# Patient Record
Sex: MALE | Race: BLACK OR AFRICAN AMERICAN | Employment: PART TIME | URBAN - METROPOLITAN AREA
[De-identification: names, ages, dates, MRNs, and addresses within clinical notes are randomized per-mention and may not be internally consistent; named-entity substitution may affect disease eponyms.]

---

## 2017-01-10 ENCOUNTER — HOSPITAL ENCOUNTER (INPATIENT)
Age: 37
LOS: 4 days | Discharge: HOME | DRG: 812 | End: 2017-01-14
Attending: EMERGENCY MEDICINE | Admitting: FAMILY MEDICINE
Payer: MEDICARE

## 2017-01-10 DIAGNOSIS — D57.00 SICKLE CELL PAIN CRISIS (HCC): ICD-10-CM

## 2017-01-10 DIAGNOSIS — D57.00 SICKLE CELL ANEMIA WITH CRISIS (HCC): Primary | ICD-10-CM

## 2017-01-10 LAB
ANION GAP BLD CALC-SCNC: 8 MMOL/L (ref 3–18)
BASOPHILS # BLD AUTO: 0.3 K/UL (ref 0–0.06)
BASOPHILS # BLD: 2 % (ref 0–3)
BUN SERPL-MCNC: 4 MG/DL (ref 7–18)
BUN/CREAT SERPL: 5 (ref 12–20)
CALCIUM SERPL-MCNC: 8.5 MG/DL (ref 8.5–10.1)
CHLORIDE SERPL-SCNC: 106 MMOL/L (ref 100–108)
CO2 SERPL-SCNC: 26 MMOL/L (ref 21–32)
CREAT SERPL-MCNC: 0.79 MG/DL (ref 0.6–1.3)
DIFFERENTIAL METHOD BLD: ABNORMAL
EOSINOPHIL # BLD: 0.9 K/UL (ref 0–0.4)
EOSINOPHIL NFR BLD: 7 % (ref 0–5)
ERYTHROCYTE [DISTWIDTH] IN BLOOD BY AUTOMATED COUNT: 23.6 % (ref 11.6–14.5)
GLUCOSE SERPL-MCNC: 94 MG/DL (ref 74–99)
HCT VFR BLD AUTO: 27.8 % (ref 36–48)
HGB BLD-MCNC: 9.8 G/DL (ref 13–16)
LYMPHOCYTES # BLD AUTO: 45 % (ref 20–51)
LYMPHOCYTES # BLD: 5.8 K/UL (ref 0.8–3.5)
MCH RBC QN AUTO: 26.5 PG (ref 24–34)
MCHC RBC AUTO-ENTMCNC: 35.3 G/DL (ref 31–37)
MCV RBC AUTO: 75.1 FL (ref 74–97)
MONOCYTES # BLD: 0.9 K/UL (ref 0–1)
MONOCYTES NFR BLD AUTO: 7 % (ref 2–9)
NEUTS SEG # BLD: 4.8 K/UL (ref 1.8–8)
NEUTS SEG NFR BLD AUTO: 37 % (ref 42–75)
NRBC BLD-RTO: 1 PER 100 WBC
PLATELET # BLD AUTO: 391 K/UL (ref 135–420)
PLATELET COMMENTS,PCOM: ABNORMAL
PMV BLD AUTO: 9.4 FL (ref 9.2–11.8)
POTASSIUM SERPL-SCNC: 3.6 MMOL/L (ref 3.5–5.5)
RBC # BLD AUTO: 3.7 M/UL (ref 4.7–5.5)
RBC MORPH BLD: ABNORMAL
RETICS/RBC NFR AUTO: 9.4 % (ref 0.5–2.3)
SODIUM SERPL-SCNC: 140 MMOL/L (ref 136–145)
WBC # BLD AUTO: 12.9 K/UL (ref 4.6–13.2)
WBC OTHER # BLD MANUAL: 2 10*3/UL

## 2017-01-10 PROCEDURE — 74011250636 HC RX REV CODE- 250/636: Performed by: FAMILY MEDICINE

## 2017-01-10 PROCEDURE — 65270000029 HC RM PRIVATE

## 2017-01-10 PROCEDURE — 99284 EMERGENCY DEPT VISIT MOD MDM: CPT

## 2017-01-10 PROCEDURE — 74011250636 HC RX REV CODE- 250/636: Performed by: EMERGENCY MEDICINE

## 2017-01-10 PROCEDURE — 74011250637 HC RX REV CODE- 250/637: Performed by: FAMILY MEDICINE

## 2017-01-10 PROCEDURE — 74011250636 HC RX REV CODE- 250/636: Performed by: INTERNAL MEDICINE

## 2017-01-10 PROCEDURE — 80048 BASIC METABOLIC PNL TOTAL CA: CPT | Performed by: EMERGENCY MEDICINE

## 2017-01-10 PROCEDURE — 85045 AUTOMATED RETICULOCYTE COUNT: CPT | Performed by: EMERGENCY MEDICINE

## 2017-01-10 PROCEDURE — 96374 THER/PROPH/DIAG INJ IV PUSH: CPT

## 2017-01-10 PROCEDURE — 85025 COMPLETE CBC W/AUTO DIFF WBC: CPT | Performed by: EMERGENCY MEDICINE

## 2017-01-10 PROCEDURE — 96375 TX/PRO/DX INJ NEW DRUG ADDON: CPT

## 2017-01-10 PROCEDURE — 96376 TX/PRO/DX INJ SAME DRUG ADON: CPT

## 2017-01-10 PROCEDURE — 96361 HYDRATE IV INFUSION ADD-ON: CPT

## 2017-01-10 RX ORDER — HYDROMORPHONE HYDROCHLORIDE 1 MG/ML
2 INJECTION, SOLUTION INTRAMUSCULAR; INTRAVENOUS; SUBCUTANEOUS ONCE
Status: COMPLETED | OUTPATIENT
Start: 2017-01-10 | End: 2017-01-10

## 2017-01-10 RX ORDER — HYDROMORPHONE HYDROCHLORIDE 1 MG/ML
2 INJECTION, SOLUTION INTRAMUSCULAR; INTRAVENOUS; SUBCUTANEOUS
Status: DISCONTINUED | OUTPATIENT
Start: 2017-01-10 | End: 2017-01-11

## 2017-01-10 RX ORDER — DIPHENHYDRAMINE HYDROCHLORIDE 50 MG/ML
50 INJECTION, SOLUTION INTRAMUSCULAR; INTRAVENOUS ONCE
Status: COMPLETED | OUTPATIENT
Start: 2017-01-10 | End: 2017-01-10

## 2017-01-10 RX ORDER — SODIUM CHLORIDE 9 MG/ML
150 INJECTION, SOLUTION INTRAVENOUS CONTINUOUS
Status: DISCONTINUED | OUTPATIENT
Start: 2017-01-10 | End: 2017-01-14 | Stop reason: HOSPADM

## 2017-01-10 RX ORDER — HYDROXYUREA 500 MG/1
500 CAPSULE ORAL 2 TIMES DAILY
Status: DISCONTINUED | OUTPATIENT
Start: 2017-01-10 | End: 2017-01-14 | Stop reason: HOSPADM

## 2017-01-10 RX ORDER — FENTANYL 25 UG/1
1 PATCH TRANSDERMAL
Status: DISCONTINUED | OUTPATIENT
Start: 2017-01-10 | End: 2017-01-14 | Stop reason: HOSPADM

## 2017-01-10 RX ORDER — SODIUM CHLORIDE 9 MG/ML
150 INJECTION, SOLUTION INTRAVENOUS CONTINUOUS
Status: DISCONTINUED | OUTPATIENT
Start: 2017-01-10 | End: 2017-01-10

## 2017-01-10 RX ORDER — DIPHENHYDRAMINE HYDROCHLORIDE 50 MG/ML
25 INJECTION, SOLUTION INTRAMUSCULAR; INTRAVENOUS
Status: COMPLETED | OUTPATIENT
Start: 2017-01-10 | End: 2017-01-10

## 2017-01-10 RX ORDER — HYDROMORPHONE HYDROCHLORIDE 2 MG/ML
2 INJECTION, SOLUTION INTRAMUSCULAR; INTRAVENOUS; SUBCUTANEOUS ONCE
Status: COMPLETED | OUTPATIENT
Start: 2017-01-10 | End: 2017-01-10

## 2017-01-10 RX ORDER — ONDANSETRON 4 MG/1
8 TABLET, FILM COATED ORAL
Status: DISCONTINUED | OUTPATIENT
Start: 2017-01-10 | End: 2017-01-14 | Stop reason: HOSPADM

## 2017-01-10 RX ORDER — DIPHENHYDRAMINE HYDROCHLORIDE 50 MG/ML
25 INJECTION, SOLUTION INTRAMUSCULAR; INTRAVENOUS
Status: DISCONTINUED | OUTPATIENT
Start: 2017-01-10 | End: 2017-01-14 | Stop reason: HOSPADM

## 2017-01-10 RX ORDER — ONDANSETRON 2 MG/ML
4 INJECTION INTRAMUSCULAR; INTRAVENOUS
Status: COMPLETED | OUTPATIENT
Start: 2017-01-10 | End: 2017-01-10

## 2017-01-10 RX ORDER — HYDROMORPHONE HYDROCHLORIDE 1 MG/ML
1 INJECTION, SOLUTION INTRAMUSCULAR; INTRAVENOUS; SUBCUTANEOUS ONCE
Status: COMPLETED | OUTPATIENT
Start: 2017-01-10 | End: 2017-01-10

## 2017-01-10 RX ORDER — SODIUM CHLORIDE 450 MG/100ML
150 INJECTION, SOLUTION INTRAVENOUS CONTINUOUS
Status: DISCONTINUED | OUTPATIENT
Start: 2017-01-10 | End: 2017-01-10

## 2017-01-10 RX ADMIN — HYDROMORPHONE HYDROCHLORIDE 2 MG: 1 INJECTION, SOLUTION INTRAMUSCULAR; INTRAVENOUS; SUBCUTANEOUS at 20:52

## 2017-01-10 RX ADMIN — SODIUM CHLORIDE 1000 ML: 900 INJECTION, SOLUTION INTRAVENOUS at 12:36

## 2017-01-10 RX ADMIN — HYDROMORPHONE HYDROCHLORIDE 2 MG: 2 INJECTION, SOLUTION INTRAMUSCULAR; INTRAVENOUS; SUBCUTANEOUS at 12:32

## 2017-01-10 RX ADMIN — HYDROXYUREA 500 MG: 500 CAPSULE ORAL at 21:50

## 2017-01-10 RX ADMIN — ONDANSETRON HYDROCHLORIDE 4 MG: 2 SOLUTION INTRAMUSCULAR; INTRAVENOUS at 02:48

## 2017-01-10 RX ADMIN — DIPHENHYDRAMINE HYDROCHLORIDE 25 MG: 50 INJECTION INTRAMUSCULAR; INTRAVENOUS at 12:31

## 2017-01-10 RX ADMIN — HYDROMORPHONE HYDROCHLORIDE 2 MG: 1 INJECTION, SOLUTION INTRAMUSCULAR; INTRAVENOUS; SUBCUTANEOUS at 02:48

## 2017-01-10 RX ADMIN — DIPHENHYDRAMINE HYDROCHLORIDE 25 MG: 50 INJECTION INTRAMUSCULAR; INTRAVENOUS at 20:54

## 2017-01-10 RX ADMIN — HYDROMORPHONE HYDROCHLORIDE 2 MG: 1 INJECTION, SOLUTION INTRAMUSCULAR; INTRAVENOUS; SUBCUTANEOUS at 19:46

## 2017-01-10 RX ADMIN — ONDANSETRON HYDROCHLORIDE 8 MG: 8 TABLET, FILM COATED ORAL at 17:18

## 2017-01-10 RX ADMIN — HYDROMORPHONE HYDROCHLORIDE 2 MG: 1 INJECTION, SOLUTION INTRAMUSCULAR; INTRAVENOUS; SUBCUTANEOUS at 17:18

## 2017-01-10 RX ADMIN — DIPHENHYDRAMINE HYDROCHLORIDE 50 MG: 50 INJECTION INTRAMUSCULAR; INTRAVENOUS at 02:48

## 2017-01-10 RX ADMIN — DIPHENHYDRAMINE HYDROCHLORIDE 50 MG: 50 INJECTION INTRAMUSCULAR; INTRAVENOUS at 07:02

## 2017-01-10 RX ADMIN — SODIUM CHLORIDE 125 ML/HR: 900 INJECTION, SOLUTION INTRAVENOUS at 17:19

## 2017-01-10 RX ADMIN — SODIUM CHLORIDE 125 ML/HR: 900 INJECTION, SOLUTION INTRAVENOUS at 21:06

## 2017-01-10 RX ADMIN — HYDROMORPHONE HYDROCHLORIDE 2 MG: 1 INJECTION, SOLUTION INTRAMUSCULAR; INTRAVENOUS; SUBCUTANEOUS at 05:46

## 2017-01-10 RX ADMIN — SODIUM CHLORIDE 1000 ML: 900 INJECTION, SOLUTION INTRAVENOUS at 02:49

## 2017-01-10 RX ADMIN — HYDROMORPHONE HYDROCHLORIDE 2 MG: 1 INJECTION, SOLUTION INTRAMUSCULAR; INTRAVENOUS; SUBCUTANEOUS at 03:32

## 2017-01-10 RX ADMIN — HYDROMORPHONE HYDROCHLORIDE 1 MG: 1 INJECTION, SOLUTION INTRAMUSCULAR; INTRAVENOUS; SUBCUTANEOUS at 10:41

## 2017-01-10 RX ADMIN — HYDROMORPHONE HYDROCHLORIDE 2 MG: 1 INJECTION, SOLUTION INTRAMUSCULAR; INTRAVENOUS; SUBCUTANEOUS at 06:53

## 2017-01-10 NOTE — IP AVS SNAPSHOT
[address] [address] [facility] Patient: Neil Brown MRN: [id number] PHT:5/90/1407 You are allergic to the following Allergen Reactions Eggshell Membrane Nausea and Vomiting Milk Nausea and Vomiting Recent Documentation Weight BMI Smoking Status 78 kg 23.33 kg/m2 Never Smoker Emergency Contacts Name Discharge Info Relation Home Work Mobile [address] CAREGIVER [3] Spouse [3] [phone] Art Moreno  Mother [14] [phone] About your hospitalization You were admitted on:  January 10, 2017 You last received care in the:  [facility] [address] You were discharged on:  January 14, 2017 Unit phone number:  [phone] Why you were hospitalized Your primary diagnosis was:  Not on File Providers Seen During Your Hospitalizations Provider Role Specialty Primary office phone Edd Oppenheim, MD Attending Provider Internal Medicine [phone] Woodrow Hernandez MD Attending Provider Emergency Medicine [phone] Spencer Valle MD Attending Provider Emergency Medicine [phone] Gary Talbert MD Attending Provider [facility] [phone] Nilo Le MD Attending Provider Hematology and Oncology [phone] Your Primary Care Physician (PCP) Primary Care Physician Office Phone Office Fax [address] [facility], [address] [place] [address] [phone] Follow-up Information Follow up With Details Comments Contact Info Nilo Le MD On 1/13/2017 Kalina Chavez will call  the patient. [address] [facility] [address] [place] [address] 
[phone] Current Discharge Medication List  
  
START taking these medications Dose & Instructions Dispensing Information Comments Morning Noon Evening Bedtime  
 folic acid 1 mg tablet Commonly known as:  Google Your next dose is: Today, Tomorrow Other:  _________ Dose:  1 mg Take 1 Tab by mouth daily. Quantity:  30 Tab Refills:  1 CONTINUE these medications which have NOT CHANGED Dose & Instructions Dispensing Information Comments Morning Noon Evening Bedtime  
 fentaNYL 25 mcg/hr PATCH Commonly known as:  Alexis Herrera Your next dose is: Today, Tomorrow Other:  _________ Dose:  1 Patch 1 Patch by TransDERmal route every seventy-two (72) hours. Max Daily Amount: 1 Patch. Quantity:  5 Patch Refills:  0 HYDROmorphone 2 mg tablet Commonly known as:  DILAUDID Your next dose is: Today, Tomorrow Other:  _________ Dose:  2 mg Take 1 Tab by mouth every four (4) hours as needed for Pain. Max Daily Amount: 12 mg. Quantity:  40 Tab Refills:  0  
     
   
   
   
  
 hydroxyurea 500 mg capsule Commonly known as:  HYDREA Your next dose is: Today, Tomorrow Other:  _________ Dose:  500 mg Take 500 mg by mouth two (2) times a day. Indications: SICKLE CELL ANEMIA WITH CRISIS Refills:  0  
     
   
   
   
  
 ondansetron hcl 4 mg tablet Commonly known as:  ZOFRAN (AS HYDROCHLORIDE) Your next dose is: Today, Tomorrow Other:  _________ Dose:  8 mg Take 2 Tabs by mouth every eight (8) hours as needed for Nausea. Quantity:  12 Tab Refills:  0 Where to Get Your Medications Information on where to get these meds will be given to you by the nurse or doctor. ! Ask your nurse or doctor about these medications  
  folic acid 1 mg tablet Discharge Instructions Sickle Cell Crisis: Care Instructions Your Care Instructions Sickle cell crisis is a painful episode that may begin suddenly in a person with sickle cell disease. Sickle cell disease turns normal, round red blood cells into cells that look like jeane or crescent moons. The sickle-shaped cells can get stuck in blood vessels, blocking blood flow and causing severe pain. The pain can occur in the bones of the spine, the arms and legs, the chest, and the abdomen. An episode may be called a \"painful event\" or \"painful crisis. \" Some people who have sickle cell disease have many painful events, while others have few or none. Treatment depends on the level of pain and how long it lasts. Sometimes taking nonprescription pain relievers can help. Or you may need stronger pain relief medicine that is prescribed or given by a doctor. You may need to be treated in the hospital. 
It isn't always possible to know what sets off a painful event. But triggers include being dehydrated, cold temperatures, infection, stress, and not getting enough oxygen. Follow-up care is a key part of your treatment and safety. Be sure to make and go to all appointments, and call your doctor if you are having problems. It's also a good idea to know your test results and keep a list of the medicines you take. How can you care for yourself at home? · Create a pain management plan with your doctor. This plan should include the types of medicines you can take and other actions you can take at home to relieve pain. · Drink plenty of fluids, enough so that your urine is light yellow or clear like water. If you have kidney, heart, or liver disease and have to limit fluids, talk with your doctor before you increase the amount of fluids you drink. · Take your medicines exactly as prescribed. Call your doctor if you think you are having a problem with your medicine. · Take pain medicines exactly as directed. ¨ If the doctor gave you a prescription medicine for pain, take it as prescribed. ¨ If you are not taking a prescription pain medicine, ask your doctor if you can take an over-the-counter medicine. · Avoid alcohol. It can make you dehydrated. · Dress warmly in cold weather. The cold and windy weather can lead to severe pain. · Do not smoke. Smoking can reduce the amount of oxygen in your blood. · Get plenty of sleep. When should you call for help? Call 911 anytime you think you may need emergency care. For example, call if: 
· You passed out (lost consciousness). Call your doctor now or seek immediate medical care if: 
· You are in severe pain. · You are dizzy or lightheaded, or you feel like you may faint. · You have a fever. · You are short of breath. · Your symptoms are getting worse. Watch closely for changes in your health, and be sure to contact your doctor if you are not getting better as expected. Where can you learn more? Go to http://manuel-amadeo.info/. Enter F104 in the search box to learn more about \"Sickle Cell Crisis: Care Instructions. \" Current as of: February 5, 2016 Content Version: 11.1 © 6749-5068 Silenseed. Care instructions adapted under license by [employer] (which disclaims liability or warranty for this information). If you have questions about a medical condition or this instruction, always ask your healthcare professional. [address] [place] [address] any warranty or liability for your use of this information. Sickle Cell Disease: Care Instructions Your Care Instructions Sickle cell disease turns normal, round red blood cells into misshaped cells that look like jeane or crescent moons. The sickle-shaped cells can get stuck in blood vessels, blocking blood flow and causing severe pain. The sickle-shaped cells also can harm organs, muscles, and bones. It is a lifelong condition. Sickle cell disease is passed down in families. You can talk to your doctor about whether to have genetic tests to find out the chance of having a child with the disease.  Your doctor also may recommend that your family members get tested for sickle cell disease. Your doctor may treat you with medicines. Some people get blood transfusions or a bone marrow transplant. Managing pain is an important part of your treatment. Follow-up care is a key part of your treatment and safety. Be sure to make and go to all appointments, and call your doctor if you are having problems. It's also a good idea to know your test results and keep a list of the medicines you take. How can you care for yourself at home? · Take your medicines exactly as prescribed. Call your doctor if you think you are having a problem with your medicine. · Take pain medicines exactly as directed. ¨ If the doctor gave you a prescription medicine for pain, take it as prescribed. ¨ If you are not taking a prescription pain medicine, ask your doctor if you can take an over-the-counter medicine. · Try to help ease pain by distracting yourself. Use guided imagery, deep breathing, and relaxation exercises. A pain specialist can teach you pain management skills. · Avoid alcohol. It can make you dehydrated. · Dress warmly in cold weather. The cold and windy weather can lead to severe pain. · Do not smoke. Smoking can reduce the amount of oxygen in your blood. If you need help quitting, talk to your doctor about stop-smoking programs and medicines. These can increase your chances of quitting for good. · Get plenty of sleep. · Get regular eye exams. Sickle cell disease can cause vision problems. · Wear medical alert jewelry that says that you have sickle cell disease. You can buy this at most drugstores. · Avoid colds and flu. Get a pneumococcal vaccine shot. If you have had one before, ask your doctor whether you need another dose. Get a flu shot every year. If you must be around people with colds or flu, wash your hands often. When should you call for help? Call 911 anytime you think you may need emergency care. For example, call if: · You passed out (lost consciousness). · You are in severe pain that is not helped by your pain medicines. Call your doctor now or seek immediate medical care if: 
· You have these signs of acute chest syndrome, a problem caused by sickle cell disease: ¨ Cough. ¨ Chest pain. ¨ Fever. ¨ Shortness of breath. · You have vision problems. · You have severe belly pain. · You have a severe headache. · You have less urine than normal or no urine. · You have vomiting or diarrhea that does not go away after 2 hours. · You are dizzy or lightheaded, or you feel like you may faint. · You have sudden numbness or tingling in your hands, feet, fingers, or toes (even if it goes away). · You suddenly have poor balance and coordination when you walk (even if it goes away). · You have an erection that lasts more than 2 to 3 hours or is extremely painful. Watch closely for changes in your health, and be sure to contact your doctor if you have any problems. Where can you learn more? Go to http://manuel-amadeo.info/. Enter 486 4676 in the search box to learn more about \"Sickle Cell Disease: Care Instructions. \" Current as of: February 5, 2016 Content Version: 11.1 © 1007-8326 Section 101. Care instructions adapted under license by [employer] (which disclaims liability or warranty for this information). If you have questions about a medical condition or this instruction, always ask your healthcare professional. [place] [address] any warranty or liability for your use of this information. Sickle Cell Disease: Care Instructions Your Care Instructions Sickle cell disease turns normal, round red blood cells into misshaped cells that look like jeane or crescent moons. The sickle-shaped cells can get stuck in blood vessels, blocking blood flow and causing severe pain. The sickle-shaped cells also can harm organs, muscles, and bones.  It is a lifelong condition. Sickle cell disease is passed down in families. You can talk to your doctor about whether to have genetic tests to find out the chance of having a child with the disease. Your doctor also may recommend that your family members get tested for sickle cell disease. Your doctor may treat you with medicines. Some people get blood transfusions or a bone marrow transplant. Managing pain is an important part of your treatment. Follow-up care is a key part of your treatment and safety. Be sure to make and go to all appointments, and call your doctor if you are having problems. It's also a good idea to know your test results and keep a list of the medicines you take. How can you care for yourself at home? · Take your medicines exactly as prescribed. Call your doctor if you think you are having a problem with your medicine. · Take pain medicines exactly as directed. ¨ If the doctor gave you a prescription medicine for pain, take it as prescribed. ¨ If you are not taking a prescription pain medicine, ask your doctor if you can take an over-the-counter medicine. · Try to help ease pain by distracting yourself. Use guided imagery, deep breathing, and relaxation exercises. A pain specialist can teach you pain management skills. · Avoid alcohol. It can make you dehydrated. · Dress warmly in cold weather. The cold and windy weather can lead to severe pain. · Do not smoke. Smoking can reduce the amount of oxygen in your blood. If you need help quitting, talk to your doctor about stop-smoking programs and medicines. These can increase your chances of quitting for good. · Get plenty of sleep. · Get regular eye exams. Sickle cell disease can cause vision problems. · Wear medical alert jewelry that says that you have sickle cell disease. You can buy this at most [employer]. · Avoid colds and flu. Get a pneumococcal vaccine shot.  If you have had one before, ask your doctor whether you need another dose. Get a flu shot every year. If you must be around people with colds or flu, wash your hands often. When should you call for help? Call 911 anytime you think you may need emergency care. For example, call if: 
· You passed out (lost consciousness). · You are in severe pain that is not helped by your pain medicines. Call your doctor now or seek immediate medical care if: 
· You have these signs of acute chest syndrome, a problem caused by sickle cell disease: ¨ Cough. ¨ Chest pain. ¨ Fever. ¨ Shortness of breath. · You have vision problems. · You have severe belly pain. · You have a severe headache. · You have less urine than normal or no urine. · You have vomiting or diarrhea that does not go away after 2 hours. · You are dizzy or lightheaded, or you feel like you may faint. · You have sudden numbness or tingling in your hands, feet, fingers, or toes (even if it goes away). · You suddenly have poor balance and coordination when you walk (even if it goes away). · You have an erection that lasts more than 2 to 3 hours or is extremely painful. Watch closely for changes in your health, and be sure to contact your doctor if you have any problems. Where can you learn more? Go to http://manuel-amadeo.info/. Enter 486 5792 in the search box to learn more about \"Sickle Cell Disease: Care Instructions. \" Current as of: February 5, 2016 Content Version: 11.1 © 6673-4436 [employer]ing. Care instructions adapted under license by [employer] (which disclaims liability or warranty for this information). If you have questions about a medical condition or this instruction, always ask your healthcare professional. [place] [address] any warranty or liability for your use of this information. Patient armband removed and shredded MyChart Activation Thank you for requesting access to Acumen. Please follow the instructions below to securely access and download your online medical record. Acumen allows you to send messages to your doctor, view your test results, renew your prescriptions, schedule appointments, and more. How Do I Sign Up? 1. In your internet browser, go to [email or web address] 
2. Click on the First Time User? Click Here link in the Sign In box. You will be redirect to the New Member Sign Up page. 3. Enter your Acumen Access Code exactly as it appears below. You will not need to use this code after youve completed the sign-up process. If you do not sign up before the expiration date, you must request a new code. Acumen Access Code: [email or web address] Expires: 1/15/2017  6:03 PM (This is the date your Acumen access code will ) 4. Enter the last four digits of your Social Security Number (xxxx) and Date of Birth (mm/dd/yyyy) as indicated and click Submit. You will be taken to the next sign-up page. 5. Create a Acumen ID. This will be your Acumen login ID and cannot be changed, so think of one that is secure and easy to remember. 6. Create a Acumen password. You can change your password at any time. 7. Enter your Password Reset Question and Answer. This can be used at a later time if you forget your password. 8. Enter your e-mail address. You will receive e-mail notification when new information is available in [address] 9. Click Sign Up. You can now view and download portions of your medical record. 10. Click the Download Summary menu link to download a portable copy of your medical information. Additional Information If you have questions, please visit the Frequently Asked Questions section of the Acumen website at [email or web address] Remember, Acumen is NOT to be used for urgent needs. For medical emergencies, dial 911. DISCHARGE SUMMARY from Nurse The following personal items are in your possession at time of discharge: 
 
Dental Appliances: None Visual Aid: None Home Medications: None Jewelry: Watch Clothing: Pants, Shirt, Undergarments, Footwear, Jacket/Coat, Socks (2 shirts total ) Other Valuables: Cell Phone () PATIENT INSTRUCTIONS: 
 
 
F-face looks uneven A-arms unable to move or move unevenly S-speech slurred or non-existent T-time-call 911 as soon as signs and symptoms begin-DO NOT go Back to bed or wait to see if you get better-TIME IS BRAIN. Warning Signs of HEART ATTACK Call 911 if you have these symptoms: 
? Chest discomfort. Most heart attacks involve discomfort in the center of the chest that lasts more than a few minutes, or that goes away and comes back. It can feel like uncomfortable pressure, squeezing, fullness, or pain. ? Discomfort in other areas of the upper body. Symptoms can include pain or discomfort in one or both arms, the back, neck, jaw, or stomach. ? Shortness of breath with or without chest discomfort. ? Other signs may include breaking out in a cold sweat, nausea, or lightheadedness. Don't wait more than five minutes to call [address]! Fast action can save your life. Calling 911 is almost always the fastest way to get lifesaving treatment. Emergency Medical Services staff can begin treatment when they arrive  up to an hour sooner than if someone gets to the hospital by car. The discharge information has been reviewed with the patient. The patient verbalized understanding.  
 
Discharge medications reviewed with the patient and appropriate educational materials and side effects teaching were provided. Discharge Orders None Inspirato Announcement We are excited to announce that we are making your provider's discharge notes available to you in Inspirato. You will see these notes when they are completed and signed by the physician that discharged you from your recent hospital stay. If you have any questions or concerns about any information you see in Inspirato, please call the Health Information Department where you were seen or reach out to your Primary Care Provider for more information about your plan of care. Introducing [facility]! Clifton Cristina introduces Inspirato patient portal. Now you can access parts of your medical record, email your doctor's office, and request medication refills online. 1. In your internet browser, go to [email or web address] 2. Click on the First Time User? Click Here link in the Sign In box. You will see the New Member Sign Up page. 3. Enter your Inspirato Access Code exactly as it appears below. You will not need to use this code after youve completed the sign-up process. If you do not sign up before the expiration date, you must request a new code. · Inspirato Access Code: [email or web address] Expires: 1/15/2017  6:03 PM 
 
4. Enter the last four digits of your Social Security Number (xxxx) and Date of Birth (mm/dd/yyyy) as indicated and click Submit. You will be taken to the next sign-up page. 5. Create a Inspirato ID. This will be your Inspirato login ID and cannot be changed, so think of one that is secure and easy to remember. 6. Create a Inspirato password. You can change your password at any time. 7. Enter your Password Reset Question and Answer. This can be used at a later time if you forget your password. 8. Enter your e-mail address. You will receive e-mail notification when new information is available in [address] 9. Click Sign Up. You can now view and download portions of your medical record. 10. Click the Download Summary menu link to download a portable copy of your medical information. If you have questions, please visit the Frequently Asked Questions section of the "[employer]" website. Remember, "[employer]" is NOT to be used for urgent needs. For medical emergencies, dial 911. Now available from your iPhone and Android! General Information Please provide this summary of care documentation to your next provider. Patient Signature:  ____________________________________________________________ Date:  ____________________________________________________________  
  
[facility] Provider Signature:  ____________________________________________________________ Date:  ____________________________________________________________

## 2017-01-10 NOTE — ED NOTES
Patient states that he has generalized pain in his body. Particular in his legs and back. Patient states that his pain has been a 9/10 for the [ast 2 days due to his sickle cell.

## 2017-01-10 NOTE — ED NOTES
Patient laying on stretcher with curtain partially closed and lights dimmed. No other request at this time.

## 2017-01-10 NOTE — H&P
[facility]  ROUTINE H AND PS    Name:  Meghan Groves  MR#:  [id number]  :  1980  Account #:  [de-identified]  Date of Adm:  01/10/2017      CHIEF COMPLAINT: Sickle cell pain crisis. HISTORY OF PRESENT ILLNESS: The patient is a 26-year-old  gentleman with a past medical history significant for sickle cell disease,  who came to the emergency department with pain complaints and  crisis. He uses a fentanyl patch at home, as well as p.o. Dilaudid. However, he has had onset of sickle cell pain crisis 2 days ago without  relief from his home medication regimen. His affects his lower back, both legs. It is constant in nature, at times  reaching 10/10. He does take hydroxyurea on an outpatient basis. In the emergency department, he has received multiple doses of  Dilaudid, has maintained his Fentanyl patch. He still has significant  pain complaints. Hematology/Oncology been consulted. He has no other complaints at the current time. REVIEW OF SYSTEMS: No headaches or visual changes, dizziness,  lightheadedness, any fevers or chills, nausea, vomiting, chest pain,  shortness of breath, palpitations, edema, cough, wheeze, abdominal  pain, dysuria, hematuria, polyuria, oliguria, constipation, diarrhea,  melena, hematochezia, rash, bruise, bleed, or hot or cold intolerance. Review otherwise negative 10 element review. ALLERGIES: NO KNOWN DRUG ALLERGIES. MEDICATIONS  1. Hydroxyurea 500 mg p.o. b.i.d.  2. Dilaudid 2 mg every 4 hours as needed for pain. 3. Benadryl 25 mg every 6 hours as needed for itching. 4. Fentanyl 25 mcg per hour patch to skin every 72 hours. 5. Zofran 4 mg p.o. every 8 hours p.r.n. nausea. PAST MEDICAL HISTORY: Sickle cell pain crisis with sickle cell  disease. PAST SURGICAL HISTORY: No new surgical history. FAMILY HISTORY: No new family history. SOCIAL HISTORY: No alcohol, tobacco or any other drug use.     PHYSICAL EXAMINATION  VITAL SIGNS: Last set of vitals include a temperature of 98.2, pulse  77, blood pressure 146/70, respiratory rate 100% on room air. GENERAL: The patient is a well-developed, well-nourished gentleman  who is awake, alert and oriented, and appears to be in mild discomfort. HEENT: Head is normocephalic and atraumatic. Pupils equal, round,  reactive to light. Extraocular motion intact. Nares are patent both sides. Posterior pharynx is clear. Mucous membranes are moist. Tongue is  midline. NECK: Supple, no lymphadenopathy. CARDIOVASCULAR: Regular rate and rhythm. No murmur, rubs, or  gallops. PULMONARY: Clear to auscultation bilaterally. ABDOMEN: Soft, nontender, nondistended. Normal bowel sounds. No  masses are felt. EXTREMITIES: 5/5 motor strength x4. He does have some pain with  movement. 2+ bilateral pedal pulses. No calf tenderness, no edema. NEUROLOGIC: Cranial nerves 2 through 12 grossly intact. LABORATORY DATA: Includes a metabolic panel with a sodium 140,  potassium 3.6, chloride 106, CO2 26, BUN 4, creatinine 0.7, glucose  94, calcium 8.5. CBC with a white count 12.9, hemoglobin and hematocrit of 9.8/27.8,  platelets 188. Differential 37 segs, 45 lymphs, 7 monocytes, 7  eosinophils, 2 basophils. IMPRESSION: A 64-year-old gentleman with a history significant for  the above, who is here with sickle cell pain crisis. PLAN  1. Sickle cell pain crisis: Will maintain him on IV Dilaudid 2 mg every 2  hours as needed for pain, continue to reassess. Holding parameters  for sedation or respiratory rate less than 12. Continue hydroxyurea,  maintain on supplemental oxygen, as well. We will go ahead and  continue his Fentanyl patch as well. Hematology and Oncology have  been consulted. 2. Deep venous thrombosis prophylaxis in the form of bilateral  sequential compression devices.         Shanthi Alfaro MD    PP / DC  D:  01/10/2017   15:31  T:  01/10/2017   16:01  Job #:  [id number]

## 2017-01-10 NOTE — DISCHARGE INSTRUCTIONS
Sickle Cell Crisis: Care Instructions  Your Care Instructions    Sickle cell crisis is a painful episode that may begin suddenly in a person with sickle cell disease. Sickle cell disease turns normal, round red blood cells into cells that look like jeane or crescent moons. The sickle-shaped cells can get stuck in blood vessels, blocking blood flow and causing severe pain. The pain can occur in the bones of the spine, the arms and legs, the chest, and the abdomen. An episode may be called a \"painful event\" or \"painful crisis. \" Some people who have sickle cell disease have many painful events, while others have few or none. Treatment depends on the level of pain and how long it lasts. Sometimes taking nonprescription pain relievers can help. Or you may need stronger pain relief medicine that is prescribed or given by a doctor. You may need to be treated in the hospital.  It isn't always possible to know what sets off a painful event. But triggers include being dehydrated, cold temperatures, infection, stress, and not getting enough oxygen. Follow-up care is a key part of your treatment and safety. Be sure to make and go to all appointments, and call your doctor if you are having problems. It's also a good idea to know your test results and keep a list of the medicines you take. How can you care for yourself at home? · Create a pain management plan with your doctor. This plan should include the types of medicines you can take and other actions you can take at home to relieve pain. · Drink plenty of fluids, enough so that your urine is light yellow or clear like water. If you have kidney, heart, or liver disease and have to limit fluids, talk with your doctor before you increase the amount of fluids you drink. · Take your medicines exactly as prescribed. Call your doctor if you think you are having a problem with your medicine. · Take pain medicines exactly as directed.   ¨ If the doctor gave you a prescription medicine for pain, take it as prescribed. ¨ If you are not taking a prescription pain medicine, ask your doctor if you can take an over-the-counter medicine. · Avoid alcohol. It can make you dehydrated. · Dress warmly in cold weather. The cold and windy weather can lead to severe pain. · Do not smoke. Smoking can reduce the amount of oxygen in your blood. · Get plenty of sleep. When should you call for help? Call 911 anytime you think you may need emergency care. For example, call if:  · You passed out (lost consciousness). Call your doctor now or seek immediate medical care if:  · You are in severe pain. · You are dizzy or lightheaded, or you feel like you may faint. · You have a fever. · You are short of breath. · Your symptoms are getting worse. Watch closely for changes in your health, and be sure to contact your doctor if you are not getting better as expected. Where can you learn more? Go to http://manuel-amadeo.info/. Enter F104 in the search box to learn more about \"Sickle Cell Crisis: Care Instructions. \"  Current as of: February 5, 2016  Content Version: 11.1  © 9058-9368 [employer]. Care instructions adapted under license by [employer] (which disclaims liability or warranty for this information). If you have questions about a medical condition or this instruction, always ask your healthcare professional. [place] [address] any warranty or liability for your use of this information. Sickle Cell Disease: Care Instructions  Your Care Instructions    Sickle cell disease turns normal, round red blood cells into misshaped cells that look like jeane or crescent moons. The sickle-shaped cells can get stuck in blood vessels, blocking blood flow and causing severe pain. The sickle-shaped cells also can harm organs, muscles, and bones. It is a lifelong condition. Sickle cell disease is passed down in families.  You can talk to your doctor about whether to have genetic tests to find out the chance of having a child with the disease. Your doctor also may recommend that your family members get tested for sickle cell disease. Your doctor may treat you with medicines. Some people get blood transfusions or a bone marrow transplant. Managing pain is an important part of your treatment. Follow-up care is a key part of your treatment and safety. Be sure to make and go to all appointments, and call your doctor if you are having problems. It's also a good idea to know your test results and keep a list of the medicines you take. How can you care for yourself at home? · Take your medicines exactly as prescribed. Call your doctor if you think you are having a problem with your medicine. · Take pain medicines exactly as directed. ¨ If the doctor gave you a prescription medicine for pain, take it as prescribed. ¨ If you are not taking a prescription pain medicine, ask your doctor if you can take an over-the-counter medicine. · Try to help ease pain by distracting yourself. Use guided imagery, deep breathing, and relaxation exercises. A pain specialist can teach you pain management skills. · Avoid alcohol. It can make you dehydrated. · Dress warmly in cold weather. The cold and windy weather can lead to severe pain. · Do not smoke. Smoking can reduce the amount of oxygen in your blood. If you need help quitting, talk to your doctor about stop-smoking programs and medicines. These can increase your chances of quitting for good. · Get plenty of sleep. · Get regular eye exams. Sickle cell disease can cause vision problems. · Wear medical alert jewelry that says that you have sickle cell disease. You can buy this at most drugstores. · Avoid colds and flu. Get a pneumococcal vaccine shot. If you have had one before, ask your doctor whether you need another dose. Get a flu shot every year.  If you must be around people with colds or flu, wash your hands often. When should you call for help? Call 911 anytime you think you may need emergency care. For example, call if:  · You passed out (lost consciousness). · You are in severe pain that is not helped by your pain medicines. Call your doctor now or seek immediate medical care if:  · You have these signs of acute chest syndrome, a problem caused by sickle cell disease:  ¨ Cough. ¨ Chest pain. ¨ Fever. ¨ Shortness of breath. · You have vision problems. · You have severe belly pain. · You have a severe headache. · You have less urine than normal or no urine. · You have vomiting or diarrhea that does not go away after 2 hours. · You are dizzy or lightheaded, or you feel like you may faint. · You have sudden numbness or tingling in your hands, feet, fingers, or toes (even if it goes away). · You suddenly have poor balance and coordination when you walk (even if it goes away). · You have an erection that lasts more than 2 to 3 hours or is extremely painful. Watch closely for changes in your health, and be sure to contact your doctor if you have any problems. Where can you learn more? Go to http://manuel-amadeo.info/. Enter 488 8392 in the search box to learn more about \"Sickle Cell Disease: Care Instructions. \"  Current as of: February 5, 2016  Content Version: 11.1  © 3044-8236 [employer]. Care instructions adapted under license by [employer] (which disclaims liability or warranty for this information). If you have questions about a medical condition or this instruction, always ask your healthcare professional. [place] [address] any warranty or liability for your use of this information. Sickle Cell Disease: Care Instructions  Your Care Instructions    Sickle cell disease turns normal, round red blood cells into misshaped cells that look like jeane or crescent moons.  The sickle-shaped cells can get stuck in blood vessels, blocking blood flow and causing severe pain. The sickle-shaped cells also can harm organs, muscles, and bones. It is a lifelong condition. Sickle cell disease is passed down in families. You can talk to your doctor about whether to have genetic tests to find out the chance of having a child with the disease. Your doctor also may recommend that your family members get tested for sickle cell disease. Your doctor may treat you with medicines. Some people get blood transfusions or a bone marrow transplant. Managing pain is an important part of your treatment. Follow-up care is a key part of your treatment and safety. Be sure to make and go to all appointments, and call your doctor if you are having problems. It's also a good idea to know your test results and keep a list of the medicines you take. How can you care for yourself at home? · Take your medicines exactly as prescribed. Call your doctor if you think you are having a problem with your medicine. · Take pain medicines exactly as directed. ¨ If the doctor gave you a prescription medicine for pain, take it as prescribed. ¨ If you are not taking a prescription pain medicine, ask your doctor if you can take an over-the-counter medicine. · Try to help ease pain by distracting yourself. Use guided imagery, deep breathing, and relaxation exercises. A pain specialist can teach you pain management skills. · Avoid alcohol. It can make you dehydrated. · Dress warmly in cold weather. The cold and windy weather can lead to severe pain. · Do not smoke. Smoking can reduce the amount of oxygen in your blood. If you need help quitting, talk to your doctor about stop-smoking programs and medicines. These can increase your chances of quitting for good. · Get plenty of sleep. · Get regular eye exams. Sickle cell disease can cause vision problems. · Wear medical alert jewelry that says that you have sickle cell disease. You can buy this at most drugstores.   · Avoid colds and flu. Get a pneumococcal vaccine shot. If you have had one before, ask your doctor whether you need another dose. Get a flu shot every year. If you must be around people with colds or flu, wash your hands often. When should you call for help? Call 911 anytime you think you may need emergency care. For example, call if:  · You passed out (lost consciousness). · You are in severe pain that is not helped by your pain medicines. Call your doctor now or seek immediate medical care if:  · You have these signs of acute chest syndrome, a problem caused by sickle cell disease:  ¨ Cough. ¨ Chest pain. ¨ Fever. ¨ Shortness of breath. · You have vision problems. · You have severe belly pain. · You have a severe headache. · You have less urine than normal or no urine. · You have vomiting or diarrhea that does not go away after 2 hours. · You are dizzy or lightheaded, or you feel like you may faint. · You have sudden numbness or tingling in your hands, feet, fingers, or toes (even if it goes away). · You suddenly have poor balance and coordination when you walk (even if it goes away). · You have an erection that lasts more than 2 to 3 hours or is extremely painful. Watch closely for changes in your health, and be sure to contact your doctor if you have any problems. Where can you learn more? Go to http://manuel-amadeo.info/. Enter 691 1275 in the search box to learn more about \"Sickle Cell Disease: Care Instructions. \"  Current as of: February 5, 2016  Content Version: 11.1  © 7615-0770 [employer]. Care instructions adapted under license by [employer] (which disclaims liability or warranty for this information). If you have questions about a medical condition or this instruction, always ask your healthcare professional. [place] [address] any warranty or liability for your use of this information.   Patient armband removed and shredded  MyChart Activation    Thank you for requesting access to [facility]. Please follow the instructions below to securely access and download your online medical record. [facility] allows you to send messages to your doctor, view your test results, renew your prescriptions, schedule appointments, and more. How Do I Sign Up? 1. In your internet browser, go to [email or web address]  2. Click on the First Time User? Click Here link in the Sign In box. You will be redirect to the New Member Sign Up page. 3. Enter your RVX Access Code exactly as it appears below. You will not need to use this code after youve completed the sign-up process. If you do not sign up before the expiration date, you must request a new code. RVX Access Code: [email or web address]  Expires: 1/15/2017  6:03 PM (This is the date your RVX access code will )    4. Enter the last four digits of your Social Security Number (xxxx) and Date of Birth (mm/dd/yyyy) as indicated and click Submit. You will be taken to the next sign-up page. 5. Create a RVX ID. This will be your RVX login ID and cannot be changed, so think of one that is secure and easy to remember. 6. Create a RVX password. You can change your password at any time. 7. Enter your Password Reset Question and Answer. This can be used at a later time if you forget your password. 8. Enter your e-mail address. You will receive e-mail notification when new information is available in [address]. 9. Click Sign Up. You can now view and download portions of your medical record. 10. Click the Download Summary menu link to download a portable copy of your medical information. Additional Information    If you have questions, please visit the Frequently Asked Questions section of the [facility]X website at [email or web address] Remember, [facility] is NOT to be used for urgent needs. For medical emergencies, dial 911.         DISCHARGE SUMMARY from Nurse    The following personal items are in your possession at time of discharge:    Dental Appliances: None  Visual Aid: None     Home Medications: None  Jewelry: Watch  Clothing: Pants, Shirt, Undergarments, Footwear, Jacket/Coat, Socks (2 shirts total )  Other Valuables: Cell Phone ()             PATIENT INSTRUCTIONS:    After general anesthesia or intravenous sedation, for 24 hours or while taking prescription Narcotics:  · Limit your activities  · Do not drive and operate hazardous machinery  · Do not make important personal or business decisions  · Do  not drink alcoholic beverages  · If you have not urinated within 8 hours after discharge, please contact your surgeon on call. Report the following to your surgeon:  · Excessive pain, swelling, redness or odor of or around the surgical area  · Temperature over 100.5  · Nausea and vomiting lasting longer than 4 hours or if unable to take medications  · Any signs of decreased circulation or nerve impairment to extremity: change in color, persistent  numbness, tingling, coldness or increase pain  · Any questions        What to do at Home:  Recommended activity: Activity as tolerated,     If you experience any of the following symptoms fever greater than 100, abdominal pain greater than 10, nausea vomiting or diarrhea, please follow up with PCP or ER. *  Please give a list of your current medications to your Primary Care Provider. *  Please update this list whenever your medications are discontinued, doses are      changed, or new medications (including over-the-counter products) are added. *  Please carry medication information at all times in case of emergency situations. These are general instructions for a healthy lifestyle:    No smoking/ No tobacco products/ Avoid exposure to second hand smoke    Surgeon General's Warning:  Quitting smoking now greatly reduces serious risk to your health.     Obesity, smoking, and sedentary lifestyle greatly increases your risk for illness    A healthy diet, regular physical exercise & weight monitoring are important for maintaining a healthy lifestyle    You may be retaining fluid if you have a history of heart failure or if you experience any of the following symptoms:  Weight gain of 3 pounds or more overnight or 5 pounds in a week, increased swelling in our hands or feet or shortness of breath while lying flat in bed. Please call your doctor as soon as you notice any of these symptoms; do not wait until your next office visit. Recognize signs and symptoms of STROKE:    F-face looks uneven    A-arms unable to move or move unevenly    S-speech slurred or non-existent    T-time-call 911 as soon as signs and symptoms begin-DO NOT go       Back to bed or wait to see if you get better-TIME IS BRAIN. Warning Signs of HEART ATTACK     Call 911 if you have these symptoms:   Chest discomfort. Most heart attacks involve discomfort in the center of the chest that lasts more than a few minutes, or that goes away and comes back. It can feel like uncomfortable pressure, squeezing, fullness, or pain.  Discomfort in other areas of the upper body. Symptoms can include pain or discomfort in one or both arms, the back, neck, jaw, or stomach.  Shortness of breath with or without chest discomfort.  Other signs may include breaking out in a cold sweat, nausea, or lightheadedness. Don't wait more than five minutes to call 911 - MINUTES MATTER! Fast action can save your life. Calling 911 is almost always the fastest way to get lifesaving treatment. Emergency Medical Services staff can begin treatment when they arrive -- up to an hour sooner than if someone gets to the hospital by car. The discharge information has been reviewed with the patient. The patient verbalized understanding.     Discharge medications reviewed with the patient and appropriate educational materials and side effects teaching were provided.

## 2017-01-10 NOTE — IP AVS SNAPSHOT
Current Discharge Medication List  
  
Take these medications at their scheduled times Dose & Instructions Dispensing Information Comments Morning Noon Evening Bedtime  
 fentaNYL 25 mcg/hr PATCH Commonly known as:  Alfredoe Gals Your next dose is: Today, Tomorrow Other:  ____________ Dose:  1 Patch 1 Patch by TransDERmal route every seventy-two (72) hours. Max Daily Amount: 1 Patch. Quantity:  5 Patch Refills:  0  
     
   
   
   
  
 folic acid 1 mg tablet Commonly known as:  Google Your next dose is: Today, Tomorrow Other:  ____________ Dose:  1 mg Take 1 Tab by mouth daily. Quantity:  30 Tab Refills:  1  
     
   
   
   
  
 hydroxyurea 500 mg capsule Commonly known as:  HYDREA Your next dose is: Today, Tomorrow Other:  ____________ Dose:  500 mg Take 500 mg by mouth two (2) times a day. Indications: SICKLE CELL ANEMIA WITH CRISIS Refills:  0 Take these medications as needed Dose & Instructions Dispensing Information Comments Morning Noon Evening Bedtime HYDROmorphone 2 mg tablet Commonly known as:  DILAUDID Your next dose is: Today, Tomorrow Other:  ____________ Dose:  2 mg Take 1 Tab by mouth every four (4) hours as needed for Pain. Max Daily Amount: 12 mg. Quantity:  40 Tab Refills:  0  
     
   
   
   
  
 ondansetron hcl 4 mg tablet Commonly known as:  ZOFRAN (AS HYDROCHLORIDE) Your next dose is: Today, Tomorrow Other:  ____________ Dose:  8 mg Take 2 Tabs by mouth every eight (8) hours as needed for Nausea. Quantity:  12 Tab Refills:  0 Where to Get Your Medications Information about where to get these medications is not yet available ! Ask your nurse or doctor about these medications  
  folic acid 1 mg tablet

## 2017-01-10 NOTE — ED PROVIDER NOTES
HPI Comments: 2:55 AM Jp Toro is a 43 y.o. male who presents to the ED c/o sickle cell crisis onset 2 days ago, but worsening today. C/o leg and back pain. Mentions that he has Dilaudid and Fentanyl patches at home, but they have provided him minimal relief. No further complaints at this time. The history is provided by the patient. Past Medical History:   Diagnosis Date    Sickle cell anemia with crisis ([facility]     Vitamin D deficiency        History reviewed. No pertinent past surgical history. Family History:   Problem Relation Age of Onset    Cancer Neg Hx     Diabetes Neg Hx     Heart Disease Neg Hx     Heart Attack Neg Hx     Hypertension Neg Hx     Stroke Neg Hx        Social History     Social History    Marital status:      Spouse name: N/A    Number of children: N/A    Years of education: N/A     Occupational History    Not on file. Social History Main Topics    Smoking status: Never Smoker    Smokeless tobacco: Never Used    Alcohol use No    Drug use: No    Sexual activity: Yes     Partners: Female     Birth control/ protection: None     Other Topics Concern    Not on file     Social History Narrative         ALLERGIES: Review of patient's allergies indicates no known allergies. Review of Systems   Constitutional: Negative for activity change, appetite change, diaphoresis and fever. HENT: Negative for congestion, dental problem, ear pain, hearing loss, nosebleeds, postnasal drip, sinus pressure, sneezing and tinnitus. Eyes: Negative for photophobia, discharge, redness and visual disturbance. Respiratory: Negative for cough, choking, shortness of breath, wheezing and stridor. Cardiovascular: Negative for chest pain, palpitations and leg swelling. Gastrointestinal: Negative for abdominal distention, abdominal pain, anal bleeding and blood in stool.    Genitourinary: Negative for decreased urine volume, difficulty urinating, discharge, dysuria, frequency, hematuria, penile swelling, scrotal swelling, testicular pain and urgency. Musculoskeletal: Positive for back pain. Negative for arthralgias, gait problem, joint swelling, myalgias and neck pain. (+) bilateral leg pain    Skin: Negative for color change and pallor. Neurological: Negative for dizziness, tremors, seizures, syncope and headaches. Hematological: Negative for adenopathy. Does not bruise/bleed easily. Psychiatric/Behavioral: Negative for agitation, behavioral problems, confusion and hallucinations. The patient is not nervous/anxious. Vitals:    01/10/17 0147 01/10/17 0223   BP: 126/77    Pulse: 77    Resp: 16    Temp: 98.2 °F (36.8 °C)    SpO2: 98% 98%            Physical Exam   Constitutional: He is oriented to person, place, and time. He appears well-developed and well-nourished. HENT:   Head: Normocephalic and atraumatic. Right Ear: External ear normal.   Left Ear: External ear normal.   Nose: Nose normal.   Mouth/Throat: Oropharynx is clear and moist.   Eyes: Conjunctivae and EOM are normal. Pupils are equal, round, and reactive to light. Right eye exhibits no discharge. No scleral icterus. Neck: Normal range of motion. Neck supple. No JVD present. No thyromegaly present. Cardiovascular: Normal rate, regular rhythm and intact distal pulses. Exam reveals no gallop and no friction rub. No murmur heard. Pulmonary/Chest: No respiratory distress. He has no wheezes. He has no rales. He exhibits no tenderness. Abdominal: He exhibits no distension and no mass. There is no tenderness. There is no rebound and no guarding. Musculoskeletal: Normal range of motion. He exhibits no edema. Lymphadenopathy:     He has no cervical adenopathy. Neurological: He is alert and oriented to person, place, and time. No cranial nerve deficit. Coordination normal.   Skin: Skin is warm and dry. No rash noted. No erythema.    Slightly jaundice    Psychiatric: He has a normal mood and affect. His behavior is normal. Judgment normal.   Nursing note and vitals reviewed. MDM  Number of Diagnoses or Management Options  Sickle cell anemia with crisis [facility]):   Sickle cell pain crisis [facility]):   Diagnosis management comments: 43year old male, 2 day history of SS crisis despite home Dilaudid. No history of any recent infection. The pains are in the legs. Will initiate treatment, follow       Amount and/or Complexity of Data Reviewed  Clinical lab tests: ordered      ED Course       Procedures    Vitals:  Patient Vitals for the past 12 hrs:   Temp Pulse Resp BP SpO2   01/10/17 0223 - - - - 98 %   01/10/17 0147 98.2 °F (36.8 °C) 77 16 126/77 98 %       Medications ordered:   Medications   sodium chloride 0.9 % bolus infusion 1,000 mL (1,000 mL IntraVENous New Bag 1/10/17 0249)   0.9% sodium chloride infusion (not administered)   HYDROmorphone (PF) (DILAUDID) injection 2 mg (2 mg IntraVENous Given 1/10/17 0248)   diphenhydrAMINE (BENADRYL) injection 50 mg (50 mg IntraVENous Given 1/10/17 0248)   ondansetron (ZOFRAN) injection 4 mg (4 mg IntraVENous Given 1/10/17 0248)         Lab findings:  Recent Results (from the past 12 hour(s))   CBC WITH AUTOMATED DIFF    Collection Time: 01/10/17  2:28 AM   Result Value Ref Range    WBC 12.9 4.6 - 13.2 K/uL    RBC 3.70 (L) 4.70 - 5.50 M/uL    HGB 9.8 (L) 13.0 - 16.0 g/dL    HCT 27.8 (L) 36.0 - 48.0 %    MCV 75.1 74.0 - 97.0 FL    MCH 26.5 24.0 - 34.0 PG    MCHC 35.3 31.0 - 37.0 g/dL    RDW 23.6 (H) 11.6 - 14.5 %    PLATELET 212 072 - 965 K/uL    MPV 9.4 9.2 - 11.8 FL    NEUTROPHILS PENDING %    LYMPHOCYTES PENDING %    MONOCYTES PENDING %    EOSINOPHILS PENDING %    BASOPHILS PENDING %    ABS. NEUTROPHILS PENDING K/UL    ABS. LYMPHOCYTES PENDING K/UL    ABS. MONOCYTES PENDING K/UL    ABS. EOSINOPHILS PENDING K/UL    ABS.  BASOPHILS PENDING K/UL    DF PENDING    METABOLIC PANEL, BASIC    Collection Time: 01/10/17  2:28 AM   Result Value Ref Range Sodium 140 136 - 145 mmol/L    Potassium 3.6 3.5 - 5.5 mmol/L    Chloride 106 100 - 108 mmol/L    CO2 26 21 - 32 mmol/L    Anion gap 8 3.0 - 18 mmol/L    Glucose 94 74 - 99 mg/dL    BUN 4 (L) 7.0 - 18 MG/DL    Creatinine 0.79 0.6 - 1.3 MG/DL    BUN/Creatinine ratio 5 (L) 12 - 20      GFR est AA >60 >60 ml/min/1.73m2    GFR est non-AA >60 >60 ml/min/1.73m2    Calcium 8.5 8.5 - 10.1 MG/DL   RETICULOCYTE COUNT    Collection Time: 01/10/17  2:28 AM   Result Value Ref Range    Reticulocyte count 9.4 (H) 0.5 - 2.3 %       EKG interpretation by ED Physician:    X-Ray, CT or other radiology findings or impressions:  No orders to display       Progress notes, Consult notes or additional Procedure notes:     Reevaluation of patient:   I have reassessed the patient. Patient is feeling . Patient was    Disposition:  Diagnosis: No diagnosis found. Disposition:    Follow-up Information     None           Patient's Medications   Start Taking    No medications on file   Continue Taking    DIPHENHYDRAMINE (BENADRYL) 25 MG CAPSULE    Take 25 mg by mouth every six (6) hours as needed for Itching or Skin Irritation. FENTANYL (DURAGESIC) 25 MCG/HR PATCH    1 Patch by TransDERmal route every seventy-two (72) hours. Max Daily Amount: 1 Patch. HYDROMORPHONE (DILAUDID) 2 MG TABLET    Take 1 Tab by mouth every four (4) hours as needed for Pain. Max Daily Amount: 12 mg. HYDROXYUREA (HYDREA) 500 MG CAPSULE    Take 500 mg by mouth two (2) times a day. Indications: SICKLE CELL ANEMIA WITH CRISIS    ONDANSETRON HCL (ZOFRAN, AS HYDROCHLORIDE,) 4 MG TABLET    Take 2 Tabs by mouth every eight (8) hours as needed for Nausea.    These Medications have changed    No medications on file   Stop Taking    No medications on file           SCRIBE ATTESTATION STATEMENT  Documented by: Jenna elaineing for, and in the presence of, Catalino Juares MD 2:58 AM     Signed by: Lynda Prasad, 1/10/17, 2:58 AM    PROVIDER ATTESTATION STATEMENT  I personally performed the services described in the documentation, reviewed the documentation, as recorded by the scribe in my presence, and it accurately and completely records my words and actions. Enma Kiser MD      Pt signed out to me by Dr. Holly Sharif pending pain control and reassessment. Pt states that he feels no better, still having low back pain despite IVF and multiple rounds of IV narcotics. He states that he does have pain medications at home and denies any recent trauma or fevers. Will plan to admit. Discussed with Dr. Rekha Brooks, will admit. Requests heme consult. Discussed with Dr. Farshad Faust, will consult on pt.   Guru Tony MD

## 2017-01-11 LAB
ALBUMIN SERPL BCP-MCNC: 3.5 G/DL (ref 3.4–5)
ALBUMIN/GLOB SERPL: 0.9 {RATIO} (ref 0.8–1.7)
ALP SERPL-CCNC: 101 U/L (ref 45–117)
ALT SERPL-CCNC: 39 U/L (ref 16–61)
ANION GAP BLD CALC-SCNC: 8 MMOL/L (ref 3–18)
AST SERPL W P-5'-P-CCNC: 57 U/L (ref 15–37)
BASOPHILS # BLD AUTO: 0 K/UL (ref 0–0.06)
BASOPHILS # BLD: 0 % (ref 0–3)
BILIRUB DIRECT SERPL-MCNC: 0.7 MG/DL (ref 0–0.2)
BILIRUB SERPL-MCNC: 2.7 MG/DL (ref 0.2–1)
BUN SERPL-MCNC: 6 MG/DL (ref 7–18)
BUN/CREAT SERPL: 7 (ref 12–20)
CALCIUM SERPL-MCNC: 7.9 MG/DL (ref 8.5–10.1)
CHLORIDE SERPL-SCNC: 108 MMOL/L (ref 100–108)
CO2 SERPL-SCNC: 24 MMOL/L (ref 21–32)
CREAT SERPL-MCNC: 0.87 MG/DL (ref 0.6–1.3)
DIFFERENTIAL METHOD BLD: ABNORMAL
EOSINOPHIL # BLD: 0.7 K/UL (ref 0–0.4)
EOSINOPHIL NFR BLD: 5 % (ref 0–5)
ERYTHROCYTE [DISTWIDTH] IN BLOOD BY AUTOMATED COUNT: 22.7 % (ref 11.6–14.5)
GLOBULIN SER CALC-MCNC: 3.8 G/DL (ref 2–4)
GLUCOSE SERPL-MCNC: 106 MG/DL (ref 74–99)
HCT VFR BLD AUTO: 23.3 % (ref 36–48)
HGB BLD-MCNC: 8.1 G/DL (ref 13–16)
LYMPHOCYTES # BLD AUTO: 37 % (ref 20–51)
LYMPHOCYTES # BLD: 5 K/UL (ref 0.8–3.5)
MCH RBC QN AUTO: 26.2 PG (ref 24–34)
MCHC RBC AUTO-ENTMCNC: 34.8 G/DL (ref 31–37)
MCV RBC AUTO: 75.4 FL (ref 74–97)
MONOCYTES # BLD: 0.5 K/UL (ref 0–1)
MONOCYTES NFR BLD AUTO: 4 % (ref 2–9)
NEUTS BAND NFR BLD MANUAL: 2 % (ref 0–5)
NEUTS SEG # BLD: 7.4 K/UL (ref 1.8–8)
NEUTS SEG NFR BLD AUTO: 52 % (ref 42–75)
PLATELET # BLD AUTO: 321 K/UL (ref 135–420)
PLATELET COMMENTS,PCOM: ABNORMAL
PMV BLD AUTO: 9.7 FL (ref 9.2–11.8)
POTASSIUM SERPL-SCNC: 3.6 MMOL/L (ref 3.5–5.5)
PROT SERPL-MCNC: 7.3 G/DL (ref 6.4–8.2)
RBC # BLD AUTO: 3.09 M/UL (ref 4.7–5.5)
RBC MORPH BLD: ABNORMAL
SODIUM SERPL-SCNC: 140 MMOL/L (ref 136–145)
WBC # BLD AUTO: 13.6 K/UL (ref 4.6–13.2)

## 2017-01-11 PROCEDURE — 74011250636 HC RX REV CODE- 250/636: Performed by: INTERNAL MEDICINE

## 2017-01-11 PROCEDURE — 74011250636 HC RX REV CODE- 250/636: Performed by: FAMILY MEDICINE

## 2017-01-11 PROCEDURE — 74011250637 HC RX REV CODE- 250/637: Performed by: FAMILY MEDICINE

## 2017-01-11 PROCEDURE — 80076 HEPATIC FUNCTION PANEL: CPT | Performed by: INTERNAL MEDICINE

## 2017-01-11 PROCEDURE — 85025 COMPLETE CBC W/AUTO DIFF WBC: CPT | Performed by: FAMILY MEDICINE

## 2017-01-11 PROCEDURE — 80048 BASIC METABOLIC PNL TOTAL CA: CPT | Performed by: FAMILY MEDICINE

## 2017-01-11 PROCEDURE — 65270000029 HC RM PRIVATE

## 2017-01-11 PROCEDURE — 36415 COLL VENOUS BLD VENIPUNCTURE: CPT | Performed by: FAMILY MEDICINE

## 2017-01-11 RX ORDER — HYDROMORPHONE HYDROCHLORIDE 2 MG/ML
2 INJECTION, SOLUTION INTRAMUSCULAR; INTRAVENOUS; SUBCUTANEOUS
Status: DISCONTINUED | OUTPATIENT
Start: 2017-01-11 | End: 2017-01-11 | Stop reason: CLARIF

## 2017-01-11 RX ORDER — HYDROMORPHONE HYDROCHLORIDE 4 MG/ML
1 INJECTION, SOLUTION INTRAMUSCULAR; INTRAVENOUS; SUBCUTANEOUS ONCE
Status: DISCONTINUED | OUTPATIENT
Start: 2017-01-11 | End: 2017-01-11 | Stop reason: SDUPTHER

## 2017-01-11 RX ORDER — HYDROMORPHONE HYDROCHLORIDE 1 MG/ML
1 INJECTION, SOLUTION INTRAMUSCULAR; INTRAVENOUS; SUBCUTANEOUS ONCE
Status: COMPLETED | OUTPATIENT
Start: 2017-01-11 | End: 2017-01-11

## 2017-01-11 RX ORDER — HYDROMORPHONE HYDROCHLORIDE 2 MG/ML
2 INJECTION, SOLUTION INTRAMUSCULAR; INTRAVENOUS; SUBCUTANEOUS
Status: DISCONTINUED | OUTPATIENT
Start: 2017-01-11 | End: 2017-01-14 | Stop reason: HOSPADM

## 2017-01-11 RX ADMIN — HYDROMORPHONE HYDROCHLORIDE 1 MG: 1 INJECTION, SOLUTION INTRAMUSCULAR; INTRAVENOUS; SUBCUTANEOUS at 08:51

## 2017-01-11 RX ADMIN — HYDROXYUREA 500 MG: 500 CAPSULE ORAL at 10:44

## 2017-01-11 RX ADMIN — SODIUM CHLORIDE 150 ML/HR: 900 INJECTION, SOLUTION INTRAVENOUS at 20:29

## 2017-01-11 RX ADMIN — HYDROMORPHONE HYDROCHLORIDE 2 MG: 2 INJECTION, SOLUTION INTRAMUSCULAR; INTRAVENOUS; SUBCUTANEOUS at 14:09

## 2017-01-11 RX ADMIN — HYDROMORPHONE HYDROCHLORIDE 2 MG: 2 INJECTION, SOLUTION INTRAMUSCULAR; INTRAVENOUS; SUBCUTANEOUS at 22:36

## 2017-01-11 RX ADMIN — DIPHENHYDRAMINE HYDROCHLORIDE 25 MG: 50 INJECTION INTRAMUSCULAR; INTRAVENOUS at 07:45

## 2017-01-11 RX ADMIN — HYDROMORPHONE HYDROCHLORIDE 2 MG: 2 INJECTION, SOLUTION INTRAMUSCULAR; INTRAVENOUS; SUBCUTANEOUS at 18:38

## 2017-01-11 RX ADMIN — HYDROMORPHONE HYDROCHLORIDE 2 MG: 1 INJECTION, SOLUTION INTRAMUSCULAR; INTRAVENOUS; SUBCUTANEOUS at 01:46

## 2017-01-11 RX ADMIN — HYDROMORPHONE HYDROCHLORIDE 2 MG: 2 INJECTION, SOLUTION INTRAMUSCULAR; INTRAVENOUS; SUBCUTANEOUS at 10:36

## 2017-01-11 RX ADMIN — DIPHENHYDRAMINE HYDROCHLORIDE 25 MG: 50 INJECTION INTRAMUSCULAR; INTRAVENOUS at 20:22

## 2017-01-11 RX ADMIN — HYDROXYUREA 500 MG: 500 CAPSULE ORAL at 21:20

## 2017-01-11 RX ADMIN — SODIUM CHLORIDE 150 ML/HR: 900 INJECTION, SOLUTION INTRAVENOUS at 14:25

## 2017-01-11 RX ADMIN — HYDROMORPHONE HYDROCHLORIDE 2 MG: 2 INJECTION, SOLUTION INTRAMUSCULAR; INTRAVENOUS; SUBCUTANEOUS at 20:22

## 2017-01-11 RX ADMIN — DIPHENHYDRAMINE HYDROCHLORIDE 25 MG: 50 INJECTION INTRAMUSCULAR; INTRAVENOUS at 14:13

## 2017-01-11 RX ADMIN — HYDROMORPHONE HYDROCHLORIDE 2 MG: 1 INJECTION, SOLUTION INTRAMUSCULAR; INTRAVENOUS; SUBCUTANEOUS at 07:45

## 2017-01-11 NOTE — PROGRESS NOTES
Care Management Interventions  PCP Verified by CM: Yes (Dr. Leah Zhou)  [facility] (Criteria: CHF and RRAT>21): No  Reason for No Palliative Care Consult: Other (see comment) (No Order)  Mode of Transport at Discharge: Other (see comment)  Transition of Care Consult (CM Consult): Discharge Planning  Current Support Network: Relative's Home (pt lives with his mother)  Confirm Follow Up Transport: Self  Plan discussed with Pt/Family/Caregiver: Yes (pt plans to return home where he lives with his mother.  when he is ready for discharge. )  Discharge Location  Discharge Placement: Home with family assistance

## 2017-01-11 NOTE — PROGRESS NOTES
NUTRITION    BPA/MST Referral    Nutrition Consult: Other     RECOMMENDATIONS / PLAN:     - Add food preferences/food allergies to diet order  - Continue RD inpatient monitoring and evaluation     NUTRITION INTERVENTIONS & DIAGNOSIS:     [x] Meals/Snacks: regular diet     Nutrition Diagnosis:  No nutrition diagnosis at this time. ASSESSMENT:     Subjective:  Patient reported good appetite, good meal intake PTA and since admission. Discussed food allergies/intolerances  Current Appetite:   [x] Good     [] Fair     [] Poor     [] Other:  Appetite/meal intake prior to admission:   [x] Good     [] Fair     [] Poor     [] Other:    Diet: DIET REGULAR     Average po intake adequate to meet patients estimated nutritional needs:   [x] Yes     [] No   [] Unable to determine at this time  Current Meal Intake: No data found. Food Allergies:  Milk, eggs (only if served as is; if in baked goods or used in meal preparation/cooking, then tolerates per patient report)  Feeding Limitations:  [] Swallowing difficulty    [] Chewing difficulty    [] Other:    BM:  1/10  Skin Integrity:  No pressure ulcer or wound  Edema: 1+ UEs  Pertinent Medications: Reviewed: NS at 150 mL/hr     Labs:  Recent Labs      01/11/17   0242  01/10/17   0228   NA  140  140   K  3.6  3.6   CL  108  106   CO2  24  26   GLU  106*  94   BUN  6*  4*   CREA  0.87  0.79   CA  7.9*  8.5   ALB  3.5   --    SGOT  57*   --    ALT  39   --        Intake/Output Summary (Last 24 hours) at 01/11/17 1514  Last data filed at 01/11/17 1425   Gross per 24 hour   Intake              960 ml   Output              600 ml   Net              360 ml       Anthropometrics:  Ht Readings from Last 1 Encounters:   12/10/16 6' (1.829 m)     Last 3 Recorded Weights in this Encounter    01/10/17 1746   Weight: 78 kg (172 lb)     Body mass index is 23.33 kg/(m^2).       Weight History:  Patient denied experiencing recent changes in weight PTA    Weight Metrics 1/10/2017 12/10/2016 11/11/2016 11/9/2016 10/17/2016 6/27/2016 6/15/2016   Weight 172 lb 174 lb 174 lb 174 lb 178 lb 174 lb 178 lb   BMI 23.33 kg/m2 23.6 kg/m2 23.6 kg/m2 23.6 kg/m2 24.14 kg/m2 23.59 kg/m2 24.14 kg/m2        Admitting Diagnosis: Sickle cell pain crisis ([facility]  Past Medical History   Diagnosis Date    Sickle cell anemia with crisis ([facility]     Vitamin D deficiency        Education Needs:        [x] None identified  [] Identified - Not appropriate at this time  []  Identified and addressed - refer to education log  Learning Limitations:   [x] None identified  [] Identified    Cultural, Yazdanism & ethnic food preferences:  [x] None identified    [] Identified and addressed     ESTIMATED NUTRITION NEEDS:     Calories:  kcal (MSJx1.2-1.3) based on  [] Actual BW:      [] IBW:   CHO:  gm (50% kcal)   Protein:  gm (0.8-1 gm/kg) based on  [] Actual BW:      [] IBW:   Fluid: 1 mL/kcal     MONITORING & EVALUATION:     Nutrition Goal(s):  1. Po intake of meals will meet >75% of patient estimated nutritional needs within the next 5-7 days.   Outcome:  [] Met/Ongoing    []  Not Met    [x] New/Initial Goal     Monitoring:   [x] Monitor meal intake    [] Monitor diet tolerance     [] Monitor supplement intake    Previous Recommendations (for follow-up assessments only):     []   Implemented       []   Not Implemented (RD to address)     [] No Recommendation Made     Discharge Planning:  Regular diet   [x] Participated in care planning, discharge planning, & interdisciplinary rounds as appropriate      Ellen Aleman, [address] [place] [address]   Pager: [phone]

## 2017-01-11 NOTE — INTERDISCIPLINARY ROUNDS
IDR/SLIDR Summary          Patient: Roverto Jackson MRN: [id number]    Age: 43 y.o. YOB: 1980 Room/Bed: [facility]   Admit Diagnosis: Sickle cell pain crisis ([facility]  Principal Diagnosis: <principal problem not specified>   Goals: Pain control iv hydration  Readmission: no  Quality Measure: Not applicable  VTE Prophylaxis: Not needed  Influenza Vaccine screening completed? YES  Pneumococcal Vaccine screening completed? NO  Mobility needs: No   Nutrition plan:No  Consults:   Financial concerns:No  Escalated to CM? NO  RRAT Score: 11   Interventions:H2H  Testing due for pt today?  NO  LOS: 1 days Expected length of stay 3 days  Discharge plan: hom3   PCP: Yogi Dobson MD  Transportation needs: No    Days before discharge:two or more days before discharge   Discharge disposition: Home    Signed:     Glen Ngo RN  1/11/2017  11:38 AM

## 2017-01-11 NOTE — INTERDISCIPLINARY ROUNDS
IDR/SLIDR Summary          Patient: Theron Quevedo MRN: [id number]    Age: 43 y.o. YOB: 1980 Room/Bed: [facility]   Admit Diagnosis: Sickle cell pain crisis ([facility]  Principal Diagnosis: <principal problem not specified>   Goals: pain control, hydration, DVT prevention  Readmission: NO  Quality Measure: Not applicable  VTE Prophylaxis: Mechanical  Influenza Vaccine screening completed? YES  Pneumococcal Vaccine screening completed? NO  Mobility needs: No   Nutrition plan:Yes  Consults:Case Management. hemotology    Financial concerns:No  Escalated to CM? NO  RRAT Score: no RRAT score populated   Interventions:H2H  Testing due for pt today?  NO  LOS: 0 days Expected length of stay 3 days  Discharge plan: was sickle cell crisis under control, home   PCP: Ozzie Levine MD  Transportation needs: No    Days before discharge:two or more days before discharge   Discharge disposition: Home    Signed:     Meenu Pinedo RN  1/10/2017  8:40 PM

## 2017-01-11 NOTE — PROGRESS NOTES
Phone: [phone]     Hematology / Oncology Progress Note  [facility]      Patient: Patrick Montague   MRN: [id number]         CSN: [id number]    YOB: 1980      Admit Date: 1/10/2017    Assessment:     Active Problems:    Sickle cell pain crisis ([facility] (8/15/2014)    sickle cell anemia, SS disease    Plan:     Titrate iv dilaudid. Fentanyl patch to ct. IVF. Encourage po fluids. Check LFTs, monitor hb. [facility] [phone]      Subjective:     Back and leg pains, 3 days.  No fever or cough    Objective:     Visit Vitals    /88 (BP 1 Location: Right arm, BP Patient Position: At rest)    Pulse 82    Temp 98.4 °F (36.9 °C)    Resp 18    Wt 78 kg (172 lb)    SpO2 99%    BMI 23.33 kg/m2             Temp (24hrs), Av.9 °F (36.6 °C), Min:97.2 °F (36.2 °C), Max:98.5 °F (36.9 °C)      No intake or output data in the 24 hours ending 17 0820  Review of Systems:   Constitutional: negative for fevers, chills, sweats and positive for  fatigue  Eyes: negative for visual disturbance, redness and icterus  Ears, Nose, Mouth, Throat, and Face: negative for tinnitus, epistaxis, sore mouth and hoarseness  Respiratory: negative for cough, sputum, hemoptysis, pleurisy/chest pain or wheezing  Cardiovascular: negative for chest pain, chest pressure/discomfort, palpitations, irregular heart beats, syncope, paroxysmal nocturnal dyspnea  Gastrointestinal: negative for reflux symptoms, nausea, vomiting, change in bowel habits, melena, diarrhea, constipation and abdominal pain  Genitourinary:negative for dysuria, nocturia, urinary incontinence, hesitancy and hematuria  Integument: negative for rash, skin lesion(s) and pruritus  Hematologic/Lymphatic: negative for easy bruising, bleeding and lymphadenopathy  Musculoskeletal:positive for back and leg pain, severe  Neurological: negative for headaches, dizziness, seizures, paresthesia and weakness    Physical Exam:  Constitutional: Alert, oriented, not in distress  Eyes: PERRLA, anicteric, pallor  Ears, nose, mouth, throat, and face: no palpable Lymph nodes, no mucositis, no thrush. Respiratory: symmetrical expansion, no rales, no rhonchi, no wheezing. Cardiovascular: S1S2, no pathologic murmur, no rub. Gastrointestinal: soft, benign, non tender, no HSM, normal bowel sounds, no mass. Integument: no rash, no petechiae, no ecchymosis. Musculoskeletal: no edema, no cyanosis, no clubbing. Neurological: intact, cranial nerves, no focal motor or sensory deficits. Labs:  Recent Results (from the past 24 hour(s))   METABOLIC PANEL, BASIC    Collection Time: 01/11/17  2:42 AM   Result Value Ref Range    Sodium 140 136 - 145 mmol/L    Potassium 3.6 3.5 - 5.5 mmol/L    Chloride 108 100 - 108 mmol/L    CO2 24 21 - 32 mmol/L    Anion gap 8 3.0 - 18 mmol/L    Glucose 106 (H) 74 - 99 mg/dL    BUN 6 (L) 7.0 - 18 MG/DL    Creatinine 0.87 0.6 - 1.3 MG/DL    BUN/Creatinine ratio 7 (L) 12 - 20      GFR est AA >60 >60 ml/min/1.73m2    GFR est non-AA >60 >60 ml/min/1.73m2    Calcium 7.9 (L) 8.5 - 10.1 MG/DL   CBC WITH AUTOMATED DIFF    Collection Time: 01/11/17  2:42 AM   Result Value Ref Range    WBC 13.6 (H) 4.6 - 13.2 K/uL    RBC 3.09 (L) 4.70 - 5.50 M/uL    HGB 8.1 (L) 13.0 - 16.0 g/dL    HCT 23.3 (L) 36.0 - 48.0 %    MCV 75.4 74.0 - 97.0 FL    MCH 26.2 24.0 - 34.0 PG    MCHC 34.8 31.0 - 37.0 g/dL    RDW 22.7 (H) 11.6 - 14.5 %    PLATELET 345 490 - 749 K/uL    MPV 9.7 9.2 - 11.8 FL    NEUTROPHILS 52 42 - 75 %    BANDS 2 0 - 5 %    LYMPHOCYTES 37 20 - 51 %    MONOCYTES 4 2 - 9 %    EOSINOPHILS 5 0 - 5 %    BASOPHILS 0 0 - 3 %    ABS. NEUTROPHILS 7.4 1.8 - 8.0 K/UL    ABS. LYMPHOCYTES 5.0 (H) 0.8 - 3.5 K/UL    ABS. MONOCYTES 0.5 0 - 1.0 K/UL    ABS. EOSINOPHILS 0.7 (H) 0.0 - 0.4 K/UL    ABS.  BASOPHILS 0.0 0.0 - 0.06 K/UL    DF AUTOMATED      PLATELET COMMENTS ADEQUATE PLATELETS      RBC COMMENTS ANISOCYTOSIS  2+        RBC COMMENTS MICROCYTOSIS  1+        RBC COMMENTS HYPOCHROMIA  1+        RBC COMMENTS OVALOCYTES  1+        RBC COMMENTS TARGET CELLS  1+        RBC COMMENTS SICKLE CELLS  2+

## 2017-01-11 NOTE — PROGRESS NOTES
Received patient alert and verbal with no distress noted. Complains of continuous back and bilateral leg pain due to sickle cell crisis. Receiving PRN Dilaudid for pain. In bed at this time resting quietly. Call bell in reach. Will continue to monitor.

## 2017-01-11 NOTE — ED NOTES
Patient was admitted into the hospital. Patient will be contacted in 1 week upon discharge from the hospital to see if he needs any help with hematologist or oncologist.

## 2017-01-11 NOTE — PROGRESS NOTES
[facility] Hospitalist Group  Progress Note    Patient: Diego Stein Age: 43 y.o. : 1980 MR#: [id number] SSN: [id number]  Date/Time: 2017 2:48 PM    Subjective:     Feels a little better, pain somewhat controlled. No F/C, N/V, CP, SOB. Assessment/Plan:   1. SS pain crisis - continue current regimen. With improvement of pain, taper IV Dilaudid. Maintain Fentanyl patch. IVFluids. O2. Hydroxyurea. 2. Encouraged ambulation. Additional Notes:      Case discussed with:  [x]Patient  []Family  []Nursing  []Case Management  DVT Prophylaxis:  []Lovenox  []Hep SQ  [x]SCDs  []Coumadin   []On Heparin gtt    Objective:   VS:   Visit Vitals    /69 (BP 1 Location: Right arm, BP Patient Position: At rest)    Pulse 78    Temp 98.4 °F (36.9 °C)    Resp 18    Wt 78 kg (172 lb)    SpO2 (!) 87%    BMI 23.33 kg/m2      Tmax/24hrs: Temp (24hrs), Av °F (36.7 °C), Min:97.2 °F (36.2 °C), Max:98.5 °F (36.9 °C)    Intake/Output Summary (Last 24 hours) at 17 1448  Last data filed at 17 1425   Gross per 24 hour   Intake              960 ml   Output              600 ml   Net              360 ml       General:  Awake, alert, NAD. Cardiovascular:  RRR. Pulmonary:  CTA B.  GI:  Soft, NT/ND, NABS. Extremities:  No CT or edema.    Additional:      Labs:    Recent Results (from the past 24 hour(s))   METABOLIC PANEL, BASIC    Collection Time: 17  2:42 AM   Result Value Ref Range    Sodium 140 136 - 145 mmol/L    Potassium 3.6 3.5 - 5.5 mmol/L    Chloride 108 100 - 108 mmol/L    CO2 24 21 - 32 mmol/L    Anion gap 8 3.0 - 18 mmol/L    Glucose 106 (H) 74 - 99 mg/dL    BUN 6 (L) 7.0 - 18 MG/DL    Creatinine 0.87 0.6 - 1.3 MG/DL    BUN/Creatinine ratio 7 (L) 12 - 20      GFR est AA >60 >60 ml/min/1.73m2    GFR est non-AA >60 >60 ml/min/1.73m2    Calcium 7.9 (L) 8.5 - 10.1 MG/DL   CBC WITH AUTOMATED DIFF    Collection Time: 17  2:42 AM   Result Value Ref Range WBC 13.6 (H) 4.6 - 13.2 K/uL    RBC 3.09 (L) 4.70 - 5.50 M/uL    HGB 8.1 (L) 13.0 - 16.0 g/dL    HCT 23.3 (L) 36.0 - 48.0 %    MCV 75.4 74.0 - 97.0 FL    MCH 26.2 24.0 - 34.0 PG    MCHC 34.8 31.0 - 37.0 g/dL    RDW 22.7 (H) 11.6 - 14.5 %    PLATELET 807 807 - 810 K/uL    MPV 9.7 9.2 - 11.8 FL    NEUTROPHILS 52 42 - 75 %    BANDS 2 0 - 5 %    LYMPHOCYTES 37 20 - 51 %    MONOCYTES 4 2 - 9 %    EOSINOPHILS 5 0 - 5 %    BASOPHILS 0 0 - 3 %    ABS. NEUTROPHILS 7.4 1.8 - 8.0 K/UL    ABS. LYMPHOCYTES 5.0 (H) 0.8 - 3.5 K/UL    ABS. MONOCYTES 0.5 0 - 1.0 K/UL    ABS. EOSINOPHILS 0.7 (H) 0.0 - 0.4 K/UL    ABS. BASOPHILS 0.0 0.0 - 0.06 K/UL    DF AUTOMATED      PLATELET COMMENTS ADEQUATE PLATELETS      RBC COMMENTS ANISOCYTOSIS  2+        RBC COMMENTS MICROCYTOSIS  1+        RBC COMMENTS HYPOCHROMIA  1+        RBC COMMENTS OVALOCYTES  1+        RBC COMMENTS TARGET CELLS  1+        RBC COMMENTS SICKLE CELLS  2+       HEPATIC FUNCTION PANEL    Collection Time: 01/11/17  2:42 AM   Result Value Ref Range    Protein, total 7.3 6.4 - 8.2 g/dL    Albumin 3.5 3.4 - 5.0 g/dL    Globulin 3.8 2.0 - 4.0 g/dL    A-G Ratio 0.9 0.8 - 1.7      Bilirubin, total 2.7 (H) 0.2 - 1.0 MG/DL    Bilirubin, direct 0.7 (H) 0.0 - 0.2 MG/DL    Alk.  phosphatase 101 45 - 117 U/L    AST 57 (H) 15 - 37 U/L    ALT 39 16 - 61 U/L       Signed By: Kay Martinez MD     January 11, 2017 2:48 PM

## 2017-01-12 PROCEDURE — 74011250637 HC RX REV CODE- 250/637: Performed by: FAMILY MEDICINE

## 2017-01-12 PROCEDURE — 65270000029 HC RM PRIVATE

## 2017-01-12 PROCEDURE — 74011250636 HC RX REV CODE- 250/636: Performed by: INTERNAL MEDICINE

## 2017-01-12 PROCEDURE — 77010033678 HC OXYGEN DAILY

## 2017-01-12 RX ADMIN — HYDROXYUREA 500 MG: 500 CAPSULE ORAL at 09:50

## 2017-01-12 RX ADMIN — HYDROMORPHONE HYDROCHLORIDE 2 MG: 2 INJECTION, SOLUTION INTRAMUSCULAR; INTRAVENOUS; SUBCUTANEOUS at 20:56

## 2017-01-12 RX ADMIN — HYDROMORPHONE HYDROCHLORIDE 2 MG: 2 INJECTION, SOLUTION INTRAMUSCULAR; INTRAVENOUS; SUBCUTANEOUS at 09:50

## 2017-01-12 RX ADMIN — HYDROXYUREA 500 MG: 500 CAPSULE ORAL at 18:55

## 2017-01-12 RX ADMIN — HYDROMORPHONE HYDROCHLORIDE 2 MG: 2 INJECTION, SOLUTION INTRAMUSCULAR; INTRAVENOUS; SUBCUTANEOUS at 07:51

## 2017-01-12 RX ADMIN — SODIUM CHLORIDE 150 ML/HR: 900 INJECTION, SOLUTION INTRAVENOUS at 09:43

## 2017-01-12 RX ADMIN — DIPHENHYDRAMINE HYDROCHLORIDE 25 MG: 50 INJECTION INTRAMUSCULAR; INTRAVENOUS at 20:55

## 2017-01-12 RX ADMIN — HYDROMORPHONE HYDROCHLORIDE 2 MG: 2 INJECTION, SOLUTION INTRAMUSCULAR; INTRAVENOUS; SUBCUTANEOUS at 04:11

## 2017-01-12 RX ADMIN — HYDROMORPHONE HYDROCHLORIDE 2 MG: 2 INJECTION, SOLUTION INTRAMUSCULAR; INTRAVENOUS; SUBCUTANEOUS at 00:10

## 2017-01-12 RX ADMIN — HYDROMORPHONE HYDROCHLORIDE 2 MG: 2 INJECTION, SOLUTION INTRAMUSCULAR; INTRAVENOUS; SUBCUTANEOUS at 12:17

## 2017-01-12 RX ADMIN — DIPHENHYDRAMINE HYDROCHLORIDE 25 MG: 50 INJECTION INTRAMUSCULAR; INTRAVENOUS at 12:17

## 2017-01-12 RX ADMIN — HYDROMORPHONE HYDROCHLORIDE 2 MG: 2 INJECTION, SOLUTION INTRAMUSCULAR; INTRAVENOUS; SUBCUTANEOUS at 05:40

## 2017-01-12 RX ADMIN — HYDROMORPHONE HYDROCHLORIDE 2 MG: 2 INJECTION, SOLUTION INTRAMUSCULAR; INTRAVENOUS; SUBCUTANEOUS at 15:09

## 2017-01-12 RX ADMIN — SODIUM CHLORIDE 150 ML/HR: 900 INJECTION, SOLUTION INTRAVENOUS at 03:16

## 2017-01-12 RX ADMIN — HYDROMORPHONE HYDROCHLORIDE 2 MG: 2 INJECTION, SOLUTION INTRAMUSCULAR; INTRAVENOUS; SUBCUTANEOUS at 22:35

## 2017-01-12 RX ADMIN — HYDROMORPHONE HYDROCHLORIDE 2 MG: 2 INJECTION, SOLUTION INTRAMUSCULAR; INTRAVENOUS; SUBCUTANEOUS at 02:00

## 2017-01-12 RX ADMIN — DIPHENHYDRAMINE HYDROCHLORIDE 25 MG: 50 INJECTION INTRAMUSCULAR; INTRAVENOUS at 05:24

## 2017-01-12 NOTE — PROGRESS NOTES
Pain better but not resolved. vss pallor  Lung clear cardiac reg hs abd soft nt  Alert oriented. impr sickle cell pain crisis, uncomplicated  Plan ct current pain meds. If improved, d/c planning soon. Encourage fluids.   Cbc tomorrow am.

## 2017-01-12 NOTE — PROGRESS NOTES
[facility]  Progress Note    Patient: Saleem Woodward Age: 43 y.o. : 1980 MR#: [id number] SSN: [id number]  Date/Time: 2017 2:48 PM    Subjective:     Minimal improvement in pain. No F/C, N/V, CP or SOB. Assessment/Plan:   1. SS pain crisis - continue current regimen. Slow improvement. No change in meds today. 2. Encouraged ambulation. Additional Notes:      Case discussed with:  [x]Patient  []Family  [x]Nursing  []Case Management  DVT Prophylaxis:  []Lovenox  []Hep SQ  [x]SCDs  []Coumadin   []On Heparin gtt    Objective:   VS:   Visit Vitals    /69 (BP 1 Location: Right arm, BP Patient Position: At rest)    Pulse 64    Temp 98.2 °F (36.8 °C)    Resp 20    Wt 78 kg (172 lb)    SpO2 95%    BMI 23.33 kg/m2      Tmax/24hrs: Temp (24hrs), Av.2 °F (36.8 °C), Min:98.1 °F (36.7 °C), Max:98.3 °F (36.8 °C)      Intake/Output Summary (Last 24 hours) at 17 1610  Last data filed at 17 1400   Gross per 24 hour   Intake             4054 ml   Output             2825 ml   Net             1229 ml       General:  Awake, alert, NAD. Cardiovascular:  RRR. Pulmonary:  CTA B.  GI:  Soft, NT/ND, NABS. Extremities:  No CT or edema. Additional:      Labs:    No results found for this or any previous visit (from the past 24 hour(s)).     Signed By: Kel Pelayo MD     2017 2:48 PM

## 2017-01-12 NOTE — ROUTINE PROCESS
Bedside and Verbal shift change report given to [address] RN (oncoming nurse) by Yun Narayan RN (offgoing nurse). Report included the following information SBAR, Kardex, MAR and Recent Results.     SITUATION:    Code Status: Full Code   Reason for Admission: Sickle cell pain crisis ([facility] [address]    [facility] day: 1   Problem List:       Hospital Problems  Date Reviewed: 12/11/2016          Codes Class Noted POA    Sickle cell pain crisis [facility]) ICD-10-CM: D57.00  ICD-9-CM: 282.62  8/15/2014 Unknown              BACKGROUND:    Past Medical History:   Past Medical History   Diagnosis Date    Sickle cell anemia with crisis ([facility] [address]     Vitamin D deficiency          Patient taking anticoagulants no     ASSESSMENT:    Changes in Assessment Throughout Shift: no     Patient has Central Line: no Reasons if yes: n/a   Patient has Sesay Cath: no Reasons if yes: n/a      Last Vitals:     Vitals:    01/11/17 0316 01/11/17 0727 01/11/17 1059 01/11/17 1607   BP: 117/67 135/88 128/69 122/72   Pulse: 83 82 78 81   Resp: 16 18 18 20   Temp: 98.3 °F (36.8 °C) 98.4 °F (36.9 °C) 98.4 °F (36.9 °C) 98.3 °F (36.8 °C)   SpO2: 96% 99% (!) 87% 96%   Weight:            IV and DRAINS (will only show if present)   Peripheral IV 01/11/17 Left Wrist-Site Assessment: Clean, dry, & intact  [REMOVED] Peripheral IV 01/10/17 Left Hand-Site Assessment: Clean, dry, & intact     WOUND (if present)   Wound Type:  none   Dressing present Dressing Present : No   Wound Concerns/Notes:  none     PAIN    Pain Assessment    Pain Intensity 1: 8 (01/11/17 1938)    Pain Location 1: Arm, Back, Leg    Pain Intervention(s) 1: Medication (see MAR)    Patient Stated Pain Goal: 3  o Interventions for Pain:  dilaudid  o Intervention effective: yes  o Time of last intervention: 1838   o Reassessment Completed: yes      Last 3 Weights:  Last 3 Recorded Weights in this Encounter    01/10/17 1746   Weight: 78 kg (172 lb)     Weight change:      INTAKE/OUPUT    Current Shift:      Last three shifts: 01/10 0701 - 01/11 1900  In: 960 [P.O.:960]  Out: 1600 [Urine:1600]     LAB RESULTS     Recent Labs      01/11/17   0242  01/10/17   0228   WBC  13.6*  12.9   HGB  8.1*  9.8*   HCT  23.3*  27.8*   PLT  321  391        Recent Labs      01/11/17   0242  01/10/17   0228   NA  140  140   K  3.6  3.6   GLU  106*  94   BUN  6*  4*   CREA  0.87  0.79   CA  7.9*  8.5       RECOMMENDATIONS AND DISCHARGE PLANNING     1. Pending tests/procedures/ Plan of Care or Other Needs: disharge     2. Discharge plan for patient and Needs/Barriers: home    3. Estimated Discharge Date: TBD Posted on Whiteboard in Patients Room: no      4. The patient's care plan was reviewed with the oncoming nurse. \"HEALS\" SAFETY CHECK      Fall Risk    Total Score: 1    Safety Measures: Safety Measures: Bed/Chair alarm on, Bed/Chair-Wheels locked, Bed in low position    A safety check occurred in the patient's room between off going nurse and oncoming nurse listed above. The safety check included the below items  Area Items   H  High Alert Medications - Verify all high alert medication drips (heparin, PCA, etc.)   E  Equipment - Suction is set up for ALL patients (with pat)  - Red plugs utilized for all equipment (IV pumps, etc.)  - WOWs wiped down at end of shift.  - Room stocked with oxygen, suction, and other unit-specific supplies   A  Alarms - Bed alarm is set for fall risk patients  - Ensure chair alarm is in place and activated if patient is up in a chair   L  Lines - Check IV for any infiltration  - Sesay bag is empty if patient has a Sesay   - Tubing and IV bags are labeled   S  Safety   - Room is clean, patient is clean, and equipment is clean. - Hallways are clear from equipment besides carts. - Fall bracelet on for fall risk patients  - Ensure room is clear and free of clutter  - Suction is set up for ALL patients (with pat)  - Hallways are clear from equipment besides carts.    - Isolation precautions followed, supplies available outside room, sign posted     Cesia Lugo RN

## 2017-01-12 NOTE — ROUTINE PROCESS
No overnight events, medicated throuout shift for pain, medication effective, Benadryl given PRN for itching. Encourage fluids/ ambulation through shift. Patient ambulated to BR x 2. Remains on 2L via NC, sats wnl, . Patient resting comfortably at this time.

## 2017-01-13 LAB
ERYTHROCYTE [DISTWIDTH] IN BLOOD BY AUTOMATED COUNT: 24.9 % (ref 11.6–14.5)
HCT VFR BLD AUTO: 25.4 % (ref 36–48)
HGB BLD-MCNC: 8.7 G/DL (ref 13–16)
MCH RBC QN AUTO: 26.4 PG (ref 24–34)
MCHC RBC AUTO-ENTMCNC: 34.3 G/DL (ref 31–37)
MCV RBC AUTO: 77.2 FL (ref 74–97)
PLATELET # BLD AUTO: 207 K/UL (ref 135–420)
PMV BLD AUTO: 10.7 FL (ref 9.2–11.8)
RBC # BLD AUTO: 3.29 M/UL (ref 4.7–5.5)
WBC # BLD AUTO: 15.8 K/UL (ref 4.6–13.2)

## 2017-01-13 PROCEDURE — 36415 COLL VENOUS BLD VENIPUNCTURE: CPT | Performed by: INTERNAL MEDICINE

## 2017-01-13 PROCEDURE — 74011250636 HC RX REV CODE- 250/636: Performed by: INTERNAL MEDICINE

## 2017-01-13 PROCEDURE — 85027 COMPLETE CBC AUTOMATED: CPT | Performed by: INTERNAL MEDICINE

## 2017-01-13 PROCEDURE — 74011250637 HC RX REV CODE- 250/637: Performed by: FAMILY MEDICINE

## 2017-01-13 PROCEDURE — 65270000029 HC RM PRIVATE

## 2017-01-13 RX ORDER — FOLIC ACID 1 MG/1
1 TABLET ORAL DAILY
Qty: 30 TAB | Refills: 1 | Status: SHIPPED | OUTPATIENT
Start: 2017-01-13 | End: 2017-07-01 | Stop reason: CLARIF

## 2017-01-13 RX ADMIN — DIPHENHYDRAMINE HYDROCHLORIDE 25 MG: 50 INJECTION INTRAMUSCULAR; INTRAVENOUS at 13:12

## 2017-01-13 RX ADMIN — HYDROMORPHONE HYDROCHLORIDE 2 MG: 2 INJECTION, SOLUTION INTRAMUSCULAR; INTRAVENOUS; SUBCUTANEOUS at 05:09

## 2017-01-13 RX ADMIN — HYDROMORPHONE HYDROCHLORIDE 2 MG: 2 INJECTION, SOLUTION INTRAMUSCULAR; INTRAVENOUS; SUBCUTANEOUS at 13:12

## 2017-01-13 RX ADMIN — HYDROXYUREA 500 MG: 500 CAPSULE ORAL at 18:06

## 2017-01-13 RX ADMIN — SODIUM CHLORIDE 150 ML/HR: 900 INJECTION, SOLUTION INTRAVENOUS at 20:03

## 2017-01-13 RX ADMIN — HYDROMORPHONE HYDROCHLORIDE 2 MG: 2 INJECTION, SOLUTION INTRAMUSCULAR; INTRAVENOUS; SUBCUTANEOUS at 17:25

## 2017-01-13 RX ADMIN — HYDROMORPHONE HYDROCHLORIDE 2 MG: 2 INJECTION, SOLUTION INTRAMUSCULAR; INTRAVENOUS; SUBCUTANEOUS at 10:23

## 2017-01-13 RX ADMIN — HYDROXYUREA 500 MG: 500 CAPSULE ORAL at 10:23

## 2017-01-13 RX ADMIN — HYDROMORPHONE HYDROCHLORIDE 2 MG: 2 INJECTION, SOLUTION INTRAMUSCULAR; INTRAVENOUS; SUBCUTANEOUS at 15:30

## 2017-01-13 RX ADMIN — DIPHENHYDRAMINE HYDROCHLORIDE 25 MG: 50 INJECTION INTRAMUSCULAR; INTRAVENOUS at 07:13

## 2017-01-13 RX ADMIN — HYDROMORPHONE HYDROCHLORIDE 2 MG: 2 INJECTION, SOLUTION INTRAMUSCULAR; INTRAVENOUS; SUBCUTANEOUS at 19:58

## 2017-01-13 RX ADMIN — HYDROMORPHONE HYDROCHLORIDE 2 MG: 2 INJECTION, SOLUTION INTRAMUSCULAR; INTRAVENOUS; SUBCUTANEOUS at 07:13

## 2017-01-13 RX ADMIN — HYDROMORPHONE HYDROCHLORIDE 2 MG: 2 INJECTION, SOLUTION INTRAMUSCULAR; INTRAVENOUS; SUBCUTANEOUS at 22:30

## 2017-01-13 RX ADMIN — DIPHENHYDRAMINE HYDROCHLORIDE 25 MG: 50 INJECTION INTRAMUSCULAR; INTRAVENOUS at 19:58

## 2017-01-13 RX ADMIN — HYDROMORPHONE HYDROCHLORIDE 2 MG: 2 INJECTION, SOLUTION INTRAMUSCULAR; INTRAVENOUS; SUBCUTANEOUS at 00:29

## 2017-01-13 RX ADMIN — HYDROMORPHONE HYDROCHLORIDE 2 MG: 2 INJECTION, SOLUTION INTRAMUSCULAR; INTRAVENOUS; SUBCUTANEOUS at 02:53

## 2017-01-13 RX ADMIN — SODIUM CHLORIDE 150 ML/HR: 900 INJECTION, SOLUTION INTRAVENOUS at 05:09

## 2017-01-13 RX ADMIN — DIPHENHYDRAMINE HYDROCHLORIDE 25 MG: 50 INJECTION INTRAMUSCULAR; INTRAVENOUS at 01:30

## 2017-01-13 NOTE — PROGRESS NOTES
Pt's mother at bedside, stating she will assist with medications purchases that is needed and provide pt transportation home. Pt lives with his mother.

## 2017-01-13 NOTE — PROGRESS NOTES
attended the interdisciplinary rounds for Daquan Mcallister, who is a 43 y. o.,male. Patients Primary Language is: [place]. According to the patients EMR Druze Affiliation is: [place]. The reason the Patient came to the hospital is:   Patient Active Problem List    Diagnosis Date Noted    Hemolytic anemia ([facility] [address] 12/11/2016    SC disease ([address] 12/11/2016    Pain, generalized 04/05/2016    Sickle cell anemia with crisis ([facility] [address] 05/07/2015    Sickle cell pain crisis ([facility] [address] 08/15/2014    Sickle cell crisis ([address] 01/26/2014    Sickle cell disease ([facility] [address] 09/27/2013          Plan:  Sabra Smallwood will continue to follow and will provide pastoral care as needed.       [facility]   Spiritual Care  [phone]

## 2017-01-13 NOTE — PROGRESS NOTES
Pain better, no fever  vss lung clear alert   Oriented abd soft nt  cardiac reg hs  impr sickle cell anemia, pain crisis, improved  Plan wants to go home later today. Pt will ct fentanyl 25/hr and dilaudid prn, he has scripts. Hydrea and folic acid to ct. F/u with me in office.

## 2017-01-13 NOTE — PROGRESS NOTES
conducted an initial consultation and Spiritual Assessment for Tod Faulkner, who is a 43 y. o.,male. Patients Primary Language is: [place]. According to the patients EMR Gnosticist Affiliation is: [place]. The reason the Patient came to the hospital is:   Patient Active Problem List    Diagnosis Date Noted    Hemolytic anemia ([facility] 12/11/2016    SC disease ([facility] 12/11/2016    Pain, generalized 04/05/2016    Sickle cell anemia with crisis ([facility] 05/07/2015    Sickle cell pain crisis ([facility] 08/15/2014    Sickle cell crisis ([facility] 01/26/2014    Sickle cell disease ([facility] 09/27/2013        The  provided the following Interventions:  Initiated a relationship of care and support. Explored issues of delia, belief, spirituality and Tenriism/ritual needs while hospitalized. Listened empathically. Provided chaplaincy education. Chart reviewed. The following outcomes where achieved:  Patient shared limited information about both their medical narrative and spiritual journey/beliefs.  confirmed Patient's Gnosticist Affiliation, [place]  Patient processed feeling about current hospitalization, he has not have any problems. Patient expressed gratitude for 's visit. Assessment:  There are no spiritual or Tenriism issues which require intervention at this time. Plan:  Chaplains will continue to follow and will provide pastoral care on an as needed/requested basis.       [facility]   Spiritual Care  [phone]

## 2017-01-13 NOTE — ROUTINE PROCESS
attempted to draw labs from patient this am, each temp unsuccessful, different  will try again shortly.

## 2017-01-13 NOTE — DISCHARGE SUMMARY
Discharge summary dictated. ID#: [id number]. Pt still c/o pain. Will cancel discharge, aim to d/c tomorrow. As d/w nursing.    Marshall MD Darrell  1/13/2017  6:16 PM

## 2017-01-13 NOTE — ROUTINE PROCESS
Bedside and Verbal shift change report given to [address], RN (oncoming nurse) by Ashly Mehta RN (offgoing nurse). Report included the following information SBAR, Kardex, MAR and Recent Results. SITUATION:    Code Status: Full Code   Reason for Admission: Sickle cell pain crisis [facility])    [facility] day: 2   Problem List:       Hospital Problems  Date Reviewed: 12/11/2016          Codes Class Noted POA    Sickle cell pain crisis [facility]) ICD-10-CM: D57.00  ICD-9-CM: 282.62  8/15/2014 Unknown              BACKGROUND:    Past Medical History:   Past Medical History   Diagnosis Date    Sickle cell anemia with crisis ([facility] [address]     Vitamin D deficiency          Patient taking anticoagulants no     ASSESSMENT:    Changes in Assessment Throughout Shift: no     Patient has Central Line: no Reasons if yes: n/a   Patient has Sesay Cath: no Reasons if yes: n/a      Last Vitals:     Vitals:    01/12/17 0414 01/12/17 0719 01/12/17 1129 01/12/17 1540   BP: 109/73 122/72 144/81 120/69   Pulse: 72 80 75 64   Resp: 18 20 18 20   Temp: 98.1 °F (36.7 °C) 98.2 °F (36.8 °C) 98.3 °F (36.8 °C) 98.2 °F (36.8 °C)   SpO2: 94% 91% 90% 95%   Weight:            IV and DRAINS (will only show if present)   [REMOVED] Peripheral IV 01/11/17 Left Wrist-Site Assessment: Clean, dry, & intact  [REMOVED] Peripheral IV 01/10/17 Left Hand-Site Assessment: Clean, dry, & intact     WOUND (if present)   Wound Type:  none   Dressing present Dressing Present : No   Wound Concerns/Notes:  none     PAIN    Pain Assessment    Pain Intensity 1: 8 (01/12/17 1854)    Pain Location 1: Arm, Back, Leg    Pain Intervention(s) 1:   (iv infiltrated, unable to give pain med)    Patient Stated Pain Goal: 3  o Interventions for Pain:  dilaudid  o Intervention effective: yes  o Time of last intervention: 1509   o Reassessment Completed: yes      Last 3 Weights:  Last 3 Recorded Weights in this Encounter    01/10/17 1746   Weight: 78 kg (172 lb)     Weight change:  INTAKE/OUPUT    Current Shift:      Last three shifts: 01/11 0701 - 01/12 1900  In: 7485 [P.O.:3494; I.V.:1760]  Out: 3700 [Urine:3700]     LAB RESULTS     Recent Labs      01/11/17   0242  01/10/17   0228   WBC  13.6*  12.9   HGB  8.1*  9.8*   HCT  23.3*  27.8*   PLT  321  391        Recent Labs      01/11/17   0242  01/10/17   0228   NA  140  140   K  3.6  3.6   GLU  106*  94   BUN  6*  4*   CREA  0.87  0.79   CA  7.9*  8.5       RECOMMENDATIONS AND DISCHARGE PLANNING     1. Pending tests/procedures/ Plan of Care or Other Needs: need new iv     2. Discharge plan for patient and Needs/Barriers: Home    3. Estimated Discharge Date: TBD Posted on Whiteboard in Patients Room: yes      4. The patient's care plan was reviewed with the oncoming nurse. \"HEALS\" SAFETY CHECK      Fall Risk    Total Score: 1    Safety Measures: Safety Measures: Bed/Chair-Wheels locked, Bed in low position, Call light within reach    A safety check occurred in the patient's room between off going nurse and oncoming nurse listed above. The safety check included the below items  Area Items   H  High Alert Medications - Verify all high alert medication drips (heparin, PCA, etc.)   E  Equipment - Suction is set up for ALL patients (with yanker)  - Red plugs utilized for all equipment (IV pumps, etc.)  - WOWs wiped down at end of shift.  - Room stocked with oxygen, suction, and other unit-specific supplies   A  Alarms - Bed alarm is set for fall risk patients  - Ensure chair alarm is in place and activated if patient is up in a chair   L  Lines - Check IV for any infiltration  - Sesay bag is empty if patient has a Sesay   - Tubing and IV bags are labeled   S  Safety   - Room is clean, patient is clean, and equipment is clean. - Hallways are clear from equipment besides carts.    - Fall bracelet on for fall risk patients  - Ensure room is clear and free of clutter  - Suction is set up for ALL patients (with yanker)  - Hallways are clear from equipment besides carts.    - Isolation precautions followed, supplies available outside room, sign posted     Presley Laguna RN

## 2017-01-14 VITALS
OXYGEN SATURATION: 92 % | TEMPERATURE: 98 F | RESPIRATION RATE: 18 BRPM | DIASTOLIC BLOOD PRESSURE: 91 MMHG | SYSTOLIC BLOOD PRESSURE: 150 MMHG | WEIGHT: 172 LBS | HEART RATE: 90 BPM | BODY MASS INDEX: 23.33 KG/M2

## 2017-01-14 LAB
BASOPHILS # BLD: 0 % (ref 0–3)
BLASTS NFR BLD: 0 %
DIFFERENTIAL METHOD BLD: ABNORMAL
EOSINOPHIL NFR BLD: 10 % (ref 0–5)
ERYTHROCYTE [DISTWIDTH] IN BLOOD BY AUTOMATED COUNT: 24.5 % (ref 11.6–14.5)
HCT VFR BLD AUTO: 23.8 % (ref 36–48)
HGB BLD-MCNC: 8.2 G/DL (ref 13–16)
LYMPHOCYTES # BLD AUTO: 44 % (ref 20–51)
LYMPHOCYTES # BLD: 6 K/UL (ref 0.8–3.5)
MANUAL DIFFERENTIAL PERFORMED BLD QL: ABNORMAL
MCH RBC QN AUTO: 26.2 PG (ref 24–34)
MCHC RBC AUTO-ENTMCNC: 34.5 G/DL (ref 31–37)
MCV RBC AUTO: 76 FL (ref 74–97)
METAMYELOCYTES NFR BLD MANUAL: 0 %
MONOCYTES # BLD: 0.9 K/UL (ref 0–1)
MONOCYTES NFR BLD AUTO: 7 % (ref 2–9)
MYELOCYTES NFR BLD MANUAL: 0 %
NEUTS BAND NFR BLD MANUAL: 1 % (ref 0–5)
NEUTS SEG # BLD: 5.2 K/UL (ref 1.8–8)
NEUTS SEG NFR BLD AUTO: 38 % (ref 42–75)
NRBC BLD-RTO: 5 PER 100 WBC
PLATELET # BLD AUTO: 360 K/UL (ref 135–420)
PLATELET COMMENTS,PCOM: ABNORMAL
PMV BLD AUTO: 10 FL (ref 9.2–11.8)
PROMYELOCYTES NFR BLD MANUAL: 0 %
RBC # BLD AUTO: 3.13 M/UL (ref 4.7–5.5)
RBC MORPH BLD: ABNORMAL
WBC # BLD AUTO: 13.4 K/UL (ref 4.6–13.2)
WBC OTHER # BLD MANUAL: 0 10*3/UL

## 2017-01-14 PROCEDURE — 85027 COMPLETE CBC AUTOMATED: CPT | Performed by: FAMILY MEDICINE

## 2017-01-14 PROCEDURE — 74011250636 HC RX REV CODE- 250/636: Performed by: INTERNAL MEDICINE

## 2017-01-14 PROCEDURE — 36415 COLL VENOUS BLD VENIPUNCTURE: CPT | Performed by: FAMILY MEDICINE

## 2017-01-14 PROCEDURE — 74011250637 HC RX REV CODE- 250/637: Performed by: FAMILY MEDICINE

## 2017-01-14 RX ADMIN — HYDROMORPHONE HYDROCHLORIDE 2 MG: 2 INJECTION, SOLUTION INTRAMUSCULAR; INTRAVENOUS; SUBCUTANEOUS at 05:33

## 2017-01-14 RX ADMIN — HYDROXYUREA 500 MG: 500 CAPSULE ORAL at 09:44

## 2017-01-14 RX ADMIN — HYDROMORPHONE HYDROCHLORIDE 2 MG: 2 INJECTION, SOLUTION INTRAMUSCULAR; INTRAVENOUS; SUBCUTANEOUS at 02:51

## 2017-01-14 RX ADMIN — DIPHENHYDRAMINE HYDROCHLORIDE 25 MG: 50 INJECTION INTRAMUSCULAR; INTRAVENOUS at 02:51

## 2017-01-14 RX ADMIN — HYDROMORPHONE HYDROCHLORIDE 2 MG: 2 INJECTION, SOLUTION INTRAMUSCULAR; INTRAVENOUS; SUBCUTANEOUS at 07:44

## 2017-01-14 RX ADMIN — SODIUM CHLORIDE 150 ML/HR: 900 INJECTION, SOLUTION INTRAVENOUS at 03:00

## 2017-01-14 RX ADMIN — DIPHENHYDRAMINE HYDROCHLORIDE 25 MG: 50 INJECTION INTRAMUSCULAR; INTRAVENOUS at 07:44

## 2017-01-14 RX ADMIN — HYDROMORPHONE HYDROCHLORIDE 2 MG: 2 INJECTION, SOLUTION INTRAMUSCULAR; INTRAVENOUS; SUBCUTANEOUS at 00:30

## 2017-01-14 RX ADMIN — HYDROMORPHONE HYDROCHLORIDE 2 MG: 2 INJECTION, SOLUTION INTRAMUSCULAR; INTRAVENOUS; SUBCUTANEOUS at 11:11

## 2017-01-14 NOTE — DISCHARGE SUMMARY
Discharge Summary    Patient: Jp Toro MRN: [id number]  CSN: [id number]    YOB: 1980  Age: 43 y.o. Sex: male    DOA: 1/10/2017 LOS:  LOS: 4 days   Discharge Date:      Admission Diagnoses: Sickle cell pain crisis [facility])    Discharge Diagnoses:  PLEASE SEE DICTATION. Discharge Condition: Stable    PHYSICAL EXAM  Visit Vitals    BP (!) 150/91 (BP 1 Location: Right arm, BP Patient Position: Sitting)    Pulse 90    Temp 98 °F (36.7 °C)    Resp 18    Wt 78 kg (172 lb)    SpO2 92%    BMI 23.33 kg/m2       General: Alert, cooperative, no acute distress    Lungs:  CTA Bilaterally. No Wheezing/Rhonchi/Rales. Heart:  Regular rate and Rhythm. Abdomen: Soft, Non distended, Non tender. + Bowel sounds. Extremities: No edema/ cyanosis/ clubbing  Psych:   Good insight. Not anxious or agitated. Neurologic:  AA oriented X 3. Moves all extremities. Hospital Course: Please see dictation by Dr. Yudith Beaulieu. Pt feeling much better today, want to go home. [id number] [address] for d/c fatou smyth        Discharge Medications:     Current Discharge Medication List      START taking these medications    Details   folic acid (FOLVITE) 1 mg tablet Take 1 Tab by mouth daily. Qty: 30 Tab, Refills: 1         CONTINUE these medications which have NOT CHANGED    Details   HYDROmorphone (DILAUDID) 2 mg tablet Take 1 Tab by mouth every four (4) hours as needed for Pain. Max Daily Amount: 12 mg.  Qty: 40 Tab, Refills: 0      hydroxyurea (HYDREA) 500 mg capsule Take 500 mg by mouth two (2) times a day. Indications: SICKLE CELL ANEMIA WITH CRISIS      fentaNYL (DURAGESIC) 25 mcg/hr PATCH 1 Patch by TransDERmal route every seventy-two (72) hours. Max Daily Amount: 1 Patch. Qty: 5 Patch, Refills: 0      ondansetron hcl (ZOFRAN, AS HYDROCHLORIDE,) 4 mg tablet Take 2 Tabs by mouth every eight (8) hours as needed for Nausea.   Qty: 12 Tab, Refills: 0           · It is important that you take the medication exactly as they are prescribed. · Keep your medication in the bottles provided by the pharmacist and keep a list of the medication names, dosages, and times to be taken in your wallet. · Do not take other medications without consulting your doctor.          Minutes spent on discharge: 40 minutes spent coordinating this discharge (review instructions/follow-up, prescriptions, preparing report for sign off)    Leesa Canseco MD  1/14/2017 1:15 PM

## 2017-01-14 NOTE — DISCHARGE SUMMARY
[address]  [address] [place] SUMMARY    Name:  Marjan Hoang  MR#:  [id number]  :  1980  Account #:  [de-identified]  Date of Adm:  01/10/2017  Date of Discharge:      DATE OF DISCHARGE: 2017    DISCHARGE DIAGNOSIS: Sickle cell pain crisis. SERVICE: The patient was admitted to the hospitalist service. CONSULTS: Dr. Joanna Aase was consulted for Hematology and  Oncology. PROCEDURES: None. HISTORY OF PRESENT ILLNESS: Please refer to my admission H  and P.    Briefly, this is a 41-year-old gentleman with a history significant for  sickle cell disease, who came to the emergency department with pain  crisis. He uses a fentanyl patch home as well as p.o. Dilaudid. However, his pain crisis, which started 2 days prior to  presentation, has been worsening and not controlled with his home  regimen. This pain affects his lower back, both legs, constant in nature,  and up to 10/10. He continues to take his other medications including hydroxyurea  regularly. Vital signs at presentation were stable and normal with a pressure  146/70. The patient is well-developed, well-nourished on examination,  appeared to be in mild discomfort. He had a regular heart, clear lungs,  benign abdomen. No calf tenderness or edema. Metabolic panel, CBC normal, hemoglobin and hemoglobin of 9.8/27. 8. HOSPITAL COURSE BY PROBLEM:  1. Sickle cell pain crisis: Maintained on IV Dilaudid, fentanyl, oxygen,  hydroxyurea. Symptomatically, he has improved over the course of his  hospital stay. He would like to try to go home today. Pain is much  better. He does not require any new prescriptions and states that he  has medications at home. He was seen by Hematology and Oncology,  who has cleared the patient for discharge. We will be adding folic acid  supplementation as well.   2. On examination today, vital signs are stable and normal. The patient  has no complaints, denying fevers, chills, nausea, vomiting, chest pain,  shortness of breath, palpitations, or edema. He feels significantly  better. He has a regular heart, clear lungs, benign abdomen. No calf  tenderness or edema. LABORATORY DATA: Include a CBC with an hemoglobin and  hematocrit of 8.7/25.4 and white count 15.8. Hemoglobin and  hematocrit are stable. DISPOSITION: Home. MEDICATIONS: New medicines as follows. Folic acid 1 mg p.o. daily. OTHERWISE, CONTINUE THE FOLLOWIN. Fentanyl 25 mcg per hour patch to skin every 72 hours. 2. Dilaudid 2 mg p.o. every 4 hours p.r.n. pain. 3. Hydroxyurea 500 mg p.o. b.i.d.  4. Zofran 8 mg every 8 hours as needed for nausea. FOLLOWUP: With Dr. Bam Kennedy in 7-10 days. Total time of discharge was greater than 30 minutes.         Martín Dubois MD    PP / CD  D:  2017   16:12  T:  2017   07:01  Job #:  [id number]

## 2017-01-14 NOTE — ROUTINE PROCESS
Bedside and Verbal shift change report given to Vitaly Mcmanus RN (oncoming nurse) by Katherine Mack RN (offgoing nurse). Report included the following information SBAR, Kardex, MAR and Recent Results.     SITUATION:    Code Status: Full Code   Reason for Admission: Sickle cell pain crisis ([facility] [address]    [facility] day: 3   Problem List:       Hospital Problems  Date Reviewed: 12/11/2016          Codes Class Noted POA    Sickle cell pain crisis [facility]) ICD-10-CM: D57.00  ICD-9-CM: 282.62  8/15/2014 Unknown              BACKGROUND:    Past Medical History:   Past Medical History   Diagnosis Date    Sickle cell anemia with crisis ([facility] [address]     Vitamin D deficiency          Patient taking anticoagulants no     ASSESSMENT:    Changes in Assessment Throughout Shift: no     Patient has Central Line: no Reasons if yes: n/a   Patient has Sesay Cath: no Reasons if yes: n/a      Last Vitals:     Vitals:    01/13/17 0400 01/13/17 0724 01/13/17 1126 01/13/17 1508   BP: 136/80 129/76 129/75 126/80   Pulse: 75 88 83 78   Resp: 20 18 18 20   Temp: 98.2 °F (36.8 °C) 97.9 °F (36.6 °C) 98.4 °F (36.9 °C) 97.8 °F (36.6 °C)   SpO2: 95% 91% 94% 91%   Weight:            IV and DRAINS (will only show if present)   [REMOVED] Peripheral IV 01/11/17 Left Wrist-Site Assessment: Clean, dry, & intact  Peripheral IV 01/12/17 Right Hand-Site Assessment: Clean, dry, & intact  [REMOVED] Peripheral IV 01/10/17 Left Hand-Site Assessment: Clean, dry, & intact     WOUND (if present)   Wound Type:  none   Dressing present Dressing Present : No   Wound Concerns/Notes:  none     PAIN    Pain Assessment    Pain Intensity 1: 7 (01/13/17 0713)    Pain Location 1: Back, Arm    Pain Intervention(s) 1: Medication (see MAR)    Patient Stated Pain Goal: 3  o Interventions for Pain:  dilaudid  o Intervention effective: yes  o Time of last intervention: 1509   o Reassessment Completed: yes      Last 3 Weights:  Last 3 Recorded Weights in this Encounter    01/10/17 6366 Weight: 78 kg (172 lb)     Weight change:      INTAKE/OUPUT    Current Shift:      Last three shifts: 01/12 0701 - 01/13 1900  In: 2482 [P.O.:3517; I.V.:1500]  Out: 4250 [Urine:4250]     LAB RESULTS     Recent Labs      01/13/17   0256  01/11/17   0242   WBC  15.8*  13.6*   HGB  8.7*  8.1*   HCT  25.4*  23.3*   PLT  207  321        Recent Labs      01/11/17   0242   NA  140   K  3.6   GLU  106*   BUN  6*   CREA  0.87   CA  7.9*       RECOMMENDATIONS AND DISCHARGE PLANNING     Pending tests/procedures/ Plan of Care or Other Needs: none    1. Discharge plan for patient and Needs/Barriers: Home    2. Estimated Discharge Date: TBD Posted on Whiteboard in Patients Room: yes      4. The patient's care plan was reviewed with the oncoming nurse. \"HEALS\" SAFETY CHECK      Fall Risk    Total Score: 1    Safety Measures: Safety Measures: Bed/Chair-Wheels locked, Bed in low position, Call light within reach    A safety check occurred in the patient's room between off going nurse and oncoming nurse listed above. The safety check included the below items  Area Items   H  High Alert Medications - Verify all high alert medication drips (heparin, PCA, etc.)   E  Equipment - Suction is set up for ALL patients (with pat)  - Red plugs utilized for all equipment (IV pumps, etc.)  - WOWs wiped down at end of shift.  - Room stocked with oxygen, suction, and other unit-specific supplies   A  Alarms - Bed alarm is set for fall risk patients  - Ensure chair alarm is in place and activated if patient is up in a chair   L  Lines - Check IV for any infiltration  - Sesay bag is empty if patient has a Sesay   - Tubing and IV bags are labeled   S  Safety   - Room is clean, patient is clean, and equipment is clean. - Hallways are clear from equipment besides carts.    - Fall bracelet on for fall risk patients  - Ensure room is clear and free of clutter  - Suction is set up for ALL patients (with pat)  - Hallways are clear from equipment besides carts.    - Isolation precautions followed, supplies available outside room, sign posted     Abdullahi Corona RN

## 2017-01-19 ENCOUNTER — APPOINTMENT (OUTPATIENT)
Dept: GENERAL RADIOLOGY | Age: 37
DRG: 811 | End: 2017-01-19
Attending: EMERGENCY MEDICINE
Payer: MEDICARE

## 2017-01-19 ENCOUNTER — HOSPITAL ENCOUNTER (INPATIENT)
Age: 37
LOS: 6 days | Discharge: HOME | DRG: 811 | End: 2017-01-25
Attending: EMERGENCY MEDICINE | Admitting: INTERNAL MEDICINE
Payer: MEDICARE

## 2017-01-19 DIAGNOSIS — D57.00 SICKLE CELL ANEMIA WITH CRISIS (HCC): Primary | ICD-10-CM

## 2017-01-19 PROBLEM — D57.1 SICKLE CELL ANEMIA (HCC): Status: ACTIVE | Noted: 2017-01-19

## 2017-01-19 LAB
ALBUMIN SERPL BCP-MCNC: 4.1 G/DL (ref 3.4–5)
ALBUMIN/GLOB SERPL: 0.9 {RATIO} (ref 0.8–1.7)
ALP SERPL-CCNC: 180 U/L (ref 45–117)
ALT SERPL-CCNC: 52 U/L (ref 16–61)
ANION GAP BLD CALC-SCNC: 10 MMOL/L (ref 3–18)
APTT PPP: 29.5 SEC (ref 23–36.4)
AST SERPL W P-5'-P-CCNC: 73 U/L (ref 15–37)
BASOPHILS # BLD AUTO: 0 K/UL (ref 0–0.06)
BASOPHILS # BLD: 0 % (ref 0–3)
BILIRUB SERPL-MCNC: 4.1 MG/DL (ref 0.2–1)
BUN SERPL-MCNC: 11 MG/DL (ref 7–18)
BUN/CREAT SERPL: 14 (ref 12–20)
CALCIUM SERPL-MCNC: 8.7 MG/DL (ref 8.5–10.1)
CHLORIDE SERPL-SCNC: 104 MMOL/L (ref 100–108)
CO2 SERPL-SCNC: 23 MMOL/L (ref 21–32)
CREAT SERPL-MCNC: 0.79 MG/DL (ref 0.6–1.3)
DIFFERENTIAL METHOD BLD: ABNORMAL
EOSINOPHIL # BLD: 0.3 K/UL (ref 0–0.4)
EOSINOPHIL NFR BLD: 2 % (ref 0–5)
ERYTHROCYTE [DISTWIDTH] IN BLOOD BY AUTOMATED COUNT: 24.4 % (ref 11.6–14.5)
GLOBULIN SER CALC-MCNC: 4.7 G/DL (ref 2–4)
GLUCOSE SERPL-MCNC: 100 MG/DL (ref 74–99)
HCT VFR BLD AUTO: 23.4 % (ref 36–48)
HGB BLD-MCNC: 8.2 G/DL (ref 13–16)
INR PPP: 1.2 (ref 0.8–1.2)
LYMPHOCYTES # BLD AUTO: 35 % (ref 20–51)
LYMPHOCYTES # BLD: 5.1 K/UL (ref 0.8–3.5)
MCH RBC QN AUTO: 27.4 PG (ref 24–34)
MCHC RBC AUTO-ENTMCNC: 35 G/DL (ref 31–37)
MCV RBC AUTO: 78.3 FL (ref 74–97)
MONOCYTES # BLD: 0.6 K/UL (ref 0–1)
MONOCYTES NFR BLD AUTO: 4 % (ref 2–9)
NEUTS SEG # BLD: 8.5 K/UL (ref 1.8–8)
NEUTS SEG NFR BLD AUTO: 59 % (ref 42–75)
NRBC BLD-RTO: 4 PER 100 WBC
PLATELET # BLD AUTO: 322 K/UL (ref 135–420)
PLATELET COMMENTS,PCOM: ABNORMAL
PMV BLD AUTO: 9.5 FL (ref 9.2–11.8)
POTASSIUM SERPL-SCNC: 3.8 MMOL/L (ref 3.5–5.5)
PROT SERPL-MCNC: 8.8 G/DL (ref 6.4–8.2)
PROTHROMBIN TIME: 14.4 SEC (ref 11.5–15.2)
RBC # BLD AUTO: 2.99 M/UL (ref 4.7–5.5)
RBC MORPH BLD: ABNORMAL
RETICS/RBC NFR AUTO: 9.4 % (ref 0.5–2.3)
SODIUM SERPL-SCNC: 137 MMOL/L (ref 136–145)
WBC # BLD AUTO: 14.5 K/UL (ref 4.6–13.2)

## 2017-01-19 PROCEDURE — 85730 THROMBOPLASTIN TIME PARTIAL: CPT | Performed by: EMERGENCY MEDICINE

## 2017-01-19 PROCEDURE — 85025 COMPLETE CBC W/AUTO DIFF WBC: CPT | Performed by: EMERGENCY MEDICINE

## 2017-01-19 PROCEDURE — 96361 HYDRATE IV INFUSION ADD-ON: CPT

## 2017-01-19 PROCEDURE — 74011250636 HC RX REV CODE- 250/636: Performed by: EMERGENCY MEDICINE

## 2017-01-19 PROCEDURE — 74011250637 HC RX REV CODE- 250/637: Performed by: INTERNAL MEDICINE

## 2017-01-19 PROCEDURE — 74011250636 HC RX REV CODE- 250/636: Performed by: INTERNAL MEDICINE

## 2017-01-19 PROCEDURE — 93005 ELECTROCARDIOGRAM TRACING: CPT

## 2017-01-19 PROCEDURE — 65270000029 HC RM PRIVATE

## 2017-01-19 PROCEDURE — 99285 EMERGENCY DEPT VISIT HI MDM: CPT

## 2017-01-19 PROCEDURE — 71020 XR CHEST PA LAT: CPT

## 2017-01-19 PROCEDURE — 74011250637 HC RX REV CODE- 250/637: Performed by: EMERGENCY MEDICINE

## 2017-01-19 PROCEDURE — 74011000258 HC RX REV CODE- 258: Performed by: EMERGENCY MEDICINE

## 2017-01-19 PROCEDURE — 85045 AUTOMATED RETICULOCYTE COUNT: CPT | Performed by: EMERGENCY MEDICINE

## 2017-01-19 PROCEDURE — 85610 PROTHROMBIN TIME: CPT | Performed by: EMERGENCY MEDICINE

## 2017-01-19 PROCEDURE — 74011250636 HC RX REV CODE- 250/636

## 2017-01-19 PROCEDURE — 96375 TX/PRO/DX INJ NEW DRUG ADDON: CPT

## 2017-01-19 PROCEDURE — 96376 TX/PRO/DX INJ SAME DRUG ADON: CPT

## 2017-01-19 PROCEDURE — 77030027138 HC INCENT SPIROMETER -A

## 2017-01-19 PROCEDURE — 74011000258 HC RX REV CODE- 258: Performed by: INTERNAL MEDICINE

## 2017-01-19 PROCEDURE — 80053 COMPREHEN METABOLIC PANEL: CPT | Performed by: EMERGENCY MEDICINE

## 2017-01-19 PROCEDURE — 96374 THER/PROPH/DIAG INJ IV PUSH: CPT

## 2017-01-19 PROCEDURE — 94761 N-INVAS EAR/PLS OXIMETRY MLT: CPT

## 2017-01-19 RX ORDER — HYDROMORPHONE HYDROCHLORIDE 1 MG/ML
2 INJECTION, SOLUTION INTRAMUSCULAR; INTRAVENOUS; SUBCUTANEOUS ONCE
Status: COMPLETED | OUTPATIENT
Start: 2017-01-19 | End: 2017-01-19

## 2017-01-19 RX ORDER — FOLIC ACID 1 MG/1
1 TABLET ORAL DAILY
Status: DISCONTINUED | OUTPATIENT
Start: 2017-01-19 | End: 2017-01-25 | Stop reason: HOSPADM

## 2017-01-19 RX ORDER — PROCHLORPERAZINE EDISYLATE 5 MG/ML
10 INJECTION INTRAMUSCULAR; INTRAVENOUS
Status: DISCONTINUED | OUTPATIENT
Start: 2017-01-19 | End: 2017-01-19

## 2017-01-19 RX ORDER — FENTANYL 25 UG/1
1 PATCH TRANSDERMAL
Status: DISCONTINUED | OUTPATIENT
Start: 2017-01-19 | End: 2017-01-19

## 2017-01-19 RX ORDER — ENOXAPARIN SODIUM 100 MG/ML
40 INJECTION SUBCUTANEOUS EVERY 24 HOURS
Status: DISCONTINUED | OUTPATIENT
Start: 2017-01-19 | End: 2017-01-25 | Stop reason: HOSPADM

## 2017-01-19 RX ORDER — AZITHROMYCIN 250 MG/1
500 TABLET, FILM COATED ORAL EVERY 24 HOURS
Status: DISCONTINUED | OUTPATIENT
Start: 2017-01-19 | End: 2017-01-25

## 2017-01-19 RX ORDER — HYDROMORPHONE HYDROCHLORIDE 1 MG/ML
2 INJECTION, SOLUTION INTRAMUSCULAR; INTRAVENOUS; SUBCUTANEOUS ONCE
Status: DISCONTINUED | OUTPATIENT
Start: 2017-01-19 | End: 2017-01-19 | Stop reason: SDUPTHER

## 2017-01-19 RX ORDER — HYDROXYUREA 500 MG/1
500 CAPSULE ORAL 2 TIMES DAILY
Status: DISCONTINUED | OUTPATIENT
Start: 2017-01-19 | End: 2017-01-25 | Stop reason: HOSPADM

## 2017-01-19 RX ORDER — DEXTROSE MONOHYDRATE AND SODIUM CHLORIDE 5; .45 G/100ML; G/100ML
125 INJECTION, SOLUTION INTRAVENOUS CONTINUOUS
Status: DISCONTINUED | OUTPATIENT
Start: 2017-01-19 | End: 2017-01-25

## 2017-01-19 RX ORDER — DIPHENHYDRAMINE HYDROCHLORIDE 50 MG/ML
50 INJECTION, SOLUTION INTRAMUSCULAR; INTRAVENOUS ONCE
Status: COMPLETED | OUTPATIENT
Start: 2017-01-19 | End: 2017-01-19

## 2017-01-19 RX ORDER — SODIUM CHLORIDE 9 MG/ML
250 INJECTION, SOLUTION INTRAVENOUS CONTINUOUS
Status: DISCONTINUED | OUTPATIENT
Start: 2017-01-19 | End: 2017-01-19

## 2017-01-19 RX ORDER — ONDANSETRON 2 MG/ML
4 INJECTION INTRAMUSCULAR; INTRAVENOUS
Status: DISCONTINUED | OUTPATIENT
Start: 2017-01-19 | End: 2017-01-19

## 2017-01-19 RX ORDER — HYDROMORPHONE HYDROCHLORIDE 2 MG/ML
2 INJECTION, SOLUTION INTRAMUSCULAR; INTRAVENOUS; SUBCUTANEOUS
Status: DISCONTINUED | OUTPATIENT
Start: 2017-01-19 | End: 2017-01-25

## 2017-01-19 RX ORDER — HYDROMORPHONE HYDROCHLORIDE 1 MG/ML
2 INJECTION, SOLUTION INTRAMUSCULAR; INTRAVENOUS; SUBCUTANEOUS ONCE
Status: DISCONTINUED | OUTPATIENT
Start: 2017-01-19 | End: 2017-01-19

## 2017-01-19 RX ORDER — DIPHENHYDRAMINE HYDROCHLORIDE 50 MG/ML
12.5 INJECTION, SOLUTION INTRAMUSCULAR; INTRAVENOUS
Status: DISCONTINUED | OUTPATIENT
Start: 2017-01-19 | End: 2017-01-25 | Stop reason: HOSPADM

## 2017-01-19 RX ORDER — DOCUSATE SODIUM 100 MG/1
100 CAPSULE, LIQUID FILLED ORAL 2 TIMES DAILY
Status: DISCONTINUED | OUTPATIENT
Start: 2017-01-19 | End: 2017-01-25 | Stop reason: HOSPADM

## 2017-01-19 RX ORDER — SODIUM CHLORIDE 9 MG/ML
125 INJECTION, SOLUTION INTRAVENOUS CONTINUOUS
Status: DISCONTINUED | OUTPATIENT
Start: 2017-01-19 | End: 2017-01-19

## 2017-01-19 RX ORDER — HYDROMORPHONE HYDROCHLORIDE 1 MG/ML
2 INJECTION, SOLUTION INTRAMUSCULAR; INTRAVENOUS; SUBCUTANEOUS
Status: DISCONTINUED | OUTPATIENT
Start: 2017-01-19 | End: 2017-01-19 | Stop reason: SDUPTHER

## 2017-01-19 RX ORDER — HYDROMORPHONE HYDROCHLORIDE 1 MG/ML
INJECTION, SOLUTION INTRAMUSCULAR; INTRAVENOUS; SUBCUTANEOUS
Status: COMPLETED
Start: 2017-01-19 | End: 2017-01-19

## 2017-01-19 RX ORDER — HYDROMORPHONE HYDROCHLORIDE 2 MG/ML
2 INJECTION, SOLUTION INTRAMUSCULAR; INTRAVENOUS; SUBCUTANEOUS
Status: DISCONTINUED | OUTPATIENT
Start: 2017-01-19 | End: 2017-01-19 | Stop reason: SDUPTHER

## 2017-01-19 RX ORDER — FENTANYL 25 UG/1
1 PATCH TRANSDERMAL
Status: DISCONTINUED | OUTPATIENT
Start: 2017-01-22 | End: 2017-01-25 | Stop reason: HOSPADM

## 2017-01-19 RX ADMIN — DOCUSATE SODIUM 100 MG: 100 CAPSULE, LIQUID FILLED ORAL at 21:14

## 2017-01-19 RX ADMIN — SODIUM CHLORIDE 250 ML/HR: 900 INJECTION, SOLUTION INTRAVENOUS at 06:50

## 2017-01-19 RX ADMIN — HYDROXYUREA 500 MG: 500 CAPSULE ORAL at 18:21

## 2017-01-19 RX ADMIN — AZITHROMYCIN 500 MG: 250 TABLET, FILM COATED ORAL at 21:14

## 2017-01-19 RX ADMIN — HYDROMORPHONE HYDROCHLORIDE 2 MG: 1 INJECTION, SOLUTION INTRAMUSCULAR; INTRAVENOUS; SUBCUTANEOUS at 06:49

## 2017-01-19 RX ADMIN — HYDROMORPHONE HYDROCHLORIDE 2 MG: 1 INJECTION, SOLUTION INTRAMUSCULAR; INTRAVENOUS; SUBCUTANEOUS at 05:38

## 2017-01-19 RX ADMIN — FOLIC ACID 1 MG: 1 TABLET ORAL at 08:59

## 2017-01-19 RX ADMIN — SODIUM CHLORIDE 1000 ML: 900 INJECTION, SOLUTION INTRAVENOUS at 02:03

## 2017-01-19 RX ADMIN — HYDROMORPHONE HYDROCHLORIDE 2 MG: 1 INJECTION, SOLUTION INTRAMUSCULAR; INTRAVENOUS; SUBCUTANEOUS at 02:03

## 2017-01-19 RX ADMIN — DIPHENHYDRAMINE HYDROCHLORIDE 50 MG: 50 INJECTION INTRAMUSCULAR; INTRAVENOUS at 02:02

## 2017-01-19 RX ADMIN — SODIUM CHLORIDE 250 ML/HR: 900 INJECTION, SOLUTION INTRAVENOUS at 03:35

## 2017-01-19 RX ADMIN — DIPHENHYDRAMINE HYDROCHLORIDE 12.5 MG: 50 INJECTION INTRAMUSCULAR; INTRAVENOUS at 21:16

## 2017-01-19 RX ADMIN — HYDROMORPHONE HYDROCHLORIDE 2 MG: 2 INJECTION, SOLUTION INTRAMUSCULAR; INTRAVENOUS; SUBCUTANEOUS at 23:46

## 2017-01-19 RX ADMIN — HYDROMORPHONE HYDROCHLORIDE 2 MG: 2 INJECTION, SOLUTION INTRAMUSCULAR; INTRAVENOUS; SUBCUTANEOUS at 21:14

## 2017-01-19 RX ADMIN — HYDROMORPHONE HYDROCHLORIDE 2 MG: 2 INJECTION, SOLUTION INTRAMUSCULAR; INTRAVENOUS; SUBCUTANEOUS at 18:30

## 2017-01-19 RX ADMIN — CEFTRIAXONE 1 G: 1 INJECTION, POWDER, FOR SOLUTION INTRAMUSCULAR; INTRAVENOUS at 21:14

## 2017-01-19 RX ADMIN — HYDROMORPHONE HYDROCHLORIDE 2 MG: 2 INJECTION, SOLUTION INTRAMUSCULAR; INTRAVENOUS; SUBCUTANEOUS at 12:21

## 2017-01-19 RX ADMIN — HYDROMORPHONE HYDROCHLORIDE 2 MG: 2 INJECTION, SOLUTION INTRAMUSCULAR; INTRAVENOUS; SUBCUTANEOUS at 16:30

## 2017-01-19 RX ADMIN — DEXTROSE MONOHYDRATE AND SODIUM CHLORIDE 125 ML/HR: 5; .45 INJECTION, SOLUTION INTRAVENOUS at 16:28

## 2017-01-19 RX ADMIN — HYDROMORPHONE HYDROCHLORIDE 2 MG: 2 INJECTION, SOLUTION INTRAMUSCULAR; INTRAVENOUS; SUBCUTANEOUS at 08:59

## 2017-01-19 RX ADMIN — ENOXAPARIN SODIUM 40 MG: 40 INJECTION SUBCUTANEOUS at 08:13

## 2017-01-19 RX ADMIN — HYDROMORPHONE HYDROCHLORIDE 2 MG: 1 INJECTION, SOLUTION INTRAMUSCULAR; INTRAVENOUS; SUBCUTANEOUS at 03:35

## 2017-01-19 RX ADMIN — HYDROXYUREA 500 MG: 500 CAPSULE ORAL at 09:46

## 2017-01-19 RX ADMIN — DEXTROSE MONOHYDRATE AND SODIUM CHLORIDE 125 ML/HR: 5; .45 INJECTION, SOLUTION INTRAVENOUS at 08:12

## 2017-01-19 NOTE — ED NOTES
7:14 AM Consult:  Discussed care with Dr. Dwana Rinne discussion; including history of patients chief complaint, available diagnostic results, and treatment course. Agrees to consult on pt. Lynda Attestation:     carlos manuel Phillip for and in the presence of Aki Dangelo MD January 19, 2017 at 7:14 AM     Signed by: Lynda Gray, January 19, 2017 at 7:14 AM     Physician Attestation:   I personally performed the services described in this documentation, reviewed and edited the documentation which was dictated to the martibe in my presence, and it accurately records my words and actions.  Aki Dangelo MD  January 19, 2017 at 7:14 AM

## 2017-01-19 NOTE — PROGRESS NOTES
Patient seen in f/u. Patient in NAD, appears non toxic. Pain control, iv fluids, O2, IS, dvt prophylaxis. CXR with infiltrate/atelactasis, leucocytosis- add ceftriaxone, zithromax.  D/w patient

## 2017-01-19 NOTE — IP AVS SNAPSHOT
[address] Patient: Daquan Mcallister MRN: [id number] MAS:[id number] You are allergic to the following Allergen Reactions Eggshell Membrane Nausea and Vomiting Milk Nausea and Vomiting Recent Documentation Height Weight BMI Smoking Status 1.829 m 79.7 kg 23.84 kg/m2 Never Smoker Emergency Contacts Name Discharge Info Relation Home Work Mobile [address] CAREGIVER [3] Spouse [3] [phone] Nika Echeverria  Mother [14] [phone] About your hospitalization You were admitted on:  January 19, 2017 You last received care in the:  [facility] [address] You were discharged on:  January 25, 2017 Unit phone number:  [phone] Why you were hospitalized Your primary diagnosis was:  Not on File Your diagnoses also included:  Sickle Cell Anemia (Hcc) Providers Seen During Your Hospitalizations Provider Role Specialty Primary office phone Enma Kiser MD Attending Provider Emergency Medicine [phone] Evelyn Geronimo MD Attending Provider Internal Medicine [phone] Your Primary Care Physician (PCP) Primary Care Physician Office Phone Office Fax [address] [facility], [address] [place] [address] [phone] Follow-up Information Follow up With Details Comments Contact Info Eh Jefferson MD  Appt planner said the office will call the patient with appt. [address] [facility] 105 200 [facility] 
[phone] Current Discharge Medication List  
  
CONTINUE these medications which have NOT CHANGED Dose & Instructions Dispensing Information Comments Morning Noon Evening Bedtime  
 fentaNYL 25 mcg/hr PATCH Commonly known as:  Ambreen Force Your next dose is: Today, Tomorrow Other:  _________ Dose:  1 Patch 1 Patch by TransDERmal route every seventy-two (72) hours. Max Daily Amount: 1 Patch. Quantity:  5 Patch Refills:  0  
     
   
   
   
  
 folic acid 1 mg tablet Commonly known as:  Google Your next dose is: Today, Tomorrow Other:  _________ Dose:  1 mg Take 1 Tab by mouth daily. Quantity:  30 Tab Refills:  1 HYDROmorphone 2 mg tablet Commonly known as:  DILAUDID Your next dose is: Today, Tomorrow Other:  _________ Dose:  2 mg Take 1 Tab by mouth every four (4) hours as needed for Pain. Max Daily Amount: 12 mg. Quantity:  40 Tab Refills:  0  
     
   
   
   
  
 hydroxyurea 500 mg capsule Commonly known as:  HYDREA Your next dose is: Today, Tomorrow Other:  _________ Dose:  500 mg Take 500 mg by mouth two (2) times a day. Indications: SICKLE CELL ANEMIA WITH CRISIS Refills:  0 STOP taking these medications   
 ondansetron hcl 4 mg tablet Commonly known as:  ZOFRAN (AS HYDROCHLORIDE) Discharge Instructions Sickle Cell Crisis: Care Instructions Your Care Instructions Sickle cell crisis is a painful episode that may begin suddenly in a person with sickle cell disease. Sickle cell disease turns normal, round red blood cells into cells that look like jeane or crescent moons. The sickle-shaped cells can get stuck in blood vessels, blocking blood flow and causing severe pain. The pain can occur in the bones of the spine, the arms and legs, the chest, and the abdomen. An episode may be called a \"painful event\" or \"painful crisis. \" Some people who have sickle cell disease have many painful events, while others have few or none. Treatment depends on the level of pain and how long it lasts. Sometimes taking nonprescription pain relievers can help.  Or you may need stronger pain relief medicine that is prescribed or given by a doctor. You may need to be treated in the hospital. 
It isn't always possible to know what sets off a painful event. But triggers include being dehydrated, cold temperatures, infection, stress, and not getting enough oxygen. Follow-up care is a key part of your treatment and safety. Be sure to make and go to all appointments, and call your doctor if you are having problems. It's also a good idea to know your test results and keep a list of the medicines you take. How can you care for yourself at home? · Create a pain management plan with your doctor. This plan should include the types of medicines you can take and other actions you can take at home to relieve pain. · Drink plenty of fluids, enough so that your urine is light yellow or clear like water. If you have kidney, heart, or liver disease and have to limit fluids, talk with your doctor before you increase the amount of fluids you drink. · Take your medicines exactly as prescribed. Call your doctor if you think you are having a problem with your medicine. · Take pain medicines exactly as directed. ¨ If the doctor gave you a prescription medicine for pain, take it as prescribed. ¨ If you are not taking a prescription pain medicine, ask your doctor if you can take an over-the-counter medicine. · Avoid alcohol. It can make you dehydrated. · Dress warmly in cold weather. The cold and windy weather can lead to severe pain. · Do not smoke. Smoking can reduce the amount of oxygen in your blood. · Get plenty of sleep. When should you call for help? Call 911 anytime you think you may need emergency care. For example, call if: 
· You passed out (lost consciousness). Call your doctor now or seek immediate medical care if: 
· You are in severe pain. · You are dizzy or lightheaded, or you feel like you may faint. · You have a fever. · You are short of breath. · Your symptoms are getting worse. Watch closely for changes in your health, and be sure to contact your doctor if you are not getting better as expected. Where can you learn more? Go to http://manuel-amadeo.info/. Enter F104 in the search box to learn more about \"Sickle Cell Crisis: Care Instructions. \" Current as of: 2016 Content Version: 11.1 © 7918-6506 MD [employer]. Care instructions adapted under license by [employer] (which disclaims liability or warranty for this information). If you have questions about a medical condition or this instruction, always ask your healthcare professional. [place] [address] any warranty or liability for your use of this information. Patient armband removed and shredded MyCharLINAGORA Activation Thank you for requesting access to ROXIMITY. Please follow the instructions below to securely access and download your online medical record. ROXIMITY allows you to send messages to your doctor, view your test results, renew your prescriptions, schedule appointments, and more. How Do I Sign Up? 1. In your internet browser, go to [email or web address] 
2. Click on the First Time User? Click Here link in the Sign In box. You will be redirect to the New Member Sign Up page. 3. Enter your ROXIMITY Access Code exactly as it appears below. You will not need to use this code after youve completed the sign-up process. If you do not sign up before the expiration date, you must request a new code. ROXIMITY Access Code: [email or web address] Expires: 2017  1:09 AM (This is the date your ROXIMITY access code will ) 4. Enter the last four digits of your Social Security Number (xxxx) and Date of Birth (mm/dd/yyyy) as indicated and click Submit. You will be taken to the next sign-up page. 5. Create a ROXIMITY ID. This will be your ROXIMITY login ID and cannot be changed, so think of one that is secure and easy to remember. 6. Create a Consumer Physics password. You can change your password at any time. 7. Enter your Password Reset Question and Answer. This can be used at a later time if you forget your password. 8. Enter your e-mail address. You will receive e-mail notification when new information is available in [address] 9. Click Sign Up. You can now view and download portions of your medical record. 10. Click the Download Summary menu link to download a portable copy of your medical information. Additional Information If you have questions, please visit the Frequently Asked Questions section of the [employer] website at [email or web address] Remember, Consumer [employer] is NOT to be used for urgent needs. For medical emergencies, dial 911. DISCHARGE SUMMARY from Nurse The following personal items are in your possession at time of discharge: 
 
Dental Appliances: None Visual Aid: None Home Medications: None Jewelry: Lorena Boyce Clothing: At bedside Other Valuables: Cell Phone, Clarita Reveles (cell phone  & headphones) PATIENT INSTRUCTIONS: 
 
After general anesthesia or intravenous sedation, for 24 hours or while taking prescription Narcotics: · Limit your activities · Do not drive and operate hazardous machinery · Do not make important personal or business decisions · Do  not drink alcoholic beverages · If you have not urinated within 8 hours after discharge, please contact your surgeon on call. Report the following to your surgeon: 
· Excessive pain, swelling, redness or odor of or around the surgical area · Temperature over 100.5 · Nausea and vomiting lasting longer than 4 hours or if unable to take medications · Any signs of decreased circulation or nerve impairment to extremity: change in color, persistent  numbness, tingling, coldness or increase pain · Any questions What to do at Home: 
Recommended activity: Activity as tolerated,  
 
 If you experience any of the following symptoms fever greater than 100, chest pain, nausea, vomiting or diarrhea, please follow up with PCP or ER. *  Please give a list of your current medications to your Primary Care Provider. *  Please update this list whenever your medications are discontinued, doses are 
    changed, or new medications (including over-the-counter products) are added. *  Please carry medication information at all times in case of emergency situations. These are general instructions for a healthy lifestyle: No smoking/ No tobacco products/ Avoid exposure to second hand smoke Surgeon General's Warning:  Quitting smoking now greatly reduces serious risk to your health. Obesity, smoking, and sedentary lifestyle greatly increases your risk for illness A healthy diet, regular physical exercise & weight monitoring are important for maintaining a healthy lifestyle You may be retaining fluid if you have a history of heart failure or if you experience any of the following symptoms:  Weight gain of 3 pounds or more overnight or 5 pounds in a week, increased swelling in our hands or feet or shortness of breath while lying flat in bed. Please call your doctor as soon as you notice any of these symptoms; do not wait until your next office visit. Recognize signs and symptoms of STROKE: 
 
F-face looks uneven A-arms unable to move or move unevenly S-speech slurred or non-existent T-time-call 911 as soon as signs and symptoms begin-DO NOT go Back to bed or wait to see if you get better-TIME IS BRAIN. Warning Signs of HEART ATTACK Call 911 if you have these symptoms: 
? Chest discomfort. Most heart attacks involve discomfort in the center of the chest that lasts more than a few minutes, or that goes away and comes back. It can feel like uncomfortable pressure, squeezing, fullness, or pain. ? Discomfort in other areas of the upper body. Symptoms can include pain or discomfort in one or both arms, the back, neck, jaw, or stomach. ? Shortness of breath with or without chest discomfort. ? Other signs may include breaking out in a cold sweat, nausea, or lightheadedness. Don't wait more than five minutes to call [address]! Fast action can save your life. Calling 911 is almost always the fastest way to get lifesaving treatment. Emergency Medical Services staff can begin treatment when they arrive  up to an hour sooner than if someone gets to the hospital by car. The discharge information has been reviewed with the patient. The patient verbalized understanding. Discharge medications reviewed with the patient and appropriate educational materials and side effects teaching were provided. Discharge Orders None Introducing [facility]! Anastasiya Cano introduces [employer] patient portal. Now you can access parts of your medical record, email your doctor's office, and request medication refills online. 1. In your internet browser, go to [email or web address] 2. Click on the First Time User? Click Here link in the Sign In box. You will see the New Member Sign Up page. 3. Enter your Syscor Access Code exactly as it appears below. You will not need to use this code after youve completed the sign-up process. If you do not sign up before the expiration date, you must request a new code. · [employer] Access Code: [email or web address] Expires: 4/19/2017  1:09 AM 
 
4. Enter the last four digits of your Social Security Number (xxxx) and Date of Birth (mm/dd/yyyy) as indicated and click Submit. You will be taken to the next sign-up page. 5. Create a Sambazont ID. This will be your [employer]cor login ID and cannot be changed, so think of one that is secure and easy to remember. 6. Create a Sambazont password. You can change your password at any time. 7. Enter your Password Reset Question and Answer. This can be used at a later time if you forget your password. 8. Enter your e-mail address. You will receive e-mail notification when new information is available in [address] 9. Click Sign Up. You can now view and download portions of your medical record. 10. Click the Download Summary menu link to download a portable copy of your medical information. If you have questions, please visit the Frequently Asked Questions section of the Dizzion website. Remember, Dizzion is NOT to be used for urgent needs. For medical emergencies, dial 911. Now available from your iPhone and Android! General Information Please provide this summary of care documentation to your next provider. Patient Signature:  ____________________________________________________________ Date:  ____________________________________________________________  
  
Earnstine Bruno Provider Signature:  ____________________________________________________________ Date:  ____________________________________________________________

## 2017-01-19 NOTE — ED NOTES
Spoke with Dr. Andry Dumont about possibly getting a duragesic patch for pt since hes on one at home, he agreed, 25mcg/hr patch ordered for pt.

## 2017-01-19 NOTE — ED PROVIDER NOTES
HPI Comments: 1:26 AM Qi Cardoso is a 43 y.o. male who presents to the ED c/o sickle cell crisis onset just PTA. Sx include generalized body aches that are worse in his back and legs. Patient states that he last took Dilaudid around 10 PM last night with minimal relief. No further complaints at this time. The history is provided by the patient. Past Medical History:   Diagnosis Date    Sickle cell anemia with crisis ([address]     Vitamin D deficiency        History reviewed. No pertinent past surgical history. Family History:   Problem Relation Age of Onset    Cancer Neg Hx     Diabetes Neg Hx     Heart Disease Neg Hx     Heart Attack Neg Hx     Hypertension Neg Hx     Stroke Neg Hx        Social History     Social History    Marital status:      Spouse name: N/A    Number of children: N/A    Years of education: N/A     Occupational History    Not on file. Social History Main Topics    Smoking status: Never Smoker    Smokeless tobacco: Never Used    Alcohol use No    Drug use: No    Sexual activity: Yes     Partners: Female     Birth control/ protection: None     Other Topics Concern    Not on file     Social History Narrative         ALLERGIES: Eggshell membrane and Milk    Review of Systems   Constitutional: Negative for activity change, appetite change, diaphoresis and fever. HENT: Negative for congestion, dental problem, ear pain, hearing loss, nosebleeds, postnasal drip, sinus pressure, sneezing and tinnitus. Eyes: Negative for photophobia, discharge, redness and visual disturbance. Respiratory: Negative for cough, choking, shortness of breath, wheezing and stridor. Cardiovascular: Negative for chest pain, palpitations and leg swelling. Gastrointestinal: Negative for abdominal distention, abdominal pain, anal bleeding and blood in stool.    Genitourinary: Negative for decreased urine volume, difficulty urinating, discharge, dysuria, frequency, hematuria, penile swelling, scrotal swelling, testicular pain and urgency. Musculoskeletal: Positive for myalgias. Negative for arthralgias, back pain, gait problem, joint swelling and neck pain. Skin: Negative for color change and pallor. Neurological: Negative for dizziness, tremors, seizures, syncope and headaches. Hematological: Negative for adenopathy. Does not bruise/bleed easily. Psychiatric/Behavioral: Negative for agitation, behavioral problems, confusion and hallucinations. The patient is not nervous/anxious. Vitals:    01/19/17 0100   BP: 125/79   Pulse: 91   Resp: 18   Temp: 98.3 °F (36.8 °C)   SpO2: 96%   Weight: 78.9 kg (174 lb)   Height: 6' (1.829 m)            Physical Exam   Constitutional: He is oriented to person, place, and time. He appears well-developed and well-nourished. HENT:   Head: Normocephalic and atraumatic. Right Ear: External ear normal.   Left Ear: External ear normal.   Nose: Nose normal.   Mouth/Throat: Oropharynx is clear and moist.   Eyes: Conjunctivae and EOM are normal. Pupils are equal, round, and reactive to light. Right eye exhibits no discharge. No scleral icterus. Neck: Normal range of motion. Neck supple. No JVD present. No thyromegaly present. Cardiovascular: Normal rate, regular rhythm and intact distal pulses. Exam reveals no gallop and no friction rub. No murmur heard. Pulmonary/Chest: No respiratory distress. He has no wheezes. He has no rales. He exhibits no tenderness. Abdominal: He exhibits no distension and no mass. There is no tenderness. There is no rebound and no guarding. Musculoskeletal: Normal range of motion. He exhibits no edema. Lymphadenopathy:     He has no cervical adenopathy. Neurological: He is alert and oriented to person, place, and time. No cranial nerve deficit. Coordination normal.   Skin: Skin is warm and dry. No rash noted. No erythema. Psychiatric: He has a normal mood and affect.  His behavior is normal. Judgment normal.   Nursing note and vitals reviewed. [facility]  ED Course       Procedures      Vitals:  Patient Vitals for the past 12 hrs:   Temp Pulse Resp BP SpO2   01/19/17 0100 98.3 °F (36.8 °C) 91 18 125/79 96 %       Medications ordered:   Medications   HYDROmorphone (PF) (DILAUDID) injection 2 mg (not administered)   prochlorperazine (COMPAZINE) injection 10 mg (not administered)   diphenhydrAMINE (BENADRYL) injection 50 mg (not administered)   sodium chloride 0.9 % bolus infusion 1,000 mL (not administered)         Lab findings:  No results found for this or any previous visit (from the past 12 hour(s)). EKG interpretation by ED Physician:    X-Ray, CT or other radiology findings or impressions:  No orders to display       Progress notes, Consult notes or additional Procedure notes:     Reevaluation of patient:   I have reassessed the patient. Patient is feeling . Patient was    Disposition:  Diagnosis: No diagnosis found. Disposition:    Follow-up Information     None           Patient's Medications   Start Taking    No medications on file   Continue Taking    FENTANYL (DURAGESIC) 25 MCG/HR PATCH    1 Patch by TransDERmal route every seventy-two (72) hours. Max Daily Amount: 1 Patch. FOLIC ACID (FOLVITE) 1 MG TABLET    Take 1 Tab by mouth daily. HYDROMORPHONE (DILAUDID) 2 MG TABLET    Take 1 Tab by mouth every four (4) hours as needed for Pain. Max Daily Amount: 12 mg. HYDROXYUREA (HYDREA) 500 MG CAPSULE    Take 500 mg by mouth two (2) times a day. Indications: SICKLE CELL ANEMIA WITH CRISIS    ONDANSETRON HCL (ZOFRAN, AS HYDROCHLORIDE,) 4 MG TABLET    Take 2 Tabs by mouth every eight (8) hours as needed for Nausea.    These Medications have changed    No medications on file   Stop Taking    No medications on file           SCRIBE ATTESTATION STATEMENT  Documented by: Yrn Herbert scribing for, and in the presence of, Enma Kiser MD 1:28 AM     Signed by: Gloria Lara Lynda Eldridge, 1/19/17, 1:28 AM    PROVIDER ATTESTATION STATEMENT  I personally performed the services described in the documentation, reviewed the documentation, as recorded by the scribe in my presence, and it accurately and completely records my words and actions.   Som Carter MD      7:09 AM Discussed with Dr. Charly Altamirano who will inform Dr. Anette Pérez

## 2017-01-19 NOTE — ED NOTES
Hourly rounding-pt sleeping on stretcher, call bell in reach, side rails up, no complaints at thie time, lights dimmed, warm blanket provided.

## 2017-01-19 NOTE — H&P
History and Physical        Patient: Mariza Monaco               Sex: male          DOA: 1/19/2017         YOB: 1980      Age:  43 y.o.        LOS:  LOS: 0 days        HPI:     Mariza Monaco is a 43 y.o. male who presents to the ED c/o sickle cell crisis onset just this morning when he woke up in pain. Sx include generalized body aches that are worse in his back and legs. Patient states that he last took Dilaudid around 10 PM last night with minimal relief. He states he is compliant with medications. Does not know what triggered this crisis. No further complaints at this time.        Past Medical History   Diagnosis Date    Sickle cell anemia with crisis ([facility] [address]     Vitamin D deficiency        History reviewed. No pertinent past surgical history. Family History   Problem Relation Age of Onset    Cancer Neg Hx     Diabetes Neg Hx     Heart Disease Neg Hx     Heart Attack Neg Hx     Hypertension Neg Hx     Stroke Neg Hx        Social History     Social History    Marital status:      Spouse name: N/A    Number of children: N/A    Years of education: N/A     Social History Main Topics    Smoking status: Never Smoker    Smokeless tobacco: Never Used    Alcohol use No    Drug use: No    Sexual activity: Yes     Partners: Female     Birth control/ protection: None     Other Topics Concern    None     Social History Narrative       Prior to Admission medications    Medication Sig Start Date End Date Taking? Authorizing Provider   HYDROmorphone (DILAUDID) 2 mg tablet Take 1 Tab by mouth every four (4) hours as needed for Pain. Max Daily Amount: 12 mg. 4/8/16  Yes Jolene Reyes MD   hydroxyurea (HYDREA) 500 mg capsule Take 500 mg by mouth two (2) times a day. Indications: SICKLE CELL ANEMIA WITH CRISIS   Yes Raphael Guerrero MD   fentaNYL (DURAGESIC) 25 mcg/hr PATCH 1 Patch by TransDERmal route every seventy-two (72) hours.  Max Daily Amount: 1 Patch. 10/21/15  Yes Danya Willis MD   folic acid (FOLVITE) 1 mg tablet Take 1 Tab by mouth daily. 1/13/17   Severiano Dill, MD   ondansetron hcl (ZOFRAN, AS HYDROCHLORIDE,) 4 mg tablet Take 2 Tabs by mouth every eight (8) hours as needed for Nausea.  11/9/16   Dago Yanez MD       Allergies   Allergen Reactions    Eggshell Membrane Nausea and Vomiting    Milk Nausea and Vomiting       Review of Systems  CONSTITUTIONAL: No Fever, No chills, No weight loss, No Night sweats  HEENT: No nasal discharge, No PN drainage, No epistaxis, No diff in swallowing  CVS: No palpitations, No syncope, No peripheral edema, No PND, No orthopnea  RS: No shortness of breath, No cough, No hemoptysis, No pleuritic chest pain  GI: No vomitting, No diarrhea, No hematemesis, No rectal bleeding, No acid reflux or heartburn  NEURO: No focal weakness, No headaches, No seizures  PSYCH: No anxiety, No depression  : No hematuria or dysuria  SKIN: No rash        Physical Exam:      Visit Vitals    /74    Pulse 77    Temp 98.3 °F (36.8 °C)    Resp 21    Ht 6' (1.829 m)    Wt 78.9 kg (174 lb)    SpO2 100%    BMI 23.6 kg/m2       Intake/Output Summary (Last 24 hours) at 01/19/17 0733  Last data filed at 01/19/17 0658   Gross per 24 hour   Intake             2500 ml   Output              800 ml   Net             1700 ml       Physical Exam:  General: appears to be in pain, cooperative  Neck: No adenopathy or thyroid swelling  CVS: No significant JVD (Jugular Venous Distention) observed, PMI (Point of Maximum Impulse) not displaced, regular rate and rhythm, S1S2 normal, no murmurs  RS: Symmetrical chest rise, clear to auscultation bilaterally  Abd: No distention observed, bowel sounds normal, palpation is soft, non tender, no hepatosplenomegaly, no distension or guarding or rigidity  Neuro: non focal, awake, alert  Extrm: no leg edema   Skin: no rash    Labs Reviewed:    Chemistry Recent Labs      01/19/17   0152   GLU  100*   NA  137   K  3.8   CL 104   CO2  23   BUN  11   CREA  0.79   CA  8.7   AGAP  10   BUCR  14   AP  180*   TP  8.8*   ALB  4.1   GLOB  4.7*   AGRAT  0.9        Lactic Acid Lactic acid   Date Value Ref Range Status   10/17/2015 1.4 0.4 - 2.0 MMOL/L Final     No results for input(s): LAC in the last 72 hours. Liver Enzymes Protein, total   Date Value Ref Range Status   01/19/2017 8.8 (H) 6.4 - 8.2 g/dL Final     Albumin   Date Value Ref Range Status   01/19/2017 4.1 3.4 - 5.0 g/dL Final     Globulin   Date Value Ref Range Status   01/19/2017 4.7 (H) 2.0 - 4.0 g/dL Final     A-G Ratio   Date Value Ref Range Status   01/19/2017 0.9 0.8 - 1.7   Final     AST   Date Value Ref Range Status   01/19/2017 73 (H) 15 - 37 U/L Final     Alk. phosphatase   Date Value Ref Range Status   01/19/2017 180 (H) 45 - 117 U/L Final     Recent Labs      01/19/17   0152   TP  8.8*   ALB  4.1   GLOB  4.7*   AGRAT  0.9   SGOT  73*   AP  180*        CBC w/Diff Recent Labs      01/19/17 0152   WBC  14.5*   RBC  2.99*   HGB  8.2*   HCT  23.4*   PLT  322   GRANS  59   LYMPH  35   EOS  2        Cardiac Enzymes No results found for: CPK, CKMMB, CKMB, RCK3, CKMBT, CKNDX, CKND1, KY, TROPT, TROIQ, ELSA, TROPT, TNIPOC, BNP, BNPP     BNP No results found for: BNP, BNPP, XBNPT     Coagulation Recent Labs      01/19/17   0152   PTP  14.4   INR  1.2   APTT  29.5         Thyroid  Lab Results   Component Value Date/Time    TSH 0.93 03/15/2010 01:58 PM            ABG No results for input(s): PHI, PHI, PCO2I, PO2, PO2I, HCO3, HCO3I, FIO2, FIO2I in the last 72 hours.     No lab exists for component: POC2     Urinalysis Lab Results   Component Value Date/Time    Color DARK YELLOW 12/11/2016 02:16 AM    Appearance CLEAR 12/11/2016 02:16 AM    Specific gravity 1.012 12/11/2016 02:16 AM    pH (UA) 7.0 12/11/2016 02:16 AM    Protein NEGATIVE  12/11/2016 02:16 AM    Glucose NEGATIVE  12/11/2016 02:16 AM    Ketone NEGATIVE  12/11/2016 02:16 AM    Bilirubin NEGATIVE  12/11/2016 02:16 AM Urobilinogen 4.0 12/11/2016 02:16 AM    Nitrites NEGATIVE  12/11/2016 02:16 AM    Leukocyte Esterase TRACE 12/11/2016 02:16 AM    Epithelial cells FEW 12/11/2016 02:16 AM    Bacteria FEW 12/11/2016 02:16 AM    WBC 1 to 4 12/11/2016 02:16 AM    RBC 0 to 2 12/11/2016 02:16 AM        Micro  No results for input(s): SDES, CULT in the last 72 hours. No results for input(s): CULT in the last 72 hours. XR (Most Recent). CXR reviewed by me and compared with previous CXR   Results from Hospital Encounter encounter on 12/10/16   XR CHEST PORT   Narrative Chest single view     History: sickle cell crisis    Comparison: Multiple prior studies with the most recent from 08/09/16    Findings: The lungs are clear. No evidence to suggest pneumothorax of pleural effusion. The cardiomediastinal silhouette is unremarkable. Impression Impression:    No radiographic evidence of acute cardiopulmonary process. CT (Most Recent)   Results from Hospital Encounter encounter on 08/09/16   CT CHEST WO CONT   Narrative CT CHEST WITHOUT IV CONTRAST    COMPARISON: CTA chest 4/30/2015. INDICATIONS: Sickle cell pain, chest pain. TECHNIQUE: Volumetric data acquisition was performed through the chest with a  multislice scanner. Reconstructions were created in the axial, coronal, and  sagittal planes. All CT scans at this facility are performed using dose optimization technique as  appropriate to the performed exam, to include automated exposure control,  adjustment of the mA and/or kV according to patient's size (Including  appropriate matching for site-specific examinations), or use of iterative  reconstruction technique. FINDINGS:     Thyroid/Base Of Neck:  Unremarkable  . Lungs:   No acute infiltrates are evident. .   No mass lesions are seen. .  There is 4 mm lateral right lower lobe pulmonary nodule (image 31), stable since  comparison. Trachea and central bronchi are unremarkable.   Minimal left basilar atelectasis or scars. Pleural Spaces: There is no pneumothorax or pleural fluid evident. No pleural plaques are seen    Lymph Nodes:   Axillae: Similar prominent bilateral axillary lymph nodes but subcentimeter in  short axis. Mediastinum / Daysi: Limited evaluation without contrast. No definite  enlargement. Mediastinum, Great Vessels And Heart:  Stable prominent anterior mediastinal soft tissue/thymic tissue. .  The heart size is prominent or mildly enlarged. No pericardial effusion. Abdomen Structures Included:  Cholelithiasis. .  Probably right kidney upper pole 1.2 cm cyst.    Osseous Structures: Partially imaged bilateral humeral head AVN. Eloina Tony Impression IMPRESSION:     1. No acute abnormality in the chest.  2. Stable 4 mm pulmonary nodule right lower lobe, follow-up CT in 12 months and  if stable no further follow-up required. 3. Cholelithiasis. 4. Partially imaged bilateral humeral head AVN. EKG No results found for this or any previous visit. ECHO No results found for this or any previous visit. PFT No flowsheet data found. Assessment/Plan     1) Sickle cell crisis: Hematology consulted by ED, no recommendations at this time other than continue care initiated in ED. Will see patient later this morning. Continue IVF (change to D5 1/2 normal saline as patient appears euvolemic), Oxygen by nasal canula, home medications. Patient denies having a sickle cell pain crisis care plan. Will initiated the same pain management as patient has had the past several admissions. 2) Hemolytic anemia: patient appears to be having red cell hemolysis as bilirubin is elevated. Continue folic acid. Monitor H/H and CMP. 3) Leukocytosis: appears stable from previous labs. CXR pending. U/A ordered. Patient denies fever, cough, dysuria. Will monitor    4) Code status:  Full code per patient    5) DVT prophylaxis:  Lovenox 40 daily initiated.   Patient has normal GFR and platelet count.

## 2017-01-19 NOTE — ED NOTES
TRANSFER - OUT REPORT:    Verbal report given to Stephany Mcclelland RN on Tod Faulkner  being transferred to [facility]  for routine progression of care       Report consisted of patients Situation, Background, Assessment and   Recommendations(SBAR). Information from the following report(s) SBAR, ED Summary, MAR, Recent Results and Cardiac Rhythm NSR was reviewed with the receiving nurse. Lines:   Peripheral IV 01/19/17 Right Forearm (Active)   Site Assessment Clean, dry, & intact 1/19/2017  1:52 AM   Phlebitis Assessment 0 1/19/2017  1:52 AM   Infiltration Assessment 0 1/19/2017  1:52 AM   Dressing Status Clean, dry, & intact 1/19/2017  1:52 AM   Dressing Type Transparent;Tape 1/19/2017  1:52 AM   Hub Color/Line Status Patent; Flushed;Pink 1/19/2017  1:52 AM   Action Taken Blood drawn 1/19/2017  1:52 AM        Opportunity for questions and clarification was provided.       Patient transported with:   Registered Nurse

## 2017-01-19 NOTE — ED NOTES
Hourly rounding-pt sleeping on stretcher, call bell in reach, still c/o pain all over, states the meds arent helping, vitals stable, will continue to monitor.

## 2017-01-19 NOTE — ED NOTES
Pt c/0 generalized all over pain from sickle cell, denies SOB at this time, states he is having pain in his arms, legs and chest. States he just got released from the hospital and was doing well, tonight he was sleeping and was woken from sleep with severe sudden onset pain everywhere, states he normally wears a fentanyl patch which was removed yesterday, he needed to  new script from pharmacy so he did not replace it.

## 2017-01-19 NOTE — IP AVS SNAPSHOT
Current Discharge Medication List  
  
Take these medications at their scheduled times Dose & Instructions Dispensing Information Comments Morning Noon Evening Bedtime  
 fentaNYL 25 mcg/hr PATCH Commonly known as:  Ángel Lax Your next dose is: Today, Tomorrow Other:  ____________ Dose:  1 Patch 1 Patch by TransDERmal route every seventy-two (72) hours. Max Daily Amount: 1 Patch. Quantity:  5 Patch Refills:  0  
     
   
   
   
  
 folic acid 1 mg tablet Commonly known as:  Google Your next dose is: Today, Tomorrow Other:  ____________ Dose:  1 mg Take 1 Tab by mouth daily. Quantity:  30 Tab Refills:  1  
     
   
   
   
  
 hydroxyurea 500 mg capsule Commonly known as:  HYDREA Your next dose is: Today, Tomorrow Other:  ____________ Dose:  500 mg Take 500 mg by mouth two (2) times a day. Indications: SICKLE CELL ANEMIA WITH CRISIS Refills:  0 Take these medications as needed Dose & Instructions Dispensing Information Comments Morning Noon Evening Bedtime HYDROmorphone 2 mg tablet Commonly known as:  DILAUDID Your next dose is: Today, Tomorrow Other:  ____________ Dose:  2 mg Take 1 Tab by mouth every four (4) hours as needed for Pain. Max Daily Amount: 12 mg. Quantity:  40 Tab Refills:  0

## 2017-01-19 NOTE — ED NOTES
Hourly rounding-pt sleeping on stretcher, when aroused pt begins to writhe in pain and states his pain has not changed, call bell in reach, pt using urinal ad milton

## 2017-01-20 LAB
ALBUMIN SERPL BCP-MCNC: 3.5 G/DL (ref 3.4–5)
ALBUMIN/GLOB SERPL: 0.8 {RATIO} (ref 0.8–1.7)
ALP SERPL-CCNC: 143 U/L (ref 45–117)
ALT SERPL-CCNC: 45 U/L (ref 16–61)
ANION GAP BLD CALC-SCNC: 9 MMOL/L (ref 3–18)
AST SERPL W P-5'-P-CCNC: 60 U/L (ref 15–37)
ATRIAL RATE: 83 BPM
BASOPHILS # BLD AUTO: 0 K/UL (ref 0–0.06)
BASOPHILS # BLD: 0 % (ref 0–3)
BILIRUB SERPL-MCNC: 2.4 MG/DL (ref 0.2–1)
BUN SERPL-MCNC: 7 MG/DL (ref 7–18)
BUN/CREAT SERPL: 9 (ref 12–20)
CALCIUM SERPL-MCNC: 8 MG/DL (ref 8.5–10.1)
CALCULATED P AXIS, ECG09: 68 DEGREES
CALCULATED R AXIS, ECG10: -4 DEGREES
CALCULATED T AXIS, ECG11: 68 DEGREES
CHLORIDE SERPL-SCNC: 105 MMOL/L (ref 100–108)
CO2 SERPL-SCNC: 24 MMOL/L (ref 21–32)
CREAT SERPL-MCNC: 0.75 MG/DL (ref 0.6–1.3)
DIAGNOSIS, 93000: NORMAL
DIFFERENTIAL METHOD BLD: ABNORMAL
EOSINOPHIL # BLD: 0.7 K/UL (ref 0–0.4)
EOSINOPHIL NFR BLD: 5 % (ref 0–5)
ERYTHROCYTE [DISTWIDTH] IN BLOOD BY AUTOMATED COUNT: 24.4 % (ref 11.6–14.5)
GLOBULIN SER CALC-MCNC: 4.4 G/DL (ref 2–4)
GLUCOSE SERPL-MCNC: 90 MG/DL (ref 74–99)
HCT VFR BLD AUTO: 23 % (ref 36–48)
HGB BLD-MCNC: 7.8 G/DL (ref 13–16)
LYMPHOCYTES # BLD AUTO: 56 % (ref 20–51)
LYMPHOCYTES # BLD: 7.7 K/UL (ref 0.8–3.5)
MAGNESIUM SERPL-MCNC: 2.1 MG/DL (ref 1.8–2.4)
MCH RBC QN AUTO: 26.9 PG (ref 24–34)
MCHC RBC AUTO-ENTMCNC: 33.9 G/DL (ref 31–37)
MCV RBC AUTO: 79.3 FL (ref 74–97)
MONOCYTES # BLD: 1.7 K/UL (ref 0–1)
MONOCYTES NFR BLD AUTO: 12 % (ref 2–9)
NEUTS SEG # BLD: 3.7 K/UL (ref 1.8–8)
NEUTS SEG NFR BLD AUTO: 27 % (ref 42–75)
NRBC BLD-RTO: 6 PER 100 WBC
P-R INTERVAL, ECG05: 160 MS
PLATELET # BLD AUTO: 368 K/UL (ref 135–420)
PLATELET COMMENTS,PCOM: ABNORMAL
PMV BLD AUTO: 9.9 FL (ref 9.2–11.8)
POTASSIUM SERPL-SCNC: 4 MMOL/L (ref 3.5–5.5)
PROT SERPL-MCNC: 7.9 G/DL (ref 6.4–8.2)
Q-T INTERVAL, ECG07: 414 MS
QRS DURATION, ECG06: 94 MS
QTC CALCULATION (BEZET), ECG08: 486 MS
RBC # BLD AUTO: 2.9 M/UL (ref 4.7–5.5)
RBC MORPH BLD: ABNORMAL
RETICS/RBC NFR AUTO: 10.1 % (ref 0.5–2.3)
SODIUM SERPL-SCNC: 138 MMOL/L (ref 136–145)
VENTRICULAR RATE, ECG03: 83 BPM
WBC # BLD AUTO: 13.8 K/UL (ref 4.6–13.2)

## 2017-01-20 PROCEDURE — 83735 ASSAY OF MAGNESIUM: CPT | Performed by: INTERNAL MEDICINE

## 2017-01-20 PROCEDURE — 74011000258 HC RX REV CODE- 258: Performed by: INTERNAL MEDICINE

## 2017-01-20 PROCEDURE — 74011250636 HC RX REV CODE- 250/636: Performed by: EMERGENCY MEDICINE

## 2017-01-20 PROCEDURE — 85660 RBC SICKLE CELL TEST: CPT | Performed by: INTERNAL MEDICINE

## 2017-01-20 PROCEDURE — 36430 TRANSFUSION BLD/BLD COMPNT: CPT

## 2017-01-20 PROCEDURE — 65270000029 HC RM PRIVATE

## 2017-01-20 PROCEDURE — 74011250636 HC RX REV CODE- 250/636: Performed by: INTERNAL MEDICINE

## 2017-01-20 PROCEDURE — 80053 COMPREHEN METABOLIC PANEL: CPT | Performed by: INTERNAL MEDICINE

## 2017-01-20 PROCEDURE — 74011250637 HC RX REV CODE- 250/637: Performed by: INTERNAL MEDICINE

## 2017-01-20 PROCEDURE — 74011000258 HC RX REV CODE- 258: Performed by: EMERGENCY MEDICINE

## 2017-01-20 PROCEDURE — P9016 RBC LEUKOCYTES REDUCED: HCPCS | Performed by: INTERNAL MEDICINE

## 2017-01-20 PROCEDURE — 74011250637 HC RX REV CODE- 250/637: Performed by: EMERGENCY MEDICINE

## 2017-01-20 PROCEDURE — 74011250636 HC RX REV CODE- 250/636

## 2017-01-20 PROCEDURE — 36415 COLL VENOUS BLD VENIPUNCTURE: CPT | Performed by: INTERNAL MEDICINE

## 2017-01-20 PROCEDURE — 85025 COMPLETE CBC W/AUTO DIFF WBC: CPT | Performed by: INTERNAL MEDICINE

## 2017-01-20 PROCEDURE — 85045 AUTOMATED RETICULOCYTE COUNT: CPT | Performed by: INTERNAL MEDICINE

## 2017-01-20 PROCEDURE — 86900 BLOOD TYPING SEROLOGIC ABO: CPT | Performed by: INTERNAL MEDICINE

## 2017-01-20 PROCEDURE — 86920 COMPATIBILITY TEST SPIN: CPT | Performed by: INTERNAL MEDICINE

## 2017-01-20 PROCEDURE — 86902 BLOOD TYPE ANTIGEN DONOR EA: CPT | Performed by: INTERNAL MEDICINE

## 2017-01-20 RX ORDER — SODIUM CHLORIDE 9 MG/ML
250 INJECTION, SOLUTION INTRAVENOUS AS NEEDED
Status: DISCONTINUED | OUTPATIENT
Start: 2017-01-20 | End: 2017-01-25 | Stop reason: HOSPADM

## 2017-01-20 RX ORDER — HYDROMORPHONE HYDROCHLORIDE 1 MG/ML
INJECTION, SOLUTION INTRAMUSCULAR; INTRAVENOUS; SUBCUTANEOUS
Status: COMPLETED
Start: 2017-01-20 | End: 2017-01-20

## 2017-01-20 RX ORDER — ACETAMINOPHEN 325 MG/1
650 TABLET ORAL
Status: COMPLETED | OUTPATIENT
Start: 2017-01-20 | End: 2017-01-20

## 2017-01-20 RX ADMIN — DOCUSATE SODIUM 100 MG: 100 CAPSULE, LIQUID FILLED ORAL at 17:23

## 2017-01-20 RX ADMIN — DEXTROSE MONOHYDRATE AND SODIUM CHLORIDE 125 ML/HR: 5; .45 INJECTION, SOLUTION INTRAVENOUS at 22:31

## 2017-01-20 RX ADMIN — HYDROMORPHONE HYDROCHLORIDE 2 MG: 1 INJECTION, SOLUTION INTRAMUSCULAR; INTRAVENOUS; SUBCUTANEOUS at 06:57

## 2017-01-20 RX ADMIN — HYDROMORPHONE HYDROCHLORIDE 2 MG: 2 INJECTION, SOLUTION INTRAMUSCULAR; INTRAVENOUS; SUBCUTANEOUS at 11:16

## 2017-01-20 RX ADMIN — HYDROMORPHONE HYDROCHLORIDE 2 MG: 2 INJECTION, SOLUTION INTRAMUSCULAR; INTRAVENOUS; SUBCUTANEOUS at 09:20

## 2017-01-20 RX ADMIN — DEXTROSE MONOHYDRATE AND SODIUM CHLORIDE 125 ML/HR: 5; .45 INJECTION, SOLUTION INTRAVENOUS at 10:36

## 2017-01-20 RX ADMIN — DIPHENHYDRAMINE HYDROCHLORIDE 12.5 MG: 50 INJECTION INTRAMUSCULAR; INTRAVENOUS at 09:24

## 2017-01-20 RX ADMIN — ENOXAPARIN SODIUM 40 MG: 40 INJECTION SUBCUTANEOUS at 07:55

## 2017-01-20 RX ADMIN — HYDROMORPHONE HYDROCHLORIDE 2 MG: 2 INJECTION, SOLUTION INTRAMUSCULAR; INTRAVENOUS; SUBCUTANEOUS at 15:14

## 2017-01-20 RX ADMIN — HYDROXYUREA 500 MG: 500 CAPSULE ORAL at 17:23

## 2017-01-20 RX ADMIN — FOLIC ACID 1 MG: 1 TABLET ORAL at 07:56

## 2017-01-20 RX ADMIN — HYDROMORPHONE HYDROCHLORIDE 2 MG: 2 INJECTION, SOLUTION INTRAMUSCULAR; INTRAVENOUS; SUBCUTANEOUS at 19:52

## 2017-01-20 RX ADMIN — HYDROMORPHONE HYDROCHLORIDE 2 MG: 2 INJECTION, SOLUTION INTRAMUSCULAR; INTRAVENOUS; SUBCUTANEOUS at 01:48

## 2017-01-20 RX ADMIN — DIPHENHYDRAMINE HYDROCHLORIDE 12.5 MG: 50 INJECTION INTRAMUSCULAR; INTRAVENOUS at 22:25

## 2017-01-20 RX ADMIN — HYDROMORPHONE HYDROCHLORIDE 2 MG: 2 INJECTION, SOLUTION INTRAMUSCULAR; INTRAVENOUS; SUBCUTANEOUS at 22:26

## 2017-01-20 RX ADMIN — AZITHROMYCIN 500 MG: 250 TABLET, FILM COATED ORAL at 22:26

## 2017-01-20 RX ADMIN — HYDROXYUREA 500 MG: 500 CAPSULE ORAL at 09:20

## 2017-01-20 RX ADMIN — HYDROMORPHONE HYDROCHLORIDE 2 MG: 2 INJECTION, SOLUTION INTRAMUSCULAR; INTRAVENOUS; SUBCUTANEOUS at 17:24

## 2017-01-20 RX ADMIN — DIPHENHYDRAMINE HYDROCHLORIDE 12.5 MG: 50 INJECTION INTRAMUSCULAR; INTRAVENOUS at 03:48

## 2017-01-20 RX ADMIN — CEFTRIAXONE 1 G: 1 INJECTION, POWDER, FOR SOLUTION INTRAMUSCULAR; INTRAVENOUS at 22:26

## 2017-01-20 RX ADMIN — DEXTROSE MONOHYDRATE AND SODIUM CHLORIDE 125 ML/HR: 5; .45 INJECTION, SOLUTION INTRAVENOUS at 01:44

## 2017-01-20 RX ADMIN — HYDROMORPHONE HYDROCHLORIDE 2 MG: 2 INJECTION, SOLUTION INTRAMUSCULAR; INTRAVENOUS; SUBCUTANEOUS at 03:48

## 2017-01-20 RX ADMIN — DIPHENHYDRAMINE HYDROCHLORIDE 12.5 MG: 50 INJECTION INTRAMUSCULAR; INTRAVENOUS at 15:15

## 2017-01-20 RX ADMIN — ACETAMINOPHEN 650 MG: 325 TABLET ORAL at 15:15

## 2017-01-20 RX ADMIN — DOCUSATE SODIUM 100 MG: 100 CAPSULE, LIQUID FILLED ORAL at 07:56

## 2017-01-20 RX ADMIN — HYDROMORPHONE HYDROCHLORIDE 2 MG: 2 INJECTION, SOLUTION INTRAMUSCULAR; INTRAVENOUS; SUBCUTANEOUS at 13:25

## 2017-01-20 NOTE — PROGRESS NOTES
Phone: [phone]     Hematology / Oncology Progress Note  [facility]      Patient: Verónica Bronson   MRN: [id number]         CSN: [id number]    YOB: 1980      Admit Date: 2017    Assessment:     Active Problems:    Sickle cell anemia ([facility] [address] (2017)    pain crisis    Plan:     IVF. Dilaudid iv q 2hr. ct fentanyl patch. 1 unit prbc today  Monitor pain and titrate pain meds, T curve. chest xray with bibasilar infiltrates vs atelectasis, empiric antibiotics to ct.     Sacha Shaw MD  [facility] [phone]      Subjective:     Severe leg pain bilateral and back pain  No fever, cough    Objective:     Visit Vitals    /65 (BP 1 Location: Left arm, BP Patient Position: At rest)    Pulse 79    Temp 97.1 °F (36.2 °C)    Resp 20    Ht 6' (1.829 m)    Wt 78.4 kg (172 lb 13.5 oz)    SpO2 99%    BMI 23.44 kg/m2             Temp (24hrs), Av.1 °F (36.2 °C), Min:96.7 °F (35.9 °C), Max:97.4 °F (36.3 °C)        Intake/Output Summary (Last 24 hours) at 17 6184  Last data filed at 17 0630   Gross per 24 hour   Intake           3437.5 ml   Output              960 ml   Net           2477.5 ml     Review of Systems:   Constitutional: negative for fevers, chills, sweats and fatigue  Eyes: negative for visual disturbance, redness and icterus  Ears, Nose, Mouth, Throat, and Face: negative for tinnitus, epistaxis, sore mouth and hoarseness  Respiratory: negative for cough, sputum, hemoptysis, pleurisy/chest pain or wheezing  Cardiovascular: negative for chest pain, chest pressure/discomfort, palpitations, irregular heart beats, syncope, paroxysmal nocturnal dyspnea  Gastrointestinal: negative for reflux symptoms, nausea, vomiting, change in bowel habits, melena, diarrhea, constipation and abdominal pain  Genitourinary:negative for dysuria, nocturia, urinary incontinence, hesitancy and hematuria  Integument: negative for rash, skin lesion(s) and pruritus  Hematologic/Lymphatic: negative for easy bruising, bleeding and lymphadenopathy  Musculoskeletal:igor leg and back pain  Neurological: negative for headaches, dizziness, seizures, paresthesia and weakness    Physical Exam: distress from pain  Constitutional: Alert, oriented, Eyes: PERRLA, icteric sclera, pallor  Ears, nose, mouth, throat, and face: no palpable Lymph nodes, no mucositis, no thrush. Respiratory: symmetrical expansion, no rales, no rhonchi, no wheezing. Cardiovascular: S1S2, no pathologic murmur, no rub. Gastrointestinal: soft, benign, non tender, no HSM, normal bowel sounds, no mass. Integument: no rash, no petechiae, no ecchymosis. Musculoskeletal: no edema or bone tenderness   Neurological: intact, cranial nerves, no focal motor or sensory deficits. Labs:  Recent Results (from the past 24 hour(s))   METABOLIC PANEL, COMPREHENSIVE    Collection Time: 01/20/17  3:48 AM   Result Value Ref Range    Sodium 138 136 - 145 mmol/L    Potassium 4.0 3.5 - 5.5 mmol/L    Chloride 105 100 - 108 mmol/L    CO2 24 21 - 32 mmol/L    Anion gap 9 3.0 - 18 mmol/L    Glucose 90 74 - 99 mg/dL    BUN 7 7.0 - 18 MG/DL    Creatinine 0.75 0.6 - 1.3 MG/DL    BUN/Creatinine ratio 9 (L) 12 - 20      GFR est AA >60 >60 ml/min/1.73m2    GFR est non-AA >60 >60 ml/min/1.73m2    Calcium 8.0 (L) 8.5 - 10.1 MG/DL    Bilirubin, total 2.4 (H) 0.2 - 1.0 MG/DL    ALT 45 16 - 61 U/L    AST 60 (H) 15 - 37 U/L    Alk.  phosphatase 143 (H) 45 - 117 U/L    Protein, total 7.9 6.4 - 8.2 g/dL    Albumin 3.5 3.4 - 5.0 g/dL    Globulin 4.4 (H) 2.0 - 4.0 g/dL    A-G Ratio 0.8 0.8 - 1.7     CBC WITH AUTOMATED DIFF    Collection Time: 01/20/17  3:48 AM   Result Value Ref Range    WBC 13.8 (H) 4.6 - 13.2 K/uL    RBC 2.90 (L) 4.70 - 5.50 M/uL    HGB 7.8 (L) 13.0 - 16.0 g/dL    HCT 23.0 (L) 36.0 - 48.0 %    MCV 79.3 74.0 - 97.0 FL    MCH 26.9 24.0 - 34.0 PG    MCHC 33.9 31.0 - 37.0 g/dL    RDW 24.4 (H) 11.6 - 14.5 % PLATELET 286 023 - 469 K/uL    MPV 9.9 9.2 - 11.8 FL    NEUTROPHILS 27 (L) 42 - 75 %    LYMPHOCYTES 56 (H) 20 - 51 %    MONOCYTES 12 (H) 2 - 9 %    EOSINOPHILS 5 0 - 5 %    BASOPHILS 0 0 - 3 %    NRBC 6.0 (H) 0  WBC    ABS. NEUTROPHILS 3.7 1.8 - 8.0 K/UL    ABS. LYMPHOCYTES 7.7 (H) 0.8 - 3.5 K/UL    ABS. MONOCYTES 1.7 (H) 0 - 1.0 K/UL    ABS. EOSINOPHILS 0.7 (H) 0.0 - 0.4 K/UL    ABS.  BASOPHILS 0.0 0.0 - 0.06 K/UL    DF MANUAL      PLATELET COMMENTS ADEQUATE PLATELETS      RBC COMMENTS ANISOCYTOSIS  3+        RBC COMMENTS POLYCHROMASIA  1+        RBC COMMENTS TARGET CELLS  2+        RBC COMMENTS SICKLE CELLS  2+        RBC COMMENTS POIKILOCYTOSIS  2+       MAGNESIUM    Collection Time: 01/20/17  3:48 AM   Result Value Ref Range    Magnesium 2.1 1.8 - 2.4 mg/dL   RETICULOCYTE COUNT    Collection Time: 01/20/17  3:48 AM   Result Value Ref Range    Reticulocyte count 10.1 (H) 0.5 - 2.3 %

## 2017-01-20 NOTE — ROUTINE PROCESS
Bedside and Verbal shift change report given to Anu Sood RN (oncoming nurse) by Felix Knox RN (offgoing nurse). Report included the following information SBAR, Kardex, MAR and Recent Results.     SITUATION:    Code Status: Full Code   Reason for Admission: Sickle cell anemia ([facility] [address]    [facility] day: 0   Problem List:       Hospital Problems  Date Reviewed: 12/11/2016          Codes Class Noted POA    Sickle cell anemia ([facility] [address] ICD-10-CM: D57.1  ICD-9-CM: 282.60  1/19/2017 Unknown              BACKGROUND:    Past Medical History:   Past Medical History   Diagnosis Date    Sickle cell anemia with crisis ([facility] [address]     Vitamin D deficiency          Patient taking anticoagulants yes     ASSESSMENT:    Changes in Assessment Throughout Shift: see documentation     Patient has Central Line: no Reasons if yes: n/a   Patient has Sesay Cath: no Reasons if yes: n/a      Last Vitals:     Vitals:    01/19/17 0700 01/19/17 0715 01/19/17 1028 01/19/17 1606   BP: 118/74 118/70 117/67 117/77   Pulse: 77 84 70 77   Resp: 21 28 20 20   Temp:  98 °F (36.7 °C) 97.4 °F (36.3 °C) 97.2 °F (36.2 °C)   SpO2: 100% 98% 95% 95%   Weight:   77.9 kg (171 lb 12.8 oz)    Height:   6' (1.829 m)         IV and DRAINS (will only show if present)   Peripheral IV 01/19/17 Right Forearm-Site Assessment: Clean, dry, & intact     WOUND (if present)   Wound Type:  none   Dressing present     Wound Concerns/Notes:  none     PAIN    Pain Assessment    Pain Intensity 1: 4 (01/19/17 1504)    Pain Location 1: Generalized    Pain Intervention(s) 1: Medication (see MAR)    Patient Stated Pain Goal: 4  o Interventions for Pain:  SEE MAR  o Intervention effective: yes  o Time of last intervention: SEE MAR   o Reassessment Completed: yes      Last 3 Weights:  Last 3 Recorded Weights in this Encounter    01/19/17 0100 01/19/17 1028   Weight: 78.9 kg (174 lb) 77.9 kg (171 lb 12.8 oz)     Weight change:      INTAKE/OUPUT    Current Shift: Last three shifts: 01/18 0701 - 01/19 1900  In: 4476 [P.O.:620; I.V.:2000]  Out: 800 [Urine:800]     LAB RESULTS     Recent Labs      01/19/17 0152   WBC  14.5*   HGB  8.2*   HCT  23.4*   PLT  322        Recent Labs      01/19/17 0152   NA  137   K  3.8   GLU  100*   BUN  11   CREA  0.79   CA  8.7   INR  1.2       RECOMMENDATIONS AND DISCHARGE PLANNING     1. Pending tests/procedures/ Plan of Care or Other Needs: N/A     2. Discharge plan for patient and Needs/Barriers: N/A    3. Estimated Discharge Date: N/A Posted on Whiteboard in Patients Room: no      4. The patient's care plan was reviewed with the oncoming nurse. \"HEALS\" SAFETY CHECK      Fall Risk    Total Score: 1    Safety Measures: Safety Measures: Bed/Chair-Wheels locked    A safety check occurred in the patient's room between off going nurse and oncoming nurse listed above. The safety check included the below items  Area Items   H  High Alert Medications - Verify all high alert medication drips (heparin, PCA, etc.)   E  Equipment - Suction is set up for ALL patients (with pat)  - Red plugs utilized for all equipment (IV pumps, etc.)  - WOWs wiped down at end of shift.  - Room stocked with oxygen, suction, and other unit-specific supplies   A  Alarms - Bed alarm is set for fall risk patients  - Ensure chair alarm is in place and activated if patient is up in a chair   L  Lines - Check IV for any infiltration  - Sesay bag is empty if patient has a Sesay   - Tubing and IV bags are labeled   S  Safety   - Room is clean, patient is clean, and equipment is clean. - Hallways are clear from equipment besides carts. - Fall bracelet on for fall risk patients  - Ensure room is clear and free of clutter  - Suction is set up for ALL patients (with pat)  - Hallways are clear from equipment besides carts.    - Isolation precautions followed, supplies available outside room, sign posted     Fransisco Doyle RN

## 2017-01-20 NOTE — PROGRESS NOTES
[facility]  [facility]    Patient: Patrick Montague Age: 43 y.o. : 1980 MR#: [id number] SSN: [id number]  Date/Time: 2017     Subjective:     Patient lying in bed in NAD, awake, alert, follows commands. No CP or sob    Assessment/Plan:     1- Sickle Cell Crisis  2- ANemia  3- ? PNA     PLAN  Iv fluids, pain control, O2  Transfuse as per hematology  On empiric abx  DVT prophylaxis  D/w patient    Case discussed with:  [x]Patient  []Family  []Nursing  []Case Management  DVT Prophylaxis:  []Lovenox  []Hep SQ  []SCDs  []Coumadin   []On Heparin gtt    Objective:   VS:   Visit Vitals    /67 (BP 1 Location: Left arm, BP Patient Position: At rest)    Pulse 75    Temp 96.8 °F (36 °C)    Resp 16    Ht 6' (1.829 m)    Wt 78.4 kg (172 lb 13.5 oz)    SpO2 100%    BMI 23.44 kg/m2      Tmax/24hrs: Temp (24hrs), Av °F (36.1 °C), Min:96.7 °F (35.9 °C), Max:97.2 °F (36.2 °C)    Intake/Output Summary (Last 24 hours) at 17 1400  Last data filed at 17 1259   Gross per 24 hour   Intake           4037.5 ml   Output             1360 ml   Net           2677.5 ml       General:  Awake, alert  Cardiovascular:  S1S2+, RRR  Pulmonary:  CTA b/l  GI:  Soft, BS+, NT, ND  Extremities:  No edema      Labs:    Recent Results (from the past 24 hour(s))   METABOLIC PANEL, COMPREHENSIVE    Collection Time: 17  3:48 AM   Result Value Ref Range    Sodium 138 136 - 145 mmol/L    Potassium 4.0 3.5 - 5.5 mmol/L    Chloride 105 100 - 108 mmol/L    CO2 24 21 - 32 mmol/L    Anion gap 9 3.0 - 18 mmol/L    Glucose 90 74 - 99 mg/dL    BUN 7 7.0 - 18 MG/DL    Creatinine 0.75 0.6 - 1.3 MG/DL    BUN/Creatinine ratio 9 (L) 12 - 20      GFR est AA >60 >60 ml/min/1.73m2    GFR est non-AA >60 >60 ml/min/1.73m2    Calcium 8.0 (L) 8.5 - 10.1 MG/DL    Bilirubin, total 2.4 (H) 0.2 - 1.0 MG/DL    ALT 45 16 - 61 U/L    AST 60 (H) 15 - 37 U/L    Alk.  phosphatase 143 (H) 45 - 117 U/L    Protein, total 7.9 6.4 - 8.2 g/dL    Albumin 3.5 3.4 - 5.0 g/dL    Globulin 4.4 (H) 2.0 - 4.0 g/dL    A-G Ratio 0.8 0.8 - 1.7     CBC WITH AUTOMATED DIFF    Collection Time: 01/20/17  3:48 AM   Result Value Ref Range    WBC 13.8 (H) 4.6 - 13.2 K/uL    RBC 2.90 (L) 4.70 - 5.50 M/uL    HGB 7.8 (L) 13.0 - 16.0 g/dL    HCT 23.0 (L) 36.0 - 48.0 %    MCV 79.3 74.0 - 97.0 FL    MCH 26.9 24.0 - 34.0 PG    MCHC 33.9 31.0 - 37.0 g/dL    RDW 24.4 (H) 11.6 - 14.5 %    PLATELET 908 490 - 102 K/uL    MPV 9.9 9.2 - 11.8 FL    NEUTROPHILS 27 (L) 42 - 75 %    LYMPHOCYTES 56 (H) 20 - 51 %    MONOCYTES 12 (H) 2 - 9 %    EOSINOPHILS 5 0 - 5 %    BASOPHILS 0 0 - 3 %    NRBC 6.0 (H) 0  WBC    ABS. NEUTROPHILS 3.7 1.8 - 8.0 K/UL    ABS. LYMPHOCYTES 7.7 (H) 0.8 - 3.5 K/UL    ABS. MONOCYTES 1.7 (H) 0 - 1.0 K/UL    ABS. EOSINOPHILS 0.7 (H) 0.0 - 0.4 K/UL    ABS.  BASOPHILS 0.0 0.0 - 0.06 K/UL    DF MANUAL      PLATELET COMMENTS ADEQUATE PLATELETS      RBC COMMENTS ANISOCYTOSIS  3+        RBC COMMENTS POLYCHROMASIA  1+        RBC COMMENTS TARGET CELLS  2+        RBC COMMENTS SICKLE CELLS  2+        RBC COMMENTS POIKILOCYTOSIS  2+       MAGNESIUM    Collection Time: 01/20/17  3:48 AM   Result Value Ref Range    Magnesium 2.1 1.8 - 2.4 mg/dL   RETICULOCYTE COUNT    Collection Time: 01/20/17  3:48 AM   Result Value Ref Range    Reticulocyte count 10.1 (H) 0.5 - 2.3 %   TYPE & CROSSMATCH    Collection Time: 01/20/17  9:45 AM   Result Value Ref Range    Crossmatch Expiration 01/23/2017     ABO/Rh(D) Kat Hatchet POSITIVE     Antibody screen NEG        Signed By: Olga Lidia Sandoval MD     January 20, 2017

## 2017-01-20 NOTE — INTERDISCIPLINARY ROUNDS
IDR/SLIDR Summary          Patient: Aidan Sneed MRN: [id number]    Age: 43 y.o. YOB: 1980 Room/Bed: [facility]   Admit Diagnosis: Sickle cell anemia (HCC)  Principal Diagnosis: <principal problem not specified>   Goals: pain control  Readmission: YES  Quality Measure: PNA  VTE Prophylaxis: SCDs  Influenza Vaccine screening completed? YES  Pneumococcal Vaccine screening completed? NO  Mobility needs: No   Nutrition plan:Yes  Consults:Case Management    Financial concerns: yes  Escalated to CM? YES  RRAT Score: 16   Interventions  Testing due for pt today?  NO  LOS: 1 days Expected length of stay 2 days  Discharge plan: home   PCP: Delfin Anglin MD  Transportation needs: No    Days before discharge:two or more days before discharge   Discharge disposition: Home    Signed:     Sage Taveras RN  1/20/2017  1:33 AM

## 2017-01-20 NOTE — ROUTINE PROCESS
Bedside and Verbal shift change report given to Yadi Corral RN (oncoming nurse) by Catalina Alicea RN (offgoing nurse). Report included the following information SBAR, Kardex, Intake/Output, MAR and Recent Results.

## 2017-01-20 NOTE — PROGRESS NOTES
Pt resides with his mother- Amanda Alejandre. Pt states he is insured by Medicare and Medicaid. Discussed need for insurance cards for verifications.

## 2017-01-20 NOTE — PROGRESS NOTES
NUTRITION    Nutrition Consult: General Nutrition Management & Supplements     RECOMMENDATIONS / PLAN:     - Continue current nutrition interventions   - Continue RD inpatient monitoring and evaluation     NUTRITION INTERVENTIONS & DIAGNOSIS:     [x] Meals/Snacks: modified diet     Nutrition Diagnosis: No nutrition diagnosis at this time. ASSESSMENT:     Subjective:  Consuming 100% of meals and tolerating diet. Current Appetite:   [x] Good     [] Fair     [] Poor     [] Other:  Appetite/meal intake prior to admission:   [x] Good     [] Fair     [] Poor     [] Other:    Diet: DIET REGULAR     Average po intake adequate to meet patients estimated nutritional needs:   [x] Yes     [] No   [] Unable to determine at this time  Current Meal Intake: Patient Vitals for the past 100 hrs:   % Diet Eaten   01/20/17 1259 95 %   01/20/17 1017 25 %   01/19/17 1031 90 %      Food Allergies: eggs, milk   Feeding Limitations:  [] Swallowing difficulty    [] Chewing difficulty    [] Other:    BM: 1/18   Skin Integrity: WDL   Edema: none     Pertinent Medications: Reviewed; folic acid      Labs:  Recent Labs      01/20/17   0348  01/19/17   0152   NA  138  137   K  4.0  3.8   CL  105  104   CO2  24  23   GLU  90  100*   BUN  7  11   CREA  0.75  0.79   CA  8.0*  8.7   MG  2.1   --    ALB  3.5  4.1   SGOT  60*  73*   ALT  45  52       Intake/Output Summary (Last 24 hours) at 01/20/17 1516  Last data filed at 01/20/17 1259   Gross per 24 hour   Intake           4037.5 ml   Output             1360 ml   Net           2677.5 ml       Anthropometrics:  Ht Readings from Last 1 Encounters:   01/19/17 6' (1.829 m)     Last 3 Recorded Weights in this Encounter    01/19/17 0100 01/19/17 1028 01/20/17 0600   Weight: 78.9 kg (174 lb) 77.9 kg (171 lb 12.8 oz) 78.4 kg (172 lb 13.5 oz)     Body mass index is 23.44 kg/(m^2).       Weight History: patient denies change in weight PTA     Weight Metrics 1/20/2017 1/10/2017 12/10/2016 11/11/2016 11/9/2016 10/17/2016 6/27/2016   Weight 172 lb 13.5 oz 172 lb 174 lb 174 lb 174 lb 178 lb 174 lb   BMI 23.44 kg/m2 23.33 kg/m2 23.6 kg/m2 23.6 kg/m2 23.6 kg/m2 24.14 kg/m2 23.59 kg/m2        Admitting Diagnosis: Sickle cell anemia (HCC)  Past Medical History   Diagnosis Date    Sick[facility] with crisis [facility] [address]     Vitamin D deficiency        Education Needs:        [x] None identified  [] Identified - Not appropriate at this time  []  Identified and addressed - refer to education log  Learning Limitations:   [x] None identified  [] Identified    Cultural, Yazidi & ethnic food preferences:  [x] None identified    [] Identified and addressed     ESTIMATED NUTRITION NEEDS:     Calories: 9087-2150 kcal (MSJx1.2-1.3) based on  [x] Actual BW: 78 kg     [] IBW:   Protein: 62-78 gm (0.8-1 gm/kg) based on  [x] Actual BW: 78 kg     [] IBW:   Fluid: 1 mL/kcal     MONITORING & EVALUATION:     Nutrition Goal(s):  1. Po intake of meals will meet >75% of patient estimated nutritional needs within the next 7 days.   Outcome:  [] Met/Ongoing    []  Not Met    [x] New/Initial Goal     Monitoring:   [x] Monitor meal intake    [] Monitor diet tolerance     [] Monitor supplement intake    Previous Recommendations (for follow-up assessments only):     []   Implemented       []   Not Implemented (RD to address)     [] No Recommendation Made     Discharge Planning: regular diet   [x] Participated in care planning, discharge planning, & interdisciplinary rounds as appropriate      Simon Maria, [address]    Pager: [phone]

## 2017-01-21 PROCEDURE — 65270000029 HC RM PRIVATE

## 2017-01-21 PROCEDURE — 74011000258 HC RX REV CODE- 258: Performed by: EMERGENCY MEDICINE

## 2017-01-21 PROCEDURE — 74011250637 HC RX REV CODE- 250/637: Performed by: EMERGENCY MEDICINE

## 2017-01-21 PROCEDURE — 74011000258 HC RX REV CODE- 258: Performed by: INTERNAL MEDICINE

## 2017-01-21 PROCEDURE — 74011250636 HC RX REV CODE- 250/636: Performed by: INTERNAL MEDICINE

## 2017-01-21 PROCEDURE — 74011250637 HC RX REV CODE- 250/637: Performed by: INTERNAL MEDICINE

## 2017-01-21 PROCEDURE — 74011250636 HC RX REV CODE- 250/636: Performed by: EMERGENCY MEDICINE

## 2017-01-21 RX ADMIN — CEFTRIAXONE 1 G: 1 INJECTION, POWDER, FOR SOLUTION INTRAMUSCULAR; INTRAVENOUS at 21:39

## 2017-01-21 RX ADMIN — HYDROXYUREA 500 MG: 500 CAPSULE ORAL at 12:40

## 2017-01-21 RX ADMIN — DIPHENHYDRAMINE HYDROCHLORIDE 12.5 MG: 50 INJECTION INTRAMUSCULAR; INTRAVENOUS at 07:48

## 2017-01-21 RX ADMIN — FOLIC ACID 1 MG: 1 TABLET ORAL at 07:46

## 2017-01-21 RX ADMIN — HYDROMORPHONE HYDROCHLORIDE 2 MG: 2 INJECTION, SOLUTION INTRAMUSCULAR; INTRAVENOUS; SUBCUTANEOUS at 00:46

## 2017-01-21 RX ADMIN — DEXTROSE MONOHYDRATE AND SODIUM CHLORIDE 125 ML/HR: 5; .45 INJECTION, SOLUTION INTRAVENOUS at 14:30

## 2017-01-21 RX ADMIN — HYDROMORPHONE HYDROCHLORIDE 2 MG: 2 INJECTION, SOLUTION INTRAMUSCULAR; INTRAVENOUS; SUBCUTANEOUS at 14:35

## 2017-01-21 RX ADMIN — HYDROMORPHONE HYDROCHLORIDE 2 MG: 2 INJECTION, SOLUTION INTRAMUSCULAR; INTRAVENOUS; SUBCUTANEOUS at 05:49

## 2017-01-21 RX ADMIN — HYDROMORPHONE HYDROCHLORIDE 2 MG: 2 INJECTION, SOLUTION INTRAMUSCULAR; INTRAVENOUS; SUBCUTANEOUS at 21:42

## 2017-01-21 RX ADMIN — HYDROXYUREA 500 MG: 500 CAPSULE ORAL at 18:00

## 2017-01-21 RX ADMIN — DOCUSATE SODIUM 100 MG: 100 CAPSULE, LIQUID FILLED ORAL at 16:15

## 2017-01-21 RX ADMIN — DEXTROSE MONOHYDRATE AND SODIUM CHLORIDE 125 ML/HR: 5; .45 INJECTION, SOLUTION INTRAVENOUS at 21:47

## 2017-01-21 RX ADMIN — HYDROMORPHONE HYDROCHLORIDE 2 MG: 2 INJECTION, SOLUTION INTRAMUSCULAR; INTRAVENOUS; SUBCUTANEOUS at 03:09

## 2017-01-21 RX ADMIN — DIPHENHYDRAMINE HYDROCHLORIDE 12.5 MG: 50 INJECTION INTRAMUSCULAR; INTRAVENOUS at 23:28

## 2017-01-21 RX ADMIN — HYDROMORPHONE HYDROCHLORIDE 2 MG: 2 INJECTION, SOLUTION INTRAMUSCULAR; INTRAVENOUS; SUBCUTANEOUS at 07:49

## 2017-01-21 RX ADMIN — DOCUSATE SODIUM 100 MG: 100 CAPSULE, LIQUID FILLED ORAL at 07:45

## 2017-01-21 RX ADMIN — HYDROMORPHONE HYDROCHLORIDE 2 MG: 2 INJECTION, SOLUTION INTRAMUSCULAR; INTRAVENOUS; SUBCUTANEOUS at 18:00

## 2017-01-21 RX ADMIN — DEXTROSE MONOHYDRATE AND SODIUM CHLORIDE 125 ML/HR: 5; .45 INJECTION, SOLUTION INTRAVENOUS at 05:51

## 2017-01-21 RX ADMIN — HYDROMORPHONE HYDROCHLORIDE 2 MG: 2 INJECTION, SOLUTION INTRAMUSCULAR; INTRAVENOUS; SUBCUTANEOUS at 12:36

## 2017-01-21 RX ADMIN — HYDROMORPHONE HYDROCHLORIDE 2 MG: 2 INJECTION, SOLUTION INTRAMUSCULAR; INTRAVENOUS; SUBCUTANEOUS at 23:28

## 2017-01-21 RX ADMIN — AZITHROMYCIN 500 MG: 250 TABLET, FILM COATED ORAL at 21:39

## 2017-01-21 RX ADMIN — ENOXAPARIN SODIUM 40 MG: 40 INJECTION SUBCUTANEOUS at 07:46

## 2017-01-21 NOTE — PROGRESS NOTES
Phone: [phone]     Hematology / Oncology Progress Note  [facility]      Patient: Ned Estrada   MRN: [id number]         CSN: [id number]    YOB: 1980      Admit Date: 2017    Assessment:     Active Problems:    Sickle cell anemia ([address] (2017)    Sickle Cell Pain Crisis, improving    Plan:     Continue with suportive transfusions. Pain meds PRN    Chey Patino MD  [facility] [phone]      Subjective: Followed for SCD. Pain is is better but still significant. Objective:     Visit Vitals    /76 (BP 1 Location: Left arm, BP Patient Position: At rest)    Pulse 81    Temp 97.7 °F (36.5 °C)    Resp 18    Ht 6' (1.829 m)    Wt 79.7 kg (175 lb 12.8 oz)    SpO2 96%    BMI 23.84 kg/m2             Temp (24hrs), Av.7 °F (36.5 °C), Min:96.8 °F (36 °C), Max:99.1 °F (37.3 °C)        Intake/Output Summary (Last 24 hours) at 17 0903  Last data filed at 17 0551   Gross per 24 hour   Intake             4090 ml   Output             2600 ml   Net             1490 ml        Physical Exam:    Constitutional: well developed, well nourished, in mild discomfort, no cute distress and alert and oriented x 3   HENT: Oral mucosa  moist, no cyanosis observed and no pallor observed. Eyes: conjunctiva normal, upper and lower eyelid normal bilaterally. Neck: No jugular venous distension present. Cardiovascular: heart sounds normal, normal rate and regular rhythm, no murmurs or rubs,  Carotid arteries normal.     Pulmonary/Chest Wall: breath sounds are clear bilaterally and no use of accessory muscles in breathing. Abdominal: Non tender, no palpable masses, no hepatosplenomegaly    Genitourinary/Anorectal: No abnormality   Musculoskeletal: No digital clubbing or cyanosis. Normal muscle strength and tone.    Skin: Normal and no rashes or lesions   Lymph nodes: Not enlarged   Peripheral Vascular: No edema present in extremities. Labs:  Recent Results (from the past 24 hour(s))   TYPE & CROSSMATCH    Collection Time: 01/20/17  9:45 AM   Result Value Ref Range    Crossmatch Expiration 01/23/2017     ABO/Rh(D) O POSITIVE     Antibody screen NEG     Physician instructions SICKLEDEX NEGATIVE     CALLED TO: 2S RAJENDRA ANDREZ ON 01/20/2017 AT 1424 BY [facility]/[id number]. Unit number [id number]     Blood component type RC LR AS1     Unit division 00     Status of unit TRANSFUSED     ANTIGEN/ANTIBODY INFO E NEGATIVE,  DAVINA NEGATIVE,  FY(A) NEGATIVE,       Crossmatch result Compatible            IMAGING :  No results found.

## 2017-01-21 NOTE — ROUTINE PROCESS
Bedside and Verbal shift change report given to Patricia Yung (oncoming nurse) by nat (offgoing nurse). Report included the following information Kardex, Intake/Output and MAR.

## 2017-01-21 NOTE — ROUTINE PROCESS
Bedside and Verbal shift change report given to Nacho Graham RN (oncoming nurse) by Carmen Momin RN (offgoing nurse). Report included the following information SBAR, Kardex, MAR and Recent Results.     SITUATION:    Code Status: Full Code   Reason for Admission: Sickle cell anemia ([facility] [address]    [facility] day: 1   Problem List:       Hospital Problems  Date Reviewed: 12/11/2016          Codes Class Noted POA    Sickle cell anemia ([facility] [address] ICD-10-CM: D57.1  ICD-9-CM: 282.60  1/19/2017 Unknown              BACKGROUND:    Past Medical History:   Past Medical History   Diagnosis Date    Sickle cell anemia with crisis ([facility] [address]     Vitamin D deficiency          Patient taking anticoagulants yes     ASSESSMENT:    Changes in Assessment Throughout Shift: see documentation     Patient has Central Line: no Reasons if yes: n/a   Patient has Sesay Cath: no Reasons if yes: n/a      Last Vitals:     Vitals:    01/20/17 1127 01/20/17 1517 01/20/17 1546 01/20/17 1827   BP: 116/67 114/70 112/62 124/77   Pulse: 75 78 85 63   Resp: 16 18 18 18   Temp: 96.8 °F (36 °C) 97.1 °F (36.2 °C) 97.9 °F (36.6 °C) 97.6 °F (36.4 °C)   SpO2: 100% 96% 98% 97%   Weight:       Height:            IV and DRAINS (will only show if present)   Peripheral IV 01/19/17 Right Forearm-Site Assessment: Clean, dry, & intact     WOUND (if present)   Wound Type:  none   Dressing present Dressing Present : No   Wound Concerns/Notes:  none     PAIN    Pain Assessment    Pain Intensity 1: 5 (01/20/17 1544)    Pain Location 1: Generalized    Pain Intervention(s) 1: Medication (see MAR)    Patient Stated Pain Goal: 4  o Interventions for Pain:  See mar  o Intervention effective: yes  o Time of last intervention: see mar   o Reassessment Completed: yes      Last 3 Weights:  Last 3 Recorded Weights in this Encounter    01/19/17 0100 01/19/17 1028 01/20/17 0600   Weight: 78.9 kg (174 lb) 77.9 kg (171 lb 12.8 oz) 78.4 kg (172 lb 13.5 oz)     Weight change: -0.998 kg (-2 lb 3.2 oz)     INTAKE/OUPUT    Current Shift:      Last three shifts: 01/19 0701 - 01/20 1900  In: 4637.5 [P.O.:1800; I.V.:2837.5]  Out: 2160 [Urine:2160]     LAB RESULTS     Recent Labs      01/20/17 0348  01/19/17   0152   WBC  13.8*  14.5*   HGB  7.8*  8.2*   HCT  23.0*  23.4*   PLT  368  322        Recent Labs      01/20/17 0348 01/19/17   0152   NA  138  137   K  4.0  3.8   GLU  90  100*   BUN  7  11   CREA  0.75  0.79   CA  8.0*  8.7   MG  2.1   --    INR   --   1.2       RECOMMENDATIONS AND DISCHARGE PLANNING     1. Pending tests/procedures/ Plan of Care or Other Needs: n/a     2. Discharge plan for patient and Needs/Barriers: n/a    3. Estimated Discharge Date: n/a Posted on Whiteboard in Patients Room: no      4. The patient's care plan was reviewed with the oncoming nurse. \"HEALS\" SAFETY CHECK      Fall Risk    Total Score: 1    Safety Measures: Safety Measures: Bed/Chair alarm on, Bed/Chair-Wheels locked, Bed in low position, Call light within reach, Side rails X 3    A safety check occurred in the patient's room between off going nurse and oncoming nurse listed above. The safety check included the below items  Area Items   H  High Alert Medications - Verify all high alert medication drips (heparin, PCA, etc.)   E  Equipment - Suction is set up for ALL patients (with yanker)  - Red plugs utilized for all equipment (IV pumps, etc.)  - WOWs wiped down at end of shift.  - Room stocked with oxygen, suction, and other unit-specific supplies   A  Alarms - Bed alarm is set for fall risk patients  - Ensure chair alarm is in place and activated if patient is up in a chair   L  Lines - Check IV for any infiltration  - Sesay bag is empty if patient has a Sesay   - Tubing and IV bags are labeled   S  Safety   - Room is clean, patient is clean, and equipment is clean. - Hallways are clear from equipment besides carts.    - Fall bracelet on for fall risk patients  - Ensure room is clear and free of clutter  - Suction is set up for ALL patients (with pat)  - Hallways are clear from equipment besides carts.    - Isolation precautions followed, supplies available outside room, sign posted     Gavin Zhang RN

## 2017-01-21 NOTE — ROUTINE PROCESS
Bedside and Verbal shift change report given to Akhil Arredondo RN (oncoming nurse) by Deandre Beltran RN (offgoing nurse). Report included the following information SBAR, Kardex, Intake/Output, MAR and Recent Results.

## 2017-01-22 LAB
ABO + RH BLD: NORMAL
ANION GAP BLD CALC-SCNC: 8 MMOL/L (ref 3–18)
ANTIGENS PRESENT RBC DONR: NORMAL
BASOPHILS # BLD AUTO: 0.3 K/UL (ref 0–0.06)
BASOPHILS # BLD: 3 % (ref 0–3)
BLD PROD TYP BPU: NORMAL
BLOOD GROUP ANTIBODIES SERPL: NORMAL
BPU ID: NORMAL
BUN SERPL-MCNC: 6 MG/DL (ref 7–18)
BUN/CREAT SERPL: 8 (ref 12–20)
CALCIUM SERPL-MCNC: 8.5 MG/DL (ref 8.5–10.1)
CALLED TO:,BCALL1: NORMAL
CHLORIDE SERPL-SCNC: 106 MMOL/L (ref 100–108)
CO2 SERPL-SCNC: 24 MMOL/L (ref 21–32)
CREAT SERPL-MCNC: 0.75 MG/DL (ref 0.6–1.3)
CROSSMATCH RESULT,%XM: NORMAL
DIFFERENTIAL METHOD BLD: ABNORMAL
EOSINOPHIL # BLD: 0.8 K/UL (ref 0–0.4)
EOSINOPHIL NFR BLD: 7 % (ref 0–5)
ERYTHROCYTE [DISTWIDTH] IN BLOOD BY AUTOMATED COUNT: 23 % (ref 11.6–14.5)
GLUCOSE SERPL-MCNC: 95 MG/DL (ref 74–99)
HCT VFR BLD AUTO: 26.5 % (ref 36–48)
HGB BLD-MCNC: 9.1 G/DL (ref 13–16)
LYMPHOCYTES # BLD AUTO: 48 % (ref 20–51)
LYMPHOCYTES # BLD: 5.6 K/UL (ref 0.8–3.5)
MAGNESIUM SERPL-MCNC: 2.2 MG/DL (ref 1.8–2.4)
MCH RBC QN AUTO: 27.7 PG (ref 24–34)
MCHC RBC AUTO-ENTMCNC: 34.3 G/DL (ref 31–37)
MCV RBC AUTO: 80.8 FL (ref 74–97)
MONOCYTES # BLD: 1.3 K/UL (ref 0–1)
MONOCYTES NFR BLD AUTO: 11 % (ref 2–9)
NEUTS SEG # BLD: 3.6 K/UL (ref 1.8–8)
NEUTS SEG NFR BLD AUTO: 31 % (ref 42–75)
NRBC BLD-RTO: 3 PER 100 WBC
PHYSICIAN INSTRUCTIO,%PI: NORMAL
PLATELET # BLD AUTO: 368 K/UL (ref 135–420)
PMV BLD AUTO: 9.5 FL (ref 9.2–11.8)
POTASSIUM SERPL-SCNC: 3.7 MMOL/L (ref 3.5–5.5)
RBC # BLD AUTO: 3.28 M/UL (ref 4.7–5.5)
RBC MORPH BLD: ABNORMAL
RBC MORPH BLD: ABNORMAL
RETICS/RBC NFR AUTO: 11.1 % (ref 0.5–2.3)
SODIUM SERPL-SCNC: 138 MMOL/L (ref 136–145)
SPECIMEN EXP DATE BLD: NORMAL
STATUS OF UNIT,%ST: NORMAL
UNIT DIVISION, %UDIV: 0
WBC # BLD AUTO: 11.6 K/UL (ref 4.6–13.2)

## 2017-01-22 PROCEDURE — 74011250637 HC RX REV CODE- 250/637: Performed by: INTERNAL MEDICINE

## 2017-01-22 PROCEDURE — 74011000258 HC RX REV CODE- 258: Performed by: INTERNAL MEDICINE

## 2017-01-22 PROCEDURE — 83735 ASSAY OF MAGNESIUM: CPT | Performed by: EMERGENCY MEDICINE

## 2017-01-22 PROCEDURE — 74011250636 HC RX REV CODE- 250/636: Performed by: EMERGENCY MEDICINE

## 2017-01-22 PROCEDURE — 80048 BASIC METABOLIC PNL TOTAL CA: CPT | Performed by: EMERGENCY MEDICINE

## 2017-01-22 PROCEDURE — 74011250637 HC RX REV CODE- 250/637: Performed by: EMERGENCY MEDICINE

## 2017-01-22 PROCEDURE — 65270000029 HC RM PRIVATE

## 2017-01-22 PROCEDURE — 36415 COLL VENOUS BLD VENIPUNCTURE: CPT | Performed by: EMERGENCY MEDICINE

## 2017-01-22 PROCEDURE — 74011250636 HC RX REV CODE- 250/636: Performed by: INTERNAL MEDICINE

## 2017-01-22 PROCEDURE — 85045 AUTOMATED RETICULOCYTE COUNT: CPT | Performed by: EMERGENCY MEDICINE

## 2017-01-22 PROCEDURE — 85025 COMPLETE CBC W/AUTO DIFF WBC: CPT | Performed by: EMERGENCY MEDICINE

## 2017-01-22 PROCEDURE — 74011000258 HC RX REV CODE- 258: Performed by: EMERGENCY MEDICINE

## 2017-01-22 RX ADMIN — HYDROMORPHONE HYDROCHLORIDE 2 MG: 2 INJECTION, SOLUTION INTRAMUSCULAR; INTRAVENOUS; SUBCUTANEOUS at 07:41

## 2017-01-22 RX ADMIN — HYDROMORPHONE HYDROCHLORIDE 2 MG: 2 INJECTION, SOLUTION INTRAMUSCULAR; INTRAVENOUS; SUBCUTANEOUS at 01:37

## 2017-01-22 RX ADMIN — DIPHENHYDRAMINE HYDROCHLORIDE 12.5 MG: 50 INJECTION INTRAMUSCULAR; INTRAVENOUS at 11:38

## 2017-01-22 RX ADMIN — CEFTRIAXONE 1 G: 1 INJECTION, POWDER, FOR SOLUTION INTRAMUSCULAR; INTRAVENOUS at 20:49

## 2017-01-22 RX ADMIN — HYDROMORPHONE HYDROCHLORIDE 2 MG: 2 INJECTION, SOLUTION INTRAMUSCULAR; INTRAVENOUS; SUBCUTANEOUS at 11:38

## 2017-01-22 RX ADMIN — DIPHENHYDRAMINE HYDROCHLORIDE 12.5 MG: 50 INJECTION INTRAMUSCULAR; INTRAVENOUS at 18:38

## 2017-01-22 RX ADMIN — HYDROMORPHONE HYDROCHLORIDE 2 MG: 2 INJECTION, SOLUTION INTRAMUSCULAR; INTRAVENOUS; SUBCUTANEOUS at 03:31

## 2017-01-22 RX ADMIN — HYDROMORPHONE HYDROCHLORIDE 2 MG: 2 INJECTION, SOLUTION INTRAMUSCULAR; INTRAVENOUS; SUBCUTANEOUS at 20:49

## 2017-01-22 RX ADMIN — HYDROXYUREA 500 MG: 500 CAPSULE ORAL at 18:39

## 2017-01-22 RX ADMIN — FOLIC ACID 1 MG: 1 TABLET ORAL at 09:00

## 2017-01-22 RX ADMIN — DEXTROSE MONOHYDRATE AND SODIUM CHLORIDE 125 ML/HR: 5; .45 INJECTION, SOLUTION INTRAVENOUS at 05:38

## 2017-01-22 RX ADMIN — DIPHENHYDRAMINE HYDROCHLORIDE 12.5 MG: 50 INJECTION INTRAMUSCULAR; INTRAVENOUS at 05:33

## 2017-01-22 RX ADMIN — HYDROMORPHONE HYDROCHLORIDE 2 MG: 2 INJECTION, SOLUTION INTRAMUSCULAR; INTRAVENOUS; SUBCUTANEOUS at 05:35

## 2017-01-22 RX ADMIN — AZITHROMYCIN 500 MG: 250 TABLET, FILM COATED ORAL at 20:49

## 2017-01-22 RX ADMIN — HYDROMORPHONE HYDROCHLORIDE 2 MG: 2 INJECTION, SOLUTION INTRAMUSCULAR; INTRAVENOUS; SUBCUTANEOUS at 09:30

## 2017-01-22 RX ADMIN — HYDROMORPHONE HYDROCHLORIDE 2 MG: 2 INJECTION, SOLUTION INTRAMUSCULAR; INTRAVENOUS; SUBCUTANEOUS at 23:09

## 2017-01-22 RX ADMIN — HYDROMORPHONE HYDROCHLORIDE 2 MG: 2 INJECTION, SOLUTION INTRAMUSCULAR; INTRAVENOUS; SUBCUTANEOUS at 14:06

## 2017-01-22 RX ADMIN — HYDROMORPHONE HYDROCHLORIDE 2 MG: 2 INJECTION, SOLUTION INTRAMUSCULAR; INTRAVENOUS; SUBCUTANEOUS at 18:39

## 2017-01-22 RX ADMIN — ENOXAPARIN SODIUM 40 MG: 40 INJECTION SUBCUTANEOUS at 07:39

## 2017-01-22 RX ADMIN — HYDROXYUREA 500 MG: 500 CAPSULE ORAL at 09:47

## 2017-01-22 RX ADMIN — HYDROMORPHONE HYDROCHLORIDE 2 MG: 2 INJECTION, SOLUTION INTRAMUSCULAR; INTRAVENOUS; SUBCUTANEOUS at 16:06

## 2017-01-22 RX ADMIN — DEXTROSE MONOHYDRATE AND SODIUM CHLORIDE 125 ML/HR: 5; .45 INJECTION, SOLUTION INTRAVENOUS at 11:43

## 2017-01-22 NOTE — PROGRESS NOTES
Phone: [phone]     Hematology / Oncology Progress Note  [facility]      Patient: Thomas Wolf   MRN: [id number]         CSN: [id number]    YOB: 1980      Admit Date: 2017    Assessment:     Active Problems:    Sickle cell anemia ([address] (2017)    Pain Crisis, slowly improving      Plan:     Continue with pain and supportive meds. Zelda Tinajero MD  [facility] [phone]      Subjective:     Overall less pain but still with w/ episodes of significant pain. NO SOB. Rested well last night. Objective:     Visit Vitals    /85 (BP 1 Location: Right arm, BP Patient Position: At rest)    Pulse 67    Temp 97.2 °F (36.2 °C)    Resp 18    Ht 6' (1.829 m)    Wt 79.7 kg (175 lb 12.8 oz)    SpO2 100%    BMI 23.84 kg/m2             Temp (24hrs), Av °F (36.7 °C), Min:97.2 °F (36.2 °C), Max:99.2 °F (37.3 °C)        Intake/Output Summary (Last 24 hours) at 17 1002  Last data filed at 17 0009   Gross per 24 hour   Intake             1800 ml   Output              500 ml   Net             1300 ml       Physical Exam:    Constitutional: well developed, well nourished, in moderate pain, alert and oriented x 3   HENT: Oral mucosa  moist, no cyanosis observed, moderate pallor observed. Eyes: conjunctiva normal, upper and lower eyelid normal bilaterally. Neck: No jugular venous distension present. Cardiovascular: heart sounds normal, normal rate and regular rhythm, no murmurs or rubs,  Carotid arteries normal.     Pulmonary/Chest Wall: breath sounds are clear bilaterally and no use of accessory muscles in breathing. Abdominal: Non tender, no palpable masses, no hepatosplenomegaly    Genitourinary/Anorectal: N/A    Musculoskeletal: No digital clubbing or cyanosis. Normal muscle strength and tone. Skin: Normal and no rashes or lesions   Lymph nodes: N/A   Peripheral Vascular: No edema present in extremities. Labs:  Recent Results (from the past 24 hour(s))   CBC WITH AUTOMATED DIFF    Collection Time: 01/22/17  2:43 AM   Result Value Ref Range    WBC 11.6 4.6 - 13.2 K/uL    RBC 3.28 (L) 4.70 - 5.50 M/uL    HGB 9.1 (L) 13.0 - 16.0 g/dL    HCT 26.5 (L) 36.0 - 48.0 %    MCV 80.8 74.0 - 97.0 FL    MCH 27.7 24.0 - 34.0 PG    MCHC 34.3 31.0 - 37.0 g/dL    RDW 23.0 (H) 11.6 - 14.5 %    PLATELET 364 263 - 153 K/uL    MPV 9.5 9.2 - 11.8 FL    NEUTROPHILS 31 (L) 42 - 75 %    LYMPHOCYTES 48 20 - 51 %    MONOCYTES 11 (H) 2 - 9 %    EOSINOPHILS 7 (H) 0 - 5 %    BASOPHILS 3 0 - 3 %    NRBC 3.0 (H) 0  WBC    ABS. NEUTROPHILS 3.6 1.8 - 8.0 K/UL    ABS. LYMPHOCYTES 5.6 (H) 0.8 - 3.5 K/UL    ABS. MONOCYTES 1.3 (H) 0 - 1.0 K/UL    ABS. EOSINOPHILS 0.8 (H) 0.0 - 0.4 K/UL    ABS. BASOPHILS 0.3 (H) 0.0 - 0.06 K/UL    DF MANUAL      RBC COMMENTS ANISOCYTOSIS  2+        RBC COMMENTS OVALOCYTES  2+       METABOLIC PANEL, BASIC    Collection Time: 01/22/17  2:43 AM   Result Value Ref Range    Sodium 138 136 - 145 mmol/L    Potassium 3.7 3.5 - 5.5 mmol/L    Chloride 106 100 - 108 mmol/L    CO2 24 21 - 32 mmol/L    Anion gap 8 3.0 - 18 mmol/L    Glucose 95 74 - 99 mg/dL    BUN 6 (L) 7.0 - 18 MG/DL    Creatinine 0.75 0.6 - 1.3 MG/DL    BUN/Creatinine ratio 8 (L) 12 - 20      GFR est AA >60 >60 ml/min/1.73m2    GFR est non-AA >60 >60 ml/min/1.73m2    Calcium 8.5 8.5 - 10.1 MG/DL   MAGNESIUM    Collection Time: 01/22/17  2:43 AM   Result Value Ref Range    Magnesium 2.2 1.8 - 2.4 mg/dL   RETICULOCYTE COUNT    Collection Time: 01/22/17  2:43 AM   Result Value Ref Range    Reticulocyte count 11.1 (H) 0.5 - 2.3 %           IMAGING :  No results found.

## 2017-01-22 NOTE — PROGRESS NOTES
[facility]    Patient: Abdullahi Lara Age: 43 y.o. : 1980 MR#: [id number] SSN: [id number]  Date/Time: 2017     Subjective:     Patient in NAD, states had some pain earlier today    Assessment/Plan:     1- Sickle Cell Crisis  2- ANemia  3- ? PNA     PLAN  O2, iV fluids, Pain control  Heme following  On ceftriaxone, zithromax  DVT prophylaxis  D/wpatient    Case discussed with:  [x]Patient  []Family  []Nursing  []Case Management  DVT Prophylaxis:  []Lovenox  []Hep SQ  []SCDs  []Coumadin   []On Heparin gtt    Objective:   VS:   Visit Vitals    /74 (BP 1 Location: Right arm, BP Patient Position: At rest)    Pulse 80    Temp 98.1 °F (36.7 °C)    Resp 20    Ht 6' (1.829 m)    Wt 79.7 kg (175 lb 12.8 oz)    SpO2 100%    BMI 23.84 kg/m2      Tmax/24hrs: Temp (24hrs), Av.1 °F (36.7 °C), Min:97.2 °F (36.2 °C), Max:99.2 °F (37.3 °C)      Intake/Output Summary (Last 24 hours) at 17 1457  Last data filed at 17 0009   Gross per 24 hour   Intake             1560 ml   Output              500 ml   Net             1060 ml       General:  Awake, alert  Cardiovascular:  S1S2+, RRR  Pulmonary:  CTA b/l  GI:  Soft, BS+, NT, ND  Extremities:  No edema        Labs:    Recent Results (from the past 24 hour(s))   CBC WITH AUTOMATED DIFF    Collection Time: 17  2:43 AM   Result Value Ref Range    WBC 11.6 4.6 - 13.2 K/uL    RBC 3.28 (L) 4.70 - 5.50 M/uL    HGB 9.1 (L) 13.0 - 16.0 g/dL    HCT 26.5 (L) 36.0 - 48.0 %    MCV 80.8 74.0 - 97.0 FL    MCH 27.7 24.0 - 34.0 PG    MCHC 34.3 31.0 - 37.0 g/dL    RDW 23.0 (H) 11.6 - 14.5 %    PLATELET 859 917 - 257 K/uL    MPV 9.5 9.2 - 11.8 FL    NEUTROPHILS 31 (L) 42 - 75 %    LYMPHOCYTES 48 20 - 51 %    MONOCYTES 11 (H) 2 - 9 %    EOSINOPHILS 7 (H) 0 - 5 %    BASOPHILS 3 0 - 3 %    NRBC 3.0 (H) 0  WBC    ABS. NEUTROPHILS 3.6 1.8 - 8.0 K/UL    ABS.  LYMPHOCYTES 5.6 (H) 0.8 - 3.5 K/UL ABS. MONOCYTES 1.3 (H) 0 - 1.0 K/UL    ABS. EOSINOPHILS 0.8 (H) 0.0 - 0.4 K/UL    ABS.  BASOPHILS 0.3 (H) 0.0 - 0.06 K/UL    DF MANUAL      RBC COMMENTS ANISOCYTOSIS  2+        RBC COMMENTS OVALOCYTES  2+       METABOLIC PANEL, BASIC    Collection Time: 01/22/17  2:43 AM   Result Value Ref Range    Sodium 138 136 - 145 mmol/L    Potassium 3.7 3.5 - 5.5 mmol/L    Chloride 106 100 - 108 mmol/L    CO2 24 21 - 32 mmol/L    Anion gap 8 3.0 - 18 mmol/L    Glucose 95 74 - 99 mg/dL    BUN 6 (L) 7.0 - 18 MG/DL    Creatinine 0.75 0.6 - 1.3 MG/DL    BUN/Creatinine ratio 8 (L) 12 - 20      GFR est AA >60 >60 ml/min/1.73m2    GFR est non-AA >60 >60 ml/min/1.73m2    Calcium 8.5 8.5 - 10.1 MG/DL   MAGNESIUM    Collection Time: 01/22/17  2:43 AM   Result Value Ref Range    Magnesium 2.2 1.8 - 2.4 mg/dL   RETICULOCYTE COUNT    Collection Time: 01/22/17  2:43 AM   Result Value Ref Range    Reticulocyte count 11.1 (H) 0.5 - 2.3 %       Signed By: Pieter Beckford MD     January 22, 2017

## 2017-01-22 NOTE — PROGRESS NOTES
[facility]  Progress Note    Patient: Qi Cardoso Age: 43 y.o. : 1980 MR#: [id number] SSN: [id number]  Date/Time: 2017     Subjective:     Patient in NAD, awake, follows commands, feels a little better    Assessment/Plan:     1- Sickle Cell Crisis  2- ANemia  3- ? PNA     PLAN  Pain control, O2, iv fluids  On empiric abx  DVT prophylaxis  Check am labs  D/w patient    Case discussed with:  [x]Patient  []Family  []Nursing  []Case Management  DVT Prophylaxis:  []Lovenox  []Hep SQ  []SCDs  []Coumadin   []On Heparin gtt    Objective:   VS:   Visit Vitals    /79    Pulse 71    Temp 99.2 °F (37.3 °C)    Resp 16    Ht 6' (1.829 m)    Wt 79.7 kg (175 lb 12.8 oz)    SpO2 95%    BMI 23.84 kg/m2      Tmax/24hrs: Temp (24hrs), Av.1 °F (36.7 °C), Min:97.3 °F (36.3 °C), Max:99.2 °F (37.3 °C)      Intake/Output Summary (Last 24 hours) at 17 1940  Last data filed at 17 1801   Gross per 24 hour   Intake             4330 ml   Output             1400 ml   Net             2930 ml       General:  Awake, alert, follows commands  Cardiovascular:  S1S2+, RRR  Pulmonary:  CTA b/l  GI:  Soft, BS+, NT, ND  Extremities:  No edema      Labs:    No results found for this or any previous visit (from the past 24 hour(s)).     Signed By: Aidee Montgomery MD     2017

## 2017-01-22 NOTE — ROUTINE PROCESS
TRANSFER - OUT REPORT:    Verbal report given to ANSELMO PAULSON RN(name) on [facility]  being transferred to (unit) for routine progression of care       Report consisted of patients Situation, Background, Assessment and   Recommendations(SBAR). Information from the following report(s) SBAR, Kardex, [facility] and Recent Results was reviewed with the receiving nurse. Lines:   Peripheral IV 01/19/17 Right Forearm (Active)   Site Assessment Clean, dry, & intact 1/20/2017  7:03 AM   Phlebitis Assessment 0 1/20/2017  7:03 AM   Infiltration Assessment 0 1/20/2017  7:03 AM   Dressing Status Clean, dry, & intact 1/20/2017  7:03 AM   Dressing Type Tape;Transparent 1/20/2017  7:03 AM   Hub Color/Line Status Infusing;Patent 1/20/2017  7:03 AM   Action Taken Blood drawn 1/19/2017  1:52 AM        Opportunity for questions and clarification was provided. Patient transported with:   Tech   Patient transported via bed, belongings packed per patient.

## 2017-01-23 PROCEDURE — 74011250636 HC RX REV CODE- 250/636: Performed by: EMERGENCY MEDICINE

## 2017-01-23 PROCEDURE — 74011250637 HC RX REV CODE- 250/637: Performed by: INTERNAL MEDICINE

## 2017-01-23 PROCEDURE — 65270000029 HC RM PRIVATE

## 2017-01-23 PROCEDURE — 74011250637 HC RX REV CODE- 250/637: Performed by: EMERGENCY MEDICINE

## 2017-01-23 PROCEDURE — 74011250636 HC RX REV CODE- 250/636: Performed by: INTERNAL MEDICINE

## 2017-01-23 PROCEDURE — 74011000258 HC RX REV CODE- 258: Performed by: EMERGENCY MEDICINE

## 2017-01-23 RX ADMIN — HYDROMORPHONE HYDROCHLORIDE 2 MG: 2 INJECTION, SOLUTION INTRAMUSCULAR; INTRAVENOUS; SUBCUTANEOUS at 03:06

## 2017-01-23 RX ADMIN — HYDROMORPHONE HYDROCHLORIDE 2 MG: 2 INJECTION, SOLUTION INTRAMUSCULAR; INTRAVENOUS; SUBCUTANEOUS at 11:03

## 2017-01-23 RX ADMIN — HYDROMORPHONE HYDROCHLORIDE 2 MG: 2 INJECTION, SOLUTION INTRAMUSCULAR; INTRAVENOUS; SUBCUTANEOUS at 21:57

## 2017-01-23 RX ADMIN — HYDROXYUREA 500 MG: 500 CAPSULE ORAL at 09:24

## 2017-01-23 RX ADMIN — AZITHROMYCIN 500 MG: 250 TABLET, FILM COATED ORAL at 21:57

## 2017-01-23 RX ADMIN — HYDROMORPHONE HYDROCHLORIDE 2 MG: 2 INJECTION, SOLUTION INTRAMUSCULAR; INTRAVENOUS; SUBCUTANEOUS at 09:23

## 2017-01-23 RX ADMIN — HYDROMORPHONE HYDROCHLORIDE 2 MG: 2 INJECTION, SOLUTION INTRAMUSCULAR; INTRAVENOUS; SUBCUTANEOUS at 06:52

## 2017-01-23 RX ADMIN — HYDROMORPHONE HYDROCHLORIDE 2 MG: 2 INJECTION, SOLUTION INTRAMUSCULAR; INTRAVENOUS; SUBCUTANEOUS at 17:39

## 2017-01-23 RX ADMIN — ENOXAPARIN SODIUM 40 MG: 40 INJECTION SUBCUTANEOUS at 08:13

## 2017-01-23 RX ADMIN — HYDROMORPHONE HYDROCHLORIDE 2 MG: 2 INJECTION, SOLUTION INTRAMUSCULAR; INTRAVENOUS; SUBCUTANEOUS at 19:10

## 2017-01-23 RX ADMIN — DIPHENHYDRAMINE HYDROCHLORIDE 12.5 MG: 50 INJECTION INTRAMUSCULAR; INTRAVENOUS at 05:59

## 2017-01-23 RX ADMIN — HYDROMORPHONE HYDROCHLORIDE 2 MG: 2 INJECTION, SOLUTION INTRAMUSCULAR; INTRAVENOUS; SUBCUTANEOUS at 05:02

## 2017-01-23 RX ADMIN — DIPHENHYDRAMINE HYDROCHLORIDE 12.5 MG: 50 INJECTION INTRAMUSCULAR; INTRAVENOUS at 11:03

## 2017-01-23 RX ADMIN — DIPHENHYDRAMINE HYDROCHLORIDE 12.5 MG: 50 INJECTION INTRAMUSCULAR; INTRAVENOUS at 17:39

## 2017-01-23 RX ADMIN — HYDROXYUREA 500 MG: 500 CAPSULE ORAL at 17:39

## 2017-01-23 RX ADMIN — HYDROMORPHONE HYDROCHLORIDE 2 MG: 2 INJECTION, SOLUTION INTRAMUSCULAR; INTRAVENOUS; SUBCUTANEOUS at 01:09

## 2017-01-23 RX ADMIN — CEFTRIAXONE 1 G: 1 INJECTION, POWDER, FOR SOLUTION INTRAMUSCULAR; INTRAVENOUS at 21:57

## 2017-01-23 RX ADMIN — FOLIC ACID 1 MG: 1 TABLET ORAL at 08:14

## 2017-01-23 RX ADMIN — DIPHENHYDRAMINE HYDROCHLORIDE 12.5 MG: 50 INJECTION INTRAMUSCULAR; INTRAVENOUS at 01:09

## 2017-01-23 RX ADMIN — HYDROMORPHONE HYDROCHLORIDE 2 MG: 2 INJECTION, SOLUTION INTRAMUSCULAR; INTRAVENOUS; SUBCUTANEOUS at 13:07

## 2017-01-23 NOTE — ROUTINE PROCESS
Bedside and Verbal shift change report given to Vitaly Mcmanus RN (oncoming nurse) by Jass Simms RN (offgoing nurse). Report included the following information SBAR, Kardex, MAR and Recent Results. SITUATION:  Code Status: Full Code  Reason for Admission: Sickle cell anemia [facility])  Hospital day: 3  Problem List:       Hospital Problems  Date Reviewed: 12/11/2016          Codes Class Noted POA    Sickle cell anemia ([facility] [address] ICD-10-CM: D57.1  ICD-9-CM: 282.60  1/19/2017 Unknown              BACKGROUND:   Past Medical History:   Past Medical History   Diagnosis Date    Sickle cell anemia with crisis ([facility] [address]     Vitamin D deficiency       Patient taking anticoagulants no    Patient has a defibrillator: no    History of shots YES for example, flu, pneumonia, tetanus   Isolation History NO for example, MRSA, CDiff    ASSESSMENT:  Changes in Assessment Throughout Shift: None  Significant Changes in 24 hours (for example, RR/code, fall)  Patient has Central Line: no Reasons if yes:   Patient has Sesay Cath: no Reasons if yes:     Mobility Issues  PT  IV Patency  OR Checklist  Pending Tests    Last Vitals:  Vitals w/ MEWS Score (last day)     Date/Time MEWS Score Pulse Resp Temp BP Level of Consciousness SpO2    01/22/17 1928 1 88 18 98.5 °F (36.9 °C) 122/77 Alert 96 %    01/22/17 1612 1 88 20 98 °F (36.7 °C) 126/81 Alert 93 %    01/22/17 1225 1 80 20 98.1 °F (36.7 °C) 121/74 Alert 100 %    01/22/17 0741 1 67 18 97.2 °F (36.2 °C) 130/85 Alert 100 %    01/21/17 2300 1 68 18 98.1 °F (36.7 °C) 123/72 Alert 93 %    01/21/17 2250 1 68 18 98.1 °F (36.7 °C) 120/70 Alert 93 %    01/21/17 1900 1 73 16 98.1 °F (36.7 °C) 130/82 Alert 95 %    01/21/17 1600 1 71 16 99.2 °F (37.3 °C) 126/79 Alert 95 %    01/21/17 1114 1 69 20 97.3 °F (36.3 °C) 116/62 Alert 95 %    01/21/17 0745 1 81 18 97.7 °F (36.5 °C) 118/76 Alert 96 %    01/21/17 0340 1 91 16 97.4 °F (36.3 °C) 120/77 Alert 95 %    01/21/17 0000 1 66 18 97.7 °F (36.5 °C) 116/73 Alert 94 % PAIN    Pain Assessment    Pain Intensity 1: 4 (01/22/17 1915)    Pain Location 1: Back, Leg    Pain Intervention(s) 1: Medication (see MAR)    Patient Stated Pain Goal: 5  Intervention effective: yes  Time of last intervention: 1900 Reassessment Completed: yes   Other actions taken for pain:     Last 3 Weights:  Last 3 Recorded Weights in this Encounter    01/19/17 1028 01/20/17 0600 01/21/17 0530   Weight: 77.9 kg (171 lb 12.8 oz) 78.4 kg (172 lb 13.5 oz) 79.7 kg (175 lb 12.8 oz)   Weight change:     INTAKE/OUPUT    Current Shift: 01/22 1901 - 01/23 0700  In: -   Out: 420 [Urine:420]    Last three shifts: 01/21 0701 - 01/22 1900  In: 2160 [P.O.:960; I.V.:1200]  Out: 500 [Urine:500]    RECOMMENDATIONS AND DISCHARGE PLANNING  Patient needs and requests:     Pending tests/procedures:      Discharge plan for patient:     Discharge planning Needs or Barriers:     Estimated Discharge Date:  Posted on Whiteboard in Patients Room: yes       \"HEALS\" SAFETY CHECK  A safety check occurred in the patient's room between off going nurse and oncoming nurse listed above. The safety check included the below items:    H  High Alert Medications Verify all high alert medication drips (heparin, PCA, etc.)  E  Equipment Suction is set up for ALL patients (with yanker)  Red plugs utilized for all equipment (IV pumps, etc.)  WOWs wiped down at end of shift. Room stocked with oxygen, suction, and other unit-specific supplies  A  Alarms Bed alarm is set for fall risk patients  Ensure chair alarm is in place and activated if patient is up in a chair  L  Lines Check IV for any infiltration  Sesay bag is empty if patient has a Sesay   Tubing and IV bags are labeled  S  Safety  Room is clean, patient is clean, and equipment is clean. Hallways are clear from equipment besides carts.    Fall bracelet on for fall risk patients  Ensure room is clear and free of clutter  Suction is set up for ALL patients (with yanker)  Hallways are clear from equipment besides carts.    Isolation precautions followed, supplies available outside room, sign posted    Josseline Kaur LPN

## 2017-01-23 NOTE — PROGRESS NOTES
[facility] Hospitalist Group  Progress Note    Patient: Neil Brown Age: 43 y.o. : 1980 MR#: [id number] SSN: [id number]  Date/Time: 2017     Subjective:     Patient sitting in bed, still has pain    Assessment/Plan:     1- Sickle Cell Crisis  2- ANemia  3- Possible PNA     PLAN  Pain control, IV Fluids, O2  Heme following  Zithromax, ceftriaxone  DVT prophylaxis  D/w pateint    Case discussed with:  [x]Patient  []Family  []Nursing  []Case Management  DVT Prophylaxis:  []Lovenox  []Hep SQ  []SCDs  []Coumadin   []On Heparin gtt    Objective:   VS:   Visit Vitals    /74 (BP 1 Location: Right arm, BP Patient Position: At rest)    Pulse 88    Temp 98.4 °F (36.9 °C)    Resp 18    Ht 6' (1.829 m)    Wt 79.7 kg (175 lb 12.8 oz)    SpO2 93%    BMI 23.84 kg/m2      Tmax/24hrs: Temp (24hrs), Av.2 °F (36.8 °C), Min:97.7 °F (36.5 °C), Max:98.6 °F (37 °C)      Intake/Output Summary (Last 24 hours) at 17 1526  Last data filed at 17 0240   Gross per 24 hour   Intake             1320 ml   Output             1160 ml   Net              160 ml       General:  Awake, alert, follows commands  Cardiovascular:  S1S2+, RRR  Pulmonary:  CTA b/l  GI:  Soft, BS+, NT, ND  Extremities:  No edema      Labs:    No results found for this or any previous visit (from the past 24 hour(s)).     Signed By: Charity Krause MD     2017

## 2017-01-23 NOTE — INTERDISCIPLINARY ROUNDS
IDR/SLIDR Summary          Patient: Patrick Montague MRN: [id number]    Age: 43 y.o. YOB: 1980 Room/Bed: [facility]   Admit Diagnosis: Sickle cell anemia (HCC)  Principal Diagnosis: <principal problem not specified>   Goals: pain control  Readmission: YES  Quality Measure: SEPSIS  VTE Prophylaxis: Chemical  Influenza Vaccine screening completed? YES  Pneumococcal Vaccine screening completed? YES  Mobility needs: No   Nutrition plan:No  Consults:Case Management    Financial concerns:No  Escalated to CM? NO  RRAT Score: medium   Interventions:H2H  Testing due for pt today?  NO  LOS: 4 days Expected length of stay 6 days  Discharge plan: plans to return home   PCP: Karli Linda MD  Transportation needs: No    Days before discharge:discharge pending  Discharge disposition: Home    Signed:     David Camacho RN  1/23/2017  2:18 AM

## 2017-01-23 NOTE — PROGRESS NOTES
Pain better this am, although not slept well last night due to pain  vss lung clear alert oriented  abd soft nt  impr sickle cell pain crisis, improving  Plan ct current pain meds. Monitor symtoms. Hopefully d/c soon.

## 2017-01-24 LAB
ANION GAP BLD CALC-SCNC: 9 MMOL/L (ref 3–18)
BASOPHILS # BLD AUTO: 0.1 K/UL (ref 0–0.06)
BASOPHILS # BLD: 1 % (ref 0–3)
BUN SERPL-MCNC: 8 MG/DL (ref 7–18)
BUN/CREAT SERPL: 10 (ref 12–20)
CALCIUM SERPL-MCNC: 8.5 MG/DL (ref 8.5–10.1)
CHLORIDE SERPL-SCNC: 102 MMOL/L (ref 100–108)
CO2 SERPL-SCNC: 25 MMOL/L (ref 21–32)
CREAT SERPL-MCNC: 0.82 MG/DL (ref 0.6–1.3)
DIFFERENTIAL METHOD BLD: ABNORMAL
EOSINOPHIL # BLD: 1.5 K/UL (ref 0–0.4)
EOSINOPHIL NFR BLD: 14 % (ref 0–5)
ERYTHROCYTE [DISTWIDTH] IN BLOOD BY AUTOMATED COUNT: 22.6 % (ref 11.6–14.5)
GLUCOSE SERPL-MCNC: 108 MG/DL (ref 74–99)
HCT VFR BLD AUTO: 26.5 % (ref 36–48)
HGB BLD-MCNC: 9 G/DL (ref 13–16)
LYMPHOCYTES # BLD AUTO: 46 % (ref 20–51)
LYMPHOCYTES # BLD: 4.9 K/UL (ref 0.8–3.5)
MAGNESIUM SERPL-MCNC: 2 MG/DL (ref 1.8–2.4)
MCH RBC QN AUTO: 27.5 PG (ref 24–34)
MCHC RBC AUTO-ENTMCNC: 34 G/DL (ref 31–37)
MCV RBC AUTO: 81 FL (ref 74–97)
MONOCYTES # BLD: 0.8 K/UL (ref 0–1)
MONOCYTES NFR BLD AUTO: 7 % (ref 2–9)
NEUTS SEG # BLD: 3.5 K/UL (ref 1.8–8)
NEUTS SEG NFR BLD AUTO: 32 % (ref 42–75)
NRBC BLD-RTO: 3 PER 100 WBC
PLATELET # BLD AUTO: 409 K/UL (ref 135–420)
PLATELET COMMENTS,PCOM: ABNORMAL
PMV BLD AUTO: 10.1 FL (ref 9.2–11.8)
POTASSIUM SERPL-SCNC: 3.7 MMOL/L (ref 3.5–5.5)
RBC # BLD AUTO: 3.27 M/UL (ref 4.7–5.5)
RBC MORPH BLD: ABNORMAL
SODIUM SERPL-SCNC: 136 MMOL/L (ref 136–145)
WBC # BLD AUTO: 10.8 K/UL (ref 4.6–13.2)

## 2017-01-24 PROCEDURE — 74011000258 HC RX REV CODE- 258: Performed by: INTERNAL MEDICINE

## 2017-01-24 PROCEDURE — 74011250637 HC RX REV CODE- 250/637: Performed by: EMERGENCY MEDICINE

## 2017-01-24 PROCEDURE — 83735 ASSAY OF MAGNESIUM: CPT | Performed by: EMERGENCY MEDICINE

## 2017-01-24 PROCEDURE — 74011250636 HC RX REV CODE- 250/636: Performed by: EMERGENCY MEDICINE

## 2017-01-24 PROCEDURE — 36415 COLL VENOUS BLD VENIPUNCTURE: CPT | Performed by: EMERGENCY MEDICINE

## 2017-01-24 PROCEDURE — 80048 BASIC METABOLIC PNL TOTAL CA: CPT | Performed by: EMERGENCY MEDICINE

## 2017-01-24 PROCEDURE — 74011000258 HC RX REV CODE- 258: Performed by: EMERGENCY MEDICINE

## 2017-01-24 PROCEDURE — 74011250637 HC RX REV CODE- 250/637: Performed by: INTERNAL MEDICINE

## 2017-01-24 PROCEDURE — 65270000029 HC RM PRIVATE

## 2017-01-24 PROCEDURE — 85025 COMPLETE CBC W/AUTO DIFF WBC: CPT | Performed by: EMERGENCY MEDICINE

## 2017-01-24 PROCEDURE — 74011250636 HC RX REV CODE- 250/636: Performed by: INTERNAL MEDICINE

## 2017-01-24 RX ADMIN — DIPHENHYDRAMINE HYDROCHLORIDE 12.5 MG: 50 INJECTION INTRAMUSCULAR; INTRAVENOUS at 09:50

## 2017-01-24 RX ADMIN — DEXTROSE MONOHYDRATE AND SODIUM CHLORIDE 125 ML/HR: 5; .45 INJECTION, SOLUTION INTRAVENOUS at 20:56

## 2017-01-24 RX ADMIN — HYDROMORPHONE HYDROCHLORIDE 2 MG: 2 INJECTION, SOLUTION INTRAMUSCULAR; INTRAVENOUS; SUBCUTANEOUS at 00:51

## 2017-01-24 RX ADMIN — HYDROXYUREA 500 MG: 500 CAPSULE ORAL at 18:16

## 2017-01-24 RX ADMIN — DIPHENHYDRAMINE HYDROCHLORIDE 12.5 MG: 50 INJECTION INTRAMUSCULAR; INTRAVENOUS at 00:56

## 2017-01-24 RX ADMIN — HYDROMORPHONE HYDROCHLORIDE 2 MG: 2 INJECTION, SOLUTION INTRAMUSCULAR; INTRAVENOUS; SUBCUTANEOUS at 14:31

## 2017-01-24 RX ADMIN — HYDROXYUREA 500 MG: 500 CAPSULE ORAL at 10:08

## 2017-01-24 RX ADMIN — FOLIC ACID 1 MG: 1 TABLET ORAL at 09:50

## 2017-01-24 RX ADMIN — HYDROMORPHONE HYDROCHLORIDE 2 MG: 2 INJECTION, SOLUTION INTRAMUSCULAR; INTRAVENOUS; SUBCUTANEOUS at 20:32

## 2017-01-24 RX ADMIN — CEFTRIAXONE 1 G: 1 INJECTION, POWDER, FOR SOLUTION INTRAMUSCULAR; INTRAVENOUS at 20:32

## 2017-01-24 RX ADMIN — ENOXAPARIN SODIUM 40 MG: 40 INJECTION SUBCUTANEOUS at 09:49

## 2017-01-24 RX ADMIN — AZITHROMYCIN 500 MG: 250 TABLET, FILM COATED ORAL at 20:32

## 2017-01-24 RX ADMIN — DEXTROSE MONOHYDRATE AND SODIUM CHLORIDE 125 ML/HR: 5; .45 INJECTION, SOLUTION INTRAVENOUS at 03:47

## 2017-01-24 RX ADMIN — HYDROMORPHONE HYDROCHLORIDE 2 MG: 2 INJECTION, SOLUTION INTRAMUSCULAR; INTRAVENOUS; SUBCUTANEOUS at 06:38

## 2017-01-24 RX ADMIN — HYDROMORPHONE HYDROCHLORIDE 2 MG: 2 INJECTION, SOLUTION INTRAMUSCULAR; INTRAVENOUS; SUBCUTANEOUS at 03:40

## 2017-01-24 RX ADMIN — HYDROMORPHONE HYDROCHLORIDE 2 MG: 2 INJECTION, SOLUTION INTRAMUSCULAR; INTRAVENOUS; SUBCUTANEOUS at 09:50

## 2017-01-24 NOTE — ROUTINE PROCESS
Bedside and Verbal shift change report given to ANA IRIZARRY (oncoming nurse) by Logan Ignacio RN (offgoing nurse). Report included the following information SBAR, Kardex, MAR and Recent Results. SITUATION:  Code Status: Full Code  Reason for Admission: Sickle cell anemia [facility])  Hospital day: 4  Problem List:       Hospital Problems  Date Reviewed: 12/11/2016          Codes Class Noted POA    Sickle cell anemia ([facility] [address] ICD-10-CM: D57.1  ICD-9-CM: 282.60  1/19/2017 Unknown              BACKGROUND:   Past Medical History:   Past Medical History   Diagnosis Date    Sickle cell anemia with crisis ([facility] [address]     Vitamin D deficiency       Patient taking anticoagulants no    Patient has a defibrillator: no    History of shots YES for example, flu, pneumonia, tetanus   Isolation History NO for example, MRSA, CDiff    ASSESSMENT:  Changes in Assessment Throughout Shift: None  Significant Changes in 24 hours (for example, RR/code, fall)  Patient has Central Line: no Reasons if yes:   Patient has Sesay Cath: no Reasons if yes:     Mobility Issues  PT  IV Patency  OR Checklist  Pending Tests    Last Vitals:  Vitals w/ MEWS Score (last day)     Date/Time MEWS Score Pulse Resp Temp BP Level of Consciousness SpO2    01/23/17 1504 1 88 18 98.4 °F (36.9 °C) 129/74 Alert 93 %    01/23/17 0750 1 74 18 97.7 °F (36.5 °C) 122/76 Alert 98 %    01/22/17 2312 1 89 18 98.6 °F (37 °C) 129/72 Alert 96 %    01/22/17 1928 1 88 18 98.5 °F (36.9 °C) 122/77 Alert 96 %    01/22/17 1612 1 88 20 98 °F (36.7 °C) 126/81 Alert 93 %    01/22/17 1225 1 80 20 98.1 °F (36.7 °C) 121/74 Alert 100 %    01/22/17 0741 1 67 18 97.2 °F (36.2 °C) 130/85 Alert 100 %            PAIN    Pain Assessment    Pain Intensity 1: 3 (01/23/17 0403)    Pain Location 1: Arm    Pain Intervention(s) 1: Medication (see MAR)    Patient Stated Pain Goal: 5  Intervention effective: yes  Time of last intervention: 1900 Reassessment Completed: yes   Other actions taken for pain:     Last 3 Weights:  Last 3 Recorded Weights in this Encounter    01/19/17 1028 01/20/17 0600 01/21/17 0530   Weight: 77.9 kg (171 lb 12.8 oz) 78.4 kg (172 lb 13.5 oz) 79.7 kg (175 lb 12.8 oz)   Weight change:     INTAKE/OUPUT    Current Shift:      Last three shifts: 01/22 0701 - 01/23 1900  In: 1320 [P.O.:1320]  Out: 1160 [Urine:1160]    RECOMMENDATIONS AND DISCHARGE PLANNING  Patient needs and requests:     Pending tests/procedures:      Discharge plan for patient:     Discharge planning Needs or Barriers:     Estimated Discharge Date:  Posted on Whiteboard in Patients Room: yes       \"HEALS\" SAFETY CHECK  A safety check occurred in the patient's room between off going nurse and oncoming nurse listed above. The safety check included the below items:    H  High Alert Medications Verify all high alert medication drips (heparin, PCA, etc.)  E  Equipment Suction is set up for ALL patients (with pat)  Red plugs utilized for all equipment (IV pumps, etc.)  WOWs wiped down at end of shift. Room stocked with oxygen, suction, and other unit-specific supplies  A  Alarms Bed alarm is set for fall risk patients  Ensure chair alarm is in place and activated if patient is up in a chair  L  Lines Check IV for any infiltration  Sesay bag is empty if patient has a Sesay   Tubing and IV bags are labeled  S  Safety  Room is clean, patient is clean, and equipment is clean. Hallways are clear from equipment besides carts. Fall bracelet on for fall risk patients  Ensure room is clear and free of clutter  Suction is set up for ALL patients (with pat)  Hallways are clear from equipment besides carts.    Isolation precautions followed, supplies available outside room, sign posted    Cielo Contreras, RN

## 2017-01-24 NOTE — ROUTINE PROCESS
Received patient lying in bed awake alert and orient x 4. Patient denies pain and discomfort at this time. Call light in reach. Instructed to call lfor assistance.

## 2017-01-24 NOTE — PROGRESS NOTES
Still with back pain. No cough or SOB  vss afebrile , pallor  Lung clear to auscultation  Cardiac reg hs abd soft nt  Recent Results (from the past 12 hour(s))   CBC WITH AUTOMATED DIFF    Collection Time: 01/24/17  2:43 AM   Result Value Ref Range    WBC 10.8 4.6 - 13.2 K/uL    RBC 3.27 (L) 4.70 - 5.50 M/uL    HGB 9.0 (L) 13.0 - 16.0 g/dL    HCT 26.5 (L) 36.0 - 48.0 %    MCV 81.0 74.0 - 97.0 FL    MCH 27.5 24.0 - 34.0 PG    MCHC 34.0 31.0 - 37.0 g/dL    RDW 22.6 (H) 11.6 - 14.5 %    PLATELET 546 473 - 711 K/uL    MPV 10.1 9.2 - 11.8 FL    NEUTROPHILS 32 (L) 42 - 75 %    LYMPHOCYTES 46 20 - 51 %    MONOCYTES 7 2 - 9 %    EOSINOPHILS 14 (H) 0 - 5 %    BASOPHILS 1 0 - 3 %    NRBC 3.0 (H) 0  WBC    ABS. NEUTROPHILS 3.5 1.8 - 8.0 K/UL    ABS. LYMPHOCYTES 4.9 (H) 0.8 - 3.5 K/UL    ABS. MONOCYTES 0.8 0 - 1.0 K/UL    ABS. EOSINOPHILS 1.5 (H) 0.0 - 0.4 K/UL    ABS. BASOPHILS 0.1 (H) 0.0 - 0.06 K/UL    DF MANUAL      PLATELET COMMENTS INCREASED PLATELETS      RBC COMMENTS SICKLE CELLS  2+        RBC COMMENTS ANISOCYTOSIS  2+        RBC COMMENTS HYPOCHROMIA  1+        RBC COMMENTS POIKILOCYTOSIS  2+        RBC COMMENTS TARGET CELLS  FEW       METABOLIC PANEL, BASIC    Collection Time: 01/24/17  2:43 AM   Result Value Ref Range    Sodium 136 136 - 145 mmol/L    Potassium 3.7 3.5 - 5.5 mmol/L    Chloride 102 100 - 108 mmol/L    CO2 25 21 - 32 mmol/L    Anion gap 9 3.0 - 18 mmol/L    Glucose 108 (H) 74 - 99 mg/dL    BUN 8 7.0 - 18 MG/DL    Creatinine 0.82 0.6 - 1.3 MG/DL    BUN/Creatinine ratio 10 (L) 12 - 20      GFR est AA >60 >60 ml/min/1.73m2    GFR est non-AA >60 >60 ml/min/1.73m2    Calcium 8.5 8.5 - 10.1 MG/DL   MAGNESIUM    Collection Time: 01/24/17  2:43 AM   Result Value Ref Range    Magnesium 2.0 1.8 - 2.4 mg/dL   impr sickle cell pain crisis, unresolved  Plan ct current pain meds and supportive care. Monitor hb.

## 2017-01-24 NOTE — CDMP QUERY
In order to reflect severity of illness, please document if dx of PNA is ruled in or ruled out. Telma Betancourt Thank you      QUESTIONS?    Netta Oliver -[phone]

## 2017-01-24 NOTE — PROGRESS NOTES
conducted an initial consultation and Spiritual Assessment for Tod Faulkner, who is a 43 y. o.,male. Patients Primary Language is: [place]. According to the patients EMR Scientology Affiliation is: [email or web address]. The reason the Patient came to the hospital is:   Patient Active Problem List    Diagnosis Date Noted    Sickle cell anemia ([facility] 01/19/2017    Hemolytic anemia (HCC) 12/11/2016    SC disease ([facility] 12/11/2016    Pain, generalized 04/05/2016    Sickle cell anemia with crisis ([facility] 05/07/2015    Sickle cell pain crisis ([facility] 08/15/2014    Sickle cell crisis ([facility] 01/26/2014    Sickle cell disease ([facility] 09/27/2013        The  provided the following Interventions:  Initiated a relationship of care and support. Explored issues of delia, belief, spirituality and Yazdanism/ritual needs while hospitalized. Listened empathically. Provided chaplaincy education. Provided information about Spiritual Care Services. Offered prayer and assurance of continued prayers on patient's behalf. Chart reviewed. The following outcomes where achieved:  Patient shared limited information about both their medical narrative and spiritual journey/beliefs.  confirmed Patient's Scientology Affiliation. Patient processed feeling about current hospitalization. Patient expressed gratitude for 's visit. Assessment:  Patient does not have any Yazdanism/cultural needs that will affect patients preferences in health care. There are no spiritual or Yazdanism issues which require intervention at this time. Plan:  Chaplains will continue to follow and will provide pastoral care on an as needed/requested basis.  recommends bedside caregivers page  on duty if patient shows signs of acute spiritual or emotional distress. Chaplain Sherly CELAYA  [address]  [phone]

## 2017-01-24 NOTE — INTERDISCIPLINARY ROUNDS
IDR/SLIDR Summary          Patient: Karol Jefferson MRN: [id number]    Age: 43 y.o. YOB: 1980 Room/Bed: [facility]   Admit Diagnosis: Sickle cell anemia (HCC)  Principal Diagnosis: <principal problem not specified>   Goals: Pain Control  Readmission: YES  Quality Measure: SEPSIS  VTE Prophylaxis: Chemical  Influenza Vaccine screening completed? YES  Pneumococcal Vaccine screening completed? YES  Mobility needs: No   Nutrition plan:No  Consults:Case Management    Financial concerns:No  Escalated to CM? NO  RRAT Score: medium   Interventions:H2H  Testing due for pt today?  NO  LOS: 4 days Expected length of stay 6 days  Discharge plan: plans to return home   PCP: Jerry Sharp MD  Transportation needs: No    Days before discharge:discharge pending  Discharge disposition: Home    Signed:     Kate Nava  1/23/2017  11:18 AM

## 2017-01-24 NOTE — PROGRESS NOTES
[facility] Hospitalist Group  Progress Note    Patient: Roberth Aaron Age: 43 y.o. : 1980 MR#: [id number] SSN: [id number]  Date/Time: 2017     Subjective:     Patient lying in bed, still has pain    Assessment/Plan:     1- Sickle Cell Crisis  2- ANemia  3- Possible PNA     PLAN  Pain control, iv fluids, O2  Zithromax, ceftriaxone  DVT prophylaxis  Hopefully can start deescalating pain regimen soon  D/w pateint  D/w DR JUNO Chase    Case discussed with:  [x]Patient  []Family  []Nursing  [x]Case Management  DVT Prophylaxis:  []Lovenox  []Hep SQ  []SCDs  []Coumadin   []On Heparin gtt    Objective:   VS:   Visit Vitals    /69 (BP 1 Location: Right arm, BP Patient Position: At rest)    Pulse 68    Temp 97.6 °F (36.4 °C)    Resp 18    Ht 6' (1.829 m)    Wt 79.7 kg (175 lb 12.8 oz)    SpO2 97%    BMI 23.84 kg/m2      Tmax/24hrs: Temp (24hrs), Av.4 °F (36.3 °C), Min:97.2 °F (36.2 °C), Max:97.6 °F (36.4 °C)      Intake/Output Summary (Last 24 hours) at 17 1540  Last data filed at 17 2157   Gross per 24 hour   Intake              640 ml   Output                0 ml   Net              640 ml       General:  Awake, alert  Cardiovascular:  S1S2+, RRR  Pulmonary:  Coarse BS b/l  GI:  Soft, BS+, NT, ND  Extremities:  No edema        Labs:    Recent Results (from the past 24 hour(s))   CBC WITH AUTOMATED DIFF    Collection Time: 17  2:43 AM   Result Value Ref Range    WBC 10.8 4.6 - 13.2 K/uL    RBC 3.27 (L) 4.70 - 5.50 M/uL    HGB 9.0 (L) 13.0 - 16.0 g/dL    HCT 26.5 (L) 36.0 - 48.0 %    MCV 81.0 74.0 - 97.0 FL    MCH 27.5 24.0 - 34.0 PG    MCHC 34.0 31.0 - 37.0 g/dL    RDW 22.6 (H) 11.6 - 14.5 %    PLATELET 826 653 - 275 K/uL    MPV 10.1 9.2 - 11.8 FL    NEUTROPHILS 32 (L) 42 - 75 %    LYMPHOCYTES 46 20 - 51 %    MONOCYTES 7 2 - 9 %    EOSINOPHILS 14 (H) 0 - 5 %    BASOPHILS 1 0 - 3 %    NRBC 3.0 (H) 0  WBC    ABS.  NEUTROPHILS 3.5 1.8 - 8.0 K/UL ABS. LYMPHOCYTES 4.9 (H) 0.8 - 3.5 K/UL    ABS. MONOCYTES 0.8 0 - 1.0 K/UL    ABS. EOSINOPHILS 1.5 (H) 0.0 - 0.4 K/UL    ABS.  BASOPHILS 0.1 (H) 0.0 - 0.06 K/UL    DF MANUAL      PLATELET COMMENTS INCREASED PLATELETS      RBC COMMENTS SICKLE CELLS  2+        RBC COMMENTS ANISOCYTOSIS  2+        RBC COMMENTS HYPOCHROMIA  1+        RBC COMMENTS POIKILOCYTOSIS  2+        RBC COMMENTS TARGET CELLS  FEW       METABOLIC PANEL, BASIC    Collection Time: 01/24/17  2:43 AM   Result Value Ref Range    Sodium 136 136 - 145 mmol/L    Potassium 3.7 3.5 - 5.5 mmol/L    Chloride 102 100 - 108 mmol/L    CO2 25 21 - 32 mmol/L    Anion gap 9 3.0 - 18 mmol/L    Glucose 108 (H) 74 - 99 mg/dL    BUN 8 7.0 - 18 MG/DL    Creatinine 0.82 0.6 - 1.3 MG/DL    BUN/Creatinine ratio 10 (L) 12 - 20      GFR est AA >60 >60 ml/min/1.73m2    GFR est non-AA >60 >60 ml/min/1.73m2    Calcium 8.5 8.5 - 10.1 MG/DL   MAGNESIUM    Collection Time: 01/24/17  2:43 AM   Result Value Ref Range    Magnesium 2.0 1.8 - 2.4 mg/dL       Signed By: Rochelle Galvan MD     January 24, 2017

## 2017-01-25 VITALS
BODY MASS INDEX: 23.81 KG/M2 | WEIGHT: 175.8 LBS | SYSTOLIC BLOOD PRESSURE: 128 MMHG | RESPIRATION RATE: 18 BRPM | HEIGHT: 72 IN | DIASTOLIC BLOOD PRESSURE: 78 MMHG | TEMPERATURE: 98 F | HEART RATE: 69 BPM | OXYGEN SATURATION: 94 %

## 2017-01-25 PROCEDURE — 74011250636 HC RX REV CODE- 250/636: Performed by: INTERNAL MEDICINE

## 2017-01-25 PROCEDURE — 74011250637 HC RX REV CODE- 250/637: Performed by: INTERNAL MEDICINE

## 2017-01-25 RX ORDER — HYDROMORPHONE HYDROCHLORIDE 4 MG/1
4 TABLET ORAL
Status: DISCONTINUED | OUTPATIENT
Start: 2017-01-25 | End: 2017-01-25 | Stop reason: HOSPADM

## 2017-01-25 RX ADMIN — HYDROMORPHONE HYDROCHLORIDE 4 MG: 4 TABLET ORAL at 09:05

## 2017-01-25 RX ADMIN — HYDROXYUREA 500 MG: 500 CAPSULE ORAL at 09:05

## 2017-01-25 RX ADMIN — FOLIC ACID 1 MG: 1 TABLET ORAL at 09:05

## 2017-01-25 RX ADMIN — HYDROMORPHONE HYDROCHLORIDE 4 MG: 4 TABLET ORAL at 14:44

## 2017-01-25 RX ADMIN — HYDROMORPHONE HYDROCHLORIDE 2 MG: 2 INJECTION, SOLUTION INTRAMUSCULAR; INTRAVENOUS; SUBCUTANEOUS at 04:45

## 2017-01-25 NOTE — PROGRESS NOTES
Sickle Cell Crisis: Care Instructions  Your Care Instructions    Sickle cell crisis is a painful episode that may begin suddenly in a person with sickle cell disease. Sickle cell disease turns normal, round red blood cells into cells that look like jeane or crescent moons. The sickle-shaped cells can get stuck in blood vessels, blocking blood flow and causing severe pain. The pain can occur in the bones of the spine, the arms and legs, the chest, and the abdomen. An episode may be called a \"painful event\" or \"painful crisis. \" Some people who have sickle cell disease have many painful events, while others have few or none. Treatment depends on the level of pain and how long it lasts. Sometimes taking nonprescription pain relievers can help. Or you may need stronger pain relief medicine that is prescribed or given by a doctor. You may need to be treated in the hospital.  It isn't always possible to know what sets off a painful event. But triggers include being dehydrated, cold temperatures, infection, stress, and not getting enough oxygen. Follow-up care is a key part of your treatment and safety. Be sure to make and go to all appointments, and call your doctor if you are having problems. It's also a good idea to know your test results and keep a list of the medicines you take. How can you care for yourself at home? · Create a pain management plan with your doctor. This plan should include the types of medicines you can take and other actions you can take at home to relieve pain. · Drink plenty of fluids, enough so that your urine is light yellow or clear like water. If you have kidney, heart, or liver disease and have to limit fluids, talk with your doctor before you increase the amount of fluids you drink. · Take your medicines exactly as prescribed. Call your doctor if you think you are having a problem with your medicine. · Take pain medicines exactly as directed.   ¨ If the doctor gave you a prescription medicine for pain, take it as prescribed. ¨ If you are not taking a prescription pain medicine, ask your doctor if you can take an over-the-counter medicine. · Avoid alcohol. It can make you dehydrated. · Dress warmly in cold weather. The cold and windy weather can lead to severe pain. · Do not smoke. Smoking can reduce the amount of oxygen in your blood. · Get plenty of sleep. When should you call for help? Call 911 anytime you think you may need emergency care. For example, call if:  · You passed out (lost consciousness). Call your doctor now or seek immediate medical care if:  · You are in severe pain. · You are dizzy or lightheaded, or you feel like you may faint. · You have a fever. · You are short of breath. · Your symptoms are getting worse. Watch closely for changes in your health, and be sure to contact your doctor if you are not getting better as expected. Where can you learn more? Go to http://manuel-amadeo.info/. Enter F104 in the search box to learn more about \"Sickle Cell Crisis: Care Instructions. \"  Current as of: February 5, 2016  Content Version: 11.1  © 2814-4206 [employer]. Care instructions adapted under license by [employer] (which disclaims liability or warranty for this information). If you have questions about a medical condition or this instruction, always ask your healthcare professional. [place] [address] any warranty or liability for your use of this information. Patient armband removed and shredded  [employer] Activation    Thank you for requesting access to HitMeUp. Please follow the instructions below to securely access and download your online medical record. HitMeUp allows you to send messages to your doctor, view your test results, renew your prescriptions, schedule appointments, and more. How Do I Sign Up? 1. In your internet browser, go to [email or web address]  2.  Click on the First Time User? Click Here link in the Sign In box. You will be redirect to the New Member Sign Up page. 3. Enter your Adometry By Google Access Code exactly as it appears below. You will not need to use this code after youve completed the sign-up process. If you do not sign up before the expiration date, you must request a new code. Adometry By Google Access Code: [email or web address]  Expires: 2017  1:09 AM (This is the date your Adometry By Google access code will )    4. Enter the last four digits of your Social Security Number (xxxx) and Date of Birth (mm/dd/yyyy) as indicated and click Submit. You will be taken to the next sign-up page. 5. Create a Adometry By Google ID. This will be your Adometry By Google login ID and cannot be changed, so think of one that is secure and easy to remember. 6. Create a Adometry By Google password. You can change your password at any time. 7. Enter your Password Reset Question and Answer. This can be used at a later time if you forget your password. 8. Enter your e-mail address. You will receive e-mail notification when new information is available in [address] 9. Click Sign Up. You can now view and download portions of your medical record. 10. Click the Download Summary menu link to download a portable copy of your medical information. Additional Information    If you have questions, please visit the Frequently Asked Questions section of the Adometry By Google website at [email or web address] Remember, Adometry By Google is NOT to be used for urgent needs. For medical emergencies, dial 911. DISCHARGE SUMMARY from Nurse    The following personal items are in your possession at time of discharge:    Dental Appliances: None  Visual Aid: None     Home Medications: None  Jewelry: Earrings  Clothing:  At bedside  Other Valuables: Cell Phone, Danielbo  (cell phone  & headphones)             PATIENT INSTRUCTIONS:    After general anesthesia or intravenous sedation, for 24 hours or while taking prescription Narcotics:  · Limit your activities  · Do not drive and operate hazardous machinery  · Do not make important personal or business decisions  · Do  not drink alcoholic beverages  · If you have not urinated within 8 hours after discharge, please contact your surgeon on call. Report the following to your surgeon:  · Excessive pain, swelling, redness or odor of or around the surgical area  · Temperature over 100.5  · Nausea and vomiting lasting longer than 4 hours or if unable to take medications  · Any signs of decreased circulation or nerve impairment to extremity: change in color, persistent  numbness, tingling, coldness or increase pain  · Any questions        What to do at Home:  Recommended activity: Activity as tolerated,     If you experience any of the following symptoms fever greater than 100, chest pain, nausea, vomiting or diarrhea, please follow up with PCP or ER. *  Please give a list of your current medications to your Primary Care Provider. *  Please update this list whenever your medications are discontinued, doses are      changed, or new medications (including over-the-counter products) are added. *  Please carry medication information at all times in case of emergency situations. These are general instructions for a healthy lifestyle:    No smoking/ No tobacco products/ Avoid exposure to second hand smoke    Surgeon General's Warning:  Quitting smoking now greatly reduces serious risk to your health. Obesity, smoking, and sedentary lifestyle greatly increases your risk for illness    A healthy diet, regular physical exercise & weight monitoring are important for maintaining a healthy lifestyle    You may be retaining fluid if you have a history of heart failure or if you experience any of the following symptoms:  Weight gain of 3 pounds or more overnight or 5 pounds in a week, increased swelling in our hands or feet or shortness of breath while lying flat in bed.   Please call your doctor as soon as you notice any of these symptoms; do not wait until your next office visit. Recognize signs and symptoms of STROKE:    F-face looks uneven    A-arms unable to move or move unevenly    S-speech slurred or non-existent    T-time-call 911 as soon as signs and symptoms begin-DO NOT go       Back to bed or wait to see if you get better-TIME IS BRAIN. Warning Signs of HEART ATTACK     Call 911 if you have these symptoms:   Chest discomfort. Most heart attacks involve discomfort in the center of the chest that lasts more than a few minutes, or that goes away and comes back. It can feel like uncomfortable pressure, squeezing, fullness, or pain.  Discomfort in other areas of the upper body. Symptoms can include pain or discomfort in one or both arms, the back, neck, jaw, or stomach.  Shortness of breath with or without chest discomfort.  Other signs may include breaking out in a cold sweat, nausea, or lightheadedness. Don't wait more than five minutes to call [address]! Fast action can save your life. Calling 911 is almost always the fastest way to get lifesaving treatment. Emergency Medical Services staff can begin treatment when they arrive  up to an hour sooner than if someone gets to the hospital by car. The discharge information has been reviewed with the patient. The patient verbalized understanding. Discharge medications reviewed with the patient and appropriate educational materials and side effects teaching were provided.

## 2017-01-25 NOTE — PROGRESS NOTES
Pt has lost IV access, multiple attempts unsuccessful. This am rates his pain as 6/10 scale, but better than before. Has not needed as frequent pain meds. No fever/nausea  vss lung clear alert oriented  abd soft nt  Labs noted  impr sickle cell pain crisis, improving  Plan dilaudid 4mg po now. If by mid day pt feels well, pt would like to go home, may be d/annika. He has script has for fentanyl and dilaudid at home. He has f/u appt with me as well.

## 2017-01-25 NOTE — DISCHARGE INSTRUCTIONS
Sickle Cell Crisis: Care Instructions  Your Care Instructions    Sickle cell crisis is a painful episode that may begin suddenly in a person with sickle cell disease. Sickle cell disease turns normal, round red blood cells into cells that look like jeane or crescent moons. The sickle-shaped cells can get stuck in blood vessels, blocking blood flow and causing severe pain. The pain can occur in the bones of the spine, the arms and legs, the chest, and the abdomen. An episode may be called a \"painful event\" or \"painful crisis. \" Some people who have sickle cell disease have many painful events, while others have few or none. Treatment depends on the level of pain and how long it lasts. Sometimes taking nonprescription pain relievers can help. Or you may need stronger pain relief medicine that is prescribed or given by a doctor. You may need to be treated in the hospital.  It isn't always possible to know what sets off a painful event. But triggers include being dehydrated, cold temperatures, infection, stress, and not getting enough oxygen. Follow-up care is a key part of your treatment and safety. Be sure to make and go to all appointments, and call your doctor if you are having problems. It's also a good idea to know your test results and keep a list of the medicines you take. How can you care for yourself at home? · Create a pain management plan with your doctor. This plan should include the types of medicines you can take and other actions you can take at home to relieve pain. · Drink plenty of fluids, enough so that your urine is light yellow or clear like water. If you have kidney, heart, or liver disease and have to limit fluids, talk with your doctor before you increase the amount of fluids you drink. · Take your medicines exactly as prescribed. Call your doctor if you think you are having a problem with your medicine. · Take pain medicines exactly as directed.   ¨ If the doctor gave you a prescription medicine for pain, take it as prescribed. ¨ If you are not taking a prescription pain medicine, ask your doctor if you can take an over-the-counter medicine. · Avoid alcohol. It can make you dehydrated. · Dress warmly in cold weather. The cold and windy weather can lead to severe pain. · Do not smoke. Smoking can reduce the amount of oxygen in your blood. · Get plenty of sleep. When should you call for help? Call 911 anytime you think you may need emergency care. For example, call if:  · You passed out (lost consciousness). Call your doctor now or seek immediate medical care if:  · You are in severe pain. · You are dizzy or lightheaded, or you feel like you may faint. · You have a fever. · You are short of breath. · Your symptoms are getting worse. Watch closely for changes in your health, and be sure to contact your doctor if you are not getting better as expected. Where can you learn more? Go to http://manuel-amadeo.info/. Enter F104 in the search box to learn more about \"Sickle Cell Crisis: Care Instructions. \"  Current as of: February 5, 2016  Content Version: 11.1  © 9495-3315 [employer]. Care instructions adapted under license by [employer] (which disclaims liability or warranty for this information). If you have questions about a medical condition or this instruction, always ask your healthcare professional. [place] [address] any warranty or liability for your use of this information. Patient armband removed and shredded  [employer]ing[employer] Activation    Thank you for requesting access to [employer]. Please follow the instructions below to securely access and download your online medical record. B[employer] In A Box [employer]V allows you to send messages to your doctor, view your test results, renew your prescriptions, schedule appointments, and more. How Do I Sign Up? 1. In your internet browser, go to [email or web address]  2.  Click on the First Time User? Click Here link in the Sign In box. You will be redirect to the New Member Sign Up page. 3. Enter your Magic Leap Access Code exactly as it appears below. You will not need to use this code after youve completed the sign-up process. If you do not sign up before the expiration date, you must request a new code. Magic Leap Access Code: [email or web address]  Expires: 2017  1:09 AM (This is the date your Magic Leap access code will )    4. Enter the last four digits of your Social Security Number (xxxx) and Date of Birth (mm/dd/yyyy) as indicated and click Submit. You will be taken to the next sign-up page. 5. Create a Magic Leap ID. This will be your Magic Leap login ID and cannot be changed, so think of one that is secure and easy to remember. 6. Create a Magic Leap password. You can change your password at any time. 7. Enter your Password Reset Question and Answer. This can be used at a later time if you forget your password. 8. Enter your e-mail address. You will receive e-mail notification when new information is available in [address] 9. Click Sign Up. You can now view and download portions of your medical record. 10. Click the Download Summary menu link to download a portable copy of your medical information. Additional Information    If you have questions, please visit the Frequently Asked Questions section of the Magic Leap website at [email or web address] Remember, Magic Leap is NOT to be used for urgent needs. For medical emergencies, dial 911. DISCHARGE SUMMARY from Nurse    The following personal items are in your possession at time of discharge:    Dental Appliances: None  Visual Aid: None     Home Medications: None  Jewelry: Earrings  Clothing:  At bedside  Other Valuables: Cell Phone, Anibal Francisco (cell phone  & headphones)             PATIENT INSTRUCTIONS:    After general anesthesia or intravenous sedation, for 24 hours or while taking prescription Narcotics:  · Limit your activities  · Do not drive and operate hazardous machinery  · Do not make important personal or business decisions  · Do  not drink alcoholic beverages  · If you have not urinated within 8 hours after discharge, please contact your surgeon on call. Report the following to your surgeon:  · Excessive pain, swelling, redness or odor of or around the surgical area  · Temperature over 100.5  · Nausea and vomiting lasting longer than 4 hours or if unable to take medications  · Any signs of decreased circulation or nerve impairment to extremity: change in color, persistent  numbness, tingling, coldness or increase pain  · Any questions        What to do at Home:  Recommended activity: Activity as tolerated,     If you experience any of the following symptoms fever greater than 100, chest pain, nausea, vomiting or diarrhea, please follow up with PCP or ER. *  Please give a list of your current medications to your Primary Care Provider. *  Please update this list whenever your medications are discontinued, doses are      changed, or new medications (including over-the-counter products) are added. *  Please carry medication information at all times in case of emergency situations. These are general instructions for a healthy lifestyle:    No smoking/ No tobacco products/ Avoid exposure to second hand smoke    Surgeon General's Warning:  Quitting smoking now greatly reduces serious risk to your health. Obesity, smoking, and sedentary lifestyle greatly increases your risk for illness    A healthy diet, regular physical exercise & weight monitoring are important for maintaining a healthy lifestyle    You may be retaining fluid if you have a history of heart failure or if you experience any of the following symptoms:  Weight gain of 3 pounds or more overnight or 5 pounds in a week, increased swelling in our hands or feet or shortness of breath while lying flat in bed.   Please call your doctor as soon as you notice any of these symptoms; do not wait until your next office visit. Recognize signs and symptoms of STROKE:    F-face looks uneven    A-arms unable to move or move unevenly    S-speech slurred or non-existent    T-time-call 911 as soon as signs and symptoms begin-DO NOT go       Back to bed or wait to see if you get better-TIME IS BRAIN. Warning Signs of HEART ATTACK     Call 911 if you have these symptoms:   Chest discomfort. Most heart attacks involve discomfort in the center of the chest that lasts more than a few minutes, or that goes away and comes back. It can feel like uncomfortable pressure, squeezing, fullness, or pain.  Discomfort in other areas of the upper body. Symptoms can include pain or discomfort in one or both arms, the back, neck, jaw, or stomach.  Shortness of breath with or without chest discomfort.  Other signs may include breaking out in a cold sweat, nausea, or lightheadedness. Don't wait more than five minutes to call 911 - MINUTES MATTER! Fast action can save your life. Calling 911 is almost always the fastest way to get lifesaving treatment. Emergency Medical Services staff can begin treatment when they arrive -- up to an hour sooner than if someone gets to the hospital by car. The discharge information has been reviewed with the patient. The patient verbalized understanding. Discharge medications reviewed with the patient and appropriate educational materials and side effects teaching were provided.

## 2017-01-25 NOTE — PROGRESS NOTES
[facility] Hospitalist Group  Progress Note    Patient: Candis Franklin Age: 43 y.o. : 1980 MR#: [id number] SSN: [id number]  Date/Time: 2017     Subjective:     Pains controlled    Assessment/Plan:     1- Sickle Cell Crisis  2- ANemia  3- Possible PNA     PLAN  Pain control, iv fluids, O2  Zithromax, ceftriaxone  DVT prophylaxis  Hopefully can start deescalating pain regimen soon  D/w pateint  D/w DR JUNO Chase    Case discussed with:  [x]Patient  []Family  []Nursing  [x]Case Management  DVT Prophylaxis:  []Lovenox  []Hep SQ  []SCDs  []Coumadin   []On Heparin gtt    Objective:   VS:   Visit Vitals    /78 (BP 1 Location: Right arm, BP Patient Position: Head of bed elevated (Comment degrees))    Pulse 69    Temp 98 °F (36.7 °C)    Resp 18    Ht 6' (1.829 m)    Wt 79.7 kg (175 lb 12.8 oz)    SpO2 94%    BMI 23.84 kg/m2      Tmax/24hrs: Temp (24hrs), Av.6 °F (36.4 °C), Min:97 °F (36.1 °C), Max:98.2 °F (36.8 °C)    No intake or output data in the 24 hours ending 17 1351    General:  Awake, alert  Cardiovascular:  S1S2+, RRR  Pulmonary:  Coarse BS b/l  GI:  Soft, BS+, NT, ND  Extremities:  No edema        Labs:    No results found for this or any previous visit (from the past 24 hour(s)).     Signed By: Allyn Peña MD     2017

## 2017-02-01 NOTE — DISCHARGE SUMMARY
[address]  [address] [place] SUMMARY    Name:  Marjan Hoang  MR#:  [id number]  :  1980  Account #:  [de-identified]  Date of Adm:  2017  Date of Discharge:  2017      DISCHARGE DIAGNOSES  1. Sickle cell pain crisis. 2. Possible pneumonia. 3. Anemia secondary to #1. CONSULTANTS: Dr. Joanna Aase from Hematology/Oncology. PROCEDURES: Chest x-ray, 2017, showing new bilateral lower  lobe pulmonary opacities which may represent pneumonia, atelectasis,  or acute chest syndrome, hypoinflation, stable mild cardiomegaly. HISTORY OF PRESENT ILLNESS AND HOSPITAL COURSE: The  patient is a 24-year-old male. He has a past medical history for sickle  cell anemia. He presents with a sickle cell pain crisis. He is admitted to  a medical bed. He was seen by Hematology/Oncology. He was  continued on his pain medication. He gets a transfusion during his  hospitalization and continued on antibiotics for possible pneumonia. He  was cleared by Hematology/Oncology. For medications that he does  not have prescriptions were provided and he is discharged home. DISCHARGE MEDICATIONS: Include:  1. Dilaudid as needed. 2. Hydroxyurea 500 twice daily. 3. Duragesic patch. 4. Folic acid 1 mg daily. DISPOSITION: Discharged home. DISCHARGE INSTRUCTIONS: He should be on a cardiac diet. Follow  up with his PCP and Dr. Luda Miranda.         Dulce Carmona MD [facility] / Joanna Reveles  D:  2017   15:11  T:  2017   08:03  Job #:  [id number]

## 2017-02-15 ENCOUNTER — HOSPITAL ENCOUNTER (EMERGENCY)
Age: 37
Discharge: HOME | End: 2017-02-15
Attending: EMERGENCY MEDICINE
Payer: SELF-PAY

## 2017-02-15 VITALS
RESPIRATION RATE: 14 BRPM | WEIGHT: 175 LBS | TEMPERATURE: 98.6 F | HEART RATE: 70 BPM | OXYGEN SATURATION: 95 % | HEIGHT: 72 IN | DIASTOLIC BLOOD PRESSURE: 68 MMHG | SYSTOLIC BLOOD PRESSURE: 119 MMHG | BODY MASS INDEX: 23.7 KG/M2

## 2017-02-15 DIAGNOSIS — D57.00 SICKLE CELL ANEMIA WITH PAIN (HCC): Primary | ICD-10-CM

## 2017-02-15 LAB
ALBUMIN SERPL BCP-MCNC: 3.8 G/DL (ref 3.4–5)
ALBUMIN/GLOB SERPL: 0.8 {RATIO} (ref 0.8–1.7)
ALP SERPL-CCNC: 97 U/L (ref 45–117)
ALT SERPL-CCNC: 34 U/L (ref 16–61)
ANION GAP BLD CALC-SCNC: 8 MMOL/L (ref 3–18)
AST SERPL W P-5'-P-CCNC: 72 U/L (ref 15–37)
BASOPHILS # BLD AUTO: 0.3 K/UL (ref 0–0.06)
BASOPHILS # BLD: 2 % (ref 0–3)
BILIRUB SERPL-MCNC: 3.1 MG/DL (ref 0.2–1)
BUN SERPL-MCNC: 4 MG/DL (ref 7–18)
BUN/CREAT SERPL: 5 (ref 12–20)
CALCIUM SERPL-MCNC: 8.5 MG/DL (ref 8.5–10.1)
CHLORIDE SERPL-SCNC: 107 MMOL/L (ref 100–108)
CO2 SERPL-SCNC: 24 MMOL/L (ref 21–32)
CREAT SERPL-MCNC: 0.75 MG/DL (ref 0.6–1.3)
DIFFERENTIAL METHOD BLD: ABNORMAL
EOSINOPHIL # BLD: 1.8 K/UL (ref 0–0.4)
EOSINOPHIL NFR BLD: 14 % (ref 0–5)
ERYTHROCYTE [DISTWIDTH] IN BLOOD BY AUTOMATED COUNT: 22.2 % (ref 11.6–14.5)
GLOBULIN SER CALC-MCNC: 4.5 G/DL (ref 2–4)
GLUCOSE SERPL-MCNC: 89 MG/DL (ref 74–99)
HCT VFR BLD AUTO: 26.8 % (ref 36–48)
HGB BLD-MCNC: 9.6 G/DL (ref 13–16)
LYMPHOCYTES # BLD AUTO: 39 % (ref 20–51)
LYMPHOCYTES # BLD: 4.7 K/UL (ref 0.8–3.5)
MCH RBC QN AUTO: 28.2 PG (ref 24–34)
MCHC RBC AUTO-ENTMCNC: 35.8 G/DL (ref 31–37)
MCV RBC AUTO: 78.6 FL (ref 74–97)
MONOCYTES # BLD: 0.9 K/UL (ref 0–1)
MONOCYTES NFR BLD AUTO: 7 % (ref 2–9)
NEUTS SEG # BLD: 4.8 K/UL (ref 1.8–8)
NEUTS SEG NFR BLD AUTO: 38 % (ref 42–75)
NRBC BLD-RTO: 5 PER 100 WBC
PLATELET # BLD AUTO: 376 K/UL (ref 135–420)
PLATELET COMMENTS,PCOM: ABNORMAL
PMV BLD AUTO: 9.6 FL (ref 9.2–11.8)
POTASSIUM SERPL-SCNC: 3.8 MMOL/L (ref 3.5–5.5)
PROT SERPL-MCNC: 8.3 G/DL (ref 6.4–8.2)
RBC # BLD AUTO: 3.41 M/UL (ref 4.7–5.5)
RBC MORPH BLD: ABNORMAL
RETICS/RBC NFR AUTO: 12.6 % (ref 0.5–2.3)
SODIUM SERPL-SCNC: 139 MMOL/L (ref 136–145)
WBC # BLD AUTO: 12.5 K/UL (ref 4.6–13.2)

## 2017-02-15 PROCEDURE — 80053 COMPREHEN METABOLIC PANEL: CPT | Performed by: EMERGENCY MEDICINE

## 2017-02-15 PROCEDURE — 96361 HYDRATE IV INFUSION ADD-ON: CPT

## 2017-02-15 PROCEDURE — 96376 TX/PRO/DX INJ SAME DRUG ADON: CPT

## 2017-02-15 PROCEDURE — 85025 COMPLETE CBC W/AUTO DIFF WBC: CPT | Performed by: EMERGENCY MEDICINE

## 2017-02-15 PROCEDURE — 85045 AUTOMATED RETICULOCYTE COUNT: CPT | Performed by: EMERGENCY MEDICINE

## 2017-02-15 PROCEDURE — 96374 THER/PROPH/DIAG INJ IV PUSH: CPT

## 2017-02-15 PROCEDURE — 74011250636 HC RX REV CODE- 250/636: Performed by: PHYSICIAN ASSISTANT

## 2017-02-15 PROCEDURE — 96375 TX/PRO/DX INJ NEW DRUG ADDON: CPT

## 2017-02-15 PROCEDURE — 99282 EMERGENCY DEPT VISIT SF MDM: CPT

## 2017-02-15 RX ORDER — DIPHENHYDRAMINE HYDROCHLORIDE 50 MG/ML
12.5 INJECTION, SOLUTION INTRAMUSCULAR; INTRAVENOUS
Status: COMPLETED | OUTPATIENT
Start: 2017-02-15 | End: 2017-02-15

## 2017-02-15 RX ORDER — KETOROLAC TROMETHAMINE 30 MG/ML
30 INJECTION, SOLUTION INTRAMUSCULAR; INTRAVENOUS
Status: COMPLETED | OUTPATIENT
Start: 2017-02-15 | End: 2017-02-15

## 2017-02-15 RX ORDER — SODIUM CHLORIDE 9 MG/ML
1000 INJECTION, SOLUTION INTRAVENOUS ONCE
Status: COMPLETED | OUTPATIENT
Start: 2017-02-15 | End: 2017-02-15

## 2017-02-15 RX ORDER — HYDROMORPHONE HYDROCHLORIDE 1 MG/ML
1 INJECTION, SOLUTION INTRAMUSCULAR; INTRAVENOUS; SUBCUTANEOUS
Status: COMPLETED | OUTPATIENT
Start: 2017-02-15 | End: 2017-02-15

## 2017-02-15 RX ORDER — IBUPROFEN 800 MG/1
800 TABLET ORAL
COMMUNITY
End: 2017-07-01 | Stop reason: CLARIF

## 2017-02-15 RX ORDER — DIPHENHYDRAMINE HYDROCHLORIDE 50 MG/ML
25 INJECTION, SOLUTION INTRAMUSCULAR; INTRAVENOUS
Status: COMPLETED | OUTPATIENT
Start: 2017-02-15 | End: 2017-02-15

## 2017-02-15 RX ORDER — HYDROMORPHONE HYDROCHLORIDE 2 MG/1
2 TABLET ORAL
Qty: 15 TAB | Refills: 0 | Status: SHIPPED | OUTPATIENT
Start: 2017-02-15 | End: 2017-03-19

## 2017-02-15 RX ORDER — ONDANSETRON 2 MG/ML
4 INJECTION INTRAMUSCULAR; INTRAVENOUS
Status: COMPLETED | OUTPATIENT
Start: 2017-02-15 | End: 2017-02-15

## 2017-02-15 RX ORDER — HYDROMORPHONE HYDROCHLORIDE 1 MG/ML
1 INJECTION, SOLUTION INTRAMUSCULAR; INTRAVENOUS; SUBCUTANEOUS ONCE
Status: COMPLETED | OUTPATIENT
Start: 2017-02-15 | End: 2017-02-15

## 2017-02-15 RX ADMIN — ONDANSETRON 4 MG: 2 INJECTION INTRAMUSCULAR; INTRAVENOUS at 18:44

## 2017-02-15 RX ADMIN — DIPHENHYDRAMINE HYDROCHLORIDE 25 MG: 50 INJECTION INTRAMUSCULAR; INTRAVENOUS at 18:44

## 2017-02-15 RX ADMIN — SODIUM CHLORIDE 1000 ML: 900 INJECTION, SOLUTION INTRAVENOUS at 18:44

## 2017-02-15 RX ADMIN — HYDROMORPHONE HYDROCHLORIDE 1 MG: 1 INJECTION, SOLUTION INTRAMUSCULAR; INTRAVENOUS; SUBCUTANEOUS at 18:44

## 2017-02-15 RX ADMIN — DIPHENHYDRAMINE HYDROCHLORIDE 12.5 MG: 50 INJECTION INTRAMUSCULAR; INTRAVENOUS at 20:28

## 2017-02-15 RX ADMIN — HYDROMORPHONE HYDROCHLORIDE 1 MG: 1 INJECTION, SOLUTION INTRAMUSCULAR; INTRAVENOUS; SUBCUTANEOUS at 20:00

## 2017-02-15 RX ADMIN — KETOROLAC TROMETHAMINE 30 MG: 30 INJECTION, SOLUTION INTRAMUSCULAR at 21:44

## 2017-02-15 RX ADMIN — HYDROMORPHONE HYDROCHLORIDE 1 MG: 1 INJECTION, SOLUTION INTRAMUSCULAR; INTRAVENOUS; SUBCUTANEOUS at 20:23

## 2017-02-15 RX ADMIN — SODIUM CHLORIDE 1000 ML: 900 INJECTION, SOLUTION INTRAVENOUS at 21:44

## 2017-02-15 NOTE — ED PROVIDER NOTES
HPI Comments: 39yoM with hx of sickle cell anemia to ED c/o low back and bilat leg pain x this morning. He is on Duragesic patch and Ibuprofen 800mg and is compliant. Pt denies N/V/D, abd pain, chest pain, cough, fever, chills, or any other complaints. Reports he is out of Dilaudid. Patient is a 43 y.o. male presenting with sickle cell disease. The history is provided by the patient. Sickle Cell Crisis          Past Medical History:   Diagnosis Date    Sickle cell anemia with crisis ([facility] [address]     Vitamin D deficiency        History reviewed. No pertinent past surgical history. Family History:   Problem Relation Age of Onset    Cancer Neg Hx     Diabetes Neg Hx     Heart Disease Neg Hx     Heart Attack Neg Hx     Hypertension Neg Hx     Stroke Neg Hx        Social History     Social History    Marital status:      Spouse name: N/A    Number of children: N/A    Years of education: N/A     Occupational History    Not on file. Social History Main Topics    Smoking status: Never Smoker    Smokeless tobacco: Never Used    Alcohol use No    Drug use: No    Sexual activity: Yes     Partners: Female     Birth control/ protection: None     Other Topics Concern    Not on file     Social History Narrative         ALLERGIES: Eggshell membrane and Milk    Review of Systems    Vitals:    02/15/17 1539   BP: 119/68   Pulse: 70   Resp: 14   Temp: 98.6 °F (37 °C)   SpO2: 95%   Weight: 79.4 kg (175 lb)   Height: 6' (1.829 m)            Physical Exam   Constitutional: He is oriented to person, place, and time. He appears well-developed and well-nourished. No distress. HENT:   Head: Normocephalic and atraumatic. Right Ear: External ear normal.   Left Ear: External ear normal.   Nose: Nose normal.   Mouth/Throat: Oropharynx is clear and moist.   Eyes: Conjunctivae and EOM are normal. Pupils are equal, round, and reactive to light. Neck: Normal range of motion. Neck supple.    Cardiovascular: Normal rate and regular rhythm. Pulmonary/Chest: Effort normal and breath sounds normal. He has no wheezes. Abdominal: Soft. There is no tenderness. Musculoskeletal: Normal range of motion. He exhibits no edema. Lymphadenopathy:     He has no cervical adenopathy. Neurological: He is alert and oriented to person, place, and time. Skin: Skin is warm and dry. He is not diaphoretic. Psychiatric: He has a normal mood and affect. MDM  Number of Diagnoses or Management Options  Sickle cell anemia with pain [facility]):   Diagnosis management comments: Pt feeling better. Has been out of dilaudid for break through pain x 1-2 weeks. States has appt with HemeOnc next week. Feels like he can be discharged with dilaudid for breakthrough pain.  PHANI Dickinson 11:24 PM         Amount and/or Complexity of Data Reviewed  Clinical lab tests: ordered and reviewed    Risk of Complications, Morbidity, and/or Mortality  Presenting problems: moderate  Diagnostic procedures: moderate  Management options: low    Patient Progress  Patient progress: improved    ED Course       Procedures

## 2017-02-18 ENCOUNTER — HOSPITAL ENCOUNTER (EMERGENCY)
Age: 37
Discharge: HOME | End: 2017-02-18
Attending: EMERGENCY MEDICINE
Payer: SELF-PAY

## 2017-02-18 VITALS
OXYGEN SATURATION: 97 % | RESPIRATION RATE: 16 BRPM | SYSTOLIC BLOOD PRESSURE: 105 MMHG | TEMPERATURE: 97.1 F | BODY MASS INDEX: 23.6 KG/M2 | DIASTOLIC BLOOD PRESSURE: 67 MMHG | HEART RATE: 58 BPM | WEIGHT: 174 LBS

## 2017-02-18 DIAGNOSIS — D57.00 SICKLE CELL PAIN CRISIS (HCC): Primary | ICD-10-CM

## 2017-02-18 LAB
ANION GAP BLD CALC-SCNC: 6 MMOL/L (ref 3–18)
BASOPHILS # BLD AUTO: 0 K/UL (ref 0–0.06)
BASOPHILS # BLD: 0 % (ref 0–3)
BUN SERPL-MCNC: 3 MG/DL (ref 7–18)
BUN/CREAT SERPL: 4 (ref 12–20)
CALCIUM SERPL-MCNC: 8.6 MG/DL (ref 8.5–10.1)
CHLORIDE SERPL-SCNC: 105 MMOL/L (ref 100–108)
CO2 SERPL-SCNC: 27 MMOL/L (ref 21–32)
CREAT SERPL-MCNC: 0.77 MG/DL (ref 0.6–1.3)
DIFFERENTIAL METHOD BLD: ABNORMAL
EOSINOPHIL # BLD: 1.3 K/UL (ref 0–0.4)
EOSINOPHIL NFR BLD: 14 % (ref 0–5)
ERYTHROCYTE [DISTWIDTH] IN BLOOD BY AUTOMATED COUNT: 22.5 % (ref 11.6–14.5)
GLUCOSE SERPL-MCNC: 85 MG/DL (ref 74–99)
HCT VFR BLD AUTO: 28.1 % (ref 36–48)
HGB BLD-MCNC: 10 G/DL (ref 13–16)
LYMPHOCYTES # BLD AUTO: 42 % (ref 20–51)
LYMPHOCYTES # BLD: 3.9 K/UL (ref 0.8–3.5)
MCH RBC QN AUTO: 27.9 PG (ref 24–34)
MCHC RBC AUTO-ENTMCNC: 35.6 G/DL (ref 31–37)
MCV RBC AUTO: 78.3 FL (ref 74–97)
MONOCYTES # BLD: 1 K/UL (ref 0–1)
MONOCYTES NFR BLD AUTO: 11 % (ref 2–9)
NEUTS SEG # BLD: 3 K/UL (ref 1.8–8)
NEUTS SEG NFR BLD AUTO: 33 % (ref 42–75)
NRBC BLD-RTO: 4 PER 100 WBC
PLATELET # BLD AUTO: 374 K/UL (ref 135–420)
PLATELET COMMENTS,PCOM: ABNORMAL
PMV BLD AUTO: 9.6 FL (ref 9.2–11.8)
POTASSIUM SERPL-SCNC: 3.6 MMOL/L (ref 3.5–5.5)
RBC # BLD AUTO: 3.59 M/UL (ref 4.7–5.5)
RBC MORPH BLD: ABNORMAL
RETICS/RBC NFR AUTO: 10.1 % (ref 0.5–2.3)
SODIUM SERPL-SCNC: 138 MMOL/L (ref 136–145)
WBC # BLD AUTO: 9.2 K/UL (ref 4.6–13.2)

## 2017-02-18 PROCEDURE — 85045 AUTOMATED RETICULOCYTE COUNT: CPT | Performed by: EMERGENCY MEDICINE

## 2017-02-18 PROCEDURE — 74011250636 HC RX REV CODE- 250/636: Performed by: EMERGENCY MEDICINE

## 2017-02-18 PROCEDURE — 80048 BASIC METABOLIC PNL TOTAL CA: CPT | Performed by: EMERGENCY MEDICINE

## 2017-02-18 PROCEDURE — 85025 COMPLETE CBC W/AUTO DIFF WBC: CPT | Performed by: EMERGENCY MEDICINE

## 2017-02-18 PROCEDURE — 96376 TX/PRO/DX INJ SAME DRUG ADON: CPT

## 2017-02-18 PROCEDURE — 96361 HYDRATE IV INFUSION ADD-ON: CPT

## 2017-02-18 PROCEDURE — 96375 TX/PRO/DX INJ NEW DRUG ADDON: CPT

## 2017-02-18 PROCEDURE — 96374 THER/PROPH/DIAG INJ IV PUSH: CPT

## 2017-02-18 PROCEDURE — 99283 EMERGENCY DEPT VISIT LOW MDM: CPT

## 2017-02-18 RX ORDER — HYDROMORPHONE HYDROCHLORIDE 2 MG/ML
1 INJECTION, SOLUTION INTRAMUSCULAR; INTRAVENOUS; SUBCUTANEOUS ONCE
Status: COMPLETED | OUTPATIENT
Start: 2017-02-18 | End: 2017-02-18

## 2017-02-18 RX ORDER — KETOROLAC TROMETHAMINE 30 MG/ML
30 INJECTION, SOLUTION INTRAMUSCULAR; INTRAVENOUS
Status: COMPLETED | OUTPATIENT
Start: 2017-02-18 | End: 2017-02-18

## 2017-02-18 RX ADMIN — SODIUM CHLORIDE 1000 ML: 900 INJECTION, SOLUTION INTRAVENOUS at 09:09

## 2017-02-18 RX ADMIN — HYDROMORPHONE HYDROCHLORIDE 1 MG: 2 INJECTION, SOLUTION INTRAMUSCULAR; INTRAVENOUS; SUBCUTANEOUS at 12:43

## 2017-02-18 RX ADMIN — HYDROMORPHONE HYDROCHLORIDE 1 MG: 2 INJECTION, SOLUTION INTRAMUSCULAR; INTRAVENOUS; SUBCUTANEOUS at 10:06

## 2017-02-18 RX ADMIN — HYDROMORPHONE HYDROCHLORIDE 1 MG: 2 INJECTION, SOLUTION INTRAMUSCULAR; INTRAVENOUS; SUBCUTANEOUS at 11:31

## 2017-02-18 RX ADMIN — HYDROMORPHONE HYDROCHLORIDE 1 MG: 2 INJECTION, SOLUTION INTRAMUSCULAR; INTRAVENOUS; SUBCUTANEOUS at 08:29

## 2017-02-18 RX ADMIN — KETOROLAC TROMETHAMINE 30 MG: 30 INJECTION, SOLUTION INTRAMUSCULAR at 09:08

## 2017-02-18 RX ADMIN — SODIUM CHLORIDE 1000 ML: 900 INJECTION, SOLUTION INTRAVENOUS at 11:31

## 2017-02-18 NOTE — ED NOTES
Patient armband removed and shredded  I have reviewed discharge instructions with the patient. The patient verbalized understanding. Patient discharged on no prescriptions.

## 2017-02-18 NOTE — ED PROVIDER NOTES
HPI Comments: 8:20 AM Tiara Reid is a 43 y.o. male with a history of sickle cell disease who presents to ED be evaluated for lower back and bilateral leg pain. States it is his typical pain crisis. Attempted to control his pain with his po meds but became so uncomfortable he came in. Pt has an appointment with his PCP, Dr. Jayde Puentes, at the end of the month to reevaluate his sickle cell disease. Pt called Dr. Jayde Puentes yesterday who prescribed him pain medication and told him to come to the ED if the pain worsened. Pt notes the pain worsened last night when he laid down to go to bed and was unable to sleep. Denies CP, cough, fever, fall, or any other pertinent symptoms. The history is provided by the patient. Past Medical History:   Diagnosis Date    Sickle [facility] anemia with crisis ([facility] [address]     Vitamin D deficiency        No past surgical history on file. Family History:   Problem Relation Age of Onset    Cancer Neg Hx     Diabetes Neg Hx     Heart Disease Neg Hx     Heart Attack Neg Hx     Hypertension Neg Hx     Stroke Neg Hx        Social History     Social History    Marital status:      Spouse name: N/A    Number of children: N/A    Years of education: N/A     Occupational History    Not on file. Social History Main Topics    Smoking status: Never Smoker    Smokeless tobacco: Never Used    Alcohol use No    Drug use: No    Sexual activity: Yes     Partners: Female     Birth control/ protection: None     Other Topics Concern    Not on file     Social History Narrative         ALLERGIES: Eggshell membrane and Milk    Review of Systems   Constitutional: Negative for fever. HENT: Negative for congestion. Respiratory: Negative for cough and shortness of breath. Cardiovascular: Negative for chest pain and leg swelling. Gastrointestinal: Negative for abdominal pain, nausea and vomiting. Genitourinary: Negative for dysuria.    Musculoskeletal: Positive for back pain (lower). Bilateral leg pain   Neurological: Negative for light-headedness and headaches. All other systems reviewed and are negative. Vitals:    02/18/17 0700 02/18/17 1013   BP: 122/78 123/72   Pulse: 65 65   Resp: 18 14   Temp: 98.3 °F (36.8 °C) 97.1 °F (36.2 °C)   SpO2: 98%    Weight: 78.9 kg (174 lb)             Physical Exam   Constitutional: He is oriented to person, place, and time. Appears uncomfortable. HENT:   Head: Atraumatic. Eyes: Conjunctivae are normal.   Neck: Normal range of motion. Neck supple. Cardiovascular: Normal rate, regular rhythm and normal heart sounds. Pulmonary/Chest: Effort normal and breath sounds normal. No respiratory distress. He exhibits no tenderness. Abdominal: Soft. Bowel sounds are normal. He exhibits no distension. There is no tenderness. There is no rebound and no guarding. Musculoskeletal: Normal range of motion. He exhibits no edema or tenderness. Neurological: He is alert and oriented to person, place, and time. Skin: Skin is warm and dry. Psychiatric: He has a normal mood and affect. Nursing note and vitals reviewed. MDM  Number of Diagnoses or Management Options  Sickle cell pain crisis [facility]):   Diagnosis management comments: Abel Ozuna is a 43 y.o. male presenting with symptoms c/w pain crisis. Denies any concerning symptoms. Will check labs, treat his pain and reassess. ED Course       Procedures      Vitals:  Patient Vitals for the past 12 hrs:   Temp Pulse Resp BP SpO2   02/18/17 1013 97.1 °F (36.2 °C) 65 14 123/72 -   02/18/17 0700 98.3 °F (36.8 °C) 65 18 122/78 98 %         Progress notes, Consult notes or additional Procedure notes:   I have discussed results of work up with patient. Patient with improvement in pain after 2L NS and iv medication. Pt stable for outpt follow up. Return precautions discussed. Patient stated verbal understanding and agrees with course and plan. Disposition:  Diagnosis:   1. Sickle cell pain crisis [facility])        Disposition: Discharged home in stable condition         Scribe Attestation:   Waqar Contreras, scribing for and in the presence of Yani Leung MD February 18, 2017 at 8:27 AM     Physician Attestation:   I personally performed the services described in this documentation, reviewed and edited the documentation which was dictated to the scribe in my presence, and it accurately records my words and actions.  Yani Leung MD  February 18, 2017 at 8:27 AM

## 2017-02-18 NOTE — PROGRESS NOTES
Pt c/o having constant aching pain in lower back rates 7/10. Pt states this has been constant and he is not getting any relief. He is kneeled down up again the floor trying to get in a position of comfort.

## 2017-02-18 NOTE — DISCHARGE INSTRUCTIONS
Sickle Cell Crisis: Care Instructions  Your Care Instructions    Sickle cell crisis is a painful episode that may begin suddenly in a person with sickle cell disease. Sickle cell disease turns normal, round red blood cells into cells that look like jeane or crescent moons. The sickle-shaped cells can get stuck in blood vessels, blocking blood flow and causing severe pain. The pain can occur in the bones of the spine, the arms and legs, the chest, and the abdomen. An episode may be called a \"painful event\" or \"painful crisis. \" Some people who have sickle cell disease have many painful events, while others have few or none. Treatment depends on the level of pain and how long it lasts. Sometimes taking nonprescription pain relievers can help. Or you may need stronger pain relief medicine that is prescribed or given by a doctor. You may need to be treated in the hospital.  It isn't always possible to know what sets off a painful event. But triggers include being dehydrated, cold temperatures, infection, stress, and not getting enough oxygen. Follow-up care is a key part of your treatment and safety. Be sure to make and go to all appointments, and call your doctor if you are having problems. It's also a good idea to know your test results and keep a list of the medicines you take. How can you care for yourself at home? · Create a pain management plan with your doctor. This plan should include the types of medicines you can take and other actions you can take at home to relieve pain. · Drink plenty of fluids, enough so that your urine is light yellow or clear like water. If you have kidney, heart, or liver disease and have to limit fluids, talk with your doctor before you increase the amount of fluids you drink. · Take your medicines exactly as prescribed. Call your doctor if you think you are having a problem with your medicine. · Take pain medicines exactly as directed.   ¨ If the doctor gave you a prescription medicine for pain, take it as prescribed. ¨ If you are not taking a prescription pain medicine, ask your doctor if you can take an over-the-counter medicine. · Avoid alcohol. It can make you dehydrated. · Dress warmly in cold weather. The cold and windy weather can lead to severe pain. · Do not smoke. Smoking can reduce the amount of oxygen in your blood. · Get plenty of sleep. When should you call for help? Call 911 anytime you think you may need emergency care. For example, call if:  · You passed out (lost consciousness). Call your doctor now or seek immediate medical care if:  · You are in severe pain. · You are dizzy or lightheaded, or you feel like you may faint. · You have a fever. · You are short of breath. · Your symptoms are getting worse. Watch closely for changes in your health, and be sure to contact your doctor if you are not getting better as expected. Where can you learn more? Go to http://manuel-amadeo.info/. Enter F104 in the search box to learn more about \"Sickle Cell Crisis: Care Instructions. \"  Current as of: February 5, 2016  Content Version: 11.1  © 9511-0311 [employer]. Care instructions adapted under license by "[employer]" (which disclaims liability or warranty for this information). If you have questions about a medical condition or this instruction, always ask your healthcare professional. [address] any warranty or liability for your use of this information.

## 2017-03-19 ENCOUNTER — APPOINTMENT (OUTPATIENT)
Dept: GENERAL RADIOLOGY | Age: 37
End: 2017-03-19
Attending: NURSE PRACTITIONER
Payer: MEDICARE

## 2017-03-19 ENCOUNTER — HOSPITAL ENCOUNTER (EMERGENCY)
Age: 37
Discharge: HOME | End: 2017-03-19
Attending: EMERGENCY MEDICINE | Admitting: EMERGENCY MEDICINE
Payer: MEDICARE

## 2017-03-19 VITALS
SYSTOLIC BLOOD PRESSURE: 120 MMHG | TEMPERATURE: 98.2 F | RESPIRATION RATE: 16 BRPM | HEART RATE: 80 BPM | BODY MASS INDEX: 23.57 KG/M2 | OXYGEN SATURATION: 94 % | DIASTOLIC BLOOD PRESSURE: 59 MMHG | WEIGHT: 174 LBS | HEIGHT: 72 IN

## 2017-03-19 DIAGNOSIS — D57.00 SICKLE CELL ANEMIA WITH CRISIS (HCC): Primary | ICD-10-CM

## 2017-03-19 DIAGNOSIS — R17 ELEVATED BILIRUBIN: ICD-10-CM

## 2017-03-19 DIAGNOSIS — M87.029: ICD-10-CM

## 2017-03-19 LAB
ALBUMIN SERPL BCP-MCNC: 4.1 G/DL (ref 3.4–5)
ALBUMIN/GLOB SERPL: 0.8 {RATIO} (ref 0.8–1.7)
ALP SERPL-CCNC: 128 U/L (ref 45–117)
ALT SERPL-CCNC: 62 U/L (ref 16–61)
AMPHET UR QL SCN: NEGATIVE
ANION GAP BLD CALC-SCNC: 5 MMOL/L (ref 3–18)
APPEARANCE UR: CLEAR
AST SERPL W P-5'-P-CCNC: 87 U/L (ref 15–37)
BACTERIA URNS QL MICRO: ABNORMAL /HPF
BARBITURATES UR QL SCN: NEGATIVE
BASOPHILS # BLD AUTO: 0.2 K/UL (ref 0–0.06)
BASOPHILS # BLD: 2 % (ref 0–3)
BENZODIAZ UR QL: NEGATIVE
BILIRUB SERPL-MCNC: 4.4 MG/DL (ref 0.2–1)
BILIRUB UR QL: NEGATIVE
BUN SERPL-MCNC: 10 MG/DL (ref 7–18)
BUN/CREAT SERPL: 11 (ref 12–20)
CALCIUM SERPL-MCNC: 8.4 MG/DL (ref 8.5–10.1)
CANNABINOIDS UR QL SCN: NEGATIVE
CHLORIDE SERPL-SCNC: 105 MMOL/L (ref 100–108)
CO2 SERPL-SCNC: 27 MMOL/L (ref 21–32)
COCAINE UR QL SCN: NEGATIVE
COLOR UR: YELLOW
CREAT SERPL-MCNC: 0.88 MG/DL (ref 0.6–1.3)
DIFFERENTIAL METHOD BLD: ABNORMAL
EOSINOPHIL # BLD: 0.6 K/UL (ref 0–0.4)
EOSINOPHIL NFR BLD: 5 % (ref 0–5)
EPITH CASTS URNS QL MICRO: ABNORMAL /LPF (ref 0–5)
ERYTHROCYTE [DISTWIDTH] IN BLOOD BY AUTOMATED COUNT: 22.7 % (ref 11.6–14.5)
GLOBULIN SER CALC-MCNC: 4.9 G/DL (ref 2–4)
GLUCOSE SERPL-MCNC: 85 MG/DL (ref 74–99)
GLUCOSE UR STRIP.AUTO-MCNC: NEGATIVE MG/DL
HCT VFR BLD AUTO: 26.6 % (ref 36–48)
HDSCOM,HDSCOM: NORMAL
HGB BLD-MCNC: 9.3 G/DL (ref 13–16)
HGB UR QL STRIP: NEGATIVE
KETONES UR QL STRIP.AUTO: NEGATIVE MG/DL
LEUKOCYTE ESTERASE UR QL STRIP.AUTO: ABNORMAL
LYMPHOCYTES # BLD AUTO: 46 % (ref 20–51)
LYMPHOCYTES # BLD: 5.7 K/UL (ref 0.8–3.5)
MCH RBC QN AUTO: 26.7 PG (ref 24–34)
MCHC RBC AUTO-ENTMCNC: 35 G/DL (ref 31–37)
MCV RBC AUTO: 76.4 FL (ref 74–97)
METHADONE UR QL: NEGATIVE
MONOCYTES # BLD: 1.1 K/UL (ref 0–1)
MONOCYTES NFR BLD AUTO: 9 % (ref 2–9)
NEUTS SEG # BLD: 4.6 K/UL (ref 1.8–8)
NEUTS SEG NFR BLD AUTO: 38 % (ref 42–75)
NITRITE UR QL STRIP.AUTO: NEGATIVE
NRBC BLD-RTO: 2 PER 100 WBC
OPIATES UR QL: NEGATIVE
PCP UR QL: NEGATIVE
PH UR STRIP: 7 [PH] (ref 5–8)
PLATELET # BLD AUTO: 379 K/UL (ref 135–420)
PLATELET COMMENTS,PCOM: ABNORMAL
PMV BLD AUTO: 10.1 FL (ref 9.2–11.8)
POTASSIUM SERPL-SCNC: 4 MMOL/L (ref 3.5–5.5)
PROT SERPL-MCNC: 9 G/DL (ref 6.4–8.2)
PROT UR STRIP-MCNC: NEGATIVE MG/DL
RBC # BLD AUTO: 3.48 M/UL (ref 4.7–5.5)
RBC #/AREA URNS HPF: ABNORMAL /HPF (ref 0–5)
RBC MORPH BLD: ABNORMAL
RETICS/RBC NFR AUTO: 9 % (ref 0.5–2.3)
SODIUM SERPL-SCNC: 137 MMOL/L (ref 136–145)
SP GR UR REFRACTOMETRY: 1.01 (ref 1–1.03)
UROBILINOGEN UR QL STRIP.AUTO: 4 EU/DL (ref 0.2–1)
WBC # BLD AUTO: 12.2 K/UL (ref 4.6–13.2)
WBC URNS QL MICRO: ABNORMAL /HPF (ref 0–4)

## 2017-03-19 PROCEDURE — 85045 AUTOMATED RETICULOCYTE COUNT: CPT | Performed by: NURSE PRACTITIONER

## 2017-03-19 PROCEDURE — 96374 THER/PROPH/DIAG INJ IV PUSH: CPT

## 2017-03-19 PROCEDURE — 85025 COMPLETE CBC W/AUTO DIFF WBC: CPT | Performed by: NURSE PRACTITIONER

## 2017-03-19 PROCEDURE — 81001 URINALYSIS AUTO W/SCOPE: CPT | Performed by: NURSE PRACTITIONER

## 2017-03-19 PROCEDURE — 71020 XR CHEST PA LAT: CPT

## 2017-03-19 PROCEDURE — 80053 COMPREHEN METABOLIC PANEL: CPT | Performed by: NURSE PRACTITIONER

## 2017-03-19 PROCEDURE — 74011250636 HC RX REV CODE- 250/636: Performed by: NURSE PRACTITIONER

## 2017-03-19 PROCEDURE — 96376 TX/PRO/DX INJ SAME DRUG ADON: CPT

## 2017-03-19 PROCEDURE — 96375 TX/PRO/DX INJ NEW DRUG ADDON: CPT

## 2017-03-19 PROCEDURE — 99284 EMERGENCY DEPT VISIT MOD MDM: CPT

## 2017-03-19 PROCEDURE — 80307 DRUG TEST PRSMV CHEM ANLYZR: CPT | Performed by: NURSE PRACTITIONER

## 2017-03-19 PROCEDURE — 96361 HYDRATE IV INFUSION ADD-ON: CPT

## 2017-03-19 RX ORDER — HYDROMORPHONE HYDROCHLORIDE 1 MG/ML
1 INJECTION, SOLUTION INTRAMUSCULAR; INTRAVENOUS; SUBCUTANEOUS
Status: COMPLETED | OUTPATIENT
Start: 2017-03-19 | End: 2017-03-19

## 2017-03-19 RX ORDER — HYDROMORPHONE HYDROCHLORIDE 2 MG/1
2 TABLET ORAL
Qty: 2 TAB | Refills: 0 | Status: SHIPPED | OUTPATIENT
Start: 2017-03-19 | End: 2017-04-16

## 2017-03-19 RX ORDER — KETOROLAC TROMETHAMINE 30 MG/ML
30 INJECTION, SOLUTION INTRAMUSCULAR; INTRAVENOUS
Status: COMPLETED | OUTPATIENT
Start: 2017-03-19 | End: 2017-03-19

## 2017-03-19 RX ORDER — DIPHENHYDRAMINE HYDROCHLORIDE 50 MG/ML
25 INJECTION, SOLUTION INTRAMUSCULAR; INTRAVENOUS
Status: COMPLETED | OUTPATIENT
Start: 2017-03-19 | End: 2017-03-19

## 2017-03-19 RX ORDER — DIPHENHYDRAMINE HYDROCHLORIDE 50 MG/ML
12.5 INJECTION, SOLUTION INTRAMUSCULAR; INTRAVENOUS
Status: COMPLETED | OUTPATIENT
Start: 2017-03-19 | End: 2017-03-19

## 2017-03-19 RX ADMIN — SODIUM CHLORIDE 1000 ML: 900 INJECTION, SOLUTION INTRAVENOUS at 09:41

## 2017-03-19 RX ADMIN — KETOROLAC TROMETHAMINE 30 MG: 30 INJECTION, SOLUTION INTRAMUSCULAR at 06:52

## 2017-03-19 RX ADMIN — DIPHENHYDRAMINE HYDROCHLORIDE 25 MG: 50 INJECTION, SOLUTION INTRAMUSCULAR; INTRAVENOUS at 06:51

## 2017-03-19 RX ADMIN — SODIUM CHLORIDE 1000 ML: 900 INJECTION, SOLUTION INTRAVENOUS at 06:15

## 2017-03-19 RX ADMIN — HYDROMORPHONE HYDROCHLORIDE 1 MG: 1 INJECTION, SOLUTION INTRAMUSCULAR; INTRAVENOUS; SUBCUTANEOUS at 08:09

## 2017-03-19 RX ADMIN — SODIUM CHLORIDE 1000 ML: 900 INJECTION, SOLUTION INTRAVENOUS at 07:04

## 2017-03-19 RX ADMIN — HYDROMORPHONE HYDROCHLORIDE 1 MG: 1 INJECTION, SOLUTION INTRAMUSCULAR; INTRAVENOUS; SUBCUTANEOUS at 06:52

## 2017-03-19 RX ADMIN — DIPHENHYDRAMINE HYDROCHLORIDE 12.5 MG: 50 INJECTION, SOLUTION INTRAMUSCULAR; INTRAVENOUS at 08:09

## 2017-03-19 RX ADMIN — DIPHENHYDRAMINE HYDROCHLORIDE 12.5 MG: 50 INJECTION, SOLUTION INTRAMUSCULAR; INTRAVENOUS at 09:42

## 2017-03-19 RX ADMIN — HYDROMORPHONE HYDROCHLORIDE 1 MG: 1 INJECTION, SOLUTION INTRAMUSCULAR; INTRAVENOUS; SUBCUTANEOUS at 09:43

## 2017-03-19 NOTE — ED PROVIDER NOTES
HPI Comments: Patient is a 43 y.o.  male with the following medical history:   Past Medical History:  No date: Sickle [facility] with [facility]  No date: Vitamin D deficiency  Presents to the ED with Sickle Cell Crisis in his mid to lower back and down igor posterior legs that started at 8 pm last night and is not responsive to oral hydration and his usual narcotic pain medications (fentanyl patch changed yesterday) from his hematologist, Dr. Jair Carson. He is going for his next visit in 2 weeks but was hospitalized last month during the time he was suppose to be seen out patient and his admission symptoms feel similar to today and he was admitted due to igor \"stuff\" in his lungs. He had been feeling well but his symptoms started again yesterday. Denies chest pain, SOB, loss or change of sensation in his extremities, difficulty with bowel or bladder function, no headache or change of vision and no fever or n/v. Patient is a 43 y.o. male presenting with sickle cell disease. The history is provided by the patient. Sickle Cell Crisis   This is a recurrent problem. The current episode started 6 to 12 hours ago. The problem occurs constantly. The problem has been gradually worsening. Pertinent negatives include no chest pain, no abdominal pain, no headaches and no shortness of breath. Associated symptoms comments: Mid to lower back and back of igor leg pain. The symptoms are aggravated by exertion. Nothing relieves the symptoms. He has tried rest and water (Usual narcotic pain medication) for the symptoms. The treatment provided no relief. Past Medical History:   Diagnosis Date    Sickle cell anemia with crisis ([facility] [address]     Vitamin D deficiency        History reviewed. No pertinent surgical history.       Family History:   Problem Relation Age of Onset    Cancer Neg Hx     Diabetes Neg Hx     Heart Disease Neg Hx     Heart Attack Neg Hx     Hypertension Neg Hx     Stroke Neg Hx Social History     Social History    Marital status:      Spouse name: N/A    Number of children: N/A    Years of education: N/A     Occupational History    Not on file. Social History Main Topics    Smoking status: Never Smoker    Smokeless tobacco: Never Used    Alcohol use No    Drug use: No    Sexual activity: Yes     Partners: Female     Birth control/ protection: None     Other Topics Concern    Not on file     Social History Narrative         ALLERGIES: Eggshell membrane and Milk    Review of Systems   Constitutional: Negative for activity change, appetite change, chills and fever. HENT: Negative for trouble swallowing and voice change. Eyes: Negative for discharge, redness and visual disturbance. Respiratory: Negative for cough, chest tightness and shortness of breath. Cardiovascular: Negative for chest pain and leg swelling. Gastrointestinal: Negative for abdominal pain, constipation, diarrhea, nausea and vomiting. Genitourinary: Negative for difficulty urinating, dysuria, flank pain, frequency and urgency. Musculoskeletal: Positive for myalgias (mid to lower back and igor posterior upper legs). Negative for back pain, joint swelling, neck pain and neck stiffness. Skin: Negative for color change, rash and wound. Neurological: Negative for dizziness, speech difficulty and headaches. Psychiatric/Behavioral: Positive for suicidal ideas. Negative for agitation and behavioral problems. The patient is not nervous/anxious. Vitals:    03/19/17 0517   BP: 111/71   Pulse: 75   Resp: 18   Temp: 98.2 °F (36.8 °C)   SpO2: 98%   Weight: 78.9 kg (174 lb)   Height: 6' (1.829 m)            Physical Exam   Constitutional: He is oriented to person, place, and time. He appears well-developed and well-nourished. He appears distressed (with movement to lower back with slowness and facial grimace).    HENT:   Mouth/Throat: Oropharynx is clear and moist. No oropharyngeal exudate. Eyes: EOM are normal. Scleral icterus (light yellow) is present. Cardiovascular: Normal rate, regular rhythm and normal heart sounds. No murmur heard. Pulmonary/Chest: Effort normal and breath sounds normal. No stridor. No respiratory distress. Abdominal: Soft. Bowel sounds are normal. He exhibits no distension. There is no tenderness. Musculoskeletal: He exhibits tenderness (mid to lower back, upper posterior legs). He exhibits no edema. Lymphadenopathy:     He has no cervical adenopathy. Neurological: He is alert and oriented to person, place, and time. Skin: Skin is warm and dry. Psychiatric: He has a normal mood and affect. His behavior is normal. Judgment and thought content normal.        MDM  Number of Diagnoses or Management Options  Avascular necrosis of humeral head, unspecified laterality ([facility] [address]):   Elevated bilirubin:   Sickle cell anemia with crisis [facility]):   Diagnosis management comments: Mr. Lena Estes is a familiar patient to the ED and usually is able to turn around with hydration, oxygen and IV pain medication titrated over 2 hours. Will recheck labs to ensure no new issues, occult infection, electrolyte abnormalities and recheck chest radiograph due to igor lower lobes consolidations one month ago. Discussed the plan with patient and he is agreeable to planned care. 7:28 AM  Chest PA/Lat much improved with no acute changes and minimal detectable igor lateral consolidations. Pain has slightly improved and 1 L NS infused. 8:11 AM  Radiology report agrees in improvement of lungs but shoulder (humeral heads igor)  have further deterioration, etiology of pain/increased inflammation? Consulted with Dr. Deb Durbin, ED MD attending concerning patient Krystle Rm, standard discussion of reason for visit, HPI, ROS, PE, and current results available.   Recommendation for continue plan of pain management/fluids/oxygen, re-assess at 0900 after 3rd round of medications and contact Dr. Mich Young for further dispo of worsening humeral  head disease, ? CT, medications, sooner follow up.  8:56 AM  Mr. Mitch Lay is asleep at this time and 2L infused of NS, VS stable. Will pend #3 L fluids if pain not less than 6 on next check. 9:20 AM  Pain is 6/10 so will give #3 bag NS and page Dr. Mich Young for dispo on igor hip infarct. Consulted with Dr. Davis Solis, Hematology on call for Dr. Mich Young, concerning patient Abel Ozuna, standard discussion of reason for visit, HPI, ROS, PE, and current results available. Recommendation for he will consult with Dr. Mich Young tomorrow and patient may need MRI, ensured no fever today, no bandemia and ok to follow up outpatient. The primary encounter diagnosis was Sickle cell anemia with crisis ([facility] [address] Diagnoses of Avascular necrosis of humeral head, unspecified laterality (HCC) and Elevated bilirubin were also pertinent to this visit.         ED Course       Procedures

## 2017-03-19 NOTE — Clinical Note
Both of your shoulders have worsening of the joints which was present on your February and today chest xray. Sometimes this can mean that the joint space circulation has been injured and the space can die and become an infection problem so your doctor johana tineo like you to call the office tomorrow for further evaluation and you may need further studies like an MRI to look closer at the joint.

## 2017-03-19 NOTE — ED NOTES
Received bedside report from Mike Fletcher RN. Pt currently resting in bed on cardiac monitor. Pt states that his pain has slightly improved. Pt updated on plan of care. Call bell is in reach. Will continue to monitor.

## 2017-03-19 NOTE — ED TRIAGE NOTES
Patient in sickle cell crisis that started up yesterday around 2000. S/S are progressively getting worse.

## 2017-03-19 NOTE — DISCHARGE INSTRUCTIONS
Sickle Cell Crisis: Care Instructions  Your Care Instructions    Sickle cell crisis is a painful episode that may begin suddenly in a person with sickle cell disease. Sickle cell disease turns normal, round red blood cells into cells that look like jeane or crescent moons. The sickle-shaped cells can get stuck in blood vessels, blocking blood flow and causing severe pain. The pain can occur in the bones of the spine, the arms and legs, the chest, and the abdomen. An episode may be called a \"painful event\" or \"painful crisis. \" Some people who have sickle cell disease have many painful events, while others have few or none. Treatment depends on the level of pain and how long it lasts. Sometimes taking nonprescription pain relievers can help. Or you may need stronger pain relief medicine that is prescribed or given by a doctor. You may need to be treated in the hospital.  It isn't always possible to know what sets off a painful event. But triggers include being dehydrated, cold temperatures, infection, stress, and not getting enough oxygen. Follow-up care is a key part of your treatment and safety. Be sure to make and go to all appointments, and call your doctor if you are having problems. It's also a good idea to know your test results and keep a list of the medicines you take. How can you care for yourself at home? · Create a pain management plan with your doctor. This plan should include the types of medicines you can take and other actions you can take at home to relieve pain. · Drink plenty of fluids, enough so that your urine is light yellow or clear like water. If you have kidney, heart, or liver disease and have to limit fluids, talk with your doctor before you increase the amount of fluids you drink. · Take your medicines exactly as prescribed. Call your doctor if you think you are having a problem with your medicine. · Take pain medicines exactly as directed.   ¨ If the doctor gave you a prescription medicine for pain, take it as prescribed. ¨ If you are not taking a prescription pain medicine, ask your doctor if you can take an over-the-counter medicine. · Avoid alcohol. It can make you dehydrated. · Dress warmly in cold weather. The cold and windy weather can lead to severe pain. · Do not smoke. Smoking can reduce the amount of oxygen in your blood. · Get plenty of sleep. When should you call for help? Call 911 anytime you think you may need emergency care. For example, call if:  · You passed out (lost consciousness). Call your doctor now or seek immediate medical care if:  · You are in severe pain. · You are dizzy or lightheaded, or you feel like you may faint. · You have a fever. · You are short of breath. · Your symptoms are getting worse. Watch closely for changes in your health, and be sure to contact your doctor if you are not getting better as expected. Where can you learn more? Go to http://manuel-amadeo.info/. Enter F104 in the search box to learn more about \"Sickle Cell Crisis: Care Instructions. \"  Current as of: February 5, 2016  Content Version: 11.1  © 9683-6438 [employer]. Care instructions adapted under license by [employer] (which disclaims liability or warranty for this information). If you have questions about a medical condition or this instruction, always ask your healthcare professional. [address] any warranty or liability for your use of this information.

## 2017-04-15 ENCOUNTER — HOSPITAL ENCOUNTER (EMERGENCY)
Age: 37
Discharge: HOME | End: 2017-04-16
Attending: EMERGENCY MEDICINE
Payer: MEDICARE

## 2017-04-15 DIAGNOSIS — D57.00 SICKLE CELL ANEMIA WITH PAIN (HCC): Primary | ICD-10-CM

## 2017-04-15 LAB
ALBUMIN SERPL BCP-MCNC: 4.1 G/DL (ref 3.4–5)
ALBUMIN/GLOB SERPL: 0.8 {RATIO} (ref 0.8–1.7)
ALP SERPL-CCNC: 109 U/L (ref 45–117)
ALT SERPL-CCNC: 47 U/L (ref 16–61)
ANION GAP BLD CALC-SCNC: 9 MMOL/L (ref 3–18)
AST SERPL W P-5'-P-CCNC: 74 U/L (ref 15–37)
BILIRUB SERPL-MCNC: 3.9 MG/DL (ref 0.2–1)
BUN SERPL-MCNC: 8 MG/DL (ref 7–18)
BUN/CREAT SERPL: 9 (ref 12–20)
CALCIUM SERPL-MCNC: 8.5 MG/DL (ref 8.5–10.1)
CHLORIDE SERPL-SCNC: 103 MMOL/L (ref 100–108)
CO2 SERPL-SCNC: 25 MMOL/L (ref 21–32)
CREAT SERPL-MCNC: 0.89 MG/DL (ref 0.6–1.3)
GLOBULIN SER CALC-MCNC: 5.1 G/DL (ref 2–4)
GLUCOSE SERPL-MCNC: 78 MG/DL (ref 74–99)
POTASSIUM SERPL-SCNC: 4 MMOL/L (ref 3.5–5.5)
PROT SERPL-MCNC: 9.2 G/DL (ref 6.4–8.2)
RETICS/RBC NFR AUTO: 8.5 % (ref 0.5–2.3)
SODIUM SERPL-SCNC: 137 MMOL/L (ref 136–145)

## 2017-04-15 PROCEDURE — 99284 EMERGENCY DEPT VISIT MOD MDM: CPT

## 2017-04-15 PROCEDURE — 96375 TX/PRO/DX INJ NEW DRUG ADDON: CPT

## 2017-04-15 PROCEDURE — 96361 HYDRATE IV INFUSION ADD-ON: CPT

## 2017-04-15 PROCEDURE — 85025 COMPLETE CBC W/AUTO DIFF WBC: CPT | Performed by: EMERGENCY MEDICINE

## 2017-04-15 PROCEDURE — 85045 AUTOMATED RETICULOCYTE COUNT: CPT | Performed by: EMERGENCY MEDICINE

## 2017-04-15 PROCEDURE — 80053 COMPREHEN METABOLIC PANEL: CPT | Performed by: EMERGENCY MEDICINE

## 2017-04-15 PROCEDURE — 96374 THER/PROPH/DIAG INJ IV PUSH: CPT

## 2017-04-16 VITALS
TEMPERATURE: 98.5 F | RESPIRATION RATE: 14 BRPM | HEART RATE: 57 BPM | BODY MASS INDEX: 23.57 KG/M2 | DIASTOLIC BLOOD PRESSURE: 66 MMHG | OXYGEN SATURATION: 98 % | WEIGHT: 174 LBS | SYSTOLIC BLOOD PRESSURE: 116 MMHG | HEIGHT: 72 IN

## 2017-04-16 LAB
BASOPHILS # BLD AUTO: 0 K/UL (ref 0–0.06)
BASOPHILS # BLD: 0 % (ref 0–3)
DIFFERENTIAL METHOD BLD: ABNORMAL
EOSINOPHIL # BLD: 0.8 K/UL (ref 0–0.4)
EOSINOPHIL NFR BLD: 6 % (ref 0–5)
ERYTHROCYTE [DISTWIDTH] IN BLOOD BY AUTOMATED COUNT: 21.6 % (ref 11.6–14.5)
HCT VFR BLD AUTO: 29.3 % (ref 36–48)
HGB BLD-MCNC: 10.2 G/DL (ref 13–16)
LYMPHOCYTES # BLD AUTO: 51 % (ref 20–51)
LYMPHOCYTES # BLD: 7 K/UL (ref 0.8–3.5)
MCH RBC QN AUTO: 26 PG (ref 24–34)
MCHC RBC AUTO-ENTMCNC: 34.8 G/DL (ref 31–37)
MCV RBC AUTO: 74.6 FL (ref 74–97)
MONOCYTES # BLD: 0.8 K/UL (ref 0–1)
MONOCYTES NFR BLD AUTO: 6 % (ref 2–9)
NEUTS SEG # BLD: 5.1 K/UL (ref 1.8–8)
NEUTS SEG NFR BLD AUTO: 37 % (ref 42–75)
PLATELET # BLD AUTO: 414 K/UL (ref 135–420)
PLATELET COMMENTS,PCOM: ABNORMAL
PMV BLD AUTO: 9.5 FL (ref 9.2–11.8)
RBC # BLD AUTO: 3.93 M/UL (ref 4.7–5.5)
RBC MORPH BLD: ABNORMAL
WBC # BLD AUTO: 13.7 K/UL (ref 4.6–13.2)

## 2017-04-16 PROCEDURE — 74011250636 HC RX REV CODE- 250/636: Performed by: EMERGENCY MEDICINE

## 2017-04-16 PROCEDURE — 74011250636 HC RX REV CODE- 250/636

## 2017-04-16 RX ORDER — HYDROMORPHONE HYDROCHLORIDE 2 MG/1
2 TABLET ORAL
Qty: 12 TAB | Refills: 0 | Status: SHIPPED | OUTPATIENT
Start: 2017-04-16 | End: 2019-08-03

## 2017-04-16 RX ORDER — HYDROMORPHONE HYDROCHLORIDE 2 MG/ML
2 INJECTION, SOLUTION INTRAMUSCULAR; INTRAVENOUS; SUBCUTANEOUS ONCE
Status: COMPLETED | OUTPATIENT
Start: 2017-04-16 | End: 2017-04-16

## 2017-04-16 RX ORDER — HYDROMORPHONE HYDROCHLORIDE 2 MG/ML
INJECTION, SOLUTION INTRAMUSCULAR; INTRAVENOUS; SUBCUTANEOUS
Status: COMPLETED
Start: 2017-04-16 | End: 2017-04-16

## 2017-04-16 RX ORDER — HYDROMORPHONE HYDROCHLORIDE 2 MG/ML
1 INJECTION, SOLUTION INTRAMUSCULAR; INTRAVENOUS; SUBCUTANEOUS ONCE
Status: COMPLETED | OUTPATIENT
Start: 2017-04-16 | End: 2017-04-16

## 2017-04-16 RX ADMIN — HYDROMORPHONE HYDROCHLORIDE 2 MG: 2 INJECTION, SOLUTION INTRAMUSCULAR; INTRAVENOUS; SUBCUTANEOUS at 03:26

## 2017-04-16 RX ADMIN — SODIUM CHLORIDE 1000 ML: 900 INJECTION, SOLUTION INTRAVENOUS at 03:27

## 2017-04-16 RX ADMIN — HYDROMORPHONE HYDROCHLORIDE 1 MG: 2 INJECTION, SOLUTION INTRAMUSCULAR; INTRAVENOUS; SUBCUTANEOUS at 00:09

## 2017-04-16 RX ADMIN — HYDROMORPHONE HYDROCHLORIDE 2 MG: 2 INJECTION, SOLUTION INTRAMUSCULAR; INTRAVENOUS; SUBCUTANEOUS at 01:32

## 2017-04-16 RX ADMIN — SODIUM CHLORIDE 1000 ML: 900 INJECTION, SOLUTION INTRAVENOUS at 01:32

## 2017-04-16 NOTE — ED PROVIDER NOTES
HPI Comments:   11:59 PM Elda Banegas is a 43 y.o. Male with a h/o sickle cell, who presents to the ED for the evaluation of sickle cell crisis. C/o back pain, but no other complaints. States that he is out of his pain meds. No fever, chills, N/V, or SOB. denies chest pain or cough. No relieving or exacerbating factors. No other complaints at this time. PCP: Myah Ro MD     The history is provided by the patient. Past Medical History:   Diagnosis Date    Sickle cell anemia with crisis ([facility] [address]     Vitamin D deficiency        History reviewed. No pertinent surgical history. Family History:   Problem Relation Age of Onset    Cancer Neg Hx     Diabetes Neg Hx     Heart Disease Neg Hx     Heart Attack Neg Hx     Hypertension Neg Hx     Stroke Neg Hx        Social History     Social History    Marital status:      Spouse name: N/A    Number of children: N/A    Years of education: N/A     Occupational History    Not on file. Social History Main Topics    Smoking status: Never Smoker    Smokeless tobacco: Never Used    Alcohol use No    Drug use: No    Sexual activity: Yes     Partners: Female     Birth control/ protection: None     Other Topics Concern    Not on file     Social History Narrative         ALLERGIES: Eggshell membrane and Milk    Review of Systems   Constitutional: Negative for fever. HENT: Negative for congestion. Respiratory: Negative for cough and shortness of breath. Cardiovascular: Negative for chest pain and leg swelling. Gastrointestinal: Negative for abdominal pain, nausea and vomiting. Genitourinary: Negative for dysuria. Musculoskeletal: Positive for back pain. Neurological: Negative for light-headedness and headaches. All other systems reviewed and are negative.       Vitals:    04/16/17 0430 04/16/17 0445 04/16/17 0500 04/16/17 0515   BP: 116/56 117/65 119/66 116/66   Pulse: 68 66 (!) 56 (!) 57   Resp: 14 19 16 14   Temp: SpO2: 96% 95% 99% 98%   Weight:       Height:            98% within normal limits     Physical Exam   Constitutional: He is oriented to person, place, and time. No distress. HENT:   Head: Atraumatic. Eyes: Conjunctivae are normal.   Neck: Normal range of motion. Neck supple. Cardiovascular: Normal rate, regular rhythm and normal heart sounds. Pulmonary/Chest: Effort normal and breath sounds normal. No respiratory distress. He exhibits no tenderness. Abdominal: Soft. Bowel sounds are normal. He exhibits no distension. There is no tenderness. There is no rebound and no guarding. Musculoskeletal: Normal range of motion. He exhibits no edema or tenderness. Neurological: He is alert and oriented to person, place, and time. He has normal strength. No sensory deficit. Gait normal.   Skin: Skin is warm and dry. Psychiatric: He has a normal mood and affect. Nursing note and vitals reviewed. MDM  Number of Diagnoses or Management Options  Sickle cell anemia with pain [facility]):   Diagnosis management comments: Sandra Salinas is a 43 y.o. male presenting with symptoms c/w sickle cell crisis. No red flags in history or exam. Labs reviewed. Pain controlled and pt stable for outpt follow up. Return precautions discussed. Patient stated verbal understanding and agrees with course and plan.        ED Course       Procedures        Vitals:  Patient Vitals for the past 12 hrs:   Temp Pulse Resp BP SpO2   04/16/17 0515 - (!) 57 14 116/66 98 %   04/16/17 0500 - (!) 56 16 119/66 99 %   04/16/17 0445 - 66 19 117/65 95 %   04/16/17 0430 - 68 14 116/56 96 %   04/16/17 0415 - (!) 55 13 111/67 97 %   04/16/17 0400 - (!) 57 17 115/79 92 %   04/16/17 0345 - 64 16 124/77 96 %   04/16/17 0330 - 61 16 121/66 94 %   04/16/17 0315 - (!) 59 14 124/69 96 %   04/16/17 0300 - (!) 59 15 104/70 98 %   04/16/17 0245 - 65 16 126/64 94 %   04/16/17 0230 - (!) 54 14 126/69 98 %   04/16/17 0215 - (!) 56 14 125/66 95 % 04/16/17 0200 - (!) 57 14 125/70 95 %   04/16/17 0145 - 76 25 120/64 98 %   04/16/17 0130 - 64 17 127/70 97 %   04/16/17 0115 - 62 18 127/71 99 %   04/16/17 0100 - 60 15 113/48 98 %   04/16/17 0045 - 60 17 118/72 98 %   04/16/17 0015 - 89 28 123/40 97 %   04/16/17 0000 - 67 20 117/73 99 %   04/15/17 2345 - (!) 52 15 110/69 99 %   04/15/17 2330 - (!) 57 14 120/76 98 %   04/15/17 2315 - 67 19 123/85 99 %   04/15/17 2212 98.5 °F (36.9 °C) 73 19 116/68 99 %   98% within normal limits        Disposition:  Diagnosis:   1. Sickle cell anemia with pain (HCC)        Disposition: Discharged home in stable condition           SCRIBE ATTESTATION STATEMENT  Documented by: Rachel Pérez. Katelin Sanabria for, and in the presence of, Du Parada MD 12:00 AM     Signed by: Rachel Pérez. Lynda Avila, 4/16/2017 12:00 AM     PROVIDER ATTESTATION STATEMENT  I personally performed the services described in the documentation, reviewed the documentation, as recorded by the scribe in my presence, and it accurately and completely records my words and actions.   Du Parada MD

## 2017-04-16 NOTE — ED TRIAGE NOTES
Pt complains of lower back and bilateral leg pain, patient has sickle cell disease. Patient has not taken any pain medications today.

## 2017-04-16 NOTE — ED NOTES
Reviewed DC instructions with patient. Pt sent home with 1 prescription. Pt instructed to follow up with PCP this week. PT signed paper DC instructions; RN placed in scan bin. Pt left ED with no distress noted. Pt left ED with all belongings.

## 2017-04-16 NOTE — ED NOTES
Hourly rounding complete. Safety  Pt resting   [x ]  On stretcher with side rails up and bed in locked position, call bell within reach  [  ]  Sitting in chair with casters locked, call bell within reach    Toileting  [ x ] pt denies need to use bathroom  [  ] pt assisted to bathroom  [  ] pt assisted with bedpan  [  ] pt independent to bathroom as needed    Ongoing Plan of Care  Plan of care and expected time for test and results reviewed with pt.     Pain Management / Comfort  [  ] dimmed lights  [  ] warm blanket provided  [  ] pain assessed  [x  ] monitor alarms reviewed

## 2017-04-16 NOTE — DISCHARGE INSTRUCTIONS
Sickle Cell Crisis: Care Instructions  Your Care Instructions    Sickle cell crisis is a painful episode that may begin suddenly in a person with sickle cell disease. Sickle cell disease turns normal, round red blood cells into cells that look like jeane or crescent moons. The sickle-shaped cells can get stuck in blood vessels, blocking blood flow and causing severe pain. The pain can occur in the bones of the spine, the arms and legs, the chest, and the abdomen. An episode may be called a \"painful event\" or \"painful crisis. \" Some people who have sickle cell disease have many painful events, while others have few or none. Treatment depends on the level of pain and how long it lasts. Sometimes taking nonprescription pain relievers can help. Or you may need stronger pain relief medicine that is prescribed or given by a doctor. You may need to be treated in the hospital.  It isn't always possible to know what sets off a painful event. But triggers include being dehydrated, cold temperatures, infection, stress, and not getting enough oxygen. Follow-up care is a key part of your treatment and safety. Be sure to make and go to all appointments, and call your doctor if you are having problems. It's also a good idea to know your test results and keep a list of the medicines you take. How can you care for yourself at home? · Create a pain management plan with your doctor. This plan should include the types of medicines you can take and other actions you can take at home to relieve pain. · Drink plenty of fluids, enough so that your urine is light yellow or clear like water. If you have kidney, heart, or liver disease and have to limit fluids, talk with your doctor before you increase the amount of fluids you drink. · Take your medicines exactly as prescribed. Call your doctor if you think you are having a problem with your medicine. · Take pain medicines exactly as directed.   ¨ If the doctor gave you a prescription medicine for pain, take it as prescribed. ¨ If you are not taking a prescription pain medicine, ask your doctor if you can take an over-the-counter medicine. · Avoid alcohol. It can make you dehydrated. · Dress warmly in cold weather. The cold and windy weather can lead to severe pain. · Do not smoke. Smoking can reduce the amount of oxygen in your blood. · Get plenty of sleep. When should you call for help? Call 911 anytime you think you may need emergency care. For example, call if:  · You passed out (lost consciousness). Call your doctor now or seek immediate medical care if:  · You are in severe pain. · You are dizzy or lightheaded, or you feel like you may faint. · You have a fever. · You are short of breath. · Your symptoms are getting worse. Watch closely for changes in your health, and be sure to contact your doctor if you are not getting better as expected. Where can you learn more? Go to http://manuel-amadeo.info/. Enter F104 in the search box to learn more about \"Sickle Cell Crisis: Care Instructions. \"  Current as of: October 13, 2016  Content Version: 11.2  © 9682-6907 [employer], [employer]. Care instructions adapted under license by [employer] (which disclaims liability or warranty for this information). If you have questions about a medical condition or this instruction, always ask your healthcare professional. [place] [address] any warranty or liability for your use of this information.

## 2017-04-16 NOTE — ED NOTES
Hourly rounding complete. Safety  Pt resting   [x  ]  On stretcher with side rails up and bed in locked position, call bell within reach  [  ]  Sitting in chair with casters locked, call bell within reach    Toileting  [ x ] pt denies need to use bathroom  [  ] pt assisted to bathroom  [  ] pt assisted with bedpan  [  ] pt independent to bathroom as needed    Ongoing Plan of Care  Plan of care and expected time for test and results reviewed with pt.     Pain Management / Comfort  [  ] dimmed lights  [  ] warm blanket provided  [  ] pain assessed  [ x] monitor alarms reviewed

## 2017-04-16 NOTE — ED NOTES
Hourly rounding complete. Safety  Pt resting   [x]  On stretcher with side rails up and bed in locked position, call bell within reach  [  ]  Sitting in chair with casters locked, call bell within reach    Toileting  [ x ] pt denies need to use bathroom  [  ] pt assisted to bathroom  [  ] pt assisted with bedpan  [  ] pt independent to bathroom as needed    Ongoing Plan of Care  Plan of care and expected time for test and results reviewed with pt.     Pain Management / Comfort  [  ] dimmed lights  [  ] warm blanket provided  [  ] pain assessed  [x  ] monitor alarms reviewed

## 2017-04-24 ENCOUNTER — APPOINTMENT (OUTPATIENT)
Dept: GENERAL RADIOLOGY | Age: 37
DRG: 812 | End: 2017-04-24
Attending: EMERGENCY MEDICINE
Payer: MEDICARE

## 2017-04-24 ENCOUNTER — HOSPITAL ENCOUNTER (INPATIENT)
Age: 37
LOS: 3 days | Discharge: HOME | DRG: 812 | End: 2017-04-27
Attending: EMERGENCY MEDICINE | Admitting: INTERNAL MEDICINE
Payer: MEDICARE

## 2017-04-24 DIAGNOSIS — M79.605 BILATERAL LEG PAIN: ICD-10-CM

## 2017-04-24 DIAGNOSIS — M79.604 BILATERAL LEG PAIN: ICD-10-CM

## 2017-04-24 DIAGNOSIS — D57.00 HB-SS DISEASE WITH CRISIS (HCC): Primary | ICD-10-CM

## 2017-04-24 LAB
ANION GAP BLD CALC-SCNC: 8 MMOL/L (ref 3–18)
BASOPHILS # BLD AUTO: 0 K/UL (ref 0–0.06)
BASOPHILS # BLD: 0 % (ref 0–3)
BUN SERPL-MCNC: 7 MG/DL (ref 7–18)
BUN/CREAT SERPL: 8 (ref 12–20)
CALCIUM SERPL-MCNC: 8.7 MG/DL (ref 8.5–10.1)
CHLORIDE SERPL-SCNC: 105 MMOL/L (ref 100–108)
CO2 SERPL-SCNC: 26 MMOL/L (ref 21–32)
CREAT SERPL-MCNC: 0.88 MG/DL (ref 0.6–1.3)
DIFFERENTIAL METHOD BLD: ABNORMAL
EOSINOPHIL # BLD: 0.2 K/UL (ref 0–0.4)
EOSINOPHIL NFR BLD: 1 % (ref 0–5)
ERYTHROCYTE [DISTWIDTH] IN BLOOD BY AUTOMATED COUNT: 23.1 % (ref 11.6–14.5)
GLUCOSE SERPL-MCNC: 95 MG/DL (ref 74–99)
HCT VFR BLD AUTO: 27.2 % (ref 36–48)
HGB BLD-MCNC: 9.8 G/DL (ref 13–16)
LYMPHOCYTES # BLD AUTO: 23 % (ref 20–51)
LYMPHOCYTES # BLD: 3.8 K/UL (ref 0.8–3.5)
MCH RBC QN AUTO: 25.8 PG (ref 24–34)
MCHC RBC AUTO-ENTMCNC: 36 G/DL (ref 31–37)
MCV RBC AUTO: 71.6 FL (ref 74–97)
MONOCYTES # BLD: 1.2 K/UL (ref 0–1)
MONOCYTES NFR BLD AUTO: 7 % (ref 2–9)
NEUTS SEG # BLD: 11.3 K/UL (ref 1.8–8)
NEUTS SEG NFR BLD AUTO: 69 % (ref 42–75)
NRBC BLD-RTO: 2 PER 100 WBC
PLATELET # BLD AUTO: 444 K/UL (ref 135–420)
PLATELET COMMENTS,PCOM: ABNORMAL
PMV BLD AUTO: 9.4 FL (ref 9.2–11.8)
POTASSIUM SERPL-SCNC: 3.5 MMOL/L (ref 3.5–5.5)
RBC # BLD AUTO: 3.8 M/UL (ref 4.7–5.5)
RBC MORPH BLD: ABNORMAL
RETICS/RBC NFR AUTO: 9 % (ref 0.5–2.3)
SODIUM SERPL-SCNC: 139 MMOL/L (ref 136–145)
WBC # BLD AUTO: 16.5 K/UL (ref 4.6–13.2)

## 2017-04-24 PROCEDURE — 74011250636 HC RX REV CODE- 250/636: Performed by: EMERGENCY MEDICINE

## 2017-04-24 PROCEDURE — 80048 BASIC METABOLIC PNL TOTAL CA: CPT | Performed by: EMERGENCY MEDICINE

## 2017-04-24 PROCEDURE — 85025 COMPLETE CBC W/AUTO DIFF WBC: CPT | Performed by: EMERGENCY MEDICINE

## 2017-04-24 PROCEDURE — 65270000029 HC RM PRIVATE

## 2017-04-24 PROCEDURE — 85045 AUTOMATED RETICULOCYTE COUNT: CPT | Performed by: EMERGENCY MEDICINE

## 2017-04-24 PROCEDURE — 96374 THER/PROPH/DIAG INJ IV PUSH: CPT

## 2017-04-24 PROCEDURE — 96375 TX/PRO/DX INJ NEW DRUG ADDON: CPT

## 2017-04-24 PROCEDURE — 99284 EMERGENCY DEPT VISIT MOD MDM: CPT

## 2017-04-24 PROCEDURE — 96376 TX/PRO/DX INJ SAME DRUG ADON: CPT

## 2017-04-24 PROCEDURE — 73590 X-RAY EXAM OF LOWER LEG: CPT

## 2017-04-24 PROCEDURE — 96361 HYDRATE IV INFUSION ADD-ON: CPT

## 2017-04-24 RX ORDER — HYDROMORPHONE HYDROCHLORIDE 1 MG/ML
1 INJECTION, SOLUTION INTRAMUSCULAR; INTRAVENOUS; SUBCUTANEOUS ONCE
Status: COMPLETED | OUTPATIENT
Start: 2017-04-24 | End: 2017-04-24

## 2017-04-24 RX ORDER — DIPHENHYDRAMINE HYDROCHLORIDE 50 MG/ML
25 INJECTION, SOLUTION INTRAMUSCULAR; INTRAVENOUS ONCE
Status: COMPLETED | OUTPATIENT
Start: 2017-04-24 | End: 2017-04-24

## 2017-04-24 RX ORDER — ENOXAPARIN SODIUM 100 MG/ML
40 INJECTION SUBCUTANEOUS EVERY 24 HOURS
Status: DISCONTINUED | OUTPATIENT
Start: 2017-04-25 | End: 2017-04-28 | Stop reason: HOSPADM

## 2017-04-24 RX ORDER — ONDANSETRON 2 MG/ML
4 INJECTION INTRAMUSCULAR; INTRAVENOUS
Status: DISCONTINUED | OUTPATIENT
Start: 2017-04-24 | End: 2017-04-28 | Stop reason: HOSPADM

## 2017-04-24 RX ORDER — SODIUM CHLORIDE 9 MG/ML
150 INJECTION, SOLUTION INTRAVENOUS CONTINUOUS
Status: DISCONTINUED | OUTPATIENT
Start: 2017-04-25 | End: 2017-04-28 | Stop reason: HOSPADM

## 2017-04-24 RX ORDER — HYDROXYUREA 500 MG/1
500 CAPSULE ORAL 2 TIMES DAILY
Status: DISCONTINUED | OUTPATIENT
Start: 2017-04-25 | End: 2017-04-28 | Stop reason: HOSPADM

## 2017-04-24 RX ORDER — HYDROMORPHONE HYDROCHLORIDE 1 MG/ML
1 INJECTION, SOLUTION INTRAMUSCULAR; INTRAVENOUS; SUBCUTANEOUS
Status: DISCONTINUED | OUTPATIENT
Start: 2017-04-24 | End: 2017-04-26

## 2017-04-24 RX ORDER — FOLIC ACID 1 MG/1
1 TABLET ORAL DAILY
Status: DISCONTINUED | OUTPATIENT
Start: 2017-04-25 | End: 2017-04-28 | Stop reason: HOSPADM

## 2017-04-24 RX ORDER — HYDROMORPHONE HYDROCHLORIDE 2 MG/ML
2 INJECTION, SOLUTION INTRAMUSCULAR; INTRAVENOUS; SUBCUTANEOUS ONCE
Status: COMPLETED | OUTPATIENT
Start: 2017-04-24 | End: 2017-04-24

## 2017-04-24 RX ORDER — HYDROMORPHONE HYDROCHLORIDE 1 MG/ML
1 INJECTION, SOLUTION INTRAMUSCULAR; INTRAVENOUS; SUBCUTANEOUS
Status: COMPLETED | OUTPATIENT
Start: 2017-04-24 | End: 2017-04-24

## 2017-04-24 RX ORDER — HYDROMORPHONE HYDROCHLORIDE 1 MG/ML
2 INJECTION, SOLUTION INTRAMUSCULAR; INTRAVENOUS; SUBCUTANEOUS ONCE
Status: COMPLETED | OUTPATIENT
Start: 2017-04-24 | End: 2017-04-24

## 2017-04-24 RX ADMIN — DIPHENHYDRAMINE HYDROCHLORIDE 25 MG: 50 INJECTION, SOLUTION INTRAMUSCULAR; INTRAVENOUS at 12:56

## 2017-04-24 RX ADMIN — HYDROMORPHONE HYDROCHLORIDE 1 MG: 1 INJECTION, SOLUTION INTRAMUSCULAR; INTRAVENOUS; SUBCUTANEOUS at 12:56

## 2017-04-24 RX ADMIN — HYDROMORPHONE HYDROCHLORIDE 2 MG: 1 INJECTION, SOLUTION INTRAMUSCULAR; INTRAVENOUS; SUBCUTANEOUS at 19:28

## 2017-04-24 RX ADMIN — HYDROMORPHONE HYDROCHLORIDE 1 MG: 1 INJECTION, SOLUTION INTRAMUSCULAR; INTRAVENOUS; SUBCUTANEOUS at 17:26

## 2017-04-24 RX ADMIN — HYDROMORPHONE HYDROCHLORIDE 1 MG: 1 INJECTION, SOLUTION INTRAMUSCULAR; INTRAVENOUS; SUBCUTANEOUS at 14:30

## 2017-04-24 RX ADMIN — HYDROMORPHONE HYDROCHLORIDE 2 MG: 2 INJECTION, SOLUTION INTRAMUSCULAR; INTRAVENOUS; SUBCUTANEOUS at 15:50

## 2017-04-24 RX ADMIN — HYDROMORPHONE HYDROCHLORIDE 1 MG: 1 INJECTION, SOLUTION INTRAMUSCULAR; INTRAVENOUS; SUBCUTANEOUS at 22:35

## 2017-04-24 RX ADMIN — SODIUM CHLORIDE 1000 ML: 900 INJECTION, SOLUTION INTRAVENOUS at 14:00

## 2017-04-24 RX ADMIN — SODIUM CHLORIDE 1000 ML: 900 INJECTION, SOLUTION INTRAVENOUS at 12:56

## 2017-04-24 RX ADMIN — SODIUM CHLORIDE 1000 ML: 900 INJECTION, SOLUTION INTRAVENOUS at 19:28

## 2017-04-24 NOTE — IP AVS SNAPSHOT
Summary of Care Report The Summary of Care report has been created to help improve care coordination. Users with access to [employer] or [facility] (Web-based application) may access additional patient information including the Discharge Summary. If you are not currently a [address] user and need more information, please call the number listed below in the [place] [address] section and ask to be connected with Medical Records. Facility Information Name Address Phone [place] [address] [facility] [address] [phone] Patient Information Patient Name Sex MARY Tyler ([id number]) Male 1980 Discharge Information Admitting Provider Service Area Unit Mica Hamilton MD / [phone] [address] [address] / [phone] Discharge Provider Discharge Date/Time Discharge Disposition Destination (none) 2017 Afternoon (Pending) AHR (none) Patient Language Language ENGLISH [13] Hospital Problems as of 2017  Reviewed: 2016  6:20 AM by Lashawn Hunt MD  
  
  
  
 Class Noted - Resolved Last Modified POA Active Problems Bilateral leg pain  2017 - Present 2017 by Kali Garcia MD Unknown Entered by Kali Garcia MD  
  Vaso-occlusive sickle cell crisis [facility])  2017 - Present 2017 by Kali Garcia MD Unknown Entered by Kali Garcia MD  
  
Non-Hospital Problems as of 2017  Reviewed: 2016  6:20 AM by Lashawn Hunt MD  
  
  
  
 Class Noted - Resolved Last Modified Active Problems Sickle cell disease ([facility]  2013 - Present 2016 by Lashawn Hunt MD  
  Entered by Sumanth Mcneil Sickle cell crisis ([facility]  2014 - Present 2016 by Lashawn Hunt MD  
  Entered by Sumanth Mcneil Sickle cell pain crisis ([facility]  8/15/2014 - Present 1/10/2017 by Skylar Winter MD  
  Entered by Deanna Sickle cell anemia with crisis ([facility]  5/7/2015 - Present 7/15/2015 by Mark Willams MD  
  Entered by Senia Wynn MD  
  Pain, generalized  4/5/2016 - Present 4/5/2016 by Susana Inman NP Entered by Susana Inman NP Hemolytic anemia ([facility]  12/11/2016 - Present 12/11/2016 by Lauryn Rosen MD  
  Entered by Lauryn Rosen MD  
  SC disease [facility])  12/11/2016 - Present 12/12/2016 by Yenifer Baldwin MD  
  Entered by Lauryn Rosen MD  
  Sickle cell anemia ([facility]  1/19/2017 - Present 1/19/2017 by Mark Willams MD  
  Entered by Mark Willams MD  
  
You are allergic to the following Allergen Reactions Eggshell Membrane Nausea and Vomiting Milk Nausea and Vomiting Current Discharge Medication List  
  
CONTINUE these medications which have NOT CHANGED Dose & Instructions Dispensing Information Comments  
 fentaNYL 25 mcg/hr PATCH Commonly known as:  [place]ta Pool Dose:  1 Patch 1 Patch by TransDERmal route every seventy-two (72) hours. Max Daily Amount: 1 Patch. Quantity:  5 Patch Refills:  0  
   
 folic acid 1 mg tablet Commonly known as:  Google Dose:  1 mg Take 1 Tab by mouth daily. Quantity:  30 Tab Refills:  1 HYDROmorphone 2 mg tablet Commonly known as:  DILAUDID Dose:  2 mg Take 1 Tab by mouth every six (6) hours as needed for Pain. Max Daily Amount: 8 mg. Indications: Pain Quantity:  12 Tab Refills:  0  
   
 hydroxyurea 500 mg capsule Commonly known as:  HYDREA Dose:  500 mg Take 500 mg by mouth two (2) times a day. Indications: SICKLE CELL ANEMIA WITH CRISIS Refills:  0  
   
 ibuprofen 800 mg tablet Commonly known as:  MOTRIN Dose:  800 mg Take 800 mg by mouth every eight (8) hours as needed for Pain. Refills:  0 Current Immunizations Name Date Influenza Vaccine (Quad) PF 12/14/2016, 10/17/2015 Pneumococcal Polysaccharide (PPSV-23) 10/17/2015 Follow-up Information Follow up With Details Comments Contact Jerry Burciaga MD On 5/15/2017 @ 12:45 [address] [place] [address] 
[phone] Discharge Instructions Sickle Cell Crisis: Care Instructions Your Care Instructions Sickle cell crisis is a painful episode that may begin suddenly in a person with sickle cell disease. Sickle cell disease turns normal, round red blood cells into cells that look like jeane or crescent moons. The sickle-shaped cells can get stuck in blood vessels, blocking blood flow and causing severe pain. The pain can occur in the bones of the spine, the arms and legs, the chest, and the abdomen. An episode may be called a \"painful event\" or \"painful crisis. \" Some people who have sickle cell disease have many painful events, while others have few or none. Treatment depends on the level of pain and how long it lasts. Sometimes taking nonprescription pain relievers can help. Or you may need stronger pain relief medicine that is prescribed or given by a doctor. You may need to be treated in the hospital. 
It isn't always possible to know what sets off a painful event. But triggers include being dehydrated, cold temperatures, infection, stress, and not getting enough oxygen. Follow-up care is a key part of your treatment and safety. Be sure to make and go to all appointments, and call your doctor if you are having problems. It's also a good idea to know your test results and keep a list of the medicines you take. How can you care for yourself at home? · Create a pain management plan with your doctor. This plan should include the types of medicines you can take and other actions you can take at home to relieve pain.  
· Drink plenty of fluids, enough so that your urine is light yellow or clear like water. If you have kidney, heart, or liver disease and have to limit fluids, talk with your doctor before you increase the amount of fluids you drink. · Take your medicines exactly as prescribed. Call your doctor if you think you are having a problem with your medicine. · Take pain medicines exactly as directed. ¨ If the doctor gave you a prescription medicine for pain, take it as prescribed. ¨ If you are not taking a prescription pain medicine, ask your doctor if you can take an over-the-counter medicine. · Avoid alcohol. It can make you dehydrated. · Dress warmly in cold weather. The cold and windy weather can lead to severe pain. · Do not smoke. Smoking can reduce the amount of oxygen in your blood. · Get plenty of sleep. When should you call for help? Call 911 anytime you think you may need emergency care. For example, call if: 
· You passed out (lost consciousness). Call your doctor now or seek immediate medical care if: 
· You are in severe pain. · You are dizzy or lightheaded, or you feel like you may faint. · You have a fever. · You are short of breath. · Your symptoms are getting worse. Watch closely for changes in your health, and be sure to contact your doctor if you are not getting better as expected. Where can you learn more? Go to http://manuel-amadeo.info/. Enter F104 in the search box to learn more about \"Sickle Cell Crisis: Care Instructions. \" Current as of: October 13, 2016 Content Version: 11.2 © 1001-2006 [employer]. Care instructions adapted under license by [employer] (which disclaims liability or warranty for this information). If you have questions about a medical condition or this instruction, always ask your healthcare professional. [place] [address] any warranty or liability for your use of this information. Sickle Cell Crisis: Care Instructions Your Care Instructions Sickle cell crisis is a painful episode that may begin suddenly in a person with sickle cell disease. Sickle cell disease turns normal, round red blood cells into cells that look like jeane or crescent moons. The sickle-shaped cells can get stuck in blood vessels, blocking blood flow and causing severe pain. The pain can occur in the bones of the spine, the arms and legs, the chest, and the abdomen. An episode may be called a \"painful event\" or \"painful crisis. \" Some people who have sickle cell disease have many painful events, while others have few or none. Treatment depends on the level of pain and how long it lasts. Sometimes taking nonprescription pain relievers can help. Or you may need stronger pain relief medicine that is prescribed or given by a doctor. You may need to be treated in the hospital. 
It isn't always possible to know what sets off a painful event. But triggers include being dehydrated, cold temperatures, infection, stress, and not getting enough oxygen. Follow-up care is a key part of your treatment and safety. Be sure to make and go to all appointments, and call your doctor if you are having problems. It's also a good idea to know your test results and keep a list of the medicines you take. How can you care for yourself at home? · Create a pain management plan with your doctor. This plan should include the types of medicines you can take and other actions you can take at home to relieve pain. · Drink plenty of fluids, enough so that your urine is light yellow or clear like water. If you have kidney, heart, or liver disease and have to limit fluids, talk with your doctor before you increase the amount of fluids you drink. · Take your medicines exactly as prescribed. Call your doctor if you think you are having a problem with your medicine. · Take pain medicines exactly as directed. ¨ If the doctor gave you a prescription medicine for pain, take it as prescribed. ¨ If you are not taking a prescription pain medicine, ask your doctor if you can take an over-the-counter medicine. · Avoid alcohol. It can make you dehydrated. · Dress warmly in cold weather. The cold and windy weather can lead to severe pain. · Do not smoke. Smoking can reduce the amount of oxygen in your blood. · Get plenty of sleep. When should you call for help? Call 911 anytime you think you may need emergency care. For example, call if: 
· You passed out (lost consciousness). Call your doctor now or seek immediate medical care if: 
· You are in severe pain. · You are dizzy or lightheaded, or you feel like you may faint. · You have a fever. · You are short of breath. · Your symptoms are getting worse. Watch closely for changes in your health, and be sure to contact your doctor if you are not getting better as expected. Where can you learn more? Go to http://manuel-amadeo.info/. Enter F104 in the search box to learn more about \"Sickle Cell Crisis: Care Instructions. \" Current as of: October 13, 2016 Content Version: 11.2 © 6900-2610 [employer]. Care instructions adapted under license by [employer] (which disclaims liability or warranty for this information). If you have questions about a medical condition or this instruction, always ask your healthcare professional. [place] [address] any warranty or liability for your use of this information. Chart Review Routing History Recipient Method Report Sent By Kel Workman MD [[id number]] 2/1/2012  3:46 AM 02/01/2012 Dona Sanford MD  
Fax: [phone] Phone: [phone] Fax IP Auto Routed MD Moraima [[id number]] 11/1/2013  3:09 PM 11/01/2013 Yomaira Greene MD  
Phone: [phone] In [employer] IP Auto Routesam Valera MD [[id number]] 11/1/2013  3:09 PM 11/01/2013  Leyda Candelaria MD  
 Phone: [phone] In [employer] IP Auto Routed MD Moraima [[id number]] 11/1/2013  3:09 PM 11/01/2013 Leah Gregg MD  
Fax: [phone] Phone: [phone] Fax IP Auto Routed MD Moraima [[id number]] 2/17/2015  2:48 PM 02/17/2015 Apolinar Mata MD  
Phone: [phone] In [employer] IP Auto Routed MD Moraima [[id number]] 2/17/2015  2:48 PM 02/17/2015 Nidhi Corona MD  
Phone: [phone] In [employer] IP Auto Routed MD Moraima [[id number]] 2/17/2015  2:48 PM 02/17/2015 Leah Gregg MD  
Fax: [phone] Phone: [phone] Fax IP Auto Routed Gisell Can MD [[id number]] 2/23/2015  5:46 PM 02/23/2015 Vira Yu MD  
Phone: [phone] In [employer] IP Auto Routed Gisell Can MD [[id number]] 2/23/2015  5:46 PM 02/23/2015 Nidhi Corona MD  
Phone: [phone] In [employer] IP Auto Routed Gisell Can MD [[id number]] 2/23/2015  5:46 PM 02/23/2015 Leah Gregg MD  
Fax: [phone] Phone: [phone] Fax IP Auto Routed Trans Vira Yu MD [[id number]] 5/8/2015  4:09 PM 05/08/2015 Vira Yu MD  
Phone: [phone] In Basket IP Auto Routed Trans Vira Yu MD [[id number]] 5/8/2015  4:09 PM 05/08/2015 Leah Gregg MD  
Fax: [phone] Phone: [phone] Fax IP Auto Routed Lisa Lenz MD [[id number]] 5/11/2015  8:18 AM 05/11/2015 Vira Yu MD  
Phone: [phone] In [employer] IP Auto Routed Lisa Lenz MD [[id number]] 5/11/2015  8:18 AM 05/11/2015 Leah Gregg MD  
Fax: [phone] Phone: [phone] Fax IP Auto Routed Trans Vira Yu MD [[id number]] 5/12/2015  4:10 PM 05/12/2015 Vira Yu MD  
Phone: [phone] In Basket IP Auto Routed Trans MD Mauricio Chambers 5/12/2015  4:10 PM 05/12/2015  
 [facility] Fax: [phone]  Fax [facility] IP AMB RESULT REPORT Jolene Vargas [[id number]] 7/15/2015 6:34 PM 07/15/2015 Shady Munoz MD  
Fax: [phone] Phone: [phone] Fax Merry Hudson Routed Mary Fitch MD [[id number]] 7/17/2015  9:15 AM 07/17/2015 Kennedy Alejandre MD  
Fax: [phone] Phone: [phone] Fax Merry Hudson Routed Mary Fitch MD [[id number]] 7/17/2015  9:15 AM 07/17/2015 Shady Munoz MD  
Fax: [phone] Phone: [phone] Fax Merry Hudson Routed Mary Fitch MD [[id number]] 7/22/2015  9:04 AM 07/22/2015 Kennedy Alejandre MD  
Fax: [phone] Phone: [phone] Fax Merry Hudson Routed Mary Fitch MD [[id number]] 7/22/2015  9:04 AM 07/22/2015 Kennedy Alejandre MD  
Fax: [phone] Phone: [phone] Fax IP Auto Routed Bernice Sánchez III, MD [[id number]] 10/17/2015  5:45 PM 10/17/2015 Shady Munoz MD  
Fax: [phone] Phone: [phone] Fax IP Auto Routed Bernice Sánchez III, MD [[id number]] 10/17/2015  5:45 PM 10/17/2015 Ruben Valera MD  
Phone: [phone] In Basket IP Auto Routed Bernice Sánchez III, MD [[id number]] 10/17/2015  5:45 PM 10/17/2015 Shady Munoz MD  
Fax: [phone] Phone: [phone] Fax IP Auto Routed MD Moraima [[id number]] 12/23/2016  5:54 PM 12/23/2016 Shady Munoz MD  
Fax: [phone] Phone: [phone] Fax IP Auto Routed Trans Suad Quiroz MD [[id number]] 1/10/2017  8:25 PM 01/10/2017 Shady Munoz MD  
Fax: [phone] Phone: [phone] Fax IP Auto Routed Magi Baron MD [[id number]] 1/16/2017  3:18 PM 01/16/2017

## 2017-04-24 NOTE — ED PROVIDER NOTES
HPI Comments:   12:58 PM Marcos Neal is a 43 y.o. male with a h/o sickle cell, who presents to the ED for the evaluation of sickle cell crisis. Pt currently c/o bilateral leg pain, onset 0600 this morning. No fever, N/V, CP, cough, congestion, rhinorrhea, or abd pain. Pt states that he usually has sickle cell pain in his back. States that his last infusion was last year. No relieving or exacerbating factors. No other complaints at this time. PCP: Elpidio Burciaga MD     The history is provided by the patient. Past Medical History:   Diagnosis Date    Sickle cell anemia with crisis ([facility]     Vitamin D deficiency        History reviewed. No pertinent surgical history. Family History:   Problem Relation Age of Onset    Cancer Neg Hx     Diabetes Neg Hx     Heart Disease Neg Hx     Heart Attack Neg Hx     Hypertension Neg Hx     Stroke Neg Hx        Social History     Social History    Marital status:      Spouse name: N/A    Number of children: N/A    Years of education: N/A     Occupational History    Not on file. Social History Main Topics    Smoking status: Never Smoker    Smokeless tobacco: Never Used    Alcohol use No    Drug use: No    Sexual activity: Yes     Partners: Female     Birth control/ protection: None     Other Topics Concern    Not on file     Social History Narrative         ALLERGIES: Eggshell membrane and Milk    Review of Systems   Constitutional: Negative for chills, fatigue, fever and unexpected weight change. HENT: Negative for congestion and rhinorrhea. Respiratory: Negative for chest tightness and shortness of breath. Cardiovascular: Negative for chest pain, palpitations and leg swelling. Gastrointestinal: Negative for abdominal pain, nausea and vomiting. Genitourinary: Negative for dysuria. Musculoskeletal: Positive for myalgias. Negative for back pain. Skin: Negative for rash.    Neurological: Negative for dizziness and weakness. Psychiatric/Behavioral: The patient is not nervous/anxious. Vitals:    04/24/17 1230   BP: 116/71   Pulse: 93   Resp: 16   Temp: 98.3 °F (36.8 °C)   SpO2: 99%   Weight: 79.4 kg (175 lb)   Height: 6' (1.829 m)        99% within normal limits     Physical Exam   Constitutional: He is oriented to person, place, and time. He appears well-developed and well-nourished. No distress. HENT:   Head: Normocephalic and atraumatic. Right Ear: External ear normal.   Left Ear: External ear normal.   Nose: Nose normal.   Mouth/Throat: Oropharynx is clear and moist. Mucous membranes are dry. Eyes: Conjunctivae and EOM are normal. Pupils are equal, round, and reactive to light. No scleral icterus. Neck: Normal range of motion. Neck supple. No JVD present. No tracheal deviation present. No thyromegaly present. Cardiovascular: Normal rate, regular rhythm, normal heart sounds and intact distal pulses. Exam reveals no gallop and no friction rub. No murmur heard. Pulmonary/Chest: Effort normal and breath sounds normal. He exhibits no tenderness. Abdominal: Soft. Bowel sounds are normal. He exhibits no distension. There is no tenderness. There is no rebound and no guarding. Musculoskeletal: Normal range of motion. He exhibits no edema or tenderness. Lymphadenopathy:     He has no cervical adenopathy. Neurological: He is alert and oriented to person, place, and time. No cranial nerve deficit. Coordination normal.   No sensory loss, Gait normal, Motor 5/5   Skin: Skin is warm and dry. Psychiatric: He has a normal mood and affect. His behavior is normal. Judgment and thought content normal.   Nursing note and vitals reviewed. MDM  Number of Diagnoses or Management Options  Bilateral leg pain:   Hb-SS disease with crisis [facility]):   Diagnosis management comments:   1:03 PM Bhanu Camejo is a 43 y.o. male with acute sickle crisis, symptomatic treatment.  Reevaluation  Check patient's hemoglobin  Hydrate      ED Course       Procedures    Medications ordered:   Medications   sodium chloride 0.9 % bolus infusion 1,000 mL (0 mL IntraVENous IV Completed 4/24/17 1551)   HYDROmorphone (PF) (DILAUDID) injection 1 mg (1 mg IntraVENous Given 4/24/17 1256)   diphenhydrAMINE (BENADRYL) injection 25 mg (25 mg IntraVENous Given 4/24/17 1256)   sodium chloride 0.9 % bolus infusion 1,000 mL (0 mL IntraVENous IV Completed 4/24/17 1726)   HYDROmorphone (PF) (DILAUDID) injection 1 mg (1 mg IntraVENous Given 4/24/17 1430)   HYDROmorphone (PF) (DILAUDID) injection 2 mg (2 mg IntraVENous Given 4/24/17 1550)   HYDROmorphone (PF) (DILAUDID) injection 1 mg (1 mg IntraVENous Given 4/24/17 1726)         Lab findings:  Labs Reviewed   CBC WITH AUTOMATED DIFF - Abnormal; Notable for the following:        Result Value    WBC 16.5 (*)     RBC 3.80 (*)     HGB 9.8 (*)     HCT 27.2 (*)     MCV 71.6 (*)     RDW 23.1 (*)     PLATELET 284 (*)     NRBC 2.0 (*)     ABS. NEUTROPHILS 11.3 (*)     ABS. LYMPHOCYTES 3.8 (*)     ABS. MONOCYTES 1.2 (*)     All other components within normal limits   RETICULOCYTE COUNT - Abnormal; Notable for the following:     Reticulocyte count 9.0 (*)     All other components within normal limits   METABOLIC PANEL, BASIC - Abnormal; Notable for the following:     BUN/Creatinine ratio 8 (*)     All other components within normal limits         X-Ray, CT or other radiology findings or impressions:  No results found. Progress notes, Consult notes or additional Procedure notes:   3:11 PM: Rechecked patient. Updated patient on all ED findings. All questions answered. Feels better, but would like another dose of pain meds. 3:42 PM: I have reassessed the patient and discussed their results and diagnosis. Pt will be discharged in stable condition. Patient understands and verbalizes agreement with plan. Reevaluation of patient:   I have reevaluated patient.  Patient is feeling better, but still has some pain. Will follow up in the clinic    Dispo:  Patient was discharged home in stable condition. Patient is to return to emergency department with any new or worsening condition. 1. Hb-SS disease with crisis ([address]    2. Bilateral leg pain        Follow-up Information     Follow up With Details Comments Contact Info    Leah Gregg MD Call in 2 days  [address] [address]      [facility] EMERGENCY DEPT  As needed, If symptoms worsen [address] [facility] [address]  [phone]          Patient's Medications   Start Taking    No medications on file   Continue Taking    FENTANYL (DURAGESIC) 25 MCG/HR PATCH    1 Patch by TransDERmal route every seventy-two (72) hours. Max Daily Amount: 1 Patch. FOLIC ACID (FOLVITE) 1 MG TABLET    Take 1 Tab by mouth daily. HYDROMORPHONE (DILAUDID) 2 MG TABLET    Take 1 Tab by mouth every six (6) hours as needed for Pain. Max Daily Amount: 8 mg. Indications: Pain    HYDROXYUREA (HYDREA) 500 MG CAPSULE    Take 500 mg by mouth two (2) times a day. Indications: SICKLE CELL ANEMIA WITH CRISIS    IBUPROFEN (MOTRIN) 800 MG TABLET    Take 800 mg by mouth every eight (8) hours as needed for Pain. These Medications have changed    No medications on file   Stop Taking    No medications on file       SCRIBE ATTESTATION STATEMENT  Documented by: Marjan Buenrostro. Harolyn Areas for, and in the presence of, Ana Cintron MD 12:58 PM     Signed by: Marjan Buenrostro. [address], 4/24/2017 12:58 PM     PROVIDER ATTESTATION STATEMENT  I personally performed the services described in the documentation, reviewed the documentation, as recorded by the scribe in my presence, and it accurately and completely records my words and actions. Ana Cintron MD    Patient's care is discussed and questions answered. Oncoming physician introduced to the patient.   Patient signed off to the oncoming ED physician,  7:15 PM  Patient is awaiting reevaluation after medication , potential to go home if improves  Please reevaluate prior to disposition.

## 2017-04-24 NOTE — IP AVS SNAPSHOT
Current Discharge Medication List  
  
CONTINUE these medications which have NOT CHANGED Dose & Instructions Dispensing Information Comments Morning Noon Evening Bedtime  
 fentaNYL 25 mcg/hr PATCH Commonly known as:  Diana Moh Your last dose was: Your next dose is:    
   
   
 Dose:  1 Patch 1 Patch by TransDERmal route every seventy-two (72) hours. Max Daily Amount: 1 Patch. Quantity:  5 Patch Refills:  0  
     
   
   
   
  
 folic acid 1 mg tablet Commonly known as:  Google Your last dose was: Your next dose is:    
   
   
 Dose:  1 mg Take 1 Tab by mouth daily. Quantity:  30 Tab Refills:  1 HYDROmorphone 2 mg tablet Commonly known as:  DILAUDID Your last dose was: Your next dose is:    
   
   
 Dose:  2 mg Take 1 Tab by mouth every six (6) hours as needed for Pain. Max Daily Amount: 8 mg. Indications: Pain Quantity:  12 Tab Refills:  0  
     
   
   
   
  
 hydroxyurea 500 mg capsule Commonly known as:  HYDREA Your last dose was: Your next dose is:    
   
   
 Dose:  500 mg Take 500 mg by mouth two (2) times a day. Indications: SICKLE CELL ANEMIA WITH CRISIS Refills:  0  
     
   
   
   
  
 ibuprofen 800 mg tablet Commonly known as:  MOTRIN Your last dose was: Your next dose is:    
   
   
 Dose:  800 mg Take 800 mg by mouth every eight (8) hours as needed for Pain. Refills:  0

## 2017-04-24 NOTE — DISCHARGE INSTRUCTIONS
Sickle Cell Crisis: Care Instructions  Your Care Instructions    Sickle cell crisis is a painful episode that may begin suddenly in a person with sickle cell disease. Sickle cell disease turns normal, round red blood cells into cells that look like jeane or crescent moons. The sickle-shaped cells can get stuck in blood vessels, blocking blood flow and causing severe pain. The pain can occur in the bones of the spine, the arms and legs, the chest, and the abdomen. An episode may be called a \"painful event\" or \"painful crisis. \" Some people who have sickle cell disease have many painful events, while others have few or none. Treatment depends on the level of pain and how long it lasts. Sometimes taking nonprescription pain relievers can help. Or you may need stronger pain relief medicine that is prescribed or given by a doctor. You may need to be treated in the hospital.  It isn't always possible to know what sets off a painful event. But triggers include being dehydrated, cold temperatures, infection, stress, and not getting enough oxygen. Follow-up care is a key part of your treatment and safety. Be sure to make and go to all appointments, and call your doctor if you are having problems. It's also a good idea to know your test results and keep a list of the medicines you take. How can you care for yourself at home? · Create a pain management plan with your doctor. This plan should include the types of medicines you can take and other actions you can take at home to relieve pain. · Drink plenty of fluids, enough so that your urine is light yellow or clear like water. If you have kidney, heart, or liver disease and have to limit fluids, talk with your doctor before you increase the amount of fluids you drink. · Take your medicines exactly as prescribed. Call your doctor if you think you are having a problem with your medicine. · Take pain medicines exactly as directed.   ¨ If the doctor gave you a prescription medicine for pain, take it as prescribed. ¨ If you are not taking a prescription pain medicine, ask your doctor if you can take an over-the-counter medicine. · Avoid alcohol. It can make you dehydrated. · Dress warmly in cold weather. The cold and windy weather can lead to severe pain. · Do not smoke. Smoking can reduce the amount of oxygen in your blood. · Get plenty of sleep. When should you call for help? Call 911 anytime you think you may need emergency care. For example, call if:  · You passed out (lost consciousness). Call your doctor now or seek immediate medical care if:  · You are in severe pain. · You are dizzy or lightheaded, or you feel like you may faint. · You have a fever. · You are short of breath. · Your symptoms are getting worse. Watch closely for changes in your health, and be sure to contact your doctor if you are not getting better as expected. Where can you learn more? Go to http://manuel-amadeo.info/. Enter F104 in the search box to learn more about \"Sickle Cell Crisis: Care Instructions. \"  Current as of: October 13, 2016  Content Version: 11.2  © 2590-3988 [employer]. Care instructions adapted under license by [employer] (which disclaims liability or warranty for this information). If you have questions about a medical condition or this instruction, always ask your healthcare professional. [place] [address] any warranty or liability for your use of this information. Sickle Cell Crisis: Care Instructions  Your Care Instructions    Sickle cell crisis is a painful episode that may begin suddenly in a person with sickle cell disease. Sickle cell disease turns normal, round red blood cells into cells that look like jeane or crescent moons. The sickle-shaped cells can get stuck in blood vessels, blocking blood flow and causing severe pain.  The pain can occur in the bones of the spine, the arms and legs, the chest, and the abdomen. An episode may be called a \"painful event\" or \"painful crisis. \" Some people who have sickle cell disease have many painful events, while others have few or none. Treatment depends on the level of pain and how long it lasts. Sometimes taking nonprescription pain relievers can help. Or you may need stronger pain relief medicine that is prescribed or given by a doctor. You may need to be treated in the hospital.  It isn't always possible to know what sets off a painful event. But triggers include being dehydrated, cold temperatures, infection, stress, and not getting enough oxygen. Follow-up care is a key part of your treatment and safety. Be sure to make and go to all appointments, and call your doctor if you are having problems. It's also a good idea to know your test results and keep a list of the medicines you take. How can you care for yourself at home? · Create a pain management plan with your doctor. This plan should include the types of medicines you can take and other actions you can take at home to relieve pain. · Drink plenty of fluids, enough so that your urine is light yellow or clear like water. If you have kidney, heart, or liver disease and have to limit fluids, talk with your doctor before you increase the amount of fluids you drink. · Take your medicines exactly as prescribed. Call your doctor if you think you are having a problem with your medicine. · Take pain medicines exactly as directed. ¨ If the doctor gave you a prescription medicine for pain, take it as prescribed. ¨ If you are not taking a prescription pain medicine, ask your doctor if you can take an over-the-counter medicine. · Avoid alcohol. It can make you dehydrated. · Dress warmly in cold weather. The cold and windy weather can lead to severe pain. · Do not smoke. Smoking can reduce the amount of oxygen in your blood. · Get plenty of sleep. When should you call for help?   Call 911 anytime you think you may need emergency care. For example, call if:  · You passed out (lost consciousness). Call your doctor now or seek immediate medical care if:  · You are in severe pain. · You are dizzy or lightheaded, or you feel like you may faint. · You have a fever. · You are short of breath. · Your symptoms are getting worse. Watch closely for changes in your health, and be sure to contact your doctor if you are not getting better as expected. Where can you learn more? Go to http://manuel-amadeo.info/. Enter F104 in the search box to learn more about \"Sickle Cell Crisis: Care Instructions. \"  Current as of: October 13, 2016  Content Version: 11.2  © 3593-7411 [employer]. Care instructions adapted under license by [employer] (which disclaims liability or warranty for this information). If you have questions about a medical condition or this instruction, always ask your healthcare professional. [address] any warranty or liability for your use of this information.

## 2017-04-24 NOTE — IP AVS SNAPSHOT
[address] [address] [address] Patient: Chris Mendoza MRN: [id number] GEV:[id number]/42/[id number] You are allergic to the following Allergen Reactions Eggshell Membrane Nausea and Vomiting Milk Nausea and Vomiting Recent Documentation Height Weight BMI Smoking Status 1.829 m 79.4 kg 23.73 kg/m2 Never Smoker Emergency Contacts Name Discharge Info Relation Home Work Mobile [address] CAREGIVER [3] Spouse [3] [phone] Chari Hutchison  Mother [14] [phone] About your hospitalization You were admitted on:  April 24, 2017 You last received care in the:  [facility] [address] You were discharged on:  April 27, 2017 Unit phone number:  [phone] Why you were hospitalized Your primary diagnosis was:  Not on File Your diagnoses also included:  Bilateral Leg Pain, Vaso-Occlusive Sickle Cell Crisis (Hcc) Providers Seen During Your Hospitalizations Provider Role Specialty Primary office phone Ivone Rankin MD Attending Provider Emergency Medicine [phone] Radha Cedeño MD Attending Provider Emergency Medicine [phone] Sacha Almeida MD Attending Provider Internal Medicine [phone] Boone Lal MD Attending Provider Internal Medicine [phone] Ravi Mcdonald MD Attending Provider Internal Medicine [phone] Your Primary Care Physician (PCP) Primary Care Physician Office Phone Office Fax [address] [facility], [address] [place] [address] [phone] Follow-up Information Follow up With Details Comments Contact Info Kelly Merlin, MD On 5/15/2017 @ 12:45 [address] [facility] 
[phone] Current Discharge Medication List  
  
CONTINUE these medications which have NOT CHANGED Dose & Instructions Dispensing Information Comments Morning Noon Evening Bedtime  
 fentaNYL 25 mcg/hr PATCH Commonly known as:  Meliza Lynch Your last dose was: Your next dose is:    
   
   
 Dose:  1 Patch 1 Patch by TransDERmal route every seventy-two (72) hours. Max Daily Amount: 1 Patch. Quantity:  5 Patch Refills:  0  
     
   
   
   
  
 folic acid 1 mg tablet Commonly known as:  Google Your last dose was: Your next dose is:    
   
   
 Dose:  1 mg Take 1 Tab by mouth daily. Quantity:  30 Tab Refills:  1 HYDROmorphone 2 mg tablet Commonly known as:  DILAUDID Your last dose was: Your next dose is:    
   
   
 Dose:  2 mg Take 1 Tab by mouth every six (6) hours as needed for Pain. Max Daily Amount: 8 mg. Indications: Pain Quantity:  12 Tab Refills:  0  
     
   
   
   
  
 hydroxyurea 500 mg capsule Commonly known as:  HYDREA Your last dose was: Your next dose is:    
   
   
 Dose:  500 mg Take 500 mg by mouth two (2) times a day. Indications: SICKLE CELL ANEMIA WITH CRISIS Refills:  0  
     
   
   
   
  
 ibuprofen 800 mg tablet Commonly known as:  MOTRIN Your last dose was: Your next dose is:    
   
   
 Dose:  800 mg Take 800 mg by mouth every eight (8) hours as needed for Pain. Refills:  0 Discharge Instructions Sickle Cell Crisis: Care Instructions Your Care Instructions Sickle cell crisis is a painful episode that may begin suddenly in a person with sickle cell disease. Sickle cell disease turns normal, round red blood cells into cells that look like jeane or crescent moons. The sickle-shaped cells can get stuck in blood vessels, blocking blood flow and causing severe pain. The pain can occur in the bones of the spine, the arms and legs, the chest, and the abdomen. An episode may be called a \"painful event\" or \"painful crisis. \" Some people who have sickle cell disease have many painful events, while others have few or none. Treatment depends on the level of pain and how long it lasts. Sometimes taking nonprescription pain relievers can help. Or you may need stronger pain relief medicine that is prescribed or given by a doctor. You may need to be treated in the hospital. 
It isn't always possible to know what sets off a painful event. But triggers include being dehydrated, cold temperatures, infection, stress, and not getting enough oxygen. Follow-up care is a key part of your treatment and safety. Be sure to make and go to all appointments, and call your doctor if you are having problems. It's also a good idea to know your test results and keep a list of the medicines you take. How can you care for yourself at home? · Create a pain management plan with your doctor. This plan should include the types of medicines you can take and other actions you can take at home to relieve pain. · Drink plenty of fluids, enough so that your urine is light yellow or clear like water. If you have kidney, heart, or liver disease and have to limit fluids, talk with your doctor before you increase the amount of fluids you drink. · Take your medicines exactly as prescribed. Call your doctor if you think you are having a problem with your medicine. · Take pain medicines exactly as directed. ¨ If the doctor gave you a prescription medicine for pain, take it as prescribed. ¨ If you are not taking a prescription pain medicine, ask your doctor if you can take an over-the-counter medicine. · Avoid alcohol. It can make you dehydrated. · Dress warmly in cold weather. The cold and windy weather can lead to severe pain. · Do not smoke. Smoking can reduce the amount of oxygen in your blood. · Get plenty of sleep. When should you call for help? Call 911 anytime you think you may need emergency care. For example, call if: 
· You passed out (lost consciousness). Call your doctor now or seek immediate medical care if: 
· You are in severe pain. · You are dizzy or lightheaded, or you feel like you may faint. · You have a fever. · You are short of breath. · Your symptoms are getting worse. Watch closely for changes in your health, and be sure to contact your doctor if you are not getting better as expected. Where can you learn more? Go to http://manuel-amadeo.info/. Enter F104 in the search box to learn more about \"Sickle Cell Crisis: Care Instructions. \" Current as of: October 13, 2016 Content Version: 11.2 © 9845-9729 [employer]. Care instructions adapted under license by [employer] (which disclaims liability or warranty for this information). If you have questions about a medical condition or this instruction, always ask your healthcare professional. [place] [address] any warranty or liability for your use of this information. Sickle Cell Crisis: Care Instructions Your Care Instructions Sickle cell crisis is a painful episode that may begin suddenly in a person with sickle cell disease. Sickle cell disease turns normal, round red blood cells into cells that look like jeane or crescent moons. The sickle-shaped cells can get stuck in blood vessels, blocking blood flow and causing severe pain. The pain can occur in the bones of the spine, the arms and legs, the chest, and the abdomen. An episode may be called a \"painful event\" or \"painful crisis. \" Some people who have sickle cell disease have many painful events, while others have few or none. Treatment depends on the level of pain and how long it lasts. Sometimes taking nonprescription pain relievers can help. Or you may need stronger pain relief medicine that is prescribed or given by a doctor.  You may need to be treated in the hospital. 
It isn't always possible to know what sets off a painful event. But triggers include being dehydrated, cold temperatures, infection, stress, and not getting enough oxygen. Follow-up care is a key part of your treatment and safety. Be sure to make and go to all appointments, and call your doctor if you are having problems. It's also a good idea to know your test results and keep a list of the medicines you take. How can you care for yourself at home? · Create a pain management plan with your doctor. This plan should include the types of medicines you can take and other actions you can take at home to relieve pain. · Drink plenty of fluids, enough so that your urine is light yellow or clear like water. If you have kidney, heart, or liver disease and have to limit fluids, talk with your doctor before you increase the amount of fluids you drink. · Take your medicines exactly as prescribed. Call your doctor if you think you are having a problem with your medicine. · Take pain medicines exactly as directed. ¨ If the doctor gave you a prescription medicine for pain, take it as prescribed. ¨ If you are not taking a prescription pain medicine, ask your doctor if you can take an over-the-counter medicine. · Avoid alcohol. It can make you dehydrated. · Dress warmly in cold weather. The cold and windy weather can lead to severe pain. · Do not smoke. Smoking can reduce the amount of oxygen in your blood. · Get plenty of sleep. When should you call for help? Call 911 anytime you think you may need emergency care. For example, call if: 
· You passed out (lost consciousness). Call your doctor now or seek immediate medical care if: 
· You are in severe pain. · You are dizzy or lightheaded, or you feel like you may faint. · You have a fever. · You are short of breath. · Your symptoms are getting worse.  
Watch closely for changes in your health, and be sure to contact your doctor if you are not getting better as expected. Where can you learn more? Go to http://manuel-amadeo.info/. Enter F104 in the search box to learn more about \"Sickle Cell Crisis: Care Instructions. \" Current as of: October 13, 2016 Content Version: 11.2 © 3569-6223 [employer]. Care instructions adapted under license by [employer] (which disclaims liability or warranty for this information). If you have questions about a medical condition or this instruction, always ask your healthcare professional. [address] any warranty or liability for your use of this information. Discharge Orders None KLab Announcement We are excited to announce that we are making your provider's discharge notes available to you in KLab. You will see these notes when they are completed and signed by the physician that discharged you from your recent hospital stay. If you have any questions or concerns about any information you see in KLab, please call the Health Information Department where you were seen or reach out to your Primary Care Provider for more information about your plan of care. Introducing [facility]! Dani Chauhan introduces KLab patient portal. Now you can access parts of your medical record, email your doctor's office, and request medication refills online. 1. In your internet browser, go to [email or web address][email or web address]/"[email or web address] 2. Click on the First Time User? Click Here link in the Sign In box. You will see the New Member Sign Up page. 3. Enter your KLab Access Code exactly as it appears below. You will not need to use this code after youve completed the sign-up process. If you do not sign up before the expiration date, you must request a new code. · KLab Access Code: [email or web address] Expires: 7/22/2017  3:57 PM 
 
4.  Enter the last four digits of your Social Security Number (xxxx) and Date of Birth (mm/dd/yyyy) as indicated and click Submit. You will be taken to the next sign-up page. 5. Create a Kloneworld ID. This will be your Kloneworld login ID and cannot be changed, so think of one that is secure and easy to remember. 6. Create a Kloneworld password. You can change your password at any time. 7. Enter your Password Reset Question and Answer. This can be used at a later time if you forget your password. 8. Enter your e-mail address. You will receive e-mail notification when new information is available in [address] 9. Click Sign Up. You can now view and download portions of your medical record. 10. Click the Download Summary menu link to download a portable copy of your medical information. If you have questions, please visit the Frequently Asked Questions section of the Kloneworld website. Remember, Kloneworld is NOT to be used for urgent needs. For medical emergencies, dial 911. Now available from your iPhone and Android! General Information Please provide this summary of care documentation to your next provider. Patient Signature:  ____________________________________________________________ Date:  ____________________________________________________________  
  
Ni Aguila Provider Signature:  ____________________________________________________________ Date:  ____________________________________________________________

## 2017-04-25 LAB
BASOPHILS # BLD AUTO: 0 K/UL (ref 0–0.06)
BASOPHILS # BLD: 0 % (ref 0–3)
DIFFERENTIAL METHOD BLD: ABNORMAL
EOSINOPHIL # BLD: 0 K/UL (ref 0–0.4)
EOSINOPHIL NFR BLD: 0 % (ref 0–5)
ERYTHROCYTE [DISTWIDTH] IN BLOOD BY AUTOMATED COUNT: 23.5 % (ref 11.6–14.5)
HCT VFR BLD AUTO: 26.6 % (ref 36–48)
HGB BLD-MCNC: 9.3 G/DL (ref 13–16)
LYMPHOCYTES # BLD AUTO: 27 % (ref 20–51)
LYMPHOCYTES # BLD: 4.9 K/UL (ref 0.8–3.5)
MCH RBC QN AUTO: 25.3 PG (ref 24–34)
MCHC RBC AUTO-ENTMCNC: 35 G/DL (ref 31–37)
MCV RBC AUTO: 72.5 FL (ref 74–97)
MONOCYTES # BLD: 2 K/UL (ref 0–1)
MONOCYTES NFR BLD AUTO: 11 % (ref 2–9)
NEUTS BAND NFR BLD MANUAL: 1 % (ref 0–5)
NEUTS SEG # BLD: 11.3 K/UL (ref 1.8–8)
NEUTS SEG NFR BLD AUTO: 61 % (ref 42–75)
NRBC BLD-RTO: 1 PER 100 WBC
PLATELET # BLD AUTO: 396 K/UL (ref 135–420)
PLATELET COMMENTS,PCOM: ABNORMAL
PMV BLD AUTO: 9.7 FL (ref 9.2–11.8)
RBC # BLD AUTO: 3.67 M/UL (ref 4.7–5.5)
RBC MORPH BLD: ABNORMAL
WBC # BLD AUTO: 18.2 K/UL (ref 4.6–13.2)

## 2017-04-25 PROCEDURE — 74011250636 HC RX REV CODE- 250/636: Performed by: INTERNAL MEDICINE

## 2017-04-25 PROCEDURE — 85025 COMPLETE CBC W/AUTO DIFF WBC: CPT | Performed by: INTERNAL MEDICINE

## 2017-04-25 PROCEDURE — 36415 COLL VENOUS BLD VENIPUNCTURE: CPT | Performed by: INTERNAL MEDICINE

## 2017-04-25 PROCEDURE — 74011250637 HC RX REV CODE- 250/637: Performed by: INTERNAL MEDICINE

## 2017-04-25 PROCEDURE — 65270000029 HC RM PRIVATE

## 2017-04-25 RX ADMIN — HYDROMORPHONE HYDROCHLORIDE 1 MG: 1 INJECTION, SOLUTION INTRAMUSCULAR; INTRAVENOUS; SUBCUTANEOUS at 14:58

## 2017-04-25 RX ADMIN — FOLIC ACID 1 MG: 1 TABLET ORAL at 08:04

## 2017-04-25 RX ADMIN — ENOXAPARIN SODIUM 40 MG: 40 INJECTION SUBCUTANEOUS at 05:30

## 2017-04-25 RX ADMIN — SODIUM CHLORIDE 150 ML/HR: 900 INJECTION, SOLUTION INTRAVENOUS at 14:58

## 2017-04-25 RX ADMIN — HYDROMORPHONE HYDROCHLORIDE 1 MG: 1 INJECTION, SOLUTION INTRAMUSCULAR; INTRAVENOUS; SUBCUTANEOUS at 08:04

## 2017-04-25 RX ADMIN — HYDROXYUREA 500 MG: 500 CAPSULE ORAL at 17:10

## 2017-04-25 RX ADMIN — SODIUM CHLORIDE 150 ML/HR: 900 INJECTION, SOLUTION INTRAVENOUS at 00:32

## 2017-04-25 RX ADMIN — HYDROMORPHONE HYDROCHLORIDE 1 MG: 1 INJECTION, SOLUTION INTRAMUSCULAR; INTRAVENOUS; SUBCUTANEOUS at 11:41

## 2017-04-25 RX ADMIN — HYDROMORPHONE HYDROCHLORIDE 1 MG: 1 INJECTION, SOLUTION INTRAMUSCULAR; INTRAVENOUS; SUBCUTANEOUS at 21:24

## 2017-04-25 RX ADMIN — HYDROMORPHONE HYDROCHLORIDE 1 MG: 1 INJECTION, SOLUTION INTRAMUSCULAR; INTRAVENOUS; SUBCUTANEOUS at 18:10

## 2017-04-25 RX ADMIN — SODIUM CHLORIDE 150 ML/HR: 900 INJECTION, SOLUTION INTRAVENOUS at 07:47

## 2017-04-25 RX ADMIN — HYDROMORPHONE HYDROCHLORIDE 1 MG: 1 INJECTION, SOLUTION INTRAMUSCULAR; INTRAVENOUS; SUBCUTANEOUS at 00:20

## 2017-04-25 RX ADMIN — HYDROXYUREA 500 MG: 500 CAPSULE ORAL at 09:10

## 2017-04-25 RX ADMIN — HYDROMORPHONE HYDROCHLORIDE 1 MG: 1 INJECTION, SOLUTION INTRAMUSCULAR; INTRAVENOUS; SUBCUTANEOUS at 04:18

## 2017-04-25 RX ADMIN — SODIUM CHLORIDE 150 ML/HR: 900 INJECTION, SOLUTION INTRAVENOUS at 21:27

## 2017-04-25 NOTE — PROGRESS NOTES
Care Management Interventions  PCP Verified by CM: Yes (DR. Laurie Webster)  Palliative Care Consult (Criteria: CHF and RRAT>21): No  Reason for No Palliative Care Consult: Other (see comment) (NO ORDER)  Mode of Transport at Discharge:  Other (see comment) (FAMILY WILL TRANSPORT HOME WHEN READY)  Transition of Care Consult (CM Consult): Discharge Planning  Current Support Network: Family Lives Nearby, Relative's Home (PT RESIDES [facility]  )  Confirm Follow Up Transport: Self  Plan discussed with Pt/Family/Caregiver: Yes (THIS PT PLANS TO RETURN TO HOME WHERE HE LIVES WITH HIS MOTHER WHO IS VERY SUPPORTIVE )  Discharge Location  Discharge Placement: Home with family assistance

## 2017-04-25 NOTE — ROUTINE PROCESS
TRANSFER - OUT REPORT:    Verbal report given to ANA Salmeron (name) on [facility]  being transferred to  (unit) for routine progression of care       Report consisted of patients Situation, Background, Assessment and   Recommendations(SBAR). Information from the following report(s) SBAR, Kardex and ED Summary was reviewed with the receiving nurse. Lines:   Peripheral IV 04/24/17 Left Forearm (Active)   Site Assessment Clean, dry, & intact 4/24/2017  7:30 PM   Phlebitis Assessment 0 4/24/2017  7:30 PM   Infiltration Assessment 0 4/24/2017  7:30 PM   Dressing Status Clean, dry, & intact 4/24/2017  7:30 PM        Opportunity for questions and clarification was provided.       Patient transported with:   [employer]

## 2017-04-25 NOTE — ROUTINE PROCESS
Bedside and Verbal shift change report given to Ngozi Burger RN (oncoming nurse) by Nyla Ohara RN (offgoing nurse). Report included the following information SBAR, Kardex, MAR and Recent Results.     SITUATION:    Code Status: Full Code   Reason for Admission: Vaso-occlusive sickle cell crisis ([facility]   Bilateral leg pain    [facility] day: 1   Problem List:       Hospital Problems  Date Reviewed: 12/11/2016          Codes Class Noted POA    Bilateral leg pain ICD-10-CM: M79.604, M79.605  ICD-9-CM: 729.5  4/24/2017 Unknown        Vaso-occlusive sickle cell crisis [facility]) ICD-10-CM: D57.00  ICD-9-CM: 282.62  4/24/2017 Unknown              BACKGROUND:    Past Medical History:   Past Medical History:   Diagnosis Date    Sickle cell anemia with crisis ([facility]     Vitamin D deficiency          Patient taking anticoagulants yes     ASSESSMENT:    Changes in Assessment Throughout Shift: none     Patient has Central Line: no Reasons if yes: n/a   Patient has Sesay Cath: no Reasons if yes: n/a      Last Vitals:     Vitals:    04/24/17 2200 04/25/17 0010 04/25/17 0416 04/25/17 0810   BP: 119/68 116/76 134/79 116/63   Pulse: 90 70 82 68   Resp: 18 20 18 19   Temp:  98.7 °F (37.1 °C) 98.7 °F (37.1 °C) 98.9 °F (37.2 °C)   SpO2: 98% 91% 94% 92%   Weight:       Height:            IV and DRAINS (will only show if present)   Peripheral IV 04/24/17 Left Forearm-Site Assessment: Clean, dry, & intact     WOUND (if present)   Wound Type:  none   Dressing present Dressing Present : No   Wound Concerns/Notes:  none     PAIN    Pain Assessment    Pain Intensity 1: 8 (04/25/17 0804)    Pain Location 1: Leg    Pain Intervention(s) 1: Medication (see MAR)    Patient Stated Pain Goal: 0  o Interventions for Pain:  Dilaudid iv  o Intervention effective: yes  o Time of last intervention: 2690  o Reassessment Completed: yes      Last 3 Weights:  Last 3 Recorded Weights in this Encounter    04/24/17 1230   Weight: 79.4 kg (175 lb) Weight change:      INTAKE/OUPUT    Current Shift: 04/25 0701 - 04/25 1900  In: 0   Out: 400 [Urine:400]    Last three shifts: 04/23 1901 - 04/25 0700  In: -   Out: 1000 [Urine:1000]     LAB RESULTS     Recent Labs      04/25/17   0411  04/24/17   1305   WBC  18.2*  16.5*   HGB  9.3*  9.8*   HCT  26.6*  27.2*   PLT  396  444*        Recent Labs      04/24/17   1305   NA  139   K  3.5   GLU  95   BUN  7   CREA  0.88   CA  8.7       RECOMMENDATIONS AND DISCHARGE PLANNING     1. Pending tests/procedures/ Plan of Care or Other Needs: pain control     2. Discharge plan for patient and Needs/Barriers: home    3. Estimated Discharge Date: tbd Posted on Whiteboard in Hospitals in [place]: yes      4. The patient's care plan was reviewed with the oncoming nurse. \"HEALS\" SAFETY CHECK      Fall Risk    Total Score: 1    Safety Measures: Safety Measures: Bed/Chair-Wheels locked, Bed in low position, Call light within reach    A safety check occurred in the patient's room between off going nurse and oncoming nurse listed above. The safety check included the below items  Area Items   H  High Alert Medications - Verify all high alert medication drips (heparin, PCA, etc.)   E  Equipment - Suction is set up for ALL patients (with pat)  - Red plugs utilized for all equipment (IV pumps, etc.)  - WOWs wiped down at end of shift.  - Room stocked with oxygen, suction, and other unit-specific supplies   A  Alarms - Bed alarm is set for fall risk patients  - Ensure chair alarm is in place and activated if patient is up in a chair   L  Lines - Check IV for any infiltration  - Sesay bag is empty if patient has a Sesay   - Tubing and IV bags are labeled   S  Safety   - Room is clean, patient is clean, and equipment is clean. - Hallways are clear from equipment besides carts.    - Fall bracelet on for fall risk patients  - Ensure room is clear and free of clutter  - Suction is set up for ALL patients (with pat)  - Hallways are clear from equipment besides carts.    - Isolation precautions followed, supplies available outside room, sign posted     Pritesh Renteria RN

## 2017-04-25 NOTE — H&P
History and Physical      NAME:  Addy Woodard   :   1980   MRN:   [id number]     Date/Time:  2017     CHIEF COMPLAINT: leg pain     HISTORY OF PRESENT ILLNESS:     Mr. Yohannes Spears is a 43 y.o.   male with a PMH of Sickle cell disease who presents with c/c of  Bilateral LE pain. Patient has multiple hospital admissions in the past with sickle cell pain crisis. He currently c/o bilateral leg pain. He denies fever or chills. No chest pain or SOB. Here in ED he was given IV dilaudid with no significant improvement. He takes dilaudid PO and fentanyl for pain at home. He is still in severe pain at this time. He will be admitted for better pain management. Past Medical History:   Diagnosis Date    Sickle cell anemia with crisis ([address]     Vitamin D deficiency         History reviewed. No pertinent surgical history. Social History   Substance Use Topics    Smoking status: Never Smoker    Smokeless tobacco: Never Used    Alcohol use No        Family History   Problem Relation Age of Onset    Cancer Neg Hx     Diabetes Neg Hx     Heart Disease Neg Hx     Heart Attack Neg Hx     Hypertension Neg Hx     Stroke Neg Hx         Allergies   Allergen Reactions    Eggshell Membrane Nausea and Vomiting    Milk Nausea and Vomiting        Prior to Admission medications    Medication Sig Start Date End Date Taking? Authorizing Provider   HYDROmorphone (DILAUDID) 2 mg tablet Take 1 Tab by mouth every six (6) hours as needed for Pain. Max Daily Amount: 8 mg. Indications: Pain 17   Nilo Saavedra MD   ibuprofen (MOTRIN) 800 mg tablet Take 800 mg by mouth every eight (8) hours as needed for Pain. Raphael Guerrero MD   folic acid (FOLVITE) 1 mg tablet Take 1 Tab by mouth daily. 17   Roselia Llanes MD   hydroxyurea (HYDREA) 500 mg capsule Take 500 mg by mouth two (2) times a day.  Indications: SICKLE CELL ANEMIA WITH CRISIS    Raphael Guerrero MD fentaNYL (DURAGESIC) 25 mcg/hr PATCH 1 Patch by TransDERmal route every seventy-two (72) hours.  Max Daily Amount: 1 Patch. 10/21/15   Nelly Lowry MD       REVIEW OF SYSTEMS:     CONSTITUTIONAL: No Fever, No chills, No weight loss, No Night sweats  HEENT:  No epistaxis, No diff in swallowing  CVS: No chest pain, No palpitations, No syncope, No peripheral edema, No PND, No orthopnea  RS: No shortness of breath, No cough, No hemoptysis, No pleuritic chest pain  GI: No abd pain, No vomitting, No diarrhea, No hematemesis, No rectal bleeding, No acid reflux or heartburn  NEURO: No focal weakness, No headaches, No seizures  PSYCH: No anxiety, No depression  MUSCULOSKLETAL: bilateral leg pain  : No hematuria or dysuria  SKIN: No rash      Physical Exam:    VITALS:    Vital signs reviewed; most recent are:    Visit Vitals    /68    Pulse 90    Temp 98.1 °F (36.7 °C)    Resp 18    Ht 6' (1.829 m)    Wt 79.4 kg (175 lb)    SpO2 98%    BMI 23.73 kg/m2     SpO2 Readings from Last 6 Encounters:   04/24/17 98%   04/16/17 98%   03/19/17 94%   02/18/17 97%   02/15/17 95%   01/25/17 94%          Intake/Output Summary (Last 24 hours) at 04/24/17 2314  Last data filed at 04/24/17 1900   Gross per 24 hour   Intake                0 ml   Output             1000 ml   Net            -1000 ml          GENERAL: Not in acute distress  HEENT: pink conjunctiva, un icteric sclera,   NECK: No lymphadenopthy or thyroid swelling, JVD not seen  LYMPH: No supraclavicular or cervical or axillary nodes on both sides  CVS: S1S2, No murmurs, No gallop or rub  RS: CTA, No wheezing or crackles  Abd: Soft, non tender, not distended, No guarding, No rigidity  NEURO:  No focal neurologic deficits   Extrm: no leg edema or swelling   Skin: No rash      Labs:  Recent Results (from the past 24 hour(s))   CBC WITH AUTOMATED DIFF    Collection Time: 04/24/17  1:05 PM   Result Value Ref Range    WBC 16.5 (H) 4.6 - 13.2 K/uL    RBC 3.80 (L) 4.70 - 5.50 M/uL    HGB 9.8 (L) 13.0 - 16.0 g/dL    HCT 27.2 (L) 36.0 - 48.0 %    MCV 71.6 (L) 74.0 - 97.0 FL    MCH 25.8 24.0 - 34.0 PG    MCHC 36.0 31.0 - 37.0 g/dL    RDW 23.1 (H) 11.6 - 14.5 %    PLATELET 476 (H) 001 - 420 K/uL    MPV 9.4 9.2 - 11.8 FL    NEUTROPHILS 69 42 - 75 %    LYMPHOCYTES 23 20 - 51 %    MONOCYTES 7 2 - 9 %    EOSINOPHILS 1 0 - 5 %    BASOPHILS 0 0 - 3 %    NRBC 2.0 (H) 0  WBC    ABS. NEUTROPHILS 11.3 (H) 1.8 - 8.0 K/UL    ABS. LYMPHOCYTES 3.8 (H) 0.8 - 3.5 K/UL    ABS. MONOCYTES 1.2 (H) 0 - 1.0 K/UL    ABS. EOSINOPHILS 0.2 0.0 - 0.4 K/UL    ABS. BASOPHILS 0.0 0.0 - 0.06 K/UL    DF MANUAL      PLATELET COMMENTS INCREASED PLATELETS      RBC COMMENTS ANISOCYTOSIS  3+        RBC COMMENTS MICROCYTOSIS  1+        RBC COMMENTS SICKLE CELLS  1+        RBC COMMENTS OVALOCYTES  2+        RBC COMMENTS POIKILOCYTOSIS  2+        RBC COMMENTS POLYCHROMASIA  1+        RBC COMMENTS TARGET CELLS  1+       RETICULOCYTE COUNT    Collection Time: 04/24/17  1:05 PM   Result Value Ref Range    Reticulocyte count 9.0 (H) 0.5 - 2.3 %   METABOLIC PANEL, BASIC    Collection Time: 04/24/17  1:05 PM   Result Value Ref Range    Sodium 139 136 - 145 mmol/L    Potassium 3.5 3.5 - 5.5 mmol/L    Chloride 105 100 - 108 mmol/L    CO2 26 21 - 32 mmol/L    Anion gap 8 3.0 - 18 mmol/L    Glucose 95 74 - 99 mg/dL    BUN 7 7.0 - 18 MG/DL    Creatinine 0.88 0.6 - 1.3 MG/DL    BUN/Creatinine ratio 8 (L) 12 - 20      GFR est AA >60 >60 ml/min/1.73m2    GFR est non-AA >60 >60 ml/min/1.73m2    Calcium 8.7 8.5 - 10.1 MG/DL         Active Problems:    Bilateral leg pain (4/24/2017)      Vaso-occlusive sickle cell crisis ([facility] (4/24/2017)        Assessment:       1. Sickle cell pain crisis   2. Bilateral Leg pain  3.  Chronic anemia due to sickle cell disease    Plan:       · Admit to medical floor  · Continue IVF hydration, NS @ 150  · IV analgesics with dilaudid, 1 mg IV Q3 PRN  · Supplemental  O2  · Continue hydroxyurea, folic acid per home dose  · Monitor cbc  · DVT prophylaxsis        Total time:  58 minutes             _______________________________________________________________________        Attending Physician:  Nathaniel Madera MD

## 2017-04-25 NOTE — ROUTINE PROCESS
TRANSFER - IN REPORT:    Verbal report received from [address] [place] [address], RN (name) on Asya Gordillo  being received from ED(unit) for routine progression of care      Report consisted of patients Situation, Background, Assessment and   Recommendations(SBAR). Information from the following report(s) SBAR, Kardex and ED Summary was reviewed with the receiving nurse. Opportunity for questions and clarification was provided. Assessment completed upon patients arrival to unit and care assumed.

## 2017-04-25 NOTE — PROGRESS NOTES
[facility] Hospitalist Group  Progress Note    Patient: Cora Schwab Age: 43 y.o. : 1980 MR#: [id number] SSN: [id number]  Date/Time: 2017     Subjective:     Review of systems  General: No fevers or chills. Cardiovascular: No chest pain or pressure. No palpitations. Pulmonary: No shortness of breath, cough or wheeze. Gastrointestinal: No abdominal pain, nausea, vomiting or diarrhea. Genitourinary: No urinary frequency, urgency, hesitancy or dysuria. Musculoskeletal:pain over all major joints   Neurologic: No headache, numbness, tingling or weakness. Assessment/Plan:   1. 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   Case discussed with:  [x]Patient  []Family  []Nursing  []Case Management  DVT Prophylaxis:  [x]Lovenox  []Hep SQ  []SCDs  []Coumadin   []On Heparin gtt    Patient Active Problem List   Diagnosis Code    Sickle cell disease ([address] D57.1    Sickle cell crisis (HCC) D57.00    Sickle cell pain crisis ([address] D57.00    Sickle cell anemia with crisis ([address] D57.00    Pain, generalized R52    Hemolytic anemia ([address] D58.9    SC disease ([address] D57.20    Sickle cell anemia (HCC) D57.1    Bilateral leg pain M79.604, M79.605    Vaso-occlusive sickle cell crisis ([address] D57.00       Objective:   VS:   Visit Vitals    /63 (BP 1 Location: Right arm, BP Patient Position: At rest)    Pulse 68    Temp 98.9 °F (37.2 °C)    Resp 19    Ht 6' (1.829 m)    Wt 79.4 kg (175 lb)    SpO2 92%    BMI 23.73 kg/m2      Tmax/24hrs: Temp (24hrs), Av.5 °F (36.9 °C), Min:98.1 °F (36.7 °C), Max:98.9 °F (37.2 °C)  IOBRIEF  Intake/Output Summary (Last 24 hours) at 17 1045  Last data filed at 17 0931   Gross per 24 hour   Intake              320 ml   Output             1900 ml   Net            -1580 ml       General:  Alert, cooperative, no acute distress    HEENT:  NC, Atraumatic. PERRLA, anicteric sclerae. Pulmonary:  CTA Bilaterally. No Wheezing/Rhonchi/Rales. Cardiovascular: Regular rate and Rhythm. GI:  Soft, Non distended, Non tender. + Bowel sounds. Extremities:  No edema, cyanosis, clubbing. No calf tenderness. Pain over all joints but more right LL. Psych:  Not anxious or agitated. Neurologic: Grossly - Motor and Sensory functions are intact . No Anxiety , calm , no Agitation         Medications:   Current Facility-Administered Medications   Medication Dose Route Frequency    folic acid (FOLVITE) tablet 1 mg  1 mg Oral DAILY    hydroxyurea (HYDREA) chemo cap 500 mg  500 mg Oral BID    0.9% sodium chloride infusion  150 mL/hr IntraVENous CONTINUOUS    HYDROmorphone (PF) (DILAUDID) injection 1 mg  1 mg IntraVENous Q3H PRN    ondansetron (ZOFRAN) injection 4 mg  4 mg IntraVENous Q4H PRN    enoxaparin (LOVENOX) injection 40 mg  40 mg SubCUTAneous Q24H       Labs:    Recent Results (from the past 24 hour(s))   CBC WITH AUTOMATED DIFF    Collection Time: 17  1:05 PM   Result Value Ref Range    WBC 16.5 (H) 4.6 - 13.2 K/uL    RBC 3.80 (L) 4.70 - 5.50 M/uL    HGB 9.8 (L) 13.0 - 16.0 g/dL    HCT 27.2 (L) 36.0 - 48.0 %    MCV 71.6 (L) 74.0 - 97.0 FL    MCH 25.8 24.0 - 34.0 PG    MCHC 36.0 31.0 - 37.0 g/dL    RDW 23.1 (H) 11.6 - 14.5 %    PLATELET 017 (H) 837 - 420 K/uL    MPV 9.4 9.2 - 11.8 FL    NEUTROPHILS 69 42 - 75 %    LYMPHOCYTES 23 20 - 51 %    MONOCYTES 7 2 - 9 %    EOSINOPHILS 1 0 - 5 %    BASOPHILS 0 0 - 3 %    NRBC 2.0 (H) 0  WBC    ABS. NEUTROPHILS 11.3 (H) 1.8 - 8.0 K/UL    ABS.  LYMPHOCYTES 3.8 (H) 0.8 - 3.5 K/UL    ABS. MONOCYTES 1.2 (H) 0 - 1.0 K/UL    ABS. EOSINOPHILS 0.2 0.0 - 0.4 K/UL    ABS. BASOPHILS 0.0 0.0 - 0.06 K/UL    DF MANUAL      PLATELET COMMENTS INCREASED PLATELETS      RBC COMMENTS ANISOCYTOSIS  3+        RBC COMMENTS MICROCYTOSIS  1+        RBC COMMENTS SICKLE CELLS  1+        RBC COMMENTS OVALOCYTES  2+        RBC COMMENTS POIKILOCYTOSIS  2+        RBC COMMENTS POLYCHROMASIA  1+        RBC COMMENTS TARGET CELLS  1+       RETICULOCYTE COUNT    Collection Time: 04/24/17  1:05 PM   Result Value Ref Range    Reticulocyte count 9.0 (H) 0.5 - 2.3 %   METABOLIC PANEL, BASIC    Collection Time: 04/24/17  1:05 PM   Result Value Ref Range    Sodium 139 136 - 145 mmol/L    Potassium 3.5 3.5 - 5.5 mmol/L    Chloride 105 100 - 108 mmol/L    CO2 26 21 - 32 mmol/L    Anion gap 8 3.0 - 18 mmol/L    Glucose 95 74 - 99 mg/dL    BUN 7 7.0 - 18 MG/DL    Creatinine 0.88 0.6 - 1.3 MG/DL    BUN/Creatinine ratio 8 (L) 12 - 20      GFR est AA >60 >60 ml/min/1.73m2    GFR est non-AA >60 >60 ml/min/1.73m2    Calcium 8.7 8.5 - 10.1 MG/DL   CBC WITH AUTOMATED DIFF    Collection Time: 04/25/17  4:11 AM   Result Value Ref Range    WBC 18.2 (H) 4.6 - 13.2 K/uL    RBC 3.67 (L) 4.70 - 5.50 M/uL    HGB 9.3 (L) 13.0 - 16.0 g/dL    HCT 26.6 (L) 36.0 - 48.0 %    MCV 72.5 (L) 74.0 - 97.0 FL    MCH 25.3 24.0 - 34.0 PG    MCHC 35.0 31.0 - 37.0 g/dL    RDW 23.5 (H) 11.6 - 14.5 %    PLATELET 897 159 - 650 K/uL    MPV 9.7 9.2 - 11.8 FL    NEUTROPHILS 61 42 - 75 %    BAND NEUTROPHILS 1 0 - 5 %    LYMPHOCYTES 27 20 - 51 %    MONOCYTES 11 (H) 2 - 9 %    EOSINOPHILS 0 0 - 5 %    BASOPHILS 0 0 - 3 %    NRBC 1.0 (H) 0  WBC    ABS. NEUTROPHILS 11.3 (H) 1.8 - 8.0 K/UL    ABS. LYMPHOCYTES 4.9 (H) 0.8 - 3.5 K/UL    ABS. MONOCYTES 2.0 (H) 0 - 1.0 K/UL    ABS. EOSINOPHILS 0.0 0.0 - 0.4 K/UL    ABS.  BASOPHILS 0.0 0.0 - 0.06 K/UL    DF MANUAL      PLATELET COMMENTS ADEQUATE PLATELETS      RBC COMMENTS ANISOCYTOSIS  2+        RBC COMMENTS POIKILOCYTOSIS  2+        RBC COMMENTS MICROCYTOSIS  1+        RBC COMMENTS SPHEROCYTES  1+        RBC COMMENTS SICKLE CELLS  3+        RBC COMMENTS TARGET CELLS  1+        RBC COMMENTS POLYCHROMASIA  1+           Signed By: Avanell Cabot, MD     April 25, 2017

## 2017-04-26 LAB
BILIRUB DIRECT SERPL-MCNC: 1.2 MG/DL (ref 0–0.2)
RETICS/RBC NFR AUTO: 9.3 % (ref 0.5–2.3)

## 2017-04-26 PROCEDURE — 74011250637 HC RX REV CODE- 250/637: Performed by: INTERNAL MEDICINE

## 2017-04-26 PROCEDURE — 65270000029 HC RM PRIVATE

## 2017-04-26 PROCEDURE — 82248 BILIRUBIN DIRECT: CPT | Performed by: INTERNAL MEDICINE

## 2017-04-26 PROCEDURE — 36415 COLL VENOUS BLD VENIPUNCTURE: CPT | Performed by: INTERNAL MEDICINE

## 2017-04-26 PROCEDURE — 74011250636 HC RX REV CODE- 250/636: Performed by: INTERNAL MEDICINE

## 2017-04-26 PROCEDURE — 85045 AUTOMATED RETICULOCYTE COUNT: CPT | Performed by: INTERNAL MEDICINE

## 2017-04-26 RX ORDER — FENTANYL 25 UG/1
1 PATCH TRANSDERMAL
Status: DISCONTINUED | OUTPATIENT
Start: 2017-04-26 | End: 2017-04-28 | Stop reason: HOSPADM

## 2017-04-26 RX ORDER — HYDROMORPHONE HYDROCHLORIDE 1 MG/ML
1 INJECTION, SOLUTION INTRAMUSCULAR; INTRAVENOUS; SUBCUTANEOUS
Status: DISCONTINUED | OUTPATIENT
Start: 2017-04-26 | End: 2017-04-28 | Stop reason: HOSPADM

## 2017-04-26 RX ADMIN — HYDROMORPHONE HYDROCHLORIDE 1 MG: 1 INJECTION, SOLUTION INTRAMUSCULAR; INTRAVENOUS; SUBCUTANEOUS at 03:36

## 2017-04-26 RX ADMIN — HYDROMORPHONE HYDROCHLORIDE 1 MG: 1 INJECTION, SOLUTION INTRAMUSCULAR; INTRAVENOUS; SUBCUTANEOUS at 17:42

## 2017-04-26 RX ADMIN — HYDROMORPHONE HYDROCHLORIDE 1 MG: 1 INJECTION, SOLUTION INTRAMUSCULAR; INTRAVENOUS; SUBCUTANEOUS at 00:13

## 2017-04-26 RX ADMIN — HYDROMORPHONE HYDROCHLORIDE 1 MG: 1 INJECTION, SOLUTION INTRAMUSCULAR; INTRAVENOUS; SUBCUTANEOUS at 20:24

## 2017-04-26 RX ADMIN — HYDROMORPHONE HYDROCHLORIDE 1 MG: 1 INJECTION, SOLUTION INTRAMUSCULAR; INTRAVENOUS; SUBCUTANEOUS at 09:43

## 2017-04-26 RX ADMIN — HYDROMORPHONE HYDROCHLORIDE 1 MG: 1 INJECTION, SOLUTION INTRAMUSCULAR; INTRAVENOUS; SUBCUTANEOUS at 23:15

## 2017-04-26 RX ADMIN — HYDROMORPHONE HYDROCHLORIDE 1 MG: 1 INJECTION, SOLUTION INTRAMUSCULAR; INTRAVENOUS; SUBCUTANEOUS at 15:04

## 2017-04-26 RX ADMIN — SODIUM CHLORIDE 150 ML/HR: 900 INJECTION, SOLUTION INTRAVENOUS at 06:42

## 2017-04-26 RX ADMIN — SODIUM CHLORIDE 150 ML/HR: 900 INJECTION, SOLUTION INTRAVENOUS at 23:18

## 2017-04-26 RX ADMIN — HYDROXYUREA 500 MG: 500 CAPSULE ORAL at 11:45

## 2017-04-26 RX ADMIN — FOLIC ACID 1 MG: 1 TABLET ORAL at 09:48

## 2017-04-26 RX ADMIN — HYDROXYUREA 500 MG: 500 CAPSULE ORAL at 17:46

## 2017-04-26 RX ADMIN — ENOXAPARIN SODIUM 40 MG: 40 INJECTION SUBCUTANEOUS at 06:39

## 2017-04-26 NOTE — PROGRESS NOTES
Phone: [phone]     Hematology / Oncology Progress Note  [facility]      Patient: Rochelle Nickerson   MRN: [id number]         CSN: [id number]    YOB: 1980      Admit Date: 2017    Assessment:     Active Problems:    Bilateral leg pain (2017)      Vaso-occlusive sickle cell crisis ([address] (2017)    sickle cell pain crisis  Abnormal LFT sec to intra hepatic sequestration      Plan:     Ct fentanyl patch 25 ugm/hr, increase IV  dilaudid q 2hr prn  Encourage IVF  Monitor HB      Gabriel See MD  [facility] [phone]      Subjective:     Pain in both legs.  No fevers    Objective:     Visit Vitals    /65    Pulse 75    Temp 99.1 °F (37.3 °C)    Resp 16    Ht 6' (1.829 m)    Wt 79.4 kg (175 lb)    SpO2 97%    BMI 23.73 kg/m2             Temp (24hrs), Av.7 °F (37.1 °C), Min:97.6 °F (36.4 °C), Max:99.4 °F (37.4 °C)        Intake/Output Summary (Last 24 hours) at 17 0817  Last data filed at 17 2768   Gross per 24 hour   Intake              940 ml   Output             3925 ml   Net            -2985 ml     Review of Systems:   Constitutional: negative for fevers, chills, sweats and positive for  fatigue  Eyes: negative for visual disturbance, redness and icterus  Ears, Nose, Mouth, Throat, and Face: negative for tinnitus, epistaxis, sore mouth and hoarseness  Respiratory: negative for cough, sputum, hemoptysis, pleurisy/chest pain or wheezing  Cardiovascular: negative for chest pain, chest pressure/discomfort, palpitations, irregular heart beats, syncope, paroxysmal nocturnal dyspnea  Gastrointestinal: negative for reflux symptoms, nausea, vomiting, change in bowel habits, melena, diarrhea, constipation and abdominal pain  Genitourinary:negative for dysuria, nocturia, urinary incontinence, hesitancy and hematuria  Integument: negative for rash, skin lesion(s) and pruritus  Hematologic/Lymphatic: negative for easy bruising, bleeding and lymphadenopathy  Musculoskeletal:igor leg pain  Neurological: negative for headaches, dizziness, seizures, paresthesia and weakness    Physical Exam:  Constitutional: Alert, oriented,  in distress from pain  Eyes: PERRLA, icteric,   Ears, nose, mouth, throat, and face: no palpable Lymph nodes, no mucositis, no thrush. Respiratory: symmetrical expansion, no rales, no rhonchi, no wheezing. Cardiovascular: S1S2, no pathologic murmur, no rub. Gastrointestinal: soft, benign, non tender, no HSM, normal bowel sounds, no mass. Integument: no rash, no petechiae, no ecchymosis. Musculoskeletal: no edema, no cyanosis, no clubbing. Neurological: intact, cranial nerves, no focal motor or sensory deficits. Labs:  No results found for this or any previous visit (from the past 24 hour(s)).

## 2017-04-26 NOTE — INTERDISCIPLINARY ROUNDS
IDR/SLIDR Summary          Patient: Rosa Elena Romero MRN: [id number]    Age: 43 y.o. YOB: 1980 Room/Bed: [facility]   Admit Diagnosis: Vaso-occlusive sickle cell crisis (HCC)  Bilateral leg pain  Principal Diagnosis: <principal problem not specified>   Goals: Pain Control  Readmission: NO  Quality Measure: Not applicable  VTE Prophylaxis: Not needed  Influenza Vaccine screening completed? YES  Pneumococcal Vaccine screening completed? YES  Mobility needs: No   Nutrition plan:No  Consults:Case Management    Financial concerns:No  Escalated to CM? YES  RRAT Score: 21   Interventions: Pain Management  Testing due for pt today? NO  LOS: 2 days Expected length of stay 3 days  Discharge plan: Home   PCP: Dick Deleon MD  Transportation needs: No    Days before discharge:two or more days before discharge   Discharge disposition: Home    Signed:      Gael Barclay RN  4/26/2017  4:13 AM

## 2017-04-26 NOTE — PROGRESS NOTES
[facility]  [facility]    Patient: Ginny Michaels Age: 43 y.o. : 1980 MR#: [id number] SSN: [id number]  Date/Time: 2017     Subjective:     Review of systems  General: No fevers or chills. Cardiovascular: No chest pain or pressure. No palpitations. Pulmonary: No shortness of breath, cough or wheeze. Gastrointestinal: No abdominal pain, nausea, vomiting or diarrhea. Genitourinary: No urinary frequency, urgency, hesitancy or dysuria. Musculoskeletal:pain bilateral leg pains , more on right shin area   Neurologic: No headache, numbness, tingling or weakness. Assessment/Plan:   1. 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   Case discussed with:  [x]Patient  []Family  []Nursing  []Case Management  DVT Prophylaxis:  [x]Lovenox  []Hep SQ  []SCDs  []Coumadin   []On Heparin gtt    Patient Active Problem List   Diagnosis Code    Sickle cell disease ([facility] [address] D57.1    Sickle cell crisis (HCC) D57.00    Sickle cell pain crisis ([facility] [address] D57.00    Sickle cell anemia with crisis ([address] D57.00    Pain, generalized R52    Hemolytic anemia ([address] D58.9    SC disease ([address] D57.20    Sickle cell anemia (HCC) D57.1    Bilateral leg pain M79.604, M79.605    Vaso-occlusive sickle cell crisis ([address] D57.00       Objective:   VS:   Visit Vitals    /65 (BP 1 Location: Right arm, BP Patient Position: Head of bed elevated (Comment degrees))    Pulse 78    Temp 98.8 °F (37.1 °C)    Resp 18    Ht 6' (1.829 m)    Wt 79.4 kg (175 lb)    SpO2 99%    BMI 23.73 kg/m2      Tmax/24hrs: Temp (24hrs), Av.9 °F (37.2 °C), Min:98.5 °F (36.9 °C), Max:99.4 °F (37.4 °C)  IOBRIEF    Intake/Output Summary (Last 24 hours) at 04/26/17 1239  Last data filed at 04/26/17 1131   Gross per 24 hour   Intake              620 ml   Output             4425 ml   Net            -3805 ml       General:  Alert, cooperative, no acute distress    HEENT:  NC, Atraumatic. PERRLA, anicteric sclerae. Pulmonary:  CTA Bilaterally. No Wheezing/Rhonchi/Rales. Cardiovascular: Regular rate and Rhythm. GI:  Soft, Non distended, Non tender. + Bowel sounds. Extremities:  No edema, cyanosis, clubbing. No calf tenderness. Pain over all joints but more right LL. Psych:  Not anxious or agitated. Neurologic: Grossly - Motor and Sensory functions are intact .  No Anxiety , calm , no Agitation         Medications:   Current Facility-Administered Medications   Medication Dose Route Frequency    HYDROmorphone (PF) (DILAUDID) injection 1 mg  1 mg IntraVENous Q2H PRN    fentaNYL (DURAGESIC) 25 mcg/hr patch 1 Patch  1 Patch TransDERmal M67Q    folic acid (FOLVITE) tablet 1 mg  1 mg Oral DAILY    hydroxyurea (HYDREA) chemo cap 500 mg  500 mg Oral BID    0.9% sodium chloride infusion  150 mL/hr IntraVENous CONTINUOUS    ondansetron (ZOFRAN) injection 4 mg  4 mg IntraVENous Q4H PRN    enoxaparin (LOVENOX) injection 40 mg  40 mg SubCUTAneous Q24H       Labs:    Recent Results (from the past 24 hour(s))   BILIRUBIN, DIRECT    Collection Time: 04/26/17 10:09 AM   Result Value Ref Range    Bilirubin, direct 1.2 (H) 0.0 - 0.2 MG/DL   RETICULOCYTE COUNT    Collection Time: 04/26/17 10:09 AM   Result Value Ref Range    Reticulocyte count 9.3 (H) 0.5 - 2.3 %       Signed By: Marnie Arriaga MD     April 26, 2017

## 2017-04-26 NOTE — ROUTINE PROCESS
Bedside and Verbal shift change report given to Macario Duran RN (oncoming nurse) by Clemente Leos RN (offgoing nurse). Report included the following information SBAR, Kardex, MAR and Recent Results.     SITUATION:    Code Status: Full Code  Reason for Admission: Vaso-occlusive sickle cell crisis ([facility] [address]   Bilateral leg pain    [facility] day: 2   Problem List:       Hospital Problems  Date Reviewed: 12/11/2016          Codes Class Noted POA    Bilateral leg pain ICD-10-CM: M79.604, M79.605  ICD-9-CM: 729.5  4/24/2017 Unknown        Vaso-occlusive sickle cell crisis [facility]) ICD-10-CM: D57.00  ICD-9-CM: 282.62  4/24/2017 Unknown              BACKGROUND:    Past Medical History:   Past Medical History:   Diagnosis Date    Sickle cell anemia with crisis ([facility] [address]     Vitamin D deficiency          Patient taking anticoagulants yes     ASSESSMENT:    Changes in Assessment Throughout Shift: none     Patient has Central Line: no Reasons if yes: n/a   Patient has Sesay Cath: no Reasons if yes: n/a      Last Vitals:     Vitals:    04/25/17 1107 04/25/17 1510 04/25/17 2000 04/26/17 0000   BP: 108/65 121/69 118/75 118/73   Pulse: 78 75 74 79   Resp: 18 16 18 18   Temp: 97.6 °F (36.4 °C) 98.5 °F (36.9 °C) 98.8 °F (37.1 °C) 99.4 °F (37.4 °C)   SpO2: 94% 97% 98% 97%   Weight:       Height:            IV and DRAINS (will only show if present)   Peripheral IV 04/24/17 Left Forearm-Site Assessment: Clean, dry, & intact     WOUND (if present)   Wound Type:  none   Dressing present Dressing Present : No   Wound Concerns/Notes:  none     PAIN    Pain Assessment    Pain Intensity 1: 8 (04/26/17 0405)    Pain Location 1: Leg    Pain Intervention(s) 1: Medication (see MAR)    Patient Stated Pain Goal: 0  o Interventions for Pain:  Dilaudid iv  o Intervention effective: yes  o Time of last intervention: 8198  o Reassessment Completed: yes      Last 3 Weights:  Last 3 Recorded Weights in this Encounter    04/24/17 1230   Weight: 79.4 kg (175 lb)     Weight change:      INTAKE/OUPUT    Current Shift: 04/25 1901 - 04/26 0700  In: -   Out: 8944 [Urine:1575]    Last three shifts: 04/24 0701 - 04/25 1900  In: 940 [P.O.:940]  Out: 3000 [Urine:3000]     LAB RESULTS     Recent Labs      04/25/17   0411  04/24/17   1305   WBC  18.2*  16.5*   HGB  9.3*  9.8*   HCT  26.6*  27.2*   PLT  396  444*        Recent Labs      04/24/17   1305   NA  139   K  3.5   GLU  95   BUN  7   CREA  0.88   CA  8.7       RECOMMENDATIONS AND DISCHARGE PLANNING     1. Pending tests/procedures/ Plan of Care or Other Needs: pain control     2. Discharge plan for patient and Needs/Barriers: home    3. Estimated Discharge Date: tbd Posted on Whiteboard in [facility]: yes      4. The patient's care plan was reviewed with the oncoming nurse. \"HEALS\" SAFETY CHECK      Fall Risk    Total Score: 1    Safety Measures: Safety Measures: Bed/Chair alarm on, Bed/Chair-Wheels locked, Bed in low position, Call light within reach, Side rails X 3    A safety check occurred in the patient's room between off going nurse and oncoming nurse listed above. The safety check included the below items  Area Items   H  High Alert Medications - Verify all high alert medication drips (heparin, PCA, etc.)   E  Equipment - Suction is set up for ALL patients (with yanker)  - Red plugs utilized for all equipment (IV pumps, etc.)  - WOWs wiped down at end of shift.  - Room stocked with oxygen, suction, and other unit-specific supplies   A  Alarms - Bed alarm is set for fall risk patients  - Ensure chair alarm is in place and activated if patient is up in a chair   L  Lines - Check IV for any infiltration  - Sesay bag is empty if patient has a Sesay   - Tubing and IV bags are labeled   S  Safety   - Room is clean, patient is clean, and equipment is clean. - Hallways are clear from equipment besides carts.    - Fall bracelet on for fall risk patients  - Ensure room is clear and free of clutter  - Suction is set up for ALL patients (with pat)  - Hallways are clear from equipment besides carts.    - Isolation precautions followed, supplies available outside room, sign posted     Yuly Valderrama RN

## 2017-04-27 VITALS
BODY MASS INDEX: 23.7 KG/M2 | TEMPERATURE: 98.9 F | RESPIRATION RATE: 17 BRPM | WEIGHT: 175 LBS | SYSTOLIC BLOOD PRESSURE: 129 MMHG | OXYGEN SATURATION: 94 % | HEIGHT: 72 IN | DIASTOLIC BLOOD PRESSURE: 75 MMHG | HEART RATE: 87 BPM

## 2017-04-27 PROCEDURE — 74011250636 HC RX REV CODE- 250/636: Performed by: INTERNAL MEDICINE

## 2017-04-27 PROCEDURE — 74011250637 HC RX REV CODE- 250/637: Performed by: INTERNAL MEDICINE

## 2017-04-27 RX ADMIN — FOLIC ACID 1 MG: 1 TABLET ORAL at 09:00

## 2017-04-27 RX ADMIN — HYDROMORPHONE HYDROCHLORIDE 1 MG: 1 INJECTION, SOLUTION INTRAMUSCULAR; INTRAVENOUS; SUBCUTANEOUS at 17:43

## 2017-04-27 RX ADMIN — HYDROMORPHONE HYDROCHLORIDE 1 MG: 1 INJECTION, SOLUTION INTRAMUSCULAR; INTRAVENOUS; SUBCUTANEOUS at 11:30

## 2017-04-27 RX ADMIN — HYDROMORPHONE HYDROCHLORIDE 1 MG: 1 INJECTION, SOLUTION INTRAMUSCULAR; INTRAVENOUS; SUBCUTANEOUS at 02:04

## 2017-04-27 RX ADMIN — HYDROMORPHONE HYDROCHLORIDE 1 MG: 1 INJECTION, SOLUTION INTRAMUSCULAR; INTRAVENOUS; SUBCUTANEOUS at 04:45

## 2017-04-27 RX ADMIN — HYDROMORPHONE HYDROCHLORIDE 1 MG: 1 INJECTION, SOLUTION INTRAMUSCULAR; INTRAVENOUS; SUBCUTANEOUS at 19:53

## 2017-04-27 RX ADMIN — ENOXAPARIN SODIUM 40 MG: 40 INJECTION SUBCUTANEOUS at 07:01

## 2017-04-27 RX ADMIN — HYDROMORPHONE HYDROCHLORIDE 1 MG: 1 INJECTION, SOLUTION INTRAMUSCULAR; INTRAVENOUS; SUBCUTANEOUS at 07:12

## 2017-04-27 RX ADMIN — HYDROXYUREA 500 MG: 500 CAPSULE ORAL at 11:44

## 2017-04-27 RX ADMIN — SODIUM CHLORIDE 150 ML/HR: 900 INJECTION, SOLUTION INTRAVENOUS at 07:02

## 2017-04-27 NOTE — PROGRESS NOTES
Feels better than yesterday. No fevers. vss pallor  Lung clear alert oriented  abd soft nt  Alert oriented  impr sickle cell pain crisis, improving  Plan  Ct current care, if better by afternoon would like to go home. Pt has pain meds and hydrea at home.

## 2017-04-27 NOTE — DISCHARGE SUMMARY
Discharge Summary     Patient ID:  Rosa Elena Romero  [id number]  43 y.o.  1980    PCP on record: Dick Deleon MD    Admit date: 4/24/2017  Discharge date and time: 4/27/2017    Discharge Diagnoses:                                           Acute SC crisis   Musculoskeletal pain       Consults: Hematology/Oncology          Hospital Course by problems:  - patient admitted with severe leg pains , arthralgia , high retic count. - SC with vaso occulsive state   - treated with IVF and pain management   - hematology consult done - Patient sees Dr Joe Villa on regular bases   - patient has hydroxy uria and pain meds at home - new prescriptions are not written         Patient seen and examined by me on discharge day. Pertinent Findings:  Alert and awake   Pain free   Vitals stable   RS - clear   CVS- reg   abd - benign     Patient is stable for discharge       Significant Diagnostic Studies:      Pertinent Lab Data:  Recent Labs      04/25/17   0411  04/24/17   1305   WBC  18.2*  16.5*   HGB  9.3*  9.8*   HCT  26.6*  27.2*   PLT  396  444*     Recent Labs      04/24/17   1305   NA  139   K  3.5   CL  105   CO2  26   GLU  95   BUN  7   CREA  0.88   CA  8.7       DISCHARGE MEDICATIONS:   @  Current Discharge Medication List      CONTINUE these medications which have NOT CHANGED    Details   HYDROmorphone (DILAUDID) 2 mg tablet Take 1 Tab by mouth every six (6) hours as needed for Pain. Max Daily Amount: 8 mg. Indications: Pain  Qty: 12 Tab, Refills: 0      ibuprofen (MOTRIN) 800 mg tablet Take 800 mg by mouth every eight (8) hours as needed for Pain. folic acid (FOLVITE) 1 mg tablet Take 1 Tab by mouth daily. Qty: 30 Tab, Refills: 1      hydroxyurea (HYDREA) 500 mg capsule Take 500 mg by mouth two (2) times a day. Indications: SICKLE CELL ANEMIA WITH CRISIS      fentaNYL (DURAGESIC) 25 mcg/hr PATCH 1 Patch by TransDERmal route every seventy-two (72) hours. Max Daily Amount: 1 Patch.   Qty: 5 Patch, Refills: 0               My Recommended Diet, Activity, Wound Care, and follow-up labs are listed in the patient's Discharge Insturctions which I have personally completed and reviewed. Disposition:     [x] Home with family     [] [place] PT/RN   [] SNF/NH   [] Inpatient Rehab/[facility]  Condition at Discharge:  Stable    Follow up with:   PCP : Dick Deleon MD      Please follow-up tests/labs that are still pendin.  None  2.    >30 minutes spent coordinating this discharge (review instructions/follow-up, prescriptions, preparing report for sign off)    Signed:  Dora Romero MD  2017  12:33 PM

## 2017-04-27 NOTE — ROUTINE PROCESS
Bedside and Verbal shift change report given to Bharti Casanova (oncoming nurse) by Kana Sebastian RN (offgoing nurse). Report included the following information SBAR, Kardex, MAR and Recent Results.     SITUATION:    Code Status: Full Code   Reason for Admission: Vaso-occlusive sickle cell crisis ([facility] [address]   Bilateral leg pain    [facility] day: 2   Problem List:       Hospital Problems  Date Reviewed: 12/11/2016          Codes Class Noted POA    Bilateral leg pain ICD-10-CM: M79.604, M79.605  ICD-9-CM: 729.5  4/24/2017 Unknown        Vaso-occlusive sickle cell crisis [facility]) ICD-10-CM: D57.00  ICD-9-CM: 282.62  4/24/2017 Unknown              BACKGROUND:    Past Medical History:   Past Medical History:   Diagnosis Date    Sickle cell anemia with crisis ([facility] [address]     Vitamin D deficiency          Patient taking anticoagulants no     ASSESSMENT:    Changes in Assessment Throughout Shift: NO     Patient has Central Line: no Reasons if yes:    Patient has Sesay Cath: no Reasons if yes:       Last Vitals:     Vitals:    04/26/17 0405 04/26/17 0858 04/26/17 1131 04/26/17 1558   BP: 117/65 119/71 108/65 104/59   Pulse: 75 66 78 67   Resp: 16 18 18 18   Temp: 99.1 °F (37.3 °C) 98.6 °F (37 °C) 98.8 °F (37.1 °C) 98.2 °F (36.8 °C)   SpO2: 97% 97% 99% 93%   Weight:       Height:            IV and DRAINS (will only show if present)   Peripheral IV 04/24/17 Left Forearm-Site Assessment: Clean, dry, & intact     WOUND (if present)   Wound Type:  none   Dressing present Dressing Present : No   Wound Concerns/Notes:  none     PAIN    Pain Assessment    Pain Intensity 1: 7 (04/26/17 1744)    Pain Location 1: Leg    Pain Intervention(s) 1: Medication (see MAR)    Patient Stated Pain Goal: 0  o Interventions for Pain:  YES  o Intervention effective: yes  o Time of last intervention:    o Reassessment Completed: yes      Last 3 Weights:  Last 3 Recorded Weights in this Encounter    04/24/17 1230   Weight: 79.4 kg (175 lb)     Weight change:      INTAKE/OUPUT    Current Shift:      Last three shifts: 04/25 0701 - 04/26 1900  In: 940 [P.O.:940]  Out: 6025 [Urine:6025]     LAB RESULTS     Recent Labs      04/25/17   0411  04/24/17   1305   WBC  18.2*  16.5*   HGB  9.3*  9.8*   HCT  26.6*  27.2*   PLT  396  444*        Recent Labs      04/24/17   1305   NA  139   K  3.5   GLU  95   BUN  7   CREA  0.88   CA  8.7       RECOMMENDATIONS AND DISCHARGE PLANNING     1. Pending tests/procedures/ Plan of Care or Other Needs: NONE     2. Discharge plan for patient and Needs/Barriers: HOME    3. Estimated Discharge Date: 4/28/17 Posted on Whiteboard in [facility] Room: yes      4. The patient's care plan was reviewed with the oncoming nurse. \"HEALS\" SAFETY CHECK      Fall Risk    Total Score: 1    Safety Measures: Safety Measures: Bed/Chair-Wheels locked, Bed in low position, Call light within reach    A safety check occurred in the patient's room between off going nurse and oncoming nurse listed above. The safety check included the below items  Area Items   H  High Alert Medications - Verify all high alert medication drips (heparin, PCA, etc.)   E  Equipment - Suction is set up for ALL patients (with pat)  - Red plugs utilized for all equipment (IV pumps, etc.)  - WOWs wiped down at end of shift.  - Room stocked with oxygen, suction, and other unit-specific supplies   A  Alarms - Bed alarm is set for fall risk patients  - Ensure chair alarm is in place and activated if patient is up in a chair   L  Lines - Check IV for any infiltration  - Sesay bag is empty if patient has a Sesay   - Tubing and IV bags are labeled   S  Safety   - Room is clean, patient is clean, and equipment is clean. - Hallways are clear from equipment besides carts. - Fall bracelet on for fall risk patients  - Ensure room is clear and free of clutter  - Suction is set up for ALL patients (with pat)  - Hallways are clear from equipment besides carts.    - Isolation precautions followed, supplies available outside room, sign posted     Elkhart Bolus, RN

## 2017-04-27 NOTE — PROGRESS NOTES
Bedside and Verbal shift change report given to Leyda rn (oncoming nurse) by Diego Luz RN   (offgoing nurse). Report included the following information SBAR and Kardex.

## 2017-04-27 NOTE — INTERDISCIPLINARY ROUNDS
Interdisciplinary Round Note   Patient Information: Patient Information: Sandra Salinas                                      460/01   Reason for Admission: Vaso-occlusive sickle cell crisis ([facility] [address]  Bilateral leg pain   Attending Provider:   Kaylee Hays MD  Primary Care Physician:       Ana Valdez MD       [phone]   Past Medical History:   Past Medical History:   Diagnosis Date    Sickle cell anemia with crisis ([facility] [address]     Vitamin D deficiency       Hospital day: 2  Estimated discharge date: 4/28/17  RRAT Score: Medium Risk            16       Total Score        3 Relationship with PCP    9 More than 1 Admission in calendar year    4 Patient Insurance is Medicare, Medicaid or Self Pay        Criteria that do not apply:    Patient Living Status    Patient Length of Stay > 5    Charlson Comorbidity Score       Goals for Today: PAIN CONTROL      Overnight Events: NO     VITAL SIGNS  Vitals:    04/26/17 0405 04/26/17 0858 04/26/17 1131 04/26/17 1558   BP: 117/65 119/71 108/65 104/59   Pulse: 75 66 78 67   Resp: 16 18 18 18   Temp: 99.1 °F (37.3 °C) 98.6 °F (37 °C) 98.8 °F (37.1 °C) 98.2 °F (36.8 °C)   SpO2: 97% 97% 99% 93%   Weight:       Height:              Lines, Drains, & Airways    Peripheral IV 04/24/17 Left Forearm-Site Assessment: Clean, dry, & intact       VTE Prophylaxis                                   Intake and Output:   04/25 0701 - 04/26 1900  In: 940 [P.O.:940]  Out: 6025 [Urine:6025]    Current Diet: DIET REGULAR          GI Prophylaxis: no        Type:         Recent Glucose Results: No results found for: GLU, GLUPOC, GLUCPOC       IV Antibiotics? When started: Activity Level:   Activity Level: Bath Room Privileges  Needs assistance with ADLs: yes  PT Consult Status:  Current Immunizations:  Immunization History   Administered Date(s) Administered    Influenza Vaccine (Quad) PF 10/17/2015, 12/14/2016    Pneumococcal Polysaccharide (PPSV-23) 10/17/2015 Recommendations:   Discharge Disposition: Home Independent    Needs for Discharge: NONE Recommendations from IDR team: PAIN MEDICATION    Other Notes:

## 2017-05-14 PROCEDURE — 99283 EMERGENCY DEPT VISIT LOW MDM: CPT

## 2017-05-14 PROCEDURE — 96375 TX/PRO/DX INJ NEW DRUG ADDON: CPT

## 2017-05-14 PROCEDURE — 96361 HYDRATE IV INFUSION ADD-ON: CPT

## 2017-05-14 PROCEDURE — 96374 THER/PROPH/DIAG INJ IV PUSH: CPT

## 2017-05-14 PROCEDURE — 96376 TX/PRO/DX INJ SAME DRUG ADON: CPT

## 2017-05-15 ENCOUNTER — HOSPITAL ENCOUNTER (EMERGENCY)
Age: 37
Discharge: HOME | End: 2017-05-15
Attending: EMERGENCY MEDICINE | Admitting: EMERGENCY MEDICINE
Payer: MEDICARE

## 2017-05-15 VITALS
RESPIRATION RATE: 17 BRPM | DIASTOLIC BLOOD PRESSURE: 47 MMHG | SYSTOLIC BLOOD PRESSURE: 120 MMHG | WEIGHT: 174 LBS | OXYGEN SATURATION: 100 % | HEART RATE: 76 BPM | TEMPERATURE: 98.2 F | BODY MASS INDEX: 23.6 KG/M2

## 2017-05-15 DIAGNOSIS — D57.00 SICKLE CELL PAIN CRISIS (HCC): Primary | ICD-10-CM

## 2017-05-15 PROCEDURE — 74011250636 HC RX REV CODE- 250/636: Performed by: EMERGENCY MEDICINE

## 2017-05-15 RX ORDER — DIPHENHYDRAMINE HYDROCHLORIDE 50 MG/ML
50 INJECTION, SOLUTION INTRAMUSCULAR; INTRAVENOUS ONCE
Status: COMPLETED | OUTPATIENT
Start: 2017-05-15 | End: 2017-05-15

## 2017-05-15 RX ORDER — KETOROLAC TROMETHAMINE 30 MG/ML
15 INJECTION, SOLUTION INTRAMUSCULAR; INTRAVENOUS
Status: COMPLETED | OUTPATIENT
Start: 2017-05-15 | End: 2017-05-15

## 2017-05-15 RX ORDER — SODIUM CHLORIDE 9 MG/ML
150 INJECTION, SOLUTION INTRAVENOUS CONTINUOUS
Status: DISCONTINUED | OUTPATIENT
Start: 2017-05-15 | End: 2017-05-15 | Stop reason: HOSPADM

## 2017-05-15 RX ORDER — HYDROMORPHONE HYDROCHLORIDE 1 MG/ML
2 INJECTION, SOLUTION INTRAMUSCULAR; INTRAVENOUS; SUBCUTANEOUS ONCE
Status: COMPLETED | OUTPATIENT
Start: 2017-05-15 | End: 2017-05-15

## 2017-05-15 RX ADMIN — DIPHENHYDRAMINE HYDROCHLORIDE 50 MG: 50 INJECTION, SOLUTION INTRAMUSCULAR; INTRAVENOUS at 03:53

## 2017-05-15 RX ADMIN — HYDROMORPHONE HYDROCHLORIDE 2 MG: 1 INJECTION, SOLUTION INTRAMUSCULAR; INTRAVENOUS; SUBCUTANEOUS at 05:09

## 2017-05-15 RX ADMIN — KETOROLAC TROMETHAMINE 15 MG: 30 INJECTION, SOLUTION INTRAMUSCULAR at 04:08

## 2017-05-15 RX ADMIN — HYDROMORPHONE HYDROCHLORIDE 2 MG: 1 INJECTION, SOLUTION INTRAMUSCULAR; INTRAVENOUS; SUBCUTANEOUS at 03:57

## 2017-05-15 RX ADMIN — SODIUM CHLORIDE 1000 ML: 900 INJECTION, SOLUTION INTRAVENOUS at 03:53

## 2017-05-15 NOTE — ED NOTES
7:11 AM :Pt care assumed from Dr. Farzad Valles , ED provider. Pt complaint(s), current treatment plan, progression and available diagnostic results have been discussed thoroughly. Rounding occurred: yes  Intended Disposition: Discharge Home  Pending diagnostic reports and/or labs (please list): Dispo         7:37 AM Pt has been reassessed. Discussed all results with pt and pt agrees with plan for discharge. All questions answered at this time. ED warnings given for any new or worsening symptoms. Pt voices understanding. Pt discharged in stable condition. Scribe Attestation:     I, [address] [place] [address] for and in the presence of [place] DO May 15, 2017 at 7:36 AM     Signed by: Lynda Xiong May 15, 2017, 7:36 AM      Physician Attestation:   I personally performed the services described in this documentation, reviewed and edited the documentation which was dictated to the scribe in my presence, and it accurately records my words and actions.  [place] DO  May 15, 2017 at 7:36 AM

## 2017-05-15 NOTE — ED NOTES
Bedside [facility] PT resting in bed with NS infusing, safety intact, will continue to monitor

## 2017-05-15 NOTE — ED PROVIDER NOTES
HPI Comments:   2:42 AM Opal Riley is a 43 y.o. male with a h/o sickle cell, who presents to the ED for the evaluation of sickle cell crisis, onset yesterday morning. Pt states that his crisis nset around 0200 yesterday morning, but has progressively worsened. States that he took his pain meds, but they did not help. C/o BLE and back pain. No relieving or exacerbating factors. No other complaints at this time. PCP: Nasima Solorzano MD     The history is provided by the patient. Past Medical History:   Diagnosis Date    Sickle cell anemia with crisis ([facility] [address]     Vitamin D deficiency        History reviewed. No pertinent surgical history. Family History:   Problem Relation Age of Onset    Cancer Neg Hx     Diabetes Neg Hx     Heart Disease Neg Hx     Heart Attack Neg Hx     Hypertension Neg Hx     Stroke Neg Hx        Social History     Social History    Marital status:      Spouse name: N/A    Number of children: N/A    Years of education: N/A     Occupational History    Not on file. Social History Main Topics    Smoking status: Never Smoker    Smokeless tobacco: Never Used    Alcohol use No    Drug use: No    Sexual activity: Yes     Partners: Female     Birth control/ protection: None     Other Topics Concern    Not on file     Social History Narrative         ALLERGIES: Eggshell membrane and Milk    Review of Systems   Constitutional: Negative for activity change, appetite change, diaphoresis and fever. HENT: Negative for congestion, dental problem, ear pain, hearing loss, nosebleeds, postnasal drip, sinus pressure, sneezing and tinnitus. Eyes: Negative for photophobia, discharge, redness and visual disturbance. Respiratory: Negative for cough, choking, shortness of breath, wheezing and stridor. Cardiovascular: Negative for chest pain, palpitations and leg swelling.    Gastrointestinal: Negative for abdominal distention, abdominal pain, anal bleeding and blood in stool. Genitourinary: Negative for decreased urine volume, difficulty urinating, discharge, dysuria, frequency, hematuria, penile swelling, scrotal swelling, testicular pain and urgency. Musculoskeletal: Positive for back pain and myalgias. Negative for arthralgias, gait problem, joint swelling and neck pain. Skin: Negative for color change and pallor. Neurological: Negative for dizziness, tremors, seizures, syncope and headaches. Hematological: Negative for adenopathy. Does not bruise/bleed easily. Psychiatric/Behavioral: Negative for agitation, behavioral problems, confusion and hallucinations. The patient is not nervous/anxious. Vitals:    05/14/17 2318 05/15/17 0139   BP: 132/86 131/73   Pulse: 76 76   Resp: 18 17   Temp: 98.3 °F (36.8 °C) 98.2 °F (36.8 °C)   SpO2: 98% 98%   Weight: 78.9 kg (174 lb)             Physical Exam   Constitutional: He is oriented to person, place, and time. He appears well-developed and well-nourished. HENT:   Head: Normocephalic and atraumatic. Right Ear: External ear normal.   Left Ear: External ear normal.   Nose: Nose normal.   Mouth/Throat: Oropharynx is clear and moist.   Eyes: Conjunctivae and EOM are normal. Pupils are equal, round, and reactive to light. Right eye exhibits no discharge. Scleral icterus is present. Neck: Normal range of motion. Neck supple. No JVD present. No thyromegaly present. Cardiovascular: Normal rate, regular rhythm and intact distal pulses. Exam reveals no gallop and no friction rub. No murmur heard. Pulmonary/Chest: No respiratory distress. He has no wheezes. He has no rales. He exhibits no tenderness. Abdominal: He exhibits no distension and no mass. There is no tenderness. There is no rebound and no guarding. Musculoskeletal: Normal range of motion. He exhibits no edema. Lymphadenopathy:     He has no cervical adenopathy. Neurological: He is alert and oriented to person, place, and time.  No cranial nerve deficit. Coordination normal.   Skin: Skin is warm and dry. No rash noted. No erythema. Psychiatric: He has a normal mood and affect. His behavior is normal. Judgment normal.   Nursing note and vitals reviewed. MDM  Number of Diagnoses or Management Options  Sickle cell pain crisis [facility]):   Diagnosis management comments: 43year old male presenting with pain in the legs despite home therapy. Known SS. Workup and treatment initiated. Amount and/or Complexity of Data Reviewed  Clinical lab tests: ordered and reviewed  Tests in the radiology section of CPT®: ordered and reviewed    Risk of Complications, Morbidity, and/or Mortality  Presenting problems: moderate    Patient Progress  Patient progress: stable    ED Course       Procedures    Medications ordered:   Medications   0.9% sodium chloride infusion (not administered)   HYDROmorphone (PF) (DILAUDID) injection 2 mg (not administered)   sodium chloride 0.9 % bolus infusion 1,000 mL (1,000 mL IntraVENous New Bag 5/15/17 0353)   HYDROmorphone (PF) (DILAUDID) injection 2 mg (2 mg IntraVENous Given 5/15/17 0357)   diphenhydrAMINE (BENADRYL) injection 50 mg (50 mg IntraVENous Given 5/15/17 0353)   ketorolac (TORADOL) injection 15 mg (15 mg IntraVENous Given 5/15/17 0408)         Lab findings:  No results found for this or any previous visit (from the past 12 hour(s)). Progress notes, Consult notes or additional Procedure notes:   5:08 AM: Rechecked patient. Updated patient on all ED findings. All questions answered. States that his painb is down to a 7. Will give more meds. 7:18 AM 7:18 AM : Pt care transferred to Dr. Osiris Ahn  ,ED provider. History of patient complaint(s), available diagnostic reports and current treatment plan has been discussed thoroughly. Bedside rounding on patient occured : yes .   Intended disposition of patient : Home  Pending diagnostics reports and/or labs (please list): repeat labs    Reevaluation of patient:   I have reevaluated patient. Patient is feeling better    Dispo:  Patient was discharged home in stable condition. Patient is to return to emergency department with any new or worsening condition. No diagnosis found. Follow-up Information     Follow up With Details Comments Contact Info    Brooks Yu MD Call in 2 days  [address] [facility] EM[facility]  As needed, If symptoms worsen [address] [place] [id number]  [phone]          Patient's Medications   Start Taking    No medications on file   Continue Taking    FENTANYL (DURAGESIC) 25 MCG/HR PATCH    1 Patch by TransDERmal route every seventy-two (72) hours. Max Daily Amount: 1 Patch. FOLIC ACID (FOLVITE) 1 MG TABLET    Take 1 Tab by mouth daily. HYDROMORPHONE (DILAUDID) 2 MG TABLET    Take 1 Tab by mouth every six (6) hours as needed for Pain. Max Daily Amount: 8 mg. Indications: Pain    HYDROXYUREA (HYDREA) 500 MG CAPSULE    Take 500 mg by mouth two (2) times a day. Indications: SICKLE CELL ANEMIA WITH CRISIS    IBUPROFEN (MOTRIN) 800 MG TABLET    Take 800 mg by mouth every eight (8) hours as needed for Pain. These Medications have changed    No medications on file   Stop Taking    No medications on file       SCRIBE ATTESTATION STATEMENT  Documented by: Gabriel Browning Beulah for, and in the presence of, Rashawn Fernandez MD 2:42 AM     Signed by: Lynda Hameed, 5/15/2017 2:42 AM     PROVIDER ATTESTATION STATEMENT  I personally performed the services described in the documentation, reviewed the documentation, as recorded by the scribe in my presence, and it accurately and completely records my words and actions.   Rashawn Fernandez MD

## 2017-05-15 NOTE — DISCHARGE INSTRUCTIONS
Sickle Cell Crisis: Care Instructions  Your Care Instructions    Sickle cell crisis is a painful episode that may begin suddenly in a person with sickle cell disease. Sickle cell disease turns normal, round red blood cells into cells that look like jeane or crescent moons. The sickle-shaped cells can get stuck in blood vessels, blocking blood flow and causing severe pain. The pain can occur in the bones of the spine, the arms and legs, the chest, and the abdomen. An episode may be called a \"painful event\" or \"painful crisis. \" Some people who have sickle cell disease have many painful events, while others have few or none. Treatment depends on the level of pain and how long it lasts. Sometimes taking nonprescription pain relievers can help. Or you may need stronger pain relief medicine that is prescribed or given by a doctor. You may need to be treated in the hospital.  It isn't always possible to know what sets off a painful event. But triggers include being dehydrated, cold temperatures, infection, stress, and not getting enough oxygen. Follow-up care is a key part of your treatment and safety. Be sure to make and go to all appointments, and call your doctor if you are having problems. It's also a good idea to know your test results and keep a list of the medicines you take. How can you care for yourself at home? · Create a pain management plan with your doctor. This plan should include the types of medicines you can take and other actions you can take at home to relieve pain. · Drink plenty of fluids, enough so that your urine is light yellow or clear like water. If you have kidney, heart, or liver disease and have to limit fluids, talk with your doctor before you increase the amount of fluids you drink. · Take your medicines exactly as prescribed. Call your doctor if you think you are having a problem with your medicine. · Take pain medicines exactly as directed.   ¨ If the doctor gave you a prescription medicine for pain, take it as prescribed. ¨ If you are not taking a prescription pain medicine, ask your doctor if you can take an over-the-counter medicine. · Avoid alcohol. It can make you dehydrated. · Dress warmly in cold weather. The cold and windy weather can lead to severe pain. · Do not smoke. Smoking can reduce the amount of oxygen in your blood. · Get plenty of sleep. When should you call for help? Call 911 anytime you think you may need emergency care. For example, call if:  · You passed out (lost consciousness). Call your doctor now or seek immediate medical care if:  · You are in severe pain. · You are dizzy or lightheaded, or you feel like you may faint. · You have a fever. · You are short of breath. · Your symptoms are getting worse. Watch closely for changes in your health, and be sure to contact your doctor if you are not getting better as expected. Where can you learn more? Go to http://manuel-amadeo.info/. Enter F104 in the search box to learn more about \"Sickle Cell Crisis: Care Instructions. \"  Current as of: October 13, 2016  Content Version: 11.2  © 3488-2219 [employer]. Care instructions adapted under license by [employer] (which disclaims liability or warranty for this information). If you have questions about a medical condition or this instruction, always ask your healthcare professional. [place] [address] any warranty or liability for your use of this information.

## 2017-06-26 ENCOUNTER — HOSPITAL ENCOUNTER (EMERGENCY)
Age: 37
Discharge: HOME | End: 2017-06-26
Attending: EMERGENCY MEDICINE
Payer: MEDICARE

## 2017-06-26 VITALS
SYSTOLIC BLOOD PRESSURE: 109 MMHG | OXYGEN SATURATION: 94 % | DIASTOLIC BLOOD PRESSURE: 68 MMHG | BODY MASS INDEX: 23.6 KG/M2 | HEART RATE: 88 BPM | WEIGHT: 174 LBS | RESPIRATION RATE: 17 BRPM | TEMPERATURE: 98.4 F

## 2017-06-26 DIAGNOSIS — D57.00 SICKLE CELL ANEMIA WITH PAIN (HCC): Primary | ICD-10-CM

## 2017-06-26 LAB
ANION GAP BLD CALC-SCNC: 8 MMOL/L (ref 3–18)
BASOPHILS # BLD AUTO: 0.1 K/UL (ref 0–0.06)
BASOPHILS # BLD: 1 % (ref 0–3)
BUN SERPL-MCNC: 11 MG/DL (ref 7–18)
BUN/CREAT SERPL: 12 (ref 12–20)
CALCIUM SERPL-MCNC: 8.4 MG/DL (ref 8.5–10.1)
CHLORIDE SERPL-SCNC: 104 MMOL/L (ref 100–108)
CO2 SERPL-SCNC: 25 MMOL/L (ref 21–32)
CREAT SERPL-MCNC: 0.89 MG/DL (ref 0.6–1.3)
DIFFERENTIAL METHOD BLD: ABNORMAL
EOSINOPHIL # BLD: 0.4 K/UL (ref 0–0.4)
EOSINOPHIL NFR BLD: 3 % (ref 0–5)
ERYTHROCYTE [DISTWIDTH] IN BLOOD BY AUTOMATED COUNT: 23.8 % (ref 11.6–14.5)
GLUCOSE SERPL-MCNC: 111 MG/DL (ref 74–99)
HCT VFR BLD AUTO: 21.7 % (ref 36–48)
HGB BLD-MCNC: 7.7 G/DL (ref 13–16)
LYMPHOCYTES # BLD AUTO: 42 % (ref 20–51)
LYMPHOCYTES # BLD: 5.5 K/UL (ref 0.8–3.5)
MCH RBC QN AUTO: 25.6 PG (ref 24–34)
MCHC RBC AUTO-ENTMCNC: 35.5 G/DL (ref 31–37)
MCV RBC AUTO: 72.1 FL (ref 74–97)
MONOCYTES # BLD: 1 K/UL (ref 0–1)
MONOCYTES NFR BLD AUTO: 8 % (ref 2–9)
NEUTS SEG # BLD: 6.1 K/UL (ref 1.8–8)
NEUTS SEG NFR BLD AUTO: 46 % (ref 42–75)
NRBC BLD-RTO: 8 PER 100 WBC
PLATELET # BLD AUTO: 309 K/UL (ref 135–420)
PLATELET COMMENTS,PCOM: ABNORMAL
PMV BLD AUTO: 9.2 FL (ref 9.2–11.8)
POTASSIUM SERPL-SCNC: 3.8 MMOL/L (ref 3.5–5.5)
RBC # BLD AUTO: 3.01 M/UL (ref 4.7–5.5)
RBC MORPH BLD: ABNORMAL
RETICS/RBC NFR AUTO: 10.6 % (ref 0.5–2.3)
SODIUM SERPL-SCNC: 137 MMOL/L (ref 136–145)
WBC # BLD AUTO: 13.1 K/UL (ref 4.6–13.2)

## 2017-06-26 PROCEDURE — 96375 TX/PRO/DX INJ NEW DRUG ADDON: CPT

## 2017-06-26 PROCEDURE — 96361 HYDRATE IV INFUSION ADD-ON: CPT

## 2017-06-26 PROCEDURE — 96374 THER/PROPH/DIAG INJ IV PUSH: CPT

## 2017-06-26 PROCEDURE — 99282 EMERGENCY DEPT VISIT SF MDM: CPT

## 2017-06-26 PROCEDURE — 96376 TX/PRO/DX INJ SAME DRUG ADON: CPT

## 2017-06-26 PROCEDURE — 85025 COMPLETE CBC W/AUTO DIFF WBC: CPT | Performed by: EMERGENCY MEDICINE

## 2017-06-26 PROCEDURE — 80048 BASIC METABOLIC PNL TOTAL CA: CPT | Performed by: EMERGENCY MEDICINE

## 2017-06-26 PROCEDURE — 85045 AUTOMATED RETICULOCYTE COUNT: CPT | Performed by: EMERGENCY MEDICINE

## 2017-06-26 PROCEDURE — 74011250636 HC RX REV CODE- 250/636: Performed by: EMERGENCY MEDICINE

## 2017-06-26 RX ORDER — HYDROMORPHONE HYDROCHLORIDE 1 MG/ML
1 INJECTION, SOLUTION INTRAMUSCULAR; INTRAVENOUS; SUBCUTANEOUS ONCE
Status: COMPLETED | OUTPATIENT
Start: 2017-06-26 | End: 2017-06-26

## 2017-06-26 RX ORDER — KETOROLAC TROMETHAMINE 30 MG/ML
30 INJECTION, SOLUTION INTRAMUSCULAR; INTRAVENOUS
Status: COMPLETED | OUTPATIENT
Start: 2017-06-26 | End: 2017-06-26

## 2017-06-26 RX ADMIN — KETOROLAC TROMETHAMINE 30 MG: 30 INJECTION, SOLUTION INTRAMUSCULAR at 04:51

## 2017-06-26 RX ADMIN — HYDROMORPHONE HYDROCHLORIDE 1 MG: 1 INJECTION, SOLUTION INTRAMUSCULAR; INTRAVENOUS; SUBCUTANEOUS at 04:20

## 2017-06-26 RX ADMIN — HYDROMORPHONE HYDROCHLORIDE 1 MG: 1 INJECTION, SOLUTION INTRAMUSCULAR; INTRAVENOUS; SUBCUTANEOUS at 03:52

## 2017-06-26 RX ADMIN — SODIUM CHLORIDE 1000 ML: 900 INJECTION, SOLUTION INTRAVENOUS at 03:20

## 2017-06-26 NOTE — ED PROVIDER NOTES
Patient is a 43 y.o. male presenting with sickle cell disease. Sickle Cell Crisis   Pertinent negatives include no chest pain, no headaches and no shortness of breath. Pt is c/o sickle cell crisis pain to BLE. Pain onset @6hrs ago. Takes Dilaudid and wears Fentanyl patch as outpatient to control his pain. Denies CP, SOB, abd pain, n/v/d. Denies ETOH, tobacco, illicits. Past Medical History:   Diagnosis Date    Sickle cell anemia with crisis ([facility] [address]     Vitamin D deficiency        History reviewed. No pertinent surgical history. Family History:   Problem Relation Age of Onset    Cancer Neg Hx     Diabetes Neg Hx     Heart Disease Neg Hx     Heart Attack Neg Hx     Hypertension Neg Hx     Stroke Neg Hx        Social History     Social History    Marital status:      Spouse name: N/A    Number of children: N/A    Years of education: N/A     Occupational History    Not on file. Social History Main Topics    Smoking status: Never Smoker    Smokeless tobacco: Never Used    Alcohol use No    Drug use: No    Sexual activity: Yes     Partners: Female     Birth control/ protection: None     Other Topics Concern    Not on file     Social History Narrative         ALLERGIES: Eggshell membrane and Milk    Review of Systems   Constitutional: Negative. Negative for fatigue and fever. HENT: Negative. Eyes: Negative. Respiratory: Negative. Negative for shortness of breath. Cardiovascular: Negative for chest pain. Gastrointestinal: Negative. Endocrine: Negative. Genitourinary: Negative. Negative for flank pain. Musculoskeletal: Positive for myalgias. Skin: Negative. Allergic/Immunologic: Negative. Neurological: Negative. Negative for weakness and headaches. Hematological: Negative. Psychiatric/Behavioral: Negative.         Vitals:    06/26/17 0245   BP: 109/68   Pulse: 88   Resp: 17   Temp: 98.4 °F (36.9 °C)   SpO2: 94%   Weight: 78.9 kg (174 lb) Physical Exam   Constitutional: Vital signs are normal. He appears well-developed and well-nourished. He is active. Non-toxic appearance. He does not appear ill. No distress. HENT:   Head: Normocephalic and atraumatic. Neck: Normal range of motion. Neck supple. Carotid bruit is not present. No tracheal deviation present. No thyromegaly present. Cardiovascular: Normal rate, regular rhythm and normal heart sounds. Exam reveals no gallop and no friction rub. No murmur heard. Pulmonary/Chest: Effort normal and breath sounds normal. No stridor. No respiratory distress. He has no wheezes. He has no rales. He exhibits no tenderness. Abdominal: Soft. He exhibits no distension and no mass. There is no tenderness. There is no rebound, no guarding and no CVA tenderness. Musculoskeletal: Normal range of motion. Neurological: He is alert. Skin: Skin is warm, dry and intact. He is not diaphoretic. No pallor. Psychiatric: He has a normal mood and affect. His speech is normal and behavior is normal. Judgment and thought content normal.   Nursing note and vitals reviewed. MDM  Number of Diagnoses or Management Options  Sickle cell anemia with pain [facility]):   Diagnosis management comments: Differential; sickle cell crisi; anemia; hypoxia; rhabdomyolysis    Feeling better. D/c home. Labs comparable to prior values. Amount and/or Complexity of Data Reviewed  Clinical lab tests: ordered and reviewed      ED Course       Left external jugular IV placement  Date/Time: 6/26/2017 3:09 AM  Performed by: Ever Martinez  Authorized by: Ever Martinez     Consent:     Consent obtained:  Verbal    Consent given by:  Patient    Risks discussed:  Bleeding, infection and nerve damage    Alternatives discussed:  Alternative treatment  Indications:     Indications:  Needs peripheralIV access  Pre-procedure details:     Skin preparation:  ChloraPrep  Anesthesia (see MAR for exact dosages):      Anesthesia method:  None  Post-procedure details:     Patient tolerance of procedure: Tolerated well, no immediate complications        Recent Results (from the past 12 hour(s))   CBC WITH AUTOMATED DIFF    Collection Time: 06/26/17  3:10 AM   Result Value Ref Range    WBC 13.1 4.6 - 13.2 K/uL    RBC 3.01 (L) 4.70 - 5.50 M/uL    HGB 7.7 (L) 13.0 - 16.0 g/dL    HCT 21.7 (L) 36.0 - 48.0 %    MCV 72.1 (L) 74.0 - 97.0 FL    MCH 25.6 24.0 - 34.0 PG    MCHC 35.5 31.0 - 37.0 g/dL    RDW 23.8 (H) 11.6 - 14.5 %    PLATELET 781 712 - 350 K/uL    MPV 9.2 9.2 - 11.8 FL    NEUTROPHILS 46 42 - 75 %    LYMPHOCYTES 42 20 - 51 %    MONOCYTES 8 2 - 9 %    EOSINOPHILS 3 0 - 5 %    BASOPHILS 1 0 - 3 %    NRBC 8.0 (H) 0  WBC    ABS. NEUTROPHILS 6.1 1.8 - 8.0 K/UL    ABS. LYMPHOCYTES 5.5 (H) 0.8 - 3.5 K/UL    ABS. MONOCYTES 1.0 0 - 1.0 K/UL    ABS. EOSINOPHILS 0.4 0.0 - 0.4 K/UL    ABS. BASOPHILS 0.1 (H) 0.0 - 0.06 K/UL    DF AUTOMATED      PLATELET COMMENTS ADEQUATE PLATELETS      RBC COMMENTS ANISOCYTOSIS  2+        RBC COMMENTS HYPOCHROMIA  1+        RBC COMMENTS MICROCYTOSIS  1+        RBC COMMENTS POIKILOCYTOSIS  2+        RBC COMMENTS OVALOCYTES  1+        RBC COMMENTS SICKLE CELLS  2+        RBC COMMENTS TARGET CELLS  1+       RETICULOCYTE COUNT    Collection Time: 06/26/17  3:10 AM   Result Value Ref Range    Reticulocyte count 10.6 (H) 0.5 - 2.3 %   METABOLIC PANEL, BASIC    Collection Time: 06/26/17  3:10 AM   Result Value Ref Range    Sodium 137 136 - 145 mmol/L    Potassium 3.8 3.5 - 5.5 mmol/L    Chloride 104 100 - 108 mmol/L    CO2 25 21 - 32 mmol/L    Anion gap 8 3.0 - 18 mmol/L    Glucose 111 (H) 74 - 99 mg/dL    BUN 11 7.0 - 18 MG/DL    Creatinine 0.89 0.6 - 1.3 MG/DL    BUN/Creatinine ratio 12 12 - 20      GFR est AA >60 >60 ml/min/1.73m2    GFR est non-AA >60 >60 ml/min/1.73m2    Calcium 8.4 (L) 8.5 - 10.1 MG/DL     4:12 AM    Diagnosis:   1.  Sickle cell anemia with pain (H[facility])          Disposition: home    Follow-up Information     Follow up With Details Comments Contact Info    Phuc Roper MD Schedule an appointment as soon as possible for a visit today  [address] [facility] EM[facility]ENCY DEPT  If symptoms worsen return immediately [address]  [phone]          Patient's Medications   Start Taking    No medications on file   Continue Taking    FENTANYL (DURAGESIC) 25 MCG/HR PATCH    1 Patch by TransDERmal route every seventy-two (72) hours. Max Daily Amount: 1 Patch. FOLIC ACID (FOLVITE) 1 MG TABLET    Take 1 Tab by mouth daily. HYDROMORPHONE (DILAUDID) 2 MG TABLET    Take 1 Tab by mouth every six (6) hours as needed for Pain. Max Daily Amount: 8 mg. Indications: Pain    HYDROXYUREA (HYDREA) 500 MG CAPSULE    Take 500 mg by mouth two (2) times a day. Indications: SICKLE CELL ANEMIA WITH CRISIS    IBUPROFEN (MOTRIN) 800 MG TABLET    Take 800 mg by mouth every eight (8) hours as needed for Pain.    These Medications have changed    No medications on file   Stop Taking    No medications on file

## 2017-07-01 ENCOUNTER — APPOINTMENT (OUTPATIENT)
Dept: NUCLEAR MEDICINE | Age: 37
DRG: 417 | End: 2017-07-01
Attending: PHYSICIAN ASSISTANT
Payer: MEDICARE

## 2017-07-01 ENCOUNTER — APPOINTMENT (OUTPATIENT)
Dept: ULTRASOUND IMAGING | Age: 37
DRG: 417 | End: 2017-07-01
Attending: PHYSICIAN ASSISTANT
Payer: MEDICARE

## 2017-07-01 ENCOUNTER — ANESTHESIA (OUTPATIENT)
Dept: SURGERY | Age: 37
DRG: 417 | End: 2017-07-01
Payer: MEDICARE

## 2017-07-01 ENCOUNTER — HOSPITAL ENCOUNTER (INPATIENT)
Age: 37
LOS: 1 days | Discharge: HOME | DRG: 417 | End: 2017-07-02
Attending: EMERGENCY MEDICINE | Admitting: INTERNAL MEDICINE
Payer: MEDICARE

## 2017-07-01 ENCOUNTER — APPOINTMENT (OUTPATIENT)
Dept: GENERAL RADIOLOGY | Age: 37
DRG: 417 | End: 2017-07-01
Attending: PHYSICIAN ASSISTANT
Payer: MEDICARE

## 2017-07-01 ENCOUNTER — APPOINTMENT (OUTPATIENT)
Dept: GENERAL RADIOLOGY | Age: 37
DRG: 417 | End: 2017-07-01
Attending: SURGERY
Payer: MEDICARE

## 2017-07-01 ENCOUNTER — ANESTHESIA EVENT (OUTPATIENT)
Dept: SURGERY | Age: 37
DRG: 417 | End: 2017-07-01
Payer: MEDICARE

## 2017-07-01 ENCOUNTER — APPOINTMENT (OUTPATIENT)
Dept: MRI IMAGING | Age: 37
DRG: 417 | End: 2017-07-01
Attending: PHYSICIAN ASSISTANT
Payer: MEDICARE

## 2017-07-01 DIAGNOSIS — M54.50 ACUTE MIDLINE LOW BACK PAIN WITHOUT SCIATICA: ICD-10-CM

## 2017-07-01 DIAGNOSIS — M79.605 BILATERAL LEG PAIN: ICD-10-CM

## 2017-07-01 DIAGNOSIS — M79.604 BILATERAL LEG PAIN: ICD-10-CM

## 2017-07-01 DIAGNOSIS — E87.20 LACTIC ACIDOSIS: ICD-10-CM

## 2017-07-01 DIAGNOSIS — R70.1 RETICULOCYTOSIS: ICD-10-CM

## 2017-07-01 DIAGNOSIS — R79.89 POSITIVE D DIMER: ICD-10-CM

## 2017-07-01 DIAGNOSIS — R10.13 ABDOMINAL PAIN, EPIGASTRIC: ICD-10-CM

## 2017-07-01 DIAGNOSIS — R79.89 ABNORMAL LFTS: ICD-10-CM

## 2017-07-01 DIAGNOSIS — R06.02 SOB (SHORTNESS OF BREATH): ICD-10-CM

## 2017-07-01 DIAGNOSIS — R94.31 ABNORMAL EKG: ICD-10-CM

## 2017-07-01 DIAGNOSIS — D50.9 MICROCYTIC ANEMIA: ICD-10-CM

## 2017-07-01 DIAGNOSIS — D72.829 LEUKOCYTOSIS, UNSPECIFIED TYPE: ICD-10-CM

## 2017-07-01 DIAGNOSIS — R17 TOTAL BILIRUBIN, ELEVATED: ICD-10-CM

## 2017-07-01 DIAGNOSIS — R07.9 CHEST PAIN, UNSPECIFIED TYPE: Primary | ICD-10-CM

## 2017-07-01 PROBLEM — K81.9 CHOLECYSTITIS: Status: ACTIVE | Noted: 2017-07-01

## 2017-07-01 LAB
ALBUMIN SERPL BCP-MCNC: 4.3 G/DL (ref 3.4–5)
ALBUMIN/GLOB SERPL: 0.9 {RATIO} (ref 0.8–1.7)
ALP SERPL-CCNC: 152 U/L (ref 45–117)
ALT SERPL-CCNC: 39 U/L (ref 16–61)
ANION GAP BLD CALC-SCNC: 11 MMOL/L (ref 3–18)
APAP SERPL-MCNC: <2 UG/ML (ref 10–30)
APPEARANCE UR: CLEAR
AST SERPL W P-5'-P-CCNC: 99 U/L (ref 15–37)
ATRIAL RATE: 49 BPM
ATRIAL RATE: 66 BPM
BACTERIA URNS QL MICRO: ABNORMAL /HPF
BASOPHILS # BLD AUTO: 0.3 K/UL (ref 0–0.06)
BASOPHILS # BLD: 2 % (ref 0–3)
BILIRUB DIRECT SERPL-MCNC: 1.2 MG/DL (ref 0–0.2)
BILIRUB SERPL-MCNC: 6 MG/DL (ref 0.2–1)
BILIRUB UR QL: ABNORMAL
BUN SERPL-MCNC: 12 MG/DL (ref 7–18)
BUN/CREAT SERPL: 11 (ref 12–20)
CALCIUM SERPL-MCNC: 9 MG/DL (ref 8.5–10.1)
CALCULATED P AXIS, ECG09: 57 DEGREES
CALCULATED P AXIS, ECG09: 66 DEGREES
CALCULATED R AXIS, ECG10: 1 DEGREES
CALCULATED T AXIS, ECG11: 58 DEGREES
CALCULATED T AXIS, ECG11: 75 DEGREES
CHLORIDE SERPL-SCNC: 101 MMOL/L (ref 100–108)
CK MB CFR SERPL CALC: NORMAL % (ref 0–4)
CK MB CFR SERPL CALC: NORMAL % (ref 0–4)
CK MB SERPL-MCNC: <1 NG/ML (ref 5–25)
CK MB SERPL-MCNC: <1 NG/ML (ref 5–25)
CK SERPL-CCNC: 102 U/L (ref 39–308)
CK SERPL-CCNC: 166 U/L (ref 39–308)
CO2 SERPL-SCNC: 25 MMOL/L (ref 21–32)
COLOR UR: ABNORMAL
CREAT SERPL-MCNC: 1.09 MG/DL (ref 0.6–1.3)
D DIMER PPP FEU-MCNC: 3.01 UG/ML(FEU)
DIAGNOSIS, 93000: NORMAL
DIAGNOSIS, 93000: NORMAL
DIFFERENTIAL METHOD BLD: ABNORMAL
EOSINOPHIL # BLD: 0.3 K/UL (ref 0–0.4)
EOSINOPHIL NFR BLD: 2 % (ref 0–5)
EPITH CASTS URNS QL MICRO: ABNORMAL /LPF (ref 0–5)
ERYTHROCYTE [DISTWIDTH] IN BLOOD BY AUTOMATED COUNT: 24.3 % (ref 11.6–14.5)
GLOBULIN SER CALC-MCNC: 5 G/DL (ref 2–4)
GLUCOSE SERPL-MCNC: 159 MG/DL (ref 74–99)
GLUCOSE UR STRIP.AUTO-MCNC: NEGATIVE MG/DL
HBA1C MFR BLD: <3.5 % (ref 4.2–5.6)
HCT VFR BLD AUTO: 27.6 % (ref 36–48)
HGB BLD-MCNC: 9.6 G/DL (ref 13–16)
HGB UR QL STRIP: NEGATIVE
KETONES UR QL STRIP.AUTO: ABNORMAL MG/DL
LACTATE BLD-SCNC: 0.8 MMOL/L (ref 0.4–2)
LACTATE BLD-SCNC: 2.1 MMOL/L (ref 0.4–2)
LEUKOCYTE ESTERASE UR QL STRIP.AUTO: ABNORMAL
LYMPHOCYTES # BLD AUTO: 28 % (ref 20–51)
LYMPHOCYTES # BLD: 4.7 K/UL (ref 0.8–3.5)
MAGNESIUM SERPL-MCNC: 2.4 MG/DL (ref 1.6–2.6)
MCH RBC QN AUTO: 25.4 PG (ref 24–34)
MCHC RBC AUTO-ENTMCNC: 34.8 G/DL (ref 31–37)
MCV RBC AUTO: 73 FL (ref 74–97)
MONOCYTES # BLD: 0.7 K/UL (ref 0–1)
MONOCYTES NFR BLD AUTO: 4 % (ref 2–9)
MUCOUS THREADS URNS QL MICRO: ABNORMAL /LPF
NEUTS SEG # BLD: 10.8 K/UL (ref 1.8–8)
NEUTS SEG NFR BLD AUTO: 64 % (ref 42–75)
NITRITE UR QL STRIP.AUTO: NEGATIVE
NRBC BLD-RTO: 6 PER 100 WBC
P-R INTERVAL, ECG05: 166 MS
P-R INTERVAL, ECG05: 168 MS
PH UR STRIP: 7 [PH] (ref 5–8)
PLATELET # BLD AUTO: 404 K/UL (ref 135–420)
PLATELET COMMENTS,PCOM: ABNORMAL
PMV BLD AUTO: 9.6 FL (ref 9.2–11.8)
POTASSIUM SERPL-SCNC: 4.4 MMOL/L (ref 3.5–5.5)
PROT SERPL-MCNC: 9.3 G/DL (ref 6.4–8.2)
PROT UR STRIP-MCNC: 30 MG/DL
Q-T INTERVAL, ECG07: 454 MS
Q-T INTERVAL, ECG07: 490 MS
QRS DURATION, ECG06: 102 MS
QRS DURATION, ECG06: 98 MS
QTC CALCULATION (BEZET), ECG08: 442 MS
QTC CALCULATION (BEZET), ECG08: 475 MS
RBC # BLD AUTO: 3.78 M/UL (ref 4.7–5.5)
RBC #/AREA URNS HPF: ABNORMAL /HPF (ref 0–5)
RBC MORPH BLD: ABNORMAL
RETICS/RBC NFR AUTO: 10.3 % (ref 0.5–2.3)
SALICYLATES SERPL-MCNC: <2.8 MG/DL (ref 2.8–20)
SODIUM SERPL-SCNC: 137 MMOL/L (ref 136–145)
SP GR UR REFRACTOMETRY: 1.01 (ref 1–1.03)
TROPONIN I SERPL-MCNC: <0.02 NG/ML (ref 0–0.04)
TROPONIN I SERPL-MCNC: <0.02 NG/ML (ref 0–0.04)
UROBILINOGEN UR QL STRIP.AUTO: 1 EU/DL (ref 0.2–1)
VENTRICULAR RATE, ECG03: 49 BPM
VENTRICULAR RATE, ECG03: 66 BPM
WBC # BLD AUTO: 16.8 K/UL (ref 4.6–13.2)
WBC URNS QL MICRO: ABNORMAL /HPF (ref 0–4)

## 2017-07-01 PROCEDURE — 80307 DRUG TEST PRSMV CHEM ANLYZR: CPT | Performed by: PHYSICIAN ASSISTANT

## 2017-07-01 PROCEDURE — 96361 HYDRATE IV INFUSION ADD-ON: CPT

## 2017-07-01 PROCEDURE — 77030009852 HC PCH RTVR ENDOSC COVD -B: Performed by: SURGERY

## 2017-07-01 PROCEDURE — 74011250636 HC RX REV CODE- 250/636: Performed by: SURGERY

## 2017-07-01 PROCEDURE — 83735 ASSAY OF MAGNESIUM: CPT | Performed by: EMERGENCY MEDICINE

## 2017-07-01 PROCEDURE — 77030008756 HC TU IRR SUC STRY -B: Performed by: SURGERY

## 2017-07-01 PROCEDURE — C9290 INJ, BUPIVACAINE LIPOSOME: HCPCS | Performed by: SURGERY

## 2017-07-01 PROCEDURE — 85379 FIBRIN DEGRADATION QUANT: CPT | Performed by: PHYSICIAN ASSISTANT

## 2017-07-01 PROCEDURE — 71010 XR CHEST PORT: CPT

## 2017-07-01 PROCEDURE — 77030008683 HC TU ET CUF COVD -A: Performed by: ANESTHESIOLOGY

## 2017-07-01 PROCEDURE — 74011000258 HC RX REV CODE- 258: Performed by: PHYSICIAN ASSISTANT

## 2017-07-01 PROCEDURE — 77030010507 HC ADH SKN DERMBND J&J -B: Performed by: SURGERY

## 2017-07-01 PROCEDURE — 77030020782 HC GWN BAIR PAWS FLX 3M -B: Performed by: SURGERY

## 2017-07-01 PROCEDURE — A9567 TECHNETIUM TC-99M AEROSOL: HCPCS

## 2017-07-01 PROCEDURE — 72100 X-RAY EXAM L-S SPINE 2/3 VWS: CPT

## 2017-07-01 PROCEDURE — 96376 TX/PRO/DX INJ SAME DRUG ADON: CPT

## 2017-07-01 PROCEDURE — 81001 URINALYSIS AUTO W/SCOPE: CPT | Performed by: PHYSICIAN ASSISTANT

## 2017-07-01 PROCEDURE — 74011000250 HC RX REV CODE- 250: Performed by: ANESTHESIOLOGY

## 2017-07-01 PROCEDURE — 82550 ASSAY OF CK (CPK): CPT | Performed by: PHYSICIAN ASSISTANT

## 2017-07-01 PROCEDURE — BF131ZZ FLUOROSCOPY OF GALLBLADDER AND BILE DUCTS USING LOW OSMOLAR CONTRAST: ICD-10-PCS | Performed by: SURGERY

## 2017-07-01 PROCEDURE — 80053 COMPREHEN METABOLIC PANEL: CPT | Performed by: EMERGENCY MEDICINE

## 2017-07-01 PROCEDURE — 82248 BILIRUBIN DIRECT: CPT | Performed by: PHYSICIAN ASSISTANT

## 2017-07-01 PROCEDURE — 96375 TX/PRO/DX INJ NEW DRUG ADDON: CPT

## 2017-07-01 PROCEDURE — 93005 ELECTROCARDIOGRAM TRACING: CPT

## 2017-07-01 PROCEDURE — 74011636320 HC RX REV CODE- 636/320: Performed by: SURGERY

## 2017-07-01 PROCEDURE — 77030003580 HC NDL INSUF VERES J&J -B: Performed by: SURGERY

## 2017-07-01 PROCEDURE — 0FT44ZZ RESECTION OF GALLBLADDER, PERCUTANEOUS ENDOSCOPIC APPROACH: ICD-10-PCS | Performed by: SURGERY

## 2017-07-01 PROCEDURE — 65270000029 HC RM PRIVATE

## 2017-07-01 PROCEDURE — 74011000258 HC RX REV CODE- 258: Performed by: SURGERY

## 2017-07-01 PROCEDURE — 74300 X-RAY BILE DUCTS/PANCREAS: CPT

## 2017-07-01 PROCEDURE — 74181 MRI ABDOMEN W/O CONTRAST: CPT

## 2017-07-01 PROCEDURE — 76010000153 HC OR TIME 1.5 TO 2 HR: Performed by: SURGERY

## 2017-07-01 PROCEDURE — 76060000034 HC ANESTHESIA 1.5 TO 2 HR: Performed by: SURGERY

## 2017-07-01 PROCEDURE — 96374 THER/PROPH/DIAG INJ IV PUSH: CPT

## 2017-07-01 PROCEDURE — 83036 HEMOGLOBIN GLYCOSYLATED A1C: CPT | Performed by: SURGERY

## 2017-07-01 PROCEDURE — 99285 EMERGENCY DEPT VISIT HI MDM: CPT

## 2017-07-01 PROCEDURE — 74011250636 HC RX REV CODE- 250/636: Performed by: PHYSICIAN ASSISTANT

## 2017-07-01 PROCEDURE — 77030010513 HC APPL CLP LIG J&J -C: Performed by: SURGERY

## 2017-07-01 PROCEDURE — 77030032490 HC SLV COMPR SCD KNE COVD -B: Performed by: SURGERY

## 2017-07-01 PROCEDURE — 83605 ASSAY OF LACTIC ACID: CPT

## 2017-07-01 PROCEDURE — 85025 COMPLETE CBC W/AUTO DIFF WBC: CPT | Performed by: EMERGENCY MEDICINE

## 2017-07-01 PROCEDURE — 74011000250 HC RX REV CODE- 250

## 2017-07-01 PROCEDURE — 74011250636 HC RX REV CODE- 250/636: Performed by: ANESTHESIOLOGY

## 2017-07-01 PROCEDURE — 85045 AUTOMATED RETICULOCYTE COUNT: CPT | Performed by: EMERGENCY MEDICINE

## 2017-07-01 PROCEDURE — 76210000016 HC OR PH I REC 1 TO 1.5 HR: Performed by: SURGERY

## 2017-07-01 PROCEDURE — 74011250637 HC RX REV CODE- 250/637: Performed by: SURGERY

## 2017-07-01 PROCEDURE — 76705 ECHO EXAM OF ABDOMEN: CPT

## 2017-07-01 PROCEDURE — 77030035048 HC TRCR ENDOSC OPTCL COVD -B: Performed by: SURGERY

## 2017-07-01 PROCEDURE — 77030011640 HC PAD GRND REM COVD -A: Performed by: SURGERY

## 2017-07-01 PROCEDURE — 77030028990 HC ADH TISS DERMFLX CHMP -B: Performed by: SURGERY

## 2017-07-01 PROCEDURE — 0FC84ZZ EXTIRPATION OF MATTER FROM CYSTIC DUCT, PERCUTANEOUS ENDOSCOPIC APPROACH: ICD-10-PCS | Performed by: SURGERY

## 2017-07-01 PROCEDURE — 88304 TISSUE EXAM BY PATHOLOGIST: CPT | Performed by: SURGERY

## 2017-07-01 PROCEDURE — 77030035045 HC TRCR ENDOSC VRSPRT BLDLSS COVD -B: Performed by: SURGERY

## 2017-07-01 PROCEDURE — 77030002933 HC SUT MCRYL J&J -A: Performed by: SURGERY

## 2017-07-01 PROCEDURE — 77030020255 HC SOL INJ LR 1000ML BG: Performed by: SURGERY

## 2017-07-01 PROCEDURE — 77030003028 HC SUT VCRL J&J -A: Performed by: SURGERY

## 2017-07-01 PROCEDURE — 74011250636 HC RX REV CODE- 250/636

## 2017-07-01 RX ORDER — ONDANSETRON 2 MG/ML
4 INJECTION INTRAMUSCULAR; INTRAVENOUS
Status: DISCONTINUED | OUTPATIENT
Start: 2017-07-01 | End: 2017-07-03 | Stop reason: HOSPADM

## 2017-07-01 RX ORDER — FENTANYL CITRATE 50 UG/ML
INJECTION, SOLUTION INTRAMUSCULAR; INTRAVENOUS AS NEEDED
Status: DISCONTINUED | OUTPATIENT
Start: 2017-07-01 | End: 2017-07-01 | Stop reason: HOSPADM

## 2017-07-01 RX ORDER — ACETAMINOPHEN 325 MG/1
650 TABLET ORAL
Status: DISCONTINUED | OUTPATIENT
Start: 2017-07-01 | End: 2017-07-03 | Stop reason: HOSPADM

## 2017-07-01 RX ORDER — ACETAMINOPHEN 650 MG/1
650 SUPPOSITORY RECTAL ONCE
Status: DISCONTINUED | OUTPATIENT
Start: 2017-07-01 | End: 2017-07-01

## 2017-07-01 RX ORDER — DIPHENHYDRAMINE HYDROCHLORIDE 50 MG/ML
12.5 INJECTION, SOLUTION INTRAMUSCULAR; INTRAVENOUS
Status: DISCONTINUED | OUTPATIENT
Start: 2017-07-01 | End: 2017-07-03 | Stop reason: HOSPADM

## 2017-07-01 RX ORDER — SODIUM CHLORIDE, SODIUM LACTATE, POTASSIUM CHLORIDE, CALCIUM CHLORIDE 600; 310; 30; 20 MG/100ML; MG/100ML; MG/100ML; MG/100ML
150 INJECTION, SOLUTION INTRAVENOUS CONTINUOUS
Status: DISCONTINUED | OUTPATIENT
Start: 2017-07-01 | End: 2017-07-02

## 2017-07-01 RX ORDER — HYDROCODONE BITARTRATE AND ACETAMINOPHEN 5; 325 MG/1; MG/1
1 TABLET ORAL
Status: DISCONTINUED | OUTPATIENT
Start: 2017-07-01 | End: 2017-07-02

## 2017-07-01 RX ORDER — HEPARIN SODIUM 5000 [USP'U]/ML
5000 INJECTION, SOLUTION INTRAVENOUS; SUBCUTANEOUS EVERY 8 HOURS
Status: DISCONTINUED | OUTPATIENT
Start: 2017-07-01 | End: 2017-07-01 | Stop reason: SDUPTHER

## 2017-07-01 RX ORDER — SODIUM CHLORIDE, SODIUM LACTATE, POTASSIUM CHLORIDE, CALCIUM CHLORIDE 600; 310; 30; 20 MG/100ML; MG/100ML; MG/100ML; MG/100ML
150 INJECTION, SOLUTION INTRAVENOUS CONTINUOUS
Status: DISCONTINUED | OUTPATIENT
Start: 2017-07-01 | End: 2017-07-03 | Stop reason: HOSPADM

## 2017-07-01 RX ORDER — HYDROMORPHONE HYDROCHLORIDE 1 MG/ML
1 INJECTION, SOLUTION INTRAMUSCULAR; INTRAVENOUS; SUBCUTANEOUS
Status: COMPLETED | OUTPATIENT
Start: 2017-07-01 | End: 2017-07-01

## 2017-07-01 RX ORDER — PROPOFOL 10 MG/ML
INJECTION, EMULSION INTRAVENOUS AS NEEDED
Status: DISCONTINUED | OUTPATIENT
Start: 2017-07-01 | End: 2017-07-01 | Stop reason: HOSPADM

## 2017-07-01 RX ORDER — HYDROMORPHONE HYDROCHLORIDE 1 MG/ML
1 INJECTION, SOLUTION INTRAMUSCULAR; INTRAVENOUS; SUBCUTANEOUS
Status: DISCONTINUED | OUTPATIENT
Start: 2017-07-01 | End: 2017-07-02

## 2017-07-01 RX ORDER — LIDOCAINE HYDROCHLORIDE 20 MG/ML
INJECTION, SOLUTION EPIDURAL; INFILTRATION; INTRACAUDAL; PERINEURAL AS NEEDED
Status: DISCONTINUED | OUTPATIENT
Start: 2017-07-01 | End: 2017-07-01 | Stop reason: HOSPADM

## 2017-07-01 RX ORDER — ONDANSETRON 2 MG/ML
4 INJECTION INTRAMUSCULAR; INTRAVENOUS
Status: COMPLETED | OUTPATIENT
Start: 2017-07-01 | End: 2017-07-01

## 2017-07-01 RX ORDER — HEPARIN SODIUM 5000 [USP'U]/ML
5000 INJECTION, SOLUTION INTRAVENOUS; SUBCUTANEOUS EVERY 8 HOURS
Status: DISCONTINUED | OUTPATIENT
Start: 2017-07-01 | End: 2017-07-01

## 2017-07-01 RX ORDER — HYDROMORPHONE HYDROCHLORIDE 1 MG/ML
0.5 INJECTION, SOLUTION INTRAMUSCULAR; INTRAVENOUS; SUBCUTANEOUS
Status: COMPLETED | OUTPATIENT
Start: 2017-07-01 | End: 2017-07-01

## 2017-07-01 RX ORDER — ROCURONIUM BROMIDE 10 MG/ML
INJECTION, SOLUTION INTRAVENOUS AS NEEDED
Status: DISCONTINUED | OUTPATIENT
Start: 2017-07-01 | End: 2017-07-01 | Stop reason: HOSPADM

## 2017-07-01 RX ORDER — SODIUM CHLORIDE, SODIUM LACTATE, POTASSIUM CHLORIDE, CALCIUM CHLORIDE 600; 310; 30; 20 MG/100ML; MG/100ML; MG/100ML; MG/100ML
75 INJECTION, SOLUTION INTRAVENOUS CONTINUOUS
Status: DISCONTINUED | OUTPATIENT
Start: 2017-07-01 | End: 2017-07-01 | Stop reason: HOSPADM

## 2017-07-01 RX ORDER — SUCCINYLCHOLINE CHLORIDE 20 MG/ML
INJECTION INTRAMUSCULAR; INTRAVENOUS AS NEEDED
Status: DISCONTINUED | OUTPATIENT
Start: 2017-07-01 | End: 2017-07-01 | Stop reason: HOSPADM

## 2017-07-01 RX ORDER — PROMETHAZINE HYDROCHLORIDE 25 MG/1
25 TABLET ORAL
Status: DISCONTINUED | OUTPATIENT
Start: 2017-07-01 | End: 2017-07-03 | Stop reason: HOSPADM

## 2017-07-01 RX ORDER — MIDAZOLAM HYDROCHLORIDE 1 MG/ML
INJECTION, SOLUTION INTRAMUSCULAR; INTRAVENOUS AS NEEDED
Status: DISCONTINUED | OUTPATIENT
Start: 2017-07-01 | End: 2017-07-01 | Stop reason: HOSPADM

## 2017-07-01 RX ORDER — HYDROMORPHONE HYDROCHLORIDE 2 MG/1
2 TABLET ORAL
Status: DISCONTINUED | OUTPATIENT
Start: 2017-07-01 | End: 2017-07-03 | Stop reason: HOSPADM

## 2017-07-01 RX ORDER — FENTANYL 25 UG/1
1 PATCH TRANSDERMAL
Status: DISCONTINUED | OUTPATIENT
Start: 2017-07-01 | End: 2017-07-03 | Stop reason: HOSPADM

## 2017-07-01 RX ORDER — SODIUM CHLORIDE 9 MG/ML
125 INJECTION, SOLUTION INTRAVENOUS CONTINUOUS
Status: DISCONTINUED | OUTPATIENT
Start: 2017-07-01 | End: 2017-07-02

## 2017-07-01 RX ADMIN — MIDAZOLAM HYDROCHLORIDE 2 MG: 1 INJECTION, SOLUTION INTRAMUSCULAR; INTRAVENOUS at 18:53

## 2017-07-01 RX ADMIN — PIPERACILLIN SODIUM AND TAZOBACTAM SODIUM 4.5 G: 4; .5 INJECTION, POWDER, LYOPHILIZED, FOR SOLUTION INTRAVENOUS at 14:53

## 2017-07-01 RX ADMIN — SODIUM CHLORIDE 500 ML: 900 INJECTION, SOLUTION INTRAVENOUS at 09:24

## 2017-07-01 RX ADMIN — ROCURONIUM BROMIDE 25 MG: 10 INJECTION, SOLUTION INTRAVENOUS at 19:02

## 2017-07-01 RX ADMIN — SODIUM CHLORIDE, SODIUM LACTATE, POTASSIUM CHLORIDE, AND CALCIUM CHLORIDE 75 ML/HR: 600; 310; 30; 20 INJECTION, SOLUTION INTRAVENOUS at 18:07

## 2017-07-01 RX ADMIN — SODIUM CHLORIDE, SODIUM LACTATE, POTASSIUM CHLORIDE, AND CALCIUM CHLORIDE 150 ML/HR: 600; 310; 30; 20 INJECTION, SOLUTION INTRAVENOUS at 21:00

## 2017-07-01 RX ADMIN — FAMOTIDINE 20 MG: 10 INJECTION, SOLUTION INTRAVENOUS at 18:04

## 2017-07-01 RX ADMIN — SODIUM CHLORIDE 1000 ML: 900 INJECTION, SOLUTION INTRAVENOUS at 07:24

## 2017-07-01 RX ADMIN — PIPERACILLIN SODIUM,TAZOBACTAM SODIUM 3.38 G: 3; .375 INJECTION, POWDER, FOR SOLUTION INTRAVENOUS at 18:50

## 2017-07-01 RX ADMIN — PROPOFOL 150 MG: 10 INJECTION, EMULSION INTRAVENOUS at 18:58

## 2017-07-01 RX ADMIN — HYDROMORPHONE HYDROCHLORIDE 1 MG: 1 INJECTION, SOLUTION INTRAMUSCULAR; INTRAVENOUS; SUBCUTANEOUS at 09:05

## 2017-07-01 RX ADMIN — HYDROMORPHONE HYDROCHLORIDE 0.5 MG: 1 INJECTION, SOLUTION INTRAMUSCULAR; INTRAVENOUS; SUBCUTANEOUS at 12:22

## 2017-07-01 RX ADMIN — FENTANYL CITRATE 50 MCG: 50 INJECTION, SOLUTION INTRAMUSCULAR; INTRAVENOUS at 18:58

## 2017-07-01 RX ADMIN — ONDANSETRON 4 MG: 2 INJECTION INTRAMUSCULAR; INTRAVENOUS at 09:04

## 2017-07-01 RX ADMIN — SUCCINYLCHOLINE CHLORIDE 120 MG: 20 INJECTION INTRAMUSCULAR; INTRAVENOUS at 18:58

## 2017-07-01 RX ADMIN — LIDOCAINE HYDROCHLORIDE 40 MG: 20 INJECTION, SOLUTION EPIDURAL; INFILTRATION; INTRACAUDAL; PERINEURAL at 18:58

## 2017-07-01 RX ADMIN — ROCURONIUM BROMIDE 5 MG: 10 INJECTION, SOLUTION INTRAVENOUS at 18:58

## 2017-07-01 NOTE — IP AVS SNAPSHOT
Cristela Saldana 
 
 
 [address] Patient: Nikolay Marie MRN: [id number] You are allergic to the following Allergen Reactions Eggshell Membrane Nausea and Vomiting Recent Documentation Height Weight BMI Smoking Status 1.829 m 78.9 kg 23.6 kg/m2 Never Smoker Emergency Contacts Name Discharge Info Relation Home Work Mobile [address] CAREGIVER [3] Spouse [3] [phone] Elicia Vivar  Mother [14] [phone] About your hospitalization You were admitted on:  July 1, 2017 You last received care in the:  [facility] [address] 1982 You were discharged on:  July 2, 2017 Unit phone number:  [phone] Why you were hospitalized Your primary diagnosis was:  Not on File Your diagnoses also included:  Pain, Generalized, Sickle Cell Anemia (Hcc), Cholecystitis Providers Seen During Your Hospitalizations Provider Role Specialty Primary office phone Sammy Hope MD Attending Provider Emergency Medicine [phone] Boy Sampson MD Attending Provider -- Number not on file Boy Sampson MD Attending Provider Internal Medicine [phone] Your Primary Care Physician (PCP) ** None ** Follow-up Information Follow up With Details Comments Contact Info King Kassi MD On 7/6/2017 Appointment at [facility] [address] [facility] 
[phone] Sheila Marshall MD On 7/14/2017 Appointment at 12:00pm [address] [place], [place] [address] [phone] Current Discharge Medication List  
  
CONTINUE these medications which have NOT CHANGED Dose & Instructions Dispensing Information Comments Morning Noon Evening Bedtime  
 fentaNYL 25 mcg/hr PATCH Commonly known as:  Roxene Ream Your last dose was: Your next dose is:    
   
   
 Dose:  1 Patch 1 Patch by TransDERmal route every seventy-two (72) hours. Max Daily Amount: 1 Patch. Quantity:  5 Patch Refills:  0 HYDROmorphone 2 mg tablet Commonly known as:  DILAUDID Your last dose was: Your next dose is:    
   
   
 Dose:  2 mg Take 1 Tab by mouth every six (6) hours as needed for Pain. Max Daily Amount: 8 mg. Indications: Pain Quantity:  12 Tab Refills:  0  
     
   
   
   
  
 hydroxyurea 500 mg capsule Commonly known as:  HYDREA Your last dose was: Your next dose is:    
   
   
 Dose:  500 mg Take 500 mg by mouth two (2) times a day. Indications: SICKLE CELL ANEMIA WITH CRISIS Refills:  0 Discharge Instructions Learning About Acute Cholecystitis What is cholecystitis? Cholecystitis (say \"koh-lih-sis-TY-tus\") is inflammation of the gallbladder. The gallbladder stores bile. Bile helps the body digest food. Normally, the bile flows from the gallbladder to the small intestine. A gallstone stuck in the cystic duct is most often the cause of sudden (acute) cholecystitis. The cystic duct is the tube that carries the bile out of the gallbladder. The gallstone blocks the bile from leaving the gallbladder. This results in an irritated and swollen gallbladder. The disease can also be caused by infection or trauma, such as an injury from a car accident. Cholecystitis has to be treated right away. You will probably have to go to the hospital. Surgery is the usual treatment. What are the symptoms? Symptoms include: · Steady and severe pain in the upper right part of belly. This is the most common symptom. The pain can sometimes move to your back or right shoulder blade. It may last for more than 6 hours. · Nausea or vomiting. · A fever. How is it treated? The main way to treat this disease is surgery to remove the gallbladder. This surgery can often be done through small cuts (incisions) in the belly. This is called a laparoscopic cholecystectomy. In some cases, you may need a more extensive surgery. You may need surgery as soon as possible. The doctor may try to reduce swelling and irritation in the gallbladder before removing it. You may be given fluids and antibiotics through an IV. You may also be given pain medicine. Follow-up care is a key part of your treatment and safety. Be sure to make and go to all appointments, and call your doctor if you are having problems. It's also a good idea to know your test results and keep a list of the medicines you take. Where can you learn more? Go to http://manuel-amadeo.info/. Enter T940 in the search box to learn more about \"Learning About Acute Cholecystitis. \" Current as of: August 9, 2016 Content Version: 11.3 © 0368-7806 [employer]. Care instructions adapted under license by [employer] (which disclaims liability or warranty for this information). If you have questions about a medical condition or this instruction, always ask your healthcare professional. [place] [address] any warranty or liability for your use of this information. Patient armband removed and shredded DISCHARGE SUMMARY from Nurse The following personal items are in your possession at time of discharge: 
 
Dental Appliances: None Visual Aid: None PATIENT INSTRUCTIONS: 
 
 
F-face looks uneven A-arms unable to move or move unevenly S-speech slurred or non-existent T-time-call 911 as soon as signs and symptoms begin-DO NOT go Back to bed or wait to see if you get better-TIME IS BRAIN. Warning Signs of HEART ATTACK Call 911 if you have these symptoms: ? Chest discomfort. Most heart attacks involve discomfort in the center of the chest that lasts more than a few minutes, or that goes away and comes back. It can feel like uncomfortable pressure, squeezing, fullness, or pain. ? Discomfort in other areas of the upper body. Symptoms can include pain or discomfort in one or both arms, the back, neck, jaw, or stomach. ? Shortness of breath with or without chest discomfort. ? Other signs may include breaking out in a cold sweat, nausea, or lightheadedness. Don't wait more than five minutes to call [address]! Fast action can save your life. Calling 911 is almost always the fastest way to get lifesaving treatment. Emergency Medical Services staff can begin treatment when they arrive  up to an hour sooner than if someone gets to the hospital by car. The discharge information has been reviewed with the patient. The patient verbalized understanding. Discharge medications reviewed with the patient and appropriate educational materials and side effects teaching were provided. Pinterest Activation Thank you for requesting access to Pinterest. Please follow the instructions below to securely access and download your online medical record. Pinterest allows you to send messages to your doctor, view your test results, renew your prescriptions, schedule appointments, and more. How Do I Sign Up? 1. In your internet browser, go to [email or web address] 
2. Click on the First Time User? Click Here link in the Sign In box. You will be redirect to the New Member Sign Up page. 3. Enter your Pinterest Access Code exactly as it appears below. You will not need to use this code after youve completed the sign-up process. If you do not sign up before the expiration date, you must request a new code. Pinterest Access Code: [email or web address] Expires: 2017  3:57 PM (This is the date your Pinterest access code will ) 4. Enter the last four digits of your Social Security Number (xxxx) and Date of Birth (mm/dd/yyyy) as indicated and click Submit. You will be taken to the next sign-up page. 5. Create a [employer] ID. This will be your [employer] login ID and cannot be changed, so think of one that is secure and easy to remember. 6. Create a [employer] password. You can change your password at any time. 7. Enter your Password Reset Question and Answer. This can be used at a later time if you forget your password. 8. Enter your e-mail address. You will receive e-mail notification when new information is available in [address] 9. Click Sign Up. You can now view and download portions of your medical record. 10. Click the Download Summary menu link to download a portable copy of your medical information. Additional Information If you have questions, please visit the Frequently Asked Questions section of the [employer] website at [email or web address] Remember, [employer] is NOT to be used for urgent needs. For medical emergencies, dial 911. Discharge Orders None [employer] Announcement We are excited to announce that we are making your provider's discharge notes available to you in [employer]. You will see these notes when they are completed and signed by the physician that discharged you from your recent hospital stay. If you have any questions or concerns about any information you see in [employer], please call the Health Information Department where you were seen or reach out to your Primary Care Provider for more information about your plan of care. Introducing [address]! Henrik Dimas introduces [employer] patient portal. Now you can access parts of your medical record, email your doctor's office, and request medication refills online. 1. In your internet browser, go to [email or web address] 2. Click on the First Time User? Click Here link in the Sign In box.  You will see the New Member Sign Up page. 3. Enter your [employer] Access Code exactly as it appears below. You will not need to use this code after youve completed the sign-up process. If you do not sign up before the expiration date, you must request a new code. · [employer] Access Code: [email or web address] Expires: 7/22/2017  3:57 PM 
 
4. Enter the last four digits of your Social Security Number (xxxx) and Date of Birth (mm/dd/yyyy) as indicated and click Submit. You will be taken to the next sign-up page. 5. Create a [employer] ID. This will be your [employer] login ID and cannot be changed, so think of one that is secure and easy to remember. 6. Create a [employer] password. You can change your password at any time. 7. Enter your Password Reset Question and Answer. This can be used at a later time if you forget your password. 8. Enter your e-mail address. You will receive e-mail notification when new information is available in [address] 9. Click Sign Up. You can now view and download portions of your medical record. 10. Click the Download Summary menu link to download a portable copy of your medical information. If you have questions, please visit the Frequently Asked Questions section of the [employer] website. Remember, [employer] is NOT to be used for urgent needs. For medical emergencies, dial 911. Now available from your iPhone and Android! General Information Please provide this summary of care documentation to your next provider. Patient Signature:  ____________________________________________________________ Date:  ____________________________________________________________  
  
Palak Rick Provider Signature:  ____________________________________________________________ Date:  ____________________________________________________________

## 2017-07-01 NOTE — PROGRESS NOTES
History & Physical    Patient: Alvin Baldwin MRN: [id number]  CSN:     YOB: 1980  Age: 43 y.o. Sex: male      DOA: 7/1/2017    Chief Complaint:  Hx of Sickle Cell anemia SS followed by [facility]   Complains of chest pain and abdominal pain progressive over last 24 hrs   One episode of nausea and emesis  No f/c/n/s  Elevated D dimer in ER   V/q low prob             HPI:     Alvin Baldwin is a 43 y.o.  male who    Past Medical History:   Diagnosis Date    B12 deficiency 03/15/2010    210    Cholelithiasis 09/17/2012    U/S Result: Cholelithiasis    Elevated alkaline phosphatase level 03/21/2010    158    Elevated ALT measurement 03/21/2010    71    Elevated AST (SGOT) 03/10/2012    38    Elevated platelet count (HCC) 10/25/2007    494    Elevated total protein 12/27/2011    8.7    Hypocalcemia 07/06/2011    4.1    Hypokalemia     Hyponatremia 11/18/2009    Leukocytosis 11/16/2009    Microcytic anemia 10/25/2007    Pleural effusion on right 07/15/2015    [facility] CXR Result    Reticulocytosis 10/25/2007    7.8    Serum total bilirubin elevated 12/27/2011    T.B. 3.8, D.B. 0.4.     Sickle cell anemia with crisis ([facility]     Dr. Karel Lopes. Salv[place]    Vitamin D deficiency 01/20/2016    5.4       History reviewed. No pertinent surgical history.     Family History   Problem Relation Age of Onset    Cancer Neg Hx     Diabetes Neg Hx     Heart Disease Neg Hx     Heart Attack Neg Hx     Hypertension Neg Hx     Stroke Neg Hx        Social History     Social History    Marital status:      Spouse name: N/A    Number of children: N/A    Years of education: N/A     Social History Main Topics    Smoking status: Never Smoker    Smokeless tobacco: Never Used    Alcohol use Yes      Comment: Occasional    Drug use: No    Sexual activity: Yes     Partners: Female     Birth control/ protection: None     Other Topics Concern    None     Social History Narrative Prior to Admission medications    Medication Sig Start Date End Date Taking? Authorizing Provider   HYDROmorphone (DILAUDID) 2 mg tablet Take 1 Tab by mouth every six (6) hours as needed for Pain. Max Daily Amount: 8 mg. Indications: Pain 17  Yes Margarito Nguyễn MD   hydroxyurea (HYDREA) 500 mg capsule Take 500 mg by mouth two (2) times a day. Indications: SICKLE CELL ANEMIA WITH CRISIS   Yes Raphael Guerrero MD   fentaNYL (DURAGESIC) 25 mcg/hr PATCH 1 Patch by TransDERmal route every seventy-two (72) hours. Max Daily Amount: 1 Patch. 10/21/15  Yes Eric Kathleen MD       Allergies   Allergen Reactions    Eggshell Membrane Nausea and Vomiting    Milk Nausea and Vomiting         Review of Systems  GENERAL: Patient alert, awake and oriented times 3, able to communicate full sentences and not in distress. HEENT: No change in vision, no earache, tinnitus, sore throat or sinus congestion. jauncice noted eye and sublingual    NECK: No pain or stiffness. PULMONARY: No shortness of breath, cough or wheeze. Cardiovascular: no pnd or orthopnea, no CP  GASTROINTESTINAL: NABS positive mid epigastric abdominal pain, nausea, vomiting or diarrhea, melena or bright red blood per rectum. GENITOURINARY: No urinary frequency, urgency, hesitancy or dysuria. MUSCULOSKELETAL: No joint or muscle pain, no back pain, no recent trauma. DERMATOLOGIC: No rash, no itching, no lesions. ENDOCRINE: No polyuria, polydipsia, no heat or cold intolerance. No recent change in weight. HEMATOLOGICAL: No anemia or easy bruising or bleeding. NEUROLOGIC: No headache, seizures, numbness, tingling or weakness.        Physical Exam:     Physical Exam:  Visit Vitals    /56    Pulse (!) 52    Temp 97.4 °F (36.3 °C)    Resp 13    Ht 6' (1.829 m)    Wt 78.9 kg (174 lb)    SpO2 99%    BMI 23.6 kg/m2      O2 Device: Room air    Temp (24hrs), Av.4 °F (36.3 °C), Min:97.4 °F (36.3 °C), Max:97.4 °F (36.3 °C) General:  Alert, cooperative, no distress, appears stated age. Head: Normocephalic, without obvious abnormality, atraumatic. Eyes:  Conjunctivae/corneas jaundice and icteric  PERRL, EOMs intact. sublingual yellowing noted    Nose: Nares normal. No drainage or sinus tenderness. Neck: Supple, symmetrical, trachea midline, no adenopathy, thyroid: no enlargement, no carotid bruit and no JVD. Lungs:   Clear to auscultation bilaterally. Heart:  Regular rate and rhythm, S1, S2 normal.     Abdomen: Soft mid epigastric ttender. Negative byrd signs Bowel sounds normal.    Extremities: Extremities normal, atraumatic, no cyanosis or edema. Pulses: 2+ and symmetric all extremities. Skin:  No rashes or lesions   Neurologic: AAOx3, No focal motor or sensory deficit. Labs Reviewed:    BMP:   Lab Results   Component Value Date/Time     07/01/2017 07:52 AM    K 4.4 07/01/2017 07:52 AM     07/01/2017 07:52 AM    CO2 25 07/01/2017 07:52 AM    AGAP 11 07/01/2017 07:52 AM     (H) 07/01/2017 07:52 AM    BUN 12 07/01/2017 07:52 AM    CREA 1.09 07/01/2017 07:52 AM    GFRAA >60 07/01/2017 07:52 AM    GFRNA >60 07/01/2017 07:52 AM     MRA and EKG    Procedures/imaging: see electronic medical records for all procedures/Xrays and details which were not copied into this note but were reviewed prior to creation of Plan      Assessment/Plan     Active Problems:      1. Chest Pain Syndrome Sickle Cell Crisis  No significant EKG changes  Check troponins which are negative V/q  scan low prob  Damale consulted in ER   2. Abdominal Pain with enlarged CBD /  Hepatic Crisis  Vs cholecystitis   Surgery consulted and NPO pending further eval with MRCP per Palma Mortensen recommendations   If abnormal will need surgery   3.  Billary colic   Pain control for now   If confirms stone will go to OR  Dr. Palma Mortensen following patient as well      DVT/GI Prophylaxis: Hep SQ    Discussed with patient  about hospital admission and my plan care understood and agree with my plan care.     Tiago Reyes MD  7/1/2017 4:14 PM

## 2017-07-01 NOTE — CONSULTS
Surgery    Consult dictated. [id number]    I believe his epigastric/RUQ pain is from his gallbladder and symptomatic cholelithiasis. His CBD is now dialated and he may have choledocholithiasis too. An intraoperative cholangiogram is planned. I called MRI and pulled him off the scanner, I do NOT think that study will change my treatment. I have called ans spoken to Dr. Samir Dangelo GI and discussed my plans. I will call him with the results of the cholangiogram.    I have a call out to Dr. Lori Coffman too.

## 2017-07-01 NOTE — IP AVS SNAPSHOT
Current Discharge Medication List  
  
CONTINUE these medications which have NOT CHANGED Dose & Instructions Dispensing Information Comments Morning Noon Evening Bedtime  
 fentaNYL 25 mcg/hr PATCH Commonly known as:  [place] Your last dose was: Your next dose is:    
   
   
 Dose:  1 Patch 1 Patch by TransDERmal route every seventy-two (72) hours. Max Daily Amount: 1 Patch. Quantity:  5 Patch Refills:  0 HYDROmorphone 2 mg tablet Commonly known as:  DILAUDID Your last dose was: Your next dose is:    
   
   
 Dose:  2 mg Take 1 Tab by mouth every six (6) hours as needed for Pain. Max Daily Amount: 8 mg. Indications: Pain Quantity:  12 Tab Refills:  0  
     
   
   
   
  
 hydroxyurea 500 mg capsule Commonly known as:  HYDREA Your last dose was: Your next dose is:    
   
   
 Dose:  500 mg Take 500 mg by mouth two (2) times a day. Indications: SICKLE CELL ANEMIA WITH CRISIS Refills:  0

## 2017-07-01 NOTE — ED PROVIDER NOTES
HPI Comments: Ja Garza is a  43 y.o. Thin build  [place] male h/o Sickle Cell Anemia, Sickle Cell Crisis, Microcytic Anemia, Reticulocytosis, Leukocytosis, Elevated Platelet Count, Hypocalcemia, Hypokalemia, Hyponatremia, Vitamin D Deficiency, B12 Deficiency, Elevated Alk Phos, Abnormal LFTs, Cholelithiasis, Elevated Total Bilirubin, R Pleural Effusion  presents to the ED via POV c/o Substernal CP, SOB, Upper (Points to Epigastrium) Abd Pain, Low Back & Bilateral lower back pain that suddenly worsened & awoke him from sleep around 2 hours ago PTA however; began around 10 o'clock last evening. He notes sitting to standing, feels faint, weak, dizzy. Denies fever/chills, headache, LOC, vision changes, facial droop, slurred speech, st, neck pain/stiffness, palpitations, pearce, orthopnea, calf swelling, hemoptysis, cough, wheeze, n/v/d, brbpr, melena, flank pain, blood in urine, difficulty urinating, decreased urination, urinary sx, extremity weakness/numbness/tingling, loss of b/b control. Past Medical History:   Diagnosis Date    B12 deficiency 03/15/2010    210    Cholelithiasis 09/17/2012    U/S Result: Cholelithiasis    Elevated alkaline phosphatase level 03/21/2010    158    Elevated ALT measurement 03/21/2010    71    Elevated AST (SGOT) 03/10/2012    38    Elevated platelet count (HCC) 10/25/2007    494    Elevated total protein 12/27/2011    8.7    Hypocalcemia 07/06/2011    4.1    Hypokalemia     Hyponatremia 11/18/2009    Leukocytosis 11/16/2009    Microcytic anemia 10/25/2007    Pleural effusion on right 07/15/2015    [facility] CXR Result    Reticulocytosis 10/25/2007    7.8    Serum total bilirubin elevated 12/27/2011    T.B. 3.8, D.B. 0.4.     Sickle cell anemia with crisis ([facility] [address]     Dr. Raquel Alvarez. [place]    Vitamin D deficiency 01/20/2016    5.4       History reviewed. No pertinent surgical history.       Family History:   Problem Relation Age of Onset    Cancer Neg Hx     Diabetes Neg Hx     Heart Disease Neg Hx     Heart Attack Neg Hx     Hypertension Neg Hx     Stroke Neg Hx        Social History     Social History    Marital status:      Spouse name: N/A    Number of children: N/A    Years of education: N/A     Occupational History    Not on file. Social History Main Topics    Smoking status: Never Smoker    Smokeless tobacco: Never Used    Alcohol use Yes      Comment: Occasional    Drug use: No    Sexual activity: Yes     Partners: Female     Birth control/ protection: None     Other Topics Concern    Not on file     Social History Narrative         ALLERGIES: Eggshell membrane and Milk    Review of Systems   Constitutional: Positive for fatigue. Negative for appetite change, chills, diaphoresis and fever. HENT: Negative for congestion, dental problem, ear pain, rhinorrhea, sore throat, trouble swallowing and voice change. Eyes: Negative for photophobia and visual disturbance. Respiratory: Positive for chest tightness and shortness of breath. Negative for cough, choking, wheezing and stridor. Cardiovascular: Positive for chest pain. Negative for palpitations and leg swelling. Gastrointestinal: Positive for abdominal pain (Epigastric). Negative for abdominal distention, diarrhea, nausea and vomiting. Genitourinary: Negative for decreased urine volume, difficulty urinating, dysuria, flank pain, frequency, hematuria and urgency. Musculoskeletal: Positive for arthralgias (BLE Lower Leg Pain) and back pain (Bilateral Lower Back Pain). Negative for gait problem, joint swelling, neck pain and neck stiffness. Skin: Negative for color change, pallor, rash and wound. Neurological: Positive for dizziness, weakness (Generalized) and light-headedness. Negative for seizures, syncope, facial asymmetry, speech difficulty, numbness and headaches. Reports Dizziness/Lightheaded \"feeling faint\" when rising from sitting to standing. Vitals:    07/01/17 1145 07/01/17 1215 07/01/17 1300 07/01/17 1415   BP: 111/58 111/58 101/54 100/56   Pulse: 68 (!) 52 (!) 58 (!) 52   Resp: 18 15 16 13   Temp:       SpO2: 99% 97% 96% 99%   Weight:       Height:                Physical Exam   Constitutional: He is oriented to person, place, and time. Vital signs are normal. He appears well-developed and well-nourished. He is active and cooperative. He appears distressed. HENT:   Head: Normocephalic and atraumatic. Right Ear: Tympanic membrane, external ear and ear canal normal.   Left Ear: Tympanic membrane, external ear and ear canal normal.   Nose: Nose normal.   Mouth/Throat: Uvula is midline and oropharynx is clear and moist. Mucous membranes are not pale, dry and not cyanotic. No trismus in the jaw. Normal dentition. No dental abscesses or uvula swelling. No oropharyngeal exudate, posterior oropharyngeal edema, posterior oropharyngeal erythema or tonsillar abscesses. Eyes: Conjunctivae, EOM and lids are normal. Pupils are equal, round, and reactive to light. Right conjunctiva is not injected. Right conjunctiva has no hemorrhage. Left conjunctiva is not injected. Left conjunctiva has no hemorrhage. Scleral icterus is present. Right eye exhibits normal extraocular motion and no nystagmus. Left eye exhibits normal extraocular motion and no nystagmus. Right pupil is round and reactive. Left pupil is round and reactive. Pupils are equal.   Conjunctivae Pink & Moist.    Neck: Trachea normal, normal range of motion, full passive range of motion without pain and phonation normal. Neck supple. No JVD present. No tracheal tenderness, no spinous process tenderness and no muscular tenderness present. No rigidity. No tracheal deviation, no edema, no erythema and normal range of motion present. Cardiovascular: Regular rhythm, normal heart sounds and intact distal pulses. Bradycardia present. Exam reveals no gallop and no friction rub.     No murmur heard.  Pulmonary/Chest: Effort normal and breath sounds normal. No accessory muscle usage or stridor. No tachypnea. No respiratory distress. He has no decreased breath sounds. He has no wheezes. He has no rhonchi. He has no rales. He exhibits no tenderness. Symmetric rise/fall of the chest wall. Speaks in full sentences. Great inspiratory effort. Currently on Nasal Cannula 2 Liters. No tachypnea. No labored respiration. No calf TTP or edema. Lungs CTAB. Abdominal: Soft. Normal appearance and bowel sounds are normal. He exhibits no distension, no abdominal bruit and no pulsatile midline mass. There is tenderness in the epigastric area. There is guarding. There is no rigidity, no rebound, no CVA tenderness, no tenderness at McBurney's point and negative Trotter's sign. No hernia. Normal Inspection. Normoactive BS. Non-Distended. + Epigastric TTP. No rebound. + Voluntary Guarding. No CVAT. Musculoskeletal: Normal range of motion. Cervical back: Normal.        Thoracic back: Normal.        Lumbar back: Normal.        Right lower leg: Normal. He exhibits no tenderness, no bony tenderness, no swelling, no edema, no deformity and no laceration. Left lower leg: Normal. He exhibits no tenderness, no bony tenderness, no swelling, no edema, no deformity and no laceration. Normal inspection of the cervical & thoracolumbar regions of the back. Cervical & Thoracic Regions: No midline TTP. No paraspinal muscle TTP. No step off deformity FROM w/o pain. No prevertebral swelling. Normal inspection. Lumbar: Normal inspection. + Midline TTP. FROM w/ pain on flexion. No paraspinal muscle TTP. MS 5/5 BUE/BLE. No leg edema/swelling. DP/PT 2+ BLE. Sensation intact BLE. Lymphadenopathy:     He has no cervical adenopathy. Neurological: He is alert and oriented to person, place, and time. He has normal strength. He is not disoriented. No sensory deficit. MS 5/5 BUE/BLE.  Speech clear. A&O x 3. Skin: Skin is warm, dry and intact. No rash noted. He is not diaphoretic. No cyanosis. No pallor. Nursing note and vitals reviewed. MDM  Number of Diagnoses or Management Options  Abdominal pain, epigastric: new and requires workup  Abnormal EKG: new and requires workup  Abnormal LFTs: new and requires workup  Acute midline low back pain without sciatica: new and requires workup  Bilateral leg pain: new and requires workup  Chest pain, unspecified type: new and requires workup  Lactic acidosis: new and requires workup  Leukocytosis, unspecified type: new and requires workup  Reticulocytosis: new and requires workup  SOB (shortness of breath): new and requires workup  Diagnosis management comments: DDX: Chest pain, Atypical CP, Musculoskeletal CP, Chest wall contusion, Pleuritis, Pleurisy, ACS, Pneumothorax, Pneumonia, Bronchitis, Pleurodynia, Pericarditis, Pleural Effusion, Atelectasis, Pericardial Effusion, CHF, Gastro-esophageal spasm, Esophagitis, Gastritis, Rib fracture, Costochondritis. DDX: Abdominal pain, Pancreatitis, Gastritis, AMI, Gastroenteritis, Colitis, Diverticulitis, PUD, Cholecystitis, Choledocholithiasis, Mesenteric Ischemia, Acute Cystitis, Pyelonephritis, Renal Colic, Biliary Colic, Perforation, Splenic Flexure Syndrome, Abdominal Aortic Aneurysm, Gastrointestinal Bleed. DDX: Sciatica, Lumbar radiculopathy, Back Sprain, Back Strain, DJD, HNP, Epidural abscess, Cauda Equina Syndrome, Contusion, Pyelonephritis, Renal Colic, Rib Fracture, Rib Contusion, Costochondritis, Pleurisy, Pleurodynia, Pneumothorax, Thoracic Aneurysm, Abdominal Aortic Aneurysm, Nephro-ureteral Calculus, Acute Myocardial Infarction. DDX: bilateral  Leg Strain, Sprain, Fracture, Contusion, Cellulitis, Erysipelas, Abscess, Septic Arthritis, Osteomyelitis, Deep Vein Thrombosis, Superficial Thrombophlebitis, Achilles Tendon Rupture, Shin Splints.      Consulted with Dr. Holly Benson (EP) concerning patient Shanice Velásquez, standard discussion of reason for visit, HPI, ROS, PE, and current results available. Discussed the patient's sudden onset of CP, SOB, Bilateral Leg Pain, worse this morning but beginning late last night in the setting of Sickle Cell. He is not tachycardic, hypoxic, hypotensive however; has a RR in low 20's, SOB is improving with nasal cannula supplied oxygen. PHANI Terrell  July 1, 2017  7:30 AM    8:32 AM: CXR Portable: No active or acute cardiopulmonary process is evident. ED EKG interpretation:  Vent rate: 66 bpm; VT Interval: 166 ms; QRS duration: 102 ms; QT/QTc: 454/475 ms; P-R-T axes: 66 1 75. EKG was completed at 0835. This EKG was interpreted by Dr. Jr Maria (ED attending) at [place] [address] [facility]. He has reviewed and compared this EKG with EKG dated [id number]. Mild Prolonged QT. No STEMI. No other clinically significant findings noted to correlate with PE or ACS. 8:44 AM: CBC Resulted, Leukocytosis Noted, No bandemia or left shift. Microcytic Anemia is stable. 9:20 AM: Review of Chemistry, shows Abnormal LFT, Elevated Bili, Elevated Alk Phos, noted previous h/o Cholelithiasis. Will obtain U/S.     9:29 AM: Lumbar Film Series: Cholelithiasis. Demineralized osseous structures consistent with chronic sickle cell disease. 9:34 AM: POC Lactic Acid is 2.1. Currently receiving IVF. He is afebrile. No left shift. Neg CXR. No diarrhea or nausea/vomiting. He has CP, SOB, LBP, Abd Pain, Bilateral Leg Pain. 10:11 AM: U/S Report: Multiple small calculi are seen within the gallbladder.   Mild dilation of the common duct.     If further evaluation of the dilated common duct and abnormal liver function  tests is desired MRCP would be the most efficacious next step.     D-Dimer 3.01, CXR Portable Neg, Pre-Test Probability Low for PE, unable to obtain CTA as he doesn't have Appropriate AC IV access. D/w Dr. Jalen Solitario, V/Q Scan recommended.    2:04 PM: V/Q Scan Result: Low probability of pulmonary embolism. Consulted with Dr. Demetrice Castro (EP) concerning patient Yfn Hancock, standard discussion of reason for visit, HPI, ROS, PE, and current results available. Recommendation for admission if he continues to have persistent pain. Pt denies current CP, but continues to have Epigastric Abdominal Pain but no N/V/D. He recommends consulting Surgeon, admit to [address]  July 1, 2017  2:18 PM     Consulted with Dr. Yaneth Cooper (Surgeon) concerning patient Yfn Hancock, standard discussion of reason for visit, HPI, ROS, PE, and current results available. Informed him of the patient's previous & current U/S findings as well as labs. Informed him of Epigastric TTP without n/v/d. Noted Leukocytosis without left shift, no bandemia, and that patient is afebrile. He has a h/o Leukocytosis. Informed him of the Direct & Indirect Bili results as well as all other labs. He would like patient to remain NPO. Last Drank/Ate around noon. He will add him to OR schedule and come evaluate patient for likely Cholecystectomy w/ Intraoperative PHANI Islas  July 1, 2017  2:56 PM    Consulted with Dr. Patt Cortés (Heme/Onc) concerning patient Yfn Hancock, standard discussion of reason for visit, HPI, ROS, PE, and current results available. Agrees to consult on this patient. She is familiar with this patient. Reviewed all findings. Informed her of Surgery's recommendations. She would like GI involved, to see if they concur prior to any Cholecystectomy as he's had findings of Hepatic Crisis w/ abnormal LFTs. She notes the D.B. Is only 1.2, and the majority of the bili is Indirect 6.0. PHANI Beckham  July 1, 2017  3:01 PM     Consulted with Dr. David Crain [facility]) concerning patient Yfn Hancock, standard discussion of reason for visit, HPI, ROS, PE, and current results available. Informed her that Surgeon would like patient NPO, for surgery as the CBD is dilated, which is a new finding since 2012. Additionally, he has leukocytosis, abnormal LFT, elevated T.B. & D.B. He would like patient admitted to Hospitalist. Additionally, I informed her that I spoke to Dr. Kaity Velazco (patient's Heme/Onc) notes she recommends clearance by GI On-Call prior to any surgery as he does hemolyze during Sickle Cell Crisis. He does have yellowing of the eyes. He does have h/o elevated LFT, and that today, the majority of the Bilirubin is Indirect (6.0), the Direct Bilirubin is 1.2. Informed her of the V/Q Findings of Low Prob PE. She agrees to admit. PHANI Yeung  July 1, 2017  3:09 PM     Consulted with Dr. Miriam Patel (GI) concerning patient Alvin Baldwin, standard discussion of reason for visit, HPI, ROS, PE, and current results available. Informed him that Dr. Kaity Velazco (Heme/Onc) would like him to consult on the patient. Explained Surgery's recommendations. He would like MRCP ordered stat. He agrees to consult on the patient. PHANI Yeung  July 1, 2017  3:15 PM            Amount and/or Complexity of Data Reviewed  Clinical lab tests: reviewed and ordered  Tests in the radiology section of CPT®: ordered and reviewed  Tests in the medicine section of CPT®: reviewed and ordered  Discussion of test results with the performing providers: yes  Decide to obtain previous medical records or to obtain history from someone other than the patient: yes  Review and summarize past medical records: yes  Discuss the patient with other providers: yes  Independent visualization of images, tracings, or specimens: yes    Risk of Complications, Morbidity, and/or Mortality  Presenting problems: moderate  Diagnostic procedures: moderate  Management options: moderate    Patient Progress  Patient progress: stable      Procedures  Diagnosis:   1. Chest pain, unspecified type    2. SOB (shortness of breath)    3.  Bilateral leg pain    4. Abnormal EKG    5. Abdominal pain, epigastric    6. Acute midline low back pain without sciatica    7. Leukocytosis, unspecified type    8. Reticulocytosis    9. Lactic acidosis    10. Abnormal LFTs    11. Total bilirubin, elevated    12. Positive D dimer    13. Microcytic anemia          Disposition: ADMIT    Follow-up Information     None          Patient's Medications   Start Taking    No medications on file   Continue Taking    FENTANYL (DURAGESIC) 25 MCG/HR PATCH    1 Patch by TransDERmal route every seventy-two (72) hours. Max Daily Amount: 1 Patch. HYDROMORPHONE (DILAUDID) 2 MG TABLET    Take 1 Tab by mouth every six (6) hours as needed for Pain. Max Daily Amount: 8 mg. Indications: Pain    HYDROXYUREA (HYDREA) 500 MG CAPSULE    Take 500 mg by mouth two (2) times a day. Indications: SICKLE CELL ANEMIA WITH CRISIS   These Medications have changed    No medications on file   Stop Taking    No medications on file       Recent Results (from the past 24 hour(s))   CBC WITH AUTOMATED DIFF    Collection Time: 07/01/17  7:52 AM   Result Value Ref Range    WBC 16.8 (H) 4.6 - 13.2 K/uL    RBC 3.78 (L) 4.70 - 5.50 M/uL    HGB 9.6 (L) 13.0 - 16.0 g/dL    HCT 27.6 (L) 36.0 - 48.0 %    MCV 73.0 (L) 74.0 - 97.0 FL    MCH 25.4 24.0 - 34.0 PG    MCHC 34.8 31.0 - 37.0 g/dL    RDW 24.3 (H) 11.6 - 14.5 %    PLATELET 950 115 - 510 K/uL    MPV 9.6 9.2 - 11.8 FL    NEUTROPHILS 64 42 - 75 %    LYMPHOCYTES 28 20 - 51 %    MONOCYTES 4 2 - 9 %    EOSINOPHILS 2 0 - 5 %    BASOPHILS 2 0 - 3 %    NRBC 6.0 (H) 0  WBC    ABS. NEUTROPHILS 10.8 (H) 1.8 - 8.0 K/UL    ABS. LYMPHOCYTES 4.7 (H) 0.8 - 3.5 K/UL    ABS. MONOCYTES 0.7 0 - 1.0 K/UL    ABS. EOSINOPHILS 0.3 0.0 - 0.4 K/UL    ABS.  BASOPHILS 0.3 (H) 0.0 - 0.06 K/UL    DF MANUAL      PLATELET COMMENTS INCREASED PLATELETS      RBC COMMENTS ANISOCYTOSIS  2+        RBC COMMENTS POIKILOCYTOSIS  3+        RBC COMMENTS SICKLE CELLS  3+        RBC COMMENTS POLYCHROMASIA  1+       METABOLIC PANEL, COMPREHENSIVE    Collection Time: 07/01/17  7:52 AM   Result Value Ref Range    Sodium 137 136 - 145 mmol/L    Potassium 4.4 3.5 - 5.5 mmol/L    Chloride 101 100 - 108 mmol/L    CO2 25 21 - 32 mmol/L    Anion gap 11 3.0 - 18 mmol/L    Glucose 159 (H) 74 - 99 mg/dL    BUN 12 7.0 - 18 MG/DL    Creatinine 1.09 0.6 - 1.3 MG/DL    BUN/Creatinine ratio 11 (L) 12 - 20      GFR est AA >60 >60 ml/min/1.73m2    GFR est non-AA >60 >60 ml/min/1.73m2    Calcium 9.0 8.5 - 10.1 MG/DL    Bilirubin, total 6.0 (H) 0.2 - 1.0 MG/DL    ALT (SGPT) 39 16 - 61 U/L    AST (SGOT) 99 (H) 15 - 37 U/L    Alk.  phosphatase 152 (H) 45 - 117 U/L    Protein, total 9.3 (H) 6.4 - 8.2 g/dL    Albumin 4.3 3.4 - 5.0 g/dL    Globulin 5.0 (H) 2.0 - 4.0 g/dL    A-G Ratio 0.9 0.8 - 1.7     MAGNESIUM    Collection Time: 07/01/17  7:52 AM   Result Value Ref Range    Magnesium 2.4 1.6 - 2.6 mg/dL   RETICULOCYTE COUNT    Collection Time: 07/01/17  7:52 AM   Result Value Ref Range    Reticulocyte count 10.3 (H) 0.5 - 2.3 %   CARDIAC PANEL,(CK, CKMB & TROPONIN)    Collection Time: 07/01/17  7:52 AM   Result Value Ref Range     39 - 308 U/L    CK - MB <1.0 <3.6 ng/ml    CK-MB Index  0.0 - 4.0 %     CALCULATION NOT PERFORMED WHEN RESULT IS BELOW LINEAR LIMIT    Troponin-I, Qt. <0.02 0.0 - 0.045 NG/ML   BILIRUBIN, DIRECT    Collection Time: 07/01/17  7:52 AM   Result Value Ref Range    Bilirubin, direct 1.2 (H) 0.0 - 0.2 MG/DL   D DIMER    Collection Time: 07/01/17  7:52 AM   Result Value Ref Range    D DIMER 3.01 (H) <0.46 ug/ml(FEU)   EKG, 12 LEAD, INITIAL    Collection Time: 07/01/17  8:35 AM   Result Value Ref Range    Ventricular Rate 66 BPM    Atrial Rate 66 BPM    P-R Interval 166 ms    QRS Duration 102 ms    Q-T Interval 454 ms    QTC Calculation (Bezet) 475 ms    Calculated P Axis 66 degrees    Calculated R Axis 1 degrees    Calculated T Axis 75 degrees    Diagnosis       Normal sinus rhythm  Possible Left atrial enlargement  Left ventricular hypertrophy  Nonspecific T wave abnormality  Prolonged QT  Abnormal ECG  When compared with ECG of 19-JAN-2017 01:54,  Nonspecific T wave abnormality now evident in Anterior leads     POC LACTIC ACID    Collection Time: 07/01/17  9:30 AM   Result Value Ref Range    Lactic Acid (POC) 2.1 (HH) 0.4 - 2.0 mmol/L   ACETAMINOPHEN    Collection Time: 07/01/17 10:46 AM   Result Value Ref Range    Acetaminophen level <2 (L) 10 - 30 ug/mL   SALICYLATE    Collection Time: 07/01/17 10:46 AM   Result Value Ref Range    SALICYLATE <2.0 (L) 2.8 - 20.0 MG/DL   EKG, 12 LEAD, SUBSEQUENT    Collection Time: 07/01/17 11:55 AM   Result Value Ref Range    Ventricular Rate 49 BPM    Atrial Rate 49 BPM    P-R Interval 168 ms    QRS Duration 98 ms    Q-T Interval 490 ms    QTC Calculation (Bezet) 442 ms    Calculated P Axis 57 degrees    Calculated T Axis 58 degrees    Diagnosis       Marked sinus bradycardia  Minimal voltage criteria for LVH, may be normal variant  Nonspecific T wave abnormality  Abnormal ECG  When compared with ECG of 01-JUL-2017 08:35,  Nonspecific T wave abnormality has replaced inverted T waves in Lateral leads     CARDIAC PANEL,(CK, CKMB & TROPONIN)    Collection Time: 07/01/17 12:04 PM   Result Value Ref Range     39 - 308 U/L    CK - MB <1.0 <3.6 ng/ml    CK-MB Index  0.0 - 4.0 %     CALCULATION NOT PERFORMED WHEN RESULT IS BELOW LINEAR LIMIT    Troponin-I, Qt. <0.02 0.0 - 0.045 NG/ML   URINALYSIS W/ RFLX MICROSCOPIC    Collection Time: 07/01/17 12:07 PM   Result Value Ref Range    Color DARK YELLOW      Appearance CLEAR      Specific gravity 1.014 1.005 - 1.030      pH (UA) 7.0 5.0 - 8.0      Protein 30 (A) NEG mg/dL    Glucose NEGATIVE  NEG mg/dL    Ketone TRACE (A) NEG mg/dL    Bilirubin SMALL (A) NEG      Blood NEGATIVE  NEG      Urobilinogen 1.0 0.2 - 1.0 EU/dL    Nitrites NEGATIVE  NEG      Leukocyte Esterase TRACE (A) NEG     URINE MICROSCOPIC ONLY    Collection Time: 07/01/17 12:07 PM   Result Value Ref Range    WBC 0 to 5 0 - 4 /hpf    RBC NONE 0 - 5 /hpf    Epithelial cells FEW 0 - 5 /lpf    Bacteria 1+ (A) NEG /hpf    Mucus 1+ (A) NEG /lpf   POC LACTIC ACID    Collection Time: 07/01/17 12:08 PM   Result Value Ref Range    Lactic Acid (POC) 0.8 0.4 - 2.0 mmol/L

## 2017-07-01 NOTE — ANESTHESIA PREPROCEDURE EVALUATION
Anesthetic History   No history of anesthetic complications            Review of Systems / Medical History  Patient summary reviewed and pertinent labs reviewed    Pulmonary  Within defined limits                 Neuro/Psych   Within defined limits           Cardiovascular                  Exercise tolerance: >4 METS  Comments: Sickle cell Anemia with several episodes of crisis   GI/Hepatic/Renal  Within defined limits              Endo/Other        Anemia     Other Findings   Comments:   Risk Factors for Postoperative nausea/vomiting:       History of postoperative nausea/vomiting? NO       Female? NO       Motion sickness? NO       Intended opioid administration for postoperative analgesia? YES      Smoking Abstinence  Current Smoker? NO  Elective Surgery? NO  Seen preoperatively by anesthesiologist or proxy prior to day of surgery? YES  Pt abstained from smoking 24 hours prior to anesthesia?  NO         Physical Exam    Airway  Mallampati: II  TM Distance: 4 - 6 cm  Neck ROM: normal range of motion   Mouth opening: Normal     Cardiovascular  Regular rate and rhythm,  S1 and S2 normal,  no murmur, click, rub, or gallop             Dental  No notable dental hx       Pulmonary  Breath sounds clear to auscultation               Abdominal  GI exam deferred       Other Findings            Anesthetic Plan    ASA: 2, emergent  Anesthesia type: general          Induction: Intravenous  Anesthetic plan and risks discussed with: Patient

## 2017-07-01 NOTE — PROGRESS NOTES
Pt in scanner, Surgeon called and told me to stop the MRI. He wants patient sent immediately to the OR. Antionette and I brought patient out of the magnet, put pt on his stretcher , transport came and took pt to OR. Anish Pitts in the ER notified that the exam was stopped by the surgeon and patient is going directly to the OR from MRI.   MRI images will be submitted as a limited study MRI Abd without contrast.

## 2017-07-02 VITALS
SYSTOLIC BLOOD PRESSURE: 123 MMHG | BODY MASS INDEX: 23.57 KG/M2 | TEMPERATURE: 97.9 F | HEART RATE: 77 BPM | DIASTOLIC BLOOD PRESSURE: 70 MMHG | WEIGHT: 174 LBS | RESPIRATION RATE: 16 BRPM | OXYGEN SATURATION: 100 % | HEIGHT: 72 IN

## 2017-07-02 LAB
ALBUMIN SERPL BCP-MCNC: 3.8 G/DL (ref 3.4–5)
ALBUMIN/GLOB SERPL: 0.8 {RATIO} (ref 0.8–1.7)
ALP SERPL-CCNC: 145 U/L (ref 45–117)
ALT SERPL-CCNC: 54 U/L (ref 16–61)
ANION GAP BLD CALC-SCNC: 8 MMOL/L (ref 3–18)
AST SERPL W P-5'-P-CCNC: 92 U/L (ref 15–37)
BASOPHILS # BLD AUTO: 0 K/UL (ref 0–0.06)
BASOPHILS # BLD: 0 % (ref 0–3)
BILIRUB SERPL-MCNC: 4.6 MG/DL (ref 0.2–1)
BUN SERPL-MCNC: 10 MG/DL (ref 7–18)
BUN/CREAT SERPL: 9 (ref 12–20)
CALCIUM SERPL-MCNC: 8.4 MG/DL (ref 8.5–10.1)
CHLORIDE SERPL-SCNC: 109 MMOL/L (ref 100–108)
CO2 SERPL-SCNC: 21 MMOL/L (ref 21–32)
CREAT SERPL-MCNC: 1.07 MG/DL (ref 0.6–1.3)
DIFFERENTIAL METHOD BLD: ABNORMAL
EOSINOPHIL # BLD: 0 K/UL (ref 0–0.4)
EOSINOPHIL NFR BLD: 0 % (ref 0–5)
ERYTHROCYTE [DISTWIDTH] IN BLOOD BY AUTOMATED COUNT: 23.7 % (ref 11.6–14.5)
GLOBULIN SER CALC-MCNC: 4.7 G/DL (ref 2–4)
GLUCOSE SERPL-MCNC: 84 MG/DL (ref 74–99)
HCT VFR BLD AUTO: 27.1 % (ref 36–48)
HGB BLD-MCNC: 9.4 G/DL (ref 13–16)
LYMPHOCYTES # BLD AUTO: 12 % (ref 20–51)
LYMPHOCYTES # BLD: 1.9 K/UL (ref 0.8–3.5)
MCH RBC QN AUTO: 25.7 PG (ref 24–34)
MCHC RBC AUTO-ENTMCNC: 34.7 G/DL (ref 31–37)
MCV RBC AUTO: 74 FL (ref 74–97)
MONOCYTES # BLD: 0.3 K/UL (ref 0–1)
MONOCYTES NFR BLD AUTO: 2 % (ref 2–9)
NEUTS SEG # BLD: 13.8 K/UL (ref 1.8–8)
NEUTS SEG NFR BLD AUTO: 86 % (ref 42–75)
NRBC BLD-RTO: 3 PER 100 WBC
PLATELET # BLD AUTO: 349 K/UL (ref 135–420)
PLATELET COMMENTS,PCOM: ABNORMAL
PMV BLD AUTO: 9.5 FL (ref 9.2–11.8)
POTASSIUM SERPL-SCNC: 3.7 MMOL/L (ref 3.5–5.5)
PROT SERPL-MCNC: 8.5 G/DL (ref 6.4–8.2)
RBC # BLD AUTO: 3.66 M/UL (ref 4.7–5.5)
RBC MORPH BLD: ABNORMAL
SODIUM SERPL-SCNC: 138 MMOL/L (ref 136–145)
TROPONIN I SERPL-MCNC: <0.02 NG/ML (ref 0–0.04)
WBC # BLD AUTO: 16 K/UL (ref 4.6–13.2)

## 2017-07-02 PROCEDURE — 84484 ASSAY OF TROPONIN QUANT: CPT | Performed by: INTERNAL MEDICINE

## 2017-07-02 PROCEDURE — 80053 COMPREHEN METABOLIC PANEL: CPT | Performed by: INTERNAL MEDICINE

## 2017-07-02 PROCEDURE — 74011000258 HC RX REV CODE- 258: Performed by: SURGERY

## 2017-07-02 PROCEDURE — 74011250637 HC RX REV CODE- 250/637: Performed by: SURGERY

## 2017-07-02 PROCEDURE — 85025 COMPLETE CBC W/AUTO DIFF WBC: CPT | Performed by: INTERNAL MEDICINE

## 2017-07-02 PROCEDURE — 74011250636 HC RX REV CODE- 250/636: Performed by: INTERNAL MEDICINE

## 2017-07-02 PROCEDURE — 77030027138 HC INCENT SPIROMETER -A

## 2017-07-02 PROCEDURE — 74011250636 HC RX REV CODE- 250/636: Performed by: SURGERY

## 2017-07-02 PROCEDURE — 77010033678 HC OXYGEN DAILY

## 2017-07-02 PROCEDURE — 36415 COLL VENOUS BLD VENIPUNCTURE: CPT | Performed by: INTERNAL MEDICINE

## 2017-07-02 RX ORDER — ENOXAPARIN SODIUM 100 MG/ML
40 INJECTION SUBCUTANEOUS EVERY 24 HOURS
Status: DISCONTINUED | OUTPATIENT
Start: 2017-07-02 | End: 2017-07-03 | Stop reason: HOSPADM

## 2017-07-02 RX ORDER — HYDROMORPHONE HYDROCHLORIDE 2 MG/ML
2 INJECTION, SOLUTION INTRAMUSCULAR; INTRAVENOUS; SUBCUTANEOUS
Status: DISCONTINUED | OUTPATIENT
Start: 2017-07-02 | End: 2017-07-03 | Stop reason: HOSPADM

## 2017-07-02 RX ADMIN — HYDROMORPHONE HYDROCHLORIDE 2 MG: 2 TABLET ORAL at 02:26

## 2017-07-02 RX ADMIN — HYDROMORPHONE HYDROCHLORIDE 2 MG: 2 TABLET ORAL at 06:21

## 2017-07-02 RX ADMIN — HYDROMORPHONE HYDROCHLORIDE 2 MG: 2 INJECTION, SOLUTION INTRAMUSCULAR; INTRAVENOUS; SUBCUTANEOUS at 10:18

## 2017-07-02 RX ADMIN — HYDROMORPHONE HYDROCHLORIDE 2 MG: 2 INJECTION, SOLUTION INTRAMUSCULAR; INTRAVENOUS; SUBCUTANEOUS at 14:06

## 2017-07-02 RX ADMIN — SODIUM CHLORIDE, SODIUM LACTATE, POTASSIUM CHLORIDE, AND CALCIUM CHLORIDE 150 ML/HR: 600; 310; 30; 20 INJECTION, SOLUTION INTRAVENOUS at 02:23

## 2017-07-02 RX ADMIN — HYDROMORPHONE HYDROCHLORIDE 2 MG: 2 INJECTION, SOLUTION INTRAMUSCULAR; INTRAVENOUS; SUBCUTANEOUS at 18:50

## 2017-07-02 RX ADMIN — SODIUM CHLORIDE, SODIUM LACTATE, POTASSIUM CHLORIDE, AND CALCIUM CHLORIDE 150 ML/HR: 600; 310; 30; 20 INJECTION, SOLUTION INTRAVENOUS at 10:17

## 2017-07-02 RX ADMIN — ENOXAPARIN SODIUM 40 MG: 40 INJECTION SUBCUTANEOUS at 09:58

## 2017-07-02 RX ADMIN — PIPERACILLIN SODIUM AND TAZOBACTAM SODIUM 3.38 G: 3; .375 INJECTION, POWDER, FOR SOLUTION INTRAVENOUS at 09:58

## 2017-07-02 RX ADMIN — PIPERACILLIN SODIUM AND TAZOBACTAM SODIUM 3.38 G: 3; .375 INJECTION, POWDER, FOR SOLUTION INTRAVENOUS at 02:20

## 2017-07-02 NOTE — PROGRESS NOTES
conducted an initial consultation and Spiritual Assessment for Tod Faulkner, who is a 43 y. o.,male. Patients Primary Language is: [place]. According to the patients EMR Amish Affiliation is: Sita Talbert. The reason the Patient came to the hospital is:   Patient Active Problem List    Diagnosis Date Noted    Cholecystitis 07/01/2017    Hypokalemia     Bilateral leg pain 04/24/2017    Vaso-occlusive sickle cell crisis ([facility] 04/24/2017    Sickle cell anemia (HCC) 01/19/2017    Hemolytic anemia ([facility]) 12/11/2016    SC disease ([facility] 12/11/2016    Pain, generalized 04/05/2016    Vitamin D deficiency 01/26/2016    Leukocytosis 10/17/2015    Sickle cell anemia with crisis ([facility] 05/07/2015    Sickle cell pain crisis ([facility] 08/15/2014    Sickle cell crisis ([facility] 01/26/2014    Sickle cell disease ([facility] 09/27/2013    Elevated AST (SGOT) 03/10/2012    Serum total bilirubin elevated 12/27/2011    Elevated total protein 12/27/2011    Hypocalcemia 07/06/2011    Elevated alkaline phosphatase level 03/21/2010    Elevated ALT measurement 03/21/2010    B12 deficiency 03/15/2010    Hyponatremia 11/18/2009    Microcytic anemia 10/25/2007    Reticulocytosis 10/25/2007    Elevated platelet count ([facility] 10/25/2007        The  provided the following Interventions:  Initiated a relationship of care and support. Listened empathically. Provided chaplaincy education. Provided information about Spiritual Care Services. Offered assurance of continued prayers on patient's behalf. Chart reviewed. The following outcomes where achieved:  Patient shared limited information about both their medical narrative. Patient expressed gratitude for 's visit. Assessment:  Patient does not have any Jehovah's witness/cultural needs that will affect patients preferences in health care. There are no spiritual or Jehovah's witness issues which require intervention at this time.      Plan:  Chaplains will continue to follow and will provide pastoral care on an as needed/requested basis.  recommends bedside caregivers page  on duty if patient shows signs of acute spiritual or emotional distress.         [address] [facility].   [phone]

## 2017-07-02 NOTE — ANESTHESIA POSTPROCEDURE EVALUATION
Post-Anesthesia Evaluation and Assessment    Patient: Katty Moreno MRN: [id number]  SSN: [id number]    YOB: 1980  Age: 43 y.o. Sex: male       Cardiovascular Function/Vital Signs  Visit Vitals    /63    Pulse 100    Temp 36.8 °C (98.2 °F)    Resp 15    Ht 6' (1.829 m)    Wt 78.9 kg (174 lb)    SpO2 100%    BMI 23.6 kg/m2       Patient is status post No value filed. anesthesia for Procedure(s):  CHOLECYSTECTOMY LAPAROSCOPIC  WITH INTRAOPERATIVE CHPOLANGIOGRAM.    Nausea/Vomiting: None    Postoperative hydration reviewed and adequate. Pain:  Pain Scale 1: Numeric (0 - 10) (07/01/17 1723)  Pain Intensity 1: 4 (07/01/17 1723)   Managed    Neurological Status: At baseline    Mental Status and Level of Consciousness: Alert and oriented     Pulmonary Status:   O2 Device: Oxygen mask (07/01/17 2046)   Adequate oxygenation and airway patent    Complications related to anesthesia: None    Post-anesthesia assessment completed.  No concerns    Signed By: Garret Olmos CRNA     July 1, 2017

## 2017-07-02 NOTE — ROUTINE PROCESS
2200 TRANSFER - IN REPORT:    Verbal report received from Juan cook(name) on Wily Hale  being received from PACu(unit) for routine post - op      Report consisted of patients Situation, Background, Assessment and   Recommendations(SBAR). Information from the following report(s) SBAR was reviewed with the receiving nurse. Opportunity for questions and clarification was provided. Assessment completed upon patients arrival to unit and care assumed. 2200 Patient is very drowsy but arousable falls right back to sleep woke up when lab came to stick him. His incisions are all intact and dry except for the lapsite on the lower right side is bleeding a little will continue to monitor. VSS  0000 Patient still sleeping but wakes up when touched. He is oriented times four with no signs or symptoms of distress. Still waiting on this patient to void. 8502 Patient voided 400 of bao urine  0400 Patient is alert and oriented times three with no signs or symptoms of distress. Lap sites are intact and dry  0700   Bedside and Verbal shift change report given to [address] [place] [address] (oncoming nurse) by Bre Lal RN (offgoing nurse). Report included the following information SBAR, Kardex, MAR and Recent Results.     SITUATION:    Code Status: Full Code   Reason for Admission: dx  Clius 145 day: 1   Problem List:       Hospital Problems  Date Reviewed: 7/1/2017          Codes Class Noted POA    Cholecystitis ICD-10-CM: K81.9  ICD-9-CM: 575.10  7/1/2017 Unknown        Sickle cell anemia ([facility] ICD-10-CM: D57.1  ICD-9-CM: 282.60  1/19/2017 Yes        Pain, generalized ICD-10-CM: R52  ICD-9-CM: 780.96  4/5/2016 Yes        Sickle cell crisis ([facility] ICD-10-CM: D57.00  ICD-9-CM: 282.62  1/26/2014 Yes        Sickle cell disease ([facility] ICD-10-CM: D57.1  ICD-9-CM: 282.60  9/27/2013 Yes              BACKGROUND:    Past Medical History:   Past Medical History:   Diagnosis Date    B12 deficiency 03/15/2010 210    Cholelithiasis 09/17/2012    U/S Result: Cholelithiasis    Elevated alkaline phosphatase level 03/21/2010    158    Elevated ALT measurement 03/21/2010    71    Elevated AST (SGOT) 03/10/2012    38    Elevated platelet count (HCC) 10/25/2007    494    Elevated total protein 12/27/2011    8.7    Hypocalcemia 07/06/2011    4.1    Hypokalemia     Hyponatremia 11/18/2009    Leukocytosis 11/16/2009    Microcytic anemia 10/25/2007    Pleural effusion on right 07/15/2015    [facility] CXR Result    Reticulocytosis 10/25/2007    7.8    Serum total bilirubin elevated 12/27/2011    T.B. 3.8, D.B. 0.4.     Sickle cell anemia with crisis ([facility]     Dr. Rosaura Giordano.  [place]    Vitamin D deficiency 01/20/2016    5.4         Patient taking anticoagulants yes     ASSESSMENT:    Changes in Assessment Throughout Shift: none     Patient has Central Line: no Reasons if yes: none   Patient has Sesay Cath: no Reasons if yes: none      Last Vitals:     Vitals:    07/01/17 2133 07/01/17 2159 07/02/17 0015 07/02/17 0405   BP:  115/71 122/78 121/69   Pulse: (!) 56 (!) 54 (!) 56 (!) 55   Resp: 14 13 16 14   Temp: 98.3 °F (36.8 °C) 97.7 °F (36.5 °C) 98.1 °F (36.7 °C) 97.9 °F (36.6 °C)   SpO2: 100% 100% 100% 100%   Weight:       Height:            IV and DRAINS (will only show if present)   Peripheral IV 07/01/17 Left Arm-Site Assessment: Clean, dry, & intact  Peripheral IV Left -Site Assessment: Clean, dry, & intact     WOUND (if present)   Wound Type:  none, lapsites   Dressing present     Wound Concerns/Notes:  none     PAIN    Pain Assessment    Pain Intensity 1: 0 (07/02/17 0314)    Pain Location 1: Abdomen, Incisional    Pain Intervention(s) 1: Medication (see MAR)    Patient Stated Pain Goal: 0  o Interventions for Pain:  none, medications  o Intervention effective: yes  o Time of last intervention: 0400   o Reassessment Completed: yes      Last 3 Weights:  Last 3 Recorded Weights in this Encounter    07/01/17 0715   Weight: 78.9 kg (174 lb)     Weight change:      INTAKE/OUPUT    Current Shift: 07/01 1901 - 07/02 0700  In: 1367.5 [I.V.:1367.5]  Out: 450 [Urine:400]    Last three shifts:       LAB RESULTS     Recent Labs      07/02/17 0054 07/01/17   0752   WBC  16.0*  16.8*   HGB  9.4*  9.6*   HCT  27.1*  27.6*   PLT  349  404        Recent Labs      07/02/17 0054 07/01/17   0752   NA  138  137   K  3.7  4.4   GLU  84  159*   BUN  10  12   CREA  1.07  1.09   CA  8.4*  9.0   MG   --   2.4       RECOMMENDATIONS AND DISCHARGE PLANNING     1. Pending tests/procedures/ Plan of Care or Other Needs: none     2. Discharge plan for patient and Needs/Barriers: tomorrow    3. Estimated Discharge Date: 7/3/17 Posted on Whiteboard in [address] [place] [address] Room: yes      4. The patient's care plan was reviewed with the oncoming nurse. \"HEALS\" SAFETY CHECK      Fall Risk    Total Score: 1    Safety Measures: Safety Measures: Bed/Chair-Wheels locked, Bed in low position, Caregiver at bedside, Call light within reach    A safety check occurred in the patient's room between off going nurse and oncoming nurse listed above. The safety check included the below items  Area Items   H  High Alert Medications - Verify all high alert medication drips (heparin, PCA, etc.)   E  Equipment - Suction is set up for ALL patients (with yanker)  - Red plugs utilized for all equipment (IV pumps, etc.)  - WOWs wiped down at end of shift.  - Room stocked with oxygen, suction, and other unit-specific supplies   A  Alarms - Bed alarm is set for fall risk patients  - Ensure chair alarm is in place and activated if patient is up in a chair   L  Lines - Check IV for any infiltration  - Sesay bag is empty if patient has a Sesay   - Tubing and IV bags are labeled   S  Safety   - Room is clean, patient is clean, and equipment is clean. - Hallways are clear from equipment besides carts.    - Fall bracelet on for fall risk patients  - Ensure room is clear and free of clutter  - Suction is set up for ALL patients (with pat)  - Hallways are clear from equipment besides carts.    - Isolation precautions followed, supplies available outside room, sign posted     Eyal Stephens RN

## 2017-07-02 NOTE — PROGRESS NOTES
Progress Note    Patient: Shayla Baxter MRN: [id number]  SSN: [id number]    YOB: 1980  Age: 43 y.o. Sex: male      Admit Date: 7/1/2017    LOS: 1 day   POD#1 s/p Lap anatoly and Intraoperative cholangiogram    Subjective:     Minimal \"sorness\" around umbilicus. Pain mostly in his back and legs-his usual.  Drank water fine. IM changed my GI lite diet order and he got no food. He is hungry, no nausea or vomiting. Objective:     Vitals:    07/01/17 2133 07/01/17 2159 07/02/17 0015 07/02/17 0405   BP:  115/71 122/78 121/69   Pulse: (!) 56 (!) 54 (!) 56 (!) 55   Resp: 14 13 16 14   Temp: 98.3 °F (36.8 °C) 97.7 °F (36.5 °C) 98.1 °F (36.7 °C) 97.9 °F (36.6 °C)   SpO2: 100% 100% 100% 100%   Weight:       Height:            Intake and Output:  Current Shift:    Last three shifts: 06/30 1901 - 07/02 0700  In: 2180 [I.V.:2180]  Out: 1200 [Urine:1150]    Physical Exam:   Looks well  Abdomen is soft, all trochar sites clean dry and closed  Non non tender RUQ    Lab/Data Review:  Recent Results (from the past 12 hour(s))   CBC WITH AUTOMATED DIFF    Collection Time: 07/02/17 12:54 AM   Result Value Ref Range    WBC 16.0 (H) 4.6 - 13.2 K/uL    RBC 3.66 (L) 4.70 - 5.50 M/uL    HGB 9.4 (L) 13.0 - 16.0 g/dL    HCT 27.1 (L) 36.0 - 48.0 %    MCV 74.0 74.0 - 97.0 FL    MCH 25.7 24.0 - 34.0 PG    MCHC 34.7 31.0 - 37.0 g/dL    RDW 23.7 (H) 11.6 - 14.5 %    PLATELET 033 721 - 443 K/uL    MPV 9.5 9.2 - 11.8 FL    NEUTROPHILS 86 (H) 42 - 75 %    LYMPHOCYTES 12 (L) 20 - 51 %    MONOCYTES 2 2 - 9 %    EOSINOPHILS 0 0 - 5 %    BASOPHILS 0 0 - 3 %    NRBC 3.0 (H) 0  WBC    ABS. NEUTROPHILS 13.8 (H) 1.8 - 8.0 K/UL    ABS. LYMPHOCYTES 1.9 0.8 - 3.5 K/UL    ABS. MONOCYTES 0.3 0 - 1.0 K/UL    ABS. EOSINOPHILS 0.0 0.0 - 0.4 K/UL    ABS.  BASOPHILS 0.0 0.0 - 0.06 K/UL    DF MANUAL      PLATELET COMMENTS ADEQUATE PLATELETS      RBC COMMENTS ANISOCYTOSIS  3+        RBC COMMENTS POLYCHROMASIA  1+        RBC COMMENTS POIKILOCYTOSIS  2+        RBC COMMENTS TARGET CELLS  1+        RBC COMMENTS SICKLE CELLS  3+       METABOLIC PANEL, COMPREHENSIVE    Collection Time: 07/02/17 12:54 AM   Result Value Ref Range    Sodium 138 136 - 145 mmol/L    Potassium 3.7 3.5 - 5.5 mmol/L    Chloride 109 (H) 100 - 108 mmol/L    CO2 21 21 - 32 mmol/L    Anion gap 8 3.0 - 18 mmol/L    Glucose 84 74 - 99 mg/dL    BUN 10 7.0 - 18 MG/DL    Creatinine 1.07 0.6 - 1.3 MG/DL    BUN/Creatinine ratio 9 (L) 12 - 20      GFR est AA >60 >60 ml/min/1.73m2    GFR est non-AA >60 >60 ml/min/1.73m2    Calcium 8.4 (L) 8.5 - 10.1 MG/DL    Bilirubin, total 4.6 (H) 0.2 - 1.0 MG/DL    ALT (SGPT) 54 16 - 61 U/L    AST (SGOT) 92 (H) 15 - 37 U/L    Alk. phosphatase 145 (H) 45 - 117 U/L    Protein, total 8.5 (H) 6.4 - 8.2 g/dL    Albumin 3.8 3.4 - 5.0 g/dL    Globulin 4.7 (H) 2.0 - 4.0 g/dL    A-G Ratio 0.8 0.8 - 1.7     TROPONIN I    Collection Time: 07/02/17 12:54 AM   Result Value Ref Range    Troponin-I, Qt. <0.02 0.0 - 0.045 NG/ML        Pathology is pending. Assessment:     Active Problems:    Sickle cell disease ([facility] (9/27/2013)      Sickle cell crisis ([facility] (1/26/2014)      Pain, generalized (4/5/2016)      Sickle cell anemia ([facility] (1/19/2017)      Cholecystitis (7/1/2017)      I do NOT think he presented with a \"pain crisis\" he his here because of his gallbladder pain. He may well have passed a gallstone too. I found a stone obstructing his cystic duct and removed it. Common duct has NO stones in it on intraoperative cholangiogram.     Total bilirubin is down to usual 4 range. Plan:     Doing very well,  Ok to eat, Lovenox OK  Already has dilaudid pills at home and I put his usual Fentanyl patch back on. OK to shower starting tonight, no lifting > 15 lbs  Business card provided, follow up with me in two weeks. No additional antibiotics needed. OK to discharge if he tolerates breakfast ok.     Signed By: Arpit Freeman MD     July 2, 2017

## 2017-07-02 NOTE — ROUTINE PROCESS
Patient states that he is not allergic to milk but he is lactose intolerant. Patient does not drink milk but consumes milk containing products without difficulty.

## 2017-07-02 NOTE — ROUTINE PROCESS
Pt alert and oriented at this time. Checked patient's and there was no FentaNYL patch on him.   I will let the pharmacy know so that they can deliver another medication as orally reminded by the MD.

## 2017-07-02 NOTE — PROGRESS NOTES
Care Management Interventions  Current Support Network: Other, Family Lives Nearby  Confirm Follow Up Transport: Family  Plan discussed with Pt/Family/Caregiver: Yes     Pt is a 43year old male admitted for cholecystitis. Pt is alert and oriented in room. Pt states that he resides with his mother Vic. Pt's plan is to return home. He is self pay and mentions that he has applied for disability and has been approved. Pt indicates being independent with ADLs and ambulation. Pt is being discharged home and will be assisted with indigent meds. Pt's mother will be assisting him with transportation home.

## 2017-07-02 NOTE — PROGRESS NOTES
Phone: [phone]     Hematology / Oncology Progress Note  [facility]      Patient: Virginia Shelley   MRN: [id number]         CSN: [id number]    YOB: 1980      Admit Date: 2017    Assessment:     Active Problems:    Sickle cell disease ([facility] (2013)      Sickle cell crisis ([facility] (2014)      Pain, generalized (2016)      Sickle cell anemia ([facility] (2017)      Cholecystitis (2017)    unconjugated hyperbilirubinemia from hemolysis    Plan:     Chart and sono reviewed. D/w ER providers yesterday. S/p lap cholecystectomy yesterday   Ct fentanyl patch  25 ugm , dilaudid IV prn for brk through symptoms of pain. Monitor Bili, baseline 3-4, increase in indirect fraction noted with crisis. D/w pt. Joan Melgar MD  [facility] [phone]      Subjective:     Abdominal pain better.   Back pain and thigh pain  continues  No fever/nausea  Objective:     Visit Vitals    /67 (BP 1 Location: Right arm)    Pulse 65    Temp 98.5 °F (36.9 °C)    Resp 16    Ht 6' (1.829 m)    Wt 78.9 kg (174 lb)    SpO2 100%    BMI 23.6 kg/m2             Temp (24hrs), Av.1 °F (36.7 °C), Min:97.7 °F (36.5 °C), Max:98.5 °F (36.9 °C)        Intake/Output Summary (Last 24 hours) at 17 0849  Last data filed at 17 8646   Gross per 24 hour   Intake             2180 ml   Output             1200 ml   Net              980 ml     Review of Systems:   Constitutional: negative for fevers, chills, sweats and fatigue  Eyes: negative for visual disturbance, redness and positive for  icterus  Ears, Nose, Mouth, Throat, and Face: negative for tinnitus, epistaxis, sore mouth and hoarseness  Respiratory: negative for cough, sputum, hemoptysis, pleurisy/chest pain or wheezing  Cardiovascular: negative for chest pain, chest pressure/discomfort, palpitations, irregular heart beats, syncope, paroxysmal nocturnal dyspnea  Gastrointestinal:  Pain better, negative for reflux symptoms, nausea, vomiting, change in bowel habits, melena, diarrhea, constipation and abdominal pain  Genitourinary:negative for dysuria, nocturia, urinary incontinence, hesitancy and hematuria  Integument: negative for rash, skin lesion(s) and pruritus  Hematologic/Lymphatic: negative for easy bruising, bleeding and lymphadenopathy  Musculoskeletal:negative for myalgias, arthralgias and bone pain  Neurological: negative for headaches, dizziness, seizures, paresthesia and weakness    Physical Exam:  Constitutional: Alert, oriented,  Eyes: PERRLA, icteric  Ears, nose, mouth, throat, and face: no palpable Lymph nodes, no mucositis, no thrush. Respiratory: symmetrical expansion, no rales, no rhonchi, no wheezing. Cardiovascular: S1S2, regular  Gastrointestinal: soft, s/p lap cholecystectomy, BS present  Integument: no rash, no petechiae, no ecchymosis. Musculoskeletal: no edema, no cyanosis, no clubbing. Neurological: intact, cranial nerves, no focal motor or sensory deficits.       Labs:  Recent Results (from the past 24 hour(s))   POC LACTIC ACID    Collection Time: 07/01/17  9:30 AM   Result Value Ref Range    Lactic Acid (POC) 2.1 (HH) 0.4 - 2.0 mmol/L   ACETAMINOPHEN    Collection Time: 07/01/17 10:46 AM   Result Value Ref Range    Acetaminophen level <2 (L) 10 - 30 ug/mL   SALICYLATE    Collection Time: 07/01/17 10:46 AM   Result Value Ref Range    SALICYLATE <6.9 (L) 2.8 - 20.0 MG/DL   EKG, 12 LEAD, SUBSEQUENT    Collection Time: 07/01/17 11:55 AM   Result Value Ref Range    Ventricular Rate 49 BPM    Atrial Rate 49 BPM    P-R Interval 168 ms    QRS Duration 98 ms    Q-T Interval 490 ms    QTC Calculation (Bezet) 442 ms    Calculated P Axis 57 degrees    Calculated T Axis 58 degrees    Diagnosis       Marked sinus bradycardia  Minimal voltage criteria for LVH, may be normal variant  Nonspecific T wave abnormality  Abnormal ECG  When compared with ECG of 01-JUL-2017 08:35,  Nonspecific T wave abnormality has replaced inverted T waves in Lateral leads  Confirmed by Jean Paul Sanford MD, ----- ([id number]) on 7/1/2017 5:03:12 PM     CARDIAC PANEL,(CK, CKMB & TROPONIN)    Collection Time: 07/01/17 12:04 PM   Result Value Ref Range     39 - 308 U/L    CK - MB <1.0 <3.6 ng/ml    CK-MB Index  0.0 - 4.0 %     CALCULATION NOT PERFORMED WHEN RESULT IS BELOW LINEAR LIMIT    Troponin-I, Qt. <0.02 0.0 - 0.045 NG/ML   URINALYSIS W/ RFLX MICROSCOPIC    Collection Time: 07/01/17 12:07 PM   Result Value Ref Range    Color DARK YELLOW      Appearance CLEAR      Specific gravity 1.014 1.005 - 1.030      pH (UA) 7.0 5.0 - 8.0      Protein 30 (A) NEG mg/dL    Glucose NEGATIVE  NEG mg/dL    Ketone TRACE (A) NEG mg/dL    Bilirubin SMALL (A) NEG      Blood NEGATIVE  NEG      Urobilinogen 1.0 0.2 - 1.0 EU/dL    Nitrites NEGATIVE  NEG      Leukocyte Esterase TRACE (A) NEG     URINE MICROSCOPIC ONLY    Collection Time: 07/01/17 12:07 PM   Result Value Ref Range    WBC 0 to 5 0 - 4 /hpf    RBC NONE 0 - 5 /hpf    Epithelial cells FEW 0 - 5 /lpf    Bacteria 1+ (A) NEG /hpf    Mucus 1+ (A) NEG /lpf   POC LACTIC ACID    Collection Time: 07/01/17 12:08 PM   Result Value Ref Range    Lactic Acid (POC) 0.8 0.4 - 2.0 mmol/L   CBC WITH AUTOMATED DIFF    Collection Time: 07/02/17 12:54 AM   Result Value Ref Range    WBC 16.0 (H) 4.6 - 13.2 K/uL    RBC 3.66 (L) 4.70 - 5.50 M/uL    HGB 9.4 (L) 13.0 - 16.0 g/dL    HCT 27.1 (L) 36.0 - 48.0 %    MCV 74.0 74.0 - 97.0 FL    MCH 25.7 24.0 - 34.0 PG    MCHC 34.7 31.0 - 37.0 g/dL    RDW 23.7 (H) 11.6 - 14.5 %    PLATELET 374 891 - 400 K/uL    MPV 9.5 9.2 - 11.8 FL    NEUTROPHILS 86 (H) 42 - 75 %    LYMPHOCYTES 12 (L) 20 - 51 %    MONOCYTES 2 2 - 9 %    EOSINOPHILS 0 0 - 5 %    BASOPHILS 0 0 - 3 %    NRBC 3.0 (H) 0  WBC    ABS. NEUTROPHILS 13.8 (H) 1.8 - 8.0 K/UL    ABS. LYMPHOCYTES 1.9 0.8 - 3.5 K/UL    ABS. MONOCYTES 0.3 0 - 1.0 K/UL    ABS.  EOSINOPHILS 0.0 0.0 - 0.4 K/UL    ABS. BASOPHILS 0.0 0.0 - 0.06 K/UL    DF MANUAL      PLATELET COMMENTS ADEQUATE PLATELETS      RBC COMMENTS ANISOCYTOSIS  3+        RBC COMMENTS POLYCHROMASIA  1+        RBC COMMENTS POIKILOCYTOSIS  2+        RBC COMMENTS TARGET CELLS  1+        RBC COMMENTS SICKLE CELLS  3+       METABOLIC PANEL, COMPREHENSIVE    Collection Time: 07/02/17 12:54 AM   Result Value Ref Range    Sodium 138 136 - 145 mmol/L    Potassium 3.7 3.5 - 5.5 mmol/L    Chloride 109 (H) 100 - 108 mmol/L    CO2 21 21 - 32 mmol/L    Anion gap 8 3.0 - 18 mmol/L    Glucose 84 74 - 99 mg/dL    BUN 10 7.0 - 18 MG/DL    Creatinine 1.07 0.6 - 1.3 MG/DL    BUN/Creatinine ratio 9 (L) 12 - 20      GFR est AA >60 >60 ml/min/1.73m2    GFR est non-AA >60 >60 ml/min/1.73m2    Calcium 8.4 (L) 8.5 - 10.1 MG/DL    Bilirubin, total 4.6 (H) 0.2 - 1.0 MG/DL    ALT (SGPT) 54 16 - 61 U/L    AST (SGOT) 92 (H) 15 - 37 U/L    Alk.  phosphatase 145 (H) 45 - 117 U/L    Protein, total 8.5 (H) 6.4 - 8.2 g/dL    Albumin 3.8 3.4 - 5.0 g/dL    Globulin 4.7 (H) 2.0 - 4.0 g/dL    A-G Ratio 0.8 0.8 - 1.7     TROPONIN I    Collection Time: 07/02/17 12:54 AM   Result Value Ref Range    Troponin-I, Qt. <0.02 0.0 - 0.045 NG/ML

## 2017-07-02 NOTE — ROUTINE PROCESS
7:38; Received pt. A&O x 4. Abdominal lap sites clean dry and intact. No redness or swelling noticed. Pt denies any N/V. Bowel sounds  hypoavtive     States he is tolerating her oral intake well. Pt denies any pain or distress at this time.

## 2017-07-02 NOTE — CONSULTS
[address]    Name:  Zia Spivey  MR#:  [id number]  :  1980  Account #:  [de-identified]  Date of Adm:  2017  Date of Consultation:  2017      REFERRING PHYSICIAN: I was called by physician assistant Abdiaziz Cosme. REASON FOR CONSULTATION: Abnormal ultrasound with  cholelithiasis and abdominal pain, abnormal liver function tests. HISTORY OF PRESENT ILLNESS: The patient is a 77-year-old [facility]  American gentleman with sickle cell disease. He has had multiple  emergency room visits through the years with pain crises. He reported  to the [facility] Emergency Room this morning after developing acute  epigastric pain starting last night at about 11:00. He had barbecue for  dinner. This pain, he reports, now is different from his usual pain crisis  with sickle cell. That pain usually encompasses the legs and bones. The current pain he has now is in the epigastrium and slightly to the  right side. It is associated with no nausea, vomiting, or fevers. He had  several episodes of this severe epigastric pain last year and then none  until yesterday evening. He has known cholelithiasis, based on an  ultrasound in . Physician assistant Sanket Pretty repeated the ultrasound  today, and this study has shown a dilated common bile duct which is  new from the past. It measured 8 mm. In the past, it was 4 mm. The  gallbladder does contain numerous small calculi. There is no  intrahepatic dilatation. There is no mention of Trotter sign. The  pancreas was found to be unremarkable with the pancreatic duct not  dilated. The right portal vein was normal size with antegrade flow. PAST MEDICAL HISTORY: Notable only for the sickle cell disease. CURRENT MEDICATIONS  1. Hydroxyurea 500 mg twice a day. 2. He wears a fentanyl patch 25 mcg, changes it every 72 hours. 3. Dilaudid 2 mg p.r.n. breakthrough pain. ALLERGIES: HE HAS NO KNOWN DRUG ALLERGIES.     PAST SURGICAL HISTORY: He has had no past surgeries. SOCIAL HISTORY: He does not smoke. He drinks alcohol very rarely. He lives with his mother. 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 He has recently obtained Medicaid and disability because  of his chronic sickle cell disease with multiple episodes of pain crises. he is  from his wife and 4 children and live across the street  from them with his mother. He get his medicines from [facility]. FAMILY HISTORY: Both his mother and father have sickle cell trait. His mother was recently diagnosed with lupus. There is no diabetes or  hypertension in the family. He has one sister with trait, one sister and  one brother who do not have the trait. REVIEW OF SYSTEMS: He reports to be 6 feet tall, 174 pounds,  which is stable. He has no changes in his vision, headache, swallowing  problems. This morning when the pain was especially severe, when he  took a deep breath it hurt, and for this reason the physician assistant  obtained a V/Q scan which was low probability for pulmonary  embolism. He now reports no shortness of breath, no chest pain. His  epigastric pain has improved with IV narcotic treatment. He reports no  blood in his urine, no blood in his stools. He has no history of  thromboembolic phenomenon. He denies easy bleeding. He has no  numbness or tingling in his extremities. PHYSICAL EXAMINATION  GENERAL: He is well appearing in no acute distress. MOST RECENT VITAL SIGNS: Blood pressure of 105/62, pulse of 50,  temperature 97.4 on admission to the emergency room, 94% on room  air. HEAD AND NECK: His eyes do appear slightly jaundiced. He reports  that this is usual for him. Extraocular movements are normal. Full view  of the soft palate is seen. NECK: Supple, without adenopathy, thyromegaly, or neck bruits. LUNGS: Clear without wheezing.   HEART: Regular without murmurs. ABDOMEN: Soft with mild tenderness in the epigastrium and right  upper quadrant, and I feel no palpable masses. He has no peritoneal  findings. Abdomen does not appear distended. It is soft in the lower  quadrants. EXTREMITIES: Lower extremities show no peripheral edema. NEUROLOGICAL: Exam is nonfocal.    LABORATORY DATA: Laboratory studies from the emergency room  today. White blood cell count is 16.8 thousand, hematocrit of 27.6,  MCV of 73, with an RDW of 24.3, platelet count is 726,473. Differential  is 64% segmented neutrophils, no bands, and 28% lymphocytes. Chemistries show a glucose of 159, sodium 137, potassium 4.4,  chloride 101, CO2 of 25, BUN and creatinine of 12 and 1.09; this is a  bit elevated from his normal of 0.88. Magnesium was 2.4, albumin 4.3. Total bilirubin is 6.0. In the past, in March and April of this year, it was  4.4 and 3.9. His liver function tests have been elevated in the past. In  April, 47 and 74 for ALT and AST. Today, respectively, 39 and 99. Alkaline phosphatase elevated at 152. D-dimer today was elevated at  3.01. INR was 1.2. Review of records from the blood bank at [facility] show at least 5  units of blood have been transferred between October 2015 and  January 20, 2017. Urinalysis today was clear, specific gravity of 1.041. His lactic acid this morning was 2.1; after presumably fluid  administration, it is now 0.8. ASSESSMENT AND PLAN: This is a gentleman with sickle cell disease. I believe the pain he his experiencing now is from his gallbladder and  not a typical pain crisis. I believe he may well have passed a stone, as  his common bile duct is now dilated from the past. I have discussed  this with Dr. Paty Hickman, Gastroenterology, and I believe the next treatment  is removal of his gallbladder with intraoperative cholangiogram. I do  not believe an MRI/MRCP is needed.  I believe this is a common  problem in sicklers, where they develop pigment stones, and many of  his episodes of pain in the past, I suspect, were his gallbladder and  simply not a pain crisis. I believe laparoscopic cholecystectomy is in  order with intraoperative cholangiogram. I will call Dr. Ledy Murphy with the  results of that cholangiogram. I have a call out to the patient's primary  care physician, Dr. Annabel Contreras, to discuss my plans with her as well. I have drawn a picture of the relevant anatomy and explained to the  patient the need for removal of his gallbladder as well as the need for  intraoperative cholangiogram. If a common bile duct stone is found and  unable to be flushed through, then he may need a postoperative ERC. I have also discussed some of the risks and complications of surgery,  including bleeding, infection, damage to structures in the right upper  quadrant, including the common bile duct. We have also discussed the  possibility of need to convert to an open operation or the possibility of  postcholecystectomy diarrhea. With all this in mind, he desires to  proceed with the planned operation. I have already discussed with the  anesthesiologist the need to keep him well hydrated and well  oxygenated to prevent a sickle crisis. He has already received 4.5  grams of Zosyn in the emergency room at 3:00 this afternoon. I do not  believe he needs another dose currently. Currently, our surgery has  been delayed due to an orthopedic emergency. Depending on how  soon we can go to the operating room, I may give another dose of  intravenous antibiotics.         MD KULWANT Virk / REILLY  D:  07/01/2017   17:17  T:  07/02/2017   03:26  Job #:  [id number]

## 2017-07-02 NOTE — PERIOP NOTES
TRANSFER - OUT REPORT:    Verbal report given to [facility]) on Lashae  being transferred to [address] [place], [place] [address]unit) for routine post - op       Report consisted of patients Situation, Background, Assessment and   Recommendations(SBAR). Information from the following report(s) SBAR, Kardex, OR Summary, Procedure Summary, Intake/Output, MAR, Recent Results and Med Rec Status was reviewed with the receiving nurse. Lines:   Peripheral IV 07/01/17 Left Arm (Active)   Site Assessment Clean, dry, & intact 7/1/2017  8:46 PM   Phlebitis Assessment 0 7/1/2017  8:46 PM   Infiltration Assessment 0 7/1/2017  8:46 PM   Dressing Status Clean, dry, & intact 7/1/2017  8:46 PM   Dressing Type Tape;Transparent 7/1/2017  8:46 PM        Opportunity for questions and clarification was provided.       Patient transported with:   O2 @ 2 liters  Registered Nurse

## 2017-07-02 NOTE — PROGRESS NOTES
Case discussed wit Surgery last night and 0p results noted. No evidence of CBD stone on IOC. Available as needed call for questions.

## 2017-07-02 NOTE — H&P
History & Physical    Patient: Zeus Pena MRN: [id number]  CSN: [id number]    YOB: 1980  Age: 43 y.o. Sex: male      DOA: 7/1/2017    Chief Complaint:    Came to ER with complaints of Chest pain and  And abdominal pain. Nausea, decrease appetiite  folowed by Dr. Damon Hunter sent to ER  D dimer high V/q low prob in ER  High alk phos ultrasound in ER showing enlarged CBD  Surgery and hematology consulted in ER          HPI:     Zeus Pena is a 43 y.o.  male who    Past Medical History:   Diagnosis Date    B12 deficiency 03/15/2010    210    Cholelithiasis 09/17/2012    U/S Result: Cholelithiasis    Elevated alkaline phosphatase level 03/21/2010    158    Elevated ALT measurement 03/21/2010    71    Elevated AST (SGOT) 03/10/2012    38    Elevated platelet count (HCC) 10/25/2007    494    Elevated total protein 12/27/2011    8.7    Hypocalcemia 07/06/2011    4.1    Hypokalemia     Hyponatremia 11/18/2009    Leukocytosis 11/16/2009    Microcytic anemia 10/25/2007    Pleural effusion on right 07/15/2015    [facility] CXR Result    Reticulocytosis 10/25/2007    7.8    Serum total bilirubin elevated 12/27/2011    T.B. 3.8, D.B. 0.4.     Sickle cell anemia with crisis ([facility]     Dr. Zacarias Seip. Damle    Vitamin D deficiency 01/20/2016    5.4       History reviewed. No pertinent surgical history.     Family History   Problem Relation Age of Onset    Cancer Neg Hx     Diabetes Neg Hx     Heart Disease Neg Hx     Heart Attack Neg Hx     Hypertension Neg Hx     Stroke Neg Hx        Social History     Social History    Marital status:      Spouse name: N/A    Number of children: N/A    Years of education: N/A     Social History Main Topics    Smoking status: Never Smoker    Smokeless tobacco: Never Used    Alcohol use Yes      Comment: Occasional    Drug use: No    Sexual activity: Yes     Partners: Female     Birth control/ protection: None Other Topics Concern    None     Social History Narrative       Prior to Admission medications    Medication Sig Start Date End Date Taking? Authorizing Provider   fentaNYL (DURAGESIC) 25 mcg/hr PATCH 1 Patch by TransDERmal route every seventy-two (72) hours. Max Daily Amount: 1 Patch. 10/21/15  Yes Desmond Hollingsworth MD   HYDROmorphone (DILAUDID) 2 mg tablet Take 1 Tab by mouth every six (6) hours as needed for Pain. Max Daily Amount: 8 mg. Indications: Pain 17   Noemi Valente MD   hydroxyurea (HYDREA) 500 mg capsule Take 500 mg by mouth two (2) times a day. Indications: SICKLE CELL ANEMIA WITH CRISIS    Phys Yolanda, MD       Allergies   Allergen Reactions    Eggshell Membrane Nausea and Vomiting    Milk Nausea and Vomiting         Review of Systems  GENERAL: Patient alert, awake and oriented times 3, able to communicate full sentences and not in distress. HEENT: No change in vision, no earache, tinnitus, sore throat or sinus congestion. NECK: No pain or stiffness. PULMONARY: No shortness of breath, cough or wheeze. Cardiovascular: no pnd or orthopnea, no CP  GASTROINTESTINAL: No abdominal pain, nausea, vomiting or diarrhea, melena or bright red blood per rectum. GENITOURINARY: No urinary frequency, urgency, hesitancy or dysuria. MUSCULOSKELETAL: No joint or muscle pain, no back pain, no recent trauma. DERMATOLOGIC: No rash, no itching, no lesions. ENDOCRINE: No polyuria, polydipsia, no heat or cold intolerance. No recent change in weight. HEMATOLOGICAL: No anemia or easy bruising or bleeding. NEUROLOGIC: No headache, seizures, numbness, tingling or weakness.        Physical Exam:     Physical Exam:  Visit Vitals    /67 (BP 1 Location: Right arm)    Pulse 65    Temp 98.5 °F (36.9 °C)    Resp 16    Ht 6' (1.829 m)    Wt 78.9 kg (174 lb)    SpO2 100%    BMI 23.6 kg/m2    O2 Flow Rate (L/min): 2 l/min O2 Device: Nasal cannula    Temp (24hrs), Av.1 °F (36.7 °C), Min:97.7 °F (36.5 °C), Max:98.5 °F (36.9 °C)        06/30 1901 - 07/02 0700  In: 2180 [I.V.:2180]  Out: 1200 [Urine:1150]    General:  Alert, cooperative, no distress, appears stated age. Head: Normocephalic, without obvious abnormality, atraumatic. Eyes:  Conjunctivae/cornea s icteric . PERRL, EOMs intact. Nose: Nares normal. No drainage or sinus tenderness. Neck: Supple, symmetrical, trachea midline, no adenopathy, thyroid: no enlargement, no carotid bruit and no JVD. Lungs:   Clear to auscultation bilaterally. Heart:  Regular rate and rhythm, S1, S2 normal.     Abdomen: Soft, non-tender. Bowel sounds normal.    Extremities: Extremities normal, atraumatic, no cyanosis or edema. Pulses: 2+ and symmetric all extremities. Skin:  No rashes or lesions   Neurologic: AAOx3, No focal motor or sensory deficit. Labs Reviewed:    Lab results reviewed. For significant abnormal values and values requiring intervention, see assessment and plan. and EKG    Procedures/imaging: see electronic medical records for all procedures/Xrays and details which were not copied into this note but were reviewed prior to creation of Plan      Assessment/Plan     Active Problems:    Sickle cell disease ([facility] (9/27/2013)      Sickle cell crisis ([facility] (1/26/2014)      Pain, generalized (4/5/2016)      Sickle cell anemia ([facility] (1/19/2017)      Cholecystitis (7/1/2017)         DVT/GI Prophylaxis: Hep SQ    Discussed with patient and  at bedside about hospital admission and my plan care, both understood and agree with my plan care. Comfort Oates MD  7/2/2017 10:47 AM    History & Physical    Patient: Ashly Robbins MRN: [id number]  [facility]: [id number]    YOB: 1980  Age: 43 y.o. Sex: male      DOA: 7/1/2017    Chief Complaint: No chief complaint on file. HPI:     Ashly Robbins is a 43 y.o.   male who    Past Medical History:   Diagnosis Date    B12 deficiency 03/15/2010    210    Cholelithiasis 09/17/2012    U/S Result: Cholelithiasis    Elevated alkaline phosphatase level 03/21/2010    158    Elevated ALT measurement 03/21/2010    71    Elevated AST (SGOT) 03/10/2012    38    Elevated platelet count (HCC) 10/25/2007    494    Elevated total protein 12/27/2011    8.7    Hypocalcemia 07/06/2011    4.1    Hypokalemia     Hyponatremia 11/18/2009    Leukocytosis 11/16/2009    Microcytic anemia 10/25/2007    Pleural effusion on right 07/15/2015    [facility] CXR Result    Reticulocytosis 10/25/2007    7.8    Serum total bilirubin elevated 12/27/2011    T.B. 3.8, D.B. 0.4.     Sickle cell anemia with crisis ([facility]     Dr. Yakov Marcelo. Damle    Vitamin D deficiency 01/20/2016    5.4       History reviewed. No pertinent surgical history. Family History   Problem Relation Age of Onset    Cancer Neg Hx     Diabetes Neg Hx     Heart Disease Neg Hx     Heart Attack Neg Hx     Hypertension Neg Hx     Stroke Neg Hx        Social History     Social History    Marital status:      Spouse name: N/A    Number of children: N/A    Years of education: N/A     Social History Main Topics    Smoking status: Never Smoker    Smokeless tobacco: Never Used    Alcohol use Yes      Comment: Occasional    Drug use: No    Sexual activity: Yes     Partners: Female     Birth control/ protection: None     Other Topics Concern    None     Social History Narrative       Prior to Admission medications    Medication Sig Start Date End Date Taking? Authorizing Provider   fentaNYL (DURAGESIC) 25 mcg/hr PATCH 1 Patch by TransDERmal route every seventy-two (72) hours. Max Daily Amount: 1 Patch. 10/21/15  Yes Elizabeth Franklin MD   HYDROmorphone (DILAUDID) 2 mg tablet Take 1 Tab by mouth every six (6) hours as needed for Pain. Max Daily Amount: 8 mg.  Indications: Pain 4/16/17   Yisel Lay MD   hydroxyurea (HYDREA) 500 mg capsule Take 500 mg by mouth two (2) times a day. Indications: SICKLE CELL ANEMIA WITH CRISIS    Phys Other, MD       Allergies   Allergen Reactions    Eggshell Membrane Nausea and Vomiting    Milk Nausea and Vomiting         Review of Systems  GENERAL: Patient alert, awake and oriented times 3, able to communicate full sentences and not in distress. HEENT: No change in vision, no earache, tinnitus, sore throat or sinus congestion. NECK: No pain or stiffness. PULMONARY: No shortness of breath, cough or wheeze. Cardiovascular: no pnd or orthopnea, no CP  GASTROINTESTINAL: No abdominal pain, nausea, vomiting or diarrhea, melena or bright red blood per rectum. GENITOURINARY: No urinary frequency, urgency, hesitancy or dysuria. MUSCULOSKELETAL: No joint or muscle pain, no back pain, no recent trauma. DERMATOLOGIC: No rash, no itching, no lesions. ENDOCRINE: No polyuria, polydipsia, no heat or cold intolerance. No recent change in weight. HEMATOLOGICAL: No anemia or easy bruising or bleeding. NEUROLOGIC: No headache, seizures, numbness, tingling or weakness. Physical Exam:     Physical Exam:  Visit Vitals    /67 (BP 1 Location: Right arm)    Pulse 65    Temp 98.5 °F (36.9 °C)    Resp 16    Ht 6' (1.829 m)    Wt 78.9 kg (174 lb)    SpO2 100%    BMI 23.6 kg/m2    O2 Flow Rate (L/min): 2 l/min O2 Device: Nasal cannula    Temp (24hrs), Av.1 °F (36.7 °C), Min:97.7 °F (36.5 °C), Max:98.5 °F (36.9 °C)         190 -  0700  In: 2180 [I.V.:2180]  Out: 1200 [Urine:1150]    General:  Alert, cooperative, no distress, appears stated age. Head: Normocephalic, without obvious abnormality, atraumatic. Eyes:  Conjunctivae/corneas clear. PERRL, EOMs intact. Nose: Nares normal. No drainage or sinus tenderness. Neck: Supple, symmetrical, trachea midline, no adenopathy, thyroid: no enlargement, no carotid bruit and no JVD. Lungs:   Clear to auscultation bilaterally.    Heart:  Regular rate and rhythm, S1, S2 normal.     Abdomen: Soft, non-tender. Bowel sounds normal.    Extremities: Extremities normal, atraumatic, no cyanosis or edema. Pulses: 2+ and symmetric all extremities. Skin:  No rashes or lesions   Neurologic: AAOx3, No focal motor or sensory deficit. Labs Reviewed:    BMP:   Lab Results   Component Value Date/Time     07/02/2017 12:54 AM    K 3.7 07/02/2017 12:54 AM     (H) 07/02/2017 12:54 AM    CO2 21 07/02/2017 12:54 AM    AGAP 8 07/02/2017 12:54 AM    GLU 84 07/02/2017 12:54 AM    BUN 10 07/02/2017 12:54 AM    CREA 1.07 07/02/2017 12:54 AM    GFRAA >60 07/02/2017 12:54 AM    GFRNA >60 07/02/2017 12:54 AM      and EKG    Procedures/imaging: see electronic medical records for all procedures/Xrays and details which were not copied into this note but were reviewed prior to creation of Plan      Assessment/Plan     Active Problems:    Sickle cell disease ([facility] (9/27/2013)      Sickle cell crisis ([facility] (1/26/2014)      Pain, generalized (4/5/2016)      Sickle cell anemia ([facility] (1/19/2017)      Cholecystitis (7/1/2017)         Surgery consulted regarding gallbladder removal opting not to wait for MRCP  Oncology aware of patient arrival       DVT/GI Prophylaxis: Hep SQ    Discussed with patientabout hospital admission and my plan care, both understood and agree with my plan care.     Diego Olivarez MD  7/2/2017 10:47 AM

## 2017-07-02 NOTE — BRIEF OP NOTE
BRIEF OPERATIVE NOTE    Date of Procedure: 7/1/2017   Preoperative Diagnosis: Acute cholecystitis  and CHOLELITHIASIS, Sickle cell disease, chronic anemia. Postoperative Diagnosis:   Same as above. Procedure(s):  CHOLECYSTECTOMY LAPAROSCOPIC  WITH INTRAOPERATIVE CHOLANGIOGRAM  Surgeon(s) and Role:     * Stephen Dukes MD - Primary         Assistant Staff:       Surgical Staff:  Circ-1: Chi Ponce RN  Circ-Relief: Shiva Torres RN  Radiology Technician: Zay Barahona  Scrub Tech-1: Swati TopPatchjaden  Surg Asst-1: [facility] Core  Event Time In   Incision Start 1915   Incision Close      Anesthesia: General   Estimated Blood Loss: 50 ml   1000 ml iv fluids,  No UO monitored  Specimens:   ID Type Source Tests Collected by Time Destination   1 : 100 [facility] Gallbladder  Stephen Dukes MD 7/1/2017 1946 Pathology      Findings: thin walled gallbladder, not erythematous, but some surrounding edema   Black stone in cystic duct milked back, then cholangiogram catheter passed. Cholangiogram did not show intraductal stones and free flow into duodenum,  Liver ok. Complications: none  Implants: * No implants in log *    OP note.   [id number]

## 2017-07-02 NOTE — PERIOP NOTES
Dr. Alicia Dubois notified of order for patient to receive heparin. MD ordered for heparin to be discontinued and MD will place Lovenox order.

## 2017-07-03 NOTE — ROUTINE PROCESS
7:38; Received pt. A&O x 4. Abdominal lap sites clean dry and intact. No redness or swelling noticed. Pt denies any N/V. Bowel sounds  Hypoactive. States he is tolerating her oral intake well. C/o pain, pain med given @ 0621 distress at this time.

## 2017-07-03 NOTE — DISCHARGE INSTRUCTIONS
Learning About Acute Cholecystitis  What is cholecystitis? Cholecystitis (say \"koh-lih-sis-TY-tus\") is inflammation of the gallbladder. The gallbladder stores bile. Bile helps the body digest food. Normally, the bile flows from the gallbladder to the small intestine. A gallstone stuck in the cystic duct is most often the cause of sudden (acute) cholecystitis. The cystic duct is the tube that carries the bile out of the gallbladder. The gallstone blocks the bile from leaving the gallbladder. This results in an irritated and swollen gallbladder. The disease can also be caused by infection or trauma, such as an injury from a car accident. Cholecystitis has to be treated right away. You will probably have to go to the hospital. Surgery is the usual treatment. What are the symptoms? Symptoms include:  · Steady and severe pain in the upper right part of belly. This is the most common symptom. The pain can sometimes move to your back or right shoulder blade. It may last for more than 6 hours. · Nausea or vomiting. · A fever. How is it treated? The main way to treat this disease is surgery to remove the gallbladder. This surgery can often be done through small cuts (incisions) in the belly. This is called a laparoscopic cholecystectomy. In some cases, you may need a more extensive surgery. You may need surgery as soon as possible. The doctor may try to reduce swelling and irritation in the gallbladder before removing it. You may be given fluids and antibiotics through an IV. You may also be given pain medicine. Follow-up care is a key part of your treatment and safety. Be sure to make and go to all appointments, and call your doctor if you are having problems. It's also a good idea to know your test results and keep a list of the medicines you take. Where can you learn more? Go to http://manuel-amadeo.info/.   Enter T940 in the search box to learn more about \"Learning About Acute Cholecystitis. \"  Current as of: August 9, 2016  Content Version: 11.3  © 0942-0881 [employer]. Care instructions adapted under license by [employer] (which disclaims liability or warranty for this information). If you have questions about a medical condition or this instruction, always ask your healthcare professional. [address] any warranty or liability for your use of this information. Patient armband removed and shredded  DISCHARGE SUMMARY from Nurse    The following personal items are in your possession at time of discharge:    Dental Appliances: None  Visual Aid: None                            PATIENT INSTRUCTIONS:    After general anesthesia or intravenous sedation, for 24 hours or while taking prescription Narcotics:  · Limit your activities  · Do not drive and operate hazardous machinery  · Do not make important personal or business decisions  · Do  not drink alcoholic beverages  · If you have not urinated within 8 hours after discharge, please contact your surgeon on call. Report the following to your surgeon:  · Excessive pain, swelling, redness or odor of or around the surgical area  · Temperature over 100.5  · Nausea and vomiting lasting longer than 4 hours or if unable to take medications  · Any signs of decreased circulation or nerve impairment to extremity: change in color, persistent  numbness, tingling, coldness or increase pain  · Any questions        What to do at Home:  Recommended activity: Activity as tolerated,      *  Please give a list of your current medications to your Primary Care Provider. *  Please update this list whenever your medications are discontinued, doses are      changed, or new medications (including over-the-counter products) are added. *  Please carry medication information at all times in case of emergency situations.           These are general instructions for a healthy lifestyle:    No smoking/ No tobacco products/ Avoid exposure to second hand smoke    Surgeon General's Warning:  Quitting smoking now greatly reduces serious risk to your health. Obesity, smoking, and sedentary lifestyle greatly increases your risk for illness    A healthy diet, regular physical exercise & weight monitoring are important for maintaining a healthy lifestyle    You may be retaining fluid if you have a history of heart failure or if you experience any of the following symptoms:  Weight gain of 3 pounds or more overnight or 5 pounds in a week, increased swelling in our hands or feet or shortness of breath while lying flat in bed. Please call your doctor as soon as you notice any of these symptoms; do not wait until your next office visit. Recognize signs and symptoms of STROKE:    F-face looks uneven    A-arms unable to move or move unevenly    S-speech slurred or non-existent    T-time-call 911 as soon as signs and symptoms begin-DO NOT go       Back to bed or wait to see if you get better-TIME IS BRAIN. Warning Signs of HEART ATTACK     Call 911 if you have these symptoms:   Chest discomfort. Most heart attacks involve discomfort in the center of the chest that lasts more than a few minutes, or that goes away and comes back. It can feel like uncomfortable pressure, squeezing, fullness, or pain.  Discomfort in other areas of the upper body. Symptoms can include pain or discomfort in one or both arms, the back, neck, jaw, or stomach.  Shortness of breath with or without chest discomfort.  Other signs may include breaking out in a cold sweat, nausea, or lightheadedness. Don't wait more than five minutes to call 911 - MINUTES MATTER! Fast action can save your life. Calling 911 is almost always the fastest way to get lifesaving treatment. Emergency Medical Services staff can begin treatment when they arrive -- up to an hour sooner than if someone gets to the hospital by car.        The discharge information has been reviewed with the patient. The patient verbalized understanding. Discharge medications reviewed with the patient and appropriate educational materials and side effects teaching were provided. [facility] [employer] Activation    Thank you for requesting access to Care IT. Please follow the instructions below to securely access and download your online medical record. Care IT allows you to send messages to your doctor, view your test results, renew your prescriptions, schedule appointments, and more. How Do I Sign Up? 1. In your internet browser, go to [email or web address]  2. Click on the First Time User? Click Here link in the Sign In box. You will be redirect to the New Member Sign Up page. 3. Enter your Care IT Access Code exactly as it appears below. You will not need to use this code after youve completed the sign-up process. If you do not sign up before the expiration date, you must request a new code. Care IT Access Code: [email or web address]  Expires: 2017  3:57 PM (This is the date your Care IT access code will )    4. Enter the last four digits of your Social Security Number (xxxx) and Date of Birth (mm/dd/yyyy) as indicated and click Submit. You will be taken to the next sign-up page. 5. Create a Care IT ID. This will be your Care IT login ID and cannot be changed, so think of one that is secure and easy to remember. 6. Create a Care IT password. You can change your password at any time. 7. Enter your Password Reset Question and Answer. This can be used at a later time if you forget your password. 8. Enter your e-mail address. You will receive e-mail notification when new information is available in [address]. 9. Click Sign Up. You can now view and download portions of your medical record. 10. Click the Download Summary menu link to download a portable copy of your medical information.     Additional Information    If you have questions, please visit the Frequently Asked Questions section of the [employer] website at [email or web address] Remember, [employer] is NOT to be used for urgent needs. For medical emergencies, dial 911.

## 2017-07-03 NOTE — DISCHARGE SUMMARY
[address]  [address] [place] Marlee Garay SUMMARY    Name:  Jennyfer Spring  MR#:  [id number]  :  1980  Account #:  [de-identified]  Date of Adm:  2017  Date of Discharge:  2017      DISCHARGE DIAGNOSES:  1. Cholecystitis, status post laparoscopic cholecystectomy performed  on 2017.  2. Sickle cell disease. SERVICE: The patient was admitted to the hospitalist service. CONSULTS:  1. Dr. Lino Rucker was consulted for general surgery. 2. Dr. Astrid Cohen for hematology/oncology. 3. Dr. Josey Naranjo for gastroenterology. PROCEDURES: On 2017 patient underwent laparoscopic  cholecystectomy with intraoperative cholangiogram.    HISTORY OF PRESENT ILLNESS: Please refer to admission H and  P.    Briefly, the patient is a 26-year-old gentleman with a history significant  for sickle cell disease who came to the emergency department  complaining of some abdominal pain. Concern was for sickle cell pain  crisis, imaging including an MRI of the abdomen was obtained. There  was concern, based on ultrasound results of multiple small calculi seen  within the gallbladder, mild dilation of the common duct. LFTs on  admission were significant for an ALT of 39, AST of 99. He had significant abdominal pain. He also complained of some lower  extremity pain as well as lower back pain. The patient was admitted for the aforementioned reasons. HOSPITAL COURSE BY PROBLEM:  1. Cholecystitis: With laparoscopic cholecystectomy. Underwent  procedure as per above. Pain is much improved. He has no other  complaints at the current time. He was seen by surgery today. Tolerating p.o. intake. Cleared for discharge home. No other acute  issues. He was empirically covered with Zosyn while in house. 2. Sickle cell disease, no acute issues. No pain complaints at the  current time. He does have and confirms that he has all of his  medications at home.     MEDICATIONS: He has the following at home, needs no refills:  1. Fentanyl patch 25 microgram per hour to skin every 72 hours,  remove old patch. 2. Dilaudid 2 mg every 6 hours as needed for pain p.o.  3. Hydroxyurea 500 mg p.o. b.i.d. p.o. DISPOSITION: The patient discharged home. FOLLOWUP: With his PCP in 7 to 10 days. He follows up with Dr. Max Woods.         Al Bridges MD PP / LIANNA  D:  07/02/2017   10:06  T:  07/03/2017   09:00  Job #:  [id number]

## 2017-07-03 NOTE — ROUTINE PROCESS
Pt alert and oriented x 4 ambulating in the room getting ready as he waits for the ride,  Abdominal lap sites clean dry and intact. Pt states the abdomen is starting to be sore but he is still good. Just the back pain that has been bothering him. The discharge information has been reviewed with the patient. The patient verbalized understanding. Discharge medications reviewed with the patient and appropriate educational materials and side effects teaching were provided. Iv removed  Armband removed and shredded. Pt left sitting on the bed waiting for his ride. Stable. Pt taken to the main entrance in a wheelchair. No s/s of distress.

## 2017-07-03 NOTE — OP NOTES
[address]    Name:  Fatuma Castellanos  MR#:  [id number]  :  1980  Account #:  [de-identified]  Date of Adm:  2017  Date of Surgery:  2017      PREOPERATIVE DIAGNOSES  1. Acute cholecystitis and cholelithiasis. 2. Sickle cell disease with chronic anemia. 3. Elevated Liver Function tests. 4. Dilated common bile duct on ultrasound testing. POSTOPERATIVE DIAGNOSES  1. Acute cholecystitis and cholelithiasis. 2. Sickle cell disease with chronic anemia. 3. Elevated Liver Function tests. 4. Dilated common bile duct on ultrasound testing. 5. No choledocholithiasis. Stone in cystic duct. PROCEDURES PERFORMED:  1. Laparoscopic cholecystectomy with intraoperative cholangiogram.  2. Gallstone removed from cystic duct. ESTIMATED BLOOD LOSS:  Less than 50 mL. SPECIMENS REMOVED:  Gallbladder and contents. ANESTHESIA:  General endotracheal.    URINE OUTPUT: None monitored. INDICATIONS FOR PROCEDURE: The patient is a 38-year-old  gentleman with known sickle cell disease and a long history of multiple  emergency room visits for sickle cell crisis. He has chronic anemia with  a high reticular rate. He has had at least 5 units of blood in the past on  review of records. He developed acute epigastric pain starting at 11:00  last evening after eating BBQ. This pain was distinctly different than his  usual sickle cell crisis pain where the pain is in his joints and in his legs  predominantly. He has had several episodes of this type of epigastric  pain in the last year. He has known cholelithiasis based on an  ultrasound from . The emergency room physician assistant  repeated that study and this time it showed dilatation in the common  bile duct from 4 mm in the past to 8 mm today.  His liver function tests  have been abnormal in the past and they were elevated again today  with an AST of 99 and ALT of 39, and a total bilirubin elevated at 6.0,  direct 1.2. His past total bilirubin levels have been in the 4 range. DESCRIPTION OF OPERATION: After consent was obtained, he was  brought to the main operating room. He had been given a dose of  Zosyn several hours ago in the emergency room and additional dose  was given as he was going back to the operating room. Once on the  operating table, sequential compression devices were applied and  activated to his lower extremities. General endotracheal anesthesia was  administered. No Sesay catheter was placed as he voided prior to  coming to the operating room. An orogastric tube was placed. His  abdomen was then prepped and draped in the usual sterile fashion. A  surgical timeout was completed. A small amount of Exparel long-acting  liposomal Marcaine was infiltrated above the rim of his umbilicus. A  small curvilinear incision was made and a Veress needle was inserted. Pneumoperitoneum to 15 mmHg was created. The Veress needle was  removed and a 12 mm bladeless trocar was inserted under Optiview  guidance. Once in the abdomen, inspection revealed no evidence of  insertion trauma. We then switched to a 10 mm 30-degree laparoscope  and placed the patient in a very steep reverse Trendelenburg position. Additional Exparel was infiltrated in the epigastrium and in the right  upper quadrant, and three 5 mm bladeless trocars were inserted. The  gallbladder did not appear to be inflamed or swollen; however, upon  the conduct of the case there did seem to be more than the usual  edema around the gallbladder. First step was to have my assistant put  the fundus of the gallbladder on cephalad traction. I then placed the  neck on lateral traction and using the hook cautery opened the  peritoneal medially and laterally on the gallbladder. This exposed the  cystic duct. We could see the common bile duct more posteriorly,  which did appear to be very full.  A small structure was running parallel  to the cystic duct, initially thought was the artery but later proved just to  be lymphatic. It was clipped once proximally and transected. The cystic  duct was dissected out and a 4 Jac ureteral catheter was used as  a cholangiocatheter. This entered the abdomen through a 14-[place]  angiocatheter, which was placed in the right subcostal space. Scissors  were used to make a small ductotomy in the cystic duct just  below a clip that had been placed at the neck of the gallbladder/cystic  duct junction. Initially with the ductotomy no bile was flowing from the  opening. Attempted passage of the cholangiocatheter was  unsuccessful. We then milked the cystic duct and a 3-4 mm stone was  removedfrom the cystic duct and fell into the abdomen. After this was  done, we were able to easily pass the cholangiocatheter into the cystic  duct and it was clipped and held into place with easy flow of the saline. The patient was then flattened and the fluoroscopy unit was brought  into play. A standard intraoperative cholangiogram was performed. This showed free flow of the contrast into the common bile duct and  duodenum. There was no intraluminal filling defect seen. We could see  both the right and left hepatic branches of the intrahepatic ducts as  well. With this negative cholangiogram, the table was returned to a  steep reverse Trendelenburg position, the clip was removed, and the  cholangiocatheter removed. Three clips were placed proximal to the  ductotomy and the cystic duct was transected. The cystic artery was  then dissected out more posteriorly. There seemed to be a large branch  going toward the liver and care was taken not to damage this. Instead,  clips were placed higher up closer to the gallbladder, and the cystic  artery too was transected. The gallbladder was then removed from the  bed of the liver with the hook cautery. One opening was indeed made  in the gallbladder but no stone spilled from it.  The gallbladder was then  placed in an Endobag and it was removed via the 12 mm port site. We  switched back to a 5 mm, 30-degree laparoscope to watch this being  performed. An 0 Vicryl tie was then laparoscopically placed through the  fascia beneath the 12 mm trocar site, but was not tied. The trocar was  then reinserted and switching back to the 10 mm 30-degree  laparoscope, we were able to find the initial gallstone which had fallen  out of the cystic duct. This was successfully removed. This turned out  to be a very soft friable stone consistent with pigment stones. The  gallbladder itself on palpation contained several of these stones. The  gallbladder and its contents were submitted in formalin for pathologic  sectioning. The gallbladder fossa was copiously irrigated, there was no  bleeding seen. The irrigant was suctioned out and the suture closing  the fascia beneath the 12 mm trocar site was tied. Pneumoperitoneum  was released by the remaining 5 mm trocar sites, and they too were  removed. All subcutaneous tissues were irrigated with saline. All skin  incisions closed with 4-0 Monocryl suture and dressed with  Dermabond. The patient was awakened from anesthesia, extubated  and brought to the recovery room in excellent condition with no known  complications. Blood loss again was felt to be less than 50 mL. He  received about 1 L of IV crystalloid during the case. No urine output  was recorded. The remainder of the Exparel solution was infiltrated  around the 12 mm umbilical trocar site, and the anesthesiologist was  asked to give Toradol.         MD KULWANT Montez / JW  D:  07/01/2017   20:41  T:  07/03/2017   10:24  Job #:  [id number]

## 2017-08-14 ENCOUNTER — HOSPITAL ENCOUNTER (EMERGENCY)
Age: 37
Discharge: HOME | End: 2017-08-14
Attending: EMERGENCY MEDICINE | Admitting: EMERGENCY MEDICINE
Payer: MEDICARE

## 2017-08-14 VITALS
TEMPERATURE: 98.2 F | OXYGEN SATURATION: 97 % | DIASTOLIC BLOOD PRESSURE: 72 MMHG | BODY MASS INDEX: 23.6 KG/M2 | HEART RATE: 56 BPM | RESPIRATION RATE: 16 BRPM | WEIGHT: 174 LBS | SYSTOLIC BLOOD PRESSURE: 119 MMHG

## 2017-08-14 DIAGNOSIS — D57.00 SICKLE CELL ANEMIA WITH CRISIS (HCC): Primary | ICD-10-CM

## 2017-08-14 LAB
ANION GAP BLD CALC-SCNC: 7 MMOL/L (ref 3–18)
BASOPHILS # BLD AUTO: 0.1 K/UL (ref 0–0.06)
BASOPHILS # BLD: 1 % (ref 0–3)
BUN SERPL-MCNC: 8 MG/DL (ref 7–18)
BUN/CREAT SERPL: 9 (ref 12–20)
CALCIUM SERPL-MCNC: 8.3 MG/DL (ref 8.5–10.1)
CHLORIDE SERPL-SCNC: 107 MMOL/L (ref 100–108)
CO2 SERPL-SCNC: 25 MMOL/L (ref 21–32)
CREAT SERPL-MCNC: 0.88 MG/DL (ref 0.6–1.3)
DIFFERENTIAL METHOD BLD: ABNORMAL
EOSINOPHIL # BLD: 0.6 K/UL (ref 0–0.4)
EOSINOPHIL NFR BLD: 5 % (ref 0–5)
ERYTHROCYTE [DISTWIDTH] IN BLOOD BY AUTOMATED COUNT: 23.6 % (ref 11.6–14.5)
GLUCOSE SERPL-MCNC: 88 MG/DL (ref 74–99)
HCT VFR BLD AUTO: 23.3 % (ref 36–48)
HGB BLD-MCNC: 8.2 G/DL (ref 13–16)
LYMPHOCYTES # BLD AUTO: 50 % (ref 20–51)
LYMPHOCYTES # BLD: 5.6 K/UL (ref 0.8–3.5)
MCH RBC QN AUTO: 25.5 PG (ref 24–34)
MCHC RBC AUTO-ENTMCNC: 35.2 G/DL (ref 31–37)
MCV RBC AUTO: 72.4 FL (ref 74–97)
MONOCYTES # BLD: 0.8 K/UL (ref 0–1)
MONOCYTES NFR BLD AUTO: 7 % (ref 2–9)
NEUTS SEG # BLD: 4.2 K/UL (ref 1.8–8)
NEUTS SEG NFR BLD AUTO: 37 % (ref 42–75)
NRBC BLD-RTO: 2 PER 100 WBC
PLATELET # BLD AUTO: 387 K/UL (ref 135–420)
PLATELET COMMENTS,PCOM: ABNORMAL
PMV BLD AUTO: 9.7 FL (ref 9.2–11.8)
POTASSIUM SERPL-SCNC: 4.1 MMOL/L (ref 3.5–5.5)
RBC # BLD AUTO: 3.22 M/UL (ref 4.7–5.5)
RBC MORPH BLD: ABNORMAL
RETICS/RBC NFR AUTO: 9 % (ref 0.5–2.3)
SODIUM SERPL-SCNC: 139 MMOL/L (ref 136–145)
WBC # BLD AUTO: 11.3 K/UL (ref 4.6–13.2)

## 2017-08-14 PROCEDURE — 85045 AUTOMATED RETICULOCYTE COUNT: CPT | Performed by: EMERGENCY MEDICINE

## 2017-08-14 PROCEDURE — 85025 COMPLETE CBC W/AUTO DIFF WBC: CPT | Performed by: EMERGENCY MEDICINE

## 2017-08-14 PROCEDURE — 96374 THER/PROPH/DIAG INJ IV PUSH: CPT

## 2017-08-14 PROCEDURE — 80048 BASIC METABOLIC PNL TOTAL CA: CPT | Performed by: EMERGENCY MEDICINE

## 2017-08-14 PROCEDURE — 96375 TX/PRO/DX INJ NEW DRUG ADDON: CPT

## 2017-08-14 PROCEDURE — 99284 EMERGENCY DEPT VISIT MOD MDM: CPT

## 2017-08-14 PROCEDURE — 74011250636 HC RX REV CODE- 250/636: Performed by: EMERGENCY MEDICINE

## 2017-08-14 PROCEDURE — 96361 HYDRATE IV INFUSION ADD-ON: CPT

## 2017-08-14 PROCEDURE — 74011250637 HC RX REV CODE- 250/637: Performed by: EMERGENCY MEDICINE

## 2017-08-14 PROCEDURE — 96376 TX/PRO/DX INJ SAME DRUG ADON: CPT

## 2017-08-14 RX ORDER — KETOROLAC TROMETHAMINE 30 MG/ML
30 INJECTION, SOLUTION INTRAMUSCULAR; INTRAVENOUS
Status: COMPLETED | OUTPATIENT
Start: 2017-08-14 | End: 2017-08-14

## 2017-08-14 RX ORDER — OXYCODONE AND ACETAMINOPHEN 5; 325 MG/1; MG/1
2 TABLET ORAL
Status: COMPLETED | OUTPATIENT
Start: 2017-08-14 | End: 2017-08-14

## 2017-08-14 RX ORDER — HYDROMORPHONE HYDROCHLORIDE 2 MG/ML
1 INJECTION, SOLUTION INTRAMUSCULAR; INTRAVENOUS; SUBCUTANEOUS ONCE
Status: COMPLETED | OUTPATIENT
Start: 2017-08-14 | End: 2017-08-14

## 2017-08-14 RX ORDER — HYDROMORPHONE HYDROCHLORIDE 1 MG/ML
1 INJECTION, SOLUTION INTRAMUSCULAR; INTRAVENOUS; SUBCUTANEOUS ONCE
Status: COMPLETED | OUTPATIENT
Start: 2017-08-14 | End: 2017-08-14

## 2017-08-14 RX ADMIN — KETOROLAC TROMETHAMINE 30 MG: 30 INJECTION, SOLUTION INTRAMUSCULAR at 05:24

## 2017-08-14 RX ADMIN — SODIUM CHLORIDE 1000 ML: 900 INJECTION, SOLUTION INTRAVENOUS at 05:25

## 2017-08-14 RX ADMIN — HYDROMORPHONE HYDROCHLORIDE 1 MG: 2 INJECTION, SOLUTION INTRAMUSCULAR; INTRAVENOUS; SUBCUTANEOUS at 05:56

## 2017-08-14 RX ADMIN — HYDROMORPHONE HYDROCHLORIDE 1 MG: 1 INJECTION, SOLUTION INTRAMUSCULAR; INTRAVENOUS; SUBCUTANEOUS at 06:36

## 2017-08-14 RX ADMIN — OXYCODONE HYDROCHLORIDE AND ACETAMINOPHEN 2 TABLET: 5; 325 TABLET ORAL at 07:10

## 2017-08-14 RX ADMIN — HYDROMORPHONE HYDROCHLORIDE 1 MG: 2 INJECTION, SOLUTION INTRAMUSCULAR; INTRAVENOUS; SUBCUTANEOUS at 05:25

## 2017-08-14 NOTE — ED PROVIDER NOTES
HPI Comments: Lane Arriaga is a 43 y.o. male with a hx of sickle cell anemia who presents to the ED c/o leg and back pain that began 9 hours ago. He took pain medication at home which did not alleviate Sx. This episode is consistent with previous sickle cell crises he has experienced. Denies any fevers, chest pain, shortness of breath or other complaints. The history is provided by the patient. No  was used. Past Medical History:   Diagnosis Date    B12 deficiency 03/15/2010    210    Cholelithiasis 09/17/2012    U/S Result: Cholelithiasis    Elevated alkaline phosphatase level 03/21/2010    158    Elevated ALT measurement 03/21/2010    71    Elevated AST (SGOT) 03/10/2012    38    Elevated platelet count (HCC) 10/25/2007    494    Elevated total protein 12/27/2011    8.7    Hypocalcemia 07/06/2011    4.1    Hypokalemia     Hyponatremia 11/18/2009    Leukocytosis 11/16/2009    Microcytic anemia 10/25/2007    Pleural effusion on right 07/15/2015    [facility] CXR Result    Reticulocytosis 10/25/2007    7.8    Serum total bilirubin elevated 12/27/2011    T.B. 3.8, D.B. 0.4.     [facility]     Dr. Vikki Pal. Damle    Vitamin D deficiency 01/20/2016    5.4       History reviewed. No pertinent surgical history. Family History:   Problem Relation Age of Onset    Cancer Neg Hx     Diabetes Neg Hx     Heart Disease Neg Hx     Heart Attack Neg Hx     Hypertension Neg Hx     Stroke Neg Hx        Social History     Social History    Marital status:      Spouse name: N/A    Number of children: N/A    Years of education: N/A     Occupational History    Not on file.      Social History Main Topics    Smoking status: Never Smoker    Smokeless tobacco: Never Used    Alcohol use Yes      Comment: Occasional    Drug use: No    Sexual activity: Yes     Partners: Female     Birth control/ protection: None     Other Topics Concern  Not on file     Social History Narrative         ALLERGIES: Eggshell membrane    Review of Systems   Constitutional: Negative for fever. HENT: Negative for congestion. Respiratory: Negative for cough and shortness of breath. Cardiovascular: Negative for chest pain and leg swelling. Gastrointestinal: Negative for abdominal pain, nausea and vomiting. Genitourinary: Negative for dysuria. Musculoskeletal: Positive for back pain and myalgias. Neurological: Negative for light-headedness and headaches. All other systems reviewed and are negative. Vitals:    08/14/17 0537 08/14/17 0545 08/14/17 0600 08/14/17 0615   BP:  109/73 107/65 116/73   Pulse: (!) 59 (!) 56 (!) 54 (!) 54   Resp: 14 13 12 13   Temp:       SpO2: 97% 97% 96% 100%   Weight:                Physical Exam   Constitutional: He is oriented to person, place, and time. uncomfortable   HENT:   Head: Atraumatic. Eyes: Conjunctivae are normal.   Neck: Neck supple. Cardiovascular: Normal rate, regular rhythm and normal heart sounds. Pulmonary/Chest: Effort normal and breath sounds normal. No respiratory distress. He exhibits no tenderness. Abdominal: Soft. Bowel sounds are normal. He exhibits no distension. There is no tenderness. There is no rebound and no guarding. Musculoskeletal: Normal range of motion. He exhibits no edema or tenderness. Neurological: He is alert and oriented to person, place, and time. Skin: Skin is warm and dry. Psychiatric: He has a normal mood and affect. Nursing note and vitals reviewed. MDM  Number of Diagnoses or Management Options  Sickle cell anemia with crisis [facility]):   Diagnosis management comments: Alvin Baldwin is a 43 y.o. male presenting with pain c/w previous sickle cell pain. No red flags on history or exam. Labs obtained and reviewed. Fluids and pain medicaitons ordered.      ED Course       Procedures    Vitals:  Patient Vitals for the past 12 hrs:   Temp Pulse Resp BP SpO2   08/14/17 0615 - (!) 54 13 116/73 100 %   08/14/17 0600 - (!) 54 12 107/65 96 %   08/14/17 0545 - (!) 56 13 109/73 97 %   08/14/17 0537 - (!) 59 14 - 97 %   08/14/17 0536 - (!) 57 11 - 97 %   08/14/17 0535 - 64 14 - 99 %   08/14/17 0534 - 62 14 - 98 %   08/14/17 0528 - - - 118/62 -   08/14/17 0439 98.2 °F (36.8 °C) 70 21 115/71 95 %         Progress notes, Consult notes or additional Procedure notes:   6:36 AM on repeat exams pt with improvement in pain. Resting comfortably. Will finish fluid bolus and anticipate dc home with outpt follow up. Return precautions discussed. Patient stated verbal understanding and agrees with course and plan. Diagnosis:   1. Sickle cell anemia with crisis [facility]          Scribe [address] [place] [address] acting as a scribe for and in the presence of Stone Sorensen MD      August 14, 2017 at [facility]       Provider Attestation:      I personally performed the services described in the documentation, reviewed the documentation, as recorded by the scribe in my presence, and it accurately and completely records my words and actions.  August 14, 2017 at [facility] [address], MD

## 2017-08-14 NOTE — ED NOTES
Hourly rounding complete. Safety  Pt resting   [x  ]  On stretcher with side rails up and bed in locked position, call bell within reach  [  ]  Sitting in chair with casters locked, call bell within reach    Toileting  [x  ] pt denies need to use bathroom  [  ] pt assisted to bathroom  [  ] pt assisted with bedpan  [  ] pt independent to bathroom as needed    Ongoing Plan of Care  Plan of care and expected time for test and results reviewed with pt.     Pain Management / Comfort  [  ] dimmed lights  [  ] warm blanket provided  [  ] pain assessed  [ x ] monitor alarms reviewed

## 2017-08-14 NOTE — ED TRIAGE NOTES
Pt arrives to ED for sickle cell pain in both lower extremities and his lower back since 10:30 last night unrelieved by prescribed pain medication

## 2017-08-14 NOTE — ED NOTES
Reviewed DC instructions with patient. Pt verbalized an understanding. Pt instructed to follow up with PCP this week. Pt signed paper DC instructions; RN placed in scan bin. Pt left ED with no distress noted. Pt left ED with all belongings.

## 2017-08-24 ENCOUNTER — HOSPITAL ENCOUNTER (EMERGENCY)
Age: 37
Discharge: HOME | End: 2017-08-24
Attending: EMERGENCY MEDICINE
Payer: MEDICARE

## 2017-08-24 VITALS
DIASTOLIC BLOOD PRESSURE: 82 MMHG | RESPIRATION RATE: 16 BRPM | SYSTOLIC BLOOD PRESSURE: 129 MMHG | BODY MASS INDEX: 23.84 KG/M2 | HEART RATE: 74 BPM | OXYGEN SATURATION: 100 % | TEMPERATURE: 98.2 F | HEIGHT: 72 IN | WEIGHT: 176 LBS

## 2017-08-24 DIAGNOSIS — D57.00 SICKLE CELL ANEMIA WITH PAIN (HCC): Primary | ICD-10-CM

## 2017-08-24 LAB
BASOPHILS # BLD: 0 K/UL (ref 0–0.06)
BASOPHILS NFR BLD: 0 % (ref 0–3)
DIFFERENTIAL METHOD BLD: ABNORMAL
EOSINOPHIL # BLD: 1.1 K/UL (ref 0–0.4)
EOSINOPHIL NFR BLD: 8 % (ref 0–5)
ERYTHROCYTE [DISTWIDTH] IN BLOOD BY AUTOMATED COUNT: 23.9 % (ref 11.6–14.5)
HCT VFR BLD AUTO: 26 % (ref 36–48)
HGB BLD-MCNC: 9.1 G/DL (ref 13–16)
LYMPHOCYTES # BLD: 5.7 K/UL (ref 0.8–3.5)
LYMPHOCYTES NFR BLD: 44 % (ref 20–51)
MCH RBC QN AUTO: 25.6 PG (ref 24–34)
MCHC RBC AUTO-ENTMCNC: 35 G/DL (ref 31–37)
MCV RBC AUTO: 73 FL (ref 74–97)
MONOCYTES # BLD: 0.7 K/UL (ref 0–1)
MONOCYTES NFR BLD: 5 % (ref 2–9)
NEUTS SEG # BLD: 5.7 K/UL (ref 1.8–8)
NEUTS SEG NFR BLD: 43 % (ref 42–75)
NRBC BLD-RTO: 2 PER 100 WBC
PLATELET # BLD AUTO: 440 K/UL (ref 135–420)
PLATELET COMMENTS,PCOM: ABNORMAL
PMV BLD AUTO: 9.8 FL (ref 9.2–11.8)
RBC # BLD AUTO: 3.56 M/UL (ref 4.7–5.5)
RBC MORPH BLD: ABNORMAL
RETICS/RBC NFR AUTO: 8.6 % (ref 0.5–2.3)
WBC # BLD AUTO: 13.2 K/UL (ref 4.6–13.2)

## 2017-08-24 PROCEDURE — 99282 EMERGENCY DEPT VISIT SF MDM: CPT

## 2017-08-24 PROCEDURE — 96375 TX/PRO/DX INJ NEW DRUG ADDON: CPT

## 2017-08-24 PROCEDURE — 96361 HYDRATE IV INFUSION ADD-ON: CPT

## 2017-08-24 PROCEDURE — 74011250636 HC RX REV CODE- 250/636: Performed by: EMERGENCY MEDICINE

## 2017-08-24 PROCEDURE — 96374 THER/PROPH/DIAG INJ IV PUSH: CPT

## 2017-08-24 PROCEDURE — 85025 COMPLETE CBC W/AUTO DIFF WBC: CPT | Performed by: EMERGENCY MEDICINE

## 2017-08-24 PROCEDURE — 85045 AUTOMATED RETICULOCYTE COUNT: CPT | Performed by: EMERGENCY MEDICINE

## 2017-08-24 PROCEDURE — 96376 TX/PRO/DX INJ SAME DRUG ADON: CPT

## 2017-08-24 RX ORDER — HYDROMORPHONE HYDROCHLORIDE 1 MG/ML
1 INJECTION, SOLUTION INTRAMUSCULAR; INTRAVENOUS; SUBCUTANEOUS ONCE
Status: COMPLETED | OUTPATIENT
Start: 2017-08-24 | End: 2017-08-24

## 2017-08-24 RX ORDER — HYDROMORPHONE HYDROCHLORIDE 2 MG/ML
1 INJECTION, SOLUTION INTRAMUSCULAR; INTRAVENOUS; SUBCUTANEOUS ONCE
Status: COMPLETED | OUTPATIENT
Start: 2017-08-24 | End: 2017-08-24

## 2017-08-24 RX ORDER — KETOROLAC TROMETHAMINE 30 MG/ML
30 INJECTION, SOLUTION INTRAMUSCULAR; INTRAVENOUS
Status: COMPLETED | OUTPATIENT
Start: 2017-08-24 | End: 2017-08-24

## 2017-08-24 RX ADMIN — HYDROMORPHONE HYDROCHLORIDE 1 MG: 1 INJECTION, SOLUTION INTRAMUSCULAR; INTRAVENOUS; SUBCUTANEOUS at 12:04

## 2017-08-24 RX ADMIN — SODIUM CHLORIDE 1000 ML: 900 INJECTION, SOLUTION INTRAVENOUS at 10:33

## 2017-08-24 RX ADMIN — SODIUM CHLORIDE 1000 ML: 900 INJECTION, SOLUTION INTRAVENOUS at 12:04

## 2017-08-24 RX ADMIN — HYDROMORPHONE HYDROCHLORIDE 1 MG: 2 INJECTION, SOLUTION INTRAMUSCULAR; INTRAVENOUS; SUBCUTANEOUS at 13:54

## 2017-08-24 RX ADMIN — HYDROMORPHONE HYDROCHLORIDE 1 MG: 1 INJECTION, SOLUTION INTRAMUSCULAR; INTRAVENOUS; SUBCUTANEOUS at 10:28

## 2017-08-24 RX ADMIN — KETOROLAC TROMETHAMINE 30 MG: 30 INJECTION, SOLUTION INTRAMUSCULAR at 10:28

## 2017-08-24 NOTE — ED PROVIDER NOTES
HPI Comments: 10:15 AM Zeus Pena is a 43 y.o. male with history of Sickle Cell Disease who presents to the ED c/o sickle cell pain that began yesterday and worsened this morning. Pt notes pain in back, knees and arms. Pt denies fever, cough, NVD or any other acute Sx. Denies any change from his typical pain. The history is provided by the patient. Past Medical History:   Diagnosis Date    B12 deficiency 03/15/2010    210    Cholelithiasis 09/17/2012    U/S Result: Cholelithiasis    Elevated alkaline phosphatase level 03/21/2010    158    Elevated ALT measurement 03/21/2010    71    Elevated AST (SGOT) 03/10/2012    38    Elevated platelet count (HCC) 10/25/2007    494    Elevated total protein 12/27/2011    8.7    Hypocalcemia 07/06/2011    4.1    Hypokalemia     Hyponatremia 11/18/2009    Leukocytosis 11/16/2009    Microcytic anemia 10/25/2007    Pleural effusion on right 07/15/2015    [facility] CXR Result    Reticulocytosis 10/25/2007    7.8    Serum total bilirubin elevated 12/27/2011    T.B. 3.8, D.B. 0.4.     Sickle cell anemia with crisis ([facility] [address]     Dr. Zacarias Seip. [place]    Vitamin D deficiency 01/20/2016    5.4       History reviewed. No pertinent surgical history. Family History:   Problem Relation Age of Onset    Cancer Neg Hx     Diabetes Neg Hx     Heart Disease Neg Hx     Heart Attack Neg Hx     Hypertension Neg Hx     Stroke Neg Hx        Social History     Social History    Marital status:      Spouse name: N/A    Number of children: N/A    Years of education: N/A     Occupational History    Not on file.      Social History Main Topics    Smoking status: Never Smoker    Smokeless tobacco: Never Used    Alcohol use Yes      Comment: Occasional    Drug use: No    Sexual activity: Yes     Partners: Female     Birth control/ protection: None     Other Topics Concern    Not on file     Social History Narrative         ALLERGIES: Eggshell membrane    Review of Systems   Constitutional: Negative for fever. HENT: Negative for congestion. Respiratory: Negative for cough and shortness of breath. Cardiovascular: Negative for chest pain and leg swelling. Gastrointestinal: Negative for abdominal pain, nausea and vomiting. Genitourinary: Negative for dysuria. Musculoskeletal: Positive for arthralgias (back, knees, arms). Neurological: Negative for light-headedness and headaches. All other systems reviewed and are negative. Vitals:    08/24/17 0952   BP: 115/73   Pulse: 74   Resp: 16   Temp: 98.2 °F (36.8 °C)   SpO2: 97%   Weight: 79.8 kg (176 lb)   Height: 6' (1.829 m)            Physical Exam   Constitutional: He is oriented to person, place, and time. Uncomfortable appearance. HENT:   Head: Atraumatic. Eyes: Conjunctivae are normal.   Neck: Neck supple. Cardiovascular: Normal rate, regular rhythm and normal heart sounds. Pulmonary/Chest: Effort normal and breath sounds normal. No respiratory distress. He exhibits no tenderness. Abdominal: Soft. Bowel sounds are normal. He exhibits no distension. There is no tenderness. There is no rebound and no guarding. Musculoskeletal: Normal range of motion. He exhibits no edema or tenderness. Neurological: He is alert and oriented to person, place, and time. Gait normal.   Skin: Skin is warm and dry. Psychiatric: He has a normal mood and affect. Nursing note and vitals reviewed. MDM  Number of Diagnoses or Management Options  Sickle cell anemia with pain [facility]):   Diagnosis management comments: Iza Prado is a 43 y.o. male presenting with pain c/w typical sickle cell pain crisis. Exam otherwise reassuirng. Will check labs and treat his pain.      ED Course       Procedures        Vitals:  Patient Vitals for the past 12 hrs:   Temp Pulse Resp BP SpO2   08/24/17 0952 98.2 °F (36.8 °C) 74 16 115/73 97 %         Progress notes, Consult notes or additional Procedure notes:   2:18 PM pt s/p 3 rounds pain medication and 2LNS. Resting comfortably, stable for outpt follow up. Return precautions discussed. Patient stated verbal understanding and agrees with course and plan. Disposition:   Discharged home in stable condition      Diagnosis:   1. Sickle cell anemia with pain (HCC)                   Scribe Attestation:   ITj, am scribing for and in the presence of Andrea Valverde MD on this day 08/24/17 at 10:16 AM   kevin Cuba    Provider Attestation:  I personally performed the services described in the documentation, reviewed the documentation, as recorded by the scribe in my presence, and it accurately and completely records my words and actions.   Andrea Valverde MD. 10:16 AM      Signed by: Kevin Cuba, 10:16 AM

## 2017-08-24 NOTE — ED NOTES
Pt left without DC paperwork. Received DC instructions per MD. Pt stated he was going to work after leaving ED, educated pt on the dangers of working with dilaudid in his system. Pt advised not to work with under the influence of dilaudid. Pt verbalized understanding.

## 2017-08-24 NOTE — ED TRIAGE NOTES
Pt c/o sickle cell pain bl le and bl le and back, started yesterday, pt aao*4, no s/s of acute cardiac or respiratory distress, pt ambulatory strong steady gait

## 2017-10-15 ENCOUNTER — HOSPITAL ENCOUNTER (EMERGENCY)
Age: 37
Discharge: HOME | End: 2017-10-15
Attending: EMERGENCY MEDICINE
Payer: MEDICARE

## 2017-10-15 VITALS
SYSTOLIC BLOOD PRESSURE: 122 MMHG | HEIGHT: 72 IN | OXYGEN SATURATION: 100 % | HEART RATE: 80 BPM | WEIGHT: 178 LBS | TEMPERATURE: 97.8 F | DIASTOLIC BLOOD PRESSURE: 70 MMHG | RESPIRATION RATE: 14 BRPM | BODY MASS INDEX: 24.11 KG/M2

## 2017-10-15 DIAGNOSIS — D57.00 SICKLE CELL ANEMIA WITH PAIN (HCC): Primary | ICD-10-CM

## 2017-10-15 LAB
ALBUMIN SERPL-MCNC: 4.2 G/DL (ref 3.4–5)
ALBUMIN/GLOB SERPL: 0.9 {RATIO} (ref 0.8–1.7)
ALP SERPL-CCNC: 89 U/L (ref 45–117)
ALT SERPL-CCNC: 29 U/L (ref 16–61)
ANION GAP SERPL CALC-SCNC: 7 MMOL/L (ref 3–18)
APPEARANCE UR: CLEAR
AST SERPL-CCNC: 49 U/L (ref 15–37)
BACTERIA URNS QL MICRO: NEGATIVE /HPF
BASOPHILS # BLD: 0 K/UL (ref 0–0.06)
BASOPHILS NFR BLD: 0 % (ref 0–3)
BILIRUB SERPL-MCNC: 3.2 MG/DL (ref 0.2–1)
BILIRUB UR QL: NEGATIVE
BUN SERPL-MCNC: 5 MG/DL (ref 7–18)
BUN/CREAT SERPL: 6 (ref 12–20)
CALCIUM SERPL-MCNC: 8.6 MG/DL (ref 8.5–10.1)
CHLORIDE SERPL-SCNC: 107 MMOL/L (ref 100–108)
CO2 SERPL-SCNC: 25 MMOL/L (ref 21–32)
COLOR UR: YELLOW
CREAT SERPL-MCNC: 0.77 MG/DL (ref 0.6–1.3)
DIFFERENTIAL METHOD BLD: ABNORMAL
EOSINOPHIL # BLD: 1.3 K/UL (ref 0–0.4)
EOSINOPHIL NFR BLD: 10 % (ref 0–5)
EPITH CASTS URNS QL MICRO: NORMAL /LPF (ref 0–5)
ERYTHROCYTE [DISTWIDTH] IN BLOOD BY AUTOMATED COUNT: 23.9 % (ref 11.6–14.5)
GLOBULIN SER CALC-MCNC: 4.6 G/DL (ref 2–4)
GLUCOSE SERPL-MCNC: 84 MG/DL (ref 74–99)
GLUCOSE UR STRIP.AUTO-MCNC: NEGATIVE MG/DL
HCT VFR BLD AUTO: 24.2 % (ref 36–48)
HGB BLD-MCNC: 8.4 G/DL (ref 13–16)
HGB UR QL STRIP: NEGATIVE
KETONES UR QL STRIP.AUTO: NEGATIVE MG/DL
LEUKOCYTE ESTERASE UR QL STRIP.AUTO: ABNORMAL
LYMPHOCYTES # BLD: 6.7 K/UL (ref 0.8–3.5)
LYMPHOCYTES NFR BLD: 51 % (ref 20–51)
MCH RBC QN AUTO: 24.9 PG (ref 24–34)
MCHC RBC AUTO-ENTMCNC: 34.7 G/DL (ref 31–37)
MCV RBC AUTO: 71.8 FL (ref 74–97)
MONOCYTES # BLD: 1 K/UL (ref 0–1)
MONOCYTES NFR BLD: 8 % (ref 2–9)
NEUTS SEG # BLD: 4 K/UL (ref 1.8–8)
NEUTS SEG NFR BLD: 31 % (ref 42–75)
NITRITE UR QL STRIP.AUTO: NEGATIVE
NRBC BLD-RTO: 1 PER 100 WBC
PH UR STRIP: 7 [PH] (ref 5–8)
PLATELET # BLD AUTO: 371 K/UL (ref 135–420)
PLATELET COMMENTS,PCOM: ABNORMAL
PMV BLD AUTO: 9.7 FL (ref 9.2–11.8)
POTASSIUM SERPL-SCNC: 3.4 MMOL/L (ref 3.5–5.5)
PROT SERPL-MCNC: 8.8 G/DL (ref 6.4–8.2)
PROT UR STRIP-MCNC: NEGATIVE MG/DL
RBC # BLD AUTO: 3.37 M/UL (ref 4.7–5.5)
RBC #/AREA URNS HPF: NORMAL /HPF (ref 0–5)
RBC MORPH BLD: ABNORMAL
RETICS/RBC NFR AUTO: 8.3 % (ref 0.5–2.3)
SODIUM SERPL-SCNC: 139 MMOL/L (ref 136–145)
SP GR UR REFRACTOMETRY: 1.01 (ref 1–1.03)
UROBILINOGEN UR QL STRIP.AUTO: 1 EU/DL (ref 0.2–1)
WBC # BLD AUTO: 13 K/UL (ref 4.6–13.2)
WBC URNS QL MICRO: NORMAL /HPF (ref 0–4)

## 2017-10-15 PROCEDURE — 87086 URINE CULTURE/COLONY COUNT: CPT | Performed by: PHYSICIAN ASSISTANT

## 2017-10-15 PROCEDURE — 74011250636 HC RX REV CODE- 250/636: Performed by: NURSE PRACTITIONER

## 2017-10-15 PROCEDURE — 80053 COMPREHEN METABOLIC PANEL: CPT | Performed by: PHYSICIAN ASSISTANT

## 2017-10-15 PROCEDURE — 96361 HYDRATE IV INFUSION ADD-ON: CPT

## 2017-10-15 PROCEDURE — 99283 EMERGENCY DEPT VISIT LOW MDM: CPT

## 2017-10-15 PROCEDURE — 81001 URINALYSIS AUTO W/SCOPE: CPT | Performed by: PHYSICIAN ASSISTANT

## 2017-10-15 PROCEDURE — 85045 AUTOMATED RETICULOCYTE COUNT: CPT | Performed by: NURSE PRACTITIONER

## 2017-10-15 PROCEDURE — 85025 COMPLETE CBC W/AUTO DIFF WBC: CPT | Performed by: NURSE PRACTITIONER

## 2017-10-15 PROCEDURE — 96374 THER/PROPH/DIAG INJ IV PUSH: CPT

## 2017-10-15 RX ORDER — HYDROMORPHONE HYDROCHLORIDE 2 MG/ML
1 INJECTION, SOLUTION INTRAMUSCULAR; INTRAVENOUS; SUBCUTANEOUS
Status: COMPLETED | OUTPATIENT
Start: 2017-10-15 | End: 2017-10-15

## 2017-10-15 RX ADMIN — SODIUM CHLORIDE 1000 ML: 9 INJECTION, SOLUTION INTRAVENOUS at 21:00

## 2017-10-15 RX ADMIN — HYDROMORPHONE HYDROCHLORIDE 1 MG: 2 INJECTION, SOLUTION INTRAMUSCULAR; INTRAVENOUS; SUBCUTANEOUS at 21:00

## 2017-10-15 NOTE — ED NOTES
Assisted in Triage of patient and referred to main treatment area due to complexity of complaint. Initial orders started and patient assisted to back by Triage RN.    Signed By: Nir Epps NP     October 15, 2017

## 2017-10-15 NOTE — ED TRIAGE NOTES
The patient presents with sickle cell pain to his back and legs. He has a Fentanyl patch on that was changed yesterday. He ran out of his Dilaudid \"two weeks ago. \"

## 2017-10-16 NOTE — ED PROVIDER NOTES
HPI Comments: 8:34 PM Abel Ozuna is a 42 y.o. male with h/o sickle cell anemia who presents to ED complaining of sickle cell pain in his back and legs since this morning. The pt states he treated his pain with fentanyl patch with no relief. Pt also reportedly takes dilaudid at home but is out of this medication. Pt says his PCP manages his pain and he has appointment for next month. The pt denies trauma, fever, nausea, vomiting, and diarrhea. The pt had no other complaints or concerns in the ED. PCP: Swati Zapien MD      The history is provided by the patient. No  was used. Past Medical History:   Diagnosis Date    B12 deficiency 03/15/2010    210    Cholelithiasis 09/17/2012    U/S Result: Cholelithiasis    Elevated alkaline phosphatase level 03/21/2010    158    Elevated ALT measurement 03/21/2010    71    Elevated AST (SGOT) 03/10/2012    38    Elevated platelet count (HCC) 10/25/2007    494    Elevated total protein 12/27/2011    8.7    Hypocalcemia 07/06/2011    4.1    Hypokalemia     Hyponatremia 11/18/2009    Leukocytosis 11/16/2009    Microcytic anemia 10/25/2007    Pleural effusion on right 07/15/2015    [facility]R Result    Reticulocytosis 10/25/2007    7.8    Serum total bilirubin elevated 12/27/2011    T.B. 3.8, D.B. 0.4.     Sickle cell anemia with crisis ([email or web address] [facility]     Dr. Yoav Ellis. [place]    Vitamin D deficiency 01/20/2016    5.4       History reviewed. No pertinent surgical history. Family History:   Problem Relation Age of Onset    Cancer Neg Hx     Diabetes Neg Hx     Heart Disease Neg Hx     Heart Attack Neg Hx     Hypertension Neg Hx     Stroke Neg Hx        Social History     Social History    Marital status:      Spouse name: N/A    Number of children: N/A    Years of education: N/A     Occupational History    Not on file.      Social History Main Topics    Smoking status: Never Smoker    Smokeless tobacco: Never Used    Alcohol use Yes      Comment: Occasional    Drug use: No    Sexual activity: Yes     Partners: Female     Birth control/ protection: None     Other Topics Concern    Not on file     Social History Narrative         ALLERGIES: Eggshell membrane    Review of Systems   Constitutional: Negative. Negative for chills, diaphoresis, fatigue and fever. HENT: Negative. Negative for congestion, ear pain, rhinorrhea and sore throat. Eyes: Negative. Negative for pain and redness. Respiratory: Negative. Negative for cough, shortness of breath, wheezing and stridor. Cardiovascular: Negative. Negative for chest pain, palpitations and leg swelling. Gastrointestinal: Negative. Negative for abdominal pain, constipation, diarrhea, nausea and vomiting. Endocrine: Negative. Genitourinary: Negative. Negative for dysuria, flank pain, frequency and hematuria. Musculoskeletal: Positive for back pain and myalgias. Negative for neck pain and neck stiffness. Skin: Negative. Negative for rash and wound. Allergic/Immunologic: Negative. Neurological: Negative. Negative for dizziness, seizures, weakness, light-headedness and headaches. Psychiatric/Behavioral: Negative. All other systems reviewed and are negative. Vitals:    10/15/17 1656   BP: 122/70   Pulse: 80   Resp: 14   Temp: 97.8 °F (36.6 °C)   SpO2: 100%   Weight: 80.7 kg (178 lb)   Height: 6' (1.829 m)            Physical Exam   Constitutional: He is oriented to person, place, and time. He appears well-developed and well-nourished. He appears distressed. Pt appears  Mildly distressed    HENT:   Head: Normocephalic. Neck: Normal range of motion. Neck supple. Cardiovascular: Normal rate, regular rhythm and normal heart sounds. Exam reveals no gallop and no friction rub. No murmur heard. Pulmonary/Chest: Effort normal and breath sounds normal. No stridor. No respiratory distress. He has no wheezes. He has no rales. Musculoskeletal: Normal range of motion. He exhibits no edema, tenderness or deformity. Neurological: He is alert and oriented to person, place, and time. Coordination normal.   Gait is steady. Able to ambulate without difficulty. Skin: Skin is warm and dry. No rash noted. He is not diaphoretic. No erythema. Psychiatric: He has a normal mood and affect. His behavior is normal. Thought content normal.   Nursing note and vitals reviewed. MDM  Number of Diagnoses or Management Options  Sickle cell anemia with pain [facility]):   Diagnosis management comments: Impression:  Sickle cell anemia with pain    IV inserted dilaudid given     RBC 3.37, hgb 8.4, hct 24.2, MCV 71.8, RDW 23.9, BUN 5, BUCR 6, K 3.4, Tbil 3.2, SGOT 49,  GLOB 4.6, retic count 8.3    UA trace leuk esterase, urine sent for culture, pt denies urinary sx. Progress: pt sx improved and he states he is ready to be discharged. Patient is stable for discharge at this time. No new rx given. Rest and follow-up with PCP this week. Return to the ED immediately for any new or worsening sx. Latha Valdivia PA-C 10:26 PM            Amount and/or Complexity of Data Reviewed  Clinical lab tests: ordered and reviewed    Risk of Complications, Morbidity, and/or Mortality  Presenting problems: moderate  Diagnostic procedures: moderate  Management options: moderate    Patient Progress  Patient progress: stable    ED Course       Procedures    Vitals:  Patient Vitals for the past 12 hrs:   Temp Pulse Resp BP SpO2   10/15/17 1656 97.8 °F (36.6 °C) 80 14 122/70 100 %       Medications Ordered:  Medications   sodium chloride 0.9 % bolus infusion 1,000 mL (not administered)   HYDROmorphone (PF) (DILAUDID) injection 1 mg (not administered)       Lab Findings:  No results found for this or any previous visit (from the past 12 hour(s)).     EKG Interpretation by ED physician:  None     X-ray, CT or radiology findings or impressions:  No orders to display Progress notes, consult notes, or additional procedure notes:      Reevaluation of the patient:   Sx improving     Diagnosis: No diagnosis found. Disposition: stable for d/c    Follow-up Information     None           Patient's Medications   Start Taking    No medications on file   Continue Taking    FENTANYL (DURAGESIC) 25 MCG/HR PATCH    1 Patch by TransDERmal route every seventy-two (72) hours. Max Daily Amount: 1 Patch. HYDROMORPHONE (DILAUDID) 2 MG TABLET    Take 1 Tab by mouth every six (6) hours as needed for Pain. Max Daily Amount: 8 mg. Indications: Pain    HYDROXYUREA (HYDREA) 500 MG CAPSULE    Take 500 mg by mouth two (2) times a day. Indications: SICKLE CELL ANEMIA WITH CRISIS   These Medications have changed    No medications on file   Stop Taking    No medications on file       Scribe [address] acting as a scribe for and in the presence of Rosi Carlson MD      October 15, 2017 at 8:34 PM       Provider Attestation:      I personally performed the services described in the documentation, reviewed the documentation, as recorded by the scribe in my presence, and it accurately and completely records my words and actions.  October 15, 2017 at 8:34 PM - Rosi Carlson MD

## 2017-10-16 NOTE — DISCHARGE INSTRUCTIONS
Sickle Cell Disease: Care Instructions  Your Care Instructions    Sickle cell disease turns normal, round red blood cells into misshaped cells that look like jeane or crescent moons. The sickle-shaped cells can get stuck in blood vessels, blocking blood flow and causing severe pain. The sickle-shaped cells also can harm organs, muscles, and bones. It is a lifelong condition. Sickle cell disease is passed down in families. You can talk to your doctor about whether to have genetic tests to find out the chance of having a child with the disease. Your doctor also may recommend that your family members get tested for sickle cell disease. Your doctor may treat you with medicines. Some people get blood transfusions or a bone marrow transplant. Managing pain is an important part of your treatment. Follow-up care is a key part of your treatment and safety. Be sure to make and go to all appointments, and call your doctor if you are having problems. It's also a good idea to know your test results and keep a list of the medicines you take. How can you care for yourself at home? · Take your medicines exactly as prescribed. Call your doctor if you think you are having a problem with your medicine. · Take pain medicines exactly as directed. ¨ If the doctor gave you a prescription medicine for pain, take it as prescribed. ¨ If you are not taking a prescription pain medicine, ask your doctor if you can take an over-the-counter medicine. · Try to help ease pain by distracting yourself. Use guided imagery, deep breathing, and relaxation exercises. A pain specialist can teach you pain management skills. · Avoid alcohol. It can make you dehydrated. · Dress warmly in cold weather. The cold and windy weather can lead to severe pain. · Do not smoke. Smoking can reduce the amount of oxygen in your blood. If you need help quitting, talk to your doctor about stop-smoking programs and medicines.  These can increase your chances of quitting for good. · Get plenty of sleep. · Get regular eye exams. Sickle cell disease can cause vision problems. · Wear medical alert jewelry that says that you have sickle cell disease. You can buy this at most drugstores. · Avoid colds and flu. Get a pneumococcal vaccine shot. If you have had one before, ask your doctor whether you need another dose. Get a flu shot every year. If you must be around people with colds or flu, wash your hands often. When should you call for help? Call 911 anytime you think you may need emergency care. For example, call if:  · You passed out (lost consciousness). · You are in severe pain that is not helped by your pain medicines. Call your doctor now or seek immediate medical care if:  · You have these signs of acute chest syndrome, a problem caused by sickle cell disease:  ¨ Cough. ¨ Chest pain. ¨ Fever. ¨ Shortness of breath. · You have vision problems. · You have severe belly pain. · You have a severe headache. · You have less urine than normal or no urine. · You have vomiting or diarrhea that does not go away after 2 hours. · You are dizzy or lightheaded, or you feel like you may faint. · You have sudden numbness or tingling in your hands, feet, fingers, or toes (even if it goes away). · You suddenly have poor balance and coordination when you walk (even if it goes away). · You have an erection that lasts more than 2 to 3 hours or is extremely painful. Watch closely for changes in your health, and be sure to contact your doctor if you have any problems. Where can you learn more? Go to http://manuel-amadeo.info/. Enter 486 [id number] in the search box to learn more about \"Sickle Cell Disease: Care Instructions. \"  Current as of: October 13, 2016  Content Version: 11.3  © 4847-7434 [employer]. Care instructions adapted under license by [employer] (which disclaims liability or warranty for this information).  If you have questions about a medical condition or this instruction, always ask your healthcare professional. [address] any warranty or liability for your use of this information. Individual [employer] Activation    Thank you for requesting access to [employer]. Please follow the instructions below to securely access and download your online medical record. [employer] allows you to send messages to your doctor, view your test results, renew your prescriptions, schedule appointments, and more. How Do I Sign Up? 1. In your internet browser, go to [email or web address]  2. Click on the First Time User? Click Here link in the Sign In box. You will be redirect to the New Member Sign Up page. 3. Enter your Individual [employer] Access Code exactly as it appears below. You will not need to use this code after youve completed the sign-up process. If you do not sign up before the expiration date, you must request a new code. Individual [employer] Access Code: [email or web address]  Expires: 2017  6:37 AM (This is the date your Individual Digital access code will )    4. Enter the last four digits of your Social Security Number (xxxx) and Date of Birth (mm/dd/yyyy) as indicated and click Submit. You will be taken to the next sign-up page. 5. Create a Individual Digital ID. This will be your Individual [employer] login ID and cannot be changed, so think of one that is secure and easy to remember. 6. Create a [employer] password. You can change your password at any time. 7. Enter your Password Reset Question and Answer. This can be used at a later time if you forget your password. 8. Enter your e-mail address. You will receive e-mail notification when new information is available in [address] 9. Click Sign Up. You can now view and download portions of your medical record. 10. Click the Download Summary menu link to download a portable copy of your medical information.     Additional Information    If you have questions, please visit the Frequently Asked Questions section of the [employer] website at [email or web address] Remember, [employer] is NOT to be used for urgent needs. For medical emergencies, dial 911.

## 2017-10-17 LAB
BACTERIA SPEC CULT: NORMAL
SERVICE CMNT-IMP: NORMAL

## 2017-12-02 ENCOUNTER — HOSPITAL ENCOUNTER (EMERGENCY)
Age: 37
Discharge: HOME | End: 2017-12-02
Attending: EMERGENCY MEDICINE
Payer: MEDICARE

## 2017-12-02 VITALS
BODY MASS INDEX: 24.11 KG/M2 | OXYGEN SATURATION: 100 % | WEIGHT: 178 LBS | HEART RATE: 66 BPM | HEIGHT: 72 IN | DIASTOLIC BLOOD PRESSURE: 53 MMHG | RESPIRATION RATE: 18 BRPM | TEMPERATURE: 97.7 F | SYSTOLIC BLOOD PRESSURE: 119 MMHG

## 2017-12-02 DIAGNOSIS — D57.00 SICKLE CELL PAIN CRISIS (HCC): Primary | ICD-10-CM

## 2017-12-02 LAB
ANION GAP SERPL CALC-SCNC: 7 MMOL/L (ref 3–18)
BASOPHILS # BLD: 0 K/UL (ref 0–0.06)
BASOPHILS NFR BLD: 0 % (ref 0–3)
BUN SERPL-MCNC: 8 MG/DL (ref 7–18)
BUN/CREAT SERPL: 8 (ref 12–20)
CALCIUM SERPL-MCNC: 8.2 MG/DL (ref 8.5–10.1)
CHLORIDE SERPL-SCNC: 107 MMOL/L (ref 100–108)
CO2 SERPL-SCNC: 25 MMOL/L (ref 21–32)
CREAT SERPL-MCNC: 1.02 MG/DL (ref 0.6–1.3)
DIFFERENTIAL METHOD BLD: ABNORMAL
EOSINOPHIL # BLD: 1.2 K/UL (ref 0–0.4)
EOSINOPHIL NFR BLD: 10 % (ref 0–5)
ERYTHROCYTE [DISTWIDTH] IN BLOOD BY AUTOMATED COUNT: 22.7 % (ref 11.6–14.5)
GLUCOSE SERPL-MCNC: 107 MG/DL (ref 74–99)
HCT VFR BLD AUTO: 25.6 % (ref 36–48)
HGB BLD-MCNC: 8.9 G/DL (ref 13–16)
LYMPHOCYTES # BLD: 4.5 K/UL (ref 0.8–3.5)
LYMPHOCYTES NFR BLD: 36 % (ref 20–51)
MCH RBC QN AUTO: 24.3 PG (ref 24–34)
MCHC RBC AUTO-ENTMCNC: 34.8 G/DL (ref 31–37)
MCV RBC AUTO: 69.8 FL (ref 74–97)
MONOCYTES # BLD: 0.9 K/UL (ref 0–1)
MONOCYTES NFR BLD: 7 % (ref 2–9)
NEUTS SEG # BLD: 5.8 K/UL (ref 1.8–8)
NEUTS SEG NFR BLD: 47 % (ref 42–75)
PLATELET # BLD AUTO: 374 K/UL (ref 135–420)
PLATELET COMMENTS,PCOM: ABNORMAL
PMV BLD AUTO: 9.7 FL (ref 9.2–11.8)
POTASSIUM SERPL-SCNC: 3.5 MMOL/L (ref 3.5–5.5)
RBC # BLD AUTO: 3.67 M/UL (ref 4.7–5.5)
RBC MORPH BLD: ABNORMAL
RETICS/RBC NFR AUTO: 7.9 % (ref 0.5–2.3)
SODIUM SERPL-SCNC: 139 MMOL/L (ref 136–145)
WBC # BLD AUTO: 12.4 K/UL (ref 4.6–13.2)

## 2017-12-02 PROCEDURE — 85045 AUTOMATED RETICULOCYTE COUNT: CPT | Performed by: EMERGENCY MEDICINE

## 2017-12-02 PROCEDURE — 96375 TX/PRO/DX INJ NEW DRUG ADDON: CPT

## 2017-12-02 PROCEDURE — 74011250636 HC RX REV CODE- 250/636: Performed by: EMERGENCY MEDICINE

## 2017-12-02 PROCEDURE — 80048 BASIC METABOLIC PNL TOTAL CA: CPT | Performed by: EMERGENCY MEDICINE

## 2017-12-02 PROCEDURE — 85025 COMPLETE CBC W/AUTO DIFF WBC: CPT | Performed by: EMERGENCY MEDICINE

## 2017-12-02 PROCEDURE — 99283 EMERGENCY DEPT VISIT LOW MDM: CPT

## 2017-12-02 PROCEDURE — 96361 HYDRATE IV INFUSION ADD-ON: CPT

## 2017-12-02 PROCEDURE — 96374 THER/PROPH/DIAG INJ IV PUSH: CPT

## 2017-12-02 RX ORDER — HYDROMORPHONE HYDROCHLORIDE 2 MG/ML
1 INJECTION, SOLUTION INTRAMUSCULAR; INTRAVENOUS; SUBCUTANEOUS ONCE
Status: COMPLETED | OUTPATIENT
Start: 2017-12-02 | End: 2017-12-02

## 2017-12-02 RX ORDER — HYDROMORPHONE HCL IN 0.9% NACL 50 MG/50ML
1 PLASTIC BAG, INJECTION (ML) INJECTION ONCE
Status: COMPLETED | OUTPATIENT
Start: 2017-12-02 | End: 2017-12-02

## 2017-12-02 RX ADMIN — SODIUM CHLORIDE 1000 ML: 900 INJECTION, SOLUTION INTRAVENOUS at 07:21

## 2017-12-02 RX ADMIN — HYDROMORPHONE HYDROCHLORIDE 1 MG: 2 INJECTION, SOLUTION INTRAMUSCULAR; INTRAVENOUS; SUBCUTANEOUS at 07:43

## 2017-12-02 RX ADMIN — Medication 1 MG: at 08:30

## 2017-12-02 NOTE — ED PROVIDER NOTES
HPI Comments: Patient with h/o sickle cell disease is c/o diffuse aching pain since last night. Used his Fentanyl patch as well as two doses of Ibuprofen w/o relief. Last hospitalization to [facility] in mid-summer. Salvatore manages his disease. Denies SOB, CP, abd pain, n/v/d, fever. Denies ETOH, tobacco, illicits. The history is provided by the patient. Past Medical History:   Diagnosis Date    B12 deficiency 03/15/2010    210    Cholelithiasis 09/17/2012    U/S Result: Cholelithiasis    Elevated alkaline phosphatase level 03/21/2010    158    Elevated ALT measurement 03/21/2010    71    Elevated AST (SGOT) 03/10/2012    38    Elevated platelet count (HCC) 10/25/2007    494    Elevated total protein 12/27/2011    8.7    Hypocalcemia 07/06/2011    4.1    Hypokalemia     Hyponatremia 11/18/2009    Leukocytosis 11/16/2009    Microcytic anemia 10/25/2007    Pleural effusion on right 07/15/2015    [facility] CXR Result    Reticulocytosis 10/25/2007    7.8    Serum total bilirubin elevated 12/27/2011    T.B. 3.8, D.B. 0.4.     Sickle cell anemia with crisis ([facility] [address]     Dr. Jimmy Pérez. Salvatore    Vitamin D deficiency 01/20/2016    5.4       No past surgical history on file. Family History:   Problem Relation Age of Onset    Cancer Neg Hx     Diabetes Neg Hx     Heart Disease Neg Hx     Heart Attack Neg Hx     Hypertension Neg Hx     Stroke Neg Hx        Social History     Social History    Marital status:      Spouse name: N/A    Number of children: N/A    Years of education: N/A     Occupational History    Not on file.      Social History Main Topics    Smoking status: Never Smoker    Smokeless tobacco: Never Used    Alcohol use Yes      Comment: Occasional    Drug use: No    Sexual activity: Yes     Partners: Female     Birth control/ protection: None     Other Topics Concern    Not on file     Social History Narrative         ALLERGIES: Eggshell membrane    Review of Systems Constitutional: Negative. Negative for fever. HENT: Negative. Eyes: Negative. Respiratory: Negative. Negative for shortness of breath. Cardiovascular: Negative. Negative for chest pain. Gastrointestinal: Negative. Negative for abdominal pain, diarrhea, nausea and vomiting. Endocrine: Negative. Genitourinary: Negative. Negative for flank pain. Musculoskeletal: Positive for myalgias. Negative for arthralgias. Skin: Negative for rash. Allergic/Immunologic: Negative. Neurological: Negative. Psychiatric/Behavioral: Negative. Negative for confusion. All other systems reviewed and are negative. Vitals:    12/02/17 0703   BP: 119/53   Pulse: 66   Resp: 18   Temp: 97.7 °F (36.5 °C)   SpO2: 100%   Weight: 80.7 kg (178 lb)   Height: 6' (1.829 m)            Physical Exam   Constitutional: Vital signs are normal. He appears well-developed and well-nourished. He is active. Non-toxic appearance. He does not appear ill. No distress. HENT:   Head: Normocephalic and atraumatic. Neck: Normal range of motion. Neck supple. Carotid bruit is not present. No tracheal deviation present. No thyromegaly present. Cardiovascular: Normal rate, regular rhythm and normal heart sounds. Exam reveals no gallop and no friction rub. No murmur heard. Pulmonary/Chest: Effort normal and breath sounds normal. No stridor. No respiratory distress. He has no wheezes. He has no rales. He exhibits no tenderness. Abdominal: Soft. He exhibits no distension and no mass. There is no tenderness. There is no rebound, no guarding and no CVA tenderness. Musculoskeletal: Normal range of motion. Neurological: He is alert. Skin: Skin is warm, dry and intact. He is not diaphoretic. No pallor. Psychiatric: He has a normal mood and affect. His speech is normal and behavior is normal. Judgment and thought content normal.   Nursing note and vitals reviewed.        MDM  Number of Diagnoses or Management Options  Sickle cell pain crisis [facility]):   Diagnosis management comments: Differential: sickle cell anemia; pain crisis; chronic pain; electrolyte abnormalities; hypoxia; dehydration    Labs good and VS stable. Treat pain crisis. F/up with PCP. Amount and/or Complexity of Data Reviewed  Clinical lab tests: ordered and reviewed      ED Course       Procedures           Recent Results (from the past 12 hour(s))   CBC WITH AUTOMATED DIFF    Collection Time: 12/02/17  7:35 AM   Result Value Ref Range    WBC 12.4 4.6 - 13.2 K/uL    RBC 3.67 (L) 4.70 - 5.50 M/uL    HGB 8.9 (L) 13.0 - 16.0 g/dL    HCT 25.6 (L) 36.0 - 48.0 %    MCV 69.8 (L) 74.0 - 97.0 FL    MCH 24.3 24.0 - 34.0 PG    MCHC 34.8 31.0 - 37.0 g/dL    RDW 22.7 (H) 11.6 - 14.5 %    PLATELET 874 255 - 139 K/uL    MPV 9.7 9.2 - 11.8 FL    NEUTROPHILS PENDING %    LYMPHOCYTES PENDING %    MONOCYTES PENDING %    EOSINOPHILS PENDING %    BASOPHILS PENDING %    ABS. NEUTROPHILS PENDING K/UL    ABS. LYMPHOCYTES PENDING K/UL    ABS. MONOCYTES PENDING K/UL    ABS. EOSINOPHILS PENDING K/UL    ABS. BASOPHILS PENDING K/UL    DF PENDING    METABOLIC PANEL, BASIC    Collection Time: 12/02/17  7:35 AM   Result Value Ref Range    Sodium 139 136 - 145 mmol/L    Potassium 3.5 3.5 - 5.5 mmol/L    Chloride 107 100 - 108 mmol/L    CO2 25 21 - 32 mmol/L    Anion gap 7 3.0 - 18 mmol/L    Glucose 107 (H) 74 - 99 mg/dL    BUN 8 7.0 - 18 MG/DL    Creatinine 1.02 0.6 - 1.3 MG/DL    BUN/Creatinine ratio 8 (L) 12 - 20      GFR est AA >60 >60 ml/min/1.73m2    GFR est non-AA >60 >60 ml/min/1.73m2    Calcium 8.2 (L) 8.5 - 10.1 MG/DL   RETICULOCYTE COUNT    Collection Time: 12/02/17  7:35 AM   Result Value Ref Range    Reticulocyte count 7.9 (H) 0.5 - 2.3 %     8:25 AM  Diagnosis:   1.  Sickle cell pain crisis ([facility] [address]          Disposition: home    Follow-up Information     Follow up With Details Comments Contact Info    Mehdi Soto MD Schedule an appointment as soon as possible for a visit in 2 days  [address] [place] [address]      [facility] EMERGENCY DEPT  If symptoms worsen return immediately [address]  [phone]          Patient's Medications   Start Taking    No medications on file   Continue Taking    FENTANYL (DURAGESIC) 25 MCG/HR PATCH    1 Patch by TransDERmal route every seventy-two (72) hours. Max Daily Amount: 1 Patch. HYDROMORPHONE (DILAUDID) 2 MG TABLET    Take 1 Tab by mouth every six (6) hours as needed for Pain. Max Daily Amount: 8 mg. Indications: Pain    HYDROXYUREA (HYDREA) 500 MG CAPSULE    Take 500 mg by mouth two (2) times a day.  Indications: SICKLE CELL ANEMIA WITH CRISIS   These Medications have changed    No medications on file   Stop Taking    No medications on file

## 2017-12-02 NOTE — DISCHARGE INSTRUCTIONS
Sickle Cell Crisis: Care Instructions  Your Care Instructions    Sickle cell crisis is a painful episode that may begin suddenly in a person with sickle cell disease. Sickle cell disease turns normal, round red blood cells into cells that look like jeane or crescent moons. The sickle-shaped cells can get stuck in blood vessels, blocking blood flow and causing severe pain. The pain can occur in the bones of the spine, the arms and legs, the chest, and the abdomen. An episode may be called a \"painful event\" or \"painful crisis. \" Some people who have sickle cell disease have many painful events, while others have few or none. Treatment depends on the level of pain and how long it lasts. Sometimes taking nonprescription pain relievers can help. Or you may need stronger pain relief medicine that is prescribed or given by a doctor. You may need to be treated in the hospital.  It isn't always possible to know what sets off a painful event. But triggers include being dehydrated, cold temperatures, infection, stress, and not getting enough oxygen. Follow-up care is a key part of your treatment and safety. Be sure to make and go to all appointments, and call your doctor if you are having problems. It's also a good idea to know your test results and keep a list of the medicines you take. How can you care for yourself at home? · Create a pain management plan with your doctor. This plan should include the types of medicines you can take and other actions you can take at home to relieve pain. · Drink plenty of fluids, enough so that your urine is light yellow or clear like water. If you have kidney, heart, or liver disease and have to limit fluids, talk with your doctor before you increase the amount of fluids you drink. · Take your medicines exactly as prescribed. Call your doctor if you think you are having a problem with your medicine. · Take pain medicines exactly as directed.   ¨ If the doctor gave you a prescription medicine for pain, take it as prescribed. ¨ If you are not taking a prescription pain medicine, ask your doctor if you can take an over-the-counter medicine. · Avoid alcohol. It can make you dehydrated. · Dress warmly in cold weather. The cold and windy weather can lead to severe pain. · Do not smoke. Smoking can reduce the amount of oxygen in your blood. · Get plenty of sleep. When should you call for help? Call 911 anytime you think you may need emergency care. For example, call if:  ? · You have symptoms of a severe problem from sickle cell. ? · You have symptoms of a stroke. These may include:  ¨ Sudden numbness, tingling, weakness, or loss of movement in your face, arm, or leg, especially on only one side of your body. ¨ Sudden vision changes. ¨ Sudden trouble speaking. ¨ Sudden confusion or trouble understanding simple statements. ¨ Sudden problems with walking or balance. ¨ A sudden, severe headache that is different from past headaches. ? · You are in severe pain. ? · You have symptoms of a heart attack. These may include:  ¨ Chest pain or pressure, or a strange feeling in the chest.  ¨ Sweating. ¨ Shortness of breath. ¨ Nausea or vomiting. ¨ Pain, pressure, or a strange feeling in the back, neck, jaw, or upper belly or in one or both shoulders or arms. ¨ Lightheadedness or sudden weakness. ¨ A fast or irregular heartbeat. After you call 911, the  may tell you to chew 1 adult-strength or 2 to 4 low-dose aspirin. Wait for an ambulance. Do not try to drive yourself. ?Call your doctor now or seek immediate medical care if:  ? · You have a fever. ? Watch closely for changes in your health, and be sure to contact your doctor if you have any problems. Where can you learn more? Go to http://manuel-amadeo.info/. Enter F104 in the search box to learn more about \"Sickle Cell Crisis: Care Instructions. \"  Current as of: October 13, 2016  Content Version: 11.4  © 2221-1386 [employer]. Care instructions adapted under license by [employer] (which disclaims liability or warranty for this information). If you have questions about a medical condition or this instruction, always ask your healthcare professional. [address] any warranty or liability for your use of this information.

## 2018-01-12 ENCOUNTER — HOSPITAL ENCOUNTER (EMERGENCY)
Age: 38
Discharge: HOME | End: 2018-01-12
Attending: EMERGENCY MEDICINE
Payer: MEDICARE

## 2018-01-12 VITALS
RESPIRATION RATE: 20 BRPM | OXYGEN SATURATION: 96 % | HEIGHT: 72 IN | SYSTOLIC BLOOD PRESSURE: 107 MMHG | WEIGHT: 182 LBS | DIASTOLIC BLOOD PRESSURE: 69 MMHG | BODY MASS INDEX: 24.65 KG/M2 | HEART RATE: 83 BPM | TEMPERATURE: 98.2 F

## 2018-01-12 DIAGNOSIS — D57.00 SICKLE CELL ANEMIA WITH PAIN (HCC): Primary | ICD-10-CM

## 2018-01-12 LAB
ALBUMIN SERPL-MCNC: 4.6 G/DL (ref 3.4–5)
ALBUMIN/GLOB SERPL: 0.9 {RATIO} (ref 0.8–1.7)
ALP SERPL-CCNC: 98 U/L (ref 45–117)
ALT SERPL-CCNC: 42 U/L (ref 16–61)
ANION GAP SERPL CALC-SCNC: 7 MMOL/L (ref 3–18)
AST SERPL-CCNC: 57 U/L (ref 15–37)
BASOPHILS # BLD: 0 K/UL (ref 0–0.06)
BASOPHILS NFR BLD: 0 % (ref 0–3)
BILIRUB SERPL-MCNC: 4.1 MG/DL (ref 0.2–1)
BUN SERPL-MCNC: 6 MG/DL (ref 7–18)
BUN/CREAT SERPL: 7 (ref 12–20)
CALCIUM SERPL-MCNC: 8.7 MG/DL (ref 8.5–10.1)
CHLORIDE SERPL-SCNC: 108 MMOL/L (ref 100–108)
CO2 SERPL-SCNC: 25 MMOL/L (ref 21–32)
CREAT SERPL-MCNC: 0.89 MG/DL (ref 0.6–1.3)
DIFFERENTIAL METHOD BLD: ABNORMAL
EOSINOPHIL # BLD: 2.6 K/UL (ref 0–0.4)
EOSINOPHIL NFR BLD: 21 % (ref 0–5)
ERYTHROCYTE [DISTWIDTH] IN BLOOD BY AUTOMATED COUNT: 22.5 % (ref 11.6–14.5)
GLOBULIN SER CALC-MCNC: 5.3 G/DL (ref 2–4)
GLUCOSE SERPL-MCNC: 89 MG/DL (ref 74–99)
HCT VFR BLD AUTO: 29.1 % (ref 36–48)
HGB BLD-MCNC: 10.2 G/DL (ref 13–16)
LYMPHOCYTES # BLD: 5.3 K/UL (ref 0.8–3.5)
LYMPHOCYTES NFR BLD: 43 % (ref 20–51)
MCH RBC QN AUTO: 24.4 PG (ref 24–34)
MCHC RBC AUTO-ENTMCNC: 35.1 G/DL (ref 31–37)
MCV RBC AUTO: 69.6 FL (ref 74–97)
MONOCYTES # BLD: 1 K/UL (ref 0–1)
MONOCYTES NFR BLD: 8 % (ref 2–9)
NEUTS BAND NFR BLD MANUAL: 2 % (ref 0–5)
NEUTS SEG # BLD: 3.4 K/UL (ref 1.8–8)
NEUTS SEG NFR BLD: 26 % (ref 42–75)
PLATELET # BLD AUTO: 446 K/UL (ref 135–420)
PLATELET COMMENTS,PCOM: ABNORMAL
PMV BLD AUTO: 9.5 FL (ref 9.2–11.8)
POTASSIUM SERPL-SCNC: 3.6 MMOL/L (ref 3.5–5.5)
PROT SERPL-MCNC: 9.9 G/DL (ref 6.4–8.2)
RBC # BLD AUTO: 4.18 M/UL (ref 4.7–5.5)
RBC MORPH BLD: ABNORMAL
RETICS/RBC NFR AUTO: 7.6 % (ref 0.5–2.3)
SODIUM SERPL-SCNC: 140 MMOL/L (ref 136–145)
WBC # BLD AUTO: 12.3 K/UL (ref 4.6–13.2)

## 2018-01-12 PROCEDURE — 80053 COMPREHEN METABOLIC PANEL: CPT | Performed by: EMERGENCY MEDICINE

## 2018-01-12 PROCEDURE — 96361 HYDRATE IV INFUSION ADD-ON: CPT

## 2018-01-12 PROCEDURE — 99283 EMERGENCY DEPT VISIT LOW MDM: CPT

## 2018-01-12 PROCEDURE — 93005 ELECTROCARDIOGRAM TRACING: CPT

## 2018-01-12 PROCEDURE — 85045 AUTOMATED RETICULOCYTE COUNT: CPT | Performed by: EMERGENCY MEDICINE

## 2018-01-12 PROCEDURE — 74011000258 HC RX REV CODE- 258: Performed by: EMERGENCY MEDICINE

## 2018-01-12 PROCEDURE — 85025 COMPLETE CBC W/AUTO DIFF WBC: CPT | Performed by: EMERGENCY MEDICINE

## 2018-01-12 PROCEDURE — 96374 THER/PROPH/DIAG INJ IV PUSH: CPT

## 2018-01-12 PROCEDURE — 74011250636 HC RX REV CODE- 250/636: Performed by: EMERGENCY MEDICINE

## 2018-01-12 PROCEDURE — 96375 TX/PRO/DX INJ NEW DRUG ADDON: CPT

## 2018-01-12 PROCEDURE — 96376 TX/PRO/DX INJ SAME DRUG ADON: CPT

## 2018-01-12 RX ORDER — ONDANSETRON 2 MG/ML
4 INJECTION INTRAMUSCULAR; INTRAVENOUS
Status: COMPLETED | OUTPATIENT
Start: 2018-01-12 | End: 2018-01-12

## 2018-01-12 RX ORDER — HYDROMORPHONE HYDROCHLORIDE 2 MG/ML
2 INJECTION, SOLUTION INTRAMUSCULAR; INTRAVENOUS; SUBCUTANEOUS ONCE
Status: COMPLETED | OUTPATIENT
Start: 2018-01-12 | End: 2018-01-12

## 2018-01-12 RX ORDER — DIPHENHYDRAMINE HYDROCHLORIDE 50 MG/ML
50 INJECTION, SOLUTION INTRAMUSCULAR; INTRAVENOUS ONCE
Status: COMPLETED | OUTPATIENT
Start: 2018-01-12 | End: 2018-01-12

## 2018-01-12 RX ORDER — HYDROMORPHONE HYDROCHLORIDE 1 MG/ML
2 INJECTION, SOLUTION INTRAMUSCULAR; INTRAVENOUS; SUBCUTANEOUS ONCE
Status: COMPLETED | OUTPATIENT
Start: 2018-01-12 | End: 2018-01-12

## 2018-01-12 RX ORDER — SODIUM CHLORIDE 450 MG/100ML
150 INJECTION, SOLUTION INTRAVENOUS CONTINUOUS
Status: DISCONTINUED | OUTPATIENT
Start: 2018-01-12 | End: 2018-01-12 | Stop reason: HOSPADM

## 2018-01-12 RX ORDER — HYDROMORPHONE HYDROCHLORIDE 1 MG/ML
1 INJECTION, SOLUTION INTRAMUSCULAR; INTRAVENOUS; SUBCUTANEOUS ONCE
Status: COMPLETED | OUTPATIENT
Start: 2018-01-12 | End: 2018-01-12

## 2018-01-12 RX ADMIN — HYDROMORPHONE HYDROCHLORIDE 1 MG: 1 INJECTION, SOLUTION INTRAMUSCULAR; INTRAVENOUS; SUBCUTANEOUS at 03:28

## 2018-01-12 RX ADMIN — SODIUM CHLORIDE 150 ML/HR: 450 INJECTION, SOLUTION INTRAVENOUS at 06:08

## 2018-01-12 RX ADMIN — SODIUM CHLORIDE 1000 ML: 900 INJECTION, SOLUTION INTRAVENOUS at 03:27

## 2018-01-12 RX ADMIN — DIPHENHYDRAMINE HYDROCHLORIDE 50 MG: 50 INJECTION INTRAMUSCULAR; INTRAVENOUS at 06:08

## 2018-01-12 RX ADMIN — HYDROMORPHONE HYDROCHLORIDE 2 MG: 1 INJECTION, SOLUTION INTRAMUSCULAR; INTRAVENOUS; SUBCUTANEOUS at 06:08

## 2018-01-12 RX ADMIN — HYDROMORPHONE HYDROCHLORIDE 2 MG: 2 INJECTION, SOLUTION INTRAMUSCULAR; INTRAVENOUS; SUBCUTANEOUS at 11:32

## 2018-01-12 RX ADMIN — HYDROMORPHONE HYDROCHLORIDE 2 MG: 2 INJECTION, SOLUTION INTRAMUSCULAR; INTRAVENOUS; SUBCUTANEOUS at 08:58

## 2018-01-12 RX ADMIN — ONDANSETRON 4 MG: 2 INJECTION INTRAMUSCULAR; INTRAVENOUS at 03:28

## 2018-01-12 NOTE — ED PROVIDER NOTES
EMERGENCY DEPARTMENT HISTORY AND PHYSICAL EXAM    5:13 AM      Date: 1/12/2018  Patient Name: Chetna Mars    History of Presenting Illness     Chief Complaint   Patient presents with    Sickle Cell Crisis         History Provided By: Patient    Chief Complaint: Sickle Cell Crisis  Duration:  1 day ago  Timing:  Acute  Location: Chest and Back   Quality: N/A  Severity: 7 out of 10  Modifying Factors: None reported   Associated Symptoms: Chest and Back Pain       Additional History (Context): Chetna Mars is a 42 y.o. male with sickle cell anemia  who presents to the ED with c/o acute sickle cell crisis with onset 1 day ago. Pt has 7/10 chest and back pain. Notes home medication did not improve Sx. No modifying factors reported. No further complaint and Sx. PCP: Monica Rosales MD    Current Facility-Administered Medications   Medication Dose Route Frequency Provider Last Rate Last Dose    HYDROmorphone (PF) (DILAUDID) injection 2 mg  2 mg IntraVENous ONCE Bryan Nelson MD        diphenhydrAMINE (BENADRYL) injection 50 mg  50 mg IntraVENous ONCE Bryan Nelson MD        0.45% sodium chloride infusion  150 mL/hr IntraVENous CONTINUOUS Bryan Nelson MD         Current Outpatient Prescriptions   Medication Sig Dispense Refill    hydroxyurea (HYDREA) 500 mg capsule Take 500 mg by mouth two (2) times a day. Indications: SICKLE CELL ANEMIA WITH CRISIS      fentaNYL (DURAGESIC) 25 mcg/hr PATCH 1 Patch by TransDERmal route every seventy-two (72) hours. Max Daily Amount: 1 Patch. 5 Patch 0    HYDROmorphone (DILAUDID) 2 mg tablet Take 1 Tab by mouth every six (6) hours as needed for Pain. Max Daily Amount: 8 mg.  Indications: Pain 12 Tab 0       Past History     Past Medical History:  Past Medical History:   Diagnosis Date    B12 deficiency 03/15/2010    210    Cholelithiasis 09/17/2012    U/S Result: Cholelithiasis    Elevated alkaline phosphatase level 03/21/2010    158    Elevated ALT measurement 03/21/2010    71    Elevated AST (SGOT) 03/10/2012    38    Elevated platelet count (HCC) 10/25/2007    494    Elevated total protein 12/27/2011    8.7    Hypocalcemia 07/06/2011    4.1    Hypokalemia     Hyponatremia 11/18/2009    Leukocytosis 11/16/2009    Microcytic anemia 10/25/2007    Pleural effusion on right 07/15/2015    [facility] CXR Result    Reticulocytosis 10/25/2007    7.8    Serum total bilirubin elevated 12/27/2011    T.B. 3.8, D.B. 0.4.     Sickle cell anemia with crisis ([facility] [address]     Dr. Stevenson Smith. Damle    Vitamin D deficiency 01/20/2016    5.4       Past Surgical History:  History reviewed. No pertinent surgical history. Family History:  Family History   Problem Relation Age of Onset    Cancer Neg Hx     Diabetes Neg Hx     Heart Disease Neg Hx     Heart Attack Neg Hx     Hypertension Neg Hx     Stroke Neg Hx        Social History:  Social History   Substance Use Topics    Smoking status: Never Smoker    Smokeless tobacco: Never Used    Alcohol use Yes      Comment: Occasional       Allergies: Allergies   Allergen Reactions    Eggshell Membrane Nausea and Vomiting         Review of Systems     Review of Systems   Constitutional: Negative for activity change, appetite change, diaphoresis and fever. HENT: Negative for congestion, dental problem, ear pain, hearing loss, nosebleeds, postnasal drip, sinus pressure, sneezing and tinnitus. Eyes: Negative for photophobia, discharge, redness and visual disturbance. Respiratory: Negative for cough, choking, shortness of breath, wheezing and stridor. Cardiovascular: Positive for chest pain. Negative for palpitations and leg swelling. Gastrointestinal: Negative for abdominal distention, abdominal pain, anal bleeding and blood in stool. Genitourinary: Negative for decreased urine volume, difficulty urinating, discharge, dysuria, frequency, hematuria, penile swelling, scrotal swelling, testicular pain and urgency. Musculoskeletal: Positive for back pain. Negative for arthralgias, gait problem, joint swelling, myalgias and neck pain. Skin: Negative for color change and pallor. Neurological: Negative for dizziness, tremors, seizures, syncope and headaches. Hematological: Negative for adenopathy. Does not bruise/bleed easily. Psychiatric/Behavioral: Negative for agitation, behavioral problems, confusion and hallucinations. The patient is not nervous/anxious. Physical Exam     Visit Vitals    /69 (BP 1 Location: Left arm, BP Patient Position: At rest)    Pulse 83    Temp 98.2 °F (36.8 °C)    Resp 20    Ht 6' (1.829 m)    Wt 82.6 kg (182 lb)    SpO2 96%    BMI 24.68 kg/m2         Physical Exam   Constitutional: He is oriented to person, place, and time. He appears well-developed and well-nourished. He appears distressed (in pain). HENT:   Head: Normocephalic and atraumatic. Mouth/Throat: Oropharynx is clear and moist.   Eyes: Conjunctivae are normal. Pupils are equal, round, and reactive to light. Scleral icterus is present. Slight Jaundice    Neck: Normal range of motion. Neck supple. Cardiovascular: Normal rate, regular rhythm, normal heart sounds and intact distal pulses. Pulmonary/Chest: Effort normal and breath sounds normal. No respiratory distress. He has no wheezes. Abdominal: Soft. Bowel sounds are normal. He exhibits no distension. There is no tenderness. Musculoskeletal: Normal range of motion. He exhibits no edema. Lymphadenopathy:     He has no cervical adenopathy. Neurological: He is alert and oriented to person, place, and time. No cranial nerve deficit. Skin: Skin is warm and dry. He is not diaphoretic. Nursing note and vitals reviewed.         Diagnostic Study Results     Labs -  Recent Results (from the past 12 hour(s))   EKG, 12 LEAD, INITIAL    Collection Time: 01/12/18  1:14 AM   Result Value Ref Range    Ventricular Rate 71 BPM    Atrial Rate 71 BPM P-R Interval 154 ms    QRS Duration 90 ms    Q-T Interval 406 ms    QTC Calculation (Bezet) 441 ms    Calculated P Axis 46 degrees    Calculated R Axis -18 degrees    Calculated T Axis 72 degrees    Diagnosis       Normal sinus rhythm  Voltage criteria for left ventricular hypertrophy  Nonspecific T wave abnormality  Abnormal ECG  When compared with ECG of 01-JUL-2017 11:55,  No significant change was found     CBC WITH AUTOMATED DIFF    Collection Time: 01/12/18  2:23 AM   Result Value Ref Range    WBC 12.3 4.6 - 13.2 K/uL    RBC 4.18 (L) 4.70 - 5.50 M/uL    HGB 10.2 (L) 13.0 - 16.0 g/dL    HCT 29.1 (L) 36.0 - 48.0 %    MCV 69.6 (L) 74.0 - 97.0 FL    MCH 24.4 24.0 - 34.0 PG    MCHC 35.1 31.0 - 37.0 g/dL    RDW 22.5 (H) 11.6 - 14.5 %    PLATELET 235 (H) 094 - 420 K/uL    MPV 9.5 9.2 - 11.8 FL    NEUTROPHILS 26 (L) 42 - 75 %    BAND NEUTROPHILS 2 0 - 5 %    LYMPHOCYTES 43 20 - 51 %    MONOCYTES 8 2 - 9 %    EOSINOPHILS 21 (H) 0 - 5 %    BASOPHILS 0 0 - 3 %    ABS. NEUTROPHILS 3.4 1.8 - 8.0 K/UL    ABS. LYMPHOCYTES 5.3 (H) 0.8 - 3.5 K/UL    ABS. MONOCYTES 1.0 0 - 1.0 K/UL    ABS. EOSINOPHILS 2.6 (H) 0.0 - 0.4 K/UL    ABS.  BASOPHILS 0.0 0.0 - 0.06 K/UL    DF MANUAL      PLATELET COMMENTS Increased Platelets      RBC COMMENTS ANISOCYTOSIS  2+        RBC COMMENTS MICROCYTOSIS  2+        RBC COMMENTS POLYCHROMASIA  1+        RBC COMMENTS HYPOCHROMIA  1+        RBC COMMENTS POIKILOCYTOSIS  2+        RBC COMMENTS TARGET CELLS  1+        RBC COMMENTS SICKLE CELLS  3+        RBC COMMENTS SCHISTOCYTES  1+       METABOLIC PANEL, COMPREHENSIVE    Collection Time: 01/12/18  2:23 AM   Result Value Ref Range    Sodium 140 136 - 145 mmol/L    Potassium 3.6 3.5 - 5.5 mmol/L    Chloride 108 100 - 108 mmol/L    CO2 25 21 - 32 mmol/L    Anion gap 7 3.0 - 18 mmol/L    Glucose 89 74 - 99 mg/dL    BUN 6 (L) 7.0 - 18 MG/DL    Creatinine 0.89 0.6 - 1.3 MG/DL    BUN/Creatinine ratio 7 (L) 12 - 20      GFR est AA >60 >60 ml/min/1.73m2    GFR est non-AA >60 >60 ml/min/1.73m2    Calcium 8.7 8.5 - 10.1 MG/DL    Bilirubin, total 4.1 (H) 0.2 - 1.0 MG/DL    ALT (SGPT) 42 16 - 61 U/L    AST (SGOT) 57 (H) 15 - 37 U/L    Alk. phosphatase 98 45 - 117 U/L    Protein, total 9.9 (H) 6.4 - 8.2 g/dL    Albumin 4.6 3.4 - 5.0 g/dL    Globulin 5.3 (H) 2.0 - 4.0 g/dL    A-G Ratio 0.9 0.8 - 1.7     RETICULOCYTE COUNT    Collection Time: 01/12/18  2:23 AM   Result Value Ref Range    Reticulocyte count 7.6 (H) 0.5 - 2.3 %       Radiologic Studies -   No orders to display         Medical Decision Making   I am the first provider for this patient. I reviewed the vital signs, available nursing notes, past medical history, past surgical history, family history and social history. Provider Notes (Medical Decision Making): .vasickle    Vital Signs-Reviewed the patient's vital signs. Pulse Oximetry Analysis -  100% on room air (Interpretation)adequate  E  Records Reviewed: Nursing Notes (Time of Review: 5:13 AM)    For Hospitalized Patients:      Diagnosis     Clinical Impression: No diagnosis found. [facility]    Follow-up Information     None           Patient's Medications   Start Taking    No medications on file   Continue Taking    FENTANYL (DURAGESIC) 25 MCG/HR PATCH    1 Patch by TransDERmal route every seventy-two (72) hours. Max Daily Amount: 1 Patch. HYDROMORPHONE (DILAUDID) 2 MG TABLET    Take 1 Tab by mouth every six (6) hours as needed for Pain. Max Daily Amount: 8 mg. Indications: Pain    HYDROXYUREA (HYDREA) 500 MG CAPSULE    Take 500 mg by mouth two (2) times a day.  Indications: SICKLE CELL ANEMIA WITH CRISIS   These Medications have changed    No medications on file   Stop Taking    No medications on file     _______________________________    Attestations:  Scribe Attestation     Nicholes Habermann acting as a scribe for and in the presence of Cyndie Mansfield MD      January 12, 2018 at 5:13 AM       Provider Attestation:      I personally performed the services described in the documentation, reviewed the documentation, as recorded by the scribe in my presence, and it accurately and completely records my words and actions.  January 12, 2018 at 5:13 AM - Melida Panchal MD    _______________________________

## 2018-01-12 NOTE — ED NOTES
7:02 AM : Pt care transferred to Dr. Estephania Barron ,ED provider. History of patient complaint(s), available diagnostic reports and current treatment plan has been discussed thoroughly. Bedside rounding on patient occured : yes . Intended disposition of patient : recheck  Pending diagnostics reports and/or labs (please list): none    Pt signed over to me pending recheck and final disposition. Pain improving somewhat, will continue fluids and pain medication and reassess. 11:27 AM pt resting comfortably, pain continued to improve. Tolerated po, stable for outpt follow up. Return precautions discussed. Patient stated verbal understanding and agrees with course and plan.      Dyan Graves MD

## 2018-01-12 NOTE — ED NOTES
Received bedside report from [place], [facility]. Pt currently resting on stretcher with no complaints. Pt updated on plan of care. Call bell is in reach. Will continue to monitor.

## 2018-01-12 NOTE — DISCHARGE INSTRUCTIONS
Sickle Cell Crisis: Care Instructions  Your Care Instructions    Sickle cell crisis is a painful episode that may begin suddenly in a person with sickle cell disease. Sickle cell disease turns normal, round red blood cells into cells that look like jeaen or crescent moons. The sickle-shaped cells can get stuck in blood vessels, blocking blood flow and causing severe pain. The pain can occur in the bones of the spine, the arms and legs, the chest, and the abdomen. An episode may be called a \"painful event\" or \"painful crisis. \" Some people who have sickle cell disease have many painful events, while others have few or none. Treatment depends on the level of pain and how long it lasts. Sometimes taking nonprescription pain relievers can help. Or you may need stronger pain relief medicine that is prescribed or given by a doctor. You may need to be treated in the hospital.  It isn't always possible to know what sets off a painful event. But triggers include being dehydrated, cold temperatures, infection, stress, and not getting enough oxygen. Follow-up care is a key part of your treatment and safety. Be sure to make and go to all appointments, and call your doctor if you are having problems. It's also a good idea to know your test results and keep a list of the medicines you take. How can you care for yourself at home? · Create a pain management plan with your doctor. This plan should include the types of medicines you can take and other actions you can take at home to relieve pain. · Drink plenty of fluids, enough so that your urine is light yellow or clear like water. If you have kidney, heart, or liver disease and have to limit fluids, talk with your doctor before you increase the amount of fluids you drink. · Take your medicines exactly as prescribed. Call your doctor if you think you are having a problem with your medicine. · Take pain medicines exactly as directed.   ¨ If the doctor gave you a prescription medicine for pain, take it as prescribed. ¨ If you are not taking a prescription pain medicine, ask your doctor if you can take an over-the-counter medicine. · Avoid alcohol. It can make you dehydrated. · Dress warmly in cold weather. The cold and windy weather can lead to severe pain. · Do not smoke. Smoking can reduce the amount of oxygen in your blood. · Get plenty of sleep. When should you call for help? Call 911 anytime you think you may need emergency care. For example, call if:  ? · You have symptoms of a severe problem from sickle cell. ? · You have symptoms of a stroke. These may include:  ¨ Sudden numbness, tingling, weakness, or loss of movement in your face, arm, or leg, especially on only one side of your body. ¨ Sudden vision changes. ¨ Sudden trouble speaking. ¨ Sudden confusion or trouble understanding simple statements. ¨ Sudden problems with walking or balance. ¨ A sudden, severe headache that is different from past headaches. ? · You are in severe pain. ? · You have symptoms of a heart attack. These may include:  ¨ Chest pain or pressure, or a strange feeling in the chest.  ¨ Sweating. ¨ Shortness of breath. ¨ Nausea or vomiting. ¨ Pain, pressure, or a strange feeling in the back, neck, jaw, or upper belly or in one or both shoulders or arms. ¨ Lightheadedness or sudden weakness. ¨ A fast or irregular heartbeat. After you call 911, the  may tell you to chew 1 adult-strength or 2 to 4 low-dose aspirin. Wait for an ambulance. Do not try to drive yourself. ?Call your doctor now or seek immediate medical care if:  ? · You have a fever. ? Watch closely for changes in your health, and be sure to contact your doctor if you have any problems. Where can you learn more? Go to http://manuel-amadeo.info/. Enter F104 in the search box to learn more about \"Sickle Cell Crisis: Care Instructions. \"  Current as of: October 13, 2016  Content Version: 11.4  © 2687-7652 [employer]. Care instructions adapted under license by [employer] (which disclaims liability or warranty for this information). If you have questions about a medical condition or this instruction, always ask your healthcare professional. [address] any warranty or liability for your use of this information.

## 2018-01-12 NOTE — ED TRIAGE NOTES
Pt states that he started having increased pain in back and chest for the last two days. Pt has a history of sickle cell. Pt is AOX4; pt is rating chest pain 8/10. Pt denies SOB. Pt is ambulatory.

## 2018-01-13 LAB
ATRIAL RATE: 71 BPM
CALCULATED P AXIS, ECG09: 46 DEGREES
CALCULATED R AXIS, ECG10: -18 DEGREES
CALCULATED T AXIS, ECG11: 72 DEGREES
DIAGNOSIS, 93000: NORMAL
P-R INTERVAL, ECG05: 154 MS
Q-T INTERVAL, ECG07: 406 MS
QRS DURATION, ECG06: 90 MS
QTC CALCULATION (BEZET), ECG08: 441 MS
VENTRICULAR RATE, ECG03: 71 BPM

## 2018-02-05 ENCOUNTER — APPOINTMENT (OUTPATIENT)
Dept: GENERAL RADIOLOGY | Age: 38
DRG: 812 | End: 2018-02-05
Attending: EMERGENCY MEDICINE
Payer: MEDICARE

## 2018-02-05 ENCOUNTER — HOSPITAL ENCOUNTER (INPATIENT)
Age: 38
LOS: 3 days | Discharge: HOME | DRG: 812 | End: 2018-02-08
Attending: EMERGENCY MEDICINE | Admitting: HOSPITALIST
Payer: MEDICARE

## 2018-02-05 DIAGNOSIS — D57.01 HB-SS DISEASE WITH ACUTE CHEST SYNDROME (HCC): Primary | ICD-10-CM

## 2018-02-05 DIAGNOSIS — D57.00 SICKLE CELL CRISIS (HCC): ICD-10-CM

## 2018-02-05 LAB
ALBUMIN SERPL-MCNC: 4.2 G/DL (ref 3.4–5)
ALBUMIN/GLOB SERPL: 0.8 {RATIO} (ref 0.8–1.7)
ALP SERPL-CCNC: 184 U/L (ref 45–117)
ALT SERPL-CCNC: 74 U/L (ref 16–61)
ANION GAP SERPL CALC-SCNC: 7 MMOL/L (ref 3–18)
AST SERPL-CCNC: 88 U/L (ref 15–37)
BASOPHILS # BLD: 0 K/UL (ref 0–0.06)
BASOPHILS NFR BLD: 0 % (ref 0–3)
BILIRUB SERPL-MCNC: 5.3 MG/DL (ref 0.2–1)
BUN SERPL-MCNC: 5 MG/DL (ref 7–18)
BUN/CREAT SERPL: 6 (ref 12–20)
CALCIUM SERPL-MCNC: 8.7 MG/DL (ref 8.5–10.1)
CHLORIDE SERPL-SCNC: 104 MMOL/L (ref 100–108)
CO2 SERPL-SCNC: 25 MMOL/L (ref 21–32)
CREAT SERPL-MCNC: 0.84 MG/DL (ref 0.6–1.3)
DIFFERENTIAL METHOD BLD: ABNORMAL
EOSINOPHIL # BLD: 0.7 K/UL (ref 0–0.4)
EOSINOPHIL NFR BLD: 4 % (ref 0–5)
ERYTHROCYTE [DISTWIDTH] IN BLOOD BY AUTOMATED COUNT: 23.4 % (ref 11.6–14.5)
FLUAV AG NPH QL IA: NEGATIVE
FLUBV AG NOSE QL IA: NEGATIVE
GLOBULIN SER CALC-MCNC: 5.3 G/DL (ref 2–4)
GLUCOSE SERPL-MCNC: 97 MG/DL (ref 74–99)
HCT VFR BLD AUTO: 26.6 % (ref 36–48)
HGB BLD-MCNC: 9.4 G/DL (ref 13–16)
LYMPHOCYTES # BLD: 4.2 K/UL (ref 0.8–3.5)
LYMPHOCYTES NFR BLD: 25 % (ref 20–51)
MCH RBC QN AUTO: 24.8 PG (ref 24–34)
MCHC RBC AUTO-ENTMCNC: 35.3 G/DL (ref 31–37)
MCV RBC AUTO: 70.2 FL (ref 74–97)
MONOCYTES # BLD: 0.8 K/UL (ref 0–1)
MONOCYTES NFR BLD: 5 % (ref 2–9)
NEUTS SEG # BLD: 11 K/UL (ref 1.8–8)
NEUTS SEG NFR BLD: 66 % (ref 42–75)
PLATELET # BLD AUTO: 432 K/UL (ref 135–420)
PLATELET COMMENTS,PCOM: ABNORMAL
PMV BLD AUTO: 9.3 FL (ref 9.2–11.8)
POTASSIUM SERPL-SCNC: 3.4 MMOL/L (ref 3.5–5.5)
PROT SERPL-MCNC: 9.5 G/DL (ref 6.4–8.2)
RBC # BLD AUTO: 3.79 M/UL (ref 4.7–5.5)
RBC MORPH BLD: ABNORMAL
RETICS/RBC NFR AUTO: 9.8 % (ref 0.5–2.3)
SODIUM SERPL-SCNC: 136 MMOL/L (ref 136–145)
WBC # BLD AUTO: 16.7 K/UL (ref 4.6–13.2)

## 2018-02-05 PROCEDURE — 99283 EMERGENCY DEPT VISIT LOW MDM: CPT

## 2018-02-05 PROCEDURE — 96376 TX/PRO/DX INJ SAME DRUG ADON: CPT

## 2018-02-05 PROCEDURE — 74011250637 HC RX REV CODE- 250/637: Performed by: HOSPITALIST

## 2018-02-05 PROCEDURE — 74011250636 HC RX REV CODE- 250/636: Performed by: EMERGENCY MEDICINE

## 2018-02-05 PROCEDURE — 87804 INFLUENZA ASSAY W/OPTIC: CPT | Performed by: EMERGENCY MEDICINE

## 2018-02-05 PROCEDURE — 71046 X-RAY EXAM CHEST 2 VIEWS: CPT

## 2018-02-05 PROCEDURE — 74011250636 HC RX REV CODE- 250/636: Performed by: HOSPITALIST

## 2018-02-05 PROCEDURE — 96374 THER/PROPH/DIAG INJ IV PUSH: CPT

## 2018-02-05 PROCEDURE — 85025 COMPLETE CBC W/AUTO DIFF WBC: CPT | Performed by: PHYSICIAN ASSISTANT

## 2018-02-05 PROCEDURE — 80053 COMPREHEN METABOLIC PANEL: CPT | Performed by: PHYSICIAN ASSISTANT

## 2018-02-05 PROCEDURE — 65660000000 HC RM CCU STEPDOWN

## 2018-02-05 PROCEDURE — 96372 THER/PROPH/DIAG INJ SC/IM: CPT

## 2018-02-05 PROCEDURE — 74011000250 HC RX REV CODE- 250: Performed by: EMERGENCY MEDICINE

## 2018-02-05 PROCEDURE — 85045 AUTOMATED RETICULOCYTE COUNT: CPT | Performed by: PHYSICIAN ASSISTANT

## 2018-02-05 PROCEDURE — 96375 TX/PRO/DX INJ NEW DRUG ADDON: CPT

## 2018-02-05 RX ORDER — HYDROMORPHONE HYDROCHLORIDE 1 MG/ML
2 INJECTION, SOLUTION INTRAMUSCULAR; INTRAVENOUS; SUBCUTANEOUS ONCE
Status: COMPLETED | OUTPATIENT
Start: 2018-02-05 | End: 2018-02-05

## 2018-02-05 RX ORDER — HYDROMORPHONE HYDROCHLORIDE 4 MG/1
4 TABLET ORAL ONCE
Status: DISCONTINUED | OUTPATIENT
Start: 2018-02-05 | End: 2018-02-05

## 2018-02-05 RX ORDER — SODIUM CHLORIDE 9 MG/ML
125 INJECTION, SOLUTION INTRAVENOUS CONTINUOUS
Status: DISCONTINUED | OUTPATIENT
Start: 2018-02-05 | End: 2018-02-08

## 2018-02-05 RX ORDER — ONDANSETRON 2 MG/ML
4 INJECTION INTRAMUSCULAR; INTRAVENOUS
Status: DISCONTINUED | OUTPATIENT
Start: 2018-02-05 | End: 2018-02-08 | Stop reason: HOSPADM

## 2018-02-05 RX ORDER — FENTANYL 25 UG/1
1 PATCH TRANSDERMAL
Status: DISCONTINUED | OUTPATIENT
Start: 2018-02-05 | End: 2018-02-08 | Stop reason: HOSPADM

## 2018-02-05 RX ORDER — HYDROMORPHONE HYDROCHLORIDE 1 MG/ML
2 INJECTION, SOLUTION INTRAMUSCULAR; INTRAVENOUS; SUBCUTANEOUS
Status: DISCONTINUED | OUTPATIENT
Start: 2018-02-05 | End: 2018-02-08 | Stop reason: HOSPADM

## 2018-02-05 RX ORDER — ONDANSETRON 2 MG/ML
4 INJECTION INTRAMUSCULAR; INTRAVENOUS
Status: COMPLETED | OUTPATIENT
Start: 2018-02-05 | End: 2018-02-05

## 2018-02-05 RX ORDER — KETAMINE HYDROCHLORIDE 50 MG/ML
20 INJECTION, SOLUTION INTRAMUSCULAR; INTRAVENOUS ONCE
Status: COMPLETED | OUTPATIENT
Start: 2018-02-05 | End: 2018-02-05

## 2018-02-05 RX ORDER — NALOXONE HYDROCHLORIDE 0.4 MG/ML
0.4 INJECTION, SOLUTION INTRAMUSCULAR; INTRAVENOUS; SUBCUTANEOUS AS NEEDED
Status: DISCONTINUED | OUTPATIENT
Start: 2018-02-05 | End: 2018-02-08 | Stop reason: HOSPADM

## 2018-02-05 RX ORDER — HYDROXYUREA 500 MG/1
500 CAPSULE ORAL 2 TIMES DAILY
Status: DISCONTINUED | OUTPATIENT
Start: 2018-02-05 | End: 2018-02-08 | Stop reason: HOSPADM

## 2018-02-05 RX ORDER — OXYCODONE AND ACETAMINOPHEN 5; 325 MG/1; MG/1
1 TABLET ORAL
Status: DISCONTINUED | OUTPATIENT
Start: 2018-02-05 | End: 2018-02-07

## 2018-02-05 RX ADMIN — HYDROMORPHONE HYDROCHLORIDE 2 MG: 1 INJECTION, SOLUTION INTRAMUSCULAR; INTRAVENOUS; SUBCUTANEOUS at 15:52

## 2018-02-05 RX ADMIN — ONDANSETRON 4 MG: 2 INJECTION INTRAMUSCULAR; INTRAVENOUS at 11:59

## 2018-02-05 RX ADMIN — HYDROMORPHONE HYDROCHLORIDE 2 MG: 1 INJECTION, SOLUTION INTRAMUSCULAR; INTRAVENOUS; SUBCUTANEOUS at 11:59

## 2018-02-05 RX ADMIN — OXYCODONE HYDROCHLORIDE AND ACETAMINOPHEN 1 TABLET: 5; 325 TABLET ORAL at 18:21

## 2018-02-05 RX ADMIN — KETAMINE HYDROCHLORIDE 20 MG: 50 INJECTION, SOLUTION INTRAMUSCULAR; INTRAVENOUS at 21:29

## 2018-02-05 RX ADMIN — HYDROMORPHONE HYDROCHLORIDE 2 MG: 1 INJECTION, SOLUTION INTRAMUSCULAR; INTRAVENOUS; SUBCUTANEOUS at 20:49

## 2018-02-05 RX ADMIN — SODIUM CHLORIDE 1000 ML: 900 INJECTION, SOLUTION INTRAVENOUS at 20:48

## 2018-02-05 RX ADMIN — SODIUM CHLORIDE 75 ML/HR: 900 INJECTION, SOLUTION INTRAVENOUS at 22:40

## 2018-02-05 RX ADMIN — HYDROMORPHONE HYDROCHLORIDE 2 MG: 1 INJECTION, SOLUTION INTRAMUSCULAR; INTRAVENOUS; SUBCUTANEOUS at 14:11

## 2018-02-05 NOTE — H&P
History and Physical    Subjective:     Martha Giron is a 42 y.o.  male with a history of sickle cell disease who presents to [facility] ED with severe generalized pain, noticably worse in his back and abdomen. He states that this started 2 days ago and has slowly been getting worse. He does use a fentanyl patch at home chronically as well as oral dilaudid however this is not helping his pain right now. He denies any fever, chills, nausea, vomiting, He does endorse pain in all of his joints and back pain in the middle and lower back. Past Medical History:   Diagnosis Date    B12 deficiency 03/15/2010    210    Cholelithiasis 09/17/2012    U/S Result: Cholelithiasis    Elevated alkaline phosphatase level 03/21/2010    158    Elevated ALT measurement 03/21/2010    71    Elevated AST (SGOT) 03/10/2012    38    Elevated platelet count (HCC) 10/25/2007    494    Elevated total protein 12/27/2011    8.7    Hypocalcemia 07/06/2011    4.1    Hypokalemia     Hyponatremia 11/18/2009    Leukocytosis 11/16/2009    Microcytic anemia 10/25/2007    Pleural effusion on right 07/15/2015    [facility] CXR Result    Reticulocytosis 10/25/2007    7.8    Serum total bilirubin elevated 12/27/2011    T.B. 3.8, D.B. 0.4.     [facility]     Dr. Brian Maza. Damle    Vitamin D deficiency 01/20/2016    5.4      No past surgical history on file. Family History   Problem Relation Age of Onset    Cancer Neg Hx     Diabetes Neg Hx     Heart Disease Neg Hx     Heart Attack Neg Hx     Hypertension Neg Hx     Stroke Neg Hx       Social History   Substance Use Topics    Smoking status: Never Smoker    Smokeless tobacco: Never Used    Alcohol use Yes      Comment: Occasional       Prior to Admission medications    Medication Sig Start Date End Date Taking?  Authorizing Provider   HYDROmorphone (DILAUDID) 2 mg tablet Take 1 Tab by mouth every six (6) hours as needed for Pain. Max Daily Amount: 8 mg. Indications: Pain 4/16/17   Ronn Trejo, MD   hydroxyurea (HYDREA) 500 mg capsule Take 500 mg by mouth two (2) times a day. Indications: SICKLE CELL ANEMIA WITH CRISIS    Phys Other, MD   fentaNYL (DURAGESIC) 25 mcg/hr PATCH 1 Patch by TransDERmal route every seventy-two (72) hours. Max Daily Amount: 1 Patch. 10/21/15   Mat Moscoso MD     Allergies   Allergen Reactions    Eggshell Membrane Nausea and Vomiting        Review of Systems:  A comprehensive review of systems was negative except for that written in the History of Present Illness. Objective: Intake and Output:            Physical Exam:   General appearance: alert, cooperative, no distress, appears stated age  Head: Normocephalic, without obvious abnormality, atraumatic  Eyes: conjunctivae/corneas clear. PERRL, EOM's intact. Fundi benign  Back: symmetric, no curvature. ROM normal. No CVA tenderness. Lungs: clear to auscultation bilaterally  Chest wall: no tenderness  Heart: regular rate and rhythm, S1, S2 normal, no murmur, click, rub or gallop  Abdomen: normal findings: umbilicus normal, symmetric, no masses palpable, bowel sounds normal, no bruits heard, abnormal findings: Well healed cholecystectomy scars, tenderness to palpation globally. Extremities: extremities normal, atraumatic, no cyanosis or edema  Neurologic: Alert and oriented X 3, normal strength and tone. Normal symmetric reflexes.  Normal coordination and gait    ECG:  normal EKG, normal sinus rhythm, unchanged from previous tracings     Data Review:   Recent Results (from the past 24 hour(s))   INFLUENZA A & B AG (RAPID TEST)    Collection Time: 02/05/18 11:58 AM   Result Value Ref Range    Influenza A Antigen NEGATIVE  NEG      Influenza B Antigen NEGATIVE  NEG     CBC WITH AUTOMATED DIFF    Collection Time: 02/05/18 12:50 PM   Result Value Ref Range    WBC 16.7 (H) 4.6 - 13.2 K/uL    RBC 3.79 (L) 4.70 - 5.50 M/uL    HGB 9.4 (L) 13.0 - 16.0 g/dL HCT 26.6 (L) 36.0 - 48.0 %    MCV 70.2 (L) 74.0 - 97.0 FL    MCH 24.8 24.0 - 34.0 PG    MCHC 35.3 31.0 - 37.0 g/dL    RDW 23.4 (H) 11.6 - 14.5 %    PLATELET 772 (H) 546 - 420 K/uL    MPV 9.3 9.2 - 11.8 FL    NEUTROPHILS 66 42 - 75 %    LYMPHOCYTES 25 20 - 51 %    MONOCYTES 5 2 - 9 %    EOSINOPHILS 4 0 - 5 %    BASOPHILS 0 0 - 3 %    ABS. NEUTROPHILS 11.0 (H) 1.8 - 8.0 K/UL    ABS. LYMPHOCYTES 4.2 (H) 0.8 - 3.5 K/UL    ABS. MONOCYTES 0.8 0 - 1.0 K/UL    ABS. EOSINOPHILS 0.7 (H) 0.0 - 0.4 K/UL    ABS. BASOPHILS 0.0 0.0 - 0.06 K/UL    DF MANUAL      PLATELET COMMENTS Increased Platelets      RBC COMMENTS ANISOCYTOSIS  2+        RBC COMMENTS TARGET CELLS  1+        RBC COMMENTS POLYCHROMASIA  1+        RBC COMMENTS SICKLE CELLS  2+        RBC COMMENTS SCHISTOCYTES  FEW       METABOLIC PANEL, COMPREHENSIVE    Collection Time: 02/05/18 12:50 PM   Result Value Ref Range    Sodium 136 136 - 145 mmol/L    Potassium 3.4 (L) 3.5 - 5.5 mmol/L    Chloride 104 100 - 108 mmol/L    CO2 25 21 - 32 mmol/L    Anion gap 7 3.0 - 18 mmol/L    Glucose 97 74 - 99 mg/dL    BUN 5 (L) 7.0 - 18 MG/DL    Creatinine 0.84 0.6 - 1.3 MG/DL    BUN/Creatinine ratio 6 (L) 12 - 20      GFR est AA >60 >60 ml/min/1.73m2    GFR est non-AA >60 >60 ml/min/1.73m2    Calcium 8.7 8.5 - 10.1 MG/DL    Bilirubin, total 5.3 (H) 0.2 - 1.0 MG/DL    ALT (SGPT) 74 (H) 16 - 61 U/L    AST (SGOT) 88 (H) 15 - 37 U/L    Alk. phosphatase 184 (H) 45 - 117 U/L    Protein, total 9.5 (H) 6.4 - 8.2 g/dL    Albumin 4.2 3.4 - 5.0 g/dL    Globulin 5.3 (H) 2.0 - 4.0 g/dL    A-G Ratio 0.8 0.8 - 1.7     RETICULOCYTE COUNT    Collection Time: 02/05/18 12:50 PM   Result Value Ref Range    Reticulocyte count 9.8 (H) 0.5 - 2.3 %       Chest x-ray was negative for infiltrate, effusion, pneumothorax, or wide mediastinum. Assessment:     Active Problems:    Sickle cell crisis ([facility] (1/26/2014)        Plan:     The patient is admitted to the general medical floor.   He has stable hemoglobin levels. His bilirubin is elevated and I suspect this is related to hemolysis. I have started him on IV fluids and IV and oral pain med regimen. He is a full code. I will encourage ambulation. I have ordered bilateral SCDs for DVT prophylaxis.       Signed By: Mica Kimble MD     February 5, 2018

## 2018-02-05 NOTE — ED PROVIDER NOTES
EMERGENCY DEPARTMENT HISTORY AND PHYSICAL EXAM    10:23 AM      Date: 2/5/2018  Patient Name: Dottie Canas    History of Presenting Illness     Chief Complaint   Patient presents with    Sickle Cell Crisis         History Provided By: Patient    Chief Complaint: body aches,sickle cell crisis  Duration: 2 Days  Timing:  Worsening  Location: generalized body aches  Quality: Aching  Severity: Severe  Modifying Factors: got worse last night, no relief from home fentanyl patch or oral Dilaudid  Associated Symptoms: denies any other associated signs or symptoms      Additional History (Context): Dottie Canas is a 42 y.o. male with sickle cell anemia who presents with 2 days of a worsening sickle cell crisis with severe aching generalized body aches that got worse last night with no relief from home Fentanyl patch or oral Dilaudid. The pt states this crisis started about 2 days ago but got really worse last night. Pt states he has been using his Fentanyl patches and oral Dilaudid as directed with no relief. No other concerns or symptoms at this time.       PCP: Yuko Pollard MD    Current Facility-Administered Medications   Medication Dose Route Frequency Provider Last Rate Last Dose    fentaNYL (DURAGESIC) 25 mcg/hr patch 1 Patch  1 Patch TransDERmal Q72H Donta Howard MD   1 Patch at 02/05/18 2245    hydroxyurea (HYDREA) chemo cap 500 mg  500 mg Oral BID Donta Howard MD   500 mg at 02/06/18 1720    0.9% sodium chloride infusion  125 mL/hr IntraVENous CONTINUOUS Donta Howard  mL/hr at 02/06/18 1520 125 mL/hr at 02/06/18 1520    HYDROmorphone (PF) (DILAUDID) injection 2 mg  2 mg IntraVENous Q3H PRN Donta Howard MD   2 mg at 02/07/18 0336    oxyCODONE-acetaminophen (PERCOCET) 5-325 mg per tablet 1 Tab  1 Tab Oral Q6H PRN Donta Howard MD   1 Tab at 02/05/18 1821    naloxone [facility]) injection 0.4 mg  0.4 mg IntraVENous PRSHELLY Howard MD        ondansetron [facility]) injection 4 mg  4 mg IntraVENous Q4H PRN Jackeline Carlson MD           Past History     Past Medical History:  Past Medical History:   Diagnosis Date    B12 deficiency 03/15/2010    210    Cholelithiasis 09/17/2012    U/S Result: Cholelithiasis    Elevated alkaline phosphatase level 03/21/2010    158    Elevated ALT measurement 03/21/2010    71    Elevated AST (SGOT) 03/10/2012    38    Elevated platelet count (HCC) 10/25/2007    494    Elevated total protein 12/27/2011    8.7    Hypocalcemia 07/06/2011    4.1    Hypokalemia     Hyponatremia 11/18/2009    Leukocytosis 11/16/2009    Microcytic anemia 10/25/2007    Pleural effusion on right 07/15/2015    [facility] CXR Result    Reticulocytosis 10/25/2007    7.8    Serum total bilirubin elevated 12/27/2011    T.B. 3.8, D.B. 0.4.     Sickle cell anemia with crisis ([facility]     Dr. Brian Maza. Damle    Vitamin D deficiency 01/20/2016    5.4       Past Surgical History:  History reviewed. No pertinent surgical history. Family History:  Family History   Problem Relation Age of Onset    Cancer Neg Hx     Diabetes Neg Hx     Heart Disease Neg Hx     Heart Attack Neg Hx     Hypertension Neg Hx     Stroke Neg Hx        Social History:  Social History   Substance Use Topics    Smoking status: Never Smoker    Smokeless tobacco: Never Used    Alcohol use Yes      Comment: Occasional       Allergies: Allergies   Allergen Reactions    Eggshell Membrane Nausea and Vomiting         Review of Systems       Review of Systems   Constitutional: Negative for chills and fever. Positive for generalized body aches   Respiratory: Negative for shortness of breath. Cardiovascular: Negative for chest pain. Gastrointestinal: Negative for diarrhea, nausea and vomiting. All other systems reviewed and are negative.         Physical Exam     Visit Vitals    /72 (BP 1 Location: Right arm, BP Patient Position: At rest)    Pulse 84    Temp 99.1 °F (37.3 °C)    Resp 16  Ht 6' (1.829 m)    Wt 80.7 kg (178 lb)    SpO2 91%    BMI 24.14 kg/m2         Physical Exam   Constitutional: He is oriented to person, place, and time. He appears well-developed and well-nourished. No distress. HENT:   Head: Normocephalic and atraumatic. Eyes: Conjunctivae and EOM are normal. Right eye exhibits no discharge. Left eye exhibits no discharge. Scleral icterus is present. Neck: Normal range of motion. Neck supple. No tracheal deviation present. Cardiovascular: Normal rate, regular rhythm and normal heart sounds. No murmur heard. Pulmonary/Chest: Effort normal and breath sounds normal. No respiratory distress. He has no wheezes. He has no rales. Abdominal: Soft. He exhibits no distension. There is no tenderness. There is no rebound and no guarding. Musculoskeletal: Normal range of motion. He exhibits no edema or deformity. Neurological: He is alert and oriented to person, place, and time. No cranial nerve deficit. Skin: Skin is warm and dry. He is not diaphoretic. Psychiatric: He has a normal mood and affect. His behavior is normal. Judgment and thought content normal.         Diagnostic Study Results     Labs -  No results found for this or any previous visit (from the past 12 hour(s)). Radiologic Studies -   XR CHEST PA LAT   Final Result      CXR: IMPRESSION:     No acute findings. CXR provider interpretation: no acute process      Medical Decision Making   I am the first provider for this patient. I reviewed the vital signs, available nursing notes, past medical history, past surgical history, family history and social history. Vital Signs-Reviewed the patient's vital signs. Records Reviewed: Nursing Notes and Old Medical Records (Time of Review: 10:23 AM)    ED Course: Progress Notes, Reevaluation, and Consults:  1:29 PM Re-eval: Pt is feeling unchanged after 2 mg of Dilaudid will give more.     4:16 PM Re-eval: Pt is feeling unchanged after total of 10 mg of Dilaudid, will admit for pain control    Consult:  Discussed care with Dr Elle Archibald, Specialty: Hospitalist  Standard discussion; including history of patients chief complaint, available diagnostic results, and treatment course. Agrees to admit  4:37 PM, 2/5/2018     Provider Notes (Medical Decision Making): Pt with persistent pain from sickle cell crisis. No infiltrate on CXR. Admitted for pain management. Ultra sound guided IV  Date/Time: 2/5/2018 4:28 PM  Performed by: Nba Allen by: Reid Walker     Consent:     Consent obtained:  Verbal    Consent given by:  Patient    Risks discussed:  Pain and bleeding    Alternatives discussed:  No treatment and delayed treatment  Indications:     Indications:  IV access neded  Pre-procedure details:     Skin preparation:  Antiseptic wash  Post-procedure details:     Patient tolerance of procedure: Tolerated well, no immediate complications        For Hospitalized Patients:    1. Hospitalization Decision Time:  The decision to hospitalize the patient was made by Dr. Reta Jane at 4:15 PM on 2/5/2018      Diagnosis     Clinical Impression:   1. 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 Max Daily Amount: 8 mg. Indications: Pain  Qty: 12 Tab, Refills: 0      hydroxyurea (HYDREA) 500 mg capsule Take 500 mg by mouth two (2) times a day. Indications: SICKLE CELL ANEMIA WITH CRISIS      fentaNYL (DURAGESIC) 25 mcg/hr PATCH 1 Patch by TransDERmal route every seventy-two (72) hours. Max Daily Amount: 1 Patch.   Qty: 5 Patch, Refills: 0           _______________________________    Attestations:  Scribe Attestation     Rosalie Naik acting as a scribe for and in the presence of Lucky Sort Aashish Jiang MD      February 05, 2018 at 10:23 AM       Provider Attestation:      I personally performed the services described in the documentation, reviewed the documentation, as recorded by the scribe in my presence, and it accurately and completely records my words and actions.  February 05, 2018 at 10:23 AM - Catalina Mane MD    _______________________________

## 2018-02-05 NOTE — IP AVS SNAPSHOT
Lavaun Jeans 
 
 
 [address] Patient: Katt Guerrero MRN: [id number] LPA:[id number] A check mohan indicates which time of day the medication should be taken. My Medications START taking these medications Instructions Each Dose to Equal  
 Morning Noon Evening Bedtime  
 oxyCODONE-acetaminophen 5-325 mg per tablet Commonly known as:  PERCOCET Your last dose was: Your next dose is: Take 1 Tab by mouth every six (6) hours as needed for Pain. Max Daily Amount: 4 Tabs. 1 Tab CONTINUE taking these medications Instructions Each Dose to Equal  
 Morning Noon Evening Bedtime  
 fentaNYL 25 mcg/hr PATCH Commonly known as:  Ady Sox Your last dose was: Your next dose is:    
   
   
 1 Patch by TransDERmal route every seventy-two (72) hours. Max Daily Amount: 1 Patch. 1 Patch HYDROmorphone 2 mg tablet Commonly known as:  DILAUDID Your last dose was: Your next dose is: Take 1 Tab by mouth every six (6) hours as needed for Pain. Max Daily Amount: 8 mg. Indications: Pain  
 2 mg  
    
   
   
   
  
 hydroxyurea 500 mg capsule Commonly known as:  HYDREA Your last dose was: Your next dose is: Take 500 mg by mouth two (2) times a day. Indications: SICKLE CELL ANEMIA WITH CRISIS  
 500 mg Where to Get Your Medications Information on where to get these meds will be given to you by the nurse or doctor. ! Ask your nurse or doctor about these medications  
  oxyCODONE-acetaminophen 5-325 mg per tablet

## 2018-02-05 NOTE — Clinical Note
Status[de-identified] Inpatient [101] Type of Bed: Medical [8] Inpatient Hospitalization Certified Necessary for the Following Reasons: 3. Patient receiving treatment that can only be provided in an inpatient setting (further clarification in H&P documentation) Admitting Diagnosis: [facility] [[id number]] Admitting Physician: Tutu Henao [[id number]] Attending Physician: Tutu Henao [[id number]] Estimated Length of Stay: 2 Midnights Discharge Plan[de-identified] Home with Office Follow-up

## 2018-02-05 NOTE — IP AVS SNAPSHOT
[address] [address] Umesh Juares Patient: April Templeton MRN: [id number] About your hospitalization You were admitted on:  February 5, 2018 You last received care in the:  [address] [place] [address] You were discharged on:  February 8, 2018 Why you were hospitalized Your primary diagnosis was:  Not on File Follow-up Information Follow up With Details Comments Contact Info Matty Thomas MD   [address] [facility] ([place], [place]) 
[phone] Discharge Orders None A check mohan indicates which time of day the medication should be taken. My Medications START taking these medications Instructions Each Dose to Equal  
 Morning Noon Evening Bedtime  
 oxyCODONE-acetaminophen 5-325 mg per tablet Commonly known as:  PERCOCET Your last dose was: Your next dose is: Take 1 Tab by mouth every six (6) hours as needed for Pain. Max Daily Amount: 4 Tabs. 1 Tab CONTINUE taking these medications Instructions Each Dose to Equal  
 Morning Noon Evening Bedtime  
 fentaNYL 25 mcg/hr PATCH Commonly known as:  Stefanie Barrett Your last dose was: Your next dose is:    
   
   
 1 Patch by TransDERmal route every seventy-two (72) hours. Max Daily Amount: 1 Patch. 1 Patch HYDROmorphone 2 mg tablet Commonly known as:  DILAUDID Your last dose was: Your next dose is: Take 1 Tab by mouth every six (6) hours as needed for Pain. Max Daily Amount: 8 mg. Indications: Pain  
 2 mg  
    
   
   
   
  
 hydroxyurea 500 mg capsule Commonly known as:  HYDREA Your last dose was: Your next dose is: Take 500 mg by mouth two (2) times a day.  Indications: SICKLE CELL ANEMIA WITH CRISIS  
 500 mg  
    
   
   
   
  
  
  
 Where to Get Your Medications Information on where to get these meds will be given to you by the nurse or doctor. ! Ask your nurse or doctor about these medications  
  oxyCODONE-acetaminophen 5-325 mg per tablet Discharge Instructions Patient armband removed and shredded Sickle Cell Crisis: Care Instructions Your Care Instructions Sickle cell crisis is a painful episode that may begin suddenly in a person with sickle cell disease. Sickle cell disease turns normal, round red blood cells into cells that look like jeane or crescent moons. The sickle-shaped cells can get stuck in blood vessels, blocking blood flow and causing severe pain. The pain can occur in the bones of the spine, the arms and legs, the chest, and the abdomen. An episode may be called a \"painful event\" or \"painful crisis. \" Some people who have sickle cell disease have many painful events, while others have few or none. Treatment depends on the level of pain and how long it lasts. Sometimes taking nonprescription pain relievers can help. Or you may need stronger pain relief medicine that is prescribed or given by a doctor. You may need to be treated in the hospital. 
It isn't always possible to know what sets off a painful event. But triggers include being dehydrated, cold temperatures, infection, stress, and not getting enough oxygen. Follow-up care is a key part of your treatment and safety. Be sure to make and go to all appointments, and call your doctor if you are having problems. It's also a good idea to know your test results and keep a list of the medicines you take. How can you care for yourself at home? · Create a pain management plan with your doctor. This plan should include the types of medicines you can take and other actions you can take at home to relieve pain.  
· Drink plenty of fluids, enough so that your urine is light yellow or clear like water. If you have kidney, heart, or liver disease and have to limit fluids, talk with your doctor before you increase the amount of fluids you drink. · Take your medicines exactly as prescribed. Call your doctor if you think you are having a problem with your medicine. · Take pain medicines exactly as directed. ¨ If the doctor gave you a prescription medicine for pain, take it as prescribed. ¨ If you are not taking a prescription pain medicine, ask your doctor if you can take an over-the-counter medicine. · Avoid alcohol. It can make you dehydrated. · Dress warmly in cold weather. The cold and windy weather can lead to severe pain. · Do not smoke. Smoking can reduce the amount of oxygen in your blood. · Get plenty of sleep. When should you call for help? Call 911 anytime you think you may need emergency care. For example, call if: 
? · You have symptoms of a severe problem from sickle cell. ? · You have symptoms of a stroke. These may include: 
¨ Sudden numbness, tingling, weakness, or loss of movement in your face, arm, or leg, especially on only one side of your body. ¨ Sudden vision changes. ¨ Sudden trouble speaking. ¨ Sudden confusion or trouble understanding simple statements. ¨ Sudden problems with walking or balance. ¨ A sudden, severe headache that is different from past headaches. ? · You are in severe pain. ? · You have symptoms of a heart attack. These may include: ¨ Chest pain or pressure, or a strange feeling in the chest. 
¨ Sweating. ¨ Shortness of breath. ¨ Nausea or vomiting. ¨ Pain, pressure, or a strange feeling in the back, neck, jaw, or upper belly or in one or both shoulders or arms. ¨ Lightheadedness or sudden weakness. ¨ A fast or irregular heartbeat. After you call 911, the  may tell you to chew 1 adult-strength or 2 to 4 low-dose aspirin. Wait for an ambulance. Do not try to drive yourself. ?Call your doctor now or seek immediate medical care if: 
? · You have a fever. ? Watch closely for changes in your health, and be sure to contact your doctor if you have any problems. Where can you learn more? Go to http://manuel-amadeo.info/. Enter F104 in the search box to learn more about \"Sickle Cell Crisis: Care Instructions. \" Current as of: October 13, 2016 Content Version: 11.4 © 3017-7498 [employer]. Care instructions adapted under license by [employer] (which disclaims liability or warranty for this information). If you have questions about a medical condition or this instruction, always ask your healthcare professional. [address] any warranty or liability for your use of this information. DISCHARGE SUMMARY from Nurse PATIENT INSTRUCTIONS: 
 
After general anesthesia or intravenous sedation, for 24 hours or while taking prescription Narcotics: · Limit your activities · Do not drive and operate hazardous machinery · Do not make important personal or business decisions · Do  not drink alcoholic beverages · If you have not urinated within 8 hours after discharge, please contact your surgeon on call. Report the following to your surgeon: 
· Excessive pain, swelling, redness or odor of or around the surgical area · Temperature over 100.5 · Nausea and vomiting lasting longer than 4 hours or if unable to take medications · Any signs of decreased circulation or nerve impairment to extremity: change in color, persistent  numbness, tingling, coldness or increase pain · Any questions What to do at Home: 
Recommended activity: Activity as tolerated, If you experience any of the following symptoms pain, please follow up with ER. *  Please give a list of your current medications to your Primary Care Provider.  
 
*  Please update this list whenever your medications are discontinued, doses are 
 changed, or new medications (including over-the-counter products) are added. *  Please carry medication information at all times in case of emergency situations. These are general instructions for a healthy lifestyle: No smoking/ No tobacco products/ Avoid exposure to second hand smoke Surgeon General's Warning:  Quitting smoking now greatly reduces serious risk to your health. Obesity, smoking, and sedentary lifestyle greatly increases your risk for illness A healthy diet, regular physical exercise & weight monitoring are important for maintaining a healthy lifestyle You may be retaining fluid if you have a history of heart failure or if you experience any of the following symptoms:  Weight gain of 3 pounds or more overnight or 5 pounds in a week, increased swelling in our hands or feet or shortness of breath while lying flat in bed. Please call your doctor as soon as you notice any of these symptoms; do not wait until your next office visit. Recognize signs and symptoms of STROKE: 
 
F-face looks uneven A-arms unable to move or move unevenly S-speech slurred or non-existent T-time-call 911 as soon as signs and symptoms begin-DO NOT go Back to bed or wait to see if you get better-TIME IS BRAIN. Warning Signs of HEART ATTACK Call 911 if you have these symptoms: 
? Chest discomfort. Most heart attacks involve discomfort in the center of the chest that lasts more than a few minutes, or that goes away and comes back. It can feel like uncomfortable pressure, squeezing, fullness, or pain. ? Discomfort in other areas of the upper body. Symptoms can include pain or discomfort in one or both arms, the back, neck, jaw, or stomach. ? Shortness of breath with or without chest discomfort. ? Other signs may include breaking out in a cold sweat, nausea, or lightheadedness. Don't wait more than five minutes to call [address]!  Fast action can save your life. Calling 911 is almost always the fastest way to get lifesaving treatment. Emergency Medical Services staff can begin treatment when they arrive  up to an hour sooner than if someone gets to the hospital by car. The discharge information has been reviewed with the patient. The patient verbalized understanding. Discharge medications reviewed with the patient and appropriate educational materials and side effects teaching were provided. ___________________________________________________________________________________________________________________________________ [employer] Announcement We are excited to announce that we are making your provider's discharge notes available to you in [facility]. You will see these notes when they are completed and signed by the physician that discharged you from your recent hospital stay. If you have any questions or concerns about any information you see in [facility], please call the Health Information Department where you were seen or reach out to your Primary Care Provider for more information about your plan of care. Introducing [facility]! Lois Youngblood introduces [facility] patient portal. Now you can access parts of your medical record, email your doctor's office, and request medication refills online. 1. In your internet browser, go to [email or web address] 2. Click on the First Time User? Click Here link in the Sign In box. You will see the New Member Sign Up page. 3. Enter your [facility] Access Code exactly as it appears below. You will not need to use this code after youve completed the sign-up process. If you do not sign up before the expiration date, you must request a new code. · [facility] Access Code: [email or web address] Expires: 2/10/2018  3:02 PM 
 
4. Enter the last four digits of your Social Security Number (xxxx) and Date of Birth (mm/dd/yyyy) as indicated and click Submit.  You will be taken to the next sign-up page. 5. Create a [employer] ID. This will be your [facility] login ID and cannot be changed, so think of one that is secure and easy to remember. 6. Create a [employer] password. You can change your password at any time. 7. Enter your Password Reset Question and Answer. This can be used at a later time if you forget your password. 8. Enter your e-mail address. You will receive e-mail notification when new information is available in [address]. 9. Click Sign Up. You can now view and download portions of your medical record. 10. Click the Download Summary menu link to download a portable copy of your medical information. If you have questions, please visit the Frequently Asked Questions section of the [employer] website. Remember, [facility] is NOT to be used for urgent needs. For medical emergencies, dial 911. Now available from your iPhone and Android! Providers Seen During Your Hospitalization Provider Specialty Primary office phone Sera Osullivan MD Emergency Medicine [phone] Jany Joseph MD Family Practice [phone] Your Primary Care Physician (PCP) Primary Care Physician Office Phone Office Fax [address] [facility], [address] [place] [address] [phone] You are allergic to the following Allergen Reactions Eggshell Membrane Nausea and Vomiting Recent Documentation Height Weight BMI Smoking Status 1.829 m 60.1 kg 17.96 kg/m2 Never Smoker Emergency Contacts Name Discharge Info Relation Home Work Mobile [address] [facility] CAREGIVER [3] Mother [14] [phone] [facility] [3] Spouse [3] [phone] Patient Belongings  The following personal items are in your possession at time of discharge: 
  Dental Appliances: None         Home Medications: None   Jewelry: Earrings, With patient  Clothing: Undergarments, With patient, Socks, Shirt, Pants    Other Valuables: Cell Phone, Petejeri Pennington, Personal electronic devices (comment) (headphones, ) Please provide this summary of care documentation to your next provider. Signatures-by signing, you are acknowledging that this After Visit Summary has been reviewed with you and you have received a copy. Patient Signature:  ____________________________________________________________ Date:  ____________________________________________________________  
  
[facility] Provider Signature:  ____________________________________________________________ Date:  ____________________________________________________________

## 2018-02-06 LAB
ALBUMIN SERPL-MCNC: 3.5 G/DL (ref 3.4–5)
ALBUMIN/GLOB SERPL: 0.8 {RATIO} (ref 0.8–1.7)
ALP SERPL-CCNC: 154 U/L (ref 45–117)
ALT SERPL-CCNC: 65 U/L (ref 16–61)
ANION GAP SERPL CALC-SCNC: 6 MMOL/L (ref 3–18)
AST SERPL-CCNC: 72 U/L (ref 15–37)
BASOPHILS # BLD: 0.1 K/UL (ref 0–0.06)
BASOPHILS NFR BLD: 1 % (ref 0–3)
BILIRUB SERPL-MCNC: 5.6 MG/DL (ref 0.2–1)
BUN SERPL-MCNC: 6 MG/DL (ref 7–18)
BUN/CREAT SERPL: 8 (ref 12–20)
CALCIUM SERPL-MCNC: 8 MG/DL (ref 8.5–10.1)
CHLORIDE SERPL-SCNC: 107 MMOL/L (ref 100–108)
CO2 SERPL-SCNC: 25 MMOL/L (ref 21–32)
CREAT SERPL-MCNC: 0.74 MG/DL (ref 0.6–1.3)
DIFFERENTIAL METHOD BLD: ABNORMAL
EOSINOPHIL # BLD: 0.1 K/UL (ref 0–0.4)
EOSINOPHIL NFR BLD: 1 % (ref 0–5)
ERYTHROCYTE [DISTWIDTH] IN BLOOD BY AUTOMATED COUNT: 22.7 % (ref 11.6–14.5)
GLOBULIN SER CALC-MCNC: 4.6 G/DL (ref 2–4)
GLUCOSE SERPL-MCNC: 103 MG/DL (ref 74–99)
HCT VFR BLD AUTO: 23.5 % (ref 36–48)
HGB BLD-MCNC: 8 G/DL (ref 13–16)
LYMPHOCYTES # BLD: 4.6 K/UL (ref 0.8–3.5)
LYMPHOCYTES NFR BLD: 34 % (ref 20–51)
MCH RBC QN AUTO: 24.5 PG (ref 24–34)
MCHC RBC AUTO-ENTMCNC: 34 G/DL (ref 31–37)
MCV RBC AUTO: 71.9 FL (ref 74–97)
MONOCYTES # BLD: 0.8 K/UL (ref 0–1)
MONOCYTES NFR BLD: 6 % (ref 2–9)
NEUTS BAND NFR BLD MANUAL: 1 % (ref 0–5)
NEUTS SEG # BLD: 8 K/UL (ref 1.8–8)
NEUTS SEG NFR BLD: 57 % (ref 42–75)
NRBC BLD-RTO: 2 PER 100 WBC
PLATELET # BLD AUTO: 360 K/UL (ref 135–420)
PLATELET COMMENTS,PCOM: ABNORMAL
PMV BLD AUTO: 9.7 FL (ref 9.2–11.8)
POTASSIUM SERPL-SCNC: 3.4 MMOL/L (ref 3.5–5.5)
PROT SERPL-MCNC: 8.1 G/DL (ref 6.4–8.2)
RBC # BLD AUTO: 3.27 M/UL (ref 4.7–5.5)
RBC MORPH BLD: ABNORMAL
SODIUM SERPL-SCNC: 138 MMOL/L (ref 136–145)
WBC # BLD AUTO: 13.6 K/UL (ref 4.6–13.2)

## 2018-02-06 PROCEDURE — 74011250636 HC RX REV CODE- 250/636: Performed by: HOSPITALIST

## 2018-02-06 PROCEDURE — 80053 COMPREHEN METABOLIC PANEL: CPT | Performed by: HOSPITALIST

## 2018-02-06 PROCEDURE — 65660000000 HC RM CCU STEPDOWN

## 2018-02-06 PROCEDURE — 85025 COMPLETE CBC W/AUTO DIFF WBC: CPT | Performed by: HOSPITALIST

## 2018-02-06 PROCEDURE — 36415 COLL VENOUS BLD VENIPUNCTURE: CPT | Performed by: HOSPITALIST

## 2018-02-06 RX ADMIN — HYDROMORPHONE HYDROCHLORIDE 2 MG: 1 INJECTION, SOLUTION INTRAMUSCULAR; INTRAVENOUS; SUBCUTANEOUS at 04:54

## 2018-02-06 RX ADMIN — HYDROMORPHONE HYDROCHLORIDE 2 MG: 1 INJECTION, SOLUTION INTRAMUSCULAR; INTRAVENOUS; SUBCUTANEOUS at 14:11

## 2018-02-06 RX ADMIN — HYDROMORPHONE HYDROCHLORIDE 2 MG: 1 INJECTION, SOLUTION INTRAMUSCULAR; INTRAVENOUS; SUBCUTANEOUS at 10:51

## 2018-02-06 RX ADMIN — HYDROMORPHONE HYDROCHLORIDE 2 MG: 1 INJECTION, SOLUTION INTRAMUSCULAR; INTRAVENOUS; SUBCUTANEOUS at 20:42

## 2018-02-06 RX ADMIN — HYDROMORPHONE HYDROCHLORIDE 2 MG: 1 INJECTION, SOLUTION INTRAMUSCULAR; INTRAVENOUS; SUBCUTANEOUS at 07:29

## 2018-02-06 RX ADMIN — HYDROMORPHONE HYDROCHLORIDE 2 MG: 1 INJECTION, SOLUTION INTRAMUSCULAR; INTRAVENOUS; SUBCUTANEOUS at 01:10

## 2018-02-06 RX ADMIN — HYDROXYUREA 500 MG: 500 CAPSULE ORAL at 10:51

## 2018-02-06 RX ADMIN — HYDROMORPHONE HYDROCHLORIDE 2 MG: 1 INJECTION, SOLUTION INTRAMUSCULAR; INTRAVENOUS; SUBCUTANEOUS at 17:20

## 2018-02-06 RX ADMIN — HYDROXYUREA 500 MG: 500 CAPSULE ORAL at 17:20

## 2018-02-06 NOTE — PROGRESS NOTES
Problem: Pain  Goal: *Control of Pain  Outcome: Progressing Towards Goal  Pt is receiving Dilaudid prn and has received a fentanyl patch for pain. Pt was instructed to ask for pain medication when he feels it is needed.

## 2018-02-06 NOTE — PROGRESS NOTES
Hospitalist Progress Note    Subjective:   Daily Progress Note: 2018 3:15 PM    The patient is feeling better today. He still has significant pain but it is controlled with current medication regimen. Current Facility-Administered Medications   Medication Dose Route Frequency    fentaNYL (DURAGESIC) 25 mcg/hr patch 1 Patch  1 Patch TransDERmal Q72H    hydroxyurea (HYDREA) chemo cap 500 mg  500 mg Oral BID    0.9% sodium chloride infusion  75 mL/hr IntraVENous CONTINUOUS    HYDROmorphone (PF) (DILAUDID) injection 2 mg  2 mg IntraVENous Q3H PRN    oxyCODONE-acetaminophen (PERCOCET) 5-325 mg per tablet 1 Tab  1 Tab Oral Q6H PRN    naloxone (NARCAN) injection 0.4 mg  0.4 mg IntraVENous PRN    ondansetron (ZOFRAN) injection 4 mg  4 mg IntraVENous Q4H PRN        Review of Systems  A comprehensive review of systems was negative except for that written in the HPI. Objective:     Visit Vitals    /73 (BP 1 Location: Right arm, BP Patient Position: At rest)    Pulse 67    Temp 98.1 °F (36.7 °C)    Resp 20    Ht 6' (1.829 m)    Wt 78.1 kg (172 lb 1.6 oz)    SpO2 93%    BMI 23.34 kg/m2      O2 Device: Room air    Temp (24hrs), Av.3 °F (36.8 °C), Min:97.8 °F (36.6 °C), Max:98.7 °F (37.1 °C)       07 -  1900  In: 120 [P.O.:120]  Out: 450 [Urine:450]  1901 -  0700  In: 1162.5 [P.O.:720; I.V.:442.5]  Out: 550 [Urine:550]    General appearance: alert, cooperative, mild distress, appears stated age  Back: symmetric, no curvature. ROM normal. No CVA tenderness. Lungs: clear to auscultation bilaterally  Chest wall: no tenderness  Heart: regular rate and rhythm, S1, S2 normal, no murmur, click, rub or gallop  Abdomen: soft, non-tender.  Bowel sounds normal. No masses,  no organomegaly  Skin: Skin color, texture, turgor normal. No rashes or lesions  Neurologic: Grossly normal    Additional comments:None    Data Review    Recent Results (from the past 24 hour(s))   CBC WITH AUTOMATED DIFF    Collection Time: 02/06/18  3:50 AM   Result Value Ref Range    WBC 13.6 (H) 4.6 - 13.2 K/uL    RBC 3.27 (L) 4.70 - 5.50 M/uL    HGB 8.0 (L) 13.0 - 16.0 g/dL    HCT 23.5 (L) 36.0 - 48.0 %    MCV 71.9 (L) 74.0 - 97.0 FL    MCH 24.5 24.0 - 34.0 PG    MCHC 34.0 31.0 - 37.0 g/dL    RDW 22.7 (H) 11.6 - 14.5 %    PLATELET 158 653 - 885 K/uL    MPV 9.7 9.2 - 11.8 FL    NEUTROPHILS 57 42 - 75 %    BAND NEUTROPHILS 1 0 - 5 %    LYMPHOCYTES 34 20 - 51 %    MONOCYTES 6 2 - 9 %    EOSINOPHILS 1 0 - 5 %    BASOPHILS 1 0 - 3 %    NRBC 2.0 (H) 0  WBC    ABS. NEUTROPHILS 8.0 1.8 - 8.0 K/UL    ABS. LYMPHOCYTES 4.6 (H) 0.8 - 3.5 K/UL    ABS. MONOCYTES 0.8 0 - 1.0 K/UL    ABS. EOSINOPHILS 0.1 0.0 - 0.4 K/UL    ABS. BASOPHILS 0.1 (H) 0.0 - 0.06 K/UL    DF AUTOMATED      PLATELET COMMENTS ADEQUATE PLATELETS      RBC COMMENTS ANISOCYTOSIS  2+        RBC COMMENTS MICROCYTOSIS  1+        RBC COMMENTS HYPOCHROMIA  2+        RBC COMMENTS TARGET CELLS  1+        RBC COMMENTS TEARDROP CELLS  1+        RBC COMMENTS POIKILOCYTOSIS  1+       METABOLIC PANEL, COMPREHENSIVE    Collection Time: 02/06/18  3:50 AM   Result Value Ref Range    Sodium 138 136 - 145 mmol/L    Potassium 3.4 (L) 3.5 - 5.5 mmol/L    Chloride 107 100 - 108 mmol/L    CO2 25 21 - 32 mmol/L    Anion gap 6 3.0 - 18 mmol/L    Glucose 103 (H) 74 - 99 mg/dL    BUN 6 (L) 7.0 - 18 MG/DL    Creatinine 0.74 0.6 - 1.3 MG/DL    BUN/Creatinine ratio 8 (L) 12 - 20      GFR est AA >60 >60 ml/min/1.73m2    GFR est non-AA >60 >60 ml/min/1.73m2    Calcium 8.0 (L) 8.5 - 10.1 MG/DL    Bilirubin, total 5.6 (H) 0.2 - 1.0 MG/DL    ALT (SGPT) 65 (H) 16 - 61 U/L    AST (SGOT) 72 (H) 15 - 37 U/L    Alk.  phosphatase 154 (H) 45 - 117 U/L    Protein, total 8.1 6.4 - 8.2 g/dL    Albumin 3.5 3.4 - 5.0 g/dL    Globulin 4.6 (H) 2.0 - 4.0 g/dL    A-G Ratio 0.8 0.8 - 1.7           Assessment/Plan:     Active Problems:    [facility] (1/26/2014)    Will continue IV and po pain control. Will increase IV fluids to 125ml/hr, encourage out of bed and ambulation, discussed that we would begin de-escalating pain meds in next 1-2 days. Care Plan discussed with: Patient/Family    Total time spent with patient: 20 minutes.     Signed By: Jany Joseph MD     February 6, 2018

## 2018-02-06 NOTE — PROGRESS NOTES
Care Management Interventions  PCP Verified by CM: Yes  Current Support Network: Relative's Home, Family Lives Nearby  Confirm Follow Up Transport: Family  Plan discussed with Pt/Family/Caregiver: Yes     Pt is a 42year old male admitted for sickle cell crisis. Pt is alert and oriented in room. Pt states that he resides in the home with his mother Deondre Melendez ([phone]). His plan is to return home and family will assist him with transportation at discharge. Pt indicates being totally independent with ADLs and ambulation prior to admission. Pt shares that he is in a lot of pain. Pt's nurse is aware.

## 2018-02-06 NOTE — ROUTINE PROCESS
Bedside and Verbal shift change report given to Marta Staley RN (oncoming nurse) by Jasper Mata (offgoing nurse). Report included the following information SBAR, Kardex, MAR and Recent Results. SITUATION:    Code Status: Full Code   Reason for Admission: Sickle cell crisis ([facility]   Sickle cell crisis ([facility]    [facility] day: 1   Problem List:       Hospital Problems  Date Reviewed: 7/1/2017          Codes Class Noted POA    Sickle cell crisis ([facility] ICD-10-CM: D57.00  ICD-9-CM: 282.62  1/26/2014 Unknown              BACKGROUND:    Past Medical History:   Past Medical History:   Diagnosis Date    B12 deficiency 03/15/2010    210    Cholelithiasis 09/17/2012    U/S Result: Cholelithiasis    Elevated alkaline phosphatase level 03/21/2010    158    Elevated ALT measurement 03/21/2010    71    Elevated AST (SGOT) 03/10/2012    38    Elevated platelet count (HCC) 10/25/2007    494    Elevated total protein 12/27/2011    8.7    Hypocalcemia 07/06/2011    4.1    Hypokalemia     Hyponatremia 11/18/2009    Leukocytosis 11/16/2009    Microcytic anemia 10/25/2007    Pleural effusion on right 07/15/2015    [facility] CXR Result    Reticulocytosis 10/25/2007    7.8    Serum total bilirubin elevated 12/27/2011    T.B. 3.8, D.B. 0.4.     Sickle cell anemia with crisis ([facility]     Dr. Tiffanie Rebolledo.  [place]    Vitamin D deficiency 01/20/2016    5.4         Patient taking anticoagulants no     ASSESSMENT:    Changes in Assessment Throughout Shift: none     Patient has Central Line: no Reasons if yes: n/a   Patient has Sesay Cath: no Reasons if yes: n/a      Last Vitals:     Vitals:    02/05/18 2238 02/05/18 2250 02/06/18 0020 02/06/18 0434   BP: 135/78  123/71 123/80   Pulse: 60  70 72   Resp: 18 18 20   Temp: 98.5 °F (36.9 °C)  97.8 °F (36.6 °C) 98.7 °F (37.1 °C)   SpO2: 100%  95% 93%   Weight:  78.2 kg (172 lb 6.4 oz)  78.1 kg (172 lb 1.6 oz)   Height:            IV and DRAINS (will only show if present)   Peripheral IV 02/05/18 Right External jugular-Site Assessment: Clean, dry, & intact     WOUND (if present)   Wound Type:  none   Dressing present Dressing Present : No   Wound Concerns/Notes:  none     PAIN    Pain Assessment    Pain Intensity 1: 0 (02/06/18 0515)    Pain Location 1: Generalized    Pain Intervention(s) 1: Medication (see MAR)    Patient Stated Pain Goal: 0  o Interventions for Pain:  yes  o Intervention effective: yes  o Time of last intervention: See MAR   o Reassessment Completed: yes      Last 3 Weights:  Last 3 Recorded Weights in this Encounter    02/05/18 1003 02/05/18 2250 02/06/18 0434   Weight: 80.7 kg (178 lb) 78.2 kg (172 lb 6.4 oz) 78.1 kg (172 lb 1.6 oz)     Weight change:      INTAKE/OUPUT    Current Shift: 02/05 1901 - 02/06 0700  In: 1162.5 [P.O.:720; I.V.:442.5]  Out: 550 [Urine:550]    Last three shifts:       LAB RESULTS     Recent Labs      02/06/18   0350  02/05/18   1250   WBC  13.6*  16.7*   HGB  8.0*  9.4*   HCT  23.5*  26.6*   PLT  360  432*        Recent Labs      02/06/18   0350  02/05/18   1250   NA  138  136   K  3.4*  3.4*   GLU  103*  97   BUN  6*  5*   CREA  0.74  0.84   CA  8.0*  8.7       RECOMMENDATIONS AND DISCHARGE PLANNING     1. Pending tests/procedures/ Plan of Care or Other Needs: pain control     2. Discharge plan for patient and Needs/Barriers: home    3. Estimated Discharge Date: TBD Posted on Whiteboard in Patients Room: yes      4. The patient's care plan was reviewed with the oncoming nurse. \"HEALS\" SAFETY CHECK      Fall Risk    Total Score: 1    Safety Measures: Safety Measures: Bed/Chair-Wheels locked, Bed in low position, Call light within reach    A safety check occurred in the patient's room between off going nurse and oncoming nurse listed above.     The safety check included the below items  Area Items   H  High Alert Medications - Verify all high alert medication drips (heparin, PCA, etc.)   E  Equipment - Suction is set up for ALL patients (with pat)  - Red plugs utilized for all equipment (IV pumps, etc.)  - WOWs wiped down at end of shift.  - Room stocked with oxygen, suction, and other unit-specific supplies   A  Alarms - Bed alarm is set for fall risk patients  - Ensure chair alarm is in place and activated if patient is up in a chair   L  Lines - Check IV for any infiltration  - Sesay bag is empty if patient has a Sesay   - Tubing and IV bags are labeled   S  Safety   - Room is clean, patient is clean, and equipment is clean. - Hallways are clear from equipment besides carts. - Fall bracelet on for fall risk patients  - Ensure room is clear and free of clutter  - Suction is set up for ALL patients (with pat)  - Hallways are clear from equipment besides carts.    - Isolation precautions followed, supplies available outside room, sign posted     Virginia Rubi

## 2018-02-06 NOTE — ROUTINE PROCESS
TRANSFER - OUT REPORT:    Verbal report given to [facility]) on [facility]  being transferred to Medical(unit) for routine progression of care       Report consisted of patients Situation, Background, Assessment and   Recommendations(SBAR). Information from the following report(s) SBAR and ED Summary was reviewed with the receiving nurse. Lines:   Peripheral IV 02/05/18 Right External jugular (Active)   Site Assessment Clean, dry, & intact 2/5/2018 12:11 PM   Phlebitis Assessment 0 2/5/2018 12:11 PM   Infiltration Assessment 0 2/5/2018 12:11 PM   Dressing Status Clean, dry, & intact 2/5/2018 12:11 PM   Hub Color/Line Status Pink 2/5/2018 12:11 PM        Opportunity for questions and clarification was provided.       Patient transported with:   [place]

## 2018-02-06 NOTE — PROGRESS NOTES
conducted an initial consultation and Spiritual Assessment for Tod Faulkner, who is a 42 y.o.,male. Patients Primary Language is: [place]. According to the patients EMR Tenriism Affiliation is: [place]. The reason the Patient came to the hospital is:   Patient Active Problem List    Diagnosis Date Noted    Cholecystitis 07/01/2017    Hypokalemia     Bilateral leg pain 04/24/2017    Vaso-occlusive sickle cell crisis ([facility] 04/24/2017    Sickle cell anemia (HCC) 01/19/2017    Hemolytic anemia ([facility]) 12/11/2016    SC disease ([facility] 12/11/2016    Pain, generalized 04/05/2016    Vitamin D deficiency 01/26/2016    Leukocytosis 10/17/2015    Sickle cell anemia with crisis ([facility] 05/07/2015    Sickle cell pain crisis ([facility] 08/15/2014    Sickle cell crisis ([facility] 01/26/2014    Sickle cell disease ([facility] 09/27/2013    Elevated AST (SGOT) 03/10/2012    Serum total bilirubin elevated 12/27/2011    Elevated total protein 12/27/2011    Hypocalcemia 07/06/2011    Elevated alkaline phosphatase level 03/21/2010    Elevated ALT measurement 03/21/2010    B12 deficiency 03/15/2010    Hyponatremia 11/18/2009    Microcytic anemia 10/25/2007    Reticulocytosis 10/25/2007    Elevated platelet count ([facility] 10/25/2007        The  provided the following Interventions:  Initiated a relationship of care and support. Explored issues of delia, belief, spirituality and Mosque/ritual needs while hospitalized. Listened empathically. Provided chaplaincy education. Provided information about Spiritual Care Services. Offered prayer and assurance of continued prayers on patient's behalf. Chart reviewed. The following outcomes where achieved:  Patient shared limited information about both their medical narrative and spiritual journey/beliefs.  confirmed Patient's Tenriism Affiliation. Patient processed feeling about current hospitalization.   Patient expressed gratitude for 's visit. Assessment:  Patient does not have any Scientology/cultural needs that will affect patients preferences in health care. There are no spiritual or Scientology issues which require intervention at this time. Plan:  Chaplains will continue to follow and will provide pastoral care on an as needed/requested basis.  recommends bedside caregivers page  on duty if patient shows signs of acute spiritual or emotional distress.     [facility]  [phone]

## 2018-02-07 PROCEDURE — 74011250636 HC RX REV CODE- 250/636: Performed by: HOSPITALIST

## 2018-02-07 PROCEDURE — 74011250637 HC RX REV CODE- 250/637: Performed by: HOSPITALIST

## 2018-02-07 PROCEDURE — 65660000000 HC RM CCU STEPDOWN

## 2018-02-07 RX ORDER — OXYCODONE AND ACETAMINOPHEN 5; 325 MG/1; MG/1
2 TABLET ORAL
Status: DISCONTINUED | OUTPATIENT
Start: 2018-02-07 | End: 2018-02-07

## 2018-02-07 RX ORDER — OXYCODONE AND ACETAMINOPHEN 5; 325 MG/1; MG/1
1 TABLET ORAL EVERY 6 HOURS
Status: DISCONTINUED | OUTPATIENT
Start: 2018-02-07 | End: 2018-02-08 | Stop reason: HOSPADM

## 2018-02-07 RX ADMIN — HYDROMORPHONE HYDROCHLORIDE 2 MG: 1 INJECTION, SOLUTION INTRAMUSCULAR; INTRAVENOUS; SUBCUTANEOUS at 03:36

## 2018-02-07 RX ADMIN — HYDROMORPHONE HYDROCHLORIDE 2 MG: 1 INJECTION, SOLUTION INTRAMUSCULAR; INTRAVENOUS; SUBCUTANEOUS at 15:54

## 2018-02-07 RX ADMIN — HYDROMORPHONE HYDROCHLORIDE 2 MG: 1 INJECTION, SOLUTION INTRAMUSCULAR; INTRAVENOUS; SUBCUTANEOUS at 08:21

## 2018-02-07 RX ADMIN — HYDROMORPHONE HYDROCHLORIDE 2 MG: 1 INJECTION, SOLUTION INTRAMUSCULAR; INTRAVENOUS; SUBCUTANEOUS at 00:07

## 2018-02-07 RX ADMIN — HYDROMORPHONE HYDROCHLORIDE 2 MG: 1 INJECTION, SOLUTION INTRAMUSCULAR; INTRAVENOUS; SUBCUTANEOUS at 19:34

## 2018-02-07 RX ADMIN — OXYCODONE HYDROCHLORIDE AND ACETAMINOPHEN 1 TABLET: 5; 325 TABLET ORAL at 12:51

## 2018-02-07 RX ADMIN — SODIUM CHLORIDE 125 ML/HR: 900 INJECTION, SOLUTION INTRAVENOUS at 16:01

## 2018-02-07 RX ADMIN — HYDROMORPHONE HYDROCHLORIDE 2 MG: 1 INJECTION, SOLUTION INTRAMUSCULAR; INTRAVENOUS; SUBCUTANEOUS at 12:51

## 2018-02-07 RX ADMIN — OXYCODONE HYDROCHLORIDE AND ACETAMINOPHEN 1 TABLET: 5; 325 TABLET ORAL at 23:29

## 2018-02-07 RX ADMIN — HYDROXYUREA 500 MG: 500 CAPSULE ORAL at 10:12

## 2018-02-07 RX ADMIN — HYDROXYUREA 500 MG: 500 CAPSULE ORAL at 17:41

## 2018-02-07 RX ADMIN — OXYCODONE HYDROCHLORIDE AND ACETAMINOPHEN 1 TABLET: 5; 325 TABLET ORAL at 17:41

## 2018-02-07 NOTE — PROGRESS NOTES
Hospitalist Progress Note    Subjective:   Daily Progress Note: 2018 3:15 PM    The patient has worsening pain. He has not used any of the percocet that I ordered on admission. He is ambulating and tolerating diet well. Current Facility-Administered Medications   Medication Dose Route Frequency    oxyCODONE-acetaminophen (PERCOCET) 5-325 mg per tablet 1 Tab  1 Tab Oral Q6H    fentaNYL (DURAGESIC) 25 mcg/hr patch 1 Patch  1 Patch TransDERmal Q72H    hydroxyurea (HYDREA) chemo cap 500 mg  500 mg Oral BID    0.9% sodium chloride infusion  125 mL/hr IntraVENous CONTINUOUS    HYDROmorphone (PF) (DILAUDID) injection 2 mg  2 mg IntraVENous Q3H PRN    naloxone (NARCAN) injection 0.4 mg  0.4 mg IntraVENous PRN    ondansetron (ZOFRAN) injection 4 mg  4 mg IntraVENous Q4H PRN        Review of Systems  A comprehensive review of systems was negative except for that written in the HPI. Objective:     Visit Vitals    /72 (BP 1 Location: Right arm, BP Patient Position: At rest)    Pulse 84    Temp 99.1 °F (37.3 °C)    Resp 16    Ht 6' (1.829 m)    Wt 80.7 kg (178 lb)    SpO2 91%    BMI 24.14 kg/m2      O2 Device: Room air    Temp (24hrs), Av.3 °F (37.4 °C), Min:98.1 °F (36.7 °C), Max:100.2 °F (37.9 °C)          1901 -  0700  In: 2122.5 [P.O.:1680; I.V.:442.5]  Out: 7313 [Urine:3675]    General appearance: alert, cooperative, mild distress, appears stated age  Back: symmetric, no curvature. ROM normal. No CVA tenderness. Lungs: clear to auscultation bilaterally  Chest wall: no tenderness  Heart: regular rate and rhythm, S1, S2 normal, no murmur, click, rub or gallop  Abdomen: soft, non-tender. Bowel sounds normal. No masses,  no organomegaly  Skin: Skin color, texture, turgor normal. No rashes or lesions  Neurologic: Grossly normal    Additional comments:None    Data Review    No results found for this or any previous visit (from the past 24 hour(s)).       Assessment/Plan: Active Problems:    Sickle cell crisis ([facility] (1/26/2014)    I have scheduled his percocet and will continue dilaudid for breakthrough pain. Will encourage ambulation and fluid intake. I will recheck his labs tomorrow morning. Care Plan discussed with: Patient/Family    Total time spent with patient: 20 minutes.     Signed By: Jany Joseph MD     February 7, 2018

## 2018-02-07 NOTE — ROUTINE PROCESS
Bedside and Verbal shift change report given to Rafaela Jeffery RN (oncoming nurse) by Mae Saini (offgoing nurse). Report included the following information SBAR, Kardex, MAR and Recent Results. SITUATION:    Code Status: Full Code  Reason for Admission: Sickle cell crisis ([facility]   Sickle cell crisis [facility])    [facility] day: 2   Problem List:       Hospital Problems  Date Reviewed: 7/1/2017          Codes Class Noted POA    Sickle cell crisis ([facility] ICD-10-CM: D57.00  ICD-9-CM: 282.62  1/26/2014 Unknown              BACKGROUND:    Past Medical History:   Past Medical History:   Diagnosis Date    B12 deficiency 03/15/2010    210    Cholelithiasis 09/17/2012    U/S Result: Cholelithiasis    Elevated alkaline phosphatase level 03/21/2010    158    Elevated ALT measurement 03/21/2010    71    Elevated AST (SGOT) 03/10/2012    38    Elevated platelet count (HCC) 10/25/2007    494    Elevated total protein 12/27/2011    8.7    Hypocalcemia 07/06/2011    4.1    Hypokalemia     Hyponatremia 11/18/2009    Leukocytosis 11/16/2009    Microcytic anemia 10/25/2007    Pleural effusion on right 07/15/2015    [facility] CXR Result    Reticulocytosis 10/25/2007    7.8    Serum total bilirubin elevated 12/27/2011    T.B. 3.8, D.B. 0.4.     Sickle cell anemia with crisis ([facility]     Dr. Estephania Chase    Vitamin D deficiency 01/20/2016    5.4         Patient taking anticoagulants no     ASSESSMENT:    Changes in Assessment Throughout Shift: none     Patient has Central Line: no Reasons if yes: n/a   Patient has Sesay Cath: no Reasons if yes: n/a      Last Vitals:     Vitals:    02/06/18 1555 02/06/18 1950 02/06/18 2352 02/07/18 0400   BP: 124/79 145/78 138/78 122/71   Pulse: 69 85 80 79   Resp: 20 20 22 20   Temp: 99 °F (37.2 °C) 100.1 °F (37.8 °C) 100.2 °F (37.9 °C) 99.2 °F (37.3 °C)   SpO2: 98% 98% 96% 94%   Weight:    80.7 kg (178 lb)   Height:            IV and DRAINS (will only show if present)   Peripheral IV 02/05/18 Right External jugular-Site Assessment: Clean, dry, & intact     WOUND (if present)   Wound Type:  none   Dressing present Dressing Present : No   Wound Concerns/Notes:  none     PAIN    Pain Assessment    Pain Intensity 1: 0 (02/07/18 0410)    Pain Location 1: Back    Pain Intervention(s) 1: Medication (see MAR)    Patient Stated Pain Goal: 0  o Interventions for Pain:  yes  o Intervention effective: yes  o Time of last intervention: See MAR   o Reassessment Completed: yes      Last 3 Weights:  Last 3 Recorded Weights in this Encounter    02/05/18 2250 02/06/18 0434 02/07/18 0400   Weight: 78.2 kg (172 lb 6.4 oz) 78.1 kg (172 lb 1.6 oz) 80.7 kg (178 lb)     Weight change: 0 kg (0 lb)     INTAKE/OUPUT    Current Shift: 02/06 1901 - 02/07 0700  In: -   Out: 8741 [Urine:2275]    Last three shifts: 02/05 0701 - 02/06 1900  In: 2122.5 [P.O.:1680; I.V.:442.5]  Out: 1400 [Urine:1400]     LAB RESULTS     Recent Labs      02/06/18   0350  02/05/18   1250   WBC  13.6*  16.7*   HGB  8.0*  9.4*   HCT  23.5*  26.6*   PLT  360  432*        Recent Labs      02/06/18   0350  02/05/18   1250   NA  138  136   K  3.4*  3.4*   GLU  103*  97   BUN  6*  5*   CREA  0.74  0.84   CA  8.0*  8.7       RECOMMENDATIONS AND DISCHARGE PLANNING     1. Pending tests/procedures/ Plan of Care or Other Needs: pain control     2. Discharge plan for patient and Needs/Barriers: home    3. Estimated Discharge Date: TBD Posted on Whiteboard in Patients Room: yes      4. The patient's care plan was reviewed with the oncoming nurse. \"HEALS\" SAFETY CHECK      Fall Risk    Total Score: 1    Safety Measures: Safety Measures: Bed/Chair-Wheels locked, Bed in low position, Call light within reach, Fall prevention (comment), Gripper socks    A safety check occurred in the patient's room between off going nurse and oncoming nurse listed above.     The safety check included the below items  Area Items   H  High Alert Medications - Verify all high alert medication drips (heparin, PCA, etc.)   E  Equipment - Suction is set up for ALL patients (with pat)  - Red plugs utilized for all equipment (IV pumps, etc.)  - WOWs wiped down at end of shift.  - Room stocked with oxygen, suction, and other unit-specific supplies   A  Alarms - Bed alarm is set for fall risk patients  - Ensure chair alarm is in place and activated if patient is up in a chair   L  Lines - Check IV for any infiltration  - Sesay bag is empty if patient has a Sesay   - Tubing and IV bags are labeled   S  Safety   - Room is clean, patient is clean, and equipment is clean. - Hallways are clear from equipment besides carts. - Fall bracelet on for fall risk patients  - Ensure room is clear and free of clutter  - Suction is set up for ALL patients (with pat)  - Hallways are clear from equipment besides carts.    - Isolation precautions followed, supplies available outside room, sign posted     Shruthi Alcaraz

## 2018-02-07 NOTE — ROUTINE PROCESS
Bedside shift change report given to Phyllis Winters RN (oncoming nurse) by Dolores Mack RN (offgoing nurse). Report included the following information SBAR, Kardex and Recent Results.

## 2018-02-08 VITALS
RESPIRATION RATE: 20 BRPM | WEIGHT: 132.4 LBS | SYSTOLIC BLOOD PRESSURE: 132 MMHG | TEMPERATURE: 98.8 F | HEART RATE: 87 BPM | BODY MASS INDEX: 17.93 KG/M2 | OXYGEN SATURATION: 99 % | DIASTOLIC BLOOD PRESSURE: 74 MMHG | HEIGHT: 72 IN

## 2018-02-08 LAB
ALBUMIN SERPL-MCNC: 3.3 G/DL (ref 3.4–5)
ALBUMIN/GLOB SERPL: 0.7 {RATIO} (ref 0.8–1.7)
ALP SERPL-CCNC: 153 U/L (ref 45–117)
ALT SERPL-CCNC: 52 U/L (ref 16–61)
ANION GAP SERPL CALC-SCNC: 4 MMOL/L (ref 3–18)
AST SERPL-CCNC: 53 U/L (ref 15–37)
BASOPHILS # BLD: 0 K/UL (ref 0–0.06)
BASOPHILS NFR BLD: 0 % (ref 0–3)
BILIRUB SERPL-MCNC: 3.2 MG/DL (ref 0.2–1)
BUN SERPL-MCNC: 5 MG/DL (ref 7–18)
BUN/CREAT SERPL: 7 (ref 12–20)
CALCIUM SERPL-MCNC: 8.1 MG/DL (ref 8.5–10.1)
CHLORIDE SERPL-SCNC: 109 MMOL/L (ref 100–108)
CO2 SERPL-SCNC: 26 MMOL/L (ref 21–32)
CREAT SERPL-MCNC: 0.69 MG/DL (ref 0.6–1.3)
DIFFERENTIAL METHOD BLD: ABNORMAL
EOSINOPHIL # BLD: 1.1 K/UL (ref 0–0.4)
EOSINOPHIL NFR BLD: 8 % (ref 0–5)
ERYTHROCYTE [DISTWIDTH] IN BLOOD BY AUTOMATED COUNT: 24.3 % (ref 11.6–14.5)
GLOBULIN SER CALC-MCNC: 4.6 G/DL (ref 2–4)
GLUCOSE SERPL-MCNC: 91 MG/DL (ref 74–99)
HCT VFR BLD AUTO: 23.4 % (ref 36–48)
HGB BLD-MCNC: 7.8 G/DL (ref 13–16)
LYMPHOCYTES # BLD: 3.8 K/UL (ref 0.8–3.5)
LYMPHOCYTES NFR BLD: 28 % (ref 20–51)
MCH RBC QN AUTO: 24.3 PG (ref 24–34)
MCHC RBC AUTO-ENTMCNC: 33.3 G/DL (ref 31–37)
MCV RBC AUTO: 72.9 FL (ref 74–97)
MONOCYTES # BLD: 1.1 K/UL (ref 0–1)
MONOCYTES NFR BLD: 8 % (ref 2–9)
NEUTS BAND NFR BLD MANUAL: 1 % (ref 0–5)
NEUTS SEG # BLD: 7.5 K/UL (ref 1.8–8)
NEUTS SEG NFR BLD: 54 % (ref 42–75)
NRBC BLD-RTO: 10 PER 100 WBC
OTHER CELLS NFR BLD MANUAL: 1 %
PLATELET # BLD AUTO: 452 K/UL (ref 135–420)
PLATELET COMMENTS,PCOM: ABNORMAL
PMV BLD AUTO: 10 FL (ref 9.2–11.8)
POTASSIUM SERPL-SCNC: 3.8 MMOL/L (ref 3.5–5.5)
PROT SERPL-MCNC: 7.9 G/DL (ref 6.4–8.2)
RBC # BLD AUTO: 3.21 M/UL (ref 4.7–5.5)
RBC MORPH BLD: ABNORMAL
SODIUM SERPL-SCNC: 139 MMOL/L (ref 136–145)
WBC # BLD AUTO: 13.6 K/UL (ref 4.6–13.2)

## 2018-02-08 PROCEDURE — 80053 COMPREHEN METABOLIC PANEL: CPT | Performed by: HOSPITALIST

## 2018-02-08 PROCEDURE — 36415 COLL VENOUS BLD VENIPUNCTURE: CPT | Performed by: HOSPITALIST

## 2018-02-08 PROCEDURE — 74011250637 HC RX REV CODE- 250/637: Performed by: HOSPITALIST

## 2018-02-08 PROCEDURE — 74011250636 HC RX REV CODE- 250/636: Performed by: HOSPITALIST

## 2018-02-08 PROCEDURE — 85025 COMPLETE CBC W/AUTO DIFF WBC: CPT | Performed by: HOSPITALIST

## 2018-02-08 RX ORDER — OXYCODONE AND ACETAMINOPHEN 5; 325 MG/1; MG/1
1 TABLET ORAL
Qty: 16 TAB | Refills: 0 | Status: SHIPPED | OUTPATIENT
Start: 2018-02-08 | End: 2018-04-13

## 2018-02-08 RX ADMIN — HYDROMORPHONE HYDROCHLORIDE 2 MG: 1 INJECTION, SOLUTION INTRAMUSCULAR; INTRAVENOUS; SUBCUTANEOUS at 02:55

## 2018-02-08 RX ADMIN — OXYCODONE HYDROCHLORIDE AND ACETAMINOPHEN 1 TABLET: 5; 325 TABLET ORAL at 12:38

## 2018-02-08 RX ADMIN — HYDROMORPHONE HYDROCHLORIDE 2 MG: 1 INJECTION, SOLUTION INTRAMUSCULAR; INTRAVENOUS; SUBCUTANEOUS at 08:21

## 2018-02-08 RX ADMIN — OXYCODONE HYDROCHLORIDE AND ACETAMINOPHEN 1 TABLET: 5; 325 TABLET ORAL at 06:33

## 2018-02-08 RX ADMIN — HYDROXYUREA 500 MG: 500 CAPSULE ORAL at 16:43

## 2018-02-08 RX ADMIN — OXYCODONE HYDROCHLORIDE AND ACETAMINOPHEN 1 TABLET: 5; 325 TABLET ORAL at 16:42

## 2018-02-08 RX ADMIN — HYDROXYUREA 500 MG: 500 CAPSULE ORAL at 09:43

## 2018-02-08 NOTE — PROGRESS NOTES
SW DISCHARGE NOTE: SW met with the pt to ensure he does not have any needs related to discharge. Pt reported his girlfriend will provide discharge transport. SW will be available for discharge purposes.     KAVITA Beaver

## 2018-02-08 NOTE — ROUTINE PROCESS
Bedside and Verbal shift change report given to [address] [place] [address] (oncoming nurse) by Shruthi Alcaraz (offgoing nurse). Report included the following information SBAR, Kardex, MAR and Recent Results. SITUATION:    Code Status: Full Code  Reason for Admission: Sickle cell crisis ([facility] [address]   Sickle cell crisis ([facility] [address] [address]    [facility] day: 3   Problem List:       Hospital Problems  Date Reviewed: 7/1/2017          Codes Class Noted POA    Sickle cell crisis ([facility] [address] ICD-10-CM: D57.00  ICD-9-CM: 282.62  1/26/2014 Unknown              BACKGROUND:    Past Medical History:   Past Medical History:   Diagnosis Date    B12 deficiency 03/15/2010    210    Cholelithiasis 09/17/2012    U/S Result: Cholelithiasis    Elevated alkaline phosphatase level 03/21/2010    158    Elevated ALT measurement 03/21/2010    71    Elevated AST (SGOT) 03/10/2012    38    Elevated platelet count (HCC) 10/25/2007    494    Elevated total protein 12/27/2011    8.7    Hypocalcemia 07/06/2011    4.1    Hypokalemia     Hyponatremia 11/18/2009    Leukocytosis 11/16/2009    Microcytic anemia 10/25/2007    Pleural effusion on right 07/15/2015    [facility] CXR Result    Reticulocytosis 10/25/2007    7.8    Serum total bilirubin elevated 12/27/2011    T.B. 3.8, D.B. 0.4.     Sickle cell anemia with crisis ([facility] [address]     Dr. Nini Ryder.  [place]    Vitamin D deficiency 01/20/2016    5.4         Patient taking anticoagulants no     ASSESSMENT:    Changes in Assessment Throughout Shift: none     Patient has Central Line: no Reasons if yes: n/a   Patient has Sesay Cath: no Reasons if yes: n/a      Last Vitals:     Vitals:    02/07/18 1945 02/07/18 2325 02/08/18 0401 02/08/18 0423   BP: 131/68 130/77 131/72    Pulse: 79 95 63    Resp: 20 20 20    Temp: 99 °F (37.2 °C) 99 °F (37.2 °C) 98.1 °F (36.7 °C)    SpO2: 92% 96% 97%    Weight:    60.1 kg (132 lb 6.4 oz)   Height:            IV and DRAINS (will only show if present)   Peripheral IV 02/05/18 Right External jugular-Site Assessment: Clean, dry, & intact     WOUND (if present)   Wound Type:  none   Dressing present Dressing Present : No   Wound Concerns/Notes:  none     PAIN    Pain Assessment    Pain Intensity 1: 0 (02/08/18 0331)    Pain Location 1: Back    Pain Intervention(s) 1: Medication (see MAR)    Patient Stated Pain Goal: 0  o Interventions for Pain:  yes  o Intervention effective: yes  o Time of last intervention: See MAR   o Reassessment Completed: yes      Last 3 Weights:  Last 3 Recorded Weights in this Encounter    02/06/18 0434 02/07/18 0400 02/08/18 0423   Weight: 78.1 kg (172 lb 1.6 oz) 80.7 kg (178 lb) 60.1 kg (132 lb 6.4 oz)     Weight change: -20.684 kg (-45 lb 9.6 oz)     INTAKE/OUPUT    Current Shift:      Last three shifts: 02/06 0701 - 02/07 1900  In: 960 [P.O.:960]  Out: 3125 [Urine:3125]     LAB RESULTS     Recent Labs      02/08/18   0238  02/06/18   0350  02/05/18   1250   WBC  13.6*  13.6*  16.7*   HGB  7.8*  8.0*  9.4*   HCT  23.4*  23.5*  26.6*   PLT  452*  360  432*        Recent Labs      02/08/18   0238  02/06/18   0350  02/05/18   1250   NA  139  138  136   K  3.8  3.4*  3.4*   GLU  91  103*  97   BUN  5*  6*  5*   CREA  0.69  0.74  0.84   CA  8.1*  8.0*  8.7       RECOMMENDATIONS AND DISCHARGE PLANNING     1. Pending tests/procedures/ Plan of Care or Other Needs: pain control     2. Discharge plan for patient and Needs/Barriers: home    3. Estimated Discharge Date: TBD Posted on Whiteboard in Patients Room: yes      4. The patient's care plan was reviewed with the oncoming nurse. \"HEALS\" SAFETY CHECK      Fall Risk    Total Score: 1    Safety Measures: Safety Measures: Bed/Chair-Wheels locked, Bed in low position, Call light within reach, Fall prevention (comment), Gripper socks    A safety check occurred in the patient's room between off going nurse and oncoming nurse listed above.     The safety check included the below items  Area Items   H  High Alert Medications - Verify all high alert medication drips (heparin, PCA, etc.)   E  Equipment - Suction is set up for ALL patients (with pat)  - Red plugs utilized for all equipment (IV pumps, etc.)  - WOWs wiped down at end of shift.  - Room stocked with oxygen, suction, and other unit-specific supplies   A  Alarms - Bed alarm is set for fall risk patients  - Ensure chair alarm is in place and activated if patient is up in a chair   L  Lines - Check IV for any infiltration  - Sesay bag is empty if patient has a Sesay   - Tubing and IV bags are labeled   S  Safety   - Room is clean, patient is clean, and equipment is clean. - Hallways are clear from equipment besides carts. - Fall bracelet on for fall risk patients  - Ensure room is clear and free of clutter  - Suction is set up for ALL patients (with pat)  - Hallways are clear from equipment besides carts.    - Isolation precautions followed, supplies available outside room, sign posted     Yonis Luciano

## 2018-02-08 NOTE — ROUTINE PROCESS
Bedside shift change report given to Bonnie Grullon (oncoming nurse) by Yuki Sosa RN (offgoing nurse). Report included the following information SBAR, Kardex and Recent Results.

## 2018-02-08 NOTE — DISCHARGE INSTRUCTIONS
Patient armband removed and shredded  Sickle Cell Crisis: Care Instructions  Your Care Instructions    Sickle cell crisis is a painful episode that may begin suddenly in a person with sickle cell disease. Sickle cell disease turns normal, round red blood cells into cells that look like jeane or crescent moons. The sickle-shaped cells can get stuck in blood vessels, blocking blood flow and causing severe pain. The pain can occur in the bones of the spine, the arms and legs, the chest, and the abdomen. An episode may be called a \"painful event\" or \"painful crisis. \" Some people who have sickle cell disease have many painful events, while others have few or none. Treatment depends on the level of pain and how long it lasts. Sometimes taking nonprescription pain relievers can help. Or you may need stronger pain relief medicine that is prescribed or given by a doctor. You may need to be treated in the hospital.  It isn't always possible to know what sets off a painful event. But triggers include being dehydrated, cold temperatures, infection, stress, and not getting enough oxygen. Follow-up care is a key part of your treatment and safety. Be sure to make and go to all appointments, and call your doctor if you are having problems. It's also a good idea to know your test results and keep a list of the medicines you take. How can you care for yourself at home? · Create a pain management plan with your doctor. This plan should include the types of medicines you can take and other actions you can take at home to relieve pain. · Drink plenty of fluids, enough so that your urine is light yellow or clear like water. If you have kidney, heart, or liver disease and have to limit fluids, talk with your doctor before you increase the amount of fluids you drink. · Take your medicines exactly as prescribed. Call your doctor if you think you are having a problem with your medicine.   · Take pain medicines exactly as directed. ¨ If the doctor gave you a prescription medicine for pain, take it as prescribed. ¨ If you are not taking a prescription pain medicine, ask your doctor if you can take an over-the-counter medicine. · Avoid alcohol. It can make you dehydrated. · Dress warmly in cold weather. The cold and windy weather can lead to severe pain. · Do not smoke. Smoking can reduce the amount of oxygen in your blood. · Get plenty of sleep. When should you call for help? Call 911 anytime you think you may need emergency care. For example, call if:  ? · You have symptoms of a severe problem from sickle cell. ? · You have symptoms of a stroke. These may include:  ¨ Sudden numbness, tingling, weakness, or loss of movement in your face, arm, or leg, especially on only one side of your body. ¨ Sudden vision changes. ¨ Sudden trouble speaking. ¨ Sudden confusion or trouble understanding simple statements. ¨ Sudden problems with walking or balance. ¨ A sudden, severe headache that is different from past headaches. ? · You are in severe pain. ? · You have symptoms of a heart attack. These may include:  ¨ Chest pain or pressure, or a strange feeling in the chest.  ¨ Sweating. ¨ Shortness of breath. ¨ Nausea or vomiting. ¨ Pain, pressure, or a strange feeling in the back, neck, jaw, or upper belly or in one or both shoulders or arms. ¨ Lightheadedness or sudden weakness. ¨ A fast or irregular heartbeat. After you call 911, the  may tell you to chew 1 adult-strength or 2 to 4 low-dose aspirin. Wait for an ambulance. Do not try to drive yourself. ?Call your doctor now or seek immediate medical care if:  ? · You have a fever. ? Watch closely for changes in your health, and be sure to contact your doctor if you have any problems. Where can you learn more? Go to http://manuel-amadeo.info/. Enter F104 in the search box to learn more about \"Sickle Cell Crisis: Care Instructions. \"  Current as of: October 13, 2016  Content Version: 11.4  © 0208-8817 [employer]. Care instructions adapted under license by [employer] (which disclaims liability or warranty for this information). If you have questions about a medical condition or this instruction, always ask your healthcare professional. [address] any warranty or liability for your use of this information. DISCHARGE SUMMARY from Nurse    PATIENT INSTRUCTIONS:    After general anesthesia or intravenous sedation, for 24 hours or while taking prescription Narcotics:  · Limit your activities  · Do not drive and operate hazardous machinery  · Do not make important personal or business decisions  · Do  not drink alcoholic beverages  · If you have not urinated within 8 hours after discharge, please contact your surgeon on call. Report the following to your surgeon:  · Excessive pain, swelling, redness or odor of or around the surgical area  · Temperature over 100.5  · Nausea and vomiting lasting longer than 4 hours or if unable to take medications  · Any signs of decreased circulation or nerve impairment to extremity: change in color, persistent  numbness, tingling, coldness or increase pain  · Any questions    What to do at Home:  Recommended activity: Activity as tolerated,     If you experience any of the following symptoms pain, please follow up with ER. *  Please give a list of your current medications to your Primary Care Provider. *  Please update this list whenever your medications are discontinued, doses are      changed, or new medications (including over-the-counter products) are added. *  Please carry medication information at all times in case of emergency situations.     These are general instructions for a healthy lifestyle:    No smoking/ No tobacco products/ Avoid exposure to second hand smoke  Surgeon General's Warning:  Quitting smoking now greatly reduces serious risk to your health. Obesity, smoking, and sedentary lifestyle greatly increases your risk for illness    A healthy diet, regular physical exercise & weight monitoring are important for maintaining a healthy lifestyle    You may be retaining fluid if you have a history of heart failure or if you experience any of the following symptoms:  Weight gain of 3 pounds or more overnight or 5 pounds in a week, increased swelling in our hands or feet or shortness of breath while lying flat in bed. Please call your doctor as soon as you notice any of these symptoms; do not wait until your next office visit. Recognize signs and symptoms of STROKE:    F-face looks uneven    A-arms unable to move or move unevenly    S-speech slurred or non-existent    T-time-call 911 as soon as signs and symptoms begin-DO NOT go       Back to bed or wait to see if you get better-TIME IS BRAIN. Warning Signs of HEART ATTACK     Call 911 if you have these symptoms:   Chest discomfort. Most heart attacks involve discomfort in the center of the chest that lasts more than a few minutes, or that goes away and comes back. It can feel like uncomfortable pressure, squeezing, fullness, or pain.  Discomfort in other areas of the upper body. Symptoms can include pain or discomfort in one or both arms, the back, neck, jaw, or stomach.  Shortness of breath with or without chest discomfort.  Other signs may include breaking out in a cold sweat, nausea, or lightheadedness. Don't wait more than five minutes to call 911 - MINUTES MATTER! Fast action can save your life. Calling 911 is almost always the fastest way to get lifesaving treatment. Emergency Medical Services staff can begin treatment when they arrive -- up to an hour sooner than if someone gets to the hospital by car. The discharge information has been reviewed with the patient. The patient verbalized understanding.   Discharge medications reviewed with the patient and appropriate educational materials and side effects teaching were provided.   ___________________________________________________________________________________________________________________________________

## 2018-02-08 NOTE — DISCHARGE SUMMARY
Physician Discharge Summary       Patient: Mckenna Kiser MRN: [id number]  SSN: [id number]    YOB: 1980  Age: 42 y.o. Sex: male    PCP: Godfrey Russo MD    Allergies: Eggshell membrane    Admit date: 2/5/2018  Admitting Provider: Jj Porras MD    Discharge date: 2/8/2018  Discharging Provider: Jj Porras MD    * Admission Diagnoses: Sickle cell crisis ([address]  Sickle cell crisis [facility])    * Discharge Diagnoses:    Hospital Problems as of 2/8/2018  Date Reviewed: 7/1/2017          Codes Class Noted - Resolved POA    Sickle cell crisis [facility]) ICD-10-CM: D57.00  ICD-9-CM: 282.62  1/26/2014 - Present Unknown              * Hospital Course: The patient is a pleasant 40yo AA male admitted 02/05/18 secondary to severe generalized pain consistent with an acute sickle cell crisis. He was admitted to the medical unit, IV and oral pain control was initiated, he was provider IV hydration, and encouraged to ambulate. Over a period of several days I was able to wean his IV pain control and the patient is doing well on oral pain control only. I will provide him with 4 more days of pain control. He will follow up with his PCP in the next couple weeks. * Procedures: None  * No surgery found *      Consults: None    Significant Diagnostic Studies: None    Discharge Exam:  General appearance: alert, cooperative, no distress, appears stated age  Head: Normocephalic, without obvious abnormality, atraumatic  Lungs: clear to auscultation bilaterally  Chest wall: no tenderness  Heart: regular rate and rhythm, S1, S2 normal, no murmur, click, rub or gallop  Abdomen: soft, non-tender.  Bowel sounds normal. No masses,  no organomegaly  Extremities: extremities normal, atraumatic, no cyanosis or edema  Neurologic: Grossly normal    * Discharge Condition: good  * Disposition: Home    Discharge Medications:  Current Discharge Medication List      START taking these medications    Details oxyCODONE-acetaminophen (PERCOCET) 5-325 mg per tablet Take 1 Tab by mouth every six (6) hours as needed for Pain. Max Daily Amount: 4 Tabs. Qty: 16 Tab, Refills: 0    Associated Diagnoses: Sickle cell crisis ([address]         CONTINUE these medications which have NOT CHANGED    Details   HYDROmorphone (DILAUDID) 2 mg tablet Take 1 Tab by mouth every six (6) hours as needed for Pain. Max Daily Amount: 8 mg. Indications: Pain  Qty: 12 Tab, Refills: 0      hydroxyurea (HYDREA) 500 mg capsule Take 500 mg by mouth two (2) times a day. Indications: SICKLE CELL ANEMIA WITH CRISIS      fentaNYL (DURAGESIC) 25 mcg/hr PATCH 1 Patch by TransDERmal route every seventy-two (72) hours. Max Daily Amount: 1 Patch. Qty: 5 Patch, Refills: 0             * Follow-up Care/Patient Instructions:   Activity: Activity as tolerated  Diet: Regular Diet  Wound Care: None needed    Follow-up Information     Follow up With Details Comments Contact Info    Surendra Servin MD   [address] [place] [address]  [phone]            Signed:  Va Smith MD  2/8/2018  2:34 PM

## 2018-02-08 NOTE — PROGRESS NOTES
Pt with BMI of 18 kg/(m^2) today. Noted weight taken today of 132 lbs with no weight source documented. Weight yesterday of 178 lbs using standing scale. Weight using standing scale appears more accurate with a BMI of 24.1 kg/(m^2). Pt with good appetite and meal intake. Tolerating diet with no nutritional concerns at this time. Please consult if further nutrition screen is needed.     Keaton Price RD  Pager: [phone]

## 2018-03-25 ENCOUNTER — HOSPITAL ENCOUNTER (EMERGENCY)
Age: 38
Discharge: HOME | End: 2018-03-25
Attending: EMERGENCY MEDICINE
Payer: MEDICARE

## 2018-03-25 VITALS
TEMPERATURE: 98.6 F | BODY MASS INDEX: 24.14 KG/M2 | SYSTOLIC BLOOD PRESSURE: 127 MMHG | OXYGEN SATURATION: 95 % | DIASTOLIC BLOOD PRESSURE: 84 MMHG | RESPIRATION RATE: 16 BRPM | WEIGHT: 178 LBS | HEART RATE: 68 BPM

## 2018-03-25 DIAGNOSIS — D57.00 SICKLE CELL ANEMIA WITH PAIN (HCC): Primary | ICD-10-CM

## 2018-03-25 LAB
ALBUMIN SERPL-MCNC: 3.7 G/DL (ref 3.4–5)
ALBUMIN/GLOB SERPL: 0.8 {RATIO} (ref 0.8–1.7)
ALP SERPL-CCNC: 126 U/L (ref 45–117)
ALT SERPL-CCNC: 49 U/L (ref 16–61)
ANION GAP SERPL CALC-SCNC: 7 MMOL/L (ref 3–18)
AST SERPL-CCNC: 67 U/L (ref 15–37)
BASOPHILS # BLD: 0.2 K/UL (ref 0–0.06)
BASOPHILS NFR BLD: 2 % (ref 0–3)
BILIRUB SERPL-MCNC: 3.3 MG/DL (ref 0.2–1)
BUN SERPL-MCNC: 7 MG/DL (ref 7–18)
BUN/CREAT SERPL: 8 (ref 12–20)
CALCIUM SERPL-MCNC: 8.2 MG/DL (ref 8.5–10.1)
CHLORIDE SERPL-SCNC: 107 MMOL/L (ref 100–108)
CO2 SERPL-SCNC: 26 MMOL/L (ref 21–32)
CREAT SERPL-MCNC: 0.86 MG/DL (ref 0.6–1.3)
DIFFERENTIAL METHOD BLD: ABNORMAL
EOSINOPHIL # BLD: 1.3 K/UL (ref 0–0.4)
EOSINOPHIL NFR BLD: 11 % (ref 0–5)
ERYTHROCYTE [DISTWIDTH] IN BLOOD BY AUTOMATED COUNT: 24 % (ref 11.6–14.5)
GLOBULIN SER CALC-MCNC: 4.4 G/DL (ref 2–4)
GLUCOSE SERPL-MCNC: 90 MG/DL (ref 74–99)
HCT VFR BLD AUTO: 25.4 % (ref 36–48)
HGB BLD-MCNC: 8.6 G/DL (ref 13–16)
LYMPHOCYTES # BLD: 5.3 K/UL (ref 0.8–3.5)
LYMPHOCYTES NFR BLD: 46 % (ref 20–51)
MCH RBC QN AUTO: 24.7 PG (ref 24–34)
MCHC RBC AUTO-ENTMCNC: 33.9 G/DL (ref 31–37)
MCV RBC AUTO: 73 FL (ref 74–97)
MONOCYTES # BLD: 1 K/UL (ref 0–1)
MONOCYTES NFR BLD: 9 % (ref 2–9)
NEUTS SEG # BLD: 3.6 K/UL (ref 1.8–8)
NEUTS SEG NFR BLD: 32 % (ref 42–75)
NRBC BLD-RTO: 1 PER 100 WBC
PLATELET # BLD AUTO: 414 K/UL (ref 135–420)
PLATELET COMMENTS,PCOM: ABNORMAL
PMV BLD AUTO: 9.4 FL (ref 9.2–11.8)
POTASSIUM SERPL-SCNC: 4.4 MMOL/L (ref 3.5–5.5)
PROT SERPL-MCNC: 8.1 G/DL (ref 6.4–8.2)
RBC # BLD AUTO: 3.48 M/UL (ref 4.7–5.5)
RBC MORPH BLD: ABNORMAL
RETICS/RBC NFR AUTO: 9.5 % (ref 0.5–2.3)
SODIUM SERPL-SCNC: 140 MMOL/L (ref 136–145)
WBC # BLD AUTO: 11.4 K/UL (ref 4.6–13.2)
WBC MORPH BLD: ABNORMAL

## 2018-03-25 PROCEDURE — 96376 TX/PRO/DX INJ SAME DRUG ADON: CPT

## 2018-03-25 PROCEDURE — 85045 AUTOMATED RETICULOCYTE COUNT: CPT | Performed by: PHYSICIAN ASSISTANT

## 2018-03-25 PROCEDURE — 96361 HYDRATE IV INFUSION ADD-ON: CPT

## 2018-03-25 PROCEDURE — 80053 COMPREHEN METABOLIC PANEL: CPT | Performed by: PHYSICIAN ASSISTANT

## 2018-03-25 PROCEDURE — 85025 COMPLETE CBC W/AUTO DIFF WBC: CPT | Performed by: PHYSICIAN ASSISTANT

## 2018-03-25 PROCEDURE — 74011250636 HC RX REV CODE- 250/636: Performed by: PHYSICIAN ASSISTANT

## 2018-03-25 PROCEDURE — 99282 EMERGENCY DEPT VISIT SF MDM: CPT

## 2018-03-25 PROCEDURE — 96374 THER/PROPH/DIAG INJ IV PUSH: CPT

## 2018-03-25 RX ORDER — FENTANYL CITRATE 50 UG/ML
100 INJECTION, SOLUTION INTRAMUSCULAR; INTRAVENOUS
Status: COMPLETED | OUTPATIENT
Start: 2018-03-25 | End: 2018-03-25

## 2018-03-25 RX ADMIN — FENTANYL CITRATE 100 MCG: 50 INJECTION INTRAMUSCULAR; INTRAVENOUS at 10:50

## 2018-03-25 RX ADMIN — SODIUM CHLORIDE 1000 ML: 900 INJECTION, SOLUTION INTRAVENOUS at 08:33

## 2018-03-25 RX ADMIN — FENTANYL CITRATE 100 MCG: 50 INJECTION INTRAMUSCULAR; INTRAVENOUS at 08:57

## 2018-03-25 NOTE — DISCHARGE INSTRUCTIONS
Sickle Cell Crisis: Care Instructions  Your Care Instructions    Sickle cell crisis is a painful episode that may begin suddenly in a person with sickle cell disease. Sickle cell disease turns normal, round red blood cells into cells that look like jeane or crescent moons. The sickle-shaped cells can get stuck in blood vessels, blocking blood flow and causing severe pain. The pain can occur in the bones of the spine, the arms and legs, the chest, and the abdomen. An episode may be called a \"painful event\" or \"painful crisis. \" Some people who have sickle cell disease have many painful events, while others have few or none. Treatment depends on the level of pain and how long it lasts. Sometimes taking nonprescription pain relievers can help. Or you may need stronger pain relief medicine that is prescribed or given by a doctor. You may need to be treated in the hospital.  It isn't always possible to know what sets off a painful event. But triggers include being dehydrated, cold temperatures, infection, stress, and not getting enough oxygen. Follow-up care is a key part of your treatment and safety. Be sure to make and go to all appointments, and call your doctor if you are having problems. It's also a good idea to know your test results and keep a list of the medicines you take. How can you care for yourself at home? · Create a pain management plan with your doctor. This plan should include the types of medicines you can take and other actions you can take at home to relieve pain. · Drink plenty of fluids, enough so that your urine is light yellow or clear like water. If you have kidney, heart, or liver disease and have to limit fluids, talk with your doctor before you increase the amount of fluids you drink. · Take your medicines exactly as prescribed. Call your doctor if you think you are having a problem with your medicine. · Take pain medicines exactly as directed.   ¨ If the doctor gave you a prescription medicine for pain, take it as prescribed. ¨ If you are not taking a prescription pain medicine, ask your doctor if you can take an over-the-counter medicine. · Avoid alcohol. It can make you dehydrated. · Dress warmly in cold weather. The cold and windy weather can lead to severe pain. · Do not smoke. Smoking can reduce the amount of oxygen in your blood. · Get plenty of sleep. When should you call for help? Call 911 anytime you think you may need emergency care. For example, call if:  ? · You have symptoms of a severe problem from sickle cell. ? · You have symptoms of a stroke. These may include:  ¨ Sudden numbness, tingling, weakness, or loss of movement in your face, arm, or leg, especially on only one side of your body. ¨ Sudden vision changes. ¨ Sudden trouble speaking. ¨ Sudden confusion or trouble understanding simple statements. ¨ Sudden problems with walking or balance. ¨ A sudden, severe headache that is different from past headaches. ? · You are in severe pain. ? · You have symptoms of a heart attack. These may include:  ¨ Chest pain or pressure, or a strange feeling in the chest.  ¨ Sweating. ¨ Shortness of breath. ¨ Nausea or vomiting. ¨ Pain, pressure, or a strange feeling in the back, neck, jaw, or upper belly or in one or both shoulders or arms. ¨ Lightheadedness or sudden weakness. ¨ A fast or irregular heartbeat. After you call 911, the  may tell you to chew 1 adult-strength or 2 to 4 low-dose aspirin. Wait for an ambulance. Do not try to drive yourself. ?Call your doctor now or seek immediate medical care if:  ? · You have a fever. ? Watch closely for changes in your health, and be sure to contact your doctor if you have any problems. Where can you learn more? Go to http://manuel-amadeo.info/. Enter F104 in the search box to learn more about \"Sickle Cell Crisis: Care Instructions. \"  Current as of: October 13, 2016  Content Version: 11.4  © 0440-2876 [employer]. Care instructions adapted under license by [employer] (which disclaims liability or warranty for this information). If you have questions about a medical condition or this instruction, always ask your healthcare professional. [address] any warranty or liability for your use of this information.

## 2018-03-25 NOTE — ED PROVIDER NOTES
EMERGENCY DEPARTMENT HISTORY AND PHYSICAL EXAM    Date: 3/25/2018  Patient Name: Cristel Paul    History of Presenting Illness     Chief Complaint   Patient presents with    Sickle Cell Crisis         History Provided By: Patient    Chief Complaint: Pain  Duration: 5-6 Hours  Timing:  Acute  Location: Low back, bilateral leg, left arm  Quality: Aching  Severity: 8 out of 10  Modifying Factors: None identified. Associated Symptoms: denies any other associated signs or symptoms      Additional History (Context): Cristel Paul is a 42 y.o. male with sickle cell disease with recent admission in February for pain control and acute chest syndrome who presents with bilateral lower back pain, bilateral knee and lower extremity pain, and left arm pain all typical of his sickle cell pain. Pain started around 0958-2100 this morning, not controlled with his Fentanyl patch 25mcg/hr placed 1.5 days ago. Has not taken anything else for pain control. Denies chest pain, shortness of breath, urinary symptoms, fever or chills. Sees Dr. Thalia Dunbar every 3 months, next visit in April. PCP: Gene Stokes MD    Current Outpatient Prescriptions   Medication Sig Dispense Refill    oxyCODONE-acetaminophen (PERCOCET) 5-325 mg per tablet Take 1 Tab by mouth every six (6) hours as needed for Pain. Max Daily Amount: 4 Tabs. 16 Tab 0    HYDROmorphone (DILAUDID) 2 mg tablet Take 1 Tab by mouth every six (6) hours as needed for Pain. Max Daily Amount: 8 mg. Indications: Pain 12 Tab 0    hydroxyurea (HYDREA) 500 mg capsule Take 500 mg by mouth two (2) times a day. Indications: SICKLE CELL ANEMIA WITH CRISIS      fentaNYL (DURAGESIC) 25 mcg/hr PATCH 1 Patch by TransDERmal route every seventy-two (72) hours. Max Daily Amount: 1 Patch.  5 Patch 0       Past History     Past Medical History:  Past Medical History:   Diagnosis Date    B12 deficiency 03/15/2010    210    Cholelithiasis 09/17/2012    U/S Result: Cholelithiasis    Elevated alkaline phosphatase level 03/21/2010    158    Elevated ALT measurement 03/21/2010    71    Elevated AST (SGOT) 03/10/2012    38    Elevated platelet count (HCC) 10/25/2007    494    Elevated total protein 12/27/2011    8.7    Hypocalcemia 07/06/2011    4.1    Hypokalemia     Hyponatremia 11/18/2009    Leukocytosis 11/16/2009    Microcytic anemia 10/25/2007    Pleural effusion on right 07/15/2015    [facility] CXR Result    Reticulocytosis 10/25/2007    7.8    Serum total bilirubin elevated 12/27/2011    T.B. 3.8, D.B. 0.4.     Sickle cell anemia with crisis ([facility] [address]     Dr. Tiffanie Rebolledo. [place]    Vitamin D deficiency 01/20/2016    5.4       Past Surgical History:  History reviewed. No pertinent surgical history. Family History:  Family History   Problem Relation Age of Onset    Cancer Neg Hx     Diabetes Neg Hx     Heart Disease Neg Hx     Heart Attack Neg Hx     Hypertension Neg Hx     Stroke Neg Hx        Social History:  Social History   Substance Use Topics    Smoking status: Never Smoker    Smokeless tobacco: Never Used    Alcohol use Yes      Comment: Occasional       Allergies: Allergies   Allergen Reactions    Eggshell Membrane Nausea and Vomiting         Review of Systems   Review of Systems   Constitutional: Negative for chills and fever. Eyes: Negative for visual disturbance. Respiratory: Negative for shortness of breath. Cardiovascular: Negative for chest pain. Gastrointestinal: Negative for abdominal pain, nausea and vomiting. Genitourinary: Negative for dysuria. Musculoskeletal: Positive for arthralgias, back pain and myalgias. Skin: Negative for rash. Neurological: Negative for syncope. Psychiatric/Behavioral: Negative for confusion.      All Other Systems Negative  Physical Exam     Vitals:    03/25/18 1015 03/25/18 1030 03/25/18 1045 03/25/18 1100   BP:       Pulse:       Resp:       Temp:       SpO2: 95% 99% 100% 95%   Weight:         Physical Exam Constitutional: He is oriented to person, place, and time. He appears well-developed and well-nourished. No distress. HENT:   Head: Normocephalic and atraumatic. Right Ear: External ear normal.   Left Ear: External ear normal.   Nose: Nose normal.   Eyes: Scleral icterus is present. Neck: Normal range of motion. Cardiovascular: Normal rate, regular rhythm and normal heart sounds. Pulmonary/Chest: Effort normal and breath sounds normal. No respiratory distress. He has no wheezes. He exhibits no tenderness. Abdominal: Soft. Bowel sounds are normal. He exhibits no distension. There is no tenderness. There is no rebound and no guarding. Musculoskeletal: Normal range of motion. Left arm is non-tender to palpation. No midline tenderness along the back. Bilateral lower back is non-tender to palpation. No bony TTP of the knee joints bilaterally. No calf tenderness bilaterally. Neurological: He is alert and oriented to person, place, and time. Skin: Skin is warm and dry. He is not diaphoretic. No ecchymosis, edema, or skin changes appreciated. Psychiatric: He has a normal mood and affect. His behavior is normal.   Vitals reviewed. Diagnostic Study Results     Labs -     Recent Results (from the past 12 hour(s))   CBC WITH AUTOMATED DIFF    Collection Time: 03/25/18  9:19 AM   Result Value Ref Range    WBC 11.4 4.6 - 13.2 K/uL    RBC 3.48 (L) 4.70 - 5.50 M/uL    HGB 8.6 (L) 13.0 - 16.0 g/dL    HCT 25.4 (L) 36.0 - 48.0 %    MCV 73.0 (L) 74.0 - 97.0 FL    MCH 24.7 24.0 - 34.0 PG    MCHC 33.9 31.0 - 37.0 g/dL    RDW 24.0 (H) 11.6 - 14.5 %    PLATELET 937 879 - 297 K/uL    MPV 9.4 9.2 - 11.8 FL    NEUTROPHILS 32 (L) 42 - 75 %    LYMPHOCYTES 46 20 - 51 %    MONOCYTES 9 2 - 9 %    EOSINOPHILS 11 (H) 0 - 5 %    BASOPHILS 2 0 - 3 %    NRBC 1.0 (H) 0  WBC    ABS. NEUTROPHILS 3.6 1.8 - 8.0 K/UL    ABS. LYMPHOCYTES 5.3 (H) 0.8 - 3.5 K/UL    ABS. MONOCYTES 1.0 0 - 1.0 K/UL    ABS.  EOSINOPHILS 1.3 (H) 0.0 - 0.4 K/UL    ABS. BASOPHILS 0.2 (H) 0.0 - 0.06 K/UL    DF MANUAL      PLATELET COMMENTS ADEQUATE PLATELETS      RBC COMMENTS POLYCHROMASIA  1+        RBC COMMENTS SCHISTOCYTES  1+        RBC COMMENTS TARGET CELLS  1+        RBC COMMENTS SICKLE CELLS  2+        RBC COMMENTS POIKILOCYTOSIS  2+        WBC COMMENTS LARGE PLATELETS     METABOLIC PANEL, COMPREHENSIVE    Collection Time: 03/25/18  9:19 AM   Result Value Ref Range    Sodium 140 136 - 145 mmol/L    Potassium 4.4 3.5 - 5.5 mmol/L    Chloride 107 100 - 108 mmol/L    CO2 26 21 - 32 mmol/L    Anion gap 7 3.0 - 18 mmol/L    Glucose 90 74 - 99 mg/dL    BUN 7 7.0 - 18 MG/DL    Creatinine 0.86 0.6 - 1.3 MG/DL    BUN/Creatinine ratio 8 (L) 12 - 20      GFR est AA >60 >60 ml/min/1.73m2    GFR est non-AA >60 >60 ml/min/1.73m2    Calcium 8.2 (L) 8.5 - 10.1 MG/DL    Bilirubin, total 3.3 (H) 0.2 - 1.0 MG/DL    ALT (SGPT) 49 16 - 61 U/L    AST (SGOT) 67 (H) 15 - 37 U/L    Alk. phosphatase 126 (H) 45 - 117 U/L    Protein, total 8.1 6.4 - 8.2 g/dL    Albumin 3.7 3.4 - 5.0 g/dL    Globulin 4.4 (H) 2.0 - 4.0 g/dL    A-G Ratio 0.8 0.8 - 1.7     RETICULOCYTE COUNT    Collection Time: 03/25/18  9:19 AM   Result Value Ref Range    Reticulocyte count 9.5 (H) 0.5 - 2.3 %       Radiologic Studies -   No orders to display     CT Results  (Last 48 hours)    None        CXR Results  (Last 48 hours)    None            Medical Decision Making   I am the first provider for this patient. I reviewed the vital signs, available nursing notes, past medical history, past surgical history, family history and social history. Vital Signs-Reviewed the patient's vital signs. Pulse Oximetry Analysis - 97% on RA    Records Reviewed: Nursing Notes, Old Medical Records and Previous Laboratory Studies    Procedures:  Procedures    Provider Notes (Medical Decision Making): Pt with known sickle cell disease presenting with pain typical of his pain crises.  Will check labs, hydrate, address pain with IV Fentanyl. 9:27 AM Pt reassessed. States back and arm pain have resolved. Legs are still hurting, but improved and currently 6/10 on pain scale. 10:20 AM Pt reassessed. Still reporting leg pain, 6/10. Will give another dose of pain medication. 11:27 AM Pt reassessed. He is eating lean cuisine meal and on his phone. States he is feeling better and feels he can be discharged to home. Labs are all at baseline. Currently, HR is 60, oxygen saturation 100%. MED RECONCILIATION:  No current facility-administered medications for this encounter. Current Outpatient Prescriptions   Medication Sig    oxyCODONE-acetaminophen (PERCOCET) 5-325 mg per tablet Take 1 Tab by mouth every six (6) hours as needed for Pain. Max Daily Amount: 4 Tabs.  HYDROmorphone (DILAUDID) 2 mg tablet Take 1 Tab by mouth every six (6) hours as needed for Pain. Max Daily Amount: 8 mg. Indications: Pain    hydroxyurea (HYDREA) 500 mg capsule Take 500 mg by mouth two (2) times a day. Indications: SICKLE CELL ANEMIA WITH CRISIS    fentaNYL (DURAGESIC) 25 mcg/hr PATCH 1 Patch by TransDERmal route every seventy-two (72) hours. Max Daily Amount: 1 Patch. Disposition:  Discharge. DISCHARGE NOTE:   11:28 AM   Pt has been reexamined. He states his pain is improved and he feel he can go home. Patient has no new complaints, changes, or physical findings. Care plan outlined and precautions discussed. Results of labs were reviewed with the patient. All of pt's questions and concerns were addressed. Patient was instructed and agrees to follow up with hematologist, as well as to return to the ED upon further deterioration. Patient is ready to go home. Follow-up Information     Follow up With Details Comments Contact Krystle Mckeon MD Call in 1 day Call tomorrow to see if you can be seen earlier.  [address] [facility] EMERGENCY DEPT  As needed, If symptoms [address] [address]  [phone]          Current Discharge Medication List            Diagnosis     Clinical Impression:   1.  Sickle cell anemia with pain (HCC)          Estrella Salmon PA-C 11:30 AM

## 2018-04-02 ENCOUNTER — HOSPITAL ENCOUNTER (EMERGENCY)
Age: 38
Discharge: HOME | DRG: 812 | End: 2018-04-02
Attending: EMERGENCY MEDICINE | Admitting: EMERGENCY MEDICINE
Payer: MEDICARE

## 2018-04-02 ENCOUNTER — APPOINTMENT (OUTPATIENT)
Dept: GENERAL RADIOLOGY | Age: 38
DRG: 812 | End: 2018-04-02
Attending: PHYSICIAN ASSISTANT
Payer: MEDICARE

## 2018-04-02 VITALS
OXYGEN SATURATION: 100 % | HEIGHT: 72 IN | HEART RATE: 81 BPM | WEIGHT: 178 LBS | RESPIRATION RATE: 16 BRPM | DIASTOLIC BLOOD PRESSURE: 56 MMHG | TEMPERATURE: 98.6 F | SYSTOLIC BLOOD PRESSURE: 129 MMHG | BODY MASS INDEX: 24.11 KG/M2

## 2018-04-02 DIAGNOSIS — D57.00 SICKLE CELL CRISIS (HCC): Primary | ICD-10-CM

## 2018-04-02 LAB
ANION GAP SERPL CALC-SCNC: 7 MMOL/L (ref 3–18)
BASOPHILS # BLD: 0.1 K/UL (ref 0–0.06)
BASOPHILS NFR BLD: 1 % (ref 0–2)
BUN SERPL-MCNC: 5 MG/DL (ref 7–18)
BUN/CREAT SERPL: 6 (ref 12–20)
CALCIUM SERPL-MCNC: 9.5 MG/DL (ref 8.5–10.1)
CHLORIDE SERPL-SCNC: 104 MMOL/L (ref 100–108)
CO2 SERPL-SCNC: 24 MMOL/L (ref 21–32)
CREAT SERPL-MCNC: 0.86 MG/DL (ref 0.6–1.3)
DIFFERENTIAL METHOD BLD: ABNORMAL
EOSINOPHIL # BLD: 0.2 K/UL (ref 0–0.4)
EOSINOPHIL NFR BLD: 2 % (ref 0–5)
ERYTHROCYTE [DISTWIDTH] IN BLOOD BY AUTOMATED COUNT: 22.7 % (ref 11.6–14.5)
GLUCOSE SERPL-MCNC: 88 MG/DL (ref 74–99)
HCT VFR BLD AUTO: 30.1 % (ref 36–48)
HGB BLD-MCNC: 10.7 G/DL (ref 13–16)
LYMPHOCYTES # BLD: 4 K/UL (ref 0.9–3.6)
LYMPHOCYTES NFR BLD: 37 % (ref 21–52)
MCH RBC QN AUTO: 25.2 PG (ref 24–34)
MCHC RBC AUTO-ENTMCNC: 35.5 G/DL (ref 31–37)
MCV RBC AUTO: 71 FL (ref 74–97)
MONOCYTES # BLD: 0.9 K/UL (ref 0.05–1.2)
MONOCYTES NFR BLD: 8 % (ref 3–10)
NEUTS SEG # BLD: 5.5 K/UL (ref 1.8–8)
NEUTS SEG NFR BLD: 52 % (ref 40–73)
PLATELET # BLD AUTO: 469 K/UL (ref 135–420)
PLATELET COMMENTS,PCOM: ABNORMAL
PMV BLD AUTO: 9.5 FL (ref 9.2–11.8)
POTASSIUM SERPL-SCNC: 4.5 MMOL/L (ref 3.5–5.5)
RBC # BLD AUTO: 4.24 M/UL (ref 4.7–5.5)
RBC MORPH BLD: ABNORMAL
RETICS/RBC NFR AUTO: 8.7 % (ref 0.5–2.3)
SODIUM SERPL-SCNC: 135 MMOL/L (ref 136–145)
WBC # BLD AUTO: 10.7 K/UL (ref 4.6–13.2)

## 2018-04-02 PROCEDURE — 96374 THER/PROPH/DIAG INJ IV PUSH: CPT

## 2018-04-02 PROCEDURE — 99283 EMERGENCY DEPT VISIT LOW MDM: CPT

## 2018-04-02 PROCEDURE — 80048 BASIC METABOLIC PNL TOTAL CA: CPT

## 2018-04-02 PROCEDURE — 74011250637 HC RX REV CODE- 250/637: Performed by: EMERGENCY MEDICINE

## 2018-04-02 PROCEDURE — 74011250636 HC RX REV CODE- 250/636: Performed by: EMERGENCY MEDICINE

## 2018-04-02 PROCEDURE — 85045 AUTOMATED RETICULOCYTE COUNT: CPT

## 2018-04-02 PROCEDURE — 85025 COMPLETE CBC W/AUTO DIFF WBC: CPT

## 2018-04-02 PROCEDURE — 96376 TX/PRO/DX INJ SAME DRUG ADON: CPT

## 2018-04-02 PROCEDURE — 96361 HYDRATE IV INFUSION ADD-ON: CPT

## 2018-04-02 PROCEDURE — 71046 X-RAY EXAM CHEST 2 VIEWS: CPT

## 2018-04-02 RX ORDER — FENTANYL CITRATE 50 UG/ML
100 INJECTION, SOLUTION INTRAMUSCULAR; INTRAVENOUS ONCE
Status: COMPLETED | OUTPATIENT
Start: 2018-04-02 | End: 2018-04-02

## 2018-04-02 RX ORDER — HYDROMORPHONE HYDROCHLORIDE 2 MG/1
2 TABLET ORAL ONCE
Status: COMPLETED | OUTPATIENT
Start: 2018-04-02 | End: 2018-04-02

## 2018-04-02 RX ADMIN — HYDROMORPHONE HYDROCHLORIDE 2 MG: 2 TABLET ORAL at 14:22

## 2018-04-02 RX ADMIN — FENTANYL CITRATE 100 MCG: 50 INJECTION INTRAMUSCULAR; INTRAVENOUS at 15:48

## 2018-04-02 RX ADMIN — FENTANYL CITRATE 100 MCG: 50 INJECTION INTRAMUSCULAR; INTRAVENOUS at 14:22

## 2018-04-02 RX ADMIN — SODIUM CHLORIDE 1000 ML: 900 INJECTION, SOLUTION INTRAVENOUS at 14:26

## 2018-04-02 NOTE — ED NOTES
I performed a brief evaluation, including history and physical, of the patient here in triage and I have determined that pt will need further treatment and evaluation from the main side ER physician. I have placed initial orders to help in expediting patients care.      April 02, 2018 at 12:00 PM - Sb Johnson PA-C        Visit Vitals    /56 (BP 1 Location: Left arm, BP Patient Position: Sitting)    Pulse 81    Temp 98.6 °F (37 °C)    Resp 16    Ht 6' (1.829 m)    Wt 80.7 kg (178 lb)    SpO2 100%    BMI 24.14 kg/m2

## 2018-04-02 NOTE — ED PROVIDER NOTES
EMERGENCY DEPARTMENT HISTORY AND PHYSICAL EXAM    12:46 PM      Date: 4/2/2018  Patient Name: Tona Garza    History of Presenting Illness     Chief Complaint   Patient presents with    Sickle Cell Crisis         History Provided By: Patient    Chief Complaint: Sickle cell crisis  Duration: 12 Hours  Timing:  Worsening  Location: Lower back and legs  Quality: Aching  Severity: 9 out of 10  Modifying Factors: None  Associated Symptoms: denies any other associated signs or symptoms      Additional History (Context): Tona Garza is a 42 y.o. male with sickle cell anemia, hypokalemia, hypocalcemia, and hyponatremia who presents with worsening 9/10 aching sickle cell crisis pain over the past 12 hours. Pt reports that his pain is in his lower back and bilaterally posterior sides of his calves. The pt takes Dilaudid pills and has Fentanyl patches to help control his pain. The pt stated that he has taken 3 pills in the last 12 hours, in conjunction with the patch, but it has not alleviated his pain. The last pill he took was at 10 AM. He works at a group home and denies nausea, CP, and SOB. This pain he is in \"feels similar to previous sickle cell flare ups. \" No other concerns, modifying factors, or symptoms were expressed by the pt at this time. PCP: Earnestine Cunningham MD    Current Outpatient Prescriptions   Medication Sig Dispense Refill    oxyCODONE-acetaminophen (PERCOCET) 5-325 mg per tablet Take 1 Tab by mouth every six (6) hours as needed for Pain. Max Daily Amount: 4 Tabs. 16 Tab 0    HYDROmorphone (DILAUDID) 2 mg tablet Take 1 Tab by mouth every six (6) hours as needed for Pain. Max Daily Amount: 8 mg. Indications: Pain 12 Tab 0    hydroxyurea (HYDREA) 500 mg capsule Take 500 mg by mouth two (2) times a day. Indications: SICKLE CELL ANEMIA WITH CRISIS      fentaNYL (DURAGESIC) 25 mcg/hr PATCH 1 Patch by TransDERmal route every seventy-two (72) hours. Max Daily Amount: 1 Patch.  5 Patch 0       Past History     Past Medical History:  Past Medical History:   Diagnosis Date    B12 deficiency 03/15/2010    210    Cholelithiasis 09/17/2012    U/S Result: Cholelithiasis    Elevated alkaline phosphatase level 03/21/2010    158    Elevated ALT measurement 03/21/2010    71    Elevated AST (SGOT) 03/10/2012    38    Elevated platelet count 84/71/2824    494    Elevated total protein 12/27/2011    8.7    Hypocalcemia 07/06/2011    4.1    Hypokalemia     Hyponatremia 11/18/2009    Leukocytosis 11/16/2009    Microcytic anemia 10/25/2007    Pleural effusion on right 07/15/2015    [facility] CXR Result    Reticulocytosis 10/25/2007    7.8    Serum total bilirubin elevated 12/27/2011    T.B. 3.8, D.B. 0.4.     Sickle cell anemia with crisis ([facility] [address]     Dr. Tina Riedel. [place]    Vitamin D deficiency 01/20/2016    5.4       Past Surgical History:  No past surgical history on file. Family History:  Family History   Problem Relation Age of Onset    Cancer Neg Hx     Diabetes Neg Hx     Heart Disease Neg Hx     Heart Attack Neg Hx     Hypertension Neg Hx     Stroke Neg Hx        Social History:  Social History   Substance Use Topics    Smoking status: Never Smoker    Smokeless tobacco: Never Used    Alcohol use Yes      Comment: Occasional       Allergies: Allergies   Allergen Reactions    Eggshell Membrane Nausea and Vomiting         Review of Systems     Review of Systems   Constitutional: Negative for chills, diaphoresis and fever. HENT: Negative for rhinorrhea. Respiratory: Negative for shortness of breath. Cardiovascular: Negative for chest pain. Gastrointestinal: Negative for abdominal pain, nausea and vomiting. Endocrine: Negative for polyuria. Genitourinary: Negative for dysuria. Musculoskeletal: Positive for back pain (lower) and myalgias (bilateral posterior calf pain). Skin: Negative for rash. Neurological: Negative for weakness and headaches.    All other systems reviewed and are negative. Physical Exam     Visit Vitals    /56 (BP 1 Location: Left arm, BP Patient Position: Sitting)    Pulse 81    Temp 98.6 °F (37 °C)    Resp 16    Ht 6' (1.829 m)    Wt 80.7 kg (178 lb)    SpO2 100%    BMI 24.14 kg/m2     Physical Exam   Constitutional: He is oriented to person, place, and time. He appears well-developed and well-nourished. Speaking in full sentences. Pt does not appear to be in significant amounts of pain. HENT:   Head: Normocephalic and atraumatic. Eyes: Conjunctivae are normal. Pupils are equal, round, and reactive to light. Neck: Normal range of motion. Neck supple. Cardiovascular: Normal rate and regular rhythm. Pulmonary/Chest: Effort normal and breath sounds normal. No respiratory distress. He has no wheezes. Abdominal: Soft. Bowel sounds are normal. He exhibits no distension. There is no tenderness. There is no rebound and no guarding. Musculoskeletal: Normal range of motion. Neurological: He is alert and oriented to person, place, and time. Skin: Skin is warm and dry. Psychiatric: He has a normal mood and affect. Thought content normal.   Nursing note and vitals reviewed. Diagnostic Study Results     Labs -  Recent Results (from the past 12 hour(s))   CBC WITH AUTOMATED DIFF    Collection Time: 04/02/18  2:23 PM   Result Value Ref Range    WBC 10.7 4.6 - 13.2 K/uL    RBC 4.24 (L) 4.70 - 5.50 M/uL    HGB 10.7 (L) 13.0 - 16.0 g/dL    HCT 30.1 (L) 36.0 - 48.0 %    MCV 71.0 (L) 74.0 - 97.0 FL    MCH 25.2 24.0 - 34.0 PG    MCHC 35.5 31.0 - 37.0 g/dL    RDW 22.7 (H) 11.6 - 14.5 %    PLATELET 718 (H) 331 - 420 K/uL    MPV 9.5 9.2 - 11.8 FL    NEUTROPHILS 52 40 - 73 %    LYMPHOCYTES 37 21 - 52 %    MONOCYTES 8 3 - 10 %    EOSINOPHILS 2 0 - 5 %    BASOPHILS 1 0 - 2 %    ABS. NEUTROPHILS 5.5 1.8 - 8.0 K/UL    ABS. LYMPHOCYTES 4.0 (H) 0.9 - 3.6 K/UL    ABS. MONOCYTES 0.9 0.05 - 1.2 K/UL    ABS.  EOSINOPHILS 0.2 0.0 - 0.4 K/UL ABS. BASOPHILS 0.1 (H) 0.0 - 0.06 K/UL    DF AUTOMATED      PLATELET COMMENTS Increased Platelets      RBC COMMENTS ANISOCYTOSIS  2+        RBC COMMENTS MICROCYTOSIS  2+        RBC COMMENTS POLYCHROMASIA  1+        RBC COMMENTS SICKLE CELLS  2+        RBC COMMENTS TARGET CELLS  1+        RBC COMMENTS POIKILOCYTOSIS  2+        RBC COMMENTS SCHISTOCYTES  1+       METABOLIC PANEL, BASIC    Collection Time: 04/02/18  2:23 PM   Result Value Ref Range    Sodium 135 (L) 136 - 145 mmol/L    Potassium 4.5 3.5 - 5.5 mmol/L    Chloride 104 100 - 108 mmol/L    CO2 24 21 - 32 mmol/L    Anion gap 7 3.0 - 18 mmol/L    Glucose 88 74 - 99 mg/dL    BUN 5 (L) 7.0 - 18 MG/DL    Creatinine 0.86 0.6 - 1.3 MG/DL    BUN/Creatinine ratio 6 (L) 12 - 20      GFR est AA >60 >60 ml/min/1.73m2    GFR est non-AA >60 >60 ml/min/1.73m2    Calcium 9.5 8.5 - 10.1 MG/DL   RETICULOCYTE COUNT    Collection Time: 04/02/18  2:23 PM   Result Value Ref Range    Reticulocyte count 8.7 (H) 0.5 - 2.3 %       Radiologic Studies -   XR CHEST PA LAT   Final Result   IMPRESSION: No acute findings in the chest.      Medical Decision Making   I am the first provider for this patient. I reviewed the vital signs, available nursing notes, past medical history, past surgical history, family history and social history. Vital Signs-Reviewed the patient's vital signs. Records Reviewed: Nursing Notes and Old Medical Records (Time of Review: 12:46 PM)    ED Course: Progress Notes, Reevaluation, and Consults:  3:19 PM Reevaluation: Pt's back pain is completely gone. He still has some leg pain. Pt will receive one more dose of pain medication. 4:21 PM Reevaluation: Pt dressed himself, is asking to leave, and he is feeling better. Provider Notes (Medical Decision Making): Pt feels much better and is now at a level of pain that is manageable at home. Diagnosis     Clinical Impression:   1.  Sickle cell crisis [facility])        Disposition: Discharged    Follow-up Information     Follow up With Details Comments Contact Info    Lucien Howard MD Schedule an appointment as soon as possible for a visit  [address]      [facility] EM[facility]GENCY DEPT  If symptoms worsen [address] [place] [address]  [phone]           Discharge Medication List as of 4/2/2018  4:21 PM      CONTINUE these medications which have NOT CHANGED    Details   oxyCODONE-acetaminophen (PERCOCET) 5-325 mg per tablet Take 1 Tab by mouth every six (6) hours as needed for Pain. Max Daily Amount: 4 Tabs., Print, Disp-16 Tab, R-0      HYDROmorphone (DILAUDID) 2 mg tablet Take 1 Tab by mouth every six (6) hours as needed for Pain. Max Daily Amount: 8 mg. Indications: Pain, Print, Disp-12 Tab, R-0      hydroxyurea (HYDREA) 500 mg capsule Take 500 mg by mouth two (2) times a day. Indications: SICKLE CELL ANEMIA WITH CRISIS, Historical Med      fentaNYL (DURAGESIC) 25 mcg/hr PATCH 1 Patch by TransDERmal route every seventy-two (72) hours. Max Daily Amount: 1 Patch., Print, Disp-5 Patch, R-0           _______________________________    Attestations:  Scribe Attestation     Tracie Esquivel acting as a scribe for and in the presence of Danii Finn MD      April 02, 2018 at 12:46 PM       Provider Attestation:      I personally performed the services described in the documentation, reviewed the documentation, as recorded by the scribe in my presence, and it accurately and completely records my words and actions.  April 02, 2018 at 12:46 PM - Danii Finn MD    _______________________________

## 2018-04-02 NOTE — DISCHARGE INSTRUCTIONS
Sickle Cell Crisis: Care Instructions  Your Care Instructions    Sickle cell crisis is a painful episode that may begin suddenly in a person with sickle cell disease. Sickle cell disease turns normal, round red blood cells into cells that look like jeane or crescent moons. The sickle-shaped cells can get stuck in blood vessels, blocking blood flow and causing severe pain. The pain can occur in the bones of the spine, the arms and legs, the chest, and the abdomen. An episode may be called a \"painful event\" or \"painful crisis. \" Some people who have sickle cell disease have many painful events, while others have few or none. Treatment depends on the level of pain and how long it lasts. Sometimes taking nonprescription pain relievers can help. Or you may need stronger pain relief medicine that is prescribed or given by a doctor. You may need to be treated in the hospital.  It isn't always possible to know what sets off a painful event. But triggers include being dehydrated, cold temperatures, infection, stress, and not getting enough oxygen. Follow-up care is a key part of your treatment and safety. Be sure to make and go to all appointments, and call your doctor if you are having problems. It's also a good idea to know your test results and keep a list of the medicines you take. How can you care for yourself at home? · Create a pain management plan with your doctor. This plan should include the types of medicines you can take and other actions you can take at home to relieve pain. · Drink plenty of fluids, enough so that your urine is light yellow or clear like water. If you have kidney, heart, or liver disease and have to limit fluids, talk with your doctor before you increase the amount of fluids you drink. · Take your medicines exactly as prescribed. Call your doctor if you think you are having a problem with your medicine. · Take pain medicines exactly as directed.   ¨ If the doctor gave you a prescription medicine for pain, take it as prescribed. ¨ If you are not taking a prescription pain medicine, ask your doctor if you can take an over-the-counter medicine. · Avoid alcohol. It can make you dehydrated. · Dress warmly in cold weather. The cold and windy weather can lead to severe pain. · Do not smoke. Smoking can reduce the amount of oxygen in your blood. · Get plenty of sleep. When should you call for help? Call 911 anytime you think you may need emergency care. For example, call if:  ? · You have symptoms of a severe problem from sickle cell. ? · You have symptoms of a stroke. These may include:  ¨ Sudden numbness, tingling, weakness, or loss of movement in your face, arm, or leg, especially on only one side of your body. ¨ Sudden vision changes. ¨ Sudden trouble speaking. ¨ Sudden confusion or trouble understanding simple statements. ¨ Sudden problems with walking or balance. ¨ A sudden, severe headache that is different from past headaches. ? · You are in severe pain. ? · You have symptoms of a heart attack. These may include:  ¨ Chest pain or pressure, or a strange feeling in the chest.  ¨ Sweating. ¨ Shortness of breath. ¨ Nausea or vomiting. ¨ Pain, pressure, or a strange feeling in the back, neck, jaw, or upper belly or in one or both shoulders or arms. ¨ Lightheadedness or sudden weakness. ¨ A fast or irregular heartbeat. After you call 911, the  may tell you to chew 1 adult-strength or 2 to 4 low-dose aspirin. Wait for an ambulance. Do not try to drive yourself. ?Call your doctor now or seek immediate medical care if:  ? · You have a fever. ? Watch closely for changes in your health, and be sure to contact your doctor if you have any problems. Where can you learn more? Go to http://manuel-amadeo.info/. Enter F104 in the search box to learn more about \"Sickle Cell Crisis: Care Instructions. \"  Current as of: October 13, 2016  Content Version: 11.4  © 4709-4851 [employer]. Care instructions adapted under license by [employer] (which disclaims liability or warranty for this information). If you have questions about a medical condition or this instruction, always ask your healthcare professional. [address] any warranty or liability for your use of this information.

## 2018-04-05 ENCOUNTER — HOSPITAL ENCOUNTER (INPATIENT)
Age: 38
LOS: 5 days | Discharge: HOME | DRG: 812 | End: 2018-04-10
Attending: EMERGENCY MEDICINE | Admitting: INTERNAL MEDICINE
Payer: MEDICARE

## 2018-04-05 ENCOUNTER — APPOINTMENT (OUTPATIENT)
Dept: GENERAL RADIOLOGY | Age: 38
DRG: 812 | End: 2018-04-05
Attending: EMERGENCY MEDICINE
Payer: MEDICARE

## 2018-04-05 DIAGNOSIS — D57.00 SICKLE CELL CRISIS (HCC): Primary | ICD-10-CM

## 2018-04-05 LAB
ALBUMIN SERPL-MCNC: 4 G/DL (ref 3.4–5)
ALBUMIN/GLOB SERPL: 0.8 {RATIO} (ref 0.8–1.7)
ALP SERPL-CCNC: 110 U/L (ref 45–117)
ALT SERPL-CCNC: 31 U/L (ref 16–61)
ANION GAP SERPL CALC-SCNC: 8 MMOL/L (ref 3–18)
AST SERPL-CCNC: 61 U/L (ref 15–37)
ATRIAL RATE: 92 BPM
BASOPHILS # BLD: 0 K/UL (ref 0–0.06)
BASOPHILS NFR BLD: 0 % (ref 0–3)
BILIRUB SERPL-MCNC: 3.8 MG/DL (ref 0.2–1)
BUN SERPL-MCNC: 11 MG/DL (ref 7–18)
BUN/CREAT SERPL: 12 (ref 12–20)
CALCIUM SERPL-MCNC: 8.8 MG/DL (ref 8.5–10.1)
CALCULATED P AXIS, ECG09: 42 DEGREES
CALCULATED R AXIS, ECG10: -10 DEGREES
CALCULATED T AXIS, ECG11: 67 DEGREES
CHLORIDE SERPL-SCNC: 105 MMOL/L (ref 100–108)
CK MB CFR SERPL CALC: ABNORMAL % (ref 0–4)
CK MB CFR SERPL CALC: NORMAL % (ref 0–4)
CK MB SERPL-MCNC: <1 NG/ML (ref 5–25)
CK MB SERPL-MCNC: <1 NG/ML (ref 5–25)
CK SERPL-CCNC: 263 U/L (ref 39–308)
CK SERPL-CCNC: 343 U/L (ref 39–308)
CO2 SERPL-SCNC: 24 MMOL/L (ref 21–32)
CREAT SERPL-MCNC: 0.91 MG/DL (ref 0.6–1.3)
DIAGNOSIS, 93000: NORMAL
DIFFERENTIAL METHOD BLD: ABNORMAL
EOSINOPHIL # BLD: 0.2 K/UL (ref 0–0.4)
EOSINOPHIL NFR BLD: 1 % (ref 0–5)
ERYTHROCYTE [DISTWIDTH] IN BLOOD BY AUTOMATED COUNT: 22.2 % (ref 11.6–14.5)
FLUAV AG NPH QL IA: NEGATIVE
FLUBV AG NOSE QL IA: NEGATIVE
GLOBULIN SER CALC-MCNC: 4.9 G/DL (ref 2–4)
GLUCOSE SERPL-MCNC: 117 MG/DL (ref 74–99)
HCT VFR BLD AUTO: 26 % (ref 36–48)
HGB BLD-MCNC: 9.1 G/DL (ref 13–16)
LYMPHOCYTES # BLD: 5 K/UL (ref 0.8–3.5)
LYMPHOCYTES NFR BLD: 23 % (ref 20–51)
MCH RBC QN AUTO: 24.9 PG (ref 24–34)
MCHC RBC AUTO-ENTMCNC: 35 G/DL (ref 31–37)
MCV RBC AUTO: 71.2 FL (ref 74–97)
MONOCYTES # BLD: 2.2 K/UL (ref 0–1)
MONOCYTES NFR BLD: 10 % (ref 2–9)
NEUTS BAND NFR BLD MANUAL: 2 % (ref 0–5)
NEUTS SEG # BLD: 14.4 K/UL (ref 1.8–8)
NEUTS SEG NFR BLD: 64 % (ref 42–75)
NRBC BLD-RTO: 2 PER 100 WBC
P-R INTERVAL, ECG05: 166 MS
PLATELET # BLD AUTO: 441 K/UL (ref 135–420)
PLATELET COMMENTS,PCOM: ABNORMAL
PMV BLD AUTO: 9.3 FL (ref 9.2–11.8)
POTASSIUM SERPL-SCNC: 3.2 MMOL/L (ref 3.5–5.5)
PROT SERPL-MCNC: 8.9 G/DL (ref 6.4–8.2)
Q-T INTERVAL, ECG07: 376 MS
QRS DURATION, ECG06: 90 MS
QTC CALCULATION (BEZET), ECG08: 464 MS
RBC # BLD AUTO: 3.65 M/UL (ref 4.7–5.5)
RBC MORPH BLD: ABNORMAL
RETICS/RBC NFR AUTO: 8 % (ref 0.5–2.3)
SODIUM SERPL-SCNC: 137 MMOL/L (ref 136–145)
TROPONIN I SERPL-MCNC: <0.02 NG/ML (ref 0–0.04)
TROPONIN I SERPL-MCNC: <0.02 NG/ML (ref 0–0.04)
VENTRICULAR RATE, ECG03: 92 BPM
WBC # BLD AUTO: 21.8 K/UL (ref 4.6–13.2)

## 2018-04-05 PROCEDURE — 99285 EMERGENCY DEPT VISIT HI MDM: CPT

## 2018-04-05 PROCEDURE — 74011250636 HC RX REV CODE- 250/636

## 2018-04-05 PROCEDURE — 85025 COMPLETE CBC W/AUTO DIFF WBC: CPT | Performed by: EMERGENCY MEDICINE

## 2018-04-05 PROCEDURE — 71045 X-RAY EXAM CHEST 1 VIEW: CPT

## 2018-04-05 PROCEDURE — 96361 HYDRATE IV INFUSION ADD-ON: CPT

## 2018-04-05 PROCEDURE — 74011250637 HC RX REV CODE- 250/637: Performed by: INTERNAL MEDICINE

## 2018-04-05 PROCEDURE — 74011250636 HC RX REV CODE- 250/636: Performed by: INTERNAL MEDICINE

## 2018-04-05 PROCEDURE — 74011000250 HC RX REV CODE- 250: Performed by: EMERGENCY MEDICINE

## 2018-04-05 PROCEDURE — 96375 TX/PRO/DX INJ NEW DRUG ADDON: CPT

## 2018-04-05 PROCEDURE — 65660000000 HC RM CCU STEPDOWN

## 2018-04-05 PROCEDURE — 85045 AUTOMATED RETICULOCYTE COUNT: CPT | Performed by: EMERGENCY MEDICINE

## 2018-04-05 PROCEDURE — 80053 COMPREHEN METABOLIC PANEL: CPT | Performed by: EMERGENCY MEDICINE

## 2018-04-05 PROCEDURE — 93005 ELECTROCARDIOGRAM TRACING: CPT

## 2018-04-05 PROCEDURE — 96376 TX/PRO/DX INJ SAME DRUG ADON: CPT

## 2018-04-05 PROCEDURE — 77030019938 HC TBNG IV PCA ICUM -A

## 2018-04-05 PROCEDURE — 82550 ASSAY OF CK (CPK): CPT | Performed by: INTERNAL MEDICINE

## 2018-04-05 PROCEDURE — 36415 COLL VENOUS BLD VENIPUNCTURE: CPT | Performed by: INTERNAL MEDICINE

## 2018-04-05 PROCEDURE — 96374 THER/PROPH/DIAG INJ IV PUSH: CPT

## 2018-04-05 PROCEDURE — 74011250636 HC RX REV CODE- 250/636: Performed by: EMERGENCY MEDICINE

## 2018-04-05 PROCEDURE — 87804 INFLUENZA ASSAY W/OPTIC: CPT | Performed by: FAMILY MEDICINE

## 2018-04-05 RX ORDER — HYDROMORPHONE HYDROCHLORIDE 2 MG/1
6 TABLET ORAL
Status: DISCONTINUED | OUTPATIENT
Start: 2018-04-05 | End: 2018-04-06

## 2018-04-05 RX ORDER — POTASSIUM CHLORIDE 20 MEQ/1
40 TABLET, EXTENDED RELEASE ORAL
Status: COMPLETED | OUTPATIENT
Start: 2018-04-05 | End: 2018-04-05

## 2018-04-05 RX ORDER — MORPHINE SULFATE 2 MG/ML
12 INJECTION, SOLUTION INTRAMUSCULAR; INTRAVENOUS ONCE
Status: DISCONTINUED | OUTPATIENT
Start: 2018-04-05 | End: 2018-04-05

## 2018-04-05 RX ORDER — ENOXAPARIN SODIUM 100 MG/ML
40 INJECTION SUBCUTANEOUS EVERY 24 HOURS
Status: DISCONTINUED | OUTPATIENT
Start: 2018-04-05 | End: 2018-04-10 | Stop reason: HOSPADM

## 2018-04-05 RX ORDER — KETAMINE HYDROCHLORIDE 50 MG/ML
20 INJECTION, SOLUTION INTRAMUSCULAR; INTRAVENOUS ONCE
Status: COMPLETED | OUTPATIENT
Start: 2018-04-05 | End: 2018-04-05

## 2018-04-05 RX ORDER — MORPHINE SULFATE 4 MG/ML
16 INJECTION INTRAVENOUS
Status: COMPLETED | OUTPATIENT
Start: 2018-04-05 | End: 2018-04-05

## 2018-04-05 RX ORDER — FENTANYL CITRATE-0.9 % NACL/PF 900MCG/30
PATIENT CONTROLLED ANALGESIA VIAL INJECTION CONTINUOUS
Status: DISCONTINUED | OUTPATIENT
Start: 2018-04-05 | End: 2018-04-09

## 2018-04-05 RX ORDER — ONDANSETRON 2 MG/ML
4 INJECTION INTRAMUSCULAR; INTRAVENOUS
Status: COMPLETED | OUTPATIENT
Start: 2018-04-05 | End: 2018-04-05

## 2018-04-05 RX ORDER — DOCUSATE SODIUM 100 MG/1
100 CAPSULE, LIQUID FILLED ORAL 2 TIMES DAILY
Status: DISCONTINUED | OUTPATIENT
Start: 2018-04-05 | End: 2018-04-07

## 2018-04-05 RX ORDER — IBUPROFEN 400 MG/1
800 TABLET ORAL
Status: DISCONTINUED | OUTPATIENT
Start: 2018-04-06 | End: 2018-04-09

## 2018-04-05 RX ORDER — MORPHINE SULFATE 10 MG/ML
16 INJECTION, SOLUTION INTRAMUSCULAR; INTRAVENOUS
Status: COMPLETED | OUTPATIENT
Start: 2018-04-05 | End: 2018-04-05

## 2018-04-05 RX ORDER — MORPHINE SULFATE 2 MG/ML
8 INJECTION, SOLUTION INTRAMUSCULAR; INTRAVENOUS ONCE
Status: COMPLETED | OUTPATIENT
Start: 2018-04-05 | End: 2018-04-05

## 2018-04-05 RX ORDER — SODIUM CHLORIDE 0.9 % (FLUSH) 0.9 %
5-10 SYRINGE (ML) INJECTION EVERY 8 HOURS
Status: DISCONTINUED | OUTPATIENT
Start: 2018-04-05 | End: 2018-04-10 | Stop reason: HOSPADM

## 2018-04-05 RX ORDER — SODIUM CHLORIDE 9 MG/ML
25 INJECTION, SOLUTION INTRAVENOUS CONTINUOUS
Status: DISCONTINUED | OUTPATIENT
Start: 2018-04-05 | End: 2018-04-10

## 2018-04-05 RX ORDER — MORPHINE SULFATE 2 MG/ML
10 INJECTION, SOLUTION INTRAMUSCULAR; INTRAVENOUS ONCE
Status: COMPLETED | OUTPATIENT
Start: 2018-04-05 | End: 2018-04-05

## 2018-04-05 RX ORDER — MORPHINE SULFATE 10 MG/ML
12 INJECTION, SOLUTION INTRAMUSCULAR; INTRAVENOUS ONCE
Status: COMPLETED | OUTPATIENT
Start: 2018-04-05 | End: 2018-04-05

## 2018-04-05 RX ORDER — MORPHINE SULFATE 8 MG/ML
INJECTION INTRAVENOUS
Status: DISPENSED
Start: 2018-04-05 | End: 2018-04-05

## 2018-04-05 RX ORDER — SODIUM CHLORIDE 0.9 % (FLUSH) 0.9 %
5-10 SYRINGE (ML) INJECTION AS NEEDED
Status: DISCONTINUED | OUTPATIENT
Start: 2018-04-05 | End: 2018-04-10 | Stop reason: HOSPADM

## 2018-04-05 RX ORDER — MORPHINE SULFATE 2 MG/ML
16 INJECTION, SOLUTION INTRAMUSCULAR; INTRAVENOUS
Status: DISCONTINUED | OUTPATIENT
Start: 2018-04-05 | End: 2018-04-05

## 2018-04-05 RX ORDER — MORPHINE SULFATE 8 MG/ML
INJECTION INTRAVENOUS
Status: DISPENSED
Start: 2018-04-05 | End: 2018-04-06

## 2018-04-05 RX ORDER — ACETAMINOPHEN 325 MG/1
650 TABLET ORAL
Status: DISCONTINUED | OUTPATIENT
Start: 2018-04-05 | End: 2018-04-06

## 2018-04-05 RX ORDER — FENTANYL 25 UG/1
1 PATCH TRANSDERMAL
Status: DISCONTINUED | OUTPATIENT
Start: 2018-04-05 | End: 2018-04-05

## 2018-04-05 RX ORDER — HYDROXYUREA 500 MG/1
500 CAPSULE ORAL 2 TIMES DAILY
Status: DISCONTINUED | OUTPATIENT
Start: 2018-04-05 | End: 2018-04-07

## 2018-04-05 RX ORDER — HYDROMORPHONE HYDROCHLORIDE 1 MG/ML
1.5 INJECTION, SOLUTION INTRAMUSCULAR; INTRAVENOUS; SUBCUTANEOUS
Status: DISCONTINUED | OUTPATIENT
Start: 2018-04-05 | End: 2018-04-05

## 2018-04-05 RX ORDER — ONDANSETRON 2 MG/ML
4 INJECTION INTRAMUSCULAR; INTRAVENOUS
Status: DISCONTINUED | OUTPATIENT
Start: 2018-04-05 | End: 2018-04-06

## 2018-04-05 RX ADMIN — IBUPROFEN 800 MG: 400 TABLET ORAL at 23:25

## 2018-04-05 RX ADMIN — Medication 10 ML: at 22:36

## 2018-04-05 RX ADMIN — KETAMINE HYDROCHLORIDE 20 MG: 50 INJECTION, SOLUTION INTRAMUSCULAR; INTRAVENOUS at 11:32

## 2018-04-05 RX ADMIN — ONDANSETRON 4 MG: 2 SOLUTION INTRAMUSCULAR; INTRAVENOUS at 09:46

## 2018-04-05 RX ADMIN — Medication: at 23:59

## 2018-04-05 RX ADMIN — SODIUM CHLORIDE 1000 ML: 900 INJECTION, SOLUTION INTRAVENOUS at 09:46

## 2018-04-05 RX ADMIN — DOCUSATE SODIUM 100 MG: 100 CAPSULE, LIQUID FILLED ORAL at 17:31

## 2018-04-05 RX ADMIN — MORPHINE SULFATE 16 MG: 10 INJECTION INTRAMUSCULAR; INTRAVENOUS; SUBCUTANEOUS at 13:15

## 2018-04-05 RX ADMIN — SODIUM CHLORIDE 200 ML/HR: 900 INJECTION, SOLUTION INTRAVENOUS at 15:03

## 2018-04-05 RX ADMIN — MORPHINE SULFATE 16 MG: 4 INJECTION INTRAVENOUS at 14:53

## 2018-04-05 RX ADMIN — HYDROXYUREA 500 MG: 500 CAPSULE ORAL at 17:31

## 2018-04-05 RX ADMIN — Medication 10 MG: at 21:36

## 2018-04-05 RX ADMIN — POTASSIUM CHLORIDE 40 MEQ: 20 TABLET, EXTENDED RELEASE ORAL at 17:31

## 2018-04-05 RX ADMIN — Medication 10 ML: at 15:03

## 2018-04-05 RX ADMIN — ENOXAPARIN SODIUM 40 MG: 40 INJECTION, SOLUTION INTRAVENOUS; SUBCUTANEOUS at 15:02

## 2018-04-05 RX ADMIN — SODIUM CHLORIDE 200 ML/HR: 900 INJECTION, SOLUTION INTRAVENOUS at 23:17

## 2018-04-05 RX ADMIN — MORPHINE SULFATE 12 MG: 10 INJECTION, SOLUTION INTRAMUSCULAR; INTRAVENOUS at 10:46

## 2018-04-05 RX ADMIN — Medication 8 MG: at 16:50

## 2018-04-05 RX ADMIN — Medication 8 MG: at 09:45

## 2018-04-05 RX ADMIN — Medication: at 16:27

## 2018-04-05 NOTE — IP AVS SNAPSHOT
[address] [address] [place] Patient: Virginia Shelley MRN: [id number] GE A check mohan indicates which time of day the medication should be taken. My Medications CONTINUE taking these medications Instructions Each Dose to Equal  
 Morning Noon Evening Bedtime  
 fentaNYL 25 mcg/hr PATCH Commonly known as:  Bernis Shayy Your last dose was: Your next dose is:    
   
   
 1 Patch by TransDERmal route every seventy-two (72) hours. Max Daily Amount: 1 Patch. 1 Patch HYDROmorphone 2 mg tablet Commonly known as:  DILAUDID Your last dose was: Your next dose is: Take 1 Tab by mouth every six (6) hours as needed for Pain. Max Daily Amount: 8 mg. Indications: Pain  
 2 mg  
    
   
   
   
  
 hydroxyurea 500 mg capsule Commonly known as:  HYDREA Your last dose was: Your next dose is: Take 500 mg by mouth two (2) times a day. Indications: SICKLE CELL ANEMIA WITH CRISIS  
 500 mg  
    
   
   
   
  
 oxyCODONE-acetaminophen 5-325 mg per tablet Commonly known as:  PERCOCET Your last dose was: Your next dose is: Take 1 Tab by mouth every six (6) hours as needed for Pain. Max Daily Amount: 4 Tabs. 1 Tab

## 2018-04-05 NOTE — ED TRIAGE NOTES
Pt arrived via EMS c/o sickle cell related pain, states pain is all over but is worse in left calf, has fentanyl patch on which is due to be replaced tomorrow, took 1 PO dilaudid this morning for break through pain with no relief, changed into gown, placed on continuous pulse ox and cardiac monitor, a&ox4, side rails up, safety intact.

## 2018-04-05 NOTE — ED PROVIDER NOTES
HPI Comments: Warren Rodríguez is a 42year old male with history of Sickle Cell Disease with hemoglobin SS marked by frequent pain crisis, complicated by cholesystectomy and previous admission for acute chest syndrome who presents to the ED today with a 12 hour history of pain \"everywhere\". Patient was recently seen in the ED on 4/3/18 for sickle cell pain crisis with pain in his back and legs which resolved with Tx in the ED and he was discharged home. He states he felt well until yesterday evening when he started having pain in his legs and back again. He states pain worsened and now has pain everywhere, including his chest. He reports mid sternal severe chest pain non-radiating. Patient states this feels like a typical pain crisis \"but worse\". He is wearing a fentanyl patch, and he states he took his home dilaudid around 6AM today. Patient denies any history of splenic sequestration or stroke. He reports no particular triggers for sickle cell pain. No history of recent illness. He denies alcohol, tobacco, or drug use. He denies any other Sx or complaints at this time     Patient is a 42 y.o. male presenting with sickle cell disease. The history is provided by the patient. Sickle Cell Crisis   This is a new problem. The current episode started 12 to 24 hours ago. The problem occurs constantly. The problem has been rapidly worsening. Associated symptoms include chest pain.         Past Medical History:   Diagnosis Date    B12 deficiency 03/15/2010    210    Cholelithiasis 09/17/2012    U/S Result: Cholelithiasis    Elevated alkaline phosphatase level 03/21/2010    158    Elevated ALT measurement 03/21/2010    71    Elevated AST (SGOT) 03/10/2012    38    Elevated platelet count 97/73/7600    494    Elevated total protein 12/27/2011    8.7    Hypocalcemia 07/06/2011    4.1    Hypokalemia     Hyponatremia 11/18/2009    Leukocytosis 11/16/2009    Microcytic anemia 10/25/2007    Pleural effusion on right 07/15/2015    [facility] CXR Result    Reticulocytosis 10/25/2007    7.8    Serum total bilirubin elevated 12/27/2011    T.B. 3.8, D.B. 0.4.     [facility] with crisis ([facility]     Dr. Deysi Middleton. [place]    Vitamin D deficiency 01/20/2016    5.4       No past surgical history on file. Family History:   Problem Relation Age of Onset    Cancer Neg Hx     Diabetes Neg Hx     Heart Disease Neg Hx     Heart Attack Neg Hx     Hypertension Neg Hx     Stroke Neg Hx        Social History     Social History    Marital status:      Spouse name: N/A    Number of children: N/A    Years of education: N/A     Occupational History    Not on file. Social History Main Topics    Smoking status: Never Smoker    Smokeless tobacco: Never Used    Alcohol use Yes      Comment: Occasional    Drug use: No    Sexual activity: Yes     Partners: Female     Birth control/ protection: None     Other Topics Concern    Not on file     Social History Narrative         ALLERGIES: Eggshell membrane    Review of Systems   Constitutional: Positive for chills. Negative for activity change, appetite change, fatigue and fever. HENT: Negative for sore throat and trouble swallowing. Respiratory: Negative for cough. Cardiovascular: Positive for chest pain. Musculoskeletal: Positive for arthralgias, back pain and myalgias. Vitals:    04/05/18 0930   BP: 141/85   Pulse: 97   Resp: 28   SpO2: 100%            Physical Exam   Constitutional: He is oriented to person, place, and time. He appears well-developed and well-nourished. He appears distressed. HENT:   Head: Normocephalic and atraumatic. Oropharynx dry   Eyes: EOM are normal. Pupils are equal, round, and reactive to light. Scleral icterus is present. Neck: Normal range of motion. Neck supple. No tracheal deviation present. Cardiovascular: Regular rhythm. Exam reveals no gallop. No murmur heard.   Tachycardic    Pulmonary/Chest: Effort normal and breath sounds normal. He has no wheezes. He has no rales. Abdominal: Soft. He exhibits no distension. There is no tenderness. Musculoskeletal: He exhibits tenderness. Lymphadenopathy:     He has no cervical adenopathy. Neurological: He is alert and oriented to person, place, and time. No cranial nerve deficit. Skin: Skin is warm and dry. No rash noted. No erythema. MDM  Number of Diagnoses or Management Options  Diagnosis management comments: Hari Lynch is a 42year old male with history of Sickle Cell SS disease complicated by frequent pain crisis, previous cholesystectomy, and history of acute chest syndrome who presents to the ED with worsening pain crisis. He is complaining of pain \"everywhere\" but specifically endorses chest and leg pain. Will check CBC, CMP, reticulocyte count, and chest Xray. EKG shows sinus tachycardia. He appears dehydrated will give 1L fluid bolus. Pain control with morphine 8 mg initially. Continue to monitor closely. 10:32 AM  Recheck. Patient still complaining of significant pain, now localized to the knee. CBC shows leukocytosis, anemia, and elevated reticulocyte count. Pain is not well controlled. Will check flu swab and give an additional dose of morphine 12 mg. Recheck, morphine 12 mg did not control pain. Ketamine administered by Dr. Renny Rubio, patient's HR and blood pressure appears slightly better though he does point to knee for pain. 12:57 PM  Recheck after ketamine. Patient still appears uncomfortable, grasping left knee in pain. Re-examined, the knee is not swollen or erythematous. It is not warm. There is no effusion. There is no crepitus. There is no joint laxity. No clinical signs of septic arthritis. Will give 16 mg morphine. 2:00 PM  Recheck. Patient continues to complain of severe pain. Appears uncomfortable and intermittently tachycardic. Has now received multiple doses of morphine and a single dose of ketamine.  As he continues to be uncomfortable we will admit to the hospital. Discussed with Dr. Shayla Soto, who will call hospitalist for admission.          ED Course       Procedures

## 2018-04-05 NOTE — ED NOTES
TRANSFER - OUT REPORT:    Verbal report given to Win Sullivan RN(name) on [facility]  being transferred to 3N(unit) for routine progression of care       Report consisted of patients Situation, Background, Assessment and   Recommendations(SBAR). Information from the following report(s) SBAR, ED Summary, Intake/Output, MAR, Recent Results and Cardiac Rhythm NSR was reviewed with the receiving nurse. Opportunity for questions and clarification was provided.       Patient transported with:  Patient Belongings  Family member

## 2018-04-05 NOTE — H&P
History & Physical    Patient: Aung Fenton MRN: [id number]  CSN: [id number]    YOB: 1980  Age: 42 y.o. Sex: male      DOA: 4/5/2018    Chief Complaint:   Chief Complaint   Patient presents with    Sickle Cell Crisis          HPI:     Aung Fenton is a 42 y.o. male with sickle cell disease, s/p cholecytectomy 6/2017 comes with pain in his leg and back typical of his crisis pain. He was last seen in the ER on 4/3/18 and was treated and discharged home, at home he continued his fentanyl patch and oral diludid, he was fine until last night 4/4/18 when he developed the pain again, sharp, excruciating in legs , chest and back associated with shortness of breath. He was last admitted to the hospital with sickle cell crisis on 2/5/18 where he was managed with PO and IV pain medications. He says it is much worse than previous admission. No lower extremity swelling, no cough or cold symptoms. Flu negative  cxr negative    Past Medical History:   Diagnosis Date    B12 deficiency 03/15/2010    210    Cholelithiasis 09/17/2012    U/S Result: Cholelithiasis    Elevated alkaline phosphatase level 03/21/2010    158    Elevated ALT measurement 03/21/2010    71    Elevated AST (SGOT) 03/10/2012    38    Elevated platelet count 88/63/0738    494    Elevated total protein 12/27/2011    8.7    Hypocalcemia 07/06/2011    4.1    Hypokalemia     Hyponatremia 11/18/2009    Leukocytosis 11/16/2009    Microcytic anemia 10/25/2007    Pleural effusion on right 07/15/2015    [facility] CXR Result    Reticulocytosis 10/25/2007    7.8    Serum total bilirubin elevated 12/27/2011    T.B. 3.8, D.B. 0.4.     Sickle cell anemia with crisis ([facility]     Dr. Kay Pressley. [place]    Vitamin D deficiency 01/20/2016    5.4       No past surgical history on file.     Family History   Problem Relation Age of Onset    Cancer Neg Hx     Diabetes Neg Hx     Heart Disease Neg Hx     Heart Attack Neg Hx     Hypertension Neg Hx     Stroke Neg Hx        Social History     Social History    Marital status:      Spouse name: N/A    Number of children: N/A    Years of education: N/A     Social History Main Topics    Smoking status: Never Smoker    Smokeless tobacco: Never Used    Alcohol use Yes      Comment: Occasional    Drug use: No    Sexual activity: Yes     Partners: Female     Birth control/ protection: None     Other Topics Concern    Not on file     Social History Narrative       Prior to Admission medications    Medication Sig Start Date End Date Taking? Authorizing Provider   oxyCODONE-acetaminophen (PERCOCET) 5-325 mg per tablet Take 1 Tab by mouth every six (6) hours as needed for Pain. Max Daily Amount: 4 Tabs. 18   Becky Lopez MD   HYDROmorphone (DILAUDID) 2 mg tablet Take 1 Tab by mouth every six (6) hours as needed for Pain. Max Daily Amount: 8 mg. Indications: Pain 17   Yisel Lay MD   hydroxyurea (HYDREA) 500 mg capsule Take 500 mg by mouth two (2) times a day. Indications: SICKLE CELL ANEMIA WITH CRISIS    Phys Yolanda, MD   fentaNYL (DURAGESIC) 25 mcg/hr PATCH 1 Patch by TransDERmal route every seventy-two (72) hours. Max Daily Amount: 1 Patch. 10/21/15   Elizabeth Franklin MD       Allergies   Allergen Reactions    Eggshell Membrane Nausea and Vomiting         Review of Systems  12 point ROS done, negative except as stated above    Physical Exam:     Physical Exam:  Visit Vitals    /41    Pulse 99    Temp 98.1 °F (36.7 °C)    Resp 13    Wt 83.5 kg (184 lb)    SpO2 95%    BMI 24.95 kg/m2           Temp (24hrs), Av.1 °F (36.7 °C), Min:98.1 °F (36.7 °C), Max:98.1 °F (36.7 °C)             General:  Distressed, rolling around the bed, moaning, eyes bulging              Head: Normocephalic,atraumatic. Eyes:  Conjunctivae/corneas clear. PERRL, EOMs intact. Nose: Nares normal. No drainage or sinus tenderness.    Neck: Supple, symmetrical, trachea midline, no adenopathy, thyroid: no enlargement, no carotid bruit and no JVD. Lungs:   Clear to auscultation bilaterally. Moderate air movement   Heart:  tachycardic, S1, S2 normal.     Abdomen: Soft, non-tender. Bowel sounds normal.    Extremities: Extremities tender, patient holding on to them rolling in the bed. no cyanosis or edema. Pulses: 2+ and symmetric all extremities. Skin:  No rashes or lesions   Neurologic: AAOx3, able to answer questions in short sentences due to pain.         Labs Reviewed:    BMP:   Lab Results   Component Value Date/Time     04/05/2018 09:38 AM    K 3.2 (L) 04/05/2018 09:38 AM     04/05/2018 09:38 AM    CO2 24 04/05/2018 09:38 AM    AGAP 8 04/05/2018 09:38 AM     (H) 04/05/2018 09:38 AM    BUN 11 04/05/2018 09:38 AM    CREA 0.91 04/05/2018 09:38 AM    GFRAA >60 04/05/2018 09:38 AM    GFRNA >60 04/05/2018 09:38 AM     CMP:   Lab Results   Component Value Date/Time     04/05/2018 09:38 AM    K 3.2 (L) 04/05/2018 09:38 AM     04/05/2018 09:38 AM    CO2 24 04/05/2018 09:38 AM    AGAP 8 04/05/2018 09:38 AM     (H) 04/05/2018 09:38 AM    BUN 11 04/05/2018 09:38 AM    CREA 0.91 04/05/2018 09:38 AM    GFRAA >60 04/05/2018 09:38 AM    GFRNA >60 04/05/2018 09:38 AM    CA 8.8 04/05/2018 09:38 AM    ALB 4.0 04/05/2018 09:38 AM    TP 8.9 (H) 04/05/2018 09:38 AM    GLOB 4.9 (H) 04/05/2018 09:38 AM    AGRAT 0.8 04/05/2018 09:38 AM    SGOT 61 (H) 04/05/2018 09:38 AM    ALT 31 04/05/2018 09:38 AM     CBC:   Lab Results   Component Value Date/Time    WBC 21.8 (H) 04/05/2018 09:38 AM    HGB 9.1 (L) 04/05/2018 09:38 AM    HCT 26.0 (L) 04/05/2018 09:38 AM     (H) 04/05/2018 09:38 AM     All Cardiac Markers in the last 24 hours: No results found for: CPK, CK, CKMMB, CKMB, RCK3, CKMBT, CKNDX, CKND1, KY, TROPT, TROIQ, ELSA, TROPT, TNIPOC, BNP, BNPP  COAGS: No results found for: APTT, PTP, INR  Liver Panel:   Lab Results   Component Value Date/Time    ALB 4.0 04/05/2018 09:38 AM    TP 8.9 (H) 04/05/2018 09:38 AM    GLOB 4.9 (H) 04/05/2018 09:38 AM    AGRAT 0.8 04/05/2018 09:38 AM    SGOT 61 (H) 04/05/2018 09:38 AM    ALT 31 04/05/2018 09:38 AM     04/05/2018 09:38 AM     CXR- Low lung volume chest radiograph with mild bibasilar atelectasis. No findings  are clearly acute. EKG-tachycardia    Procedures/imaging: see electronic medical records for all procedures/Xrays and details which were not copied into this note but were reviewed prior to creation of Plan      Assessment/Plan     Sickle cell crisis- considering PCA, however has   Leukocytosis- stress  Pain control  Hypokalemia  Thrombocytosis  Chest pain      No signs of obvious infection, cxr wnl  Trend trop, main goal is pain control. Received total 36 mg morphine and ketamine 20mg in 940am to 1pm (3 hours). He is going to get another dose of 16mg morphine. Discussed case with pharmacy as diludid is not an option for PCA. He has a fentanyl patch- will remove when starting fentayl PCA- 75mcg basal dose with demand of 20mg with a lockout of 10 minutes, max 150mcg/hr dose. IVF 200ml/hr, 2L in ER    DVT/GI Prophylaxis: Lovenox    Discussed with patient and  at bedside about hospital admission and my plan care, both understood and agree with my plan care.     Estelle Alejandre MD  4/5/2018 2:22 PM      440pm - patient on floor continues to be in excruciating, pca just started, d/w nurse to remove fentanyl patch and given one dose 8mg morphine - ordered

## 2018-04-05 NOTE — Clinical Note
Status[de-identified] Inpatient [101] Type of Bed: Medical [8] Inpatient Hospitalization Certified Necessary for the Following Reasons: 3. Patient receiving treatment that can only be provided in an inpatient setting (further clarification in H&P documentation) Admitting Diagnosis: Sickle cell crisis ([facility] [[id number]] Admitting Physician: Kyleigh Jackson [[id number]] Attending Physician: Kyleigh Jackson [[id number]] Estimated Length of Stay: 2 Midnights Discharge Plan[de-identified] Home with Office Follow-up

## 2018-04-05 NOTE — IP AVS SNAPSHOT
[address] [place] Patient: Jer Ruiz MRN: [id number] VOL:[id number] About your hospitalization You were admitted on:  April 5, 2018 You last received care in the:  [address] [place] [address] You were discharged on:  April 10, 2018 Why you were hospitalized Your primary diagnosis was:  Not on File Follow-up Information Follow up With Details Comments Contact Info Marina Naranjo MD  Left [address] [facility] [address] [facility] 
[phone] Discharge Orders None A check mohan indicates which time of day the medication should be taken. My Medications CONTINUE taking these medications Instructions Each Dose to Equal  
 Morning Noon Evening Bedtime  
 fentaNYL 25 mcg/hr PATCH Commonly known as:  [place] Your last dose was: Your next dose is:    
   
   
 1 Patch by TransDERmal route every seventy-two (72) hours. Max Daily Amount: 1 Patch. 1 Patch HYDROmorphone 2 mg tablet Commonly known as:  DILAUDID Your last dose was: Your next dose is: Take 1 Tab by mouth every six (6) hours as needed for Pain. Max Daily Amount: 8 mg. Indications: Pain  
 2 mg  
    
   
   
   
  
 hydroxyurea 500 mg capsule Commonly known as:  HYDREA Your last dose was: Your next dose is: Take 500 mg by mouth two (2) times a day. Indications: SICKLE CELL ANEMIA WITH CRISIS  
 500 mg  
    
   
   
   
  
 oxyCODONE-acetaminophen 5-325 mg per tablet Commonly known as:  PERCOCET Your last dose was: Your next dose is: Take 1 Tab by mouth every six (6) hours as needed for Pain. Max Daily Amount: 4 Tabs. 1 Tab Opioid Education Prescription Opioids: What You Need to Know: Prescription opioids can be used to help relieve moderate-to-severe pain and are often prescribed following a surgery or injury, or for certain health conditions. These medications can be an important part of treatment but also come with serious risks. Opioids are strong pain medicines. Examples include hydrocodone, oxycodone, fentanyl, and morphine. Heroin is an example of an illegal opioid. It is important to work with your health care provider to make sure you are getting the safest, most effective care. WHAT ARE THE RISKS AND SIDE EFFECTS OF OPIOID USE? Prescription opioids carry serious risks of addiction and overdose, especially with prolonged use. An opioid overdose, often marked by slow breathing, can cause sudden death. The use of prescription opioids can have a number of side effects as well, even when taken as directed. · Tolerance-meaning you might need to take more of a medication for the same pain relief · Physical dependence-meaning you have symptoms of withdrawal when the medication is stopped. Withdrawal symptoms can include nausea, sweating, chills, diarrhea, stomach cramps, and muscle aches. Withdrawal can last up to several weeks, depending on which drug you took and how long you took it. · Increased sensitivity to pain · Constipation · Nausea, vomiting, and dry mouth · Sleepiness and dizziness · Confusion · Depression · Low levels of testosterone that can result in lower sex drive, energy, and strength · Itching and sweating RISKS ARE GREATER WITH:      
· History of drug misuse, substance use disorder, or overdose · Mental health conditions (such as depression or anxiety) · Sleep apnea · Older age (72 years or older) · Pregnancy Avoid alcohol while taking prescription opioids. Also, unless specifically advised by your health care provider, medications to avoid include: · Benzodiazepines (such as Xanax or Valium) · Muscle relaxants (such as Soma or Flexeril) · Hypnotics (such as Ambien or Lunesta) · Other prescription opioids KNOW YOUR OPTIONS Talk to your health care provider about ways to manage your pain that don't involve prescription opioids. Some of these options may actually work better and have fewer risks and side effects. Options may include: 
· Pain relievers such as acetaminophen, ibuprofen, and naproxen · Some medications that are also used for depression or seizures · Physical therapy and exercise · Counseling to help patients learn how to cope better with triggers of pain and stress. · Application of heat or cold compress · Massage therapy · Relaxation techniques Be Informed Make sure you know the name of your medication, how much and how often to take it, and its potential risks & side effects. IF YOU ARE PRESCRIBED OPIOIDS FOR PAIN: 
· Never take opioids in greater amounts or more often than prescribed. Remember the goal is not to be pain-free but to manage your pain at a tolerable level. · Follow up with your primary care provider to: · Work together to create a plan on how to manage your pain. · Talk about ways to help manage your pain that don't involve prescription opioids. · Talk about any and all concerns and side effects. · Help prevent misuse and abuse. · Never sell or share prescription opioids · Help prevent misuse and abuse. · Store prescription opioids in a secure place and out of reach of others (this may include visitors, children, friends, and family). · Safely dispose of unused/unwanted prescription opioids: Find your community drug take-back program or your pharmacy mail-back program, or flush them down the toilet, following guidance from the Food and Drug Administration (www.fda.gov/Drugs/ResourcesForYou). · Visit www.cdc.gov/drugoverdose to learn about the risks of opioid abuse and overdose.  
· If you believe you may be struggling with addiction, tell your health care provider and ask for guidance or call [address] [facility] at [phone]. Discharge Instructions Sickle Cell Disease: Care Instructions Your Care Instructions Sickle cell disease turns normal, round red blood cells into misshaped cells that look like jeane or crescent moons. The sickle-shaped cells can get stuck in blood vessels, blocking blood flow and causing severe pain. The sickle-shaped cells also can harm organs, muscles, and bones. It is a lifelong condition. Sickle cell disease is passed down in families. You can talk to your doctor about whether to have genetic tests to find out the chance of having a child with the disease. Your doctor also may recommend that your family members get tested for sickle cell disease. Your doctor may treat you with medicines. Some people get blood transfusions or a bone marrow transplant. Managing pain is an important part of your treatment. Follow-up care is a key part of your treatment and safety. Be sure to make and go to all appointments, and call your doctor if you are having problems. It's also a good idea to know your test results and keep a list of the medicines you take. How can you care for yourself at home? · Take your medicines exactly as prescribed. Call your doctor if you think you are having a problem with your medicine. · Take pain medicines exactly as directed. ¨ If the doctor gave you a prescription medicine for pain, take it as prescribed. ¨ If you are not taking a prescription pain medicine, ask your doctor if you can take an over-the-counter medicine. · Try to help ease pain by distracting yourself. Use guided imagery, deep breathing, and relaxation exercises. A pain specialist can teach you pain management skills. · Avoid alcohol. It can make you dehydrated. · Dress warmly in cold weather. The cold and windy weather can lead to severe pain. · Do not smoke. Smoking can reduce the amount of oxygen in your blood. If you need help quitting, talk to your doctor about stop-smoking programs and medicines. These can increase your chances of quitting for good. · Get plenty of sleep. · Get regular eye exams. Sickle cell disease can cause vision problems. · Wear medical alert jewelry that says that you have sickle cell disease. You can buy this at most drugstores. · Avoid colds and flu. Get a pneumococcal vaccine shot. If you have had one before, ask your doctor whether you need another dose. Get a flu shot every year. If you must be around people with colds or flu, wash your hands often. When should you call for help? Call 911 anytime you think you may need emergency care. For example, call if: 
· You have symptoms of a severe problem from sickle cell. · You have symptoms of a stroke. These may include: 
¨ Sudden numbness, tingling, weakness, or loss of movement in your face, arm, or leg, especially on only one side of your body. ¨ Sudden vision changes. ¨ Sudden trouble speaking. ¨ Sudden confusion or trouble understanding simple statements. ¨ Sudden problems with walking or balance. ¨ A sudden, severe headache that is different from past headaches. · You are in severe pain. · You have symptoms of a heart attack. These may include: ¨ Chest pain or pressure, or a strange feeling in the chest. 
¨ Sweating. ¨ Shortness of breath. ¨ Nausea or vomiting. ¨ Pain, pressure, or a strange feeling in the back, neck, jaw, or upper belly or in one or both shoulders or arms. ¨ Lightheadedness or sudden weakness. ¨ A fast or irregular heartbeat. After you call 911, the  may tell you to chew 1 adult-strength or 2 to 4 low-dose aspirin. Wait for an ambulance. Do not try to drive yourself. Call your doctor now or seek immediate medical care if: 
· You have a fever.  
Watch closely for changes in your health, and be sure to contact your doctor if you have any problems. Where can you learn more? Go to http://manuel-amadeo.info/. Enter 486 9300 in the search box to learn more about \"Sickle Cell Disease: Care Instructions. \" Current as of: October 13, 2016 Content Version: 11.4 © 3568-1262 Qt Software. Care instructions adapted under license by [employer] (which disclaims liability or warranty for this information). If you have questions about a medical condition or this instruction, always ask your healthcare professional. [place] [address] any warranty or liability for your use of this information. DISCHARGE SUMMARY from Nurse PATIENT INSTRUCTIONS: 
 
After general anesthesia or intravenous sedation, for 24 hours or while taking prescription Narcotics: · Limit your activities · Do not drive and operate hazardous machinery · Do not make important personal or business decisions · Do  not drink alcoholic beverages · If you have not urinated within 8 hours after discharge, please contact your surgeon on call. Report the following to your surgeon: 
· Excessive pain, swelling, redness or odor of or around the surgical area · Temperature over 100.5 · Nausea and vomiting lasting longer than 4 hours or if unable to take medications · Any signs of decreased circulation or nerve impairment to extremity: change in color, persistent  numbness, tingling, coldness or increase pain · Any questions What to do at Home: 
Recommended activity: Activity as tolerated, If you experience any of the following symptoms chest pain, shortness or breath, dizziness, increase temperature greater 100.4, increase weakness, nausea, vomiting, or diarrhea, please follow up with PCP or call 911. *  Please give a list of your current medications to your Primary Care Provider.  
 
*  Please update this list whenever your medications are discontinued, doses are 
 changed, or new medications (including over-the-counter products) are added. *  Please carry medication information at all times in case of emergency situations. These are general instructions for a healthy lifestyle: No smoking/ No tobacco products/ Avoid exposure to second hand smoke Surgeon General's Warning:  Quitting smoking now greatly reduces serious risk to your health. Obesity, smoking, and sedentary lifestyle greatly increases your risk for illness A healthy diet, regular physical exercise & weight monitoring are important for maintaining a healthy lifestyle You may be retaining fluid if you have a history of heart failure or if you experience any of the following symptoms:  Weight gain of 3 pounds or more overnight or 5 pounds in a week, increased swelling in our hands or feet or shortness of breath while lying flat in bed. Please call your doctor as soon as you notice any of these symptoms; do not wait until your next office visit. Recognize signs and symptoms of STROKE: 
 
F-face looks uneven A-arms unable to move or move unevenly S-speech slurred or non-existent T-time-call 911 as soon as signs and symptoms begin-DO NOT go Back to bed or wait to see if you get better-TIME IS BRAIN. Warning Signs of HEART ATTACK Call 911 if you have these symptoms: 
? Chest discomfort. Most heart attacks involve discomfort in the center of the chest that lasts more than a few minutes, or that goes away and comes back. It can feel like uncomfortable pressure, squeezing, fullness, or pain. ? Discomfort in other areas of the upper body. Symptoms can include pain or discomfort in one or both arms, the back, neck, jaw, or stomach. ? Shortness of breath with or without chest discomfort. ? Other signs may include breaking out in a cold sweat, nausea, or lightheadedness. Don't wait more than five minutes to call [address]!  Fast action can save your life. Calling 911 is almost always the fastest way to get lifesaving treatment. Emergency Medical Services staff can begin treatment when they arrive  up to an hour sooner than if someone gets to the hospital by car. The discharge information has been reviewed with the patient. The patient verbalized understanding. Discharge medications reviewed with the patient and appropriate educational materials and side effects teaching were provided. _____________________________________________________________________________________Patient armband removed and shredded MyChart Activation Thank you for requesting access to [employer]. Please follow the instructions below to securely access and download your online medical record. [employer] allows you to send messages to your doctor, view your test results, renew your prescriptions, schedule appointments, and more. How Do I Sign Up? 1. In your internet browser, go to [email or web address]. 2. Click on the First Time User? Click Here link in the Sign In box. You will see the New Member Sign Up page. 3. Enter your [employer] Access Code exactly as it appears below. You will not need to use this code after youve completed the sign-up process. If you do not sign up before the expiration date, you must request a new code. [employer] Access Code: [email or web address] Expires: 2018  8:28 PM (This is the date your [employer] access code will ) 4. Enter the last four digits of your Social Security Number (xxxx) and Date of Birth (mm/dd/yyyy) as indicated and click Submit. You will be taken to the next sign-up page. 5. Create a Horizontal Systemst ID. This will be your [employer] login ID and cannot be changed, so think of one that is secure and easy to remember. 6. Create a [employer] password. You can change your password at any time. 7. Enter your Password Reset Question and Answer.  This can be used at a later time if you forget your password. 8. Enter your e-mail address. You will receive e-mail notification when new information is available in [address] 9. Click Sign Up. You can now view and download portions of your medical record. 10. Click the Download Summary menu link to download a portable copy of your medical information. Additional Information If you have questions, please visit the Frequently Asked Questions section of the [employer] website at [email or web address] Remember, [employer] is NOT to be used for urgent needs. For medical emergencies, dial 911. 
 
______________________________________________ [facility] Announcement We are excited to announce that we are making your provider's discharge notes available to you in [employer]. You will see these notes when they are completed and signed by the physician that discharged you from your recent hospital stay. If you have any questions or concerns about any information you see in [employer], please call the Health Information Department where you were seen or reach out to your Primary Care Provider for more information about your plan of care. Introducing [facility]! [facility] introduces [employer] patient portal. Now you can access parts of your medical record, email your doctor's office, and request medication refills online. 1. In your internet browser, go to [email or web address] 2. Click on the First Time User? Click Here link in the Sign In box. You will see the New Member Sign Up page. 3. Enter your [employer] Access Code exactly as it appears below. You will not need to use this code after youve completed the sign-up process. If you do not sign up before the expiration date, you must request a new code. · [employer] Access Code: [email or web address] Expires: 5/12/2018  8:28 PM 
 
4.  Enter the last four digits of your Social Security Number (xxxx) and Date of Birth (mm/dd/yyyy) as indicated and click Submit. You will be taken to the next sign-up page. 5. Create a Intexyst ID. This will be your Vanilla [employer]ree[employer] login ID and cannot be changed, so think of one that is secure and easy to remember. 6. Create a Intexyst password. You can change your password at any time. 7. Enter your Password Reset Question and Answer. This can be used at a later time if you forget your password. 8. Enter your e-mail address. You will receive e-mail notification when new information is available in [address] 9. Click Sign Up. You can now view and download portions of your medical record. 10. Click the Download Summary menu link to download a portable copy of your medical information. If you have questions, please visit the Frequently Asked Questions section of the [employer] website. Remember, [employer] is NOT to be used for urgent needs. For medical emergencies, dial 911. Now available from your iPhone and Android! Introducing Josef Craven As a [facility] patient, I wanted to make you aware of our electronic visit tool called Josef Craven. [facility] 24/7 allows you to connect within minutes with a medical provider 24 hours a day, seven days a week via a mobile device or tablet or logging into a secure website from your computer. You can access Josef Craven from anywhere in the [place]. A virtual visit might be right for you when you have a simple condition and feel like you just dont want to get out of bed, or cant get away from work for an appointment, when your regular [facility] provider is not available (evenings, weekends or holidays), or when youre out of town and need minor care. Electronic visits cost only $49 and if the [facility] 24/7 provider determines a prescription is needed to treat your condition, one can be electronically transmitted to a nearby pharmacy*. Please take a moment to enroll today if you have not already done so. The enrollment process is free and takes just a few minutes. To enroll, please download the [facility] 24/7 karen to your tablet or phone, or visit [email or web address] to enroll on your computer. And, as an [address] [place] [address] patient with a iBid2Save account, the results of your visits will be scanned into your electronic medical record and your primary care provider will be able to view the scanned results. We urge you to continue to see your regular [facility] provider for your ongoing medical care. And while your primary care provider may not be the one available when you seek a [email or web address] virtual visit, the peace of mind you get from getting a real diagnosis real time can be priceless. For more information on [employer], view our Frequently Asked Questions (FAQs) at [email or web address]. Sincerely, 
 
Jaciel Lopez MD 
Chief Medical Officer [facility] Tika Madhu *:  certain medications cannot be prescribed via Ech[employer] Providers Seen During Your Hospitalization Provider Specialty Primary office phone Freeman Quezada MD Emergency Medicine [phone] Leobardo Leslie MD Internal Medicine [phone] Rsahawn Loving MD Internal Medicine [phone] Your Primary Care Physician (PCP) Primary Care Physician Office Phone Office Fax [address] [facility], [address] [place] [address] [phone] You are allergic to the following Allergen Reactions Eggshell Membrane Nausea and Vomiting Recent Documentation Weight BMI Smoking Status 75.2 kg 22.48 kg/m2 Never Smoker Emergency Contacts Name Discharge Info Relation Home Work Mobile [facility] CAREGIVER [3] Mother [14] [phone] [address] CAREGIVER [3] Spouse [3] [phone] Patient Belongings The following personal items are in your possession at time of discharge: 
  Dental Appliances: None  Visual Aid: None      Home Medications: None   Jewelry: Earrings       Other Valuables: Cell Phone Please provide this summary of care documentation to your next provider. Signatures-by signing, you are acknowledging that this After Visit Summary has been reviewed with you and you have received a copy. Patient Signature:  ____________________________________________________________ Date:  ____________________________________________________________  
  
Mack Endo Provider Signature:  ____________________________________________________________ Date:  ____________________________________________________________

## 2018-04-05 NOTE — ED NOTES
Pt reporting some relief from pain with ketamine administration, verbal order for 16mg morphine per Dr Michelle Marinelli.

## 2018-04-06 LAB
ANION GAP SERPL CALC-SCNC: 8 MMOL/L (ref 3–18)
BUN SERPL-MCNC: 8 MG/DL (ref 7–18)
BUN/CREAT SERPL: 10 (ref 12–20)
CALCIUM SERPL-MCNC: 8.9 MG/DL (ref 8.5–10.1)
CHLORIDE SERPL-SCNC: 106 MMOL/L (ref 100–108)
CO2 SERPL-SCNC: 23 MMOL/L (ref 21–32)
CREAT SERPL-MCNC: 0.83 MG/DL (ref 0.6–1.3)
ERYTHROCYTE [DISTWIDTH] IN BLOOD BY AUTOMATED COUNT: 22.9 % (ref 11.6–14.5)
GLUCOSE SERPL-MCNC: 93 MG/DL (ref 74–99)
HCT VFR BLD AUTO: 24.2 % (ref 36–48)
HGB BLD-MCNC: 8.2 G/DL (ref 13–16)
MCH RBC QN AUTO: 24 PG (ref 24–34)
MCHC RBC AUTO-ENTMCNC: 33.9 G/DL (ref 31–37)
MCV RBC AUTO: 70.8 FL (ref 74–97)
PLATELET # BLD AUTO: 382 K/UL (ref 135–420)
PMV BLD AUTO: 9.4 FL (ref 9.2–11.8)
POTASSIUM SERPL-SCNC: 4.1 MMOL/L (ref 3.5–5.5)
RBC # BLD AUTO: 3.42 M/UL (ref 4.7–5.5)
SODIUM SERPL-SCNC: 137 MMOL/L (ref 136–145)
WBC # BLD AUTO: 19.4 K/UL (ref 4.6–13.2)

## 2018-04-06 PROCEDURE — 74011250636 HC RX REV CODE- 250/636: Performed by: INTERNAL MEDICINE

## 2018-04-06 PROCEDURE — 74011250637 HC RX REV CODE- 250/637: Performed by: INTERNAL MEDICINE

## 2018-04-06 PROCEDURE — 65660000000 HC RM CCU STEPDOWN

## 2018-04-06 PROCEDURE — 80048 BASIC METABOLIC PNL TOTAL CA: CPT | Performed by: INTERNAL MEDICINE

## 2018-04-06 PROCEDURE — 85027 COMPLETE CBC AUTOMATED: CPT | Performed by: INTERNAL MEDICINE

## 2018-04-06 PROCEDURE — 36415 COLL VENOUS BLD VENIPUNCTURE: CPT | Performed by: INTERNAL MEDICINE

## 2018-04-06 RX ORDER — ACETAMINOPHEN 500 MG
500 TABLET ORAL
Status: DISCONTINUED | OUTPATIENT
Start: 2018-04-06 | End: 2018-04-10 | Stop reason: HOSPADM

## 2018-04-06 RX ORDER — ONDANSETRON 2 MG/ML
4 INJECTION INTRAMUSCULAR; INTRAVENOUS
Status: DISCONTINUED | OUTPATIENT
Start: 2018-04-06 | End: 2018-04-10 | Stop reason: HOSPADM

## 2018-04-06 RX ORDER — HYDROMORPHONE HYDROCHLORIDE 2 MG/1
4 TABLET ORAL
Status: COMPLETED | OUTPATIENT
Start: 2018-04-06 | End: 2018-04-09

## 2018-04-06 RX ADMIN — DOCUSATE SODIUM 100 MG: 100 CAPSULE, LIQUID FILLED ORAL at 17:39

## 2018-04-06 RX ADMIN — Medication: at 16:14

## 2018-04-06 RX ADMIN — Medication: at 05:47

## 2018-04-06 RX ADMIN — SODIUM CHLORIDE 200 ML/HR: 900 INJECTION, SOLUTION INTRAVENOUS at 05:50

## 2018-04-06 RX ADMIN — Medication: at 22:19

## 2018-04-06 RX ADMIN — Medication 10 ML: at 14:29

## 2018-04-06 RX ADMIN — IBUPROFEN 800 MG: 400 TABLET ORAL at 09:35

## 2018-04-06 RX ADMIN — ENOXAPARIN SODIUM 40 MG: 40 INJECTION, SOLUTION INTRAVENOUS; SUBCUTANEOUS at 14:29

## 2018-04-06 RX ADMIN — Medication 10 ML: at 05:57

## 2018-04-06 RX ADMIN — SODIUM CHLORIDE 125 ML/HR: 900 INJECTION, SOLUTION INTRAVENOUS at 20:22

## 2018-04-06 RX ADMIN — DOCUSATE SODIUM 100 MG: 100 CAPSULE, LIQUID FILLED ORAL at 09:35

## 2018-04-06 RX ADMIN — HYDROXYUREA 500 MG: 500 CAPSULE ORAL at 17:39

## 2018-04-06 RX ADMIN — IBUPROFEN 800 MG: 400 TABLET ORAL at 12:59

## 2018-04-06 RX ADMIN — HYDROXYUREA 500 MG: 500 CAPSULE ORAL at 09:35

## 2018-04-06 RX ADMIN — Medication: at 10:30

## 2018-04-06 RX ADMIN — Medication 10 ML: at 23:08

## 2018-04-06 RX ADMIN — IBUPROFEN 800 MG: 400 TABLET ORAL at 17:39

## 2018-04-06 NOTE — ROUTINE PROCESS
Received bedside report from [address] [place] [address], patient was in bed, pain was 8/10, visitors at bedside, [address] elevated, bed in lowest position. Patient's pain was not subsiding, Dr. Kellee Barth gave a verbal order to increase the rate from 75 mcg/hr to 125 mcg/hr and Motrin 800mg PO TID. Gave report to Robb Benjamin RN, using SBAR, MAR, and Kardex.

## 2018-04-06 NOTE — PROGRESS NOTES
Phone: [phone]     Hematology / Oncology Progress Note  [facility]      Patient: Tona Garza   MRN: [id number]         CSN: [id number]    YOB: 1980      Admit Date: 2018    Assessment:     Active Problems:    Sickle cell crisis ([address] (2014)    elevated unconjugated bili, hemolysis    Plan:     Continue current fentanyl PCA settings.   Oxygen 2 lt/NC  Hydration encouraged  Oral folic acid  Hydrea [id number] bid  Monitor hb and LFTs'd/w pt and mother    Bre Henriquez MD  [facility] [phone]      Subjective:     Still with severe pain , back, some better than last night    Objective:     Visit Vitals    /74 (BP 1 Location: Left arm, BP Patient Position: At rest)    Pulse 80    Temp 98.4 °F (36.9 °C)    Resp 18    Wt 75.3 kg (166 lb)    SpO2 90%    BMI 22.51 kg/m2             Temp (24hrs), Av.2 °F (36.8 °C), Min:98 °F (36.7 °C), Max:98.4 °F (36.9 °C)        Intake/Output Summary (Last 24 hours) at 18 0818  Last data filed at 18 0457   Gross per 24 hour   Intake                0 ml   Output              700 ml   Net             -700 ml     Review of Systems:   Constitutional: negative for fevers, chills, sweats and fatigue  Eyes: negative for visual disturbance, redness and icterus  Ears, Nose, Mouth, Throat, and Face: negative for tinnitus, epistaxis, sore mouth and hoarseness  Respiratory: negative for cough, sputum, hemoptysis, pleurisy/chest pain or wheezing  Cardiovascular: negative for chest pain, chest pressure/discomfort, palpitations, irregular heart beats, syncope, paroxysmal nocturnal dyspnea  Gastrointestinal: negative for reflux symptoms, nausea, vomiting, change in bowel habits, melena, diarrhea, constipation and abdominal pain  Genitourinary:negative for dysuria, nocturia, urinary incontinence, hesitancy and hematuria  Integument: negative for rash, skin lesion(s) and pruritus  Hematologic/Lymphatic: negative for easy bruising, bleeding and lymphadenopathy  Musculoskeletal:Back pain  Neurological: negative for headaches, dizziness, seizures, paresthesia and weakness    Physical Exam:  Constitutional: Alert, oriented, in distress from pain  Eyes: PERRLA,icteric sclera  Ears, nose, mouth, throat, and face: no palpable Lymph nodes, no mucositis, no thrush. Respiratory: symmetrical expansion, no rales, no rhonchi, no wheezing. Cardiovascular: S1S2, no pathologic murmur, no rub. Gastrointestinal: soft, benign, non tender, no HSM, normal bowel sounds, no mass. Integument: no rash, no petechiae, no ecchymosis. Musculoskeletal: no edema, no cyanosis, no clubbing. Neurological: intact, cranial nerves, no focal motor or sensory deficits. Labs:  Recent Results (from the past 24 hour(s))   CBC WITH AUTOMATED DIFF    Collection Time: 04/05/18  9:38 AM   Result Value Ref Range    WBC 21.8 (H) 4.6 - 13.2 K/uL    RBC 3.65 (L) 4.70 - 5.50 M/uL    HGB 9.1 (L) 13.0 - 16.0 g/dL    HCT 26.0 (L) 36.0 - 48.0 %    MCV 71.2 (L) 74.0 - 97.0 FL    MCH 24.9 24.0 - 34.0 PG    MCHC 35.0 31.0 - 37.0 g/dL    RDW 22.2 (H) 11.6 - 14.5 %    PLATELET 813 (H) 781 - 420 K/uL    MPV 9.3 9.2 - 11.8 FL    NEUTROPHILS 64 42 - 75 %    BAND NEUTROPHILS 2 0 - 5 %    LYMPHOCYTES 23 20 - 51 %    MONOCYTES 10 (H) 2 - 9 %    EOSINOPHILS 1 0 - 5 %    BASOPHILS 0 0 - 3 %    NRBC 2.0 (H) 0  WBC    ABS. NEUTROPHILS 14.4 (H) 1.8 - 8.0 K/UL    ABS. LYMPHOCYTES 5.0 (H) 0.8 - 3.5 K/UL    ABS. MONOCYTES 2.2 (H) 0 - 1.0 K/UL    ABS. EOSINOPHILS 0.2 0.0 - 0.4 K/UL    ABS.  BASOPHILS 0.0 0.0 - 0.06 K/UL    DF MANUAL      PLATELET COMMENTS Increased Platelets      RBC COMMENTS ANISOCYTOSIS  2+        RBC COMMENTS POIKILOCYTOSIS  2+        RBC COMMENTS OVALOCYTES  1+        RBC COMMENTS POLYCHROMASIA  1+        RBC COMMENTS SICKLE CELLS  2+       RETICULOCYTE COUNT    Collection Time: 04/05/18  9:38 AM   Result Value Ref Range    Reticulocyte count 8.0 (H) 0.5 - 2.3 %   METABOLIC PANEL, COMPREHENSIVE    Collection Time: 04/05/18  9:38 AM   Result Value Ref Range    Sodium 137 136 - 145 mmol/L    Potassium 3.2 (L) 3.5 - 5.5 mmol/L    Chloride 105 100 - 108 mmol/L    CO2 24 21 - 32 mmol/L    Anion gap 8 3.0 - 18 mmol/L    Glucose 117 (H) 74 - 99 mg/dL    BUN 11 7.0 - 18 MG/DL    Creatinine 0.91 0.6 - 1.3 MG/DL    BUN/Creatinine ratio 12 12 - 20      GFR est AA >60 >60 ml/min/1.73m2    GFR est non-AA >60 >60 ml/min/1.73m2    Calcium 8.8 8.5 - 10.1 MG/DL    Bilirubin, total 3.8 (H) 0.2 - 1.0 MG/DL    ALT (SGPT) 31 16 - 61 U/L    AST (SGOT) 61 (H) 15 - 37 U/L    Alk.  phosphatase 110 45 - 117 U/L    Protein, total 8.9 (H) 6.4 - 8.2 g/dL    Albumin 4.0 3.4 - 5.0 g/dL    Globulin 4.9 (H) 2.0 - 4.0 g/dL    A-G Ratio 0.8 0.8 - 1.7     EKG, 12 LEAD, INITIAL    Collection Time: 04/05/18  9:39 AM   Result Value Ref Range    Ventricular Rate 92 BPM    Atrial Rate 92 BPM    P-R Interval 166 ms    QRS Duration 90 ms    Q-T Interval 376 ms    QTC Calculation (Bezet) 464 ms    Calculated P Axis 42 degrees    Calculated R Axis -10 degrees    Calculated T Axis 67 degrees    Diagnosis       Normal sinus rhythm  Moderate voltage criteria for LVH, may be normal variant  Nonspecific T wave abnormality  Abnormal ECG  When compared with ECG of 12-JAN-2018 01:14,  No significant change was found  Confirmed by Zuleima Vargas ([id number]) on 4/5/2018 5:15:01 PM     INFLUENZA A & B AG (RAPID TEST)    Collection Time: 04/05/18 10:44 AM   Result Value Ref Range    Influenza A Antigen NEGATIVE  NEG      Influenza B Antigen NEGATIVE  NEG     CARDIAC PANEL,(CK, CKMB & TROPONIN)    Collection Time: 04/05/18  3:40 PM   Result Value Ref Range     39 - 308 U/L    CK - MB <1.0 <3.6 ng/ml    CK-MB Index  0.0 - 4.0 %     CALCULATION NOT PERFORMED WHEN RESULT IS BELOW LINEAR LIMIT    Troponin-I, Qt. <0.02 0.0 - 0.045 NG/ML   CARDIAC PANEL,(CK, CKMB & TROPONIN)    Collection Time: 04/05/18  8:54 PM   Result Value Ref Range     (H) 39 - 308 U/L    CK - MB <1.0 <3.6 ng/ml    CK-MB Index  0.0 - 4.0 %     CALCULATION NOT PERFORMED WHEN RESULT IS BELOW LINEAR LIMIT    Troponin-I, Qt. <0.02 0.0 - 4.322 NG/ML   METABOLIC PANEL, BASIC    Collection Time: 04/06/18  2:33 AM   Result Value Ref Range    Sodium 137 136 - 145 mmol/L    Potassium 4.1 3.5 - 5.5 mmol/L    Chloride 106 100 - 108 mmol/L    CO2 23 21 - 32 mmol/L    Anion gap 8 3.0 - 18 mmol/L    Glucose 93 74 - 99 mg/dL    BUN 8 7.0 - 18 MG/DL    Creatinine 0.83 0.6 - 1.3 MG/DL    BUN/Creatinine ratio 10 (L) 12 - 20      GFR est AA >60 >60 ml/min/1.73m2    GFR est non-AA >60 >60 ml/min/1.73m2    Calcium 8.9 8.5 - 10.1 MG/DL   CBC W/O DIFF    Collection Time: 04/06/18  2:33 AM   Result Value Ref Range    WBC 19.4 (H) 4.6 - 13.2 K/uL    RBC 3.42 (L) 4.70 - 5.50 M/uL    HGB 8.2 (L) 13.0 - 16.0 g/dL    HCT 24.2 (L) 36.0 - 48.0 %    MCV 70.8 (L) 74.0 - 97.0 FL    MCH 24.0 24.0 - 34.0 PG    MCHC 33.9 31.0 - 37.0 g/dL    RDW 22.9 (H) 11.6 - 14.5 %    PLATELET 720 871 - 066 K/uL    MPV 9.4 9.2 - 11.8 FL

## 2018-04-06 NOTE — PROGRESS NOTES
conducted an initial consultation and Spiritual Assessment for Tod Faulkner, who is a 42 y.o.,male. Patients Primary Language is: [place]. According to the patients EMR Mandaen Affiliation is: [place]. The reason the Patient came to the hospital is:   Patient Active Problem List    Diagnosis Date Noted    Cholecystitis 07/01/2017    Hypokalemia     Bilateral leg pain 04/24/2017    Vaso-occlusive sickle cell crisis ([facility] 04/24/2017    Sickle cell anemia ([facility]) 01/19/2017    Hemolytic anemia ([facility]) 12/11/2016    SC disease ([facility] 12/11/2016    Pain, generalized 04/05/2016    Vitamin D deficiency 01/26/2016    Leukocytosis 10/17/2015    Sickle cell anemia with crisis ([facility] 05/07/2015    Sickle cell pain crisis ([facility] 08/15/2014    Sickle cell crisis ([facility] 01/26/2014    Sickle cell disease ([facility] 09/27/2013    Elevated AST (SGOT) 03/10/2012    Serum total bilirubin elevated 12/27/2011    Elevated total protein 12/27/2011    Hypocalcemia 07/06/2011    Elevated alkaline phosphatase level 03/21/2010    Elevated ALT measurement 03/21/2010    B12 deficiency 03/15/2010    Hyponatremia 11/18/2009    Microcytic anemia 10/25/2007    Reticulocytosis 10/25/2007    Elevated platelet count 89/46/0745        The  provided the following Interventions:  Initiated a relationship of care and support. Explored issues of delia, belief, spirituality and Tenriism/ritual needs while hospitalized. Listened empathically to patient who shared about his delia, family and his journey with Sickle cell crisis. Provided information about Spiritual Care Services. Offered prayer and assurance of continued prayers on patient's behalf. The following outcomes where achieved:  Patient shared limited information about both their medical narrative and spiritual journey/beliefs.  confirmed Patient's Mandaen Affiliation: [facility]; [employer].   Patient processed feeling about current hospitalization. Patient expressed gratitude for 's visit. Assessment:  Patient does not have any Yazdanism/cultural needs that will affect patients preferences in health care. There are no spiritual or Yazdanism issues which require intervention at this time. Plan:  Chaplains will continue to follow and will provide pastoral care on an as needed/requested basis.  recommends bedside caregivers page  on duty if patient shows signs of acute spiritual or emotional distress.       Kala Antonio, [address] [place] [address]  Spiritual Care  [phone]

## 2018-04-06 NOTE — PROGRESS NOTES
Hospitalist Progress Note      Patient: Tammy Lora MRN: [id number]  CSN: [id number]    YOB: 1980  Age: 42 y.o. Sex: male    DOA: 4/5/2018 LOS:  LOS: 1 day          Feels better than when he presented. Denies chest pain. Still has back pain but better than yesterday. Was seen by his primary hematologist this am. Good uop. Assessment/Plan     1. Sickle cell crisis - on fentanyl pca, iv fluids, folic acid, HU, supplemental oxygen. Hematology input appreciated. 2. Hemolysis  3. Anemia, microcytic hypochromic  4. Elevated AST  5. Hx low B12 - recheck. 6. Full code. Additional Notes:      Case discussed with:  [x]Patient  []Family  [x]Nursing  []Case Management  DVT Prophylaxis:  [x]Lovenox  []Hep SQ  []SCDs  []Coumadin   []On Heparin gtt    Vital signs/Intake and Output:  Visit Vitals    /74 (BP 1 Location: Left arm, BP Patient Position: At rest)    Pulse 80    Temp 98.4 °F (36.9 °C)    Resp 18    Wt 75.3 kg (166 lb)    SpO2 90%    BMI 22.51 kg/m2     Current Shift:  04/06 0701 - 04/06 1900  In: -   Out: 550 [Urine:550]  Last three shifts:  04/04 1901 - 04/06 0700  In: 0   Out: 700 [Urine:700]    Awake alert and oriented  Ncat. Perrl. RRR  cta b.l  Soft nt  No edema.    No focal deficit    Medications Reviewed      Labs: Results:       Chemistry Recent Labs      04/06/18   0233  04/05/18   0938   GLU  93  117*   NA  137  137   K  4.1  3.2*   CL  106  105   CO2  23  24   BUN  8  11   CREA  0.83  0.91   CA  8.9  8.8   AGAP  8  8   BUCR  10*  12   AP   --   110   TP   --   8.9*   ALB   --   4.0   GLOB   --   4.9*   AGRAT   --   0.8      CBC w/Diff Recent Labs      04/06/18   0233  04/05/18   0938   WBC  19.4*  21.8*   RBC  3.42*  3.65*   HGB  8.2*  9.1*   HCT  24.2*  26.0*   PLT  382  441*   GRANS   --   64   LYMPH   --   23   EOS   --   1      Cardiac Enzymes Recent Labs      04/05/18 2054 04/05/18   1540   CPK  343*  263   CKND1  CALCULATION NOT PERFORMED WHEN RESULT IS BELOW LINEAR LIMIT  CALCULATION NOT PERFORMED WHEN RESULT IS BELOW LINEAR LIMIT      Coagulation No results for input(s): PTP, INR, APTT in the last 72 hours. No lab exists for component: INREXT    Lipid Panel Lab Results   Component Value Date/Time    Cholesterol, total 106 10/11/2010 03:35 PM      BNP No results for input(s): BNPP in the last 72 hours. Liver Enzymes Recent Labs      04/05/18   0938   TP  8.9*   ALB  4.0   AP  110   SGOT  61*      Thyroid Studies Lab Results   Component Value Date/Time    TSH 0.93 03/15/2010 01:58 PM        Procedures/imaging: see electronic medical records for all procedures/Xrays and details which were not copied into this note but were reviewed prior to creation of Plan.

## 2018-04-06 NOTE — ROUTINE PROCESS
Received report from [facility]. Pt AAOx3, NAD, breathing non labored, on room air, HOB up. IVF going per order. Fentanyl PCA going per setting. Bed at the lowest level on lock position, call bell w/i reach. Bedside and Verbal shift change report given to ANA Pabon (oncoming nurse) by me (offgoing nurse). Report included the following information SBAR, Kardex, Intake/Output, MAR, Recent Results and Cardiac Rhythm not on heart monitoring.

## 2018-04-07 LAB
ALBUMIN SERPL-MCNC: 3.3 G/DL (ref 3.4–5)
ALBUMIN/GLOB SERPL: 0.8 {RATIO} (ref 0.8–1.7)
ALP SERPL-CCNC: 102 U/L (ref 45–117)
ALT SERPL-CCNC: 37 U/L (ref 16–61)
ANION GAP SERPL CALC-SCNC: 7 MMOL/L (ref 3–18)
AST SERPL-CCNC: 79 U/L (ref 15–37)
BILIRUB DIRECT SERPL-MCNC: 2.6 MG/DL (ref 0–0.2)
BILIRUB SERPL-MCNC: 7.4 MG/DL (ref 0.2–1)
BUN SERPL-MCNC: 10 MG/DL (ref 7–18)
BUN/CREAT SERPL: 11 (ref 12–20)
CALCIUM SERPL-MCNC: 8 MG/DL (ref 8.5–10.1)
CHLORIDE SERPL-SCNC: 108 MMOL/L (ref 100–108)
CO2 SERPL-SCNC: 23 MMOL/L (ref 21–32)
CREAT SERPL-MCNC: 0.89 MG/DL (ref 0.6–1.3)
ERYTHROCYTE [DISTWIDTH] IN BLOOD BY AUTOMATED COUNT: 23.4 % (ref 11.6–14.5)
FOLATE SERPL-MCNC: 3.9 NG/ML (ref 3.1–17.5)
GLOBULIN SER CALC-MCNC: 4.4 G/DL (ref 2–4)
GLUCOSE SERPL-MCNC: 94 MG/DL (ref 74–99)
HCT VFR BLD AUTO: 22.8 % (ref 36–48)
HGB BLD-MCNC: 7.9 G/DL (ref 13–16)
MAGNESIUM SERPL-MCNC: 2.1 MG/DL (ref 1.6–2.6)
MCH RBC QN AUTO: 24.3 PG (ref 24–34)
MCHC RBC AUTO-ENTMCNC: 34.6 G/DL (ref 31–37)
MCV RBC AUTO: 70.2 FL (ref 74–97)
PHOSPHATE SERPL-MCNC: 2.9 MG/DL (ref 2.5–4.9)
PLATELET # BLD AUTO: 359 K/UL (ref 135–420)
PMV BLD AUTO: 9.7 FL (ref 9.2–11.8)
POTASSIUM SERPL-SCNC: 4 MMOL/L (ref 3.5–5.5)
PROT SERPL-MCNC: 7.7 G/DL (ref 6.4–8.2)
RBC # BLD AUTO: 3.25 M/UL (ref 4.7–5.5)
SODIUM SERPL-SCNC: 138 MMOL/L (ref 136–145)
VIT B12 SERPL-MCNC: 203 PG/ML (ref 211–911)
WBC # BLD AUTO: 15 K/UL (ref 4.6–13.2)

## 2018-04-07 PROCEDURE — 83735 ASSAY OF MAGNESIUM: CPT | Performed by: INTERNAL MEDICINE

## 2018-04-07 PROCEDURE — 84100 ASSAY OF PHOSPHORUS: CPT | Performed by: INTERNAL MEDICINE

## 2018-04-07 PROCEDURE — 74011250636 HC RX REV CODE- 250/636: Performed by: INTERNAL MEDICINE

## 2018-04-07 PROCEDURE — 74011250637 HC RX REV CODE- 250/637: Performed by: INTERNAL MEDICINE

## 2018-04-07 PROCEDURE — 74011250637 HC RX REV CODE- 250/637: Performed by: HOSPITALIST

## 2018-04-07 PROCEDURE — 36415 COLL VENOUS BLD VENIPUNCTURE: CPT | Performed by: INTERNAL MEDICINE

## 2018-04-07 PROCEDURE — 65660000000 HC RM CCU STEPDOWN

## 2018-04-07 PROCEDURE — 82607 VITAMIN B-12: CPT | Performed by: INTERNAL MEDICINE

## 2018-04-07 PROCEDURE — 85027 COMPLETE CBC AUTOMATED: CPT | Performed by: INTERNAL MEDICINE

## 2018-04-07 PROCEDURE — 80048 BASIC METABOLIC PNL TOTAL CA: CPT | Performed by: INTERNAL MEDICINE

## 2018-04-07 PROCEDURE — 80076 HEPATIC FUNCTION PANEL: CPT | Performed by: INTERNAL MEDICINE

## 2018-04-07 RX ORDER — DOCUSATE SODIUM 100 MG/1
100 CAPSULE, LIQUID FILLED ORAL 2 TIMES DAILY
Status: DISCONTINUED | OUTPATIENT
Start: 2018-04-07 | End: 2018-04-10 | Stop reason: HOSPADM

## 2018-04-07 RX ORDER — HYDROXYUREA 500 MG/1
500 CAPSULE ORAL 2 TIMES DAILY
Status: DISCONTINUED | OUTPATIENT
Start: 2018-04-07 | End: 2018-04-10 | Stop reason: HOSPADM

## 2018-04-07 RX ORDER — HYDROXYUREA 500 MG/1
500 CAPSULE ORAL 2 TIMES DAILY
Status: DISCONTINUED | OUTPATIENT
Start: 2018-04-07 | End: 2018-04-07

## 2018-04-07 RX ORDER — CYANOCOBALAMIN 1000 UG/ML
1000 INJECTION, SOLUTION INTRAMUSCULAR; SUBCUTANEOUS ONCE
Status: COMPLETED | OUTPATIENT
Start: 2018-04-07 | End: 2018-04-07

## 2018-04-07 RX ADMIN — DOCUSATE SODIUM 100 MG: 100 CAPSULE, LIQUID FILLED ORAL at 21:50

## 2018-04-07 RX ADMIN — Medication: at 04:50

## 2018-04-07 RX ADMIN — HYDROXYUREA 500 MG: 500 CAPSULE ORAL at 09:42

## 2018-04-07 RX ADMIN — Medication 10 ML: at 07:10

## 2018-04-07 RX ADMIN — CYANOCOBALAMIN 1000 MCG: 1000 INJECTION, SOLUTION INTRAMUSCULAR at 11:10

## 2018-04-07 RX ADMIN — SODIUM CHLORIDE 125 ML/HR: 900 INJECTION, SOLUTION INTRAVENOUS at 23:46

## 2018-04-07 RX ADMIN — Medication: at 11:05

## 2018-04-07 RX ADMIN — IBUPROFEN 800 MG: 400 TABLET ORAL at 09:40

## 2018-04-07 RX ADMIN — IBUPROFEN 800 MG: 400 TABLET ORAL at 18:41

## 2018-04-07 RX ADMIN — Medication 10 ML: at 23:00

## 2018-04-07 RX ADMIN — HYDROXYUREA 500 MG: 500 CAPSULE ORAL at 21:50

## 2018-04-07 RX ADMIN — SODIUM CHLORIDE 125 ML/HR: 900 INJECTION, SOLUTION INTRAVENOUS at 04:22

## 2018-04-07 RX ADMIN — IBUPROFEN 800 MG: 400 TABLET ORAL at 11:10

## 2018-04-07 RX ADMIN — Medication: at 18:37

## 2018-04-07 NOTE — PROGRESS NOTES
Problem: Falls - Risk of  Goal: *Absence of Falls  Document Stephenie Fall Risk and appropriate interventions in the flowsheet.    Outcome: Progressing Towards Goal  Fall Risk Interventions:            Medication Interventions: Evaluate medications/consider consulting pharmacy, Teach patient to arise slowly, Patient to call before getting OOB

## 2018-04-07 NOTE — ROUTINE PROCESS
Received report from [address] [place] [address]. Pt AAOx3, NAD, breathing non labored, on room air, HOB up. IV site clean, dry and intact. IVF going per order. Fentanyl PCA going per ordered setting-rate verified w/ off going nurse. Bed at the lowest level on lock position, call bell w/i reach. Bedside and Verbal shift change report given to ANA Pabon (oncoming nurse) by me (offgoing nurse). Report included the following information SBAR, Kardex, Intake/Output, MAR, Recent Results and Cardiac Rhythm not on telemetry. [place]

## 2018-04-07 NOTE — PROGRESS NOTES
Hospitalist Progress Note      Patient: Kurt Willingham MRN: [id number]  CSN: [id number]    YOB: 1980  Age: 42 y.o. Sex: male    DOA: 4/5/2018 LOS:  LOS: 2 days          Started on B12 supplementation per hem / onc. Continues on fentanyl pca, claims pain 6 /10, mainly in LLE at this point. Denies chest pain, shortness of breath. Called by nsg later than he lost 22 gauge piv, she will attempt to place another. Assessment/Plan     1. Sickle cell crisis - on fentanyl pca, iv fluids, folic acid, HU, supplemental oxygen. Hematology input appreciated. 2. Hemolysis  3. Anemia, microcytic hypochromic  4. Elevated AST  5. Cyanocobalamin deficiency - supplement  6. Difficulty with iv access  7. Full code. Additional Notes:      Case discussed with:  [x]Patient  [x]Family  [x]Nursing  []Case Management  DVT Prophylaxis:  [x]Lovenox  []Hep SQ  []SCDs  []Coumadin   []On Heparin gtt    Vital signs/Intake and Output:  Visit Vitals    /82 (BP 1 Location: Right arm, BP Patient Position: At rest)    Pulse 90    Temp 99.9 °F (37.7 °C)    Resp 20    Wt 77 kg (169 lb 12.1 oz)    SpO2 92%    BMI 23.02 kg/m2     Current Shift:     Last three shifts:  04/05 1901 - 04/07 0700  In: 8477 [P.O.:2160; I.V.:1500]  Out: 3300 [Urine:3300]    Awake alert and oriented. Mom and another family member at bedside  Ncat. Perrl. RRR  cta b.l  Soft nt  No edema.    No focal deficit    Medications Reviewed      Labs: Results:       Chemistry Recent Labs      04/07/18   0148  04/06/18   0233  04/05/18   0938   GLU  94  93  117*   NA  138  137  137   K  4.0  4.1  3.2*   CL  108  106  105   CO2  23  23  24   BUN  10  8  11   CREA  0.89  0.83  0.91   CA  8.0*  8.9  8.8   AGAP  7  8  8   BUCR  11*  10*  12   AP  102   --   110   TP  7.7   --   8.9*   ALB  3.3*   --   4.0   GLOB  4.4*   --   4.9*   AGRAT  0.8   --   0.8      CBC w/Diff Recent Labs      04/07/18   0148  04/06/18   0233  04/05/18   0938   WBC 15.0*  19.4*  21.8*   RBC  3.25*  3.42*  3.65*   HGB  7.9*  8.2*  9.1*   HCT  22.8*  24.2*  26.0*   PLT  359  382  441*   GRANS   --    --   64   LYMPH   --    --   23   EOS   --    --   1      Cardiac Enzymes Recent Labs      04/05/18   2054  04/05/18   1540   CPK  343*  263   CKND1  CALCULATION NOT PERFORMED WHEN RESULT IS BELOW LINEAR LIMIT  CALCULATION NOT PERFORMED WHEN RESULT IS BELOW LINEAR LIMIT      Coagulation No results for input(s): PTP, INR, APTT in the last 72 hours. No lab exists for component: INREXT, INREXT    Lipid Panel Lab Results   Component Value Date/Time    Cholesterol, total 106 10/11/2010 03:35 PM      BNP No results for input(s): BNPP in the last 72 hours. Liver Enzymes Recent Labs      04/07/18   0148   TP  7.7   ALB  3.3*   AP  102   SGOT  79*      Thyroid Studies Lab Results   Component Value Date/Time    TSH 0.93 03/15/2010 01:58 PM        Procedures/imaging: see electronic medical records for all procedures/Xrays and details which were not copied into this note but were reviewed prior to creation of Plan.

## 2018-04-07 NOTE — ROUTINE PROCESS
1000: Contacted pharmacy for PCA medication. They stated that they will bring this to the floor as soon as possible. 1045: Spoke w/ pharmacy regarding medication. She states that it will be on this run.     1506: Contacted nursing supervisor p/t need for EJ placement. Liss Solis stated that IR is not available to place this line. When this RN inquired about an ED tech she stated that there are none working today. She stated that we can contact the MD for a central line placement if a PIV is unavailable. 1525: Do RN sup, to floor to place PIV but pt is in bathroom at this time.

## 2018-04-07 NOTE — PROGRESS NOTES
Phone: [phone]     Hematology / Oncology Progress Note  [facility]      Patient: Pedro Stephens   MRN: [id number]         CSN: [id number]    YOB: 1980      Admit Date: 2018    Assessment:     Active Problems:    Sickle cell crisis ([address] (2014)    vit b12 def    Plan:     Ct current fentanyl PCA  Encourage hydration  Monitor HB, if continues to trend down, 1 unit prbc  Vit b12 supplements    Pipe Harden MD  [facility] [phone]      Subjective:     Pain better    Objective:     Visit Vitals    /82 (BP 1 Location: Right arm, BP Patient Position: At rest)    Pulse 90    Temp 99.9 °F (37.7 °C)    Resp 20    Wt 77 kg (169 lb 12.1 oz)    SpO2 92%    BMI 23.02 kg/m2             Temp (24hrs), Av.4 °F (36.9 °C), Min:97.5 °F (36.4 °C), Max:99.9 °F (37.7 °C)        Intake/Output Summary (Last 24 hours) at 18 5477  Last data filed at 18 9269   Gross per 24 hour   Intake             3660 ml   Output             2450 ml   Net             1210 ml     Review of Systems:   Constitutional: negative for fevers, chills, sweats and fatigue  Eyes: negative for visual disturbance, redness and icterus  Ears, Nose, Mouth, Throat, and Face: negative for tinnitus, epistaxis, sore mouth and hoarseness  Respiratory: negative for cough, sputum, hemoptysis, pleurisy/chest pain or wheezing  Cardiovascular: negative for chest pain, chest pressure/discomfort, palpitations, irregular heart beats, syncope, paroxysmal nocturnal dyspnea  Gastrointestinal: negative for reflux symptoms, nausea, vomiting, change in bowel habits, melena, diarrhea, constipation and abdominal pain  Genitourinary:negative for dysuria, nocturia, urinary incontinence, hesitancy and hematuria  Integument: negative for rash, skin lesion(s) and pruritus  Hematologic/Lymphatic: negative for easy bruising, bleeding and lymphadenopathy  Musculoskeletal: positive for bone pain  Neurological: negative for headaches, dizziness, seizures, paresthesia and weakness    Physical Exam:  Constitutional: Alert, oriented, not in distress  Eyes: PERRLA, icteric, pallor  Ears, nose, mouth, throat, and face: no palpable Lymph nodes, no mucositis, no thrush. Respiratory: symmetrical expansion, no rales, no rhonchi, no wheezing. Cardiovascular: S1S2, no pathologic murmur, no rub. Gastrointestinal: soft, benign, non tender, no HSM, normal bowel sounds, no mass. Integument: no rash, no petechiae, no ecchymosis. Musculoskeletal: no edema, no cyanosis, no clubbing. Neurological: intact, cranial nerves, no focal motor or sensory deficits.       Labs:  Recent Results (from the past 24 hour(s))   METABOLIC PANEL, BASIC    Collection Time: 04/07/18  1:48 AM   Result Value Ref Range    Sodium 138 136 - 145 mmol/L    Potassium 4.0 3.5 - 5.5 mmol/L    Chloride 108 100 - 108 mmol/L    CO2 23 21 - 32 mmol/L    Anion gap 7 3.0 - 18 mmol/L    Glucose 94 74 - 99 mg/dL    BUN 10 7.0 - 18 MG/DL    Creatinine 0.89 0.6 - 1.3 MG/DL    BUN/Creatinine ratio 11 (L) 12 - 20      GFR est AA >60 >60 ml/min/1.73m2    GFR est non-AA >60 >60 ml/min/1.73m2    Calcium 8.0 (L) 8.5 - 10.1 MG/DL   CBC W/O DIFF    Collection Time: 04/07/18  1:48 AM   Result Value Ref Range    WBC 15.0 (H) 4.6 - 13.2 K/uL    RBC 3.25 (L) 4.70 - 5.50 M/uL    HGB 7.9 (L) 13.0 - 16.0 g/dL    HCT 22.8 (L) 36.0 - 48.0 %    MCV 70.2 (L) 74.0 - 97.0 FL    MCH 24.3 24.0 - 34.0 PG    MCHC 34.6 31.0 - 37.0 g/dL    RDW 23.4 (H) 11.6 - 14.5 %    PLATELET 166 435 - 550 K/uL    MPV 9.7 9.2 - 11.8 FL   VITAMIN B12 & FOLATE    Collection Time: 04/07/18  1:48 AM   Result Value Ref Range    Vitamin B12 203 (L) 211 - 911 pg/mL    Folate 3.9 3.10 - 17.50 ng/mL   HEPATIC FUNCTION PANEL    Collection Time: 04/07/18  1:48 AM   Result Value Ref Range    Protein, total 7.7 6.4 - 8.2 g/dL    Albumin 3.3 (L) 3.4 - 5.0 g/dL    Globulin 4.4 (H) 2.0 - 4.0 g/dL    A-G Ratio 0.8 0.8 - 1.7      Bilirubin, total 7.4 (H) 0.2 - 1.0 MG/DL    Bilirubin, direct 2.6 (H) 0.0 - 0.2 MG/DL    Alk.  phosphatase 102 45 - 117 U/L    AST (SGOT) 79 (H) 15 - 37 U/L    ALT (SGPT) 37 16 - 61 U/L   MAGNESIUM    Collection Time: 04/07/18  1:48 AM   Result Value Ref Range    Magnesium 2.1 1.6 - 2.6 mg/dL   PHOSPHORUS    Collection Time: 04/07/18  1:48 AM   Result Value Ref Range    Phosphorus 2.9 2.5 - 4.9 MG/DL

## 2018-04-08 LAB
ALBUMIN SERPL-MCNC: 3.2 G/DL (ref 3.4–5)
ALBUMIN/GLOB SERPL: 0.7 {RATIO} (ref 0.8–1.7)
ALP SERPL-CCNC: 105 U/L (ref 45–117)
ALT SERPL-CCNC: 31 U/L (ref 16–61)
ANION GAP SERPL CALC-SCNC: 8 MMOL/L (ref 3–18)
AST SERPL-CCNC: 51 U/L (ref 15–37)
BASOPHILS # BLD: 0.2 K/UL (ref 0–0.06)
BASOPHILS NFR BLD: 1 % (ref 0–3)
BILIRUB DIRECT SERPL-MCNC: 1.5 MG/DL (ref 0–0.2)
BILIRUB SERPL-MCNC: 4.6 MG/DL (ref 0.2–1)
BUN SERPL-MCNC: 7 MG/DL (ref 7–18)
BUN/CREAT SERPL: 9 (ref 12–20)
CALCIUM SERPL-MCNC: 8.3 MG/DL (ref 8.5–10.1)
CHLORIDE SERPL-SCNC: 105 MMOL/L (ref 100–108)
CO2 SERPL-SCNC: 24 MMOL/L (ref 21–32)
CREAT SERPL-MCNC: 0.78 MG/DL (ref 0.6–1.3)
DIFFERENTIAL METHOD BLD: ABNORMAL
EOSINOPHIL # BLD: 0.3 K/UL (ref 0–0.4)
EOSINOPHIL NFR BLD: 2 % (ref 0–5)
ERYTHROCYTE [DISTWIDTH] IN BLOOD BY AUTOMATED COUNT: 22.8 % (ref 11.6–14.5)
GLOBULIN SER CALC-MCNC: 4.8 G/DL (ref 2–4)
GLUCOSE SERPL-MCNC: 92 MG/DL (ref 74–99)
HCT VFR BLD AUTO: 22.5 % (ref 36–48)
HGB BLD-MCNC: 7.7 G/DL (ref 13–16)
LYMPHOCYTES # BLD: 4.4 K/UL (ref 0.8–3.5)
LYMPHOCYTES NFR BLD: 26 % (ref 20–51)
MAGNESIUM SERPL-MCNC: 1.9 MG/DL (ref 1.6–2.6)
MCH RBC QN AUTO: 24.4 PG (ref 24–34)
MCHC RBC AUTO-ENTMCNC: 34.2 G/DL (ref 31–37)
MCV RBC AUTO: 71.2 FL (ref 74–97)
MONOCYTES # BLD: 1.5 K/UL (ref 0–1)
MONOCYTES NFR BLD: 9 % (ref 2–9)
NEUTS BAND NFR BLD MANUAL: 1 % (ref 0–5)
NEUTS SEG # BLD: 10.6 K/UL (ref 1.8–8)
NEUTS SEG NFR BLD: 61 % (ref 42–75)
NRBC BLD-RTO: 5 PER 100 WBC
PHOSPHATE SERPL-MCNC: 3 MG/DL (ref 2.5–4.9)
PLATELET # BLD AUTO: 384 K/UL (ref 135–420)
PLATELET COMMENTS,PCOM: ABNORMAL
PMV BLD AUTO: 9.9 FL (ref 9.2–11.8)
POTASSIUM SERPL-SCNC: 4 MMOL/L (ref 3.5–5.5)
PROT SERPL-MCNC: 8 G/DL (ref 6.4–8.2)
RBC # BLD AUTO: 3.16 M/UL (ref 4.7–5.5)
RBC MORPH BLD: ABNORMAL
SODIUM SERPL-SCNC: 137 MMOL/L (ref 136–145)
WBC # BLD AUTO: 17 K/UL (ref 4.6–13.2)

## 2018-04-08 PROCEDURE — 84100 ASSAY OF PHOSPHORUS: CPT | Performed by: HOSPITALIST

## 2018-04-08 PROCEDURE — 85025 COMPLETE CBC W/AUTO DIFF WBC: CPT | Performed by: HOSPITALIST

## 2018-04-08 PROCEDURE — 74011250636 HC RX REV CODE- 250/636: Performed by: INTERNAL MEDICINE

## 2018-04-08 PROCEDURE — 65660000000 HC RM CCU STEPDOWN

## 2018-04-08 PROCEDURE — 80048 BASIC METABOLIC PNL TOTAL CA: CPT | Performed by: HOSPITALIST

## 2018-04-08 PROCEDURE — 74011250637 HC RX REV CODE- 250/637: Performed by: INTERNAL MEDICINE

## 2018-04-08 PROCEDURE — 83735 ASSAY OF MAGNESIUM: CPT | Performed by: HOSPITALIST

## 2018-04-08 PROCEDURE — 80076 HEPATIC FUNCTION PANEL: CPT | Performed by: HOSPITALIST

## 2018-04-08 PROCEDURE — 36415 COLL VENOUS BLD VENIPUNCTURE: CPT | Performed by: HOSPITALIST

## 2018-04-08 RX ADMIN — Medication: at 05:47

## 2018-04-08 RX ADMIN — Medication: at 00:20

## 2018-04-08 RX ADMIN — IBUPROFEN 800 MG: 400 TABLET ORAL at 17:00

## 2018-04-08 RX ADMIN — Medication: at 17:52

## 2018-04-08 RX ADMIN — IBUPROFEN 800 MG: 400 TABLET ORAL at 08:24

## 2018-04-08 RX ADMIN — Medication: at 10:49

## 2018-04-08 RX ADMIN — SODIUM CHLORIDE 125 ML/HR: 900 INJECTION, SOLUTION INTRAVENOUS at 10:51

## 2018-04-08 RX ADMIN — Medication 10 ML: at 07:11

## 2018-04-08 RX ADMIN — HYDROXYUREA 500 MG: 500 CAPSULE ORAL at 08:25

## 2018-04-08 RX ADMIN — HYDROXYUREA 500 MG: 500 CAPSULE ORAL at 22:52

## 2018-04-08 NOTE — ROUTINE PROCESS
Received report from [address] [place] [address]. Pt AAOx3, NAD, breathing non labored, on room air, HOB up. IV site clean, dry and intact. IVF going per order. Fentanyl PCA going per ordered setting. Bed at the lowest level on lock position, call bell w/i reach. Bedside and Verbal shift change report given to GERALDO BARAHONATL (oncoming nurse) by me (offgoing nurse). Report included the following information SBAR, Kardex, Procedure Summary, Intake/Output, MAR, Recent Results and Cardiac Rhythm not on telemetry.

## 2018-04-08 NOTE — PROGRESS NOTES
Problem: Falls - Risk of  Goal: *Absence of Falls  Document Stephenie Fall Risk and appropriate interventions in the flowsheet.    Outcome: Progressing Towards Goal  Fall Risk Interventions:            Medication Interventions: Evaluate medications/consider consulting pharmacy, Teach patient to arise slowly

## 2018-04-08 NOTE — PROGRESS NOTES
Hospitalist Progress Note      Patient: Virginia Baron MRN: [id number]  CSN: [id number]    YOB: 1980  Age: 42 y.o. Sex: male    DOA: 4/5/2018 LOS:  LOS: 3 days          piv obtained in RUE. Patient reports ongoing pain in back and LE, especially at night. He can't get comfortable, and then worse with movement. Assessment/Plan     1. Sickle cell crisis - on fentanyl pca, iv fluids, folic acid, HU, supplemental oxygen. Hematology input appreciated. 2. Hemolysis  3. Anemia, microcytic hypochromic  4. Elevated AST trending down. 5. Cyanocobalamin deficiency - supplement  6. Difficulty with iv access  7. Full code. Additional Notes:      Case discussed with:  [x]Patient  []Family  [x]Nursing  []Case Management  DVT Prophylaxis:  [x]Lovenox  []Hep SQ  []SCDs  []Coumadin   []On Heparin gtt    Vital signs/Intake and Output:  Visit Vitals    /63 (BP 1 Location: Left arm, BP Patient Position: At rest)    Pulse 84    Temp 98.2 °F (36.8 °C)    Resp 18    Wt 78.4 kg (172 lb 14.4 oz)    SpO2 94%    BMI 23.45 kg/m2     Current Shift:  04/08 0701 - 04/08 1900  In: 960 [P.O.:960]  Out: 400 [Urine:400]  Last three shifts:  04/06 1901 - 04/08 0700  In: 5802.5 [P.O.:2340; I.V.:3462.5]  Out: 7035 [Urine:7035]    Awake alert and oriented. Ncat. Perrl. RRR  cta b.l  Soft nt  No edema.    No focal deficit    Medications Reviewed      Labs: Results:       Chemistry Recent Labs      04/08/18   0238  04/07/18   0148  04/06/18   0233   GLU  92  94  93   NA  137  138  137   K  4.0  4.0  4.1   CL  105  108  106   CO2  24  23  23   BUN  7  10  8   CREA  0.78  0.89  0.83   CA  8.3*  8.0*  8.9   AGAP  8  7  8   BUCR  9*  11*  10*   AP  105  102   --    TP  8.0  7.7   --    ALB  3.2*  3.3*   --    GLOB  4.8*  4.4*   --    AGRAT  0.7*  0.8   --       CBC w/Diff Recent Labs      04/08/18   0238  04/07/18   0148  04/06/18 0233   WBC  17.0*  15.0*  19.4*   RBC  3.16*  3.25*  3.42*   HGB  7.7*  7.9* 8.2*   HCT  22.5*  22.8*  24.2*   PLT  384  359  382   GRANS  61   --    --    LYMPH  26   --    --    EOS  2   --    --       Cardiac Enzymes Recent Labs      04/05/18   2054  04/05/18   1540   CPK  343*  263   CKND1  CALCULATION NOT PERFORMED WHEN RESULT IS BELOW LINEAR LIMIT  CALCULATION NOT PERFORMED WHEN RESULT IS BELOW LINEAR LIMIT      Coagulation No results for input(s): PTP, INR, APTT in the last 72 hours. No lab exists for component: INREXT, INREXT    Lipid Panel Lab Results   Component Value Date/Time    Cholesterol, total 106 10/11/2010 03:35 PM      BNP No results for input(s): BNPP in the last 72 hours. Liver Enzymes Recent Labs      04/08/18   0238   TP  8.0   ALB  3.2*   AP  105   SGOT  51*      Thyroid Studies Lab Results   Component Value Date/Time    TSH 0.93 03/15/2010 01:58 PM        Procedures/imaging: see electronic medical records for all procedures/Xrays and details which were not copied into this note but were reviewed prior to creation of Plan.

## 2018-04-09 LAB
ANION GAP SERPL CALC-SCNC: 8 MMOL/L (ref 3–18)
BASOPHILS # BLD: 0 K/UL (ref 0–0.06)
BASOPHILS NFR BLD: 0 % (ref 0–3)
BUN SERPL-MCNC: 5 MG/DL (ref 7–18)
BUN/CREAT SERPL: 7 (ref 12–20)
CALCIUM SERPL-MCNC: 8.6 MG/DL (ref 8.5–10.1)
CHLORIDE SERPL-SCNC: 104 MMOL/L (ref 100–108)
CO2 SERPL-SCNC: 24 MMOL/L (ref 21–32)
CREAT SERPL-MCNC: 0.69 MG/DL (ref 0.6–1.3)
DIFFERENTIAL METHOD BLD: ABNORMAL
EOSINOPHIL # BLD: 0.8 K/UL (ref 0–0.4)
EOSINOPHIL NFR BLD: 6 % (ref 0–5)
ERYTHROCYTE [DISTWIDTH] IN BLOOD BY AUTOMATED COUNT: 23.8 % (ref 11.6–14.5)
GLUCOSE SERPL-MCNC: 99 MG/DL (ref 74–99)
HCT VFR BLD AUTO: 23.1 % (ref 36–48)
HGB BLD-MCNC: 7.9 G/DL (ref 13–16)
LYMPHOCYTES # BLD: 4.2 K/UL (ref 0.8–3.5)
LYMPHOCYTES NFR BLD: 32 % (ref 20–51)
MAGNESIUM SERPL-MCNC: 2 MG/DL (ref 1.6–2.6)
MCH RBC QN AUTO: 24.6 PG (ref 24–34)
MCHC RBC AUTO-ENTMCNC: 34.2 G/DL (ref 31–37)
MCV RBC AUTO: 72 FL (ref 74–97)
MONOCYTES # BLD: 0.9 K/UL (ref 0–1)
MONOCYTES NFR BLD: 7 % (ref 2–9)
NEUTS SEG # BLD: 7.1 K/UL (ref 1.8–8)
NEUTS SEG NFR BLD: 55 % (ref 42–75)
NRBC BLD-RTO: 8 PER 100 WBC
PLATELET # BLD AUTO: 263 K/UL (ref 135–420)
PLATELET COMMENTS,PCOM: ABNORMAL
PMV BLD AUTO: 10 FL (ref 9.2–11.8)
POTASSIUM SERPL-SCNC: 4.2 MMOL/L (ref 3.5–5.5)
RBC # BLD AUTO: 3.21 M/UL (ref 4.7–5.5)
RBC MORPH BLD: ABNORMAL
SODIUM SERPL-SCNC: 136 MMOL/L (ref 136–145)
WBC # BLD AUTO: 13 K/UL (ref 4.6–13.2)

## 2018-04-09 PROCEDURE — 74011250636 HC RX REV CODE- 250/636: Performed by: INTERNAL MEDICINE

## 2018-04-09 PROCEDURE — 74011250637 HC RX REV CODE- 250/637: Performed by: HOSPITALIST

## 2018-04-09 PROCEDURE — 83735 ASSAY OF MAGNESIUM: CPT | Performed by: HOSPITALIST

## 2018-04-09 PROCEDURE — 65660000000 HC RM CCU STEPDOWN

## 2018-04-09 PROCEDURE — 36415 COLL VENOUS BLD VENIPUNCTURE: CPT | Performed by: HOSPITALIST

## 2018-04-09 PROCEDURE — 74011250637 HC RX REV CODE- 250/637: Performed by: INTERNAL MEDICINE

## 2018-04-09 PROCEDURE — 85025 COMPLETE CBC W/AUTO DIFF WBC: CPT | Performed by: HOSPITALIST

## 2018-04-09 PROCEDURE — 80048 BASIC METABOLIC PNL TOTAL CA: CPT | Performed by: HOSPITALIST

## 2018-04-09 RX ORDER — FENTANYL 25 UG/1
1 PATCH TRANSDERMAL
Status: DISCONTINUED | OUTPATIENT
Start: 2018-04-09 | End: 2018-04-10 | Stop reason: HOSPADM

## 2018-04-09 RX ORDER — MORPHINE SULFATE 2 MG/ML
1 INJECTION, SOLUTION INTRAMUSCULAR; INTRAVENOUS
Status: DISCONTINUED | OUTPATIENT
Start: 2018-04-09 | End: 2018-04-10

## 2018-04-09 RX ORDER — HYDROMORPHONE HYDROCHLORIDE 4 MG/ML
1 INJECTION, SOLUTION INTRAMUSCULAR; INTRAVENOUS; SUBCUTANEOUS
Status: DISCONTINUED | OUTPATIENT
Start: 2018-04-09 | End: 2018-04-09 | Stop reason: ALTCHOICE

## 2018-04-09 RX ORDER — HYDROMORPHONE HYDROCHLORIDE 4 MG/ML
1 INJECTION, SOLUTION INTRAMUSCULAR; INTRAVENOUS; SUBCUTANEOUS ONCE
Status: DISCONTINUED | OUTPATIENT
Start: 2018-04-09 | End: 2018-04-09

## 2018-04-09 RX ORDER — OXYCODONE AND ACETAMINOPHEN 5; 325 MG/1; MG/1
1 TABLET ORAL
Status: DISCONTINUED | OUTPATIENT
Start: 2018-04-09 | End: 2018-04-10 | Stop reason: HOSPADM

## 2018-04-09 RX ORDER — IBUPROFEN 400 MG/1
800 TABLET ORAL
Status: DISCONTINUED | OUTPATIENT
Start: 2018-04-09 | End: 2018-04-10 | Stop reason: HOSPADM

## 2018-04-09 RX ADMIN — Medication 1 MG: at 19:54

## 2018-04-09 RX ADMIN — HYDROXYUREA 500 MG: 500 CAPSULE ORAL at 21:15

## 2018-04-09 RX ADMIN — HYDROMORPHONE HYDROCHLORIDE 4 MG: 2 TABLET ORAL at 09:27

## 2018-04-09 RX ADMIN — Medication 10 ML: at 00:24

## 2018-04-09 RX ADMIN — Medication 10 ML: at 07:12

## 2018-04-09 RX ADMIN — HYDROXYUREA 500 MG: 500 CAPSULE ORAL at 09:21

## 2018-04-09 RX ADMIN — Medication 1 MG: at 15:13

## 2018-04-09 RX ADMIN — SODIUM CHLORIDE 25 ML/HR: 900 INJECTION, SOLUTION INTRAVENOUS at 12:52

## 2018-04-09 RX ADMIN — SODIUM CHLORIDE 125 ML/HR: 900 INJECTION, SOLUTION INTRAVENOUS at 00:34

## 2018-04-09 RX ADMIN — DOCUSATE SODIUM 100 MG: 100 CAPSULE, LIQUID FILLED ORAL at 09:22

## 2018-04-09 RX ADMIN — Medication 10 ML: at 23:02

## 2018-04-09 RX ADMIN — ENOXAPARIN SODIUM 40 MG: 40 INJECTION, SOLUTION INTRAVENOUS; SUBCUTANEOUS at 15:09

## 2018-04-09 RX ADMIN — Medication: at 00:27

## 2018-04-09 NOTE — PROGRESS NOTES
Hospitalist Progress Note      Patient: Aung Fenton MRN: [id number]  CSN: [id number]    YOB: 1980  Age: 42 y.o. Sex: male    DOA: 4/5/2018 LOS:  LOS: 4 days          Feels better. Off fentanyl pca. Slept better. Left leg still a little painful. Denies constipation. Appetite ok    Assessment/Plan     1. Sickle cell crisis - on fentanyl pca, iv fluids, folic acid, HU, supplemental oxygen. Hematology input appreciated. 2. Hemolysis  3. Anemia, microcytic hypochromic  4. Elevated AST trending down. 5. Cyanocobalamin deficiency - supplement  6. Difficulty with iv access  7. Full code. Additional Notes:      Case discussed with:  [x]Patient  []Family  [x]Nursing  []Case Management  DVT Prophylaxis:  [x]Lovenox  []Hep SQ  []SCDs  []Coumadin   []On Heparin gtt    Vital signs/Intake and Output:  Visit Vitals    /70 (BP 1 Location: Left arm, BP Patient Position: At rest)    Pulse 92    Temp 98.8 °F (37.1 °C)    Resp 16    Wt 79.7 kg (175 lb 11.2 oz)    SpO2 97%    BMI 23.83 kg/m2     Current Shift:  04/09 0701 - 04/09 1900  In: 240 [P.O.:240]  Out: -   Last three shifts:  04/07 1901 - 04/09 0700  In: 5880 [P.O.:2880; I.V.:3000]  Out: 6385 [Urine:6385]    Awake alert and oriented. Ncat. Perrl. RRR  cta b.l  Soft nt  No edema.    No focal deficit    Medications Reviewed      Labs: Results:       Chemistry Recent Labs      04/09/18   0615  04/08/18   0238  04/07/18   0148   GLU  99  92  94   NA  136  137  138   K  4.2  4.0  4.0   CL  104  105  108   CO2  24  24  23   BUN  5*  7  10   CREA  0.69  0.78  0.89   CA  8.6  8.3*  8.0*   AGAP  8  8  7   BUCR  7*  9*  11*   AP   --   105  102   TP   --   8.0  7.7   ALB   --   3.2*  3.3*   GLOB   --   4.8*  4.4*   AGRAT   --   0.7*  0.8      CBC w/Diff Recent Labs      04/09/18   0615  04/08/18   0238  04/07/18   0148   WBC  13.0  17.0*  15.0*   RBC  3.21*  3.16*  3.25*   HGB  7.9*  7.7*  7.9*   HCT  23.1*  22.5*  22.8*   PLT  263  384 359   GRANS  55  61   --    LYMPH  32  26   --    EOS  6*  2   --       Cardiac Enzymes No results for input(s): CPK, CKND1, KY in the last 72 hours. No lab exists for component: CKRMB, TROIP   Coagulation No results for input(s): PTP, INR, APTT in the last 72 hours. No lab exists for component: INREXT, INREXT    Lipid Panel Lab Results   Component Value Date/Time    Cholesterol, total 106 10/11/2010 03:35 PM      BNP No results for input(s): BNPP in the last 72 hours. Liver Enzymes Recent Labs      04/08/18   0238   TP  8.0   ALB  3.2*   AP  105   SGOT  51*      Thyroid Studies Lab Results   Component Value Date/Time    TSH 0.93 03/15/2010 01:58 PM        Procedures/imaging: see electronic medical records for all procedures/Xrays and details which were not copied into this note but were reviewed prior to creation of Plan.

## 2018-04-09 NOTE — PROGRESS NOTES
Problem: Falls - Risk of  Goal: *Absence of Falls  Document Stephenei Fall Risk and appropriate interventions in the flowsheet.    Outcome: Progressing Towards Goal  Fall Risk Interventions:            Medication Interventions: Assess postural VS orthostatic hypotension, Teach patient to arise slowly, Evaluate medications/consider consulting pharmacy

## 2018-04-09 NOTE — PROGRESS NOTES
Phone: [phone]     Hematology / Oncology Progress Note  [facility]      Patient: Hong Joshi   MRN: [id number]         CSN: [id number]    YOB: 1980      Admit Date: 2018    Assessment:     Active Problems:    Sickle cell crisis ([address] (2014)        Plan:     Crisis better, will d/c fentanyl PCA. Start fentanyl 25 ugm patch/ q 72 hr, home medication  IV dilaudid prn\reduce ivf  Hopefully d/c by tomorrow.         David Powlel MD  [facility] [phone]      Subjective:     Pain better this am    Objective:     Visit Vitals    /62 (BP 1 Location: Left arm, BP Patient Position: At rest)    Pulse 87    Temp 98.7 °F (37.1 °C)    Resp 18    Wt 79.7 kg (175 lb 11.2 oz)    SpO2 93%    BMI 23.83 kg/m2             Temp (24hrs), Av.4 °F (36.9 °C), Min:97.9 °F (36.6 °C), Max:98.7 °F (37.1 °C)        Intake/Output Summary (Last 24 hours) at 18 0814  Last data filed at 18 0636   Gross per 24 hour   Intake             3900 ml   Output             3200 ml   Net              700 ml     Review of Systems:   Constitutional: negative for fevers, chills, sweats and fatigue  Eyes: negative for visual disturbance, redness and icterus  Ears, Nose, Mouth, Throat, and Face: negative for tinnitus, epistaxis, sore mouth and hoarseness  Respiratory: negative for cough, sputum, hemoptysis, pleurisy/chest pain or wheezing  Cardiovascular: negative for chest pain, chest pressure/discomfort, palpitations, irregular heart beats, syncope, paroxysmal nocturnal dyspnea  Gastrointestinal: negative for reflux symptoms, nausea, vomiting, change in bowel habits, melena, diarrhea, constipation and abdominal pain  Genitourinary:negative for dysuria, nocturia, urinary incontinence, hesitancy and hematuria  Integument: negative for rash, skin lesion(s) and pruritus  Hematologic/Lymphatic: negative for easy bruising, bleeding and lymphadenopathy  Musculoskeletal:iproved pain  Neurological: negative for headaches, dizziness, seizures, paresthesia and weakness    Physical Exam:  Constitutional: Alert, oriented, not in distress  Eyes: PERRLA, anicteric, no redness  Ears, nose, mouth, throat, and face: no palpable Lymph nodes, no mucositis, no thrush. Respiratory: symmetrical expansion, no rales, no rhonchi, no wheezing. Cardiovascular: S1S2, no pathologic murmur, no rub. Gastrointestinal: soft, benign, non tender, no HSM, normal bowel sounds, no mass. Integument: no rash, no petechiae, no ecchymosis. Musculoskeletal: no edema, no cyanosis, no clubbing. Neurological: intact, cranial nerves, no focal motor or sensory deficits. Labs:  Recent Results (from the past 24 hour(s))   CBC WITH AUTOMATED DIFF    Collection Time: 04/09/18  6:15 AM   Result Value Ref Range    WBC 13.0 4.6 - 13.2 K/uL    RBC 3.21 (L) 4.70 - 5.50 M/uL    HGB 7.9 (L) 13.0 - 16.0 g/dL    HCT 23.1 (L) 36.0 - 48.0 %    MCV 72.0 (L) 74.0 - 97.0 FL    MCH 24.6 24.0 - 34.0 PG    MCHC 34.2 31.0 - 37.0 g/dL    RDW 23.8 (H) 11.6 - 14.5 %    PLATELET 616 712 - 057 K/uL    MPV 10.0 9.2 - 11.8 FL    NEUTROPHILS 55 42 - 75 %    LYMPHOCYTES 32 20 - 51 %    MONOCYTES 7 2 - 9 %    EOSINOPHILS 6 (H) 0 - 5 %    BASOPHILS 0 0 - 3 %    NRBC 8.0 (H) 0  WBC    ABS. NEUTROPHILS 7.1 1.8 - 8.0 K/UL    ABS. LYMPHOCYTES 4.2 (H) 0.8 - 3.5 K/UL    ABS. MONOCYTES 0.9 0 - 1.0 K/UL    ABS. EOSINOPHILS 0.8 (H) 0.0 - 0.4 K/UL    ABS.  BASOPHILS 0.0 0.0 - 0.06 K/UL    DF MANUAL      PLATELET COMMENTS ADEQUATE PLATELETS      RBC COMMENTS ANISOCYTOSIS  2+        RBC COMMENTS MICROCYTOSIS  1+        RBC COMMENTS SICKLE CELLS  2+        RBC COMMENTS POLYCHROMASIA  1+        RBC COMMENTS OVALOCYTES  1+        RBC COMMENTS POIKILOCYTOSIS  2+        RBC COMMENTS TARGET CELLS  1+       MAGNESIUM    Collection Time: 04/09/18  6:15 AM   Result Value Ref Range    Magnesium 2.0 1.6 - 2.6 mg/dL   METABOLIC PANEL, BASIC    Collection Time: 04/09/18  6:15 AM   Result Value Ref Range    Sodium 136 136 - 145 mmol/L    Potassium 4.2 3.5 - 5.5 mmol/L    Chloride 104 100 - 108 mmol/L    CO2 24 21 - 32 mmol/L    Anion gap 8 3.0 - 18 mmol/L    Glucose 99 74 - 99 mg/dL    BUN 5 (L) 7.0 - 18 MG/DL    Creatinine 0.69 0.6 - 1.3 MG/DL    BUN/Creatinine ratio 7 (L) 12 - 20      GFR est AA >60 >60 ml/min/1.73m2    GFR est non-AA >60 >60 ml/min/1.73m2    Calcium 8.6 8.5 - 10.1 MG/DL

## 2018-04-09 NOTE — PROGRESS NOTES
Care Management Interventions  PCP Verified by CM: Yes (pt states that while Dr Wilton Pereira is a specialist, Ankit Murphy sees me for everything\" (including primary care))  Mode of Transport at Discharge:  (family)  Transition of Care Consult (CM Consult): Discharge Planning  Physical Therapy Consult: No  Occupational Therapy Consult: No  Speech Therapy Consult: No  Current Support Network: Lives Alone  Confirm Follow Up Transport: Self  Plan discussed with Pt/Family/Caregiver: Yes  Discharge Location  Discharge Placement: Home    Pt is a 42year old admitted for sickle cell crisis. Pt is alert and oriented and alone in room. Pt reports that [address] is his mailing address but that he lives at [address] [address]. Pt reports that prior to admission he was independent in his ADLs and that he has no DME at home. Pt reports that he plans to return home on discharge and that he has transportation home with family. Pt reports that he does not work but that he does still drive. Pt is not eligible for home health or [facility].

## 2018-04-09 NOTE — ROUTINE PROCESS
Received report from [facility]. Pt AAOx3, NAD, breathing non labored, on room air, HOB up. IV site clean, dry and intact. IVF going per order. Bed at the lowest level on lock position, call bell w/i reach. Bedside and Verbal shift change report given to ANA Wheat (oncoming nurse) by me (offgoing nurse). Report included the following information SBAR, Kardex, Intake/Output, MAR and Recent Results.

## 2018-04-10 VITALS
HEART RATE: 88 BPM | RESPIRATION RATE: 18 BRPM | WEIGHT: 165.79 LBS | TEMPERATURE: 98.4 F | SYSTOLIC BLOOD PRESSURE: 127 MMHG | DIASTOLIC BLOOD PRESSURE: 78 MMHG | BODY MASS INDEX: 22.48 KG/M2 | OXYGEN SATURATION: 99 %

## 2018-04-10 PROCEDURE — 74011250636 HC RX REV CODE- 250/636: Performed by: INTERNAL MEDICINE

## 2018-04-10 PROCEDURE — 74011250637 HC RX REV CODE- 250/637: Performed by: INTERNAL MEDICINE

## 2018-04-10 RX ADMIN — HYDROXYUREA 500 MG: 500 CAPSULE ORAL at 10:57

## 2018-04-10 RX ADMIN — OXYCODONE HYDROCHLORIDE AND ACETAMINOPHEN 1 TABLET: 5; 325 TABLET ORAL at 14:20

## 2018-04-10 RX ADMIN — OXYCODONE HYDROCHLORIDE AND ACETAMINOPHEN 1 TABLET: 5; 325 TABLET ORAL at 08:43

## 2018-04-10 RX ADMIN — Medication 1 MG: at 03:55

## 2018-04-10 NOTE — PROGRESS NOTES
Feels better. VSS  Lung clear  Cardiac reg hs  abd soft nt  impr sickle cell pain crisis, resolved  Suggest ok for d/c home   pt has script for pain meds fentanyl 25/hr with percocet prn  F/u with me as out pt, he has appt.

## 2018-04-10 NOTE — PROGRESS NOTES
Bedside shift change report given to this RN (oncoming nurse) by Thom Huston (offgoing nurse). Report included the following information SBAR and Kardex. Helical Rim Advancement Flap Text: The defect edges were debeveled with a #15c blade scalpel.  Given the location of the defect and the proximity to free margins (helical rim) a double helical rim advancement flap was deemed most appropriate.  Using a sterile surgical marker, the appropriate advancement flaps were drawn incorporating the defect and placing the expected incisions between the helical rim and antihelix where possible.  The area thus outlined was incised through and through with a #15 scalpel blade.  With a skin hook and iris scissors, the flaps were gently and sharply undermined and freed up.

## 2018-04-10 NOTE — PROGRESS NOTES
Patient discharge per MD orders. Patient stated understanding of discharge instructions and follow-up appointments. Patient transported to the exit via wheelchair. No distress noted at this time.

## 2018-04-10 NOTE — PROGRESS NOTES
Problem: Falls - Risk of  Goal: *Absence of Falls  Document Stephenie Fall Risk and appropriate interventions in the flowsheet.    Outcome: Progressing Towards Goal  Fall Risk Interventions:            Medication Interventions: Assess postural VS orthostatic hypotension, Evaluate medications/consider consulting pharmacy, Teach patient to arise slowly

## 2018-04-10 NOTE — DISCHARGE SUMMARY
Discharge Summary    Patient: Laura Winn MRN: [id number]  CSN: [id number]    YOB: 1980  Age: 42 y.o. Sex: male    DOA: 4/5/2018 LOS:  LOS: 5 days   Discharge Date:      Admission Diagnoses: Sickle cell crisis ([facility]  Sickle cell crisis ([facility]    Discharge Diagnoses:    Problem List as of 4/10/2018  Date Reviewed: 7/1/2017          Codes Class Noted - Resolved    Hypokalemia ICD-10-CM: E87.6  ICD-9-CM: 276.8  Unknown - Present        Cholecystitis ICD-10-CM: K81.9  ICD-9-CM: 575.10  7/1/2017 - Present        Bilateral leg pain ICD-10-CM: M79.604, M79.605  ICD-9-CM: 729.5  4/24/2017 - Present        Vaso-occlusive sickle cell crisis ([facility] ICD-10-CM: D57.00  ICD-9-CM: 282.62  4/24/2017 - Present        Sickle cell anemia ([facility] ICD-10-CM: D57.1  ICD-9-CM: 282.60  1/19/2017 - Present        Hemolytic anemia ([facility] ICD-10-CM: D58.9  ICD-9-CM: 283.9  12/11/2016 - Present        SC disease ([facility] ICD-10-CM: D57.20  ICD-9-CM: 282.63  12/11/2016 - Present        Pain, generalized ICD-10-CM: R52  ICD-9-CM: 780.96  4/5/2016 - Present        Vitamin D deficiency ICD-10-CM: E55.9  ICD-9-CM: 268.9  1/26/2016 - Present        Leukocytosis ICD-10-CM: D72.829  ICD-9-CM: 288.60  10/17/2015 - Present        Sickle cell anemia with crisis ([facility] ICD-10-CM: D57.00  ICD-9-CM: 282.62  5/7/2015 - Present        Sickle cell pain crisis ([facility] ICD-10-CM: D57.00  ICD-9-CM: 282.62  8/15/2014 - Present        Sickle cell crisis ([facility] ICD-10-CM: D57.00  ICD-9-CM: 282.62  1/26/2014 - Present        Sickle cell disease ([facility] ICD-10-CM: D57.1  ICD-9-CM: 282.60  9/27/2013 - Present        Elevated AST (SGOT) ICD-10-CM: R74.0  ICD-9-CM: 790.4  3/10/2012 - Present    Overview Signed 7/1/2017  8:24 AM by [place] Code     38             Serum total bilirubin elevated ICD-10-CM: R17  ICD-9-CM: 277.4  12/27/2011 - Present    Overview Signed 7/1/2017  8:24 AM by Richmond [facility]N 3.8, D.B. 0.4.               Elevated total protein ICD-10-CM: R77.8  ICD-9-CM: 790.99  12/27/2011 - Present    Overview Signed 7/1/2017  8:25 AM by Otilia Osuna     8.7             Hypocalcemia ICD-10-CM: E83.51  ICD-9-CM: 275.41  7/6/2011 - Present    Overview Signed 7/1/2017  8:25 AM by Otilia Osuna     4.1             Elevated alkaline phosphatase level ICD-10-CM: R74.8  ICD-9-CM: 790.5  3/21/2010 - Present    Overview Signed 7/1/2017  8:23 AM by Otilia Osuna     158             Elevated ALT measurement ICD-10-CM: R74.0  ICD-9-CM: 790.4  3/21/2010 - Present    Overview Signed 7/1/2017  8:23 AM by Byron North     71             B12 deficiency ICD-10-CM: E53.8  ICD-9-CM: 266.2  3/15/2010 - Present    Overview Signed 7/1/2017  8:22 AM by Otilia Osuna     210             Hyponatremia ICD-10-CM: E87.1  ICD-9-CM: 276.1  11/18/2009 - Present        Microcytic anemia ICD-10-CM: D50.9  ICD-9-CM: 280.9  10/25/2007 - Present        Reticulocytosis ICD-10-CM: R70.1  ICD-9-CM: 790.99  10/25/2007 - Present    Overview Signed 7/1/2017  8:23 AM by Otilia Osuna     7.8             Elevated platelet count RJZ-40-SH: R79.89  ICD-9-CM: 790.6  10/25/2007 - Present    Overview Signed 7/1/2017  8:26 AM by Otilia Osuna     182             RESOLVED: Pneumonia ICD-10-CM: J18.9  ICD-9-CM: 095  10/17/2015 - 10/21/2015        RESOLVED: Sickle cell crisis ([facility] ICD-10-CM: D57.00  ICD-9-CM: 282.62  8/3/2013 - 10/20/2013        RESOLVED: Sickle cell anemia with pain ([facility] ICD-10-CM: D57.00  ICD-9-CM: 282.62  5/11/2013 - 10/20/2013        RESOLVED: Sickle cell pain crisis ([facility] ICD-10-CM: D57.00  ICD-9-CM: 282.62  12/12/2012 - 10/25/2013            Reason for Admission  42 y.o. male with sickle cell disease, s/p cholecytectomy 6/2017 presented with pain in his leg and back, typical of his usual crisis pain.  He was last seen in the ER on 4/3/18, treated and discharged home, at home he continued his fentanyl patch and oral diludid, he was fine until evening of 4/4/18 when he developed the pain again, sharp, excruciating in legs, chest and back associated with shortness of breath. He was last admitted to the hospital with sickle cell crisis on 2/5/18 when he was managed with PO and IV pain medications. He stated his symptoms were much worse than previous admission. He denied lower extremity swelling, no cough or cold symptoms. Flu negative. cxr negative. Discharge Condition: Good    PHYSICAL EXAM at discharge  Visit Vitals    /83 (BP 1 Location: Left arm, BP Patient Position: At rest)    Pulse 85    Temp 97.8 °F (36.6 °C)    Resp 20    Wt 75.2 kg (165 lb 12.6 oz)    SpO2 97%    BMI 22.48 kg/m2      Awake alert and oriented. Ncat. Perrl. RRR  cta b.l  Soft nt  No edema. No focal deficit. Ambulating in room unassisted and w/o difficulty. No rash    Hospital Course:   1. Sickle cell crisis resolving s/p fentanyl pca, iv fluids, folic acid, HU, supplemental oxygen. Hematology input appreciated. 2. Hemolysis  3. Anemia, microcytic hypochromic  4. Elevated AST trending down. 5. Cyanocobalamin deficiency - supplemented IM  6. Difficulty with iv access - piv placed. 7. Full code. Discharge to home. Patient agrees; all questions answered to the best of my ability. Consults: Hematology/Oncology Dr. Sierra Solid Diagnostic Studies: labs:     Ref.  Range 4/9/2018 06:15   WBC Latest Ref Range: 4.6 - 13.2 K/uL 13.0   NRBC Latest Ref Range: 0  WBC 8.0 (H)   RBC Latest Ref Range: 4.70 - 5.50 M/uL 3.21 (L)   HGB Latest Ref Range: 13.0 - 16.0 g/dL 7.9 (L)   HCT Latest Ref Range: 36.0 - 48.0 % 23.1 (L)   MCV Latest Ref Range: 74.0 - 97.0 FL 72.0 (L)   MCH Latest Ref Range: 24.0 - 34.0 PG 24.6   MCHC Latest Ref Range: 31.0 - 37.0 g/dL 34.2   RDW Latest Ref Range: 11.6 - 14.5 % 23.8 (H)   PLATELET Latest Ref Range: 135 - 420 K/uL 263   MPV Latest Ref Range: 9.2 - 11.8 FL 10.0   NEUTROPHILS Latest Ref Range: 42 - 75 % 55   LYMPHOCYTES Latest Ref Range: 20 - 51 % 32 MONOCYTES Latest Ref Range: 2 - 9 % 7   EOSINOPHILS Latest Ref Range: 0 - 5 % 6 (H)   BASOPHILS Latest Ref Range: 0 - 3 % 0   DF Latest Units:   MANUAL   ABS. NEUTROPHILS Latest Ref Range: 1.8 - 8.0 K/UL 7.1   ABS. LYMPHOCYTES Latest Ref Range: 0.8 - 3.5 K/UL 4.2 (H)   ABS. MONOCYTES Latest Ref Range: 0 - 1.0 K/UL 0.9   ABS. EOSINOPHILS Latest Ref Range: 0.0 - 0.4 K/UL 0.8 (H)   ABS. BASOPHILS Latest Ref Range: 0.0 - 0.06 K/UL 0.0        Ref. Range 4/8/2018 02:38 4/9/2018 06:15   Sodium Latest Ref Range: 136 - 145 mmol/L 137 136   Potassium Latest Ref Range: 3.5 - 5.5 mmol/L 4.0 4.2   Chloride Latest Ref Range: 100 - 108 mmol/L 105 104   CO2 Latest Ref Range: 21 - 32 mmol/L 24 24   Anion gap Latest Ref Range: 3.0 - 18 mmol/L 8 8   Glucose Latest Ref Range: 74 - 99 mg/dL 92 99   BUN Latest Ref Range: 7.0 - 18 MG/DL 7 5 (L)   Creatinine Latest Ref Range: 0.6 - 1.3 MG/DL 0.78 0.69   BUN/Creatinine ratio Latest Ref Range: 12 - 20   9 (L) 7 (L)   Calcium Latest Ref Range: 8.5 - 10.1 MG/DL 8.3 (L) 8.6   Phosphorus Latest Ref Range: 2.5 - 4.9 MG/DL 3.0    Magnesium Latest Ref Range: 1.6 - 2.6 mg/dL 1.9 2.0   GFR est non-AA Latest Ref Range: >60 ml/min/1.73m2 >60 >60   GFR est AA Latest Ref Range: >60 ml/min/1.73m2 >60 >60   Bilirubin, total Latest Ref Range: 0.2 - 1.0 MG/DL 4.6 (H)    Bilirubin, direct Latest Ref Range: 0.0 - 0.2 MG/DL 1.5 (H)    Protein, total Latest Ref Range: 6.4 - 8.2 g/dL 8.0    Albumin Latest Ref Range: 3.4 - 5.0 g/dL 3.2 (L)    Globulin Latest Ref Range: 2.0 - 4.0 g/dL 4.8 (H)    A-G Ratio Latest Ref Range: 0.8 - 1.7   0.7 (L)    ALT (SGPT) Latest Ref Range: 16 - 61 U/L 31    AST Latest Ref Range: 15 - 37 U/L 51 (H)    Alk.  phosphatase Latest Ref Range: 45 - 117 U/L 105      All Micro Results     Procedure Component Value Units Date/Time    INFLUENZA A & B AG (RAPID TEST) [[id number]] Collected:  04/05/18 1044    Order Status:  Completed Specimen:  Nasopharyngeal from Nasal washing Updated: 04/05/18 1113     Influenza A Antigen NEGATIVE          A negative result does not exclude influenza virus infection, seasonal or H1N1 due to suboptimal sensitivity. If influenza is circulating in your community, a diagnosis of influenza should be considered based on a patients clinical presentation and empiric antiviral treatment should be considered, if indicated. Influenza B Antigen NEGATIVE            IMAGING  XR Results (most recent):    Results from Hospital Encounter encounter on 04/05/18   XR CHEST PORT   Narrative Description:  Portable upright chest, single view    Clinical Indication:  Chest pain. History of sickle cell disease. Comparison: April 2, 2018. Findings:  Slightly increased interstitial markings in a bibasilar distribution. No focal airspace opacity or effusion. Cardiac silhouette upper limits of  normal, stable. Mediastinum within normal limits. Impression Impression:      Low lung volume chest radiograph with mild bibasilar atelectasis. No findings  are clearly acute. Discharge Medications:     Current Discharge Medication List      CONTINUE these medications which have NOT CHANGED    Details   oxyCODONE-acetaminophen (PERCOCET) 5-325 mg per tablet Take 1 Tab by mouth every six (6) hours as needed for Pain. Max Daily Amount: 4 Tabs. Qty: 16 Tab, Refills: 0    Associated Diagnoses: Sickle cell crisis (HCC)      HYDROmorphone (DILAUDID) 2 mg tablet Take 1 Tab by mouth every six (6) hours as needed for Pain. Max Daily Amount: 8 mg. Indications: Pain  Qty: 12 Tab, Refills: 0      hydroxyurea (HYDREA) 500 mg capsule Take 500 mg by mouth two (2) times a day. Indications: SICKLE CELL ANEMIA WITH CRISIS      fentaNYL (DURAGESIC) 25 mcg/hr PATCH 1 Patch by TransDERmal route every seventy-two (72) hours. Max Daily Amount: 1 Patch.   Qty: 5 Patch, Refills: 0             Activity: Activity as tolerated    Diet: Cardiac Diet    Wound Care: None needed    Follow-up:  Salvatore as scheduled.      Minutes spent on discharge: greater than 30

## 2018-04-10 NOTE — DISCHARGE INSTRUCTIONS
Sickle Cell Disease: Care Instructions  Your Care Instructions    Sickle cell disease turns normal, round red blood cells into misshaped cells that look like jeane or crescent moons. The sickle-shaped cells can get stuck in blood vessels, blocking blood flow and causing severe pain. The sickle-shaped cells also can harm organs, muscles, and bones. It is a lifelong condition. Sickle cell disease is passed down in families. You can talk to your doctor about whether to have genetic tests to find out the chance of having a child with the disease. Your doctor also may recommend that your family members get tested for sickle cell disease. Your doctor may treat you with medicines. Some people get blood transfusions or a bone marrow transplant. Managing pain is an important part of your treatment. Follow-up care is a key part of your treatment and safety. Be sure to make and go to all appointments, and call your doctor if you are having problems. It's also a good idea to know your test results and keep a list of the medicines you take. How can you care for yourself at home? · Take your medicines exactly as prescribed. Call your doctor if you think you are having a problem with your medicine. · Take pain medicines exactly as directed. ¨ If the doctor gave you a prescription medicine for pain, take it as prescribed. ¨ If you are not taking a prescription pain medicine, ask your doctor if you can take an over-the-counter medicine. · Try to help ease pain by distracting yourself. Use guided imagery, deep breathing, and relaxation exercises. A pain specialist can teach you pain management skills. · Avoid alcohol. It can make you dehydrated. · Dress warmly in cold weather. The cold and windy weather can lead to severe pain. · Do not smoke. Smoking can reduce the amount of oxygen in your blood. If you need help quitting, talk to your doctor about stop-smoking programs and medicines.  These can increase your chances of quitting for good. · Get plenty of sleep. · Get regular eye exams. Sickle cell disease can cause vision problems. · Wear medical alert jewelry that says that you have sickle cell disease. You can buy this at most drugstores. · Avoid colds and flu. Get a pneumococcal vaccine shot. If you have had one before, ask your doctor whether you need another dose. Get a flu shot every year. If you must be around people with colds or flu, wash your hands often. When should you call for help? Call 911 anytime you think you may need emergency care. For example, call if:  · You have symptoms of a severe problem from sickle cell. · You have symptoms of a stroke. These may include:  ¨ Sudden numbness, tingling, weakness, or loss of movement in your face, arm, or leg, especially on only one side of your body. ¨ Sudden vision changes. ¨ Sudden trouble speaking. ¨ Sudden confusion or trouble understanding simple statements. ¨ Sudden problems with walking or balance. ¨ A sudden, severe headache that is different from past headaches. · You are in severe pain. · You have symptoms of a heart attack. These may include:  ¨ Chest pain or pressure, or a strange feeling in the chest.  ¨ Sweating. ¨ Shortness of breath. ¨ Nausea or vomiting. ¨ Pain, pressure, or a strange feeling in the back, neck, jaw, or upper belly or in one or both shoulders or arms. ¨ Lightheadedness or sudden weakness. ¨ A fast or irregular heartbeat. After you call 911, the  may tell you to chew 1 adult-strength or 2 to 4 low-dose aspirin. Wait for an ambulance. Do not try to drive yourself. Call your doctor now or seek immediate medical care if:  · You have a fever. Watch closely for changes in your health, and be sure to contact your doctor if you have any problems. Where can you learn more? Go to http://manuel-amadeo.info/.   Enter 544 [id number] in the search box to learn more about \"Sickle Cell Disease: Care Instructions. \"  Current as of: October 13, 2016  Content Version: 11.4  © 7628-9617 [employer]. Care instructions adapted under license by [employer] (which disclaims liability or warranty for this information). If you have questions about a medical condition or this instruction, always ask your healthcare professional. [address] any warranty or liability for your use of this information. DISCHARGE SUMMARY from Nurse    PATIENT INSTRUCTIONS:    After general anesthesia or intravenous sedation, for 24 hours or while taking prescription Narcotics:  · Limit your activities  · Do not drive and operate hazardous machinery  · Do not make important personal or business decisions  · Do  not drink alcoholic beverages  · If you have not urinated within 8 hours after discharge, please contact your surgeon on call. Report the following to your surgeon:  · Excessive pain, swelling, redness or odor of or around the surgical area  · Temperature over 100.5  · Nausea and vomiting lasting longer than 4 hours or if unable to take medications  · Any signs of decreased circulation or nerve impairment to extremity: change in color, persistent  numbness, tingling, coldness or increase pain  · Any questions    What to do at Home:  Recommended activity: Activity as tolerated,     If you experience any of the following symptoms chest pain, shortness or breath, dizziness, increase temperature greater 100.4, increase weakness, nausea, vomiting, or diarrhea, please follow up with PCP or call 911. *  Please give a list of your current medications to your Primary Care Provider. *  Please update this list whenever your medications are discontinued, doses are      changed, or new medications (including over-the-counter products) are added. *  Please carry medication information at all times in case of emergency situations.     These are general instructions for a healthy lifestyle:    No smoking/ No tobacco products/ Avoid exposure to second hand smoke  Surgeon General's Warning:  Quitting smoking now greatly reduces serious risk to your health. Obesity, smoking, and sedentary lifestyle greatly increases your risk for illness    A healthy diet, regular physical exercise & weight monitoring are important for maintaining a healthy lifestyle    You may be retaining fluid if you have a history of heart failure or if you experience any of the following symptoms:  Weight gain of 3 pounds or more overnight or 5 pounds in a week, increased swelling in our hands or feet or shortness of breath while lying flat in bed. Please call your doctor as soon as you notice any of these symptoms; do not wait until your next office visit. Recognize signs and symptoms of STROKE:    F-face looks uneven    A-arms unable to move or move unevenly    S-speech slurred or non-existent    T-time-call 911 as soon as signs and symptoms begin-DO NOT go       Back to bed or wait to see if you get better-TIME IS BRAIN. Warning Signs of HEART ATTACK     Call 911 if you have these symptoms:   Chest discomfort. Most heart attacks involve discomfort in the center of the chest that lasts more than a few minutes, or that goes away and comes back. It can feel like uncomfortable pressure, squeezing, fullness, or pain.  Discomfort in other areas of the upper body. Symptoms can include pain or discomfort in one or both arms, the back, neck, jaw, or stomach.  Shortness of breath with or without chest discomfort.  Other signs may include breaking out in a cold sweat, nausea, or lightheadedness. Don't wait more than five minutes to call 911 - MINUTES MATTER! Fast action can save your life. Calling 911 is almost always the fastest way to get lifesaving treatment. Emergency Medical Services staff can begin treatment when they arrive -- up to an hour sooner than if someone gets to the hospital by car.      The discharge information has been reviewed with the patient. The patient verbalized understanding. Discharge medications reviewed with the patient and appropriate educational materials and side effects teaching were provided. _____________________________________________________________________________________Patient armband removed and shredded  MyChart Activation    Thank you for requesting access to [facility]. Please follow the instructions below to securely access and download your online medical record. [facility] allows you to send messages to your doctor, view your test results, renew your prescriptions, schedule appointments, and more. How Do I Sign Up? 1. In your internet browser, go to [email or web address]. 2. Click on the First Time User? Click Here link in the Sign In box. You will see the New Member Sign Up page. 3. Enter your [facility] Access Code exactly as it appears below. You will not need to use this code after youve completed the sign-up process. If you do not sign up before the expiration date, you must request a new code. [facility] Access Code: [email or web address]  Expires: 2018  8:28 PM (This is the date your [facility] access code will )    4. Enter the last four digits of your Social Security Number (xxxx) and Date of Birth (mm/dd/yyyy) as indicated and click Submit. You will be taken to the next sign-up page. 5. Create a [facility] ID. This will be your [facility] login ID and cannot be changed, so think of one that is secure and easy to remember. 6. Create a [facility] password. You can change your password at any time. 7. Enter your Password Reset Question and Answer. This can be used at a later time if you forget your password. 8. Enter your e-mail address. You will receive e-mail notification when new information is available in [address] 9. Click Sign Up. You can now view and download portions of your medical record.   10. Click the Download Summary menu link to download a portable copy of your medical information. Additional Information    If you have questions, please visit the Frequently Asked Questions section of the [employer] website at [email or web address] Remember, [facility] [facility] is NOT to be used for urgent needs.  For medical emergencies, dial 911.    ______________________________________________

## 2018-04-12 ENCOUNTER — APPOINTMENT (OUTPATIENT)
Dept: GENERAL RADIOLOGY | Age: 38
End: 2018-04-12
Attending: EMERGENCY MEDICINE
Payer: MEDICARE

## 2018-04-12 ENCOUNTER — HOSPITAL ENCOUNTER (EMERGENCY)
Age: 38
Discharge: HOME | End: 2018-04-13
Attending: EMERGENCY MEDICINE
Payer: MEDICARE

## 2018-04-12 ENCOUNTER — APPOINTMENT (OUTPATIENT)
Dept: CT IMAGING | Age: 38
End: 2018-04-12
Attending: EMERGENCY MEDICINE
Payer: MEDICARE

## 2018-04-12 DIAGNOSIS — D57.00 VASO-OCCLUSIVE SICKLE CELL CRISIS (HCC): Primary | ICD-10-CM

## 2018-04-12 LAB
ALBUMIN SERPL-MCNC: 4 G/DL (ref 3.4–5)
ALBUMIN/GLOB SERPL: 0.6 {RATIO} (ref 0.8–1.7)
ALP SERPL-CCNC: 244 U/L (ref 45–117)
ALT SERPL-CCNC: 53 U/L (ref 16–61)
ANION GAP SERPL CALC-SCNC: 8 MMOL/L (ref 3–18)
AST SERPL-CCNC: 77 U/L (ref 15–37)
ATRIAL RATE: 90 BPM
BASOPHILS # BLD: 0 K/UL (ref 0–0.06)
BASOPHILS NFR BLD: 0 % (ref 0–3)
BILIRUB SERPL-MCNC: 9.3 MG/DL (ref 0.2–1)
BUN SERPL-MCNC: 14 MG/DL (ref 7–18)
BUN/CREAT SERPL: 11 (ref 12–20)
CALCIUM SERPL-MCNC: 9.4 MG/DL (ref 8.5–10.1)
CALCULATED P AXIS, ECG09: 77 DEGREES
CALCULATED R AXIS, ECG10: -3 DEGREES
CALCULATED T AXIS, ECG11: 40 DEGREES
CHLORIDE SERPL-SCNC: 101 MMOL/L (ref 100–108)
CO2 SERPL-SCNC: 26 MMOL/L (ref 21–32)
CREAT SERPL-MCNC: 1.26 MG/DL (ref 0.6–1.3)
DIAGNOSIS, 93000: NORMAL
DIFFERENTIAL METHOD BLD: ABNORMAL
EOSINOPHIL # BLD: 0 K/UL (ref 0–0.4)
EOSINOPHIL NFR BLD: 0 % (ref 0–5)
ERYTHROCYTE [DISTWIDTH] IN BLOOD BY AUTOMATED COUNT: 24 % (ref 11.6–14.5)
GLOBULIN SER CALC-MCNC: 6.2 G/DL (ref 2–4)
GLUCOSE SERPL-MCNC: 103 MG/DL (ref 74–99)
HCT VFR BLD AUTO: 23.7 % (ref 36–48)
HGB BLD-MCNC: 8.4 G/DL (ref 13–16)
LYMPHOCYTES # BLD: 4.7 K/UL (ref 0.8–3.5)
LYMPHOCYTES NFR BLD: 26 % (ref 20–51)
MCH RBC QN AUTO: 25.6 PG (ref 24–34)
MCHC RBC AUTO-ENTMCNC: 35.4 G/DL (ref 31–37)
MCV RBC AUTO: 72.3 FL (ref 74–97)
MONOCYTES # BLD: 0.9 K/UL (ref 0–1)
MONOCYTES NFR BLD: 5 % (ref 2–9)
NEUTS SEG # BLD: 12.6 K/UL (ref 1.8–8)
NEUTS SEG NFR BLD: 69 % (ref 42–75)
NRBC BLD-RTO: 21 PER 100 WBC
P-R INTERVAL, ECG05: 144 MS
PLATELET # BLD AUTO: 409 K/UL (ref 135–420)
PLATELET COMMENTS,PCOM: ABNORMAL
PMV BLD AUTO: 9 FL (ref 9.2–11.8)
POTASSIUM SERPL-SCNC: 3.9 MMOL/L (ref 3.5–5.5)
PROT SERPL-MCNC: 10.2 G/DL (ref 6.4–8.2)
Q-T INTERVAL, ECG07: 388 MS
QRS DURATION, ECG06: 88 MS
QTC CALCULATION (BEZET), ECG08: 474 MS
RBC # BLD AUTO: 3.28 M/UL (ref 4.7–5.5)
RBC MORPH BLD: ABNORMAL
RETICS/RBC NFR AUTO: 8.6 % (ref 0.5–2.3)
SODIUM SERPL-SCNC: 135 MMOL/L (ref 136–145)
VENTRICULAR RATE, ECG03: 90 BPM
WBC # BLD AUTO: 18.2 K/UL (ref 4.6–13.2)

## 2018-04-12 PROCEDURE — 96374 THER/PROPH/DIAG INJ IV PUSH: CPT

## 2018-04-12 PROCEDURE — 74011250636 HC RX REV CODE- 250/636

## 2018-04-12 PROCEDURE — 80053 COMPREHEN METABOLIC PANEL: CPT | Performed by: PHYSICIAN ASSISTANT

## 2018-04-12 PROCEDURE — 85045 AUTOMATED RETICULOCYTE COUNT: CPT | Performed by: PHYSICIAN ASSISTANT

## 2018-04-12 PROCEDURE — 74011250637 HC RX REV CODE- 250/637: Performed by: EMERGENCY MEDICINE

## 2018-04-12 PROCEDURE — 85025 COMPLETE CBC W/AUTO DIFF WBC: CPT | Performed by: PHYSICIAN ASSISTANT

## 2018-04-12 PROCEDURE — 93005 ELECTROCARDIOGRAM TRACING: CPT

## 2018-04-12 PROCEDURE — 96375 TX/PRO/DX INJ NEW DRUG ADDON: CPT

## 2018-04-12 PROCEDURE — 96361 HYDRATE IV INFUSION ADD-ON: CPT

## 2018-04-12 PROCEDURE — 96376 TX/PRO/DX INJ SAME DRUG ADON: CPT

## 2018-04-12 PROCEDURE — 99284 EMERGENCY DEPT VISIT MOD MDM: CPT

## 2018-04-12 PROCEDURE — 74011250636 HC RX REV CODE- 250/636: Performed by: EMERGENCY MEDICINE

## 2018-04-12 PROCEDURE — 71046 X-RAY EXAM CHEST 2 VIEWS: CPT

## 2018-04-12 RX ORDER — DIPHENHYDRAMINE HYDROCHLORIDE 50 MG/ML
25 INJECTION, SOLUTION INTRAMUSCULAR; INTRAVENOUS ONCE
Status: COMPLETED | OUTPATIENT
Start: 2018-04-12 | End: 2018-04-12

## 2018-04-12 RX ORDER — NALBUPHINE HYDROCHLORIDE 10 MG/ML
10 INJECTION, SOLUTION INTRAMUSCULAR; INTRAVENOUS; SUBCUTANEOUS
Status: COMPLETED | OUTPATIENT
Start: 2018-04-12 | End: 2018-04-12

## 2018-04-12 RX ORDER — ONDANSETRON 4 MG/1
8 TABLET, FILM COATED ORAL
Qty: 12 TAB | Refills: 0 | Status: SHIPPED | OUTPATIENT
Start: 2018-04-12 | End: 2019-08-03

## 2018-04-12 RX ORDER — MORPHINE SULFATE 4 MG/ML
8 INJECTION INTRAVENOUS ONCE
Status: COMPLETED | OUTPATIENT
Start: 2018-04-12 | End: 2018-04-12

## 2018-04-12 RX ORDER — MORPHINE SULFATE 10 MG/ML
8 INJECTION, SOLUTION INTRAMUSCULAR; INTRAVENOUS ONCE
Status: COMPLETED | OUTPATIENT
Start: 2018-04-12 | End: 2018-04-12

## 2018-04-12 RX ORDER — OXYCODONE AND ACETAMINOPHEN 5; 325 MG/1; MG/1
1 TABLET ORAL
Status: COMPLETED | OUTPATIENT
Start: 2018-04-12 | End: 2018-04-12

## 2018-04-12 RX ORDER — MORPHINE SULFATE 8 MG/ML
INJECTION INTRAVENOUS
Status: COMPLETED
Start: 2018-04-12 | End: 2018-04-12

## 2018-04-12 RX ORDER — MORPHINE SULFATE 4 MG/ML
8 INJECTION INTRAVENOUS
Status: COMPLETED | OUTPATIENT
Start: 2018-04-12 | End: 2018-04-12

## 2018-04-12 RX ORDER — DIAZEPAM 10 MG/2ML
5 INJECTION INTRAMUSCULAR
Status: COMPLETED | OUTPATIENT
Start: 2018-04-12 | End: 2018-04-12

## 2018-04-12 RX ADMIN — MORPHINE SULFATE: 8 INJECTION INTRAVENOUS at 21:45

## 2018-04-12 RX ADMIN — MORPHINE SULFATE 8 MG: 4 INJECTION INTRAVENOUS at 16:05

## 2018-04-12 RX ADMIN — SODIUM CHLORIDE 1000 ML: 900 INJECTION, SOLUTION INTRAVENOUS at 15:35

## 2018-04-12 RX ADMIN — MORPHINE SULFATE 8 MG: 10 INJECTION INTRAMUSCULAR; INTRAVENOUS; SUBCUTANEOUS at 15:34

## 2018-04-12 RX ADMIN — DIPHENHYDRAMINE HYDROCHLORIDE 25 MG: 50 INJECTION, SOLUTION INTRAMUSCULAR; INTRAVENOUS at 16:05

## 2018-04-12 RX ADMIN — NALBUPHINE HYDROCHLORIDE 10 MG: 10 INJECTION, SOLUTION INTRAMUSCULAR; INTRAVENOUS; SUBCUTANEOUS at 17:31

## 2018-04-12 RX ADMIN — OXYCODONE HYDROCHLORIDE AND ACETAMINOPHEN 1 TABLET: 5; 325 TABLET ORAL at 22:19

## 2018-04-12 RX ADMIN — DIPHENHYDRAMINE HYDROCHLORIDE 25 MG: 50 INJECTION, SOLUTION INTRAMUSCULAR; INTRAVENOUS at 15:34

## 2018-04-12 RX ADMIN — MORPHINE SULFATE 8 MG: 4 INJECTION INTRAVENOUS at 21:39

## 2018-04-12 RX ADMIN — Medication 5 MG: at 19:56

## 2018-04-12 NOTE — ED NOTES
I performed a brief evaluation, including history and physical, of the patient here in triage and I have determined that pt will need further treatment and evaluation from the main side ER physician. I have placed initial orders to help in expediting patients care.      April 12, 2018 at 1:37 PM - PHANI Baeza        Visit Vitals    /90    Temp 97.5 °F (36.4 °C)    Resp 18    Ht 6' (1.829 m)    Wt 78 kg (172 lb)    SpO2 97%    BMI 23.33 kg/m2

## 2018-04-12 NOTE — ED TRIAGE NOTES
Pt reports sickle cell pain all over his back and right shoulder and chest wall. Pt reports he's been using his pain medicine, but is unable to keep it under control.

## 2018-04-12 NOTE — ED NOTES
Bedside and Verbal shift change report given to ANA Wing (oncoming nurse) by Guerita Olson RN (offgoing nurse). Report included the following information SBAR, ED Summary, MAR and Recent Results. Pt resting on stretcher with visible pain noted and family at bedside. MD Dates notified of continued pain, no new orders. Will continue to monitor pt and await further orders.

## 2018-04-12 NOTE — DISCHARGE INSTRUCTIONS
SPECIFIC PATIENT INSTRUCTIONS FROM THE EMERGENCY PHYSICIAN WHO TREATED YOU TODAY:  1. Dilaudid for pain as PREVIOUSLY prescribed. 2. Zofran for nausea and/or vomiting. 3. Follow up with your primary doctor or your hematologist (if you have one) in the next 2-4 days for reevaluation. Sickle Cell Crisis: Care Instructions  Your Care Instructions    Sickle cell crisis is a painful episode that may begin suddenly in a person with sickle cell disease. Sickle cell disease turns normal, round red blood cells into cells that look like jeane or crescent moons. The sickle-shaped cells can get stuck in blood vessels, blocking blood flow and causing severe pain. The pain can occur in the bones of the spine, the arms and legs, the chest, and the abdomen. An episode may be called a \"painful event\" or \"painful crisis. \" Some people who have sickle cell disease have many painful events, while others have few or none. Treatment depends on the level of pain and how long it lasts. Sometimes taking nonprescription pain relievers can help. Or you may need stronger pain relief medicine that is prescribed or given by a doctor. You may need to be treated in the hospital.  It isn't always possible to know what sets off a painful event. But triggers include being dehydrated, cold temperatures, infection, stress, and not getting enough oxygen. Follow-up care is a key part of your treatment and safety. Be sure to make and go to all appointments, and call your doctor if you are having problems. It's also a good idea to know your test results and keep a list of the medicines you take. How can you care for yourself at home? · Create a pain management plan with your doctor. This plan should include the types of medicines you can take and other actions you can take at home to relieve pain. · Drink plenty of fluids, enough so that your urine is light yellow or clear like water.  If you have kidney, heart, or liver disease and have to limit fluids, talk with your doctor before you increase the amount of fluids you drink. · Take your medicines exactly as prescribed. Call your doctor if you think you are having a problem with your medicine. · Take pain medicines exactly as directed. ¨ If the doctor gave you a prescription medicine for pain, take it as prescribed. ¨ If you are not taking a prescription pain medicine, ask your doctor if you can take an over-the-counter medicine. · Avoid alcohol. It can make you dehydrated. · Dress warmly in cold weather. The cold and windy weather can lead to severe pain. · Do not smoke. Smoking can reduce the amount of oxygen in your blood. · Get plenty of sleep. When should you call for help? Call 911 anytime you think you may need emergency care. For example, call if:  ? · You have symptoms of a severe problem from sickle cell. ? · You have symptoms of a stroke. These may include:  ¨ Sudden numbness, tingling, weakness, or loss of movement in your face, arm, or leg, especially on only one side of your body. ¨ Sudden vision changes. ¨ Sudden trouble speaking. ¨ Sudden confusion or trouble understanding simple statements. ¨ Sudden problems with walking or balance. ¨ A sudden, severe headache that is different from past headaches. ? · You are in severe pain. ? · You have symptoms of a heart attack. These may include:  ¨ Chest pain or pressure, or a strange feeling in the chest.  ¨ Sweating. ¨ Shortness of breath. ¨ Nausea or vomiting. ¨ Pain, pressure, or a strange feeling in the back, neck, jaw, or upper belly or in one or both shoulders or arms. ¨ Lightheadedness or sudden weakness. ¨ A fast or irregular heartbeat. After you call 911, the  may tell you to chew 1 adult-strength or 2 to 4 low-dose aspirin. Wait for an ambulance. Do not try to drive yourself. ?Call your doctor now or seek immediate medical care if:  ? · You have a fever. ? Watch closely for changes in your [employer]uru, and be sure to contact your doctor if you have any problems. Where can you learn more? Go to http://manuel-amadeo.info/. Enter F104 in the search box to learn more about \"Sickle Cell Crisis: Care Instructions. \"  Current as of: 2016  Content Version: 11.4  © 5444-6474 [employer]. Care instructions adapted under license by "[employer] (which disclaims liability or warranty for this information). If you have questions about a medical condition or this instruction, always ask your healthcare professional. [address] any warranty or liability for your use of this information. Standardized Safety Activation    Thank you for requesting access to [facility]. Please follow the instructions below to securely access and download your online medical record. [facility] allows you to send messages to your doctor, view your test results, renew your prescriptions, schedule appointments, and more. How Do I Sign Up? 1. In your internet browser, go to [email or web address]. 2. Click on the First Time User? Click Here link in the Sign In box. You will see the New Member Sign Up page. 3. Enter your Standard[facility] Safety Access Code exactly as it appears below. You will not need to use this code after youve completed the sign-up process. If you do not sign up before the expiration date, you must request a new code. Standardized Safety Access Code: [email or web address]  Expires: 2018  8:28 PM (This is the date your Standardized Safety access code will )    4. Enter the last four digits of your Social Security Number (xxxx) and Date of Birth (mm/dd/yyyy) as indicated and click Submit. You will be taken to the next sign-up page. 5. Create a Standardized Safety ID. This will be your Standard[facility] login ID and cannot be changed, so think of one that is secure and easy to remember. 6. Create a [facility] Safety password. You can change your password at any time.   7. Enter your Password Reset Question and Answer. This can be used at a later time if you forget your password. 8. Enter your e-mail address. You will receive e-mail notification when new information is available in [address] 9. Click Sign Up. You can now view and download portions of your medical record. 10. Click the Download Summary menu link to download a portable copy of your medical information. Additional Information    If you have questions, please visit the Frequently Asked Questions section of the [employer] website at [email or web address] Remember, [employer] is NOT to be used for urgent needs. For medical emergencies, dial 911.

## 2018-04-12 NOTE — ED PROVIDER NOTES
[facility]  [facility] EMERGENCY DEPT      1:44 PM    Date: 4/12/2018  Patient Name: Tona Garza    History of Presenting Illness     Chief Complaint   Patient presents with    Sickle Cell Crisis       History Provided By: Patient    Chief Complaint: Back pain   Duration: 2 Days  Timing:  Constant  Location: Back   Quality: N/A  Severity: 9 out of 10  Modifying Factors: None  Associated Symptoms: right sided abdominal/muscular pain    42 y.o. male with noted past medical history who presents to the emergency department with a chief complaint of constant back pain for the past 2 days with associated symptoms of right sided abdominal/muscular pain. Patient states he has sickle cell anemia and was admitted to the hospital 2 days ago for similar symptoms and pain. Patient also states that he is usually given Dilaudid and Ibuprofen at the hospital for pain relief. Patient does not state any alleviating or modifying factors. Patient denies any other symptoms or complaints. No other complaints. Nursing notes regarding the HPI and triage nursing notes were reviewed. Prior medical records were reviewed. Current Outpatient Prescriptions   Medication Sig Dispense Refill    ondansetron hcl (ZOFRAN, AS HYDROCHLORIDE,) 4 mg tablet Take 2 Tabs by mouth every eight (8) hours as needed for Nausea. 12 Tab 0    cyanocobalamin (VITAMIN B12) 2,500 mcg sublingual tablet Take 1 Tab by mouth daily. 100 Tab 3    ergocalciferol (ERGOCALCIFEROL) 50,000 unit capsule Take 1 Cap by mouth every seven (7) days. 4 Cap 3    folic acid (FOLVITE) 1 mg tablet Take 1 Tab by mouth daily. 100 Tab 3    senna (SENOKOT) 8.6 mg tablet Take 2 Tabs by mouth nightly. 60 Tab 3    HYDROmorphone (DILAUDID) 2 mg tablet Take 1 Tab by mouth every six (6) hours as needed for Pain. Max Daily Amount: 8 mg. Indications: Pain 12 Tab 0    hydroxyurea (HYDREA) 500 mg capsule Take 500 mg by mouth two (2) times a day.  Indications: SICKLE CELL ANEMIA WITH CRISIS      fentaNYL (DURAGESIC) 25 mcg/hr PATCH 1 Patch by TransDERmal route every seventy-two (72) hours. Max Daily Amount: 1 Patch. 5 Patch 0       Past History     Past Medical History:  Past Medical History:   Diagnosis Date    B12 deficiency 03/15/2010    210    Cholelithiasis 09/17/2012    U/S Result: Cholelithiasis    Elevated alkaline phosphatase level 03/21/2010    158    Elevated ALT measurement 03/21/2010    71    Elevated AST (SGOT) 03/10/2012    38    Elevated platelet count 52/86/9300    494    Elevated total protein 12/27/2011    8.7    Hypocalcemia 07/06/2011    4.1    Hypokalemia     Hyponatremia 11/18/2009    Leukocytosis 11/16/2009    Microcytic anemia 10/25/2007    Pleural effusion on right 07/15/2015    [facility] CXR Result    Reticulocytosis 10/25/2007    7.8    Serum total bilirubin elevated 12/27/2011    T.B. 3.8, D.B. 0.4.     Sickle cell anemia with crisis ([facility] [address]     Dr. Deysi Middleton. [place]    Vitamin D deficiency 01/20/2016    5.4       Past Surgical History:  Past Surgical History:   Procedure Laterality Date    HX CHOLECYSTECTOMY         Family History:  Family History   Problem Relation Age of Onset    Cancer Neg Hx     Diabetes Neg Hx     Heart Disease Neg Hx     Heart Attack Neg Hx     Hypertension Neg Hx     Stroke Neg Hx        Social History:  Social History   Substance Use Topics    Smoking status: Never Smoker    Smokeless tobacco: Never Used    Alcohol use Yes      Comment: Occasional       Allergies: Allergies   Allergen Reactions    Eggshell Membrane Nausea and Vomiting       Patient's primary care provider (as noted in EPIC):  Chelsy Gallegos MD    Review of Systems   Constitutional: Negative for fever. Musculoskeletal: Positive for back pain and myalgias. All other systems reviewed and are negative.       Visit Vitals    /77    Pulse 92    Temp 97.5 °F (36.4 °C)    Resp 18    Ht 6' (1.829 m)    Wt 78 kg (172 lb)    SpO2 99%    BMI 23.33 kg/m2       PHYSICAL EXAM:    CONSTITUTIONAL:  Alert, in no apparent distress;  well developed;  well nourished. HEAD:  Normocephalic, atraumatic. EYES:  EOMI. Non-icteric sclera. Normal conjunctiva. ENTM:  Nose:  no rhinorrhea. Throat:  no erythema or exudate, mucous membranes moist.  NECK:  No JVD. Supple  RESPIRATORY:  Chest clear, equal breath sounds, good air movement. CARDIOVASCULAR:  Regular rate and rhythm. No murmurs, rubs, or gallops. CHEST:  No rash, lesions, bruising. Focal reproducible tenderness to palpation. GI:  Normal bowel sounds, abdomen soft and non-tender. No rebound or guarding. BACK:  Non-tender. UPPER EXT:  Normal inspection. LOWER EXT:  No edema, no calf tenderness. Distal pulses intact. NEURO:  Moves all four extremities, and grossly normal motor exam.  SKIN:  No rashes;  Normal for age. PSYCH:  Alert and normal affect. Areas of pain:  Bilateral lower back    DIFFERENTIAL DIAGNOSES/ MEDICAL DECISION MAKING:   Sickle cell vasoocclusive crisis. No chest pain or shortness of breath. Will attempt to get pain under control and check labs including cbc and reticulocyte count. Diagnostic Study Results     Abnormal lab results from this emergency department encounter:  Labs Reviewed   CBC WITH AUTOMATED DIFF - Abnormal; Notable for the following:        Result Value    WBC 18.2 (*)     RBC 3.28 (*)     HGB 8.4 (*)     HCT 23.7 (*)     MCV 72.3 (*)     RDW 24.0 (*)     MPV 9.0 (*)     NRBC 21.0 (*)     ABS. NEUTROPHILS 12.6 (*)     ABS. LYMPHOCYTES 4.7 (*)     All other components within normal limits   RETICULOCYTE COUNT - Abnormal; Notable for the following:     Reticulocyte count 8.6 (*)     All other components within normal limits   METABOLIC PANEL, COMPREHENSIVE - Abnormal; Notable for the following:     Sodium 135 (*)     Glucose 103 (*)     BUN/Creatinine ratio 11 (*)     Bilirubin, total 9.3 (*)     AST (SGOT) 77 (*)     Alk.  phosphatase 244 (*) Protein, total 10.2 (*)     Globulin 6.2 (*)     A-G Ratio 0.6 (*)     All other components within normal limits       Lab values for this patient within approximately the last 12 hours:  No results found for this or any previous visit (from the past 12 hour(s)). Radiologist and cardiologist interpretations if available at time of this note:  No results found. EKG reading by Felix Thurston MD:  NSR about 90 bpm with normal width QRS and LVH. No STEMI. No ST depression. Medication(s) ordered for patient during this emergency visit encounter:  Medications   sodium chloride 0.9 % bolus infusion 1,000 mL (0 mL IntraVENous IV Completed 4/12/18 1716)   diphenhydrAMINE (BENADRYL) injection 25 mg (25 mg IntraVENous Given 4/12/18 1534)   morphine injection 8 mg (8 mg IntraVENous Given 4/12/18 1534)   morphine 8 mg (8 mg IntraVENous Given 4/12/18 1605)   diphenhydrAMINE (BENADRYL) injection 25 mg (25 mg IntraVENous Given 4/12/18 1605)   nalbuphine (NUBAIN) injection 10 mg (10 mg IntraVENous Given 4/12/18 1731)   diazePAM (VALIUM) injection 5 mg (5 mg IntraVENous Given 4/12/18 1956)   morphine 8 mg (8 mg IntraVENous Given 4/12/18 2139)   morphine 8 mg/mL injection (  Given 4/12/18 2145)   oxyCODONE-acetaminophen (PERCOCET) 5-325 mg per tablet 1 Tab (1 Tab Oral Given 4/12/18 2219)   technetium pentetate (Tc-DTPA) injection 33 millicurie (33 millicuries Inhalation Given 4/13/18 0030)   technetium albumin aggregated (MAA) solution 5.3 millicurie (5.3 millicuries IntraVENous Given 4/13/18 0031)   morphine 8 mg (8 mg IntraVENous Given 4/13/18 0205)       Medical Decision Making     I am the first provider for this patient. I reviewed the vital signs, available nursing notes, past medical history, past surgical history, family history and social history. Vital Signs:  Reviewed the patient's vital signs. ED COURSE:  White blood cell and reticulocyte counts reviewed.      IMPRESSION AND MEDICAL DECISION MAKING:  Based on the patient's history of presenting illness, physical examination, laboratory, radiographic, and/or tests results, and response to medical interventions, I believe the patient most likely is having sickle cell vaso-occlusive crisis. The patient's discomfort did improve with the noted pain and/or antiemetic pain medications. DIAGNOSIS:  1. Sickle cell crisis. SPECIFIC PATIENT INSTRUCTIONS FROM THE EMERGENCY PHYSICIAN WHO TREATED YOU TODAY:  1. Norco for pain as prescribed. 2. Zofran for nausea and/or vomiting. 3. Follow up with your primary doctor or your hematologist (if you have one) in the next 2-4 days for reevaluation. Patient is improved, resting quietly and comfortably. The patient will be discharged home. The patient was reassured that these symptoms do not appear to represent a serious or life threatening condition at this time. Warning signs of worsening condition were discussed and understood by the patient. Based on patient's age, coexisting illness, exam, and the results of this ED evaluation, the decision to treat as an outpatient was made. Based on the information available at time of discharge, acute pathology requiring immediate intervention was deemed relative unlikely. While it is impossible to completely exclude the possibility of underlying serious disease or worsening of condition, I feel the relative likelihood is extremely low. I discussed this uncertainty with the patient, who understood ED evaluation and treatment and felt comfortable with the outpatient treatment plan. All questions regarding care, test results, and follow up were answered. The patient is stable and appropriate to discharge. They understand that they should return to the emergency department for any new or worsening symptoms. I stressed the importance of follow up for repeat assessment and possibly further evaluation/treatment.     Coding Diagnoses     Clinical Impression: 1. Vaso-occlusive sickle cell crisis ([facility] [address]        Disposition     Disposition:  Home. Tommy Salgado M.D. NIKOLAY Board Certified Emergency Physician    Provider Attestation:  If a scribe was utilized in generation of this patient record, I personally performed the services described in the documentation, reviewed the documentation, as recorded by the scribe in my presence, and it accurately records the patient's history of presenting illness, review of systems, patient physical examination, and procedures performed by me as the attending physician. Tommy Salgado M.D.   NIKOLAY Board Certified Emergency Physician  4/12/2018.  1:44 PM

## 2018-04-13 ENCOUNTER — APPOINTMENT (OUTPATIENT)
Dept: NUCLEAR MEDICINE | Age: 38
End: 2018-04-13
Attending: EMERGENCY MEDICINE
Payer: MEDICARE

## 2018-04-13 VITALS
DIASTOLIC BLOOD PRESSURE: 77 MMHG | HEART RATE: 92 BPM | OXYGEN SATURATION: 99 % | SYSTOLIC BLOOD PRESSURE: 146 MMHG | TEMPERATURE: 97.5 F | BODY MASS INDEX: 23.3 KG/M2 | HEIGHT: 72 IN | WEIGHT: 172 LBS | RESPIRATION RATE: 18 BRPM

## 2018-04-13 PROBLEM — E80.6 HYPERBILIRUBINEMIA: Status: ACTIVE | Noted: 2018-04-13

## 2018-04-13 PROCEDURE — A9567 TECHNETIUM TC-99M AEROSOL: HCPCS

## 2018-04-13 PROCEDURE — 96374 THER/PROPH/DIAG INJ IV PUSH: CPT

## 2018-04-13 PROCEDURE — 96376 TX/PRO/DX INJ SAME DRUG ADON: CPT

## 2018-04-13 PROCEDURE — 74011250636 HC RX REV CODE- 250/636: Performed by: EMERGENCY MEDICINE

## 2018-04-13 RX ORDER — MORPHINE SULFATE 4 MG/ML
8 INJECTION INTRAVENOUS
Status: COMPLETED | OUTPATIENT
Start: 2018-04-13 | End: 2018-04-13

## 2018-04-13 RX ORDER — OXYCODONE AND ACETAMINOPHEN 5; 325 MG/1; MG/1
2 TABLET ORAL
Qty: 20 TAB | Refills: 0 | Status: SHIPPED | OUTPATIENT
Start: 2018-04-13 | End: 2018-04-13

## 2018-04-13 RX ADMIN — MORPHINE SULFATE 8 MG: 4 INJECTION INTRAVENOUS at 02:05

## 2018-04-13 NOTE — ED NOTES
Pt transported to [place] by [facility] on stretcher with family at bedside.

## 2018-04-13 NOTE — ED NOTES
Pt sleeping on stretcher with no acute distress noted and family at bedside. Will continue to monitor pt and await further orders.

## 2018-04-13 NOTE — ED NOTES
7:00 AM :Pt care assumed from Dr. Anna Adams   , ED provider. Pt complaint(s), current treatment plan, progression and available diagnostic results have been discussed thoroughly. Rounding occurred: yes  Intended Disposition: TBD   Pending diagnostic reports and/or labs (please list): TBD    3:00 AM  VQ negative. Chest Xray unremarkable. Will give another dose of pain meds an discharge home. Follow up with hematology. Scribe Attestation     Leonor Gan acting as a scribe for and in the presence of Katarzyna Dupont MD      April 13, 2018 at 7:00 PM       Provider Attestation:      I personally performed the services described in the documentation, reviewed the documentation, as recorded by the scribe in my presence, and it accurately and completely records my words and actions.  April 13, 2018 at 7:00 PM - Katarzyna Dupont MD

## 2018-04-13 NOTE — ED NOTES
I have reviewed discharge instructions with patient. The patient verbalized understanding. Patient armband removed and shredded. Pt alert and oriented. Stable at time of discharge. Vital signs stable. Pt is being discharged with 2 prescriptions at this time. Pt has jaundice sclera and continued pain at discharge. Pt wheeled to vehicle that sister was driving after assistance with placing jacket on. Pt given ice water for ride. Pt urinated in urinal prior to discharge.   Large amount of dark urine noted in urinal.

## 2018-07-01 ENCOUNTER — HOSPITAL ENCOUNTER (EMERGENCY)
Age: 38
Discharge: HOME | End: 2018-07-01
Attending: EMERGENCY MEDICINE
Payer: MEDICARE

## 2018-07-01 VITALS
RESPIRATION RATE: 14 BRPM | HEART RATE: 64 BPM | SYSTOLIC BLOOD PRESSURE: 113 MMHG | TEMPERATURE: 97.1 F | DIASTOLIC BLOOD PRESSURE: 74 MMHG | OXYGEN SATURATION: 99 %

## 2018-07-01 DIAGNOSIS — D57.00 SICKLE CELL ANEMIA WITH PAIN (HCC): Primary | ICD-10-CM

## 2018-07-01 DIAGNOSIS — M79.604 BILATERAL LEG PAIN: ICD-10-CM

## 2018-07-01 DIAGNOSIS — M79.605 BILATERAL LEG PAIN: ICD-10-CM

## 2018-07-01 LAB
BASOPHILS # BLD: 0.2 K/UL (ref 0–0.06)
BASOPHILS NFR BLD: 1 % (ref 0–3)
DIFFERENTIAL METHOD BLD: ABNORMAL
EOSINOPHIL # BLD: 1.4 K/UL (ref 0–0.4)
EOSINOPHIL NFR BLD: 9 % (ref 0–5)
ERYTHROCYTE [DISTWIDTH] IN BLOOD BY AUTOMATED COUNT: 23.6 % (ref 11.6–14.5)
HCT VFR BLD AUTO: 26.3 % (ref 36–48)
HGB BLD-MCNC: 9.4 G/DL (ref 13–16)
LYMPHOCYTES # BLD: 7.8 K/UL (ref 0.8–3.5)
LYMPHOCYTES NFR BLD: 49 % (ref 20–51)
MCH RBC QN AUTO: 27.2 PG (ref 24–34)
MCHC RBC AUTO-ENTMCNC: 35.7 G/DL (ref 31–37)
MCV RBC AUTO: 76.2 FL (ref 74–97)
MONOCYTES # BLD: 0.5 K/UL (ref 0–1)
MONOCYTES NFR BLD: 3 % (ref 2–9)
NEUTS BAND NFR BLD MANUAL: 1 % (ref 0–5)
NEUTS SEG # BLD: 6 K/UL (ref 1.8–8)
NEUTS SEG NFR BLD: 37 % (ref 42–75)
NRBC BLD-RTO: 3 PER 100 WBC
PLATELET # BLD AUTO: 390 K/UL (ref 135–420)
PLATELET COMMENTS,PCOM: ABNORMAL
PMV BLD AUTO: 9.1 FL (ref 9.2–11.8)
RBC # BLD AUTO: 3.45 M/UL (ref 4.7–5.5)
RBC MORPH BLD: ABNORMAL
RETICS/RBC NFR AUTO: 11.4 % (ref 0.5–2.3)
WBC # BLD AUTO: 15.9 K/UL (ref 4.6–13.2)

## 2018-07-01 PROCEDURE — 99282 EMERGENCY DEPT VISIT SF MDM: CPT

## 2018-07-01 PROCEDURE — 96360 HYDRATION IV INFUSION INIT: CPT

## 2018-07-01 PROCEDURE — 96372 THER/PROPH/DIAG INJ SC/IM: CPT

## 2018-07-01 PROCEDURE — 85045 AUTOMATED RETICULOCYTE COUNT: CPT | Performed by: EMERGENCY MEDICINE

## 2018-07-01 PROCEDURE — 85025 COMPLETE CBC W/AUTO DIFF WBC: CPT | Performed by: EMERGENCY MEDICINE

## 2018-07-01 PROCEDURE — 74011250636 HC RX REV CODE- 250/636: Performed by: EMERGENCY MEDICINE

## 2018-07-01 RX ORDER — FENTANYL CITRATE 50 UG/ML
50 INJECTION, SOLUTION INTRAMUSCULAR; INTRAVENOUS
Status: COMPLETED | OUTPATIENT
Start: 2018-07-01 | End: 2018-07-01

## 2018-07-01 RX ADMIN — SODIUM CHLORIDE 1000 ML: 900 INJECTION, SOLUTION INTRAVENOUS at 02:00

## 2018-07-01 RX ADMIN — FENTANYL CITRATE 50 MCG: 50 INJECTION INTRAMUSCULAR; INTRAVENOUS at 02:47

## 2018-07-01 NOTE — ED TRIAGE NOTES
Pt arrived to ED for c/o sickle cell pain in both legs and lower back. Pt states that he takes Dilaudid at home and wears a fentanyl patch, last took his pain medication at 1800, with no relief.

## 2018-07-01 NOTE — DISCHARGE INSTRUCTIONS
Sickle Cell Crisis: Care Instructions  Your Care Instructions    Sickle cell crisis is a painful episode that may begin suddenly in a person with sickle cell disease. Sickle cell disease turns normal, round red blood cells into cells that look like jeane or crescent moons. The sickle-shaped cells can get stuck in blood vessels, blocking blood flow and causing severe pain. The pain can occur in the bones of the spine, the arms and legs, the chest, and the abdomen. An episode may be called a \"painful event\" or \"painful crisis. \" Some people who have sickle cell disease have many painful events, while others have few or none. Treatment depends on the level of pain and how long it lasts. Sometimes taking nonprescription pain relievers can help. Or you may need stronger pain relief medicine that is prescribed or given by a doctor. You may need to be treated in the hospital.  It isn't always possible to know what sets off a painful event. But triggers include being dehydrated, cold temperatures, infection, stress, and not getting enough oxygen. Follow-up care is a key part of your treatment and safety. Be sure to make and go to all appointments, and call your doctor if you are having problems. It's also a good idea to know your test results and keep a list of the medicines you take. How can you care for yourself at home? · Create a pain management plan with your doctor. This plan should include the types of medicines you can take and other actions you can take at home to relieve pain. · Drink plenty of fluids, enough so that your urine is light yellow or clear like water. If you have kidney, heart, or liver disease and have to limit fluids, talk with your doctor before you increase the amount of fluids you drink. · Take your medicines exactly as prescribed. Call your doctor if you think you are having a problem with your medicine. · Take pain medicines exactly as directed.   ¨ If the doctor gave you a prescription medicine for pain, take it as prescribed. ¨ If you are not taking a prescription pain medicine, ask your doctor if you can take an over-the-counter medicine. · Avoid alcohol. It can make you dehydrated. · Dress warmly in cold weather. The cold and windy weather can lead to severe pain. · Do not smoke. Smoking can reduce the amount of oxygen in your blood. · Get plenty of sleep. When should you call for help? Call 911 anytime you think you may need emergency care. For example, call if:  ? · You have symptoms of a severe problem from sickle cell. ? · You have symptoms of a stroke. These may include:  ¨ Sudden numbness, tingling, weakness, or loss of movement in your face, arm, or leg, especially on only one side of your body. ¨ Sudden vision changes. ¨ Sudden trouble speaking. ¨ Sudden confusion or trouble understanding simple statements. ¨ Sudden problems with walking or balance. ¨ A sudden, severe headache that is different from past headaches. ? · You are in severe pain. ? · You have symptoms of a heart attack. These may include:  ¨ Chest pain or pressure, or a strange feeling in the chest.  ¨ Sweating. ¨ Shortness of breath. ¨ Nausea or vomiting. ¨ Pain, pressure, or a strange feeling in the back, neck, jaw, or upper belly or in one or both shoulders or arms. ¨ Lightheadedness or sudden weakness. ¨ A fast or irregular heartbeat. After you call 911, the  may tell you to chew 1 adult-strength or 2 to 4 low-dose aspirin. Wait for an ambulance. Do not try to drive yourself. ?Call your doctor now or seek immediate medical care if:  ? · You have a fever. ? Watch closely for changes in your health, and be sure to contact your doctor if you have any problems. Where can you learn more? Go to http://manuel-amadeo.info/. Enter F104 in the search box to learn more about \"Sickle Cell Crisis: Care Instructions. \"  Current as of: October 13, 2016  Content Version: 11.4  © 8075-7764 [employer]. Care instructions adapted under license by [employer] (which disclaims liability or warranty for this information). If you have questions about a medical condition or this instruction, always ask your healthcare professional. [address] any warranty or liability for your use of this information.

## 2018-07-01 NOTE — ED PROVIDER NOTES
EMERGENCY DEPARTMENT HISTORY AND PHYSICAL EXAM    Date: 7/1/2018  Patient Name: Kalen Balbuena    History of Presenting Illness     Chief Complaint   Patient presents with    Sickle Cell Crisis         History Provided By: Patient    Chief Complaint: sickle cell pain crisis  Duration: 8 Hours  Timing:  Acute  Location: BLE, lower back  Quality: Aching  Severity: Moderate  Modifying Factors: took his Fentanyl and Dilaudid w/o relief  Associated Symptoms: denies any other associated signs or symptoms      Additional History (Context): Kalen Balbuena is a 42 y.o. male with sickle cell pain who presents with pain crisis that started @8hrs ago. Followed by [facility]. Last crisis @3mos ago. Denies known exacerbant. Denies CP, SOB. PCP: Mehdi Soto MD    Current Facility-Administered Medications   Medication Dose Route Frequency Provider Last Rate Last Dose    sodium chloride 0.9 % bolus infusion 1,000 mL  1,000 mL IntraVENous ONCE PHANI Paredes        fentaNYL citrate (PF) injection 50 mcg  50 mcg IntraMUSCular NOW PHANI Paredes         Current Outpatient Prescriptions   Medication Sig Dispense Refill    cyanocobalamin (VITAMIN B12) 2,500 mcg sublingual tablet Take 1 Tab by mouth daily. 100 Tab 3    ergocalciferol (ERGOCALCIFEROL) 50,000 unit capsule Take 1 Cap by mouth every seven (7) days. 4 Cap 3    folic acid (FOLVITE) 1 mg tablet Take 1 Tab by mouth daily. 100 Tab 3    senna (SENOKOT) 8.6 mg tablet Take 2 Tabs by mouth nightly. 60 Tab 3    ondansetron hcl (ZOFRAN, AS HYDROCHLORIDE,) 4 mg tablet Take 2 Tabs by mouth every eight (8) hours as needed for Nausea. 12 Tab 0    HYDROmorphone (DILAUDID) 2 mg tablet Take 1 Tab by mouth every six (6) hours as needed for Pain. Max Daily Amount: 8 mg. Indications: Pain 12 Tab 0    hydroxyurea (HYDREA) 500 mg capsule Take 500 mg by mouth two (2) times a day.  Indications: SICKLE CELL ANEMIA WITH CRISIS      fentaNYL (DURAGESIC) 25 mcg/hr PATCH 1 Patch by TransDERmal route every seventy-two (72) hours. Max Daily Amount: 1 Patch. 5 Patch 0       Past History     Past Medical History:  Past Medical History:   Diagnosis Date    B12 deficiency 03/15/2010    210    Cholelithiasis 09/17/2012    U/S Result: Cholelithiasis    Elevated alkaline phosphatase level 03/21/2010    158    Elevated ALT measurement 03/21/2010    71    Elevated AST (SGOT) 03/10/2012    38    Elevated platelet count 84/12/4043    494    Elevated total protein 12/27/2011    8.7    Hypocalcemia 07/06/2011    4.1    Hypokalemia     Hyponatremia 11/18/2009    Leukocytosis 11/16/2009    Microcytic anemia 10/25/2007    Pleural effusion on right 07/15/2015    [facility] CXR Result    Reticulocytosis 10/25/2007    7.8    Serum total bilirubin elevated 12/27/2011    T.B. 3.8, D.B. 0.4.     Sickle cell anemia with crisis ([facility] [address]     Dr. Demetrice Chase    Vitamin D deficiency 01/20/2016    5.4       Past Surgical History:  Past Surgical History:   Procedure Laterality Date    HX CHOLECYSTECTOMY         Family History:  Family History   Problem Relation Age of Onset    Cancer Neg Hx     Diabetes Neg Hx     Heart Disease Neg Hx     Heart Attack Neg Hx     Hypertension Neg Hx     Stroke Neg Hx        Social History:  Social History   Substance Use Topics    Smoking status: Never Smoker    Smokeless tobacco: Never Used    Alcohol use Yes      Comment: Occasional       Allergies: Allergies   Allergen Reactions    Eggshell Membrane Nausea and Vomiting         Review of Systems   Review of Systems   Constitutional: Negative. Negative for fever. HENT: Negative. Eyes: Negative. Respiratory: Negative. Negative for shortness of breath. Cardiovascular: Negative. Negative for chest pain. Gastrointestinal: Negative. Endocrine: Negative. Genitourinary: Negative. Musculoskeletal: Positive for back pain and myalgias. Skin: Negative. Negative for wound. Allergic/Immunologic: Negative. Neurological: Negative. Negative for weakness and numbness. Hematological: Negative. Psychiatric/Behavioral: Negative. All other systems reviewed and are negative. All Other Systems Negative  Physical Exam     Vitals:    07/01/18 0117   BP: 113/74   Pulse: 64   Resp: 14   Temp: 97.1 °F (36.2 °C)   SpO2: 99%     Physical Exam   Constitutional: Vital signs are normal. He appears well-developed and well-nourished. He is active. Non-toxic appearance. He does not appear ill. No distress. HENT:   Head: Normocephalic and atraumatic. Neck: Normal range of motion. Neck supple. Carotid bruit is not present. No tracheal deviation present. No thyromegaly present. Cardiovascular: Normal rate, regular rhythm and normal heart sounds. Exam reveals no gallop and no friction rub. No murmur heard. Pulmonary/Chest: Effort normal and breath sounds normal. No stridor. No respiratory distress. He has no wheezes. He has no rales. He exhibits no tenderness. Abdominal: Soft. He exhibits no distension and no mass. There is no tenderness. There is no rebound, no guarding and no CVA tenderness. Musculoskeletal: Normal range of motion. Neurological: He is alert. Skin: Skin is warm, dry and intact. He is not diaphoretic. No pallor. Psychiatric: He has a normal mood and affect. His speech is normal and behavior is normal. Judgment and thought content normal.   Nursing note and vitals reviewed.              Diagnostic Study Results     Labs -     Recent Results (from the past 12 hour(s))   CBC WITH AUTOMATED DIFF    Collection Time: 07/01/18  1:39 AM   Result Value Ref Range    WBC 15.9 (H) 4.6 - 13.2 K/uL    RBC 3.45 (L) 4.70 - 5.50 M/uL    HGB 9.4 (L) 13.0 - 16.0 g/dL    HCT 26.3 (L) 36.0 - 48.0 %    MCV 76.2 74.0 - 97.0 FL    MCH 27.2 24.0 - 34.0 PG    MCHC 35.7 31.0 - 37.0 g/dL    RDW 23.6 (H) 11.6 - 14.5 %    PLATELET 037 481 - 737 K/uL    MPV 9.1 (L) 9.2 - 11.8 FL NEUTROPHILS 37 (L) 42 - 75 %    BAND NEUTROPHILS 1 0 - 5 %    LYMPHOCYTES 49 20 - 51 %    MONOCYTES 3 2 - 9 %    EOSINOPHILS 9 (H) 0 - 5 %    BASOPHILS 1 0 - 3 %    NRBC 3.0 (H) 0  WBC    ABS. NEUTROPHILS 6.0 1.8 - 8.0 K/UL    ABS. LYMPHOCYTES 7.8 (H) 0.8 - 3.5 K/UL    ABS. MONOCYTES 0.5 0 - 1.0 K/UL    ABS. EOSINOPHILS 1.4 (H) 0.0 - 0.4 K/UL    ABS. BASOPHILS 0.2 (H) 0.0 - 0.06 K/UL    DF MANUAL      PLATELET COMMENTS ADEQUATE PLATELETS      RBC COMMENTS ANISOCYTOSIS  2+        RBC COMMENTS MICROCYTOSIS  1+        RBC COMMENTS POLYCHROMASIA  2+        RBC COMMENTS POIKILOCYTOSIS  2+        RBC COMMENTS SICKLE CELLS  3+        RBC COMMENTS TARGET CELLS  1+       RETICULOCYTE COUNT    Collection Time: 07/01/18  1:39 AM   Result Value Ref Range    Reticulocyte count 11.4 (H) 0.5 - 2.3 %       Radiologic Studies -   No orders to display     CT Results  (Last 48 hours)    None        CXR Results  (Last 48 hours)    None            Medical Decision Making   I am the first provider for this patient. I reviewed the vital signs, available nursing notes, past medical history, past surgical history, family history and social history. Vital Signs-Reviewed the patient's vital signs. Records Reviewed: Nursing Notes    Procedures:  Procedures    Provider Notes (Medical Decision Making): treat pain crisis and hydrate. Labs, VS stable. F/up with Salvatore. MED RECONCILIATION:  Current Facility-Administered Medications   Medication Dose Route Frequency    sodium chloride 0.9 % bolus infusion 1,000 mL  1,000 mL IntraVENous ONCE    fentaNYL citrate (PF) injection 50 mcg  50 mcg IntraMUSCular NOW     Current Outpatient Prescriptions   Medication Sig    cyanocobalamin (VITAMIN B12) 2,500 mcg sublingual tablet Take 1 Tab by mouth daily.  ergocalciferol (ERGOCALCIFEROL) 50,000 unit capsule Take 1 Cap by mouth every seven (7) days.  folic acid (FOLVITE) 1 mg tablet Take 1 Tab by mouth daily.     senna (SENOKOT) 8.6 mg tablet Take 2 Tabs by mouth nightly.  ondansetron hcl (ZOFRAN, AS HYDROCHLORIDE,) 4 mg tablet Take 2 Tabs by mouth every eight (8) hours as needed for Nausea.  HYDROmorphone (DILAUDID) 2 mg tablet Take 1 Tab by mouth every six (6) hours as needed for Pain. Max Daily Amount: 8 mg. Indications: Pain    hydroxyurea (HYDREA) 500 mg capsule Take 500 mg by mouth two (2) times a day. Indications: SICKLE CELL ANEMIA WITH CRISIS    fentaNYL (DURAGESIC) 25 mcg/hr PATCH 1 Patch by TransDERmal route every seventy-two (72) hours. Max Daily Amount: 1 Patch. Disposition:  home    DISCHARGE NOTE:   2:42 AM    Pt has been reexamined. Patient has no new complaints, changes, or physical findings. Care plan outlined and precautions discussed. Results of labs were reviewed with the patient. All medications were reviewed with the patient; will d/c home with reassurance. All of pt's questions and concerns were addressed. Patient was instructed and agrees to follow up with Salvatore, as well as to return to the ED upon further deterioration. Patient is ready to go home. Follow-up Information     Follow up With Details Comments Contact Info    Brenda Laguna MD Schedule an appointment as soon as possible for a visit in 2 days  [address]      [facility] EMERGENCY DEPT  If symptoms worsen return immediately [address]  [phone]          Current Discharge Medication List            Diagnosis     Clinical Impression:   1. Sickle cell anemia with pain (HCC)    2.  Bilateral leg pain

## 2018-07-01 NOTE — ED NOTES
I have reviewed discharge instructions with the patient. The patient verbalized understanding. Pt is AxOx4, NAD. Pt states that he is feeling better. Pt ambulated out of ED with steady gait.

## 2018-07-31 ENCOUNTER — HOSPITAL ENCOUNTER (EMERGENCY)
Age: 38
Discharge: HOME | End: 2018-07-31
Attending: EMERGENCY MEDICINE
Payer: MEDICARE

## 2018-07-31 ENCOUNTER — APPOINTMENT (OUTPATIENT)
Dept: GENERAL RADIOLOGY | Age: 38
End: 2018-07-31
Attending: PHYSICIAN ASSISTANT
Payer: MEDICARE

## 2018-07-31 VITALS
OXYGEN SATURATION: 95 % | HEART RATE: 96 BPM | RESPIRATION RATE: 16 BRPM | SYSTOLIC BLOOD PRESSURE: 124 MMHG | DIASTOLIC BLOOD PRESSURE: 72 MMHG | TEMPERATURE: 97.1 F

## 2018-07-31 DIAGNOSIS — S62.344A CLOSED NONDISPLACED FRACTURE OF BASE OF FOURTH METACARPAL BONE OF RIGHT HAND, INITIAL ENCOUNTER: Primary | ICD-10-CM

## 2018-07-31 DIAGNOSIS — T14.8XXA ABRASION: ICD-10-CM

## 2018-07-31 PROCEDURE — 73130 X-RAY EXAM OF HAND: CPT

## 2018-07-31 PROCEDURE — 99283 EMERGENCY DEPT VISIT LOW MDM: CPT

## 2018-07-31 PROCEDURE — 75810000053 HC SPLINT APPLICATION

## 2018-07-31 PROCEDURE — 74011250637 HC RX REV CODE- 250/637: Performed by: PHYSICIAN ASSISTANT

## 2018-07-31 RX ORDER — HYDROCODONE BITARTRATE AND ACETAMINOPHEN 5; 325 MG/1; MG/1
1 TABLET ORAL
Status: COMPLETED | OUTPATIENT
Start: 2018-07-31 | End: 2018-07-31

## 2018-07-31 RX ADMIN — HYDROCODONE BITARTRATE AND ACETAMINOPHEN 1 TABLET: 5; 325 TABLET ORAL at 17:56

## 2018-07-31 NOTE — DISCHARGE INSTRUCTIONS
Broken Hand: Care Instructions  Your Care Instructions  A hand can break (fracture) during sports, a fall, or other accidents. The break may happen when your hand twists, is hit, or is used to protect you in a fall. Fractures can range from a small, hairline crack, to a bone or bones broken into two or more pieces. Your treatment depends on how bad the break is. Your doctor may have put your hand in a brace, splint, or cast to allow it to heal or to keep it stable until you see another doctor. It may take weeks or months for your hand to heal. You can help it heal with some care at home. You heal best when you take good care of yourself. Eat a variety of healthy foods, and don't smoke. Follow-up care is a key part of your treatment and safety. Be sure to make and go to all appointments, and call your doctor if you are having problems. It's also a good idea to know your test results and keep a list of the medicines you take. How can you care for yourself at home? · Put ice or a cold pack on your hand for 10 to 20 minutes at a time. Try to do this every 1 to 2 hours for the next 3 days (when you are awake). Put a thin cloth between the ice and your cast or splint. Keep your cast or splint dry. · Follow the cast care instructions your doctor gives you. If you have a splint, do not take it off unless your doctor tells you to. · Be safe with medicines. Take pain medicines exactly as directed. ¨ If the doctor gave you a prescription medicine for pain, take it as prescribed. ¨ If you are not taking a prescription pain medicine, ask your doctor if you can take an over-the-counter medicine. · Prop up your hand on pillows when you sit or lie down in the first few days after the injury. Keep your hand higher than the level of your heart. This will help reduce swelling. · Follow instructions for exercises to keep your arm strong.   · Wiggle your uninjured fingers often to reduce swelling and stiffness, but do not use that hand to grasp or carry anything. When should you call for help? Call your doctor now or seek immediate medical care if:    · You have new or worse pain.     · Your hand or fingers are cool or pale or change color.     · Your cast or splint feels too tight.     · You have tingling, weakness, or numbness in your hand or fingers.    Watch closely for changes in your health, and be sure to contact your doctor if:    · You do not get better as expected.     · You have problems with your cast or splint. Where can you learn more? Go to http://maneulNews Distribution Networkamadeo.info/. Enter [phone] in the search box to learn more about \"Broken Hand: Care Instructions. \"  Current as of: November 29, 2017  Content Version: 11.7  © 2076-7529 [employer]. Care instructions adapted under license by [employer] (which disclaims liability or warranty for this information). If you have questions about a medical condition or this instruction, always ask your healthcare professional. [place] [address] any warranty or liability for your use of this information. Hand Bruises: Care Instructions  Your Care Instructions  Bruises, or contusions, can happen as a result of an impact or fall. Most people think of a bruise as a black-and-blue spot. This happens when small blood vessels get torn and leak blood under the skin. The bruise may turn purplish black, reddish blue, or yellowish green as it heals. But bones and muscles can also get bruised. This may damage the hand but not cause a bruise that you can see. Most bruises aren't serious and will go away on their own in 2 to 4 weeks. But sometimes a more serious hand injury might not heal on its own. Tell your doctor if you have new symptoms or your injury is not getting better over time. You may have tests to see if you have bone or nerve damage. These tests may include X-rays, a CT scan, or an MRI.  If you damaged bones or muscles, you may need more treatment. The doctor has checked you carefully, but problems can develop later. If you notice any problems or new symptoms, get medical treatment right away. Follow-up care is a key part of your treatment and safety. Be sure to make and go to all appointments, and call your doctor if you are having problems. It's also a good idea to know your test results and keep a list of the medicines you take. How can you care for yourself at home? · Put ice or a cold pack on the hand for 10 to 20 minutes at a time. Put a thin cloth between the ice and your skin. · Prop up your hand on a pillow when you ice it or anytime you sit or lie down during the next 3 days. Try to keep your hand above the level of your heart. This will help reduce swelling. · Be safe with medicines. Read and follow all instructions on the label. ¨ If the doctor gave you a prescription medicine for pain, take it as prescribed. ¨ If you are not taking a prescription pain medicine, ask your doctor if you can take an over-the-counter medicine. · Be sure to follow your doctor's advice about moving and exercising your injured hand. When should you call for help? Call your doctor now or seek immediate medical care if:    · Your pain gets worse.     · You have new or worse swelling.     · You have tingling, weakness, or numbness in the area near the bruise.     · The area near the bruise is cold or pale.     · You have symptoms of infection, such as:  ¨ Increased pain, swelling, warmth, or redness. ¨ Red streaks leading from the area. ¨ Pus draining from the area. ¨ A fever.    Watch closely for changes in your health, and be sure to contact your doctor if:    · You do not get better as expected. Where can you learn more? Go to http://manuel-amadeo.info/. Enter W307 in the search box to learn more about \"Hand Bruises: Care Instructions. \"  Current as of: November 20, 2017  Content Version: 11.7  © 5319-2877 [employer]. Care instructions adapted under license by [employer] (which disclaims liability or warranty for this information). If you have questions about a medical condition or this instruction, always ask your healthcare professional. [address] any warranty or liability for your use of this information. [employer] Activation    Thank you for requesting access to [employer]. Please follow the instructions below to securely access and download your online medical record. [employer] allows you to send messages to your doctor, view your test results, renew your prescriptions, schedule appointments, and more. How Do I Sign Up? 1. In your internet browser, go to [email or web address]  2. Click on the First Time User? Click Here link in the Sign In box. You will be redirect to the New Member Sign Up page. 3. Enter your [employer] Access Code exactly as it appears below. You will not need to use this code after youve completed the sign-up process. If you do not sign up before the expiration date, you must request a new code. [employer] Access Code: [email or web address]  Expires: 2018  5:22 AM (This is the date your [employer] access code will )    4. Enter the last four digits of your Social Security Number (xxxx) and Date of Birth (mm/dd/yyyy) as indicated and click Submit. You will be taken to the next sign-up page. 5. Create a [employer] ID. This will be your [employer] login ID and cannot be changed, so think of one that is secure and easy to remember. 6. Create a [employer] password. You can change your password at any time. 7. Enter your Password Reset Question and Answer. This can be used at a later time if you forget your password. 8. Enter your e-mail address. You will receive e-mail notification when new information is available in [address] 9. Click Sign Up. You can now view and download portions of your medical record.   10. Click the Download Summary menu link to download a portable copy of your medical information. Additional Information    If you have questions, please visit the Frequently Asked Questions section of the [employer] website at [email or web address] Remember, [employer] is NOT to be used for urgent needs. For medical emergencies, dial 911.
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
## 2018-07-31 NOTE — Clinical Note
Follow-up with your primary care physician in 2 days for reassessment. Bring the results from this visit with your for their review. Return to the ED for any new, worsening, or persistent symptoms

## 2018-12-20 ENCOUNTER — HOSPITAL ENCOUNTER (EMERGENCY)
Age: 38
Discharge: HOME | End: 2018-12-20
Attending: EMERGENCY MEDICINE
Payer: MEDICARE

## 2018-12-20 VITALS
SYSTOLIC BLOOD PRESSURE: 102 MMHG | WEIGHT: 177 LBS | DIASTOLIC BLOOD PRESSURE: 62 MMHG | TEMPERATURE: 97 F | HEART RATE: 67 BPM | RESPIRATION RATE: 18 BRPM | HEIGHT: 72 IN | OXYGEN SATURATION: 94 % | BODY MASS INDEX: 23.98 KG/M2

## 2018-12-20 DIAGNOSIS — D57.00 HB-SS DISEASE WITH CRISIS (HCC): Primary | ICD-10-CM

## 2018-12-20 LAB
ALBUMIN SERPL-MCNC: 4 G/DL (ref 3.4–5)
ALBUMIN/GLOB SERPL: 0.9 {RATIO} (ref 0.8–1.7)
ALP SERPL-CCNC: 98 U/L (ref 45–117)
ALT SERPL-CCNC: 38 U/L (ref 16–61)
ANION GAP SERPL CALC-SCNC: 8 MMOL/L (ref 3–18)
AST SERPL-CCNC: 41 U/L (ref 15–37)
BASOPHILS # BLD: 0 K/UL (ref 0–0.06)
BASOPHILS NFR BLD: 0 % (ref 0–3)
BILIRUB SERPL-MCNC: 4.4 MG/DL (ref 0.2–1)
BUN SERPL-MCNC: 5 MG/DL (ref 7–18)
BUN/CREAT SERPL: 7 (ref 12–20)
CALCIUM SERPL-MCNC: 8.4 MG/DL (ref 8.5–10.1)
CHLORIDE SERPL-SCNC: 109 MMOL/L (ref 100–108)
CO2 SERPL-SCNC: 22 MMOL/L (ref 21–32)
CREAT SERPL-MCNC: 0.72 MG/DL (ref 0.6–1.3)
DIFFERENTIAL METHOD BLD: ABNORMAL
EOSINOPHIL # BLD: 1 K/UL (ref 0–0.4)
EOSINOPHIL NFR BLD: 9 % (ref 0–5)
ERYTHROCYTE [DISTWIDTH] IN BLOOD BY AUTOMATED COUNT: 22.5 % (ref 11.6–14.5)
GLOBULIN SER CALC-MCNC: 4.4 G/DL (ref 2–4)
GLUCOSE SERPL-MCNC: 89 MG/DL (ref 74–99)
HCT VFR BLD AUTO: 25.9 % (ref 36–48)
HGB BLD-MCNC: 9 G/DL (ref 13–16)
LYMPHOCYTES # BLD: 2.3 K/UL (ref 0.8–3.5)
LYMPHOCYTES NFR BLD: 21 % (ref 20–51)
MCH RBC QN AUTO: 24.7 PG (ref 24–34)
MCHC RBC AUTO-ENTMCNC: 34.7 G/DL (ref 31–37)
MCV RBC AUTO: 71.2 FL (ref 74–97)
MONOCYTES # BLD: 0.3 K/UL (ref 0–1)
MONOCYTES NFR BLD: 3 % (ref 2–9)
NEUTS BAND NFR BLD MANUAL: 1 % (ref 0–5)
NEUTS SEG # BLD: 7.2 K/UL (ref 1.8–8)
NEUTS SEG NFR BLD: 66 % (ref 42–75)
NRBC BLD-RTO: 2 PER 100 WBC
PLATELET # BLD AUTO: 357 K/UL (ref 135–420)
PLATELET COMMENTS,PCOM: ABNORMAL
PMV BLD AUTO: 9.1 FL (ref 9.2–11.8)
POTASSIUM SERPL-SCNC: 3.5 MMOL/L (ref 3.5–5.5)
PROT SERPL-MCNC: 8.4 G/DL (ref 6.4–8.2)
RBC # BLD AUTO: 3.64 M/UL (ref 4.7–5.5)
RBC MORPH BLD: ABNORMAL
RETICS/RBC NFR AUTO: 7.5 % (ref 0.5–2.3)
SODIUM SERPL-SCNC: 139 MMOL/L (ref 136–145)
WBC # BLD AUTO: 10.8 K/UL (ref 4.6–13.2)

## 2018-12-20 PROCEDURE — 96372 THER/PROPH/DIAG INJ SC/IM: CPT

## 2018-12-20 PROCEDURE — 74011250636 HC RX REV CODE- 250/636: Performed by: EMERGENCY MEDICINE

## 2018-12-20 PROCEDURE — 99283 EMERGENCY DEPT VISIT LOW MDM: CPT

## 2018-12-20 PROCEDURE — 96361 HYDRATE IV INFUSION ADD-ON: CPT

## 2018-12-20 PROCEDURE — 85025 COMPLETE CBC W/AUTO DIFF WBC: CPT

## 2018-12-20 PROCEDURE — 96375 TX/PRO/DX INJ NEW DRUG ADDON: CPT

## 2018-12-20 PROCEDURE — 85045 AUTOMATED RETICULOCYTE COUNT: CPT

## 2018-12-20 PROCEDURE — 74011250637 HC RX REV CODE- 250/637: Performed by: EMERGENCY MEDICINE

## 2018-12-20 PROCEDURE — 96374 THER/PROPH/DIAG INJ IV PUSH: CPT

## 2018-12-20 PROCEDURE — 80053 COMPREHEN METABOLIC PANEL: CPT

## 2018-12-20 RX ORDER — HYDROMORPHONE HYDROCHLORIDE 1 MG/ML
1 INJECTION, SOLUTION INTRAMUSCULAR; INTRAVENOUS; SUBCUTANEOUS ONCE
Status: COMPLETED | OUTPATIENT
Start: 2018-12-20 | End: 2018-12-20

## 2018-12-20 RX ORDER — KETOROLAC TROMETHAMINE 30 MG/ML
15 INJECTION, SOLUTION INTRAMUSCULAR; INTRAVENOUS ONCE
Status: COMPLETED | OUTPATIENT
Start: 2018-12-20 | End: 2018-12-20

## 2018-12-20 RX ORDER — ACETAMINOPHEN 500 MG
1000 TABLET ORAL ONCE
Status: COMPLETED | OUTPATIENT
Start: 2018-12-20 | End: 2018-12-20

## 2018-12-20 RX ADMIN — SODIUM CHLORIDE 1000 ML: 900 INJECTION, SOLUTION INTRAVENOUS at 11:04

## 2018-12-20 RX ADMIN — HYDROMORPHONE HYDROCHLORIDE 1 MG: 1 INJECTION, SOLUTION INTRAMUSCULAR; INTRAVENOUS; SUBCUTANEOUS at 13:06

## 2018-12-20 RX ADMIN — KETOROLAC TROMETHAMINE 15 MG: 30 INJECTION, SOLUTION INTRAMUSCULAR at 11:03

## 2018-12-20 RX ADMIN — HYDROMORPHONE HYDROCHLORIDE 1 MG: 1 INJECTION, SOLUTION INTRAMUSCULAR; INTRAVENOUS; SUBCUTANEOUS at 11:02

## 2018-12-20 RX ADMIN — ACETAMINOPHEN 1000 MG: 500 TABLET ORAL at 11:06

## 2018-12-20 NOTE — DISCHARGE INSTRUCTIONS
IF YOU HAVE SEVERE PAIN OR FEVER RETURN TO THE ER RIGHT AWAY     Sickle Cell Crisis: Care Instructions  Your Care Instructions    Sickle cell crisis is a painful episode that may begin suddenly in a person with sickle cell disease. Sickle cell disease turns normal, round red blood cells into cells that look like jeane or crescent moons. The sickle-shaped cells can get stuck in blood vessels, blocking blood flow and causing severe pain. The pain can occur in the bones of the spine, the arms and legs, the chest, and the abdomen. An episode may be called a \"painful event\" or \"painful crisis. \" Some people who have sickle cell disease have many painful events, while others have few or none. Treatment depends on the level of pain and how long it lasts. Sometimes taking nonprescription pain relievers can help. Or you may need stronger pain relief medicine that is prescribed or given by a doctor. You may need to be treated in the hospital.  It isn't always possible to know what sets off a painful event. But triggers include being dehydrated, cold temperatures, infection, stress, and not getting enough oxygen. Follow-up care is a key part of your treatment and safety. Be sure to make and go to all appointments, and call your doctor if you are having problems. It's also a good idea to know your test results and keep a list of the medicines you take. How can you care for yourself at home? · Create a pain management plan with your doctor. This plan should include the types of medicines you can take and other actions you can take at home to relieve pain. · Drink plenty of fluids, enough so that your urine is light yellow or clear like water. If you have kidney, heart, or liver disease and have to limit fluids, talk with your doctor before you increase the amount of fluids you drink. · Take your medicines exactly as prescribed. Call your doctor if you think you are having a problem with your medicine.   · Take pain medicines exactly as directed. ? If the doctor gave you a prescription medicine for pain, take it as prescribed. ? If you are not taking a prescription pain medicine, ask your doctor if you can take an over-the-counter medicine. · Avoid alcohol. It can make you dehydrated. · Dress warmly in cold weather. The cold and windy weather can lead to severe pain. · Do not smoke. Smoking can reduce the amount of oxygen in your blood. · Get plenty of sleep. When should you call for help? Call 911 anytime you think you may need emergency care. For example, call if:    · You have symptoms of a severe problem from sickle cell.     · You have symptoms of a stroke. These may include:  ? Sudden numbness, tingling, weakness, or loss of movement in your face, arm, or leg, especially on only one side of your body. ? Sudden vision changes. ? Sudden trouble speaking. ? Sudden confusion or trouble understanding simple statements. ? Sudden problems with walking or balance. ? A sudden, severe headache that is different from past headaches.     · You are in severe pain.     · You have symptoms of a heart attack. These may include:  ? Chest pain or pressure, or a strange feeling in the chest.  ? Sweating. ? Shortness of breath. ? Nausea or vomiting. ? Pain, pressure, or a strange feeling in the back, neck, jaw, or upper belly or in one or both shoulders or arms. ? Lightheadedness or sudden weakness. ? A fast or irregular heartbeat. After you call 911, the  may tell you to chew 1 adult-strength or 2 to 4 low-dose aspirin. Wait for an ambulance. Do not try to drive yourself.    Call your doctor now or seek immediate medical care if:    · You have a fever.    Watch closely for changes in your health, and be sure to contact your doctor if you have any problems. Where can you learn more? Go to http://manuel-amadeo.info/.   Enter F104 in the search box to learn more about \"Sickle Cell Crisis: Care Instructions. \"  Current as of: September 10, 2017  Content Version: 11.8  © 4608-6125 Healthwise, [facility]. Care instructions adapted under license by [employer] (which disclaims liability or warranty for this information). If you have questions about a medical condition or this instruction, always ask your healthcare professional. [place] [address] any warranty or liability for your use of this information.

## 2018-12-20 NOTE — ED PROVIDER NOTES
EMERGENCY DEPARTMENT HISTORY AND PHYSICAL EXAM    9:47 AM      Date: 12/20/2018  Patient Name: Jorge Gooden    History of Presenting Illness     Chief Complaint   Patient presents with    Sickle Cell Crisis         History Provided By: Patient    Chief Complaint: Sickle Cell pains  Duration:  Hours  Timing:  Constant  Location: Generalized  Quality: n/a  Severity: Moderate  Modifying Factors: None  Associated Symptoms: denies any other associated signs or symptoms      Additional History (Context): Jorge Gooden is a 34 y.o. male with Sickle Cell Anemia, Hyponatremia, Hypocalcemia, Hypokalemia, and other noted PMHx who presents with c/o acute sickle cell pains, onset 10 hours ago. He mentions that the pain in generalized throughout his body and that it is typical for this time of year. He reports that he is taking Dilaudid, Hydroxyurea, and is using Fentanyl patch to treat his Sx. He mentions that his last blood transfusion was approximately 1.5 years ago. The pt denies having rhinorrhea, sore throat, or fever. No other concerns or symptoms at this time. PCP: Sabas Fragoso MD    Current Outpatient Medications   Medication Sig Dispense Refill    cyanocobalamin (VITAMIN B12) 2,500 mcg sublingual tablet Take 1 Tab by mouth daily. 100 Tab 3    ergocalciferol (ERGOCALCIFEROL) 50,000 unit capsule Take 1 Cap by mouth every seven (7) days. 4 Cap 3    folic acid (FOLVITE) 1 mg tablet Take 1 Tab by mouth daily. 100 Tab 3    senna (SENOKOT) 8.6 mg tablet Take 2 Tabs by mouth nightly. 60 Tab 3    ondansetron hcl (ZOFRAN, AS HYDROCHLORIDE,) 4 mg tablet Take 2 Tabs by mouth every eight (8) hours as needed for Nausea. 12 Tab 0    HYDROmorphone (DILAUDID) 2 mg tablet Take 1 Tab by mouth every six (6) hours as needed for Pain. Max Daily Amount: 8 mg. Indications: Pain 12 Tab 0    hydroxyurea (HYDREA) 500 mg capsule Take 500 mg by mouth two (2) times a day.  Indications: SICKLE CELL ANEMIA WITH CRISIS  fentaNYL (DURAGESIC) 25 mcg/hr PATCH 1 Patch by TransDERmal route every seventy-two (72) hours. Max Daily Amount: 1 Patch. 5 Patch 0       Past History     Past Medical History:  Past Medical History:   Diagnosis Date    B12 deficiency 03/15/2010    210    Cholelithiasis 09/17/2012    U/S Result: Cholelithiasis    Elevated alkaline phosphatase level 03/21/2010    158    Elevated ALT measurement 03/21/2010    71    Elevated AST (SGOT) 03/10/2012    38    Elevated platelet count 43/13/0207    494    Elevated total protein 12/27/2011    8.7    Hypocalcemia 07/06/2011    4.1    Hypokalemia     Hyponatremia 11/18/2009    Leukocytosis 11/16/2009    Microcytic anemia 10/25/2007    Pleural effusion on right 07/15/2015    [facility]R Result    Reticulocytosis 10/25/2007    7.8    Serum total bilirubin elevated 12/27/2011    T.B. 3.8, D.B. 0.4.     [facility]     Dr. Avril Pena. [place]    Vitamin D deficiency 01/20/2016    5.4       Past Surgical History:  Past Surgical History:   Procedure Laterality Date    HX CHOLECYSTECTOMY         Family History:  Family History   Problem Relation Age of Onset    Cancer Neg Hx     Diabetes Neg Hx     Heart Disease Neg Hx     Heart Attack Neg Hx     Hypertension Neg Hx     Stroke Neg Hx        Social History:  Social History     Tobacco Use    Smoking status: Never Smoker    Smokeless tobacco: Never Used   Substance Use Topics    Alcohol use: Yes     Comment: Occasional    Drug use: No       Allergies: Allergies   Allergen Reactions    Eggshell Membrane Nausea and Vomiting         Review of Systems     Review of Systems   Constitutional: Negative for chills, fatigue and fever. HENT: Negative for congestion, rhinorrhea and sore throat. Eyes: Negative for pain, redness, itching and visual disturbance. Respiratory: Negative for cough, chest tightness, shortness of breath and wheezing.     Cardiovascular: Negative for palpitations and leg swelling. Gastrointestinal: Negative for diarrhea, nausea and vomiting. Genitourinary: Negative for decreased urine volume, dysuria, flank pain and urgency. Musculoskeletal: Positive for myalgias. Negative for arthralgias, back pain and gait problem. + for generalized body pain   Skin: Negative for color change, rash and wound. Allergic/Immunologic: Negative for environmental allergies, food allergies and immunocompromised state. Neurological: Negative for dizziness, weakness and headaches. All other systems reviewed and are negative. Physical Exam     Visit Vitals  /62 (BP 1 Location: Left arm, BP Patient Position: At rest)   Pulse 67   Temp 97 °F (36.1 °C)   Resp 18   Ht 6' (1.829 m)   Wt 80.3 kg (177 lb)   SpO2 94%   BMI 24.01 kg/m²         Physical Exam   Constitutional: He appears well-developed and well-nourished. No distress. HENT:   Head: Normocephalic and atraumatic. Mouth/Throat: Oropharynx is clear and moist.   Eyes: Conjunctivae and EOM are normal. Pupils are equal, round, and reactive to light. Scleral icterus is present. Neck: Normal range of motion. Neck supple. Cardiovascular: Normal rate, regular rhythm and normal heart sounds. No murmur heard. Pulmonary/Chest: Effort normal and breath sounds normal. He has no wheezes. He has no rales. Abdominal: Soft. Bowel sounds are normal. He exhibits no distension. There is no tenderness. Musculoskeletal: Normal range of motion. He exhibits no edema or deformity. Lymphadenopathy:     He has no cervical adenopathy. Neurological: He is alert. He exhibits normal muscle tone. Coordination normal.   Skin: Skin is warm and dry. No rash noted. No erythema. Psychiatric: He has a normal mood and affect. His behavior is normal.   Nursing note and vitals reviewed.         Diagnostic Study Results     Labs -  Recent Results (from the past 12 hour(s))   CBC WITH AUTOMATED DIFF    Collection Time: 12/20/18 11:36 AM Result Value Ref Range    WBC 10.8 4.6 - 13.2 K/uL    RBC 3.64 (L) 4.70 - 5.50 M/uL    HGB 9.0 (L) 13.0 - 16.0 g/dL    HCT 25.9 (L) 36.0 - 48.0 %    MCV 71.2 (L) 74.0 - 97.0 FL    MCH 24.7 24.0 - 34.0 PG    MCHC 34.7 31.0 - 37.0 g/dL    RDW 22.5 (H) 11.6 - 14.5 %    PLATELET 966 406 - 832 K/uL    MPV 9.1 (L) 9.2 - 11.8 FL    NEUTROPHILS 66 42 - 75 %    BAND NEUTROPHILS 1 0 - 5 %    LYMPHOCYTES 21 20 - 51 %    MONOCYTES 3 2 - 9 %    EOSINOPHILS 9 (H) 0 - 5 %    BASOPHILS 0 0 - 3 %    NRBC 2.0 (H) 0  WBC    ABS. NEUTROPHILS 7.2 1.8 - 8.0 K/UL    ABS. LYMPHOCYTES 2.3 0.8 - 3.5 K/UL    ABS. MONOCYTES 0.3 0 - 1.0 K/UL    ABS. EOSINOPHILS 1.0 (H) 0.0 - 0.4 K/UL    ABS. BASOPHILS 0.0 0.0 - 0.06 K/UL    DF MANUAL      PLATELET COMMENTS ADEQUATE PLATELETS      RBC COMMENTS SICKLE CELLS  2+        RBC COMMENTS OVALOCYTES  1+        RBC COMMENTS POLYCHROMASIA  1+        RBC COMMENTS ANISOCYTOSIS  2+        RBC COMMENTS MICROCYTOSIS  1+        RBC COMMENTS POIKILOCYTOSIS  2+        RBC COMMENTS TARGET CELLS  1+       METABOLIC PANEL, COMPREHENSIVE    Collection Time: 12/20/18 11:36 AM   Result Value Ref Range    Sodium 139 136 - 145 mmol/L    Potassium 3.5 3.5 - 5.5 mmol/L    Chloride 109 (H) 100 - 108 mmol/L    CO2 22 21 - 32 mmol/L    Anion gap 8 3.0 - 18 mmol/L    Glucose 89 74 - 99 mg/dL    BUN 5 (L) 7.0 - 18 MG/DL    Creatinine 0.72 0.6 - 1.3 MG/DL    BUN/Creatinine ratio 7 (L) 12 - 20      GFR est AA >60 >60 ml/min/1.73m2    GFR est non-AA >60 >60 ml/min/1.73m2    Calcium 8.4 (L) 8.5 - 10.1 MG/DL    Bilirubin, total 4.4 (H) 0.2 - 1.0 MG/DL    ALT (SGPT) 38 16 - 61 U/L    AST (SGOT) 41 (H) 15 - 37 U/L    Alk.  phosphatase 98 45 - 117 U/L    Protein, total 8.4 (H) 6.4 - 8.2 g/dL    Albumin 4.0 3.4 - 5.0 g/dL    Globulin 4.4 (H) 2.0 - 4.0 g/dL    A-G Ratio 0.9 0.8 - 1.7     RETICULOCYTE COUNT    Collection Time: 12/20/18 11:36 AM   Result Value Ref Range    Reticulocyte count 7.5 (H) 0.5 - 2.3 %       Radiologic Studies -   No orders to display         Medical Decision Making   I am the first provider for this patient. I reviewed the vital signs, available nursing notes, past medical history, past surgical history, family history and social history. Vital Signs-Reviewed the patient's vital signs. Pulse Oximetry Analysis -  98% on room air (Interpretation) Normal    Cardiac Monitor:  Rate: 79bpm    Records Reviewed: Nursing Notes and Old Medical Records (Time of Review: 9:47 AM)    ED Course: Progress Notes, Reevaluation, and Consults:  12:37 PM  The pt is feeling better and his pain has dropped to 6 or 7 out of 10    3:03 PM   Patient is feeling a lot better. Wants to go home. CBC, retic, bili all appear at baseline, vitals NL, no infectious signs/symptoms. Will f/u with his hematologist       Provider Notes (Medical Decision Making):     Diagnosis     Clinical Impression:   1. Hb-SS disease with crisis [facility])        Disposition: home    Follow-up Information    None             Medication List      ASK your doctor about these medications    cyanocobalamin 2,500 mcg sublingual tablet  Commonly known as:  VITAMIN B12  Take 1 Tab by mouth daily. ergocalciferol 50,000 unit capsule  Commonly known as:  ERGOCALCIFEROL  Take 1 Cap by mouth every seven (7) days. fentaNYL 25 mcg/hr PATCH  Commonly known as:  DURAGESIC  1 Patch by TransDERmal route every seventy-two (72) hours. Max Daily Amount: 1 Patch. folic acid 1 mg tablet  Commonly known as:  FOLVITE  Take 1 Tab by mouth daily. HYDROmorphone 2 mg tablet  Commonly known as:  DILAUDID  Take 1 Tab by mouth every six (6) hours as needed for Pain. Max Daily Amount: 8 mg. Indications: Pain     hydroxyurea 500 mg capsule  Commonly known as:  HYDREA     ondansetron hcl 4 mg tablet  Commonly known as:  ZOFRAN  Take 2 Tabs by mouth every eight (8) hours as needed for Nausea. senna 8.6 mg tablet  Commonly known as:  SENOKOT  Take 2 Tabs by mouth nightly. _______________________________       Scribe Le Galvez acting as a scribe for and in the presence of Favio Waite MD      December 20, 2018 at 9:47 AM       Provider Attestation:      I personally performed the services described in the documentation, reviewed the documentation, as recorded by the scribe in my presence, and it accurately and completely records my words and actions.  December 20, 2018 at 9:47 AM - Favio Waite MD      _______________________________

## 2019-01-11 PROCEDURE — 75810000275 HC EMERGENCY DEPT VISIT NO LEVEL OF CARE

## 2019-01-12 ENCOUNTER — HOSPITAL ENCOUNTER (EMERGENCY)
Age: 39
Discharge: LEFT BEFORE DISCHARGE | End: 2019-01-12
Attending: EMERGENCY MEDICINE
Payer: MEDICARE

## 2019-01-14 ENCOUNTER — HOSPITAL ENCOUNTER (EMERGENCY)
Age: 39
Discharge: HOME | End: 2019-01-14
Attending: EMERGENCY MEDICINE
Payer: MEDICARE

## 2019-01-14 VITALS
HEART RATE: 67 BPM | RESPIRATION RATE: 18 BRPM | SYSTOLIC BLOOD PRESSURE: 135 MMHG | TEMPERATURE: 97.8 F | DIASTOLIC BLOOD PRESSURE: 80 MMHG | OXYGEN SATURATION: 99 %

## 2019-01-14 DIAGNOSIS — D57.00 HB-SS DISEASE WITH CRISIS (HCC): Primary | ICD-10-CM

## 2019-01-14 LAB
ANION GAP SERPL CALC-SCNC: 6 MMOL/L (ref 3–18)
BASOPHILS # BLD: 0 K/UL (ref 0–0.06)
BASOPHILS NFR BLD: 0 % (ref 0–3)
BUN SERPL-MCNC: 7 MG/DL (ref 7–18)
BUN/CREAT SERPL: 8 (ref 12–20)
CALCIUM SERPL-MCNC: 8.1 MG/DL (ref 8.5–10.1)
CHLORIDE SERPL-SCNC: 111 MMOL/L (ref 100–108)
CO2 SERPL-SCNC: 24 MMOL/L (ref 21–32)
CREAT SERPL-MCNC: 0.91 MG/DL (ref 0.6–1.3)
DIFFERENTIAL METHOD BLD: ABNORMAL
EOSINOPHIL # BLD: 1.4 K/UL (ref 0–0.4)
EOSINOPHIL NFR BLD: 13 % (ref 0–5)
ERYTHROCYTE [DISTWIDTH] IN BLOOD BY AUTOMATED COUNT: 22.8 % (ref 11.6–14.5)
GLUCOSE SERPL-MCNC: 97 MG/DL (ref 74–99)
HCT VFR BLD AUTO: 25.6 % (ref 36–48)
HGB BLD-MCNC: 8.9 G/DL (ref 13–16)
LYMPHOCYTES # BLD: 4.6 K/UL (ref 0.8–3.5)
LYMPHOCYTES NFR BLD: 41 % (ref 20–51)
MCH RBC QN AUTO: 24.9 PG (ref 24–34)
MCHC RBC AUTO-ENTMCNC: 34.8 G/DL (ref 31–37)
MCV RBC AUTO: 71.5 FL (ref 74–97)
MONOCYTES # BLD: 1.1 K/UL (ref 0–1)
MONOCYTES NFR BLD: 10 % (ref 2–9)
NEUTS BAND NFR BLD MANUAL: 1 % (ref 0–5)
NEUTS SEG # BLD: 4 K/UL (ref 1.8–8)
NEUTS SEG NFR BLD: 35 % (ref 42–75)
NRBC BLD-RTO: 2 PER 100 WBC
PLATELET # BLD AUTO: 398 K/UL (ref 135–420)
PLATELET COMMENTS,PCOM: ABNORMAL
PMV BLD AUTO: 9.3 FL (ref 9.2–11.8)
POTASSIUM SERPL-SCNC: 3.6 MMOL/L (ref 3.5–5.5)
RBC # BLD AUTO: 3.58 M/UL (ref 4.7–5.5)
RBC MORPH BLD: ABNORMAL
RETICS/RBC NFR AUTO: 7.7 % (ref 0.5–2.3)
SODIUM SERPL-SCNC: 141 MMOL/L (ref 136–145)
WBC # BLD AUTO: 11.1 K/UL (ref 4.6–13.2)

## 2019-01-14 PROCEDURE — 80048 BASIC METABOLIC PNL TOTAL CA: CPT

## 2019-01-14 PROCEDURE — 74011250636 HC RX REV CODE- 250/636

## 2019-01-14 PROCEDURE — 99282 EMERGENCY DEPT VISIT SF MDM: CPT

## 2019-01-14 PROCEDURE — 96376 TX/PRO/DX INJ SAME DRUG ADON: CPT

## 2019-01-14 PROCEDURE — 96374 THER/PROPH/DIAG INJ IV PUSH: CPT

## 2019-01-14 PROCEDURE — 85025 COMPLETE CBC W/AUTO DIFF WBC: CPT

## 2019-01-14 PROCEDURE — 96361 HYDRATE IV INFUSION ADD-ON: CPT

## 2019-01-14 PROCEDURE — 85045 AUTOMATED RETICULOCYTE COUNT: CPT

## 2019-01-14 PROCEDURE — 96375 TX/PRO/DX INJ NEW DRUG ADDON: CPT

## 2019-01-14 PROCEDURE — 74011250636 HC RX REV CODE- 250/636: Performed by: EMERGENCY MEDICINE

## 2019-01-14 RX ORDER — MORPHINE SULFATE 4 MG/ML
4 INJECTION INTRAVENOUS
Status: COMPLETED | OUTPATIENT
Start: 2019-01-14 | End: 2019-01-14

## 2019-01-14 RX ORDER — MORPHINE SULFATE 4 MG/ML
INJECTION INTRAVENOUS
Status: COMPLETED
Start: 2019-01-14 | End: 2019-01-14

## 2019-01-14 RX ORDER — ONDANSETRON 2 MG/ML
4 INJECTION INTRAMUSCULAR; INTRAVENOUS
Status: COMPLETED | OUTPATIENT
Start: 2019-01-14 | End: 2019-01-14

## 2019-01-14 RX ADMIN — SODIUM CHLORIDE 1000 ML: 900 INJECTION, SOLUTION INTRAVENOUS at 02:35

## 2019-01-14 RX ADMIN — MORPHINE SULFATE 4 MG: 4 INJECTION INTRAVENOUS at 02:35

## 2019-01-14 RX ADMIN — MORPHINE SULFATE 4 MG: 4 INJECTION INTRAVENOUS at 03:24

## 2019-01-14 RX ADMIN — ONDANSETRON 4 MG: 2 INJECTION INTRAMUSCULAR; INTRAVENOUS at 02:35

## 2019-01-14 NOTE — DISCHARGE INSTRUCTIONS
Patient Education   Take your prescribed medication as directed. Follow up with your primary care physician. Return to the emergency room with any new or worsening conditions. Sickle Cell Crisis: Care Instructions  Your Care Instructions    Sickle cell crisis is a painful episode that may begin suddenly in a person with sickle cell disease. Sickle cell disease turns normal, round red blood cells into cells that look like jeane or crescent moons. The sickle-shaped cells can get stuck in blood vessels, blocking blood flow and causing severe pain. The pain can occur in the bones of the spine, the arms and legs, the chest, and the abdomen. An episode may be called a \"painful event\" or \"painful crisis. \" Some people who have sickle cell disease have many painful events, while others have few or none. Treatment depends on the level of pain and how long it lasts. Sometimes taking nonprescription pain relievers can help. Or you may need stronger pain relief medicine that is prescribed or given by a doctor. You may need to be treated in the hospital.  It isn't always possible to know what sets off a painful event. But triggers include being dehydrated, cold temperatures, infection, stress, and not getting enough oxygen. Follow-up care is a key part of your treatment and safety. Be sure to make and go to all appointments, and call your doctor if you are having problems. It's also a good idea to know your test results and keep a list of the medicines you take. How can you care for yourself at home? · Create a pain management plan with your doctor. This plan should include the types of medicines you can take and other actions you can take at home to relieve pain. · Drink plenty of fluids, enough so that your urine is light yellow or clear like water. If you have kidney, heart, or liver disease and have to limit fluids, talk with your doctor before you increase the amount of fluids you drink.   · Take your medicines exactly as prescribed. Call your doctor if you think you are having a problem with your medicine. · Take pain medicines exactly as directed. ? If the doctor gave you a prescription medicine for pain, take it as prescribed. ? If you are not taking a prescription pain medicine, ask your doctor if you can take an over-the-counter medicine. · Avoid alcohol. It can make you dehydrated. · Dress warmly in cold weather. The cold and windy weather can lead to severe pain. · Do not smoke. Smoking can reduce the amount of oxygen in your blood. · Get plenty of sleep. When should you call for help? Call 911 anytime you think you may need emergency care. For example, call if:    · You have symptoms of a severe problem from sickle cell.     · You have symptoms of a stroke. These may include:  ? Sudden numbness, tingling, weakness, or loss of movement in your face, arm, or leg, especially on only one side of your body. ? Sudden vision changes. ? Sudden trouble speaking. ? Sudden confusion or trouble understanding simple statements. ? Sudden problems with walking or balance. ? A sudden, severe headache that is different from past headaches.     · You are in severe pain.     · You have symptoms of a heart attack. These may include:  ? Chest pain or pressure, or a strange feeling in the chest.  ? Sweating. ? Shortness of breath. ? Nausea or vomiting. ? Pain, pressure, or a strange feeling in the back, neck, jaw, or upper belly or in one or both shoulders or arms. ? Lightheadedness or sudden weakness. ? A fast or irregular heartbeat. After you call 911, the  may tell you to chew 1 adult-strength or 2 to 4 low-dose aspirin. Wait for an ambulance. Do not try to drive yourself.    Call your doctor now or seek immediate medical care if:    · You have a fever.    Watch closely for changes in your health, and be sure to contact your doctor if you have any problems. Where can you learn more?   Go to http://manuel-amadeo.info/. Enter F104 in the search box to learn more about \"Sickle Cell Crisis: Care Instructions. \"  Current as of: September 10, 2017  Content Version: 11.8  © 8036-4393 [employer]. Care instructions adapted under license by [employer] (which disclaims liability or warranty for this information). If you have questions about a medical condition or this instruction, always ask your healthcare professional. [place] [address] any warranty or liability for your use of this information. Patient Education        Sickle Cell Disease: Care Instructions  Your Care Instructions    Sickle cell disease turns normal, round red blood cells into misshaped cells that look like jeane or crescent moons. The sickle-shaped cells can get stuck in blood vessels, blocking blood flow and causing severe pain. The sickle-shaped cells also can harm organs, muscles, and bones. It is a lifelong condition. Sickle cell disease is passed down in families. You can talk to your doctor about whether to have genetic tests to find out the chance of having a child with the disease. Your doctor also may recommend that your family members get tested for sickle cell disease. Your doctor may treat you with medicines. Some people get blood transfusions or a bone marrow transplant. Managing pain is an important part of your treatment. Follow-up care is a key part of your treatment and safety. Be sure to make and go to all appointments, and call your doctor if you are having problems. It's also a good idea to know your test results and keep a list of the medicines you take. How can you care for yourself at home? · Take your medicines exactly as prescribed. Call your doctor if you think you are having a problem with your medicine. · Take pain medicines exactly as directed. ? If the doctor gave you a prescription medicine for pain, take it as prescribed.   ? If you are not taking a prescription pain medicine, ask your doctor if you can take an over-the-counter medicine. · Try to help ease pain by distracting yourself. Use guided imagery, deep breathing, and relaxation exercises. A pain specialist can teach you pain management skills. · Avoid alcohol. It can make you dehydrated. · Dress warmly in cold weather. The cold and windy weather can lead to severe pain. · Do not smoke. Smoking can reduce the amount of oxygen in your blood. If you need help quitting, talk to your doctor about stop-smoking programs and medicines. These can increase your chances of quitting for good. · Get plenty of sleep. · Get regular eye exams. Sickle cell disease can cause vision problems. · Wear medical alert jewelry that says that you have sickle cell disease. You can buy this at most drugstores. · Avoid colds and flu. Get a pneumococcal vaccine shot. If you have had one before, ask your doctor whether you need another dose. Get a flu shot every year. If you must be around people with colds or flu, wash your hands often. When should you call for help? Call 911 anytime you think you may need emergency care. For example, call if:    · You have symptoms of a severe problem from sickle cell.     · You have symptoms of a stroke. These may include:  ? Sudden numbness, tingling, weakness, or loss of movement in your face, arm, or leg, especially on only one side of your body. ? Sudden vision changes. ? Sudden trouble speaking. ? Sudden confusion or trouble understanding simple statements. ? Sudden problems with walking or balance. ? A sudden, severe headache that is different from past headaches.     · You are in severe pain.     · You have symptoms of a heart attack. These may include:  ? Chest pain or pressure, or a strange feeling in the chest.  ? Sweating. ? Shortness of breath. ? Nausea or vomiting.   ? Pain, pressure, or a strange feeling in the back, neck, jaw, or upper belly or in one or both shoulders or arms. ? Lightheadedness or sudden weakness. ? A fast or irregular heartbeat. After you call 911, the  may tell you to chew 1 adult-strength or 2 to 4 low-dose aspirin. Wait for an ambulance. Do not try to drive yourself.    Call your doctor now or seek immediate medical care if:    · You have a fever.    Watch closely for changes in your health, and be sure to contact your doctor if you have any problems. Where can you learn more? Go to http://manuel-amadeo.info/. Enter 486 3914 in the search box to learn more about \"Sickle Cell Disease: Care Instructions. \"  Current as of: September 10, 2017  Content Version: 11.8  © 7547-3208 [employer]. Care instructions adapted under license by [employer] (which disclaims liability or warranty for this information). If you have questions about a medical condition or this instruction, always ask your healthcare professional. [place] [address] any warranty or liability for your use of this information.

## 2019-01-14 NOTE — ED PROVIDER NOTES
EMERGENCY DEPARTMENT HISTORY AND PHYSICAL EXAM 
 
2:00 AM 
 
 
Date: 1/14/2019 Patient Name: Donato Jarvis History of Presenting Illness Chief Complaint Patient presents with  Sickle Cell Crisis  Back Pain  Leg Pain History Provided By: Patient Chief Complaint: sickle cell crisis Duration:  Days Timing:  Constant Location: back and legs Quality: Aching Severity: Severe Modifying Factors: none Associated Symptoms: back and leg pains Additional History (Context): Donato Jarvis is a 34 y.o. male with h/o sickle cell anemia w/ crisis who presents with severe back and bilateral LE pain for the past couple days. No recent extraneous activities; says this feels like his usual sickle cell pain. Ha sbeen using tylenol and ibuprofen for pain w/o relief. Jaundice come and goes. Dr. Astrid Cohen treats his sickle cell. Denies CP, SOB, fever, chills, N/v, abdominal pain, neck pain,urinary sx, weakness, numbness, or any other associated sx. No other complaints or concerns. PCP: Gayle Javed MD 
 
Current Facility-Administered Medications Medication Dose Route Frequency Provider Last Rate Last Dose  sodium chloride 0.9 % bolus infusion 1,000 mL  1,000 mL IntraVENous ONCE Sebastian Teixeira,  1,000 mL/hr at 01/14/19 0235 1,000 mL at 01/14/19 0235 Current Outpatient Medications Medication Sig Dispense Refill  cyanocobalamin (VITAMIN B12) 2,500 mcg sublingual tablet Take 1 Tab by mouth daily. 100 Tab 3  
 ergocalciferol (ERGOCALCIFEROL) 50,000 unit capsule Take 1 Cap by mouth every seven (7) days. 4 Cap 3  
 folic acid (FOLVITE) 1 mg tablet Take 1 Tab by mouth daily. 100 Tab 3  
 senna (SENOKOT) 8.6 mg tablet Take 2 Tabs by mouth nightly. 60 Tab 3  
 ondansetron hcl (ZOFRAN, AS HYDROCHLORIDE,) 4 mg tablet Take 2 Tabs by mouth every eight (8) hours as needed for Nausea.  12 Tab 0  
 HYDROmorphone (DILAUDID) 2 mg tablet Take 1 Tab by mouth every six (6) hours as needed for Pain. Max Daily Amount: 8 mg. Indications: Pain 12 Tab 0  
 hydroxyurea (HYDREA) 500 mg capsule Take 500 mg by mouth two (2) times a day. Indications: SICKLE CELL ANEMIA WITH CRISIS  fentaNYL (DURAGESIC) 25 mcg/hr PATCH 1 Patch by TransDERmal route every seventy-two (72) hours. Max Daily Amount: 1 Patch. 5 Patch 0 Past History Past Medical History: 
Past Medical History:  
Diagnosis Date  B12 deficiency 03/15/2010  
 210  Cholelithiasis 09/17/2012 [facility] Result: Cholelithiasis  Elevated alkaline phosphatase level 03/21/2010  
 158  Elevated ALT measurement 03/21/2010  
 71  Elevated AST (SGOT) 03/10/2012  
 38  Elevated platelet count 79/57/4733 [address]  Elevated total protein 12/27/2011  
 8.7  Hypocalcemia 07/06/2011 4.1  Hypokalemia  Hyponatremia 11/18/2009  Leukocytosis 11/16/2009  Microcytic anemia 10/25/2007  Pleural effusion on right 07/15/2015 [facility] CXR Result  Reticulocytosis 10/25/2007  
 7.8  Serum total bilirubin elevated 12/27/2011  
 T.B. 3.8, D.B. 0.4.   
 Sickle cell anemia with crisis ([facility] Dr. Henrik Chase  Vitamin D deficiency 01/20/2016  
 5.4 Past Surgical History: 
Past Surgical History:  
Procedure Laterality Date  HX CHOLECYSTECTOMY Family History: 
Family History Problem Relation Age of Onset  Cancer Neg Hx  Diabetes Neg Hx   
 Heart Disease Neg Hx   
 Heart Attack Neg Hx  Hypertension Neg Hx  Stroke Neg Hx Social History: 
Social History Tobacco Use  Smoking status: Never Smoker  Smokeless tobacco: Never Used Substance Use Topics  Alcohol use: Yes Comment: Occasional  
 Drug use: No  
 
 
Allergies: Allergies Allergen Reactions  Eggshell Membrane Nausea and Vomiting Review of Systems Review of Systems Constitutional: Negative for chills, fatigue and fever. HENT: Negative. Negative for sore throat. Eyes: Negative. Respiratory: Negative for cough and shortness of breath. Cardiovascular: Negative for chest pain and palpitations. Gastrointestinal: Negative for abdominal pain, nausea and vomiting. Genitourinary: Negative for dysuria. Musculoskeletal: Positive for back pain and myalgias (bilat leg pain). Skin: Negative. Neurological: Negative for dizziness, weakness, light-headedness and headaches. Psychiatric/Behavioral: Negative. All other systems reviewed and are negative. Physical Exam  
 
Visit Vitals /80 (BP 1 Location: Left arm, BP Patient Position: At rest;Sitting) Pulse 67 Temp 97.8 °F (36.6 °C) Resp 18 SpO2 99% Physical Exam  
Constitutional: He appears well-developed and well-nourished. Non-toxic appearance. He does not have a sickly appearance. He does not appear ill. No distress. HENT:  
Head: Normocephalic and atraumatic. Mouth/Throat: Oropharynx is clear and moist. No oropharyngeal exudate. Eyes: Conjunctivae and EOM are normal. Pupils are equal, round, and reactive to light. Scleral icterus (bilateral ) is present. Neck: Normal range of motion. Neck supple. No hepatojugular reflux and no JVD present. No tracheal deviation present. No thyromegaly present. Cardiovascular: Normal rate, regular rhythm, S1 normal, S2 normal, normal heart sounds, intact distal pulses and normal pulses. Exam reveals no gallop, no S3 and no S4. No murmur heard. Pulses: 
     Radial pulses are 2+ on the right side, and 2+ on the left side. Dorsalis pedis pulses are 2+ on the right side, and 2+ on the left side. Pulmonary/Chest: Effort normal and breath sounds normal. No respiratory distress. He has no decreased breath sounds. He has no wheezes. He has no rhonchi. He has no rales. Abdominal: Soft. Normal appearance and bowel sounds are normal. He exhibits no distension and no mass. There is no hepatosplenomegaly.  There is no tenderness. There is no rigidity, no rebound, no guarding, no CVA tenderness, no tenderness at McBurney's point and negative Trotter's sign. Musculoskeletal: Normal range of motion. Legs: 
Lymphadenopathy:  
     Head (right side): No submental, no submandibular, no preauricular and no occipital adenopathy present. Head (left side): No submental, no submandibular, no preauricular and no occipital adenopathy present. He has no cervical adenopathy. Right: No supraclavicular adenopathy present. Left: No supraclavicular adenopathy present. Neurological: He is alert. He has normal strength and normal reflexes. He is not disoriented. No cranial nerve deficit or sensory deficit. Coordination and gait normal. GCS eye subscore is 4. GCS verbal subscore is 5. GCS motor subscore is 6. Grossly intact Skin: Skin is warm, dry and intact. No rash noted. He is not diaphoretic. Psychiatric: He has a normal mood and affect. His speech is normal and behavior is normal. Judgment and thought content normal. Cognition and memory are normal.  
Nursing note and vitals reviewed. Diagnostic Study Results Labs - Recent Results (from the past 12 hour(s)) CBC WITH AUTOMATED DIFF Collection Time: 01/14/19  2:25 AM  
Result Value Ref Range WBC 11.1 4.6 - 13.2 K/uL  
 RBC 3.58 (L) 4.70 - 5.50 M/uL HGB 8.9 (L) 13.0 - 16.0 g/dL HCT 25.6 (L) 36.0 - 48.0 % MCV 71.5 (L) 74.0 - 97.0 FL  
 MCH 24.9 24.0 - 34.0 PG  
 MCHC 34.8 31.0 - 37.0 g/dL RDW 22.8 (H) 11.6 - 14.5 % PLATELET 096 416 - 416 K/uL MPV 9.3 9.2 - 11.8 FL  
 NEUTROPHILS 35 (L) 42 - 75 % BAND NEUTROPHILS 1 0 - 5 % LYMPHOCYTES 41 20 - 51 % MONOCYTES 10 (H) 2 - 9 % EOSINOPHILS 13 (H) 0 - 5 % BASOPHILS 0 0 - 3 % NRBC 2.0 (H) 0  WBC  
 ABS. NEUTROPHILS 4.0 1.8 - 8.0 K/UL  
 ABS. LYMPHOCYTES 4.6 (H) 0.8 - 3.5 K/UL  
 ABS. MONOCYTES 1.1 (H) 0 - 1.0 K/UL ABS. EOSINOPHILS 1.4 (H) 0.0 - 0.4 K/UL  
 ABS. BASOPHILS 0.0 0.0 - 0.06 K/UL  
 DF MANUAL PLATELET COMMENTS ADEQUATE PLATELETS    
 RBC COMMENTS SICKLE CELLS 3+ 
    
 RBC COMMENTS MICROCYTOSIS 2+ 
    
 RBC COMMENTS ANISOCYTOSIS 2+ 
    
 RBC COMMENTS POIKILOCYTOSIS 
1+ METABOLIC PANEL, BASIC Collection Time: 01/14/19  2:25 AM  
Result Value Ref Range Sodium 141 136 - 145 mmol/L Potassium 3.6 3.5 - 5.5 mmol/L Chloride 111 (H) 100 - 108 mmol/L  
 CO2 24 21 - 32 mmol/L Anion gap 6 3.0 - 18 mmol/L Glucose 97 74 - 99 mg/dL BUN 7 7.0 - 18 MG/DL Creatinine 0.91 0.6 - 1.3 MG/DL  
 BUN/Creatinine ratio 8 (L) 12 - 20 GFR est AA >60 >60 ml/min/1.73m2 GFR est non-AA >60 >60 ml/min/1.73m2 Calcium 8.1 (L) 8.5 - 10.1 MG/DL  
RETICULOCYTE COUNT Collection Time: 01/14/19  2:25 AM  
Result Value Ref Range Reticulocyte count 7.7 (H) 0.5 - 2.3 % Radiologic Studies - No orders to display Medical Decision Making Provider Notes (Medical Decision Making): MDM Number of Diagnoses or Management Options Hb-SS disease with crisis [facility]):  
 
 
I am the first provider for this patient. I reviewed the vital signs, available nursing notes, past medical history, past surgical history, family history and social history. Vital Signs-Reviewed the patient's vital signs. Records Reviewed: Nursing Notes and Old Medical Records (Time of Review: 2:00 AM) ED Course: Progress Notes, Reevaluation, and Consults: 
Labs essentially normal with the exception of hemoglobin 8.9 (chronic) and retic count 7.7. Electrolytes normal.3:16 AM  1/14/2019 Diagnosis I have reassessed the patient. Patient is feeling better. Patient was discharged in stable condition. Patient is to return to emergency department if any new or worsening condition. Clinical Impression: 1. Hb-SS disease with crisis ([address] Disposition: home Follow-up Information Follow up With Specialties Details Why Contact Info Jignesh Avilez MD Hematology and Oncology, Hematology, Oncology Call in 2 days Follow up [address] 0[address] [facility] 
[phone] [facility] EMERGENCY DEPT Emergency Medicine  If symptoms worsen, As needed [address] [place] 
[phone]  
  
  
 
_______________________________ Attestations: 
Scribe Attestation Mannie Vines acting as a scribe for and in the presence of Uriah Teixeira DO     
January 14, 2019 at 2:00 AM 
    
Provider Attestation:     
I personally performed the services described in the documentation, reviewed the documentation, as recorded by the scribe in my presence, and it accurately and completely records my words and actions. January 14, 2019 at 2:00 AM - Uriah Teixeira DO   
_____________ 
__________________

## 2019-01-14 NOTE — ED TRIAGE NOTES
Pt arrived to ED for c/o sickle cell, back, bilateral leg pain x 2 days. Pt reports history of sickle cell disease. Pt states that he has been taking tylenol and motrin at home, had a blood transfusion 1 year ago.

## 2019-02-14 ENCOUNTER — HOSPITAL ENCOUNTER (EMERGENCY)
Age: 39
Discharge: HOME | End: 2019-02-14
Attending: EMERGENCY MEDICINE
Payer: MEDICARE

## 2019-02-14 VITALS
WEIGHT: 174 LBS | DIASTOLIC BLOOD PRESSURE: 71 MMHG | TEMPERATURE: 98.1 F | HEART RATE: 77 BPM | RESPIRATION RATE: 16 BRPM | OXYGEN SATURATION: 98 % | SYSTOLIC BLOOD PRESSURE: 134 MMHG | HEIGHT: 72 IN | BODY MASS INDEX: 23.57 KG/M2

## 2019-02-14 DIAGNOSIS — D57.00 ACUTE SICKLE CELL CRISIS (HCC): Primary | ICD-10-CM

## 2019-02-14 LAB
ALBUMIN SERPL-MCNC: 4.1 G/DL (ref 3.4–5)
ALBUMIN/GLOB SERPL: 0.9 {RATIO} (ref 0.8–1.7)
ALP SERPL-CCNC: 96 U/L (ref 45–117)
ALT SERPL-CCNC: 22 U/L (ref 16–61)
ANION GAP SERPL CALC-SCNC: 4 MMOL/L (ref 3–18)
AST SERPL-CCNC: 44 U/L (ref 15–37)
BASOPHILS # BLD: 0 K/UL (ref 0–0.06)
BASOPHILS NFR BLD: 0 % (ref 0–3)
BILIRUB SERPL-MCNC: 4.8 MG/DL (ref 0.2–1)
BUN SERPL-MCNC: 6 MG/DL (ref 7–18)
BUN/CREAT SERPL: 7 (ref 12–20)
CALCIUM SERPL-MCNC: 8.2 MG/DL (ref 8.5–10.1)
CHLORIDE SERPL-SCNC: 110 MMOL/L (ref 100–108)
CO2 SERPL-SCNC: 27 MMOL/L (ref 21–32)
CREAT SERPL-MCNC: 0.83 MG/DL (ref 0.6–1.3)
DIFFERENTIAL METHOD BLD: ABNORMAL
EOSINOPHIL # BLD: 0.6 K/UL (ref 0–0.4)
EOSINOPHIL NFR BLD: 6 % (ref 0–5)
ERYTHROCYTE [DISTWIDTH] IN BLOOD BY AUTOMATED COUNT: 23.7 % (ref 11.6–14.5)
GLOBULIN SER CALC-MCNC: 4.6 G/DL (ref 2–4)
GLUCOSE SERPL-MCNC: 87 MG/DL (ref 74–99)
HCT VFR BLD AUTO: 24.3 % (ref 36–48)
HGB BLD-MCNC: 8.4 G/DL (ref 13–16)
LYMPHOCYTES # BLD: 4.6 K/UL (ref 0.8–3.5)
LYMPHOCYTES NFR BLD: 47 % (ref 20–51)
MCH RBC QN AUTO: 25.5 PG (ref 24–34)
MCHC RBC AUTO-ENTMCNC: 34.6 G/DL (ref 31–37)
MCV RBC AUTO: 73.6 FL (ref 74–97)
MONOCYTES # BLD: 0.7 K/UL (ref 0–1)
MONOCYTES NFR BLD: 7 % (ref 2–9)
NEUTS SEG # BLD: 3.9 K/UL (ref 1.8–8)
NEUTS SEG NFR BLD: 40 % (ref 42–75)
NRBC BLD-RTO: 5 PER 100 WBC
PLATELET # BLD AUTO: 385 K/UL (ref 135–420)
PLATELET COMMENTS,PCOM: ABNORMAL
PMV BLD AUTO: 9.3 FL (ref 9.2–11.8)
POTASSIUM SERPL-SCNC: 3.7 MMOL/L (ref 3.5–5.5)
PROT SERPL-MCNC: 8.7 G/DL (ref 6.4–8.2)
RBC # BLD AUTO: 3.3 M/UL (ref 4.7–5.5)
RBC MORPH BLD: ABNORMAL
RETICS/RBC NFR AUTO: 10.8 % (ref 0.5–2.3)
SODIUM SERPL-SCNC: 141 MMOL/L (ref 136–145)
WBC # BLD AUTO: 9.8 K/UL (ref 4.6–13.2)

## 2019-02-14 PROCEDURE — 96376 TX/PRO/DX INJ SAME DRUG ADON: CPT

## 2019-02-14 PROCEDURE — 99282 EMERGENCY DEPT VISIT SF MDM: CPT

## 2019-02-14 PROCEDURE — 85045 AUTOMATED RETICULOCYTE COUNT: CPT

## 2019-02-14 PROCEDURE — 85025 COMPLETE CBC W/AUTO DIFF WBC: CPT

## 2019-02-14 PROCEDURE — 96374 THER/PROPH/DIAG INJ IV PUSH: CPT

## 2019-02-14 PROCEDURE — 80053 COMPREHEN METABOLIC PANEL: CPT

## 2019-02-14 PROCEDURE — 74011250636 HC RX REV CODE- 250/636: Performed by: EMERGENCY MEDICINE

## 2019-02-14 RX ORDER — MORPHINE SULFATE 8 MG/ML
8 INJECTION INTRAVENOUS
Status: COMPLETED | OUTPATIENT
Start: 2019-02-14 | End: 2019-02-14

## 2019-02-14 RX ORDER — MORPHINE SULFATE 4 MG/ML
4 INJECTION INTRAVENOUS
Status: COMPLETED | OUTPATIENT
Start: 2019-02-14 | End: 2019-02-14

## 2019-02-14 RX ADMIN — MORPHINE SULFATE 4 MG: 4 INJECTION INTRAVENOUS at 15:20

## 2019-02-14 RX ADMIN — MORPHINE SULFATE 4 MG: 4 INJECTION INTRAVENOUS at 11:21

## 2019-02-14 RX ADMIN — MORPHINE SULFATE 8 MG: 8 INJECTION INTRAVENOUS at 14:18

## 2019-02-14 RX ADMIN — MORPHINE SULFATE 8 MG: 8 INJECTION INTRAVENOUS at 12:35

## 2019-02-14 NOTE — ED NOTES
Pt discharged to home, discharge paperwork and follow up information given to pt and all questions answered.

## 2019-02-14 NOTE — DISCHARGE INSTRUCTIONS
Patient Education        Sickle Cell Crisis: Care Instructions  Your Care Instructions    Sickle cell crisis is a painful episode that may begin suddenly in a person with sickle cell disease. Sickle cell disease turns normal, round red blood cells into cells that look like jeane or crescent moons. The sickle-shaped cells can get stuck in blood vessels, blocking blood flow and causing severe pain. The pain can occur in the bones of the spine, the arms and legs, the chest, and the abdomen. An episode may be called a \"painful event\" or \"painful crisis. \" Some people who have sickle cell disease have many painful events, while others have few or none. Treatment depends on the level of pain and how long it lasts. Sometimes taking nonprescription pain relievers can help. Or you may need stronger pain relief medicine that is prescribed or given by a doctor. You may need to be treated in the hospital.  It isn't always possible to know what sets off a painful event. But triggers include being dehydrated, cold temperatures, infection, stress, and not getting enough oxygen. Follow-up care is a key part of your treatment and safety. Be sure to make and go to all appointments, and call your doctor if you are having problems. It's also a good idea to know your test results and keep a list of the medicines you take. How can you care for yourself at home? · Create a pain management plan with your doctor. This plan should include the types of medicines you can take and other actions you can take at home to relieve pain. · Drink plenty of fluids, enough so that your urine is light yellow or clear like water. If you have kidney, heart, or liver disease and have to limit fluids, talk with your doctor before you increase the amount of fluids you drink. · Take your medicines exactly as prescribed. Call your doctor if you think you are having a problem with your medicine. · Take pain medicines exactly as directed.   ? If the doctor gave you a prescription medicine for pain, take it as prescribed. ? If you are not taking a prescription pain medicine, ask your doctor if you can take an over-the-counter medicine. · Avoid alcohol. It can make you dehydrated. · Dress warmly in cold weather. The cold and windy weather can lead to severe pain. · Do not smoke. Smoking can reduce the amount of oxygen in your blood. · Get plenty of sleep. When should you call for help? Call 911 anytime you think you may need emergency care. For example, call if:    · You have symptoms of a severe problem from sickle cell.     · You have symptoms of a stroke. These may include:  ? Sudden numbness, tingling, weakness, or loss of movement in your face, arm, or leg, especially on only one side of your body. ? Sudden vision changes. ? Sudden trouble speaking. ? Sudden confusion or trouble understanding simple statements. ? Sudden problems with walking or balance. ? A sudden, severe headache that is different from past headaches.     · You are in severe pain.     · You have symptoms of a heart attack. These may include:  ? Chest pain or pressure, or a strange feeling in the chest.  ? Sweating. ? Shortness of breath. ? Nausea or vomiting. ? Pain, pressure, or a strange feeling in the back, neck, jaw, or upper belly or in one or both shoulders or arms. ? Lightheadedness or sudden weakness. ? A fast or irregular heartbeat. After you call 911, the  may tell you to chew 1 adult-strength or 2 to 4 low-dose aspirin. Wait for an ambulance. Do not try to drive yourself.    Call your doctor now or seek immediate medical care if:    · You have a fever.    Watch closely for changes in your health, and be sure to contact your doctor if you have any problems. Where can you learn more? Go to http://manuel-amadeo.info/. Enter F104 in the search box to learn more about \"Sickle Cell Crisis: Care Instructions. \"  Current as of:  May 6, 2018  Content Version: 11.9  © 1667-6112 [employer]. Care instructions adapted under license by [employer] (which disclaims liability or warranty for this information). If you have questions about a medical condition or this instruction, always ask your healthcare professional. [address] any warranty or liability for your use of this information.

## 2019-02-14 NOTE — ED PROVIDER NOTES
EMERGENCY DEPARTMENT HISTORY AND PHYSICAL EXAM 
 
11:07 AM 
Date: 2/14/2019 Patient Name: Jer Ruiz History of Presenting Illness Chief Complaint:  
Chief Complaint Patient presents with  Sickle Cell Crisis History Provided By:  
 
Additional History (Context): Jer Ruiz is a 34 y.o. male With a past history of sickle cell disease who presents with acute sickle cell crisis. Patient states that his lower back and legs have been flared up since last night. This is consistent with prior sickle cell exacerbations. He has no fever no chest pain or chills no cough. No other aggravating or alleviating factors. No other associated symptoms. This was created with voice recognition software and transcription errors may be present. PCP: Ledy Rankin MD 
 
Duration: Since lat night. Timing:  constant Location: Back and legs Quality: aching Severity: Moderate Modifying Factors: None reported Associated Symptoms: Back and leg pain Past History Past Medical History: 
Past Medical History:  
Diagnosis Date  B12 deficiency 03/15/2010  
 210  Cholelithiasis 09/17/2012 [facility] Result: Cholelithiasis  Elevated alkaline phosphatase level 03/21/2010  
 158  Elevated ALT measurement 03/21/2010  
 71  Elevated AST (SGOT) 03/10/2012  
 38  Elevated platelet count 96/66/7193 [address]  Elevated total protein 12/27/2011  
 8.7  Hypocalcemia 07/06/2011 4.1  Hypokalemia  Hyponatremia 11/18/2009  Leukocytosis 11/16/2009  Microcytic anemia 10/25/2007  Pleural effusion on right 07/15/2015 [facility] CXR Result  Reticulocytosis 10/25/2007  
 7.8  Serum total bilirubin elevated 12/27/2011  
 T.B. 3.8, D.B. 0.4.   
 Sickle cell anemia with crisis ([facility].) Dr. Shyanne Angelo. [place]  Vitamin D deficiency 01/20/2016  
 5.4 Past Surgical History: 
Past Surgical History:  
Procedure Laterality Date  HX CHOLECYSTECTOMY Family History: 
Family History Problem Relation Age of Onset  Cancer Neg Hx  Diabetes Neg Hx   
 Heart Disease Neg Hx   
 Heart Attack Neg Hx  Hypertension Neg Hx  Stroke Neg Hx Social History: 
Social History Tobacco Use  Smoking status: Never Smoker  Smokeless tobacco: Never Used Substance Use Topics  Alcohol use: Yes Comment: Occasional  
 Drug use: No  
 
 
Allergies: Allergies Allergen Reactions  Eggshell Membrane Nausea and Vomiting Review of Systems Review of Systems Constitutional: Negative for chills, fatigue and fever. Respiratory: Negative for cough. All other systems reviewed and are negative. Physical Exam  
 
Patient Vitals for the past 12 hrs: 
 Temp Pulse Resp BP SpO2  
02/14/19 1035 98.1 °F (36.7 °C) 77 16 119/72 99 % Physical Exam  
Constitutional: He is oriented to person, place, and time. He appears well-developed. HENT:  
Head: Normocephalic and atraumatic. Eyes: EOM are normal. Pupils are equal, round, and reactive to light. Neck: Normal range of motion. Neck supple. Cardiovascular: Normal rate, regular rhythm and normal heart sounds. Exam reveals no friction rub. No murmur heard. Pulmonary/Chest: Effort normal and breath sounds normal. No respiratory distress. He has no wheezes. Abdominal: Soft. He exhibits no distension. There is no tenderness. There is no rebound and no guarding. Musculoskeletal: Normal range of motion. Neurological: He is alert and oriented to person, place, and time. Skin: Skin is warm and dry. Psychiatric: He has a normal mood and affect. His behavior is normal. Thought content normal.  
 
 
Diagnostic Study Results Vital Signs Visit Vitals /67 Pulse 77 Temp 98.1 °F (36.7 °C) Resp 16 Ht 6' (1.829 m) Wt 78.9 kg (174 lb) SpO2 98% BMI 23.60 kg/m² Labs reviewed. Imaging: none Medical Decision Making ED Course: Progress Notes, Reevaluation, and Consults: 
 
 
Provider Notes (Medical Decision Making): This is a 64-year-old gentleman well-known to me from prior visits for sickle cell disease who presents with acute sickle cell crisis. Check CBC and reticulocyte count initially treat his pain. 2:04 PM still in pain; will rive 3rd round of meds Would like one more dose and d/c. Seems reasonable. Gurmeet Art MD 
3:16 PM 
 
 
Diagnosis Clinical Impression: No diagnosis found. Disposition: 
 
  
Medication List  
  
ASK your doctor about these medications   
cyanocobalamin 2,500 mcg sublingual tablet Commonly known as:  VITAMIN B12 Take 1 Tab by mouth daily. ergocalciferol 50,000 unit capsule Commonly known as:  ERGOCALCIFEROL Take 1 Cap by mouth every seven (7) days. fentaNYL 25 mcg/hr PATCH Commonly known as:  DURAGESIC 
1 Patch by TransDERmal route every seventy-two (72) hours. Max Daily Amount: 1 Patch. folic acid 1 mg tablet Commonly known as:  [employer] Take 1 Tab by mouth daily. HYDROmorphone 2 mg tablet Commonly known as:  DILAUDID Take 1 Tab by mouth every six (6) hours as needed for Pain. Max Daily Amount: 8 mg. Indications: Pain 
  
hydroxyurea 500 mg capsule Commonly known as:  HYDREA 
  
ondansetron hcl 4 mg tablet Commonly known as:  Delbra Castor Take 2 Tabs by mouth every eight (8) hours as needed for Nausea. senna 8.6 mg tablet Commonly known as:  [employer] Take 2 Tabs by mouth nightly. 
  
  
 
_______________________________ Attestations: 
Scribe Attestation Andie Calixto acting as a scribe for and in the presence of Gail Ramirez MD     
February 14, 2019 at 11:07 AM 
    
Provider Attestation:     
I personally performed the services described in the documentation, reviewed the documentation, as recorded by the scribe in my presence, and it accurately and completely records my words and actions.  February 14, 2019 at 11:07 JOE Joy MD   
_______________________________

## 2019-06-17 ENCOUNTER — HOSPITAL ENCOUNTER (EMERGENCY)
Age: 39
Discharge: HOME | End: 2019-06-17
Attending: EMERGENCY MEDICINE
Payer: MEDICARE

## 2019-06-17 VITALS
HEART RATE: 69 BPM | DIASTOLIC BLOOD PRESSURE: 69 MMHG | TEMPERATURE: 99.1 F | SYSTOLIC BLOOD PRESSURE: 113 MMHG | OXYGEN SATURATION: 95 % | RESPIRATION RATE: 18 BRPM

## 2019-06-17 DIAGNOSIS — D57.00 SICKLE-CELL DISEASE WITH PAIN (HCC): Primary | ICD-10-CM

## 2019-06-17 LAB
ALBUMIN SERPL-MCNC: 4.8 G/DL (ref 3.4–5)
ALBUMIN/GLOB SERPL: 0.9 {RATIO} (ref 0.8–1.7)
ALP SERPL-CCNC: 107 U/L (ref 45–117)
ALT SERPL-CCNC: 31 U/L (ref 16–61)
ANION GAP SERPL CALC-SCNC: 5 MMOL/L (ref 3–18)
AST SERPL-CCNC: 67 U/L (ref 15–37)
BASOPHILS # BLD: 0.1 K/UL (ref 0–0.1)
BASOPHILS NFR BLD: 1 % (ref 0–2)
BILIRUB SERPL-MCNC: 5.3 MG/DL (ref 0.2–1)
BUN SERPL-MCNC: 8 MG/DL (ref 7–18)
BUN/CREAT SERPL: 9 (ref 12–20)
CALCIUM SERPL-MCNC: 9.2 MG/DL (ref 8.5–10.1)
CHLORIDE SERPL-SCNC: 107 MMOL/L (ref 100–108)
CO2 SERPL-SCNC: 25 MMOL/L (ref 21–32)
CREAT SERPL-MCNC: 0.85 MG/DL (ref 0.6–1.3)
DIFFERENTIAL METHOD BLD: ABNORMAL
EOSINOPHIL # BLD: 0.5 K/UL (ref 0–0.4)
EOSINOPHIL NFR BLD: 4 % (ref 0–5)
ERYTHROCYTE [DISTWIDTH] IN BLOOD BY AUTOMATED COUNT: 22.4 % (ref 11.6–14.5)
GLOBULIN SER CALC-MCNC: 5.4 G/DL (ref 2–4)
GLUCOSE SERPL-MCNC: 89 MG/DL (ref 74–99)
HCT VFR BLD AUTO: 23.4 % (ref 36–48)
HGB BLD-MCNC: 8.1 G/DL (ref 13–16)
LYMPHOCYTES # BLD: 5 K/UL (ref 0.9–3.6)
LYMPHOCYTES NFR BLD: 44 % (ref 21–52)
MCH RBC QN AUTO: 24.9 PG (ref 24–34)
MCHC RBC AUTO-ENTMCNC: 34.6 G/DL (ref 31–37)
MCV RBC AUTO: 72 FL (ref 74–97)
MONOCYTES # BLD: 0.8 K/UL (ref 0.05–1.2)
MONOCYTES NFR BLD: 7 % (ref 3–10)
NEUTS SEG # BLD: 5 K/UL (ref 1.8–8)
NEUTS SEG NFR BLD: 44 % (ref 40–73)
PLATELET # BLD AUTO: 370 K/UL (ref 135–420)
PLATELET COMMENTS,PCOM: ABNORMAL
PMV BLD AUTO: 9.2 FL (ref 9.2–11.8)
POTASSIUM SERPL-SCNC: 5 MMOL/L (ref 3.5–5.5)
PROT SERPL-MCNC: 10.2 G/DL (ref 6.4–8.2)
RBC # BLD AUTO: 3.25 M/UL (ref 4.7–5.5)
RBC MORPH BLD: ABNORMAL
RETICS/RBC NFR AUTO: 8.3 % (ref 0.5–2.3)
SODIUM SERPL-SCNC: 137 MMOL/L (ref 136–145)
WBC # BLD AUTO: 11.4 K/UL (ref 4.6–13.2)

## 2019-06-17 PROCEDURE — 96374 THER/PROPH/DIAG INJ IV PUSH: CPT

## 2019-06-17 PROCEDURE — 85025 COMPLETE CBC W/AUTO DIFF WBC: CPT

## 2019-06-17 PROCEDURE — 99283 EMERGENCY DEPT VISIT LOW MDM: CPT

## 2019-06-17 PROCEDURE — 85045 AUTOMATED RETICULOCYTE COUNT: CPT

## 2019-06-17 PROCEDURE — 80053 COMPREHEN METABOLIC PANEL: CPT

## 2019-06-17 PROCEDURE — 74011250637 HC RX REV CODE- 250/637: Performed by: EMERGENCY MEDICINE

## 2019-06-17 PROCEDURE — 74011250636 HC RX REV CODE- 250/636: Performed by: EMERGENCY MEDICINE

## 2019-06-17 PROCEDURE — 96361 HYDRATE IV INFUSION ADD-ON: CPT

## 2019-06-17 PROCEDURE — 96375 TX/PRO/DX INJ NEW DRUG ADDON: CPT

## 2019-06-17 RX ORDER — ONDANSETRON 2 MG/ML
4 INJECTION INTRAMUSCULAR; INTRAVENOUS
Status: COMPLETED | OUTPATIENT
Start: 2019-06-17 | End: 2019-06-17

## 2019-06-17 RX ORDER — HYDROMORPHONE HYDROCHLORIDE 1 MG/ML
1 INJECTION, SOLUTION INTRAMUSCULAR; INTRAVENOUS; SUBCUTANEOUS ONCE
Status: COMPLETED | OUTPATIENT
Start: 2019-06-17 | End: 2019-06-17

## 2019-06-17 RX ORDER — MORPHINE SULFATE 10 MG/ML
8 INJECTION, SOLUTION INTRAMUSCULAR; INTRAVENOUS ONCE
Status: COMPLETED | OUTPATIENT
Start: 2019-06-17 | End: 2019-06-17

## 2019-06-17 RX ORDER — OXYCODONE AND ACETAMINOPHEN 5; 325 MG/1; MG/1
1 TABLET ORAL
Status: COMPLETED | OUTPATIENT
Start: 2019-06-17 | End: 2019-06-17

## 2019-06-17 RX ADMIN — MORPHINE SULFATE 8 MG: 10 INJECTION, SOLUTION INTRAMUSCULAR; INTRAVENOUS at 08:05

## 2019-06-17 RX ADMIN — HYDROMORPHONE HYDROCHLORIDE 1 MG: 1 INJECTION, SOLUTION INTRAMUSCULAR; INTRAVENOUS; SUBCUTANEOUS at 09:22

## 2019-06-17 RX ADMIN — ONDANSETRON HYDROCHLORIDE 4 MG: 2 SOLUTION INTRAMUSCULAR; INTRAVENOUS at 08:05

## 2019-06-17 RX ADMIN — SODIUM CHLORIDE 1000 ML: 900 INJECTION, SOLUTION INTRAVENOUS at 07:38

## 2019-06-17 RX ADMIN — OXYCODONE AND ACETAMINOPHEN 1 TABLET: 5; 325 TABLET ORAL at 12:23

## 2019-06-17 NOTE — DISCHARGE INSTRUCTIONS
Patient Education        Sickle Cell Crisis: Care Instructions  Your Care Instructions    Sickle cell crisis is a painful episode that may begin suddenly in a person with sickle cell disease. Sickle cell disease turns normal, round red blood cells into cells that look like jeane or crescent moons. The sickle-shaped cells can get stuck in blood vessels, blocking blood flow and causing severe pain. The pain can occur in the bones of the spine, the arms and legs, the chest, and the abdomen. An episode may be called a \"painful event\" or \"painful crisis. \" Some people who have sickle cell disease have many painful events, while others have few or none. Treatment depends on the level of pain and how long it lasts. Sometimes taking nonprescription pain relievers can help. Or you may need stronger pain relief medicine that is prescribed or given by a doctor. You may need to be treated in the hospital.  It isn't always possible to know what sets off a painful event. But triggers include being dehydrated, cold temperatures, infection, stress, and not getting enough oxygen. Follow-up care is a key part of your treatment and safety. Be sure to make and go to all appointments, and call your doctor if you are having problems. It's also a good idea to know your test results and keep a list of the medicines you take. How can you care for yourself at home? · Create a pain management plan with your doctor. This plan should include the types of medicines you can take and other actions you can take at home to relieve pain. · Drink plenty of fluids, enough so that your urine is light yellow or clear like water. If you have kidney, heart, or liver disease and have to limit fluids, talk with your doctor before you increase the amount of fluids you drink. · Take your medicines exactly as prescribed. Call your doctor if you think you are having a problem with your medicine. · Take pain medicines exactly as directed.   ? If the doctor gave you a prescription medicine for pain, take it as prescribed. ? If you are not taking a prescription pain medicine, ask your doctor if you can take an over-the-counter medicine. · Avoid alcohol. It can make you dehydrated. · Dress warmly in cold weather. The cold and windy weather can lead to severe pain. · Do not smoke. Smoking can reduce the amount of oxygen in your blood. · Get plenty of sleep. When should you call for help? Call 911 anytime you think you may need emergency care. For example, call if:    · You have symptoms of a severe problem from sickle cell.     · You have symptoms of a stroke. These may include:  ? Sudden numbness, tingling, weakness, or loss of movement in your face, arm, or leg, especially on only one side of your body. ? Sudden vision changes. ? Sudden trouble speaking. ? Sudden confusion or trouble understanding simple statements. ? Sudden problems with walking or balance. ? A sudden, severe headache that is different from past headaches.     · You are in severe pain.     · You have symptoms of a heart attack. These may include:  ? Chest pain or pressure, or a strange feeling in the chest.  ? Sweating. ? Shortness of breath. ? Nausea or vomiting. ? Pain, pressure, or a strange feeling in the back, neck, jaw, or upper belly or in one or both shoulders or arms. ? Lightheadedness or sudden weakness. ? A fast or irregular heartbeat. After you call 911, the  may tell you to chew 1 adult-strength or 2 to 4 low-dose aspirin. Wait for an ambulance. Do not try to drive yourself.    Call your doctor now or seek immediate medical care if:    · You have a fever.    Watch closely for changes in your health, and be sure to contact your doctor if you have any problems. Where can you learn more? Go to http://manuel-amadeo.info/. Enter F104 in the search box to learn more about \"Sickle Cell Crisis: Care Instructions. \"  Current as of:  May 6, 2018  Content Version: 11.9  © 2378-8820 [employer]. Care instructions adapted under license by [employer] (which disclaims liability or warranty for this information). If you have questions about a medical condition or this instruction, always ask your healthcare professional. [address] any warranty or liability for your use of this information.

## 2019-06-17 NOTE — ED TRIAGE NOTES
Pt presents with complaint of sickle cell pain. States pain started in legs and back and has now gone all over. States took home medications with little relief.

## 2019-06-17 NOTE — ED PROVIDER NOTES
EMERGENCY DEPARTMENT HISTORY AND PHYSICAL EXAM    7:32 AM      Date: 6/17/2019  Patient Name: Thomas Wolf    History of Presenting Illness     Chief Complaint   Patient presents with    Sickle Cell Crisis         History Provided By: Patient    Additional History (Context): Thomas Wolf is a 34 y.o. male with Past medical history of sickle cell anemia, vitamin D deficiency who presents with chief complaint of severe sickle cell pain since yesterday. He states that his home medication of fentanyl and Dilaudid is not helping. He states that the pain initially started in his legs did not seem to improve but later came back in his both his legs arms and low back. He denies any fever, cough, chest pain, nausea, abdominal pain, dizziness, headache, shortness of breath and no other complaint. PCP: Colonel Cain MD        Past History     Past Medical History:  Past Medical History:   Diagnosis Date    B12 deficiency 03/15/2010    210    Cholelithiasis 09/17/2012    U/S Result: Cholelithiasis    Elevated alkaline phosphatase level 03/21/2010    158    Elevated ALT measurement 03/21/2010    71    Elevated AST (SGOT) 03/10/2012    38    Elevated platelet count 66/15/2214    494    Elevated total protein 12/27/2011    8.7    Hypocalcemia 07/06/2011    4.1    Hypokalemia     Hyponatremia 11/18/2009    Leukocytosis 11/16/2009    Microcytic anemia 10/25/2007    Pleural effusion on right 07/15/2015    [facility] Result    Reticulocytosis 10/25/2007    7.8    Serum total bilirubin elevated 12/27/2011    T.B. 3.8, D.B. 0.4.     Sickle cell anemia with crisis ([facility]     Dr. Berlin Yanez.  [place]    Vitamin D deficiency 01/20/2016    5.4       Past Surgical History:  Past Surgical History:   Procedure Laterality Date    HX CHOLECYSTECTOMY         Family History:  Family History   Problem Relation Age of Onset    Cancer Neg Hx     Diabetes Neg Hx     Heart Disease Neg Hx     Heart Attack Neg Hx     Hypertension Neg Hx     Stroke Neg Hx        Social History:  Social History     Tobacco Use    Smoking status: Never Smoker    Smokeless tobacco: Never Used   Substance Use Topics    Alcohol use: Yes     Comment: Occasional    Drug use: No       Allergies: Allergies   Allergen Reactions    Eggshell Membrane Nausea and Vomiting         Review of Systems       Review of Systems   Constitutional: Negative for chills and fever. HENT: Negative for congestion, rhinorrhea, sore throat and trouble swallowing. Eyes: Negative for visual disturbance. Respiratory: Negative for cough, shortness of breath and wheezing. Cardiovascular: Negative for chest pain and leg swelling. Gastrointestinal: Negative for abdominal pain, nausea and vomiting. Endocrine: Negative for polyuria. Genitourinary: Negative for difficulty urinating and dysuria. Musculoskeletal: Negative for arthralgias and neck stiffness. Sickle cell pain arms and legs, low back   Skin: Negative for rash. Neurological: Negative for dizziness, weakness, numbness and headaches. Hematological: Does not bruise/bleed easily. Psychiatric/Behavioral: Negative for confusion and dysphoric mood. All other systems reviewed and are negative. Physical Exam     Visit Vitals  /69   Pulse 69   Temp 99.1 °F (37.3 °C)   Resp 18   SpO2 95%         Physical Exam   Constitutional: He is oriented to person, place, and time. He appears well-developed and well-nourished. Appears in pain   HENT:   Head: Normocephalic and atraumatic. Mouth/Throat: Oropharynx is clear and moist.   Eyes: Pupils are equal, round, and reactive to light. Conjunctivae are normal. No scleral icterus. Neck: Normal range of motion. Neck supple. Cardiovascular: Normal rate and intact distal pulses. Capillary refill < 3 seconds   Pulmonary/Chest: Effort normal and breath sounds normal. No respiratory distress. He has no wheezes. Abdominal: Soft.  Bowel sounds are normal. He exhibits no distension. There is no tenderness. Musculoskeletal: Normal range of motion. He exhibits no edema. Lymphadenopathy:     He has no cervical adenopathy. Neurological: He is alert and oriented to person, place, and time. No cranial nerve deficit. Coordination normal.   Skin: Skin is warm and dry. He is not diaphoretic. Psychiatric: He has a normal mood and affect. His behavior is normal.   Nursing note and vitals reviewed. Diagnostic Study Results     Labs -  Recent Results (from the past 12 hour(s))   METABOLIC PANEL, COMPREHENSIVE    Collection Time: 06/17/19  7:21 AM   Result Value Ref Range    Sodium 137 136 - 145 mmol/L    Potassium 5.0 3.5 - 5.5 mmol/L    Chloride 107 100 - 108 mmol/L    CO2 25 21 - 32 mmol/L    Anion gap 5 3.0 - 18 mmol/L    Glucose 89 74 - 99 mg/dL    BUN 8 7.0 - 18 MG/DL    Creatinine 0.85 0.6 - 1.3 MG/DL    BUN/Creatinine ratio 9 (L) 12 - 20      GFR est AA >60 >60 ml/min/1.73m2    GFR est non-AA >60 >60 ml/min/1.73m2    Calcium 9.2 8.5 - 10.1 MG/DL    Bilirubin, total 5.3 (H) 0.2 - 1.0 MG/DL    ALT (SGPT) 31 16 - 61 U/L    AST (SGOT) 67 (H) 15 - 37 U/L    Alk. phosphatase 107 45 - 117 U/L    Protein, total 10.2 (H) 6.4 - 8.2 g/dL    Albumin 4.8 3.4 - 5.0 g/dL    Globulin 5.4 (H) 2.0 - 4.0 g/dL    A-G Ratio 0.9 0.8 - 1.7     CBC WITH AUTOMATED DIFF    Collection Time: 06/17/19 11:26 AM   Result Value Ref Range    WBC 11.4 4.6 - 13.2 K/uL    RBC 3.25 (L) 4.70 - 5.50 M/uL    HGB 8.1 (L) 13.0 - 16.0 g/dL    HCT 23.4 (L) 36.0 - 48.0 %    MCV 72.0 (L) 74.0 - 97.0 FL    MCH 24.9 24.0 - 34.0 PG    MCHC 34.6 31.0 - 37.0 g/dL    RDW 22.4 (H) 11.6 - 14.5 %    PLATELET 194 710 - 874 K/uL    MPV 9.2 9.2 - 11.8 FL    NEUTROPHILS PENDING %    LYMPHOCYTES PENDING %    MONOCYTES PENDING %    EOSINOPHILS PENDING %    BASOPHILS PENDING %    ABS. NEUTROPHILS PENDING K/UL    ABS. LYMPHOCYTES PENDING K/UL    ABS. MONOCYTES PENDING K/UL    ABS.  EOSINOPHILS PENDING K/UL    ABS. BASOPHILS PENDING K/UL    DF PENDING    RETICULOCYTE COUNT    Collection Time: 06/17/19 11:26 AM   Result Value Ref Range    Reticulocyte count 8.3 (H) 0.5 - 2.3 %       Radiologic Studies -   No orders to display         Medical Decision Making   I am the first provider for this patient. I reviewed the vital signs, available nursing notes, past medical history, past surgical history, family history and social history. Vital Signs-Reviewed the patient's vital signs. Pulse Oximetry Analysis -  95 on room air (Interpretation) normal        Records Reviewed: Nursing Notes and Old Medical Records (Time of Review: 7:36 AM)    Provider Notes (Medical Decision Making): Patient with sickle cell pain crisis, no fever cough we will check labs give IV fluid pain medication and reassess. MDM    Medications   sodium chloride 0.9 % bolus infusion 1,000 mL (1,000 mL IntraVENous New Bag 6/17/19 0738)   morphine 10 mg/mL injection 8 mg (8 mg IntraVENous Given 6/17/19 0805)   ondansetron (ZOFRAN) injection 4 mg (4 mg IntraVENous Given 6/17/19 0805)   HYDROmorphone (DILAUDID) injection 1 mg (1 mg IntraVENous Given 6/17/19 9636)           ED Course: Progress Notes, Reevaluation, and Consults:  WBC 11.4, hemoglobin 8.5, hematocrit 23.4, platelets 315, reticulocyte count 8.3. Otherwise labs reassuring. Hemoglobin is stable for him usually between 8 and 9    Will discharge patient home with follow-up Dr. Leah Zhou. I have reassessed the patient. I have discussed the workup, results and plan with the patient and patient is in agreement. Patient is feeling better. Patient was discharge in stable condition. Patient was given outpatient follow up. Patient is to return to emergency department if any new or worsening condition. Diagnosis     Clinical Impression:   1.  Sickle-cell disease with pain [facility])        Disposition: Discharged    Follow-up Information     Follow up With Specialties Details Why Contact Enma Go MD Hematology and Oncology, Hematology, Oncology Schedule an appointment as soon as possible for a visit in 2 days  [address] [facility]      [facility] DEPT Emergency Medicine  As needed, If symptoms worsen [address] [id number] [phone]           Patient's Medications   Start Taking    No medications on file   Continue Taking    CYANOCOBALAMIN (VITAMIN B12) 2,500 MCG SUBLINGUAL TABLET    Take 1 Tab by mouth daily. ERGOCALCIFEROL (ERGOCALCIFEROL) 50,000 UNIT CAPSULE    Take 1 Cap by mouth every seven (7) days. FENTANYL (DURAGESIC) 25 MCG/HR PATCH    1 Patch by TransDERmal route every seventy-two (72) hours. Max Daily Amount: 1 Patch. FOLIC ACID (FOLVITE) 1 MG TABLET    Take 1 Tab by mouth daily. HYDROMORPHONE (DILAUDID) 2 MG TABLET    Take 1 Tab by mouth every six (6) hours as needed for Pain. Max Daily Amount: 8 mg. Indications: Pain    HYDROXYUREA (HYDREA) 500 MG CAPSULE    Take 500 mg by mouth two (2) times a day. Indications: SICKLE CELL ANEMIA WITH CRISIS    ONDANSETRON HCL (ZOFRAN, AS HYDROCHLORIDE,) 4 MG TABLET    Take 2 Tabs by mouth every eight (8) hours as needed for Nausea. SENNA (SENOKOT) 8.6 MG TABLET    Take 2 Tabs by mouth nightly. These Medications have changed    No medications on file   Stop Taking    No medications on file         DO Pauline Calloway medical dictation software was used for portions of this report. Unintended transcription errors may occur. My signature above authenticates this document and my orders, the final    diagnosis (es), discharge prescription (s), and instructions in the Epic    record.

## 2019-08-03 ENCOUNTER — APPOINTMENT (OUTPATIENT)
Dept: GENERAL RADIOLOGY | Age: 39
DRG: 812 | End: 2019-08-03
Attending: INTERNAL MEDICINE
Payer: MEDICARE

## 2019-08-03 ENCOUNTER — HOSPITAL ENCOUNTER (INPATIENT)
Age: 39
LOS: 4 days | Discharge: HOME | DRG: 812 | End: 2019-08-07
Attending: EMERGENCY MEDICINE | Admitting: INTERNAL MEDICINE
Payer: MEDICARE

## 2019-08-03 ENCOUNTER — APPOINTMENT (OUTPATIENT)
Dept: VASCULAR SURGERY | Age: 39
DRG: 812 | End: 2019-08-03
Attending: EMERGENCY MEDICINE
Payer: MEDICARE

## 2019-08-03 DIAGNOSIS — D57.00 SICKLE CELL CRISIS (HCC): Primary | ICD-10-CM

## 2019-08-03 LAB
ALBUMIN SERPL-MCNC: 4.1 G/DL (ref 3.4–5)
ALBUMIN/GLOB SERPL: 0.9 {RATIO} (ref 0.8–1.7)
ALP SERPL-CCNC: 124 U/L (ref 45–117)
ALT SERPL-CCNC: 92 U/L (ref 16–61)
ANION GAP SERPL CALC-SCNC: 7 MMOL/L (ref 3–18)
AST SERPL-CCNC: 123 U/L (ref 10–38)
BASOPHILS # BLD: 0.1 K/UL (ref 0–0.06)
BASOPHILS NFR BLD: 1 % (ref 0–3)
BILIRUB SERPL-MCNC: 4 MG/DL (ref 0.2–1)
BUN SERPL-MCNC: 9 MG/DL (ref 7–18)
BUN/CREAT SERPL: 10 (ref 12–20)
CALCIUM SERPL-MCNC: 8.8 MG/DL (ref 8.5–10.1)
CHLORIDE SERPL-SCNC: 112 MMOL/L (ref 100–111)
CO2 SERPL-SCNC: 23 MMOL/L (ref 21–32)
CREAT SERPL-MCNC: 0.86 MG/DL (ref 0.6–1.3)
DIFFERENTIAL METHOD BLD: ABNORMAL
EOSINOPHIL # BLD: 0.5 K/UL (ref 0–0.4)
EOSINOPHIL NFR BLD: 5 % (ref 0–5)
ERYTHROCYTE [DISTWIDTH] IN BLOOD BY AUTOMATED COUNT: 24.6 % (ref 11.6–14.5)
FERRITIN SERPL-MCNC: 215 NG/ML (ref 8–388)
FOLATE SERPL-MCNC: 8.1 NG/ML (ref 3.1–17.5)
GLOBULIN SER CALC-MCNC: 4.6 G/DL (ref 2–4)
GLUCOSE SERPL-MCNC: 96 MG/DL (ref 74–99)
HCT VFR BLD AUTO: 23.6 % (ref 36–48)
HGB BLD-MCNC: 8.4 G/DL (ref 13–16)
IRON SATN MFR SERPL: 20 %
IRON SERPL-MCNC: 80 UG/DL (ref 50–175)
LYMPHOCYTES # BLD: 4.9 K/UL (ref 0.8–3.5)
LYMPHOCYTES NFR BLD: 45 % (ref 20–51)
MCH RBC QN AUTO: 25.2 PG (ref 24–34)
MCHC RBC AUTO-ENTMCNC: 35.6 G/DL (ref 31–37)
MCV RBC AUTO: 70.9 FL (ref 74–97)
MONOCYTES # BLD: 0.9 K/UL (ref 0–1)
MONOCYTES NFR BLD: 8 % (ref 2–9)
NEUTS SEG # BLD: 4.4 K/UL (ref 1.8–8)
NEUTS SEG NFR BLD: 41 % (ref 42–75)
NRBC BLD-RTO: 3 PER 100 WBC
PLATELET # BLD AUTO: 363 K/UL (ref 135–420)
PLATELET COMMENTS,PCOM: ABNORMAL
PMV BLD AUTO: 9.4 FL (ref 9.2–11.8)
POTASSIUM SERPL-SCNC: 3.9 MMOL/L (ref 3.5–5.5)
PROT SERPL-MCNC: 8.7 G/DL (ref 6.4–8.2)
RBC # BLD AUTO: 3.33 M/UL (ref 4.7–5.5)
RBC MORPH BLD: ABNORMAL
RETICS/RBC NFR AUTO: 8.9 % (ref 0.5–2.3)
SODIUM SERPL-SCNC: 142 MMOL/L (ref 136–145)
TIBC SERPL-MCNC: 400 UG/DL (ref 250–450)
VIT B12 SERPL-MCNC: 320 PG/ML (ref 211–911)
WBC # BLD AUTO: 10.8 K/UL (ref 4.6–13.2)

## 2019-08-03 PROCEDURE — 83540 ASSAY OF IRON: CPT

## 2019-08-03 PROCEDURE — 72100 X-RAY EXAM L-S SPINE 2/3 VWS: CPT

## 2019-08-03 PROCEDURE — 65270000029 HC RM PRIVATE

## 2019-08-03 PROCEDURE — 74011250636 HC RX REV CODE- 250/636: Performed by: INTERNAL MEDICINE

## 2019-08-03 PROCEDURE — 96361 HYDRATE IV INFUSION ADD-ON: CPT

## 2019-08-03 PROCEDURE — 93970 EXTREMITY STUDY: CPT

## 2019-08-03 PROCEDURE — 85025 COMPLETE CBC W/AUTO DIFF WBC: CPT

## 2019-08-03 PROCEDURE — 74011250636 HC RX REV CODE- 250/636: Performed by: EMERGENCY MEDICINE

## 2019-08-03 PROCEDURE — 72070 X-RAY EXAM THORAC SPINE 2VWS: CPT

## 2019-08-03 PROCEDURE — 96375 TX/PRO/DX INJ NEW DRUG ADDON: CPT

## 2019-08-03 PROCEDURE — 96374 THER/PROPH/DIAG INJ IV PUSH: CPT

## 2019-08-03 PROCEDURE — 94761 N-INVAS EAR/PLS OXIMETRY MLT: CPT

## 2019-08-03 PROCEDURE — 93005 ELECTROCARDIOGRAM TRACING: CPT

## 2019-08-03 PROCEDURE — 82728 ASSAY OF FERRITIN: CPT

## 2019-08-03 PROCEDURE — 96376 TX/PRO/DX INJ SAME DRUG ADON: CPT

## 2019-08-03 PROCEDURE — 85045 AUTOMATED RETICULOCYTE COUNT: CPT

## 2019-08-03 PROCEDURE — 99284 EMERGENCY DEPT VISIT MOD MDM: CPT

## 2019-08-03 PROCEDURE — 82607 VITAMIN B-12: CPT

## 2019-08-03 PROCEDURE — 74011250637 HC RX REV CODE- 250/637: Performed by: INTERNAL MEDICINE

## 2019-08-03 PROCEDURE — 80053 COMPREHEN METABOLIC PANEL: CPT

## 2019-08-03 RX ORDER — MORPHINE SULFATE 4 MG/ML
4 INJECTION INTRAVENOUS
Status: COMPLETED | OUTPATIENT
Start: 2019-08-03 | End: 2019-08-03

## 2019-08-03 RX ORDER — ONDANSETRON 2 MG/ML
4 INJECTION INTRAMUSCULAR; INTRAVENOUS
Status: COMPLETED | OUTPATIENT
Start: 2019-08-03 | End: 2019-08-03

## 2019-08-03 RX ORDER — HYDROMORPHONE HYDROCHLORIDE 1 MG/ML
1 INJECTION, SOLUTION INTRAMUSCULAR; INTRAVENOUS; SUBCUTANEOUS ONCE
Status: COMPLETED | OUTPATIENT
Start: 2019-08-03 | End: 2019-08-03

## 2019-08-03 RX ORDER — FACIAL-BODY WIPES
10 EACH TOPICAL DAILY PRN
Status: DISCONTINUED | OUTPATIENT
Start: 2019-08-03 | End: 2019-08-07 | Stop reason: HOSPADM

## 2019-08-03 RX ORDER — SODIUM CHLORIDE 9 MG/ML
150 INJECTION, SOLUTION INTRAVENOUS CONTINUOUS
Status: DISCONTINUED | OUTPATIENT
Start: 2019-08-03 | End: 2019-08-07

## 2019-08-03 RX ORDER — HEPARIN SODIUM 5000 [USP'U]/ML
5000 INJECTION, SOLUTION INTRAVENOUS; SUBCUTANEOUS EVERY 8 HOURS
Status: DISCONTINUED | OUTPATIENT
Start: 2019-08-03 | End: 2019-08-07 | Stop reason: HOSPADM

## 2019-08-03 RX ORDER — ONDANSETRON 2 MG/ML
4 INJECTION INTRAMUSCULAR; INTRAVENOUS
Status: DISCONTINUED | OUTPATIENT
Start: 2019-08-03 | End: 2019-08-07 | Stop reason: HOSPADM

## 2019-08-03 RX ORDER — MORPHINE SULFATE 2 MG/ML
4 INJECTION, SOLUTION INTRAMUSCULAR; INTRAVENOUS ONCE
Status: DISCONTINUED | OUTPATIENT
Start: 2019-08-03 | End: 2019-08-03

## 2019-08-03 RX ORDER — MORPHINE SULFATE 2 MG/ML
4 INJECTION, SOLUTION INTRAMUSCULAR; INTRAVENOUS ONCE
Status: COMPLETED | OUTPATIENT
Start: 2019-08-03 | End: 2019-08-03

## 2019-08-03 RX ORDER — HYDROXYUREA 500 MG/1
500 CAPSULE ORAL 2 TIMES DAILY
Status: DISCONTINUED | OUTPATIENT
Start: 2019-08-03 | End: 2019-08-03

## 2019-08-03 RX ORDER — HYDROMORPHONE HYDROCHLORIDE 1 MG/ML
1 INJECTION, SOLUTION INTRAMUSCULAR; INTRAVENOUS; SUBCUTANEOUS
Status: DISCONTINUED | OUTPATIENT
Start: 2019-08-03 | End: 2019-08-04

## 2019-08-03 RX ORDER — CELECOXIB 100 MG/1
200 CAPSULE ORAL 2 TIMES DAILY
Status: DISCONTINUED | OUTPATIENT
Start: 2019-08-03 | End: 2019-08-07 | Stop reason: HOSPADM

## 2019-08-03 RX ORDER — FAMOTIDINE 20 MG/1
20 TABLET, FILM COATED ORAL 2 TIMES DAILY
Status: DISCONTINUED | OUTPATIENT
Start: 2019-08-03 | End: 2019-08-07 | Stop reason: HOSPADM

## 2019-08-03 RX ORDER — HYDROXYUREA 500 MG/1
500 CAPSULE ORAL 2 TIMES DAILY
Status: DISCONTINUED | OUTPATIENT
Start: 2019-08-03 | End: 2019-08-07 | Stop reason: HOSPADM

## 2019-08-03 RX ORDER — NALOXONE HYDROCHLORIDE 0.4 MG/ML
0.4 INJECTION, SOLUTION INTRAMUSCULAR; INTRAVENOUS; SUBCUTANEOUS AS NEEDED
Status: DISCONTINUED | OUTPATIENT
Start: 2019-08-03 | End: 2019-08-07 | Stop reason: HOSPADM

## 2019-08-03 RX ADMIN — HYDROMORPHONE HYDROCHLORIDE 1 MG: 1 INJECTION, SOLUTION INTRAMUSCULAR; INTRAVENOUS; SUBCUTANEOUS at 21:00

## 2019-08-03 RX ADMIN — HYDROXYUREA 500 MG: 500 CAPSULE ORAL at 18:09

## 2019-08-03 RX ADMIN — SODIUM CHLORIDE 150 ML/HR: 900 INJECTION, SOLUTION INTRAVENOUS at 13:00

## 2019-08-03 RX ADMIN — SODIUM CHLORIDE 150 ML/HR: 900 INJECTION, SOLUTION INTRAVENOUS at 21:03

## 2019-08-03 RX ADMIN — HEPARIN SODIUM 5000 UNITS: 5000 INJECTION INTRAVENOUS; SUBCUTANEOUS at 23:03

## 2019-08-03 RX ADMIN — CELECOXIB 200 MG: 100 CAPSULE ORAL at 14:37

## 2019-08-03 RX ADMIN — SODIUM CHLORIDE 1000 ML: 900 INJECTION, SOLUTION INTRAVENOUS at 07:29

## 2019-08-03 RX ADMIN — HYDROMORPHONE HYDROCHLORIDE 1 MG: 1 INJECTION, SOLUTION INTRAMUSCULAR; INTRAVENOUS; SUBCUTANEOUS at 19:00

## 2019-08-03 RX ADMIN — HYDROMORPHONE HYDROCHLORIDE 1 MG: 1 INJECTION, SOLUTION INTRAMUSCULAR; INTRAVENOUS; SUBCUTANEOUS at 11:58

## 2019-08-03 RX ADMIN — MORPHINE SULFATE 4 MG: 4 INJECTION INTRAVENOUS at 07:20

## 2019-08-03 RX ADMIN — HYDROMORPHONE HYDROCHLORIDE 1 MG: 1 INJECTION, SOLUTION INTRAMUSCULAR; INTRAVENOUS; SUBCUTANEOUS at 14:28

## 2019-08-03 RX ADMIN — ONDANSETRON 4 MG: 2 INJECTION INTRAMUSCULAR; INTRAVENOUS at 07:19

## 2019-08-03 RX ADMIN — HEPARIN SODIUM 5000 UNITS: 5000 INJECTION INTRAVENOUS; SUBCUTANEOUS at 16:55

## 2019-08-03 RX ADMIN — FAMOTIDINE 20 MG: 20 TABLET ORAL at 13:35

## 2019-08-03 RX ADMIN — MORPHINE SULFATE 4 MG: 2 INJECTION, SOLUTION INTRAMUSCULAR; INTRAVENOUS at 08:35

## 2019-08-03 RX ADMIN — CELECOXIB 200 MG: 100 CAPSULE ORAL at 18:07

## 2019-08-03 RX ADMIN — FAMOTIDINE 20 MG: 20 TABLET ORAL at 18:06

## 2019-08-03 RX ADMIN — HYDROMORPHONE HYDROCHLORIDE 1 MG: 1 INJECTION, SOLUTION INTRAMUSCULAR; INTRAVENOUS; SUBCUTANEOUS at 16:55

## 2019-08-03 RX ADMIN — HYDROMORPHONE HYDROCHLORIDE 1 MG: 1 INJECTION, SOLUTION INTRAMUSCULAR; INTRAVENOUS; SUBCUTANEOUS at 23:03

## 2019-08-03 RX ADMIN — SODIUM CHLORIDE 150 ML/HR: 900 INJECTION, SOLUTION INTRAVENOUS at 14:37

## 2019-08-03 RX ADMIN — SODIUM CHLORIDE 1000 ML: 900 INJECTION, SOLUTION INTRAVENOUS at 12:00

## 2019-08-03 NOTE — H&P
History & Physical    Patient: Monica Terry MRN: [id number]  CSN: [id number]    YOB: 1980  Age: 34 y.o. Sex: male      DOA: 8/3/2019  CC: back pain and leg pain    PCP: Lashell Whelan MD       HPI:     Monica Terry is a 34 y.o. male with sickle cell disease presented with acute onset of lower back pain and leg pain from knees to ankles. He had attempted to cope with it at home but unable to do so with his normal pain regimen. His sickle cell crisis occurred about every 6 months but he was able to tolerated. Currently, he has not sick contact or recent sickness. No fever or chill. He has no respiratory symptom. No leg swelling or abdominal complaint. In the ER, he was given 1 liter of fluid and morphine on top of his Fentanyl dosing without help. He was discontinued from his fentanyl patch and started on dilaudid prn. ROS: no fever/chills, no headache, no dizziness, no facial pain, no sinus congestion,   No swallowing pain, No chest pain, no palpitation, no shortness of breath, no abd pain,  No diarrhea, no urinary complaint, +knee pain, +leg pain on both, +lower back pain      Past Medical History:   Diagnosis Date    B12 deficiency 03/15/2010    210    Cholelithiasis 09/17/2012    U/S Result: Cholelithiasis    Elevated alkaline phosphatase level 03/21/2010    158    Elevated ALT measurement 03/21/2010    71    Elevated AST (SGOT) 03/10/2012    38    Elevated platelet count 04/74/7197    494    Elevated total protein 12/27/2011    8.7    Hypocalcemia 07/06/2011    4.1    Hypokalemia     Hyponatremia 11/18/2009    Leukocytosis 11/16/2009    Microcytic anemia 10/25/2007    Pleural effusion on right 07/15/2015    [facility] CXR Result    Reticulocytosis 10/25/2007    7.8    Serum total bilirubin elevated 12/27/2011    T.B. 3.8, D.B. 0.4.     [facility]     Dr. Joshua Chase    Vitamin D deficiency 01/20/2016    5.4 Past Surgical History:   Procedure Laterality Date    HX CHOLECYSTECTOMY         Family History   Problem Relation Age of Onset    Cancer Neg Hx     Diabetes Neg Hx     Heart Disease Neg Hx     Heart Attack Neg Hx     Hypertension Neg Hx     Stroke Neg Hx        Social History     Socioeconomic History    Marital status:      Spouse name: Not on file    Number of children: Not on file    Years of education: Not on file    Highest education level: Not on file   Tobacco Use    Smoking status: Never Smoker    Smokeless tobacco: Never Used   Substance and Sexual Activity    Alcohol use: Yes     Comment: Occasional    Drug use: No    Sexual activity: Yes     Partners: Female     Birth control/protection: None       Prior to Admission medications    Medication Sig Start Date End Date Taking? Authorizing Provider   hydroxyurea (HYDREA) 500 mg capsule Take 500 mg by mouth two (2) times a day. Indications: SICKLE CELL ANEMIA WITH CRISIS   Yes Other, MD Raphael   fentaNYL (DURAGESIC) 25 mcg/hr PATCH 1 Patch by TransDERmal route every seventy-two (72) hours.  Max Daily Amount: 1 Patch. 10/21/15  Yes Gary Concepcion MD       Allergies   Allergen Reactions    Eggshell Membrane Nausea and Vomiting              Physical Exam:      Visit Vitals  /64 (BP 1 Location: Right arm, BP Patient Position: At rest;Supine)   Pulse 70   Temp 99.7 °F (37.6 °C)   Resp 18   Ht 6' (1.829 m)   Wt 77.1 kg (170 lb)   SpO2 96%   BMI 23.06 kg/m²       Physical Exam:  Tele:   General:  Cooperative, Not in acute distress, speaks in full sentence while in bed  HEENT: PERRL, EOMI, supple neck, no JVD, dry oral mucosa  Cardiovascular: S1S2 regular, no rub/gallop   Pulmonary: Clear air entry bilaterally, no wheezing, no crackle  GI:  Soft, non tender, non distended, +bs, no guarding   Extremities:  No pedal edema, +distal pulses appreciated   Pain in lower legs, no swelling at knee, tenderness at lower back, no skin change  Neuro: AOx3, moving all extremities, no gross deficit. Lab/Data Review:  Labs: Results:       Chemistry Recent Labs     08/03/19  0625   GLU 96      K 3.9   *   CO2 23   BUN 9   CREA 0.86   CA 8.8   AGAP 7   BUCR 10*   *   TP 8.7*   ALB 4.1   GLOB 4.6*   AGRAT 0.9      CBC w/Diff Recent Labs     08/03/19  0625   WBC 10.8   RBC 3.33*   HGB 8.4*   HCT 23.6*      GRANS 41*   LYMPH 45   EOS 5      Coagulation    Iron/Ferritin    BNP    Cardiac Enzymes    Liver Enzymes Recent Labs     08/03/19  0625   TP 8.7*   ALB 4.1   *   SGOT 123*      Thyroid Studies Lab Results   Component Value Date/Time    TSH 0.93 03/15/2010 01:58 PM          All Micro Results     None          Imaging Reviewed:  Xray of lumbar/thoracic spine pending      Assessment:     1)  Sickle cell pain crisis. 2)  Microcytic anemia, in the setting of sickle cell disease   3)  Hyperbilirubinemia, likely due to sickle cells disease (no jaundice evidence)  4)  Elevated AST, ALT, Alk phos related to sickle cell disease   5)  Hyperproteinemia   6)  H/o gallstone on previous US abd      Plan:     Pt meets inpt criteria for admission. Admitted to medicine floor with IV hydration, give another 1 liter bolus in ER, then start IV NS at 150ml/hr. Oxygen supplement for pain. Will resume his hydroxyuria dosing. Start celecoxib BID, pepcid, IV dilaudid prn q2hr. (can use toradol prn if prefers)  Monitor his LFT, CBC, BMP. Can follow up US abd if has abd complaint. Check xray of legs and lower back. Check Duplex of leg to rule out DVT  Check hgb/Hct and iron profile, might need transfusion if persistent pain symptom. Consult his hematologist, Dr. No Fenton for further care.      DVT prophylaxis: hep sq  Full code         Alyssa Mike MD  8/3/2019, 11:53 AM

## 2019-08-03 NOTE — PROGRESS NOTES
Received alert and oriented male pt from er. Pt rates pain 9/10. IVF running as ordered, pt placed on 2 liters n/c and medicated for pain with diluadid as ordered.

## 2019-08-03 NOTE — ED PROVIDER NOTES
EMERGENCY DEPARTMENT HISTORY AND PHYSICAL EXAM    5:33 AM      Date: 8/3/2019  Patient Name: Monica Terry    History of Presenting Illness     Chief Complaint   Patient presents with    Sickle Cell Crisis         History Provided By: Patient    Additional History (Context): Monica Terry is a 34 y.o. male with Sickle cell who presents with back and leg pain. Patient states this is his usual sickle. Patient is being treated by Dr. Maggy Wilhelm  Patient is on hydroxyurea and Dilaudid at home. He does not have any Dilaudid left. Patient is on his fentanyl patch. Patient denies chest pain, nausea, vomiting or diarrhea. PCP: Lashell Whelan MD      Current Facility-Administered Medications   Medication Dose Route Frequency Provider Last Rate Last Dose    morphine injection 4 mg  4 mg IntraVENous NOW Sebastian Teixeira DO        ondansetron [facility] injection 4 mg  4 mg IntraVENous NOW Sebastian Teixeira DO        sodium chloride 0.9 % bolus infusion 1,000 mL  1,000 mL IntraVENous ONCE Hilda Teixeira, DO         Current Outpatient Medications   Medication Sig Dispense Refill    cyanocobalamin (VITAMIN B12) 2,500 mcg sublingual tablet Take 1 Tab by mouth daily. 100 Tab 3    ergocalciferol (ERGOCALCIFEROL) 50,000 unit capsule Take 1 Cap by mouth every seven (7) days. 4 Cap 3    folic acid (FOLVITE) 1 mg tablet Take 1 Tab by mouth daily. 100 Tab 3    senna (SENOKOT) 8.6 mg tablet Take 2 Tabs by mouth nightly. 60 Tab 3    ondansetron hcl (ZOFRAN, AS HYDROCHLORIDE,) 4 mg tablet Take 2 Tabs by mouth every eight (8) hours as needed for Nausea. 12 Tab 0    HYDROmorphone (DILAUDID) 2 mg tablet Take 1 Tab by mouth every six (6) hours as needed for Pain. Max Daily Amount: 8 mg. Indications: Pain 12 Tab 0    hydroxyurea (HYDREA) 500 mg capsule Take 500 mg by mouth two (2) times a day.  Indications: SICKLE CELL ANEMIA WITH CRISIS      fentaNYL (DURAGESIC) 25 mcg/hr PATCH 1 Patch by TransDERmal route every seventy-two (72) hours. Max Daily Amount: 1 Patch. 5 Patch 0       Past History     Past Medical History:  Past Medical History:   Diagnosis Date    B12 deficiency 03/15/2010    210    Cholelithiasis 09/17/2012    U/S Result: Cholelithiasis    Elevated alkaline phosphatase level 03/21/2010    158    Elevated ALT measurement 03/21/2010    71    Elevated AST (SGOT) 03/10/2012    38    Elevated platelet count 62/16/9303    494    Elevated total protein 12/27/2011    8.7    Hypocalcemia 07/06/2011    4.1    Hypokalemia     Hyponatremia 11/18/2009    Leukocytosis 11/16/2009    Microcytic anemia 10/25/2007    Pleural effusion on right 07/15/2015    [facility] CXR Result    Reticulocytosis 10/25/2007    7.8    Serum total bilirubin elevated 12/27/2011    T.B. 3.8, D.B. 0.4.     Sickle cell anemia with crisis ([facility]     Dr. Johanna Simpson. [place]    Vitamin D deficiency 01/20/2016    5.4       Past Surgical History:  Past Surgical History:   Procedure Laterality Date    HX CHOLECYSTECTOMY         Family History:  Family History   Problem Relation Age of Onset    Cancer Neg Hx     Diabetes Neg Hx     Heart Disease Neg Hx     Heart Attack Neg Hx     Hypertension Neg Hx     Stroke Neg Hx        Social History:  Social History     Tobacco Use    Smoking status: Never Smoker    Smokeless tobacco: Never Used   Substance Use Topics    Alcohol use: Yes     Comment: Occasional    Drug use: No       Allergies: Allergies   Allergen Reactions    Eggshell Membrane Nausea and Vomiting         Review of Systems       Review of Systems   Constitutional: Negative. Negative for chills, diaphoresis and fever. HENT: Negative. Negative for congestion, rhinorrhea and sore throat. Eyes: Negative. Negative for pain, discharge and redness. Respiratory: Negative. Negative for cough, chest tightness, shortness of breath and wheezing. Cardiovascular: Negative. Negative for chest pain. Gastrointestinal: Negative. Negative for abdominal pain, constipation, diarrhea, nausea and vomiting. Genitourinary: Negative. Negative for dysuria, flank pain, frequency, hematuria and urgency. Musculoskeletal: Positive for arthralgias, back pain, joint swelling and myalgias. Negative for neck pain. Skin: Negative. Negative for rash. Neurological: Negative. Negative for syncope, weakness, numbness and headaches. Psychiatric/Behavioral: Negative. All other systems reviewed and are negative. Physical Exam     Visit Vitals  /73 (BP 1 Location: Left arm)   Pulse 73   Temp 98.2 °F (36.8 °C)   Resp 17   Ht 6' (1.829 m)   Wt 77.1 kg (170 lb)   SpO2 94%   BMI 23.06 kg/m²         Physical Exam   Constitutional: He appears well-developed and well-nourished. Non-toxic appearance. He does not have a sickly appearance. He does not appear ill. No distress. HENT:   Head: Normocephalic and atraumatic. Mouth/Throat: Oropharynx is clear and moist. No oropharyngeal exudate. Eyes: Pupils are equal, round, and reactive to light. Conjunctivae and EOM are normal. No scleral icterus. Neck: Normal range of motion. Neck supple. No hepatojugular reflux and no JVD present. No tracheal deviation present. No thyromegaly present. Cardiovascular: Normal rate, regular rhythm, S1 normal, S2 normal, normal heart sounds, intact distal pulses and normal pulses. Exam reveals no gallop, no S3 and no S4. No murmur heard. Pulses:       Radial pulses are 2+ on the right side, and 2+ on the left side. Dorsalis pedis pulses are 2+ on the right side, and 2+ on the left side. Pulmonary/Chest: Effort normal and breath sounds normal. No respiratory distress. He has no decreased breath sounds. He has no wheezes. He has no rhonchi. He has no rales. Abdominal: Soft. Normal appearance and bowel sounds are normal. He exhibits no distension and no mass. There is no hepatosplenomegaly. There is no tenderness.  There is no rigidity, no rebound, no guarding, no CVA tenderness, no tenderness at McBurney's point and negative Trotter's sign. Musculoskeletal: Normal range of motion. Back:    Lymphadenopathy:        Head (right side): No submental, no submandibular, no preauricular and no occipital adenopathy present. Head (left side): No submental, no submandibular, no preauricular and no occipital adenopathy present. He has no cervical adenopathy. Right: No supraclavicular adenopathy present. Left: No supraclavicular adenopathy present. Neurological: He is alert. He has normal strength and normal reflexes. He is not disoriented. No cranial nerve deficit or sensory deficit. Coordination and gait normal. GCS eye subscore is 4. GCS verbal subscore is 5. GCS motor subscore is 6. Grossly intact. Skin: Skin is warm, dry and intact. No rash noted. He is not diaphoretic. Psychiatric: He has a normal mood and affect. His speech is normal and behavior is normal. Judgment and thought content normal. Cognition and memory are normal.   Nursing note and vitals reviewed. Diagnostic Study Results     Labs -  No results found for this or any previous visit (from the past 12 hour(s)). Radiologic Studies -   No orders to display         Medical Decision Making   Provider Notes (Medical Decision Making):  MDM  Number of Diagnoses or Management Options  Diagnosis management comments: 68-year-old male with past medical history of sickle cell presents with his usual back and leg pain. I am the first provider for this patient. I reviewed the vital signs, available nursing notes, past medical history, past surgical history, family history and social history. Vital Signs-Reviewed the patient's vital signs. Records Reviewed: Nursing Notes (Time of Review: 5:33 AM)    ED Course: Progress Notes, Reevaluation, and Consults:    L labs pending at this time.   Turned over to Dr. Zachary Solomon ED provider for follow-up and disposition. Diagnosis       Clinical Impression:     Disposition: Pending       Attestation        Provider Attestation:     I personally performed the services described in the documentation, reviewed the documentation and it accurately and completely records my words and actions utilizing the [address] August 03, 2019 at 5:33 AM - Jesu Teixeira DO    Disclaimer. It is dictated using utilizing voice recognition software. Unfortunately this leads to occasional typographical errors. I apologize in advance if the situation occurs. If questions arise please do not hesitate to contact me or call our department. Shira Call MD  7:57 AM  Went to reevaluate the patient and he was still in pain. Patient is alert and oriented x4 in no distress. He is wearing a fentanyl patch that has 1 more day left on it so I will proceed with more pain meds with caution. At this point he is alert and oriented x4 and having normal vital signs so it would be okay to give him another dose of morphine but we will continue to monitor closely. 10:06 AM  Patient still in pain despite multiple rounds of morphine. I will take his fentanyl patch on and give him IV Dilaudid as well as admit him to the hospital for pain control.

## 2019-08-03 NOTE — ROUTINE PROCESS
Bedside shift change report given to Ness Pérez RN (oncoming nurse) by Huong Ayoub RN (offgoing nurse). Report included the following information SBAR, Kardex, Intake/Output, MAR and Recent Results.

## 2019-08-04 LAB
ALBUMIN SERPL-MCNC: 3.5 G/DL (ref 3.4–5)
ALBUMIN/GLOB SERPL: 0.9 {RATIO} (ref 0.8–1.7)
ALP SERPL-CCNC: 103 U/L (ref 45–117)
ALT SERPL-CCNC: 69 U/L (ref 16–61)
ANION GAP SERPL CALC-SCNC: 5 MMOL/L (ref 3–18)
AST SERPL-CCNC: 71 U/L (ref 10–38)
ATRIAL RATE: 61 BPM
BILIRUB SERPL-MCNC: 3.7 MG/DL (ref 0.2–1)
BUN SERPL-MCNC: 8 MG/DL (ref 7–18)
BUN/CREAT SERPL: 9 (ref 12–20)
CALCIUM SERPL-MCNC: 7.8 MG/DL (ref 8.5–10.1)
CALCULATED P AXIS, ECG09: 63 DEGREES
CALCULATED R AXIS, ECG10: -13 DEGREES
CALCULATED T AXIS, ECG11: 67 DEGREES
CHLORIDE SERPL-SCNC: 110 MMOL/L (ref 100–111)
CO2 SERPL-SCNC: 25 MMOL/L (ref 21–32)
CREAT SERPL-MCNC: 0.89 MG/DL (ref 0.6–1.3)
DIAGNOSIS, 93000: NORMAL
ERYTHROCYTE [DISTWIDTH] IN BLOOD BY AUTOMATED COUNT: 23.9 % (ref 11.6–14.5)
GLOBULIN SER CALC-MCNC: 4.1 G/DL (ref 2–4)
GLUCOSE SERPL-MCNC: 94 MG/DL (ref 74–99)
HCT VFR BLD AUTO: 21.3 % (ref 36–48)
HGB BLD-MCNC: 7.3 G/DL (ref 13–16)
MAGNESIUM SERPL-MCNC: 2.1 MG/DL (ref 1.6–2.6)
MCH RBC QN AUTO: 24.6 PG (ref 24–34)
MCHC RBC AUTO-ENTMCNC: 34.3 G/DL (ref 31–37)
MCV RBC AUTO: 71.7 FL (ref 74–97)
P-R INTERVAL, ECG05: 164 MS
PLATELET # BLD AUTO: 325 K/UL (ref 135–420)
PMV BLD AUTO: 9.7 FL (ref 9.2–11.8)
POTASSIUM SERPL-SCNC: 3.9 MMOL/L (ref 3.5–5.5)
PROT SERPL-MCNC: 7.6 G/DL (ref 6.4–8.2)
Q-T INTERVAL, ECG07: 446 MS
QRS DURATION, ECG06: 94 MS
QTC CALCULATION (BEZET), ECG08: 448 MS
RBC # BLD AUTO: 2.97 M/UL (ref 4.7–5.5)
SODIUM SERPL-SCNC: 140 MMOL/L (ref 136–145)
VENTRICULAR RATE, ECG03: 61 BPM
WBC # BLD AUTO: 11 K/UL (ref 4.6–13.2)

## 2019-08-04 PROCEDURE — 74011250637 HC RX REV CODE- 250/637: Performed by: INTERNAL MEDICINE

## 2019-08-04 PROCEDURE — 74011250636 HC RX REV CODE- 250/636: Performed by: INTERNAL MEDICINE

## 2019-08-04 PROCEDURE — 65270000029 HC RM PRIVATE

## 2019-08-04 PROCEDURE — 36415 COLL VENOUS BLD VENIPUNCTURE: CPT

## 2019-08-04 PROCEDURE — 74011000258 HC RX REV CODE- 258: Performed by: NURSE PRACTITIONER

## 2019-08-04 PROCEDURE — 85027 COMPLETE CBC AUTOMATED: CPT

## 2019-08-04 PROCEDURE — 80053 COMPREHEN METABOLIC PANEL: CPT

## 2019-08-04 PROCEDURE — 83735 ASSAY OF MAGNESIUM: CPT

## 2019-08-04 PROCEDURE — 74011250636 HC RX REV CODE- 250/636: Performed by: NURSE PRACTITIONER

## 2019-08-04 RX ORDER — HYDROMORPHONE HYDROCHLORIDE 2 MG/ML
1.5 INJECTION, SOLUTION INTRAMUSCULAR; INTRAVENOUS; SUBCUTANEOUS
Status: DISCONTINUED | OUTPATIENT
Start: 2019-08-04 | End: 2019-08-06

## 2019-08-04 RX ORDER — FENTANYL 25 UG/1
1 PATCH TRANSDERMAL
Status: DISCONTINUED | OUTPATIENT
Start: 2019-08-04 | End: 2019-08-07 | Stop reason: HOSPADM

## 2019-08-04 RX ORDER — FOLIC ACID 1 MG/1
1 TABLET ORAL DAILY
Status: DISCONTINUED | OUTPATIENT
Start: 2019-08-04 | End: 2019-08-07 | Stop reason: HOSPADM

## 2019-08-04 RX ADMIN — HYDROXYUREA 500 MG: 500 CAPSULE ORAL at 11:07

## 2019-08-04 RX ADMIN — CELECOXIB 200 MG: 100 CAPSULE ORAL at 09:33

## 2019-08-04 RX ADMIN — SODIUM CHLORIDE 150 ML/HR: 900 INJECTION, SOLUTION INTRAVENOUS at 03:04

## 2019-08-04 RX ADMIN — HYDROMORPHONE HYDROCHLORIDE 1.5 MG: 2 INJECTION INTRAMUSCULAR; INTRAVENOUS; SUBCUTANEOUS at 17:22

## 2019-08-04 RX ADMIN — FAMOTIDINE 20 MG: 20 TABLET ORAL at 17:23

## 2019-08-04 RX ADMIN — HYDROMORPHONE HYDROCHLORIDE 1.5 MG: 2 INJECTION INTRAMUSCULAR; INTRAVENOUS; SUBCUTANEOUS at 14:30

## 2019-08-04 RX ADMIN — CELECOXIB 200 MG: 100 CAPSULE ORAL at 17:23

## 2019-08-04 RX ADMIN — CALCIUM GLUCONATE 2 G: 98 INJECTION, SOLUTION INTRAVENOUS at 17:09

## 2019-08-04 RX ADMIN — FAMOTIDINE 20 MG: 20 TABLET ORAL at 09:32

## 2019-08-04 RX ADMIN — HEPARIN SODIUM 5000 UNITS: 5000 INJECTION INTRAVENOUS; SUBCUTANEOUS at 17:09

## 2019-08-04 RX ADMIN — HYDROMORPHONE HYDROCHLORIDE 1 MG: 1 INJECTION, SOLUTION INTRAMUSCULAR; INTRAVENOUS; SUBCUTANEOUS at 07:00

## 2019-08-04 RX ADMIN — HEPARIN SODIUM 5000 UNITS: 5000 INJECTION INTRAVENOUS; SUBCUTANEOUS at 09:33

## 2019-08-04 RX ADMIN — HYDROMORPHONE HYDROCHLORIDE 1.5 MG: 2 INJECTION INTRAMUSCULAR; INTRAVENOUS; SUBCUTANEOUS at 23:55

## 2019-08-04 RX ADMIN — ONDANSETRON 4 MG: 2 INJECTION INTRAMUSCULAR; INTRAVENOUS at 14:30

## 2019-08-04 RX ADMIN — FOLIC ACID 1 MG: 1 TABLET ORAL at 11:57

## 2019-08-04 RX ADMIN — ONDANSETRON 4 MG: 2 INJECTION INTRAMUSCULAR; INTRAVENOUS at 09:32

## 2019-08-04 RX ADMIN — HYDROMORPHONE HYDROCHLORIDE 1 MG: 1 INJECTION, SOLUTION INTRAMUSCULAR; INTRAVENOUS; SUBCUTANEOUS at 05:02

## 2019-08-04 RX ADMIN — HYDROMORPHONE HYDROCHLORIDE 1.5 MG: 2 INJECTION INTRAMUSCULAR; INTRAVENOUS; SUBCUTANEOUS at 11:56

## 2019-08-04 RX ADMIN — HYDROMORPHONE HYDROCHLORIDE 1 MG: 1 INJECTION, SOLUTION INTRAMUSCULAR; INTRAVENOUS; SUBCUTANEOUS at 03:04

## 2019-08-04 RX ADMIN — HYDROMORPHONE HYDROCHLORIDE 1.5 MG: 2 INJECTION INTRAMUSCULAR; INTRAVENOUS; SUBCUTANEOUS at 19:26

## 2019-08-04 RX ADMIN — HYDROMORPHONE HYDROCHLORIDE 1 MG: 1 INJECTION, SOLUTION INTRAMUSCULAR; INTRAVENOUS; SUBCUTANEOUS at 00:59

## 2019-08-04 RX ADMIN — HYDROXYUREA 500 MG: 500 CAPSULE ORAL at 17:24

## 2019-08-04 RX ADMIN — HYDROMORPHONE HYDROCHLORIDE 1.5 MG: 2 INJECTION INTRAMUSCULAR; INTRAVENOUS; SUBCUTANEOUS at 09:32

## 2019-08-04 RX ADMIN — SODIUM CHLORIDE 150 ML/HR: 900 INJECTION, SOLUTION INTRAVENOUS at 12:01

## 2019-08-04 NOTE — PROGRESS NOTES
conducted an initial consultation and Spiritual Assessment for Tod Faulkner, who is a 34 y.o.,male. Patients Primary Language is: [place]. According to the patients EMR Mormonism Affiliation is: Lucía Anderson. The reason the Patient came to the hospital is:   Patient Active Problem List    Diagnosis Date Noted    Hyperbilirubinemia 04/13/2018    Bilateral leg pain 04/24/2017    [facility]ick[facility] with crisis ([facility] 05/07/2015    Sickle cell crisis ([facility] 01/26/2014    Serum total bilirubin elevated 12/27/2011    Elevated total protein 12/27/2011    Elevated alkaline phosphatase level 03/21/2010    Microcytic anemia 10/25/2007    Reticulocytosis 10/25/2007        The  provided the following Interventions:  Initiated a relationship of care and support. Listened empathically. Provided chaplaincy education. Provided information about Spiritual Care Services. Offered assurance of continued prayers on patient's behalf. Chart reviewed. The following outcomes where achieved:  Patient shared limited information about  their medical narrative. Patient processed feeling about current hospitalization. Patient expressed gratitude for 's visit. Assessment:  Patient does not have any Amish/cultural needs that will affect patients preferences in health care. There are no spiritual or Amish issues which require intervention at this time. Plan:  Chaplains will continue to follow and will provide pastoral care on an as needed/requested basis.  recommends bedside caregivers page  on duty if patient shows signs of acute spiritual or emotional distress.         [id number] [facility].   [phone]

## 2019-08-04 NOTE — PROGRESS NOTES
[facility] Hospitalist Group  Progress Note    Patient: Paramjit Heredia Age: 34 y.o. : 1980 MR#: [id number] SSN: [id number]  Date: 2019     Subjective:   Low back and bilateral leg pain. No change with intensity of pain since admission. Minimal relief with pain management. Rocking himself to help for comfort of pain. Last SSC 3 months ago but was able to manage at home. Crisis only lasted a few hours. Last hospitalization for SSC 2 years ago. Denies CP/chest discomfort, SOB, abdominal pain, N/V, fever or chills. Assessment/Plan:   1. Sickle cell crisis  2. Sickle cell anemia with crisis  3. Bilateral leg pain r/t SSC  4. Low back pain r/t SSC  5. Elevated LFT, improvement    Plan  1. Hematology consult. Continue pain management, IVF, folic acid, hydroxyurea. Venous doppler/no DVT. 2. Monitor metabolic panel, liver function panel.  replete     Additional Notes:      Case discussed with:  [x]Patient  []Family  [x]Nursing  []Case Management  DVT Prophylaxis:  []Lovenox  [x]Hep SQ  []SCDs  []Coumadin   []On Heparin gtt    Objective:   VS:   Visit Vitals  /74 (BP 1 Location: Right arm, BP Patient Position: At rest)   Pulse 62   Temp 97.4 °F (36.3 °C)   Resp 18   Ht 6' (1.829 m)   Wt 77.1 kg (170 lb)   SpO2 93%   BMI 23.06 kg/m²      Tmax/24hrs: Temp (24hrs), Av.8 °F (36.6 °C), Min:97.4 °F (36.3 °C), Max:98.3 °F (36.8 °C)      Intake/Output Summary (Last 24 hours) at 2019 1127  Last data filed at 2019 0607  Gross per 24 hour   Intake 3360 ml   Output    Net 3360 ml       General:  Alert, NAD  Cardiovascular:  RRR  Pulmonary:  LSC throughout; respiratory effort WNL  GI:  +BS in all four quadrants, soft, non-tender  Extremities:  No edema; 2+ dorsalis pedis pulses bilaterally  Neuro: alert and oriented      Labs:    Recent Results (from the past 24 hour(s))   CBC W/O DIFF    Collection Time: 19  5:01 AM   Result Value Ref Range    WBC 11.0 4.6 - 13.2 K/uL    RBC 2.97 (L) 4.70 - 5.50 M/uL    HGB 7.3 (L) 13.0 - 16.0 g/dL    HCT 21.3 (L) 36.0 - 48.0 %    MCV 71.7 (L) 74.0 - 97.0 FL    MCH 24.6 24.0 - 34.0 PG    MCHC 34.3 31.0 - 37.0 g/dL    RDW 23.9 (H) 11.6 - 14.5 %    PLATELET 015 385 - 847 K/uL    MPV 9.7 9.2 - 47.6 FL   METABOLIC PANEL, COMPREHENSIVE    Collection Time: 08/04/19  5:01 AM   Result Value Ref Range    Sodium 140 136 - 145 mmol/L    Potassium 3.9 3.5 - 5.5 mmol/L    Chloride 110 100 - 111 mmol/L    CO2 25 21 - 32 mmol/L    Anion gap 5 3.0 - 18 mmol/L    Glucose 94 74 - 99 mg/dL    BUN 8 7.0 - 18 MG/DL    Creatinine 0.89 0.6 - 1.3 MG/DL    BUN/Creatinine ratio 9 (L) 12 - 20      GFR est AA >60 >60 ml/min/1.73m2    GFR est non-AA >60 >60 ml/min/1.73m2    Calcium 7.8 (L) 8.5 - 10.1 MG/DL    Bilirubin, total 3.7 (H) 0.2 - 1.0 MG/DL    ALT (SGPT) 69 (H) 16 - 61 U/L    AST (SGOT) 71 (H) 10 - 38 U/L    Alk.  phosphatase 103 45 - 117 U/L    Protein, total 7.6 6.4 - 8.2 g/dL    Albumin 3.5 3.4 - 5.0 g/dL    Globulin 4.1 (H) 2.0 - 4.0 g/dL    A-G Ratio 0.9 0.8 - 1.7     MAGNESIUM    Collection Time: 08/04/19  5:01 AM   Result Value Ref Range    Magnesium 2.1 1.6 - 2.6 mg/dL       Signed By: Arron Duncan NP     August 4, 2019

## 2019-08-04 NOTE — ROUTINE PROCESS
Bedside and Verbal shift change report given to LEE RN (oncoming nurse) by Сергей Washington RN (offgoing nurse). Report included the following information SBAR, Kardex and MAR.

## 2019-08-04 NOTE — PROGRESS NOTES
Phone: [phone]     Hematology / Oncology Progress Note  [facility]      Patient: Lashae Price   MRN: [id number]         CSN: [id number]    YOB: 1980      Admit Date: 8/3/2019    Assessment:     Active Problems:    Sickle cell crisis ([facility] (2014)      Sickle cell anemia with crisis ([facility] (2015)      Bilateral leg pain (2017)      Elevated alkaline phosphatase level (3/21/2010)      Overview: 158      Serum total bilirubin elevated (2011)      Overview: T.B. 3.8, D.B. 0.4.        Elevated total protein (2011)      Overview: 8.7      Hyperbilirubinemia (2018)        Plan:     Add direct bilirubin,  anton bili sec to hemolysis  Ct fentanyl 25ugm/hr, increase dilaudid 1.5 mg q 2 hr, pain secondary  To bone infarcts from crisis  Fluids encouraged  Po folic acid  Venous doppler legs ordered, will f/u    Nghia Curtis MD  [facility] [phone]      Subjective:     Back and leg pain  No fevers    Objective:     Visit Vitals  /74 (BP 1 Location: Right arm, BP Patient Position: At rest)   Pulse 62   Temp 97.4 °F (36.3 °C)   Resp 18   Ht 6' (1.829 m)   Wt 77.1 kg (170 lb)   SpO2 93%   BMI 23.06 kg/m²             Temp (24hrs), Av.1 °F (36.7 °C), Min:97.4 °F (36.3 °C), Max:99.7 °F (37.6 °C)        Intake/Output Summary (Last 24 hours) at 2019 3331  Last data filed at 2019 0607  Gross per 24 hour   Intake 3360 ml   Output    Net 3360 ml     Review of Systems:   Constitutional: negative for fevers, chills, sweats and fatigue  Eyes: negative for visual disturbance, redness and icterus  Ears, Nose, Mouth, Throat, and Face: negative for tinnitus, epistaxis, sore mouth and hoarseness  Respiratory: negative for cough, sputum, hemoptysis, pleurisy/chest pain or wheezing  Cardiovascular: negative for chest pain, chest pressure/discomfort, palpitations, irregular heart beats, syncope, paroxysmal nocturnal dyspnea  Gastrointestinal: negative for reflux symptoms, nausea, vomiting, change in bowel habits, melena, diarrhea, constipation and abdominal pain  Genitourinary:negative for dysuria, nocturia, urinary incontinence, hesitancy and hematuria  Integument: negative for rash, skin lesion(s) and pruritus  Hematologic/Lymphatic: negative for easy bruising, bleeding and lymphadenopathy  Musculoskeletal:back and leg pain  Neurological: negative for headaches, dizziness, seizures, paresthesia and weakness    Physical Exam:  Constitutional: Alert, oriented,  Eyes: PERRLA, icteric, pallor  Ears, nose, mouth, throat, and face: no palpable Lymph nodes, no mucositis, no thrush. Respiratory: symmetrical expansion, no rales, no rhonchi, no wheezing. Cardiovascular: S1S2, no pathologic murmur, no rub. Gastrointestinal: soft, benign, non tender, no HSM, normal bowel sounds, no mass. Integument: no rash, no petechiae, no ecchymosis. Musculoskeletal: no edema, no cyanosis, no clubbing. Neurological: intact, cranial nerves, no focal motor or sensory deficits.       Labs:  Recent Results (from the past 24 hour(s))   CBC W/O DIFF    Collection Time: 08/04/19  5:01 AM   Result Value Ref Range    WBC 11.0 4.6 - 13.2 K/uL    RBC 2.97 (L) 4.70 - 5.50 M/uL    HGB 7.3 (L) 13.0 - 16.0 g/dL    HCT 21.3 (L) 36.0 - 48.0 %    MCV 71.7 (L) 74.0 - 97.0 FL    MCH 24.6 24.0 - 34.0 PG    MCHC 34.3 31.0 - 37.0 g/dL    RDW 23.9 (H) 11.6 - 14.5 %    PLATELET 022 606 - 614 K/uL    MPV 9.7 9.2 - 14.8 FL   METABOLIC PANEL, COMPREHENSIVE    Collection Time: 08/04/19  5:01 AM   Result Value Ref Range    Sodium 140 136 - 145 mmol/L    Potassium 3.9 3.5 - 5.5 mmol/L    Chloride 110 100 - 111 mmol/L    CO2 25 21 - 32 mmol/L    Anion gap 5 3.0 - 18 mmol/L    Glucose 94 74 - 99 mg/dL    BUN 8 7.0 - 18 MG/DL    Creatinine 0.89 0.6 - 1.3 MG/DL    BUN/Creatinine ratio 9 (L) 12 - 20      GFR est AA >60 >60 ml/min/1.73m2    GFR est non-AA >60 >60 ml/min/1.73m2 Calcium 7.8 (L) 8.5 - 10.1 MG/DL    Bilirubin, total 3.7 (H) 0.2 - 1.0 MG/DL    ALT (SGPT) 69 (H) 16 - 61 U/L    AST (SGOT) 71 (H) 10 - 38 U/L    Alk.  phosphatase 103 45 - 117 U/L    Protein, total 7.6 6.4 - 8.2 g/dL    Albumin 3.5 3.4 - 5.0 g/dL    Globulin 4.1 (H) 2.0 - 4.0 g/dL    A-G Ratio 0.9 0.8 - 1.7     MAGNESIUM    Collection Time: 08/04/19  5:01 AM   Result Value Ref Range    Magnesium 2.1 1.6 - 2.6 mg/dL

## 2019-08-04 NOTE — PROGRESS NOTES
Received in bed awake and alert. Pt c/o gen pain=7. Is receiving dilaudid 1mg q2h. Vital signs stable. resp reg and nonlabored. Iv ns continues at 150cc/hr.

## 2019-08-05 PROCEDURE — 74011250636 HC RX REV CODE- 250/636: Performed by: INTERNAL MEDICINE

## 2019-08-05 PROCEDURE — 74011250637 HC RX REV CODE- 250/637: Performed by: INTERNAL MEDICINE

## 2019-08-05 PROCEDURE — 74011250636 HC RX REV CODE- 250/636: Performed by: NURSE PRACTITIONER

## 2019-08-05 PROCEDURE — 77030018846 HC SOL IRR STRL H20 ICUM -A

## 2019-08-05 PROCEDURE — 74011000258 HC RX REV CODE- 258: Performed by: NURSE PRACTITIONER

## 2019-08-05 PROCEDURE — 65270000029 HC RM PRIVATE

## 2019-08-05 RX ADMIN — SODIUM CHLORIDE 150 ML/HR: 900 INJECTION, SOLUTION INTRAVENOUS at 14:46

## 2019-08-05 RX ADMIN — HYDROXYUREA 500 MG: 500 CAPSULE ORAL at 08:38

## 2019-08-05 RX ADMIN — FAMOTIDINE 20 MG: 20 TABLET ORAL at 08:36

## 2019-08-05 RX ADMIN — HEPARIN SODIUM 5000 UNITS: 5000 INJECTION INTRAVENOUS; SUBCUTANEOUS at 18:08

## 2019-08-05 RX ADMIN — SODIUM CHLORIDE 150 ML/HR: 900 INJECTION, SOLUTION INTRAVENOUS at 08:10

## 2019-08-05 RX ADMIN — FOLIC ACID 1 MG: 1 TABLET ORAL at 08:36

## 2019-08-05 RX ADMIN — HYDROMORPHONE HYDROCHLORIDE 1.5 MG: 2 INJECTION INTRAMUSCULAR; INTRAVENOUS; SUBCUTANEOUS at 22:38

## 2019-08-05 RX ADMIN — HEPARIN SODIUM 5000 UNITS: 5000 INJECTION INTRAVENOUS; SUBCUTANEOUS at 23:42

## 2019-08-05 RX ADMIN — HYDROXYUREA 500 MG: 500 CAPSULE ORAL at 18:08

## 2019-08-05 RX ADMIN — HYDROMORPHONE HYDROCHLORIDE 1.5 MG: 2 INJECTION INTRAMUSCULAR; INTRAVENOUS; SUBCUTANEOUS at 16:48

## 2019-08-05 RX ADMIN — HYDROMORPHONE HYDROCHLORIDE 1.5 MG: 2 INJECTION INTRAMUSCULAR; INTRAVENOUS; SUBCUTANEOUS at 04:46

## 2019-08-05 RX ADMIN — SODIUM CHLORIDE 150 ML/HR: 900 INJECTION, SOLUTION INTRAVENOUS at 22:39

## 2019-08-05 RX ADMIN — HYDROMORPHONE HYDROCHLORIDE 1.5 MG: 2 INJECTION INTRAMUSCULAR; INTRAVENOUS; SUBCUTANEOUS at 02:46

## 2019-08-05 RX ADMIN — CELECOXIB 200 MG: 100 CAPSULE ORAL at 18:08

## 2019-08-05 RX ADMIN — CALCIUM GLUCONATE 2 G: 98 INJECTION, SOLUTION INTRAVENOUS at 11:21

## 2019-08-05 RX ADMIN — HYDROMORPHONE HYDROCHLORIDE 1.5 MG: 2 INJECTION INTRAMUSCULAR; INTRAVENOUS; SUBCUTANEOUS at 19:52

## 2019-08-05 RX ADMIN — HYDROMORPHONE HYDROCHLORIDE 1.5 MG: 2 INJECTION INTRAMUSCULAR; INTRAVENOUS; SUBCUTANEOUS at 11:21

## 2019-08-05 RX ADMIN — HEPARIN SODIUM 5000 UNITS: 5000 INJECTION INTRAVENOUS; SUBCUTANEOUS at 08:37

## 2019-08-05 RX ADMIN — HYDROMORPHONE HYDROCHLORIDE 1.5 MG: 2 INJECTION INTRAMUSCULAR; INTRAVENOUS; SUBCUTANEOUS at 08:37

## 2019-08-05 RX ADMIN — HEPARIN SODIUM 5000 UNITS: 5000 INJECTION INTRAVENOUS; SUBCUTANEOUS at 02:51

## 2019-08-05 RX ADMIN — CELECOXIB 200 MG: 100 CAPSULE ORAL at 08:36

## 2019-08-05 RX ADMIN — FAMOTIDINE 20 MG: 20 TABLET ORAL at 18:08

## 2019-08-05 RX ADMIN — HYDROMORPHONE HYDROCHLORIDE 1.5 MG: 2 INJECTION INTRAMUSCULAR; INTRAVENOUS; SUBCUTANEOUS at 06:32

## 2019-08-05 RX ADMIN — HYDROMORPHONE HYDROCHLORIDE 1.5 MG: 2 INJECTION INTRAMUSCULAR; INTRAVENOUS; SUBCUTANEOUS at 14:00

## 2019-08-05 NOTE — ROUTINE PROCESS
Bedside and Verbal shift change report given to Helena Thorne RN (oncoming nurse) by Brian oMreno RN (offgoing nurse). Report included the following information SBAR, Kardex, ED Summary, Procedure Summary, Intake/Output, MAR and Recent Results.

## 2019-08-05 NOTE — PROGRESS NOTES
[facility]  Progress Note    Patient: Mayur See Age: 34 y.o. : 1980 MR#: [id number] SSN: [id number]  Date: 2019     Subjective:   Low back and bilateral leg pain. Level of pain 7/10. No change with intensity of pain. Pain medication controlling pain more. Heating pad helping with pain. Assessment/Plan:   1. Sickle cell crisis  2. Sickle cell anemia with crisis  3. Bilateral leg pain r/t SSC  4. Low back pain r/t SSC  5. Elevated LFT, improvement    Plan  1. Hematology consult. Continue pain management, heat pad, IVF, folic acid, hydroxyurea  2. Continue to monitor metabolic and liver function. Replete. Additional Notes:      Case discussed with:  [x]Patient  []Family  [x]Nursing  []Case Management  DVT Prophylaxis:  []Lovenox  [x]Hep SQ  []SCDs  []Coumadin   []On Heparin gtt    Objective:   VS:   Visit Vitals  /70 (BP 1 Location: Right arm, BP Patient Position: At rest)   Pulse 71   Temp 97.6 °F (36.4 °C)   Resp 18   Ht 6' (1.829 m)   Wt 77.1 kg (170 lb)   SpO2 94%   BMI 23.06 kg/m²      Tmax/24hrs: Temp (24hrs), Av.9 °F (36.6 °C), Min:97.6 °F (36.4 °C), Max:98.4 °F (36.9 °C)      Intake/Output Summary (Last 24 hours) at 2019 1137  Last data filed at 2019 1121  Gross per 24 hour   Intake 840 ml   Output 1451 ml   Net -611 ml       General:  Alert, NAD  Cardiovascular:  RRR  Pulmonary:  LSC throughout; respiratory effort WNL  GI:  +BS in all four quadrants, soft, non-tender  Extremities:  No edema; 2+ dorsalis pedis pulses bilaterally  Neuro: alert and oriented      Labs:    No results found for this or any previous visit (from the past 24 hour(s)).     Signed By: Rafaela Boothe NP     2019

## 2019-08-05 NOTE — ROUTINE PROCESS
Received bedside report from Jonathan Alva, patient was resting in bed with his mother at bedside, HOB elevated, bed in lowest position and oriented to call button.

## 2019-08-05 NOTE — PROGRESS NOTES
Phone: [phone]     Hematology / Oncology Progress Note  [facility]      Patient: Zach Shay   MRN: [id number]         CSN: [id number]    YOB: 1980      Admit Date: 8/3/2019    Assessment:     Active Problems:    Sickle cell crisis ([facility] [address] (2014)      Sickle cell anemia with crisis ([facility] [address] (2015)      Bilateral leg pain (2017)      Elevated alkaline phosphatase level (3/21/2010)      Overview: 158      Serum total bilirubin elevated (2011)      Overview: T.B. 3.8, D.B. 0.4.        Elevated total protein (2011)      Overview: 8.7      Hyperbilirubinemia (2018)        Plan:     Heating pad  Ct current pain meds    Cindy Lindsay  [facility] [phone]      Subjective:   Ongoing pain    Objective:     Visit Vitals  /77 (BP 1 Location: Right arm, BP Patient Position: At rest)   Pulse 64   Temp 97.7 °F (36.5 °C)   Resp 16   Ht 6' (1.829 m)   Wt 77.1 kg (170 lb)   SpO2 97%   BMI 23.06 kg/m²             Temp (24hrs), Av °F (36.7 °C), Min:97.7 °F (36.5 °C), Max:98.4 °F (36.9 °C)        Intake/Output Summary (Last 24 hours) at 2019 0931  Last data filed at 2019 0930  Gross per 24 hour   Intake 240 ml   Output 1100 ml   Net -860 ml     Review of Systems:   Constitutional: negative for fevers, chills, sweats and fatigue  Eyes: negative for visual disturbance, redness and icterus  Ears, Nose, Mouth, Throat, and Face: negative for tinnitus, epistaxis, sore mouth and hoarseness  Respiratory: negative for cough, sputum, hemoptysis, pleurisy/chest pain or wheezing  Cardiovascular: negative for chest pain, chest pressure/discomfort, palpitations, irregular heart beats, syncope, paroxysmal nocturnal dyspnea  Gastrointestinal: negative for reflux symptoms, nausea, vomiting, change in bowel habits, melena, diarrhea, constipation and abdominal pain  Genitourinary:negative for dysuria, nocturia, urinary incontinence, hesitancy and hematuria  Integument: negative for rash, skin lesion(s) and pruritus  Hematologic/Lymphatic: negative for easy bruising, bleeding and lymphadenopathy  Musculoskeletal: bone pain  Neurological: negative for headaches, dizziness, seizures, paresthesia and weakness    Physical Exam:  Constitutional: Alert, oriented, not in distress  Eyes: PERRLA, anicteric, no redness  Ears, nose, mouth, throat, and face: no palpable Lymph nodes, no mucositis, no thrush. Respiratory: symmetrical expansion, no rales, no rhonchi, no wheezing. Cardiovascular: S1S2, no pathologic murmur, no rub. Gastrointestinal: soft, benign, non tender, no HSM, normal bowel sounds, no mass. Integument: no rash, no petechiae, no ecchymosis. Musculoskeletal: no edema, no cyanosis, no clubbing. Neurological: intact, cranial nerves, no focal motor or sensory deficits. Labs:  No results found for this or any previous visit (from the past 24 hour(s)).

## 2019-08-05 NOTE — INTERDISCIPLINARY ROUNDS
Interdisciplinary Round Note Patient Information: Patient Information: Gene Mckeon 473/01 Reason for Admission: Sickle cell crisis ([facility] [D57.00] Attending Provider:   Zhanna Jamil MD 
Primary Care Physician: 
     Alycia Hnana MD 
     [phone] Past Medical History:  
Past Medical History:  
Diagnosis Date  B12 deficiency 03/15/2010  
 210  Cholelithiasis 09/17/2012 U/S Result: Cholelithiasis  Elevated alkaline phosphatase level 03/21/2010  
 158  Elevated ALT measurement 03/21/2010  
 71  Elevated AST (SGOT) 03/10/2012  
 38  Elevated platelet count 17/16/[address]  Elevated total protein 12/27/2011  
 8.7  Hypocalcemia 07/06/2011 4.1  Hypokalemia  Hyponatremia 11/18/2009  Leukocytosis 11/16/2009  Microcytic anemia 10/25/2007  Pleural effusion on right 07/15/2015 [facility] CXR Result  Reticulocytosis 10/25/2007  
 7.8  Serum total bilirubin elevated 12/27/2011  
 T.B. 3.8, D.B. 0.4.   
 Sickle cell anemia with crisis ([facility] Dr. Erik Avila. Damle  Vitamin D deficiency 01/20/2016  
 5.4 Hospital day: 2 Estimated discharge date: 8/6/19 RRAT Score: Low Risk   
      
 9 Total Score 3 Has Seen PCP in Last 6 Months (Yes=3, No=0)  
 5 Pt. Coverage (Medicare=5 , Medicaid, or Self-Pay=4) 1 Charlson Comorbidity Score (Age + Comorbid Conditions) Criteria that do not apply:  
 . Living with Significant Other. Assisted Living. LTAC. SNF. or  
Rehab Patient Length of Stay (>5 days = 3) IP Visits Last 12 Months (1-3=4, 4=9, >4=11) Goals for Today: pain control Overnight Events: none VITAL SIGNS Vitals:  
 08/04/19 1629 08/05/19 0454 08/05/19 0824 08/05/19 1124 BP: 135/84 135/78 129/77 122/70 Pulse: 62 61 64 71 Resp: 18 18 16 18 Temp: 98.4 °F (36.9 °C) 97.9 °F (36.6 °C) 97.7 °F (36.5 °C) 97.6 °F (36.4 °C) SpO2: 95% 93% 97% 94% Weight:      
Height:      
 
 
 
 Lines, Drains, & Airways [REMOVED] Peripheral IV 08/03/19 Right Forearm-Site Assessment: Clean, dry, & intact VTE Prophylaxis Intake and Output:  
08/03 1901 - 08/05 0700 In: 5441 [P.O.:720; I.V.:1800] Out: 400 [Urine:400] 08/05 0701 - 08/05 1900 In: 840 [P.O.:720; I.V.:120] Out: 2051 [Urine:2050] Current Diet: DIET REGULAR Abdominal  
Last Bowel Movement Date: 08/05/19 Stool Appearance: Formed Appetite: Good Bowel Sounds: Active GI Prophylaxis: yes Type: pepcid Recent Glucose Results: No results found for: GLU, GLUPOC, GLUCPOC 
 
  
IV Antibiotics? no When started: Activity Level: Activity Level: Up ad milton Needs assistance with ADLs: no 
PT Consult Status: none Current Immunizations: 
Immunization History Administered Date(s) Administered  Influenza Vaccine 02/10/2015, 01/18/2016  Influenza Vaccine (Quad) PF 10/17/2015, 12/14/2016  Pneumococcal Polysaccharide (PPSV-23) 09/18/2015, 10/17/2015 Recommendations:  
Discharge Disposition: No Needs Needs for Discharge:  Recommendations from IDR team: continue plan of care. Pain control. Other Notes:

## 2019-08-05 NOTE — PROGRESS NOTES
Phone: [phone]     Hematology / Oncology Progress Note  [facility]      Patient: Ramiro Castaneda   MRN: [id number]         CSN: [id number]    YOB: 1980      Admit Date: 8/3/2019    Assessment:     Active Problems:    Sickle cell crisis ([facility] [address] (2014)      Sickle cell anemia with crisis ([facility] [address] (2015)      Bilateral leg pain (2017)      Elevated alkaline phosphatase level (3/21/2010)      Overview: 158      Serum total bilirubin elevated (2011)      Overview: T.B. 3.8, D.B. 0.4.        Elevated total protein (2011)      Overview: 8.7      Hyperbilirubinemia (2018)        Plan:   Ct current meds  Heating pad  Supportive care      Shalini Tejada MD  [facility] [phone]      Subjective:     Pain better but not resolved    Objective:     Visit Vitals  /78   Pulse 61   Temp 97.9 °F (36.6 °C)   Resp 18   Ht 6' (1.829 m)   Wt 77.1 kg (170 lb)   SpO2 93%   BMI 23.06 kg/m²             Temp (24hrs), Av.9 °F (36.6 °C), Min:97.4 °F (36.3 °C), Max:98.4 °F (36.9 °C)        Intake/Output Summary (Last 24 hours) at 2019 4357  Last data filed at 2019 0128  Gross per 24 hour   Intake    Output 400 ml   Net -400 ml     Review of Systems:   Constitutional: negative for fevers, chills, sweats and fatigue  Eyes: negative for visual disturbance, redness and icterus  Ears, Nose, Mouth, Throat, and Face: negative for tinnitus, epistaxis, sore mouth and hoarseness  Respiratory: negative for cough, sputum, hemoptysis, pleurisy/chest pain or wheezing  Cardiovascular: negative for chest pain, chest pressure/discomfort, palpitations, irregular heart beats, syncope, paroxysmal nocturnal dyspnea  Gastrointestinal: negative for reflux symptoms, nausea, vomiting, change in bowel habits, melena, diarrhea, constipation and abdominal pain  Genitourinary:negative for dysuria, nocturia, urinary incontinence, hesitancy and hematuria  Integument: negative for rash, skin lesion(s) and pruritus  Hematologic/Lymphatic: negative for easy bruising, bleeding and lymphadenopathy  Musculoskeletal: bone pain in back and legs  Neurological: negative for headaches, dizziness, seizures, paresthesia and weakness    Physical Exam:  Constitutional: Alert, oriented  Eyes: PERRLA, icteric, no redness  Ears, nose, mouth, throat, and face: no palpable Lymph nodes, no mucositis, no thrush. Respiratory: symmetrical expansion, no rales, no rhonchi, no wheezing. Cardiovascular: S1S2, no pathologic murmur, no rub. Gastrointestinal: soft, benign, non tender, no HSM, normal bowel sounds, no mass. Integument: no rash, no petechiae, no ecchymosis. Musculoskeletal: no edema, no cyanosis, no clubbing. Neurological: intact, cranial nerves, no focal motor or sensory deficits. Labs:  No results found for this or any previous visit (from the past 24 hour(s)).

## 2019-08-06 LAB
ALBUMIN SERPL-MCNC: 3.7 G/DL (ref 3.4–5)
ALBUMIN/GLOB SERPL: 0.9 {RATIO} (ref 0.8–1.7)
ALP SERPL-CCNC: 116 U/L (ref 45–117)
ALT SERPL-CCNC: 67 U/L (ref 16–61)
ANION GAP SERPL CALC-SCNC: 9 MMOL/L (ref 3–18)
AST SERPL-CCNC: 73 U/L (ref 10–38)
BASOPHILS # BLD: 0 K/UL (ref 0–0.06)
BASOPHILS NFR BLD: 0 % (ref 0–3)
BILIRUB DIRECT SERPL-MCNC: 0.6 MG/DL (ref 0–0.2)
BILIRUB SERPL-MCNC: 4.1 MG/DL (ref 0.2–1)
BUN SERPL-MCNC: 9 MG/DL (ref 7–18)
BUN/CREAT SERPL: 11 (ref 12–20)
CALCIUM SERPL-MCNC: 8.4 MG/DL (ref 8.5–10.1)
CHLORIDE SERPL-SCNC: 108 MMOL/L (ref 100–111)
CO2 SERPL-SCNC: 22 MMOL/L (ref 21–32)
CREAT SERPL-MCNC: 0.79 MG/DL (ref 0.6–1.3)
DIFFERENTIAL METHOD BLD: ABNORMAL
EOSINOPHIL # BLD: 1.3 K/UL (ref 0–0.4)
EOSINOPHIL NFR BLD: 10 % (ref 0–5)
ERYTHROCYTE [DISTWIDTH] IN BLOOD BY AUTOMATED COUNT: 24.5 % (ref 11.6–14.5)
GLOBULIN SER CALC-MCNC: 4.3 G/DL (ref 2–4)
GLUCOSE SERPL-MCNC: 78 MG/DL (ref 74–99)
HCT VFR BLD AUTO: 22.8 % (ref 36–48)
HGB BLD-MCNC: 7.7 G/DL (ref 13–16)
LYMPHOCYTES # BLD: 6.3 K/UL (ref 0.8–3.5)
LYMPHOCYTES NFR BLD: 48 % (ref 20–51)
MCH RBC QN AUTO: 24.3 PG (ref 24–34)
MCHC RBC AUTO-ENTMCNC: 33.8 G/DL (ref 31–37)
MCV RBC AUTO: 71.9 FL (ref 74–97)
MONOCYTES # BLD: 1.2 K/UL (ref 0–1)
MONOCYTES NFR BLD: 9 % (ref 2–9)
NEUTS SEG # BLD: 4.4 K/UL (ref 1.8–8)
NEUTS SEG NFR BLD: 33 % (ref 42–75)
NRBC BLD-RTO: 4 PER 100 WBC
PLATELET # BLD AUTO: 276 K/UL (ref 135–420)
PLATELET COMMENTS,PCOM: ABNORMAL
PMV BLD AUTO: 9.7 FL (ref 9.2–11.8)
POTASSIUM SERPL-SCNC: 4 MMOL/L (ref 3.5–5.5)
PROT SERPL-MCNC: 8 G/DL (ref 6.4–8.2)
RBC # BLD AUTO: 3.17 M/UL (ref 4.7–5.5)
RBC MORPH BLD: ABNORMAL
SODIUM SERPL-SCNC: 139 MMOL/L (ref 136–145)
WBC # BLD AUTO: 13.2 K/UL (ref 4.6–13.2)

## 2019-08-06 PROCEDURE — 74011250636 HC RX REV CODE- 250/636: Performed by: INTERNAL MEDICINE

## 2019-08-06 PROCEDURE — 74011250637 HC RX REV CODE- 250/637: Performed by: INTERNAL MEDICINE

## 2019-08-06 PROCEDURE — 80048 BASIC METABOLIC PNL TOTAL CA: CPT

## 2019-08-06 PROCEDURE — 65270000029 HC RM PRIVATE

## 2019-08-06 PROCEDURE — 85025 COMPLETE CBC W/AUTO DIFF WBC: CPT

## 2019-08-06 PROCEDURE — 36415 COLL VENOUS BLD VENIPUNCTURE: CPT

## 2019-08-06 PROCEDURE — 80076 HEPATIC FUNCTION PANEL: CPT

## 2019-08-06 RX ORDER — HYDROMORPHONE HYDROCHLORIDE 2 MG/ML
2 INJECTION, SOLUTION INTRAMUSCULAR; INTRAVENOUS; SUBCUTANEOUS
Status: DISCONTINUED | OUTPATIENT
Start: 2019-08-06 | End: 2019-08-07 | Stop reason: HOSPADM

## 2019-08-06 RX ADMIN — HYDROMORPHONE HYDROCHLORIDE 2 MG: 2 INJECTION INTRAMUSCULAR; INTRAVENOUS; SUBCUTANEOUS at 14:28

## 2019-08-06 RX ADMIN — SODIUM CHLORIDE 150 ML/HR: 900 INJECTION, SOLUTION INTRAVENOUS at 21:27

## 2019-08-06 RX ADMIN — HEPARIN SODIUM 5000 UNITS: 5000 INJECTION INTRAVENOUS; SUBCUTANEOUS at 17:09

## 2019-08-06 RX ADMIN — HYDROMORPHONE HYDROCHLORIDE 1.5 MG: 2 INJECTION INTRAMUSCULAR; INTRAVENOUS; SUBCUTANEOUS at 04:37

## 2019-08-06 RX ADMIN — SODIUM CHLORIDE 150 ML/HR: 900 INJECTION, SOLUTION INTRAVENOUS at 06:33

## 2019-08-06 RX ADMIN — HYDROMORPHONE HYDROCHLORIDE 2 MG: 2 INJECTION INTRAMUSCULAR; INTRAVENOUS; SUBCUTANEOUS at 21:26

## 2019-08-06 RX ADMIN — HYDROMORPHONE HYDROCHLORIDE 2 MG: 2 INJECTION INTRAMUSCULAR; INTRAVENOUS; SUBCUTANEOUS at 17:09

## 2019-08-06 RX ADMIN — CELECOXIB 200 MG: 100 CAPSULE ORAL at 17:09

## 2019-08-06 RX ADMIN — ONDANSETRON 4 MG: 2 INJECTION INTRAMUSCULAR; INTRAVENOUS at 11:40

## 2019-08-06 RX ADMIN — HYDROMORPHONE HYDROCHLORIDE 1.5 MG: 2 INJECTION INTRAMUSCULAR; INTRAVENOUS; SUBCUTANEOUS at 06:37

## 2019-08-06 RX ADMIN — HYDROMORPHONE HYDROCHLORIDE 2 MG: 2 INJECTION INTRAMUSCULAR; INTRAVENOUS; SUBCUTANEOUS at 19:17

## 2019-08-06 RX ADMIN — FOLIC ACID 1 MG: 1 TABLET ORAL at 09:07

## 2019-08-06 RX ADMIN — HYDROXYUREA 500 MG: 500 CAPSULE ORAL at 09:30

## 2019-08-06 RX ADMIN — FAMOTIDINE 20 MG: 20 TABLET ORAL at 17:10

## 2019-08-06 RX ADMIN — HYDROMORPHONE HYDROCHLORIDE 2 MG: 2 INJECTION INTRAMUSCULAR; INTRAVENOUS; SUBCUTANEOUS at 08:58

## 2019-08-06 RX ADMIN — HEPARIN SODIUM 5000 UNITS: 5000 INJECTION INTRAVENOUS; SUBCUTANEOUS at 23:47

## 2019-08-06 RX ADMIN — HYDROXYUREA 500 MG: 500 CAPSULE ORAL at 19:17

## 2019-08-06 RX ADMIN — HYDROMORPHONE HYDROCHLORIDE 2 MG: 2 INJECTION INTRAMUSCULAR; INTRAVENOUS; SUBCUTANEOUS at 23:47

## 2019-08-06 RX ADMIN — HEPARIN SODIUM 5000 UNITS: 5000 INJECTION INTRAVENOUS; SUBCUTANEOUS at 08:57

## 2019-08-06 RX ADMIN — CELECOXIB 200 MG: 100 CAPSULE ORAL at 08:57

## 2019-08-06 RX ADMIN — HYDROMORPHONE HYDROCHLORIDE 1.5 MG: 2 INJECTION INTRAMUSCULAR; INTRAVENOUS; SUBCUTANEOUS at 02:35

## 2019-08-06 RX ADMIN — HYDROMORPHONE HYDROCHLORIDE 2 MG: 2 INJECTION INTRAMUSCULAR; INTRAVENOUS; SUBCUTANEOUS at 11:40

## 2019-08-06 RX ADMIN — FAMOTIDINE 20 MG: 20 TABLET ORAL at 08:57

## 2019-08-06 RX ADMIN — SODIUM CHLORIDE 150 ML/HR: 900 INJECTION, SOLUTION INTRAVENOUS at 14:28

## 2019-08-06 NOTE — ROUTINE PROCESS
Bedside shift change report given to Rafaela Bowens RN (oncoming nurse) by Vivek Bowers (offgoing nurse). Report included the following information SBAR, Kardex, Intake/Output, MAR and Recent Results.

## 2019-08-06 NOTE — ROUTINE PROCESS
Bedside shift change report given to ANA Serrano (oncoming nurse) by Gregg Hu RN (offgoing nurse). Report included the following information SBAR, Kardex and MAR.

## 2019-08-06 NOTE — PROGRESS NOTES
Phone: [phone]     Hematology / Oncology Progress Note  [facility]      Patient: Herson Wilde   MRN: [id number]         CSN: [id number]    YOB: 1980      Admit Date: 8/3/2019    Assessment:     Active Problems:    Sickle cell crisis ([facility] [address] [facility] (2014)      Sickle cell anemia with crisis ([facility] [address] [facility] (2015)      Bilateral leg pain (2017)      Elevated alkaline phosphatase level (3/21/2010)      Overview: 158      Serum total bilirubin elevated (2011)      Overview: T.B. 3.8, D.B. 0.4.        Elevated total protein (2011)      Overview: 8.7      Hyperbilirubinemia (2018)        Plan:     Pain meds to ct  Encouraged po  Hold transfusions    Patrick Ram MD  [facility] [phone]      Subjective:     Pain better but persistent    Objective:     Visit Vitals  /71   Pulse 63   Temp 97.3 °F (36.3 °C)   Resp 17   Ht 6' (1.829 m)   Wt 77.1 kg (170 lb)   SpO2 94%   BMI 23.06 kg/m²             Temp (24hrs), Av.8 °F (36.6 °C), Min:97.3 °F (36.3 °C), Max:98.4 °F (36.9 °C)        Intake/Output Summary (Last 24 hours) at 2019 0800  Last data filed at 2019 8046  Gross per 24 hour   Intake 5390 ml   Output 3701 ml   Net 1689 ml     Review of Systems:   Constitutional: negative for fevers, chills, sweats and fatigue  Eyes: negative for visual disturbance, redness and icterus  Ears, Nose, Mouth, Throat, and Face: negative for tinnitus, epistaxis, sore mouth and hoarseness  Respiratory: negative for cough, sputum, hemoptysis, pleurisy/chest pain or wheezing  Cardiovascular: negative for chest pain, chest pressure/discomfort, palpitations, irregular heart beats, syncope, paroxysmal nocturnal dyspnea  Gastrointestinal: negative for reflux symptoms, nausea, vomiting, change in bowel habits, melena, diarrhea, constipation and abdominal pain  Genitourinary:negative for dysuria, nocturia, urinary incontinence, hesitancy and hematuria  Integument: negative for rash, skin lesion(s) and pruritus  Hematologic/Lymphatic: negative for easy bruising, bleeding and lymphadenopathy  Musculoskeletal: bone pain  Neurological: negative for headaches, dizziness, seizures, paresthesia and weakness    Physical Exam:  Constitutional: Alert, oriented, not in distress  Eyes: PERRLA, anicteric, no redness  Ears, nose, mouth, throat, and face: no palpable Lymph nodes, no mucositis, no thrush. Respiratory: symmetrical expansion, no rales, no rhonchi, no wheezing. Cardiovascular: S1S2, no pathologic murmur, no rub. Gastrointestinal: soft, benign, non tender, no HSM, normal bowel sounds, no mass. Integument: no rash, no petechiae, no ecchymosis. Musculoskeletal: no edema, no cyanosis, no clubbing. Neurological: intact, cranial nerves, no focal motor or sensory deficits. Labs:  Recent Results (from the past 24 hour(s))   CBC WITH AUTOMATED DIFF    Collection Time: 08/06/19  2:56 AM   Result Value Ref Range    WBC 13.2 4.6 - 13.2 K/uL    RBC 3.17 (L) 4.70 - 5.50 M/uL    HGB 7.7 (L) 13.0 - 16.0 g/dL    HCT 22.8 (L) 36.0 - 48.0 %    MCV 71.9 (L) 74.0 - 97.0 FL    MCH 24.3 24.0 - 34.0 PG    MCHC 33.8 31.0 - 37.0 g/dL    RDW 24.5 (H) 11.6 - 14.5 %    PLATELET 155 871 - 751 K/uL    MPV 9.7 9.2 - 11.8 FL    NEUTROPHILS 33 (L) 42 - 75 %    LYMPHOCYTES 48 20 - 51 %    MONOCYTES 9 2 - 9 %    EOSINOPHILS 10 (H) 0 - 5 %    BASOPHILS 0 0 - 3 %    NRBC 4.0 (H) 0  WBC    ABS. NEUTROPHILS 4.4 1.8 - 8.0 K/UL    ABS. LYMPHOCYTES 6.3 (H) 0.8 - 3.5 K/UL    ABS. MONOCYTES 1.2 (H) 0 - 1.0 K/UL    ABS. EOSINOPHILS 1.3 (H) 0.0 - 0.4 K/UL    ABS.  BASOPHILS 0.0 0.0 - 0.06 K/UL    DF MANUAL      PLATELET COMMENTS ADEQUATE PLATELETS      RBC COMMENTS ANISOCYTOSIS  2+        RBC COMMENTS MICROCYTOSIS  2+        RBC COMMENTS HYPOCHROMIA  2+        RBC COMMENTS POIKILOCYTOSIS  2+        RBC COMMENTS SICKLE CELLS  2+        RBC COMMENTS TARGET CELLS  1+       METABOLIC PANEL, BASIC    Collection Time: 08/06/19  2:56 AM   Result Value Ref Range    Sodium 139 136 - 145 mmol/L    Potassium 4.0 3.5 - 5.5 mmol/L    Chloride 108 100 - 111 mmol/L    CO2 22 21 - 32 mmol/L    Anion gap 9 3.0 - 18 mmol/L    Glucose 78 74 - 99 mg/dL    BUN 9 7.0 - 18 MG/DL    Creatinine 0.79 0.6 - 1.3 MG/DL    BUN/Creatinine ratio 11 (L) 12 - 20      GFR est AA >60 >60 ml/min/1.73m2    GFR est non-AA >60 >60 ml/min/1.73m2    Calcium 8.4 (L) 8.5 - 10.1 MG/DL   HEPATIC FUNCTION PANEL    Collection Time: 08/06/19  2:56 AM   Result Value Ref Range    Protein, total 8.0 6.4 - 8.2 g/dL    Albumin 3.7 3.4 - 5.0 g/dL    Globulin 4.3 (H) 2.0 - 4.0 g/dL    A-G Ratio 0.9 0.8 - 1.7      Bilirubin, total 4.1 (H) 0.2 - 1.0 MG/DL    Bilirubin, direct 0.6 (H) 0.0 - 0.2 MG/DL    Alk.  phosphatase 116 45 - 117 U/L    AST (SGOT) 73 (H) 10 - 38 U/L    ALT (SGPT) 67 (H) 16 - 61 U/L

## 2019-08-06 NOTE — PROGRESS NOTES
[facility] Hospitalist Group  Progress Note    Patient: Jia Hoffman Age: 34 y.o. : 1980 MR#: [id number] SSN: [id number]  Date: 2019     Subjective:   Lower back and bilateral knee / calf pain. Level of pain 8/10. No change with intensity of pain. Pain medication controlling pain more. Heating pad helping with pain control    Assessment/Plan:   1. Sickle cell crisis  2. Sickle cell anemia with crisis  3. Bilateral leg pain r/t SSC  4. Low back pain r/t SSC  5. Elevated LFT, improvement    Plan  1. Hematology consult appreciated; Continue pain management (improved), heat pad, IVF, folic acid, hydroxyurea - patient reports he could not manage this level of pain as OP. Has fentanyl / dilaudid PRN at home  2.  Continue to monitor metabolic and liver function - stable    Additional Notes:      Case discussed with:  [x]Patient  []Family  [x]Nursing  []Case Management  DVT Prophylaxis:  []Lovenox  [x]Hep SQ  []SCDs  []Coumadin   []On Heparin gtt    Objective:   VS:   Visit Vitals  /71 (BP 1 Location: Right arm, BP Patient Position: At rest)   Pulse 69   Temp 98 °F (36.7 °C)   Resp 18   Ht 6' (1.829 m)   Wt 77.1 kg (170 lb)   SpO2 91%   BMI 23.06 kg/m²      Tmax/24hrs: Temp (24hrs), Av °F (36.7 °C), Min:97.3 °F (36.3 °C), Max:98.4 °F (36.9 °C)      Intake/Output Summary (Last 24 hours) at 2019 1308  Last data filed at 2019 0904  Gross per 24 hour   Intake 4770 ml   Output 3075 ml   Net 1695 ml     General:  Alert, appears mildly restless  Cardiovascular:  RRR  Pulmonary:  LSC throughout  GI:  +BS in all four quadrants, soft, non-tender  Extremities:  No edema; 2+ dorsalis pedis pulses bilaterally  Neuro: alert and oriented x 3    Labs:    Recent Results (from the past 24 hour(s))   CBC WITH AUTOMATED DIFF    Collection Time: 19  2:56 AM   Result Value Ref Range    WBC 13.2 4.6 - 13.2 K/uL    RBC 3.17 (L) 4.70 - 5.50 M/uL    HGB 7.7 (L) 13.0 - 16.0 g/dL HCT 22.8 (L) 36.0 - 48.0 %    MCV 71.9 (L) 74.0 - 97.0 FL    MCH 24.3 24.0 - 34.0 PG    MCHC 33.8 31.0 - 37.0 g/dL    RDW 24.5 (H) 11.6 - 14.5 %    PLATELET 549 492 - 182 K/uL    MPV 9.7 9.2 - 11.8 FL    NEUTROPHILS 33 (L) 42 - 75 %    LYMPHOCYTES 48 20 - 51 %    MONOCYTES 9 2 - 9 %    EOSINOPHILS 10 (H) 0 - 5 %    BASOPHILS 0 0 - 3 %    NRBC 4.0 (H) 0  WBC    ABS. NEUTROPHILS 4.4 1.8 - 8.0 K/UL    ABS. LYMPHOCYTES 6.3 (H) 0.8 - 3.5 K/UL    ABS. MONOCYTES 1.2 (H) 0 - 1.0 K/UL    ABS. EOSINOPHILS 1.3 (H) 0.0 - 0.4 K/UL    ABS. BASOPHILS 0.0 0.0 - 0.06 K/UL    DF MANUAL      PLATELET COMMENTS ADEQUATE PLATELETS      RBC COMMENTS ANISOCYTOSIS  2+        RBC COMMENTS MICROCYTOSIS  2+        RBC COMMENTS HYPOCHROMIA  2+        RBC COMMENTS POIKILOCYTOSIS  2+        RBC COMMENTS SICKLE CELLS  2+        RBC COMMENTS TARGET CELLS  1+       METABOLIC PANEL, BASIC    Collection Time: 08/06/19  2:56 AM   Result Value Ref Range    Sodium 139 136 - 145 mmol/L    Potassium 4.0 3.5 - 5.5 mmol/L    Chloride 108 100 - 111 mmol/L    CO2 22 21 - 32 mmol/L    Anion gap 9 3.0 - 18 mmol/L    Glucose 78 74 - 99 mg/dL    BUN 9 7.0 - 18 MG/DL    Creatinine 0.79 0.6 - 1.3 MG/DL    BUN/Creatinine ratio 11 (L) 12 - 20      GFR est AA >60 >60 ml/min/1.73m2    GFR est non-AA >60 >60 ml/min/1.73m2    Calcium 8.4 (L) 8.5 - 10.1 MG/DL   HEPATIC FUNCTION PANEL    Collection Time: 08/06/19  2:56 AM   Result Value Ref Range    Protein, total 8.0 6.4 - 8.2 g/dL    Albumin 3.7 3.4 - 5.0 g/dL    Globulin 4.3 (H) 2.0 - 4.0 g/dL    A-G Ratio 0.9 0.8 - 1.7      Bilirubin, total 4.1 (H) 0.2 - 1.0 MG/DL    Bilirubin, direct 0.6 (H) 0.0 - 0.2 MG/DL    Alk.  phosphatase 116 45 - 117 U/L    AST (SGOT) 73 (H) 10 - 38 U/L    ALT (SGPT) 67 (H) 16 - 61 U/L     Signed By: Alia Robles NP     August 6, 2019

## 2019-08-07 VITALS
BODY MASS INDEX: 23.03 KG/M2 | SYSTOLIC BLOOD PRESSURE: 126 MMHG | DIASTOLIC BLOOD PRESSURE: 83 MMHG | WEIGHT: 170 LBS | RESPIRATION RATE: 20 BRPM | OXYGEN SATURATION: 95 % | HEIGHT: 72 IN | HEART RATE: 70 BPM | TEMPERATURE: 98 F

## 2019-08-07 LAB
ALBUMIN SERPL-MCNC: 3.5 G/DL (ref 3.4–5)
ALBUMIN/GLOB SERPL: 0.8 {RATIO} (ref 0.8–1.7)
ALP SERPL-CCNC: 133 U/L (ref 45–117)
ALT SERPL-CCNC: 70 U/L (ref 16–61)
ANION GAP SERPL CALC-SCNC: 5 MMOL/L (ref 3–18)
AST SERPL-CCNC: 74 U/L (ref 10–38)
BILIRUB SERPL-MCNC: 3.8 MG/DL (ref 0.2–1)
BUN SERPL-MCNC: 8 MG/DL (ref 7–18)
BUN/CREAT SERPL: 10 (ref 12–20)
CALCIUM SERPL-MCNC: 7.9 MG/DL (ref 8.5–10.1)
CHLORIDE SERPL-SCNC: 107 MMOL/L (ref 100–111)
CO2 SERPL-SCNC: 27 MMOL/L (ref 21–32)
CREAT SERPL-MCNC: 0.81 MG/DL (ref 0.6–1.3)
GLOBULIN SER CALC-MCNC: 4.2 G/DL (ref 2–4)
GLUCOSE SERPL-MCNC: 91 MG/DL (ref 74–99)
HCT VFR BLD AUTO: 22.8 % (ref 36–48)
HGB BLD-MCNC: 7.7 G/DL (ref 13–16)
POTASSIUM SERPL-SCNC: 3.7 MMOL/L (ref 3.5–5.5)
PROT SERPL-MCNC: 7.7 G/DL (ref 6.4–8.2)
SODIUM SERPL-SCNC: 139 MMOL/L (ref 136–145)

## 2019-08-07 PROCEDURE — 80053 COMPREHEN METABOLIC PANEL: CPT

## 2019-08-07 PROCEDURE — 74011250637 HC RX REV CODE- 250/637: Performed by: INTERNAL MEDICINE

## 2019-08-07 PROCEDURE — 74011250636 HC RX REV CODE- 250/636: Performed by: INTERNAL MEDICINE

## 2019-08-07 PROCEDURE — 85018 HEMOGLOBIN: CPT

## 2019-08-07 PROCEDURE — 36415 COLL VENOUS BLD VENIPUNCTURE: CPT

## 2019-08-07 RX ORDER — FAMOTIDINE 20 MG/1
20 TABLET, FILM COATED ORAL 2 TIMES DAILY
Qty: 60 TAB | Refills: 0 | Status: SHIPPED | OUTPATIENT
Start: 2019-08-07 | End: 2019-09-06

## 2019-08-07 RX ORDER — CELECOXIB 200 MG/1
200 CAPSULE ORAL 2 TIMES DAILY
Qty: 60 CAP | Refills: 0 | Status: SHIPPED | OUTPATIENT
Start: 2019-08-07 | End: 2019-09-06

## 2019-08-07 RX ORDER — FOLIC ACID 1 MG/1
1 TABLET ORAL DAILY
Qty: 30 TAB | Refills: 0 | Status: ON HOLD | OUTPATIENT
Start: 2019-08-07 | End: 2021-10-07

## 2019-08-07 RX ADMIN — HYDROMORPHONE HYDROCHLORIDE 2 MG: 2 INJECTION INTRAMUSCULAR; INTRAVENOUS; SUBCUTANEOUS at 03:32

## 2019-08-07 RX ADMIN — HYDROMORPHONE HYDROCHLORIDE 2 MG: 2 INJECTION INTRAMUSCULAR; INTRAVENOUS; SUBCUTANEOUS at 11:25

## 2019-08-07 RX ADMIN — FOLIC ACID 1 MG: 1 TABLET ORAL at 09:17

## 2019-08-07 RX ADMIN — CELECOXIB 200 MG: 100 CAPSULE ORAL at 09:17

## 2019-08-07 RX ADMIN — FAMOTIDINE 20 MG: 20 TABLET ORAL at 09:17

## 2019-08-07 RX ADMIN — SODIUM CHLORIDE 150 ML/HR: 900 INJECTION, SOLUTION INTRAVENOUS at 04:21

## 2019-08-07 RX ADMIN — HYDROXYUREA 500 MG: 500 CAPSULE ORAL at 09:19

## 2019-08-07 RX ADMIN — HYDROMORPHONE HYDROCHLORIDE 2 MG: 2 INJECTION INTRAMUSCULAR; INTRAVENOUS; SUBCUTANEOUS at 11:27

## 2019-08-07 RX ADMIN — HYDROMORPHONE HYDROCHLORIDE 2 MG: 2 INJECTION INTRAMUSCULAR; INTRAVENOUS; SUBCUTANEOUS at 08:08

## 2019-08-07 RX ADMIN — HYDROMORPHONE HYDROCHLORIDE 2 MG: 2 INJECTION INTRAMUSCULAR; INTRAVENOUS; SUBCUTANEOUS at 06:01

## 2019-08-07 RX ADMIN — ONDANSETRON 4 MG: 2 INJECTION INTRAMUSCULAR; INTRAVENOUS at 08:08

## 2019-08-07 NOTE — DISCHARGE SUMMARY
[facility]ist [facility]  Discharge Summary       Patient: José Gilbert Age: 34 y.o. : 1980 MR#: [id number]: [id number]  PCP on record: Millicent Garza MD  Admit date: 8/3/2019  Discharge date: 2019    Disposition:    []Home   []Home with Home Health   []SNF/NH   []Rehab   [x]Home with family   []Alternate Facility:____________________    Admission Diagnoses:  Sickle cell crisis ([facility] [address] [D57.00]    Discharge Diagnoses:                             1. Sickle cell crisis  2. Sickle cell anemia with crisis   3. Bilateral leg pain r/t SSC   4. Low back pain r/t SSC  5. Elevated LFT, improvement     Discharge Medications:     Current Discharge Medication List      START taking these medications    Details   folic acid (FOLVITE) 1 mg tablet Take 1 Tab by mouth daily. Qty: 30 Tab, Refills: 0      famotidine (PEPCID) 20 mg tablet Take 1 Tab by mouth two (2) times a day for 30 days. Qty: 60 Tab, Refills: 0      celecoxib (CELEBREX) 200 mg capsule Take 1 Cap by mouth two (2) times a day for 30 days. Qty: 60 Cap, Refills: 0         CONTINUE these medications which have NOT CHANGED    Details   hydroxyurea (HYDREA) 500 mg capsule Take 500 mg by mouth two (2) times a day. Indications: SICKLE CELL ANEMIA WITH CRISIS      fentaNYL (DURAGESIC) 25 mcg/hr PATCH 1 Patch by TransDERmal route every seventy-two (72) hours. Max Daily Amount: 1 Patch. Qty: 5 Patch, Refills: 0           Consults:    - Hematology     Procedures:  -   None    Significant Diagnostic Studies:   -  Xr Spine Thorac 2 V    Result Date: 8/3/2019  IMPRESSION: 1. Preserved alignment. No appreciable compression deformity or listhesis. Xr Spine Lumb 2 Or 3 V    Result Date: 8/3/2019  IMPRESSION: 1. Mild disc space narrowing with some sclerosis and spurring at L4-5 as above. 2. Preserved alignment. No compression deformity.     DUPLEX LOWER EXT VENOUS BILAT on 2019  No evidence of DVT within bilateral lower extremities    Hospital Course by Problem   Per H&P \"presented with acute onset of lower back pain and leg pain from knees to ankles. He had attempted to cope with it at home but unable to do so with his normal pain regimen. His sickle cell crisis occurred about every 6 months but he was able to tolerated. \"    1. Sickle cell crisis - patient treated with IV hydration, oxygen, hydroxea, NSAIDs, opiates for acute pain crisis and hematology consulted. Pain gradually improved and at time of discharge, pain is greatly improved. Patient reports having fentanyl and dilaudid at home for continued use. Continue NSAID, discussed ibuprofen use if celebrex is cost prohibitive. Encouraged adequate hydration with goal of prevent recurrence. Follow up with Dr. Juliocesar Lea as previously arranged. 2. Sickle cell anemia with crisis - blood counts followed closely and overall stable with hemoglobin levels as follows 8.4->7.3->7.7 and 7.7 prior to discharge. No bleeding concerns. 3. Bilateral leg pain r/t SSC - no evidence of DVT during admission, improved at time of discharge. 4. Low back pain r/t SSC - improved at time of discharge, imaging with mild spurring at L4-L5 only  5. Elevated LFT, improvement - at time of admission with ALT/AST of 92/123 at time of admission and improved to 70/74 respectively at time of discharge.      Today's examination of the patient revealed:     Subjective:   Mild pain to lower back and bilateral knees / calves (much improved), no SOB, CP, n/v/constipation  Objective:   VS:   Visit Vitals  BP (!) 140/91 (BP 1 Location: Right arm, BP Patient Position: At rest)   Pulse 61   Temp 98.6 °F (37 °C)   Resp 18   Ht 6' (1.829 m)   Wt 77.1 kg (170 lb)   SpO2 97%   BMI 23.06 kg/m²      Tmax/24hrs: Temp (24hrs), Av.7 °F (36.5 °C), Min:97 °F (36.1 °C), Max:98.6 °F (37 °C)     Input/Output:     Intake/Output Summary (Last 24 hours) at 2019 0859  Last data filed at 2019 0811  Gross per 24 hour   Intake 4905 ml   Output 2300 ml   Net 2605 ml     General:  Alert, NAD  Cardiovascular:  RRR  Pulmonary:  LSC throughout; respiratory effort WNL  GI:  +BS in all four quadrants, soft, non-tender  Extremities:  No edema; 2+ dorsalis pedis pulses bilaterally  Neuro: Alert and oriented x 3    Labs:    Recent Results (from the past 24 hour(s))   METABOLIC PANEL, COMPREHENSIVE    Collection Time: 08/07/19  3:27 AM   Result Value Ref Range    Sodium 139 136 - 145 mmol/L    Potassium 3.7 3.5 - 5.5 mmol/L    Chloride 107 100 - 111 mmol/L    CO2 27 21 - 32 mmol/L    Anion gap 5 3.0 - 18 mmol/L    Glucose 91 74 - 99 mg/dL    BUN 8 7.0 - 18 MG/DL    Creatinine 0.81 0.6 - 1.3 MG/DL    BUN/Creatinine ratio 10 (L) 12 - 20      GFR est AA >60 >60 ml/min/1.73m2    GFR est non-AA >60 >60 ml/min/1.73m2    Calcium 7.9 (L) 8.5 - 10.1 MG/DL    Bilirubin, total 3.8 (H) 0.2 - 1.0 MG/DL    ALT (SGPT) 70 (H) 16 - 61 U/L    AST (SGOT) 74 (H) 10 - 38 U/L    Alk. phosphatase 133 (H) 45 - 117 U/L    Protein, total 7.7 6.4 - 8.2 g/dL    Albumin 3.5 3.4 - 5.0 g/dL    Globulin 4.2 (H) 2.0 - 4.0 g/dL    A-G Ratio 0.8 0.8 - 1.7     HGB & HCT    Collection Time: 08/07/19  3:27 AM   Result Value Ref Range    HGB 7.7 (L) 13.0 - 16.0 g/dL    HCT 22.8 (L) 36.0 - 48.0 %     Additional Data Reviewed:     Condition: Stable  Follow-up Appointments:   1.  Your PCP: Desire Alamo MD, within 2 weeks as scheduled    >30 minutes spent coordinating this discharge (review instructions/follow-up, prescriptions, preparing report for sign off)    Signed:  Barry Chester NP  8/7/2019  8:59 AM

## 2019-08-07 NOTE — PROGRESS NOTES
DC instructions reviewed, voices understanding. Pt is waiting for his ride which should be around 1300.    1425  Pt escorted to car via w/c accomp by staff. All personal belongings are with pt.

## 2019-08-07 NOTE — PROGRESS NOTES
Phone: [phone]     Hematology / Oncology Progress Note  [facility]      Patient: Salo Huang   MRN: [id number]         CSN: [id number]    YOB: 1980      Admit Date: 8/3/2019    Assessment:     Active Problems:    Sickle cell crisis ([facility] [address] (2014)      Sickle cell anemia with crisis ([facility] [address] (2015)      Bilateral leg pain (2017)      Elevated alkaline phosphatase level (3/21/2010)      Overview: 158      Serum total bilirubin elevated (2011)      Overview: T.B. 3.8, D.B. 0.4.        Elevated total protein (2011)      Overview: 8.7      Hyperbilirubinemia (2018)        Plan:     D/c home today  F/u with [facility] office, pt has appt  Will ct fentanyl 25ugm/hr q 72 hr , with dilaudid 2mg q 6 prn, will give script  Pt will ct hydrea and folic acid    Fam Win RUBIO  [facility] [phone]      Subjective:     Pain better  Wants to go home    Objective:     Visit Vitals  BP (!) 140/91 (BP 1 Location: Right arm, BP Patient Position: At rest)   Pulse 61   Temp 98.6 °F (37 °C)   Resp 18   Ht 6' (1.829 m)   Wt 77.1 kg (170 lb)   SpO2 97%   BMI 23.06 kg/m²             Temp (24hrs), Av.7 °F (36.5 °C), Min:97 °F (36.1 °C), Max:98.6 °F (37 °C)        Intake/Output Summary (Last 24 hours) at 2019 0841  Last data filed at 2019 0023  Gross per 24 hour   Intake 4905 ml   Output 2300 ml   Net 2605 ml     Review of Systems:   Constitutional: negative for fevers, chills, sweats and fatigue  Eyes: negative for visual disturbance, redness and icterus  Ears, Nose, Mouth, Throat, and Face: negative for tinnitus, epistaxis, sore mouth and hoarseness  Respiratory: negative for cough, sputum, hemoptysis, pleurisy/chest pain or wheezing  Cardiovascular: negative for chest pain, chest pressure/discomfort, palpitations, irregular heart beats, syncope, paroxysmal nocturnal dyspnea  Gastrointestinal: negative for reflux symptoms, nausea, vomiting, change in bowel habits, melena, diarrhea, constipation and abdominal pain  Genitourinary:negative for dysuria, nocturia, urinary incontinence, hesitancy and hematuria  Integument: negative for rash, skin lesion(s) and pruritus  Hematologic/Lymphatic: negative for easy bruising, bleeding and lymphadenopathy  Musculoskeletal:negative for myalgias, arthralgias and bone pain  Neurological: negative for headaches, dizziness, seizures, paresthesia and weakness    Physical Exam:  Constitutional: Alert, oriented, not in distress  Eyes: PERRLA, anicteric, no redness  Ears, nose, mouth, throat, and face: no palpable Lymph nodes, no mucositis, no thrush. Respiratory: symmetrical expansion, no rales, no rhonchi, no wheezing. Cardiovascular: S1S2, no pathologic murmur, no rub. Gastrointestinal: soft, benign, non tender, no HSM, normal bowel sounds, no mass. Integument: no rash, no petechiae, no ecchymosis. Musculoskeletal: no edema, no cyanosis, no clubbing. Neurological: intact, cranial nerves, no focal motor or sensory deficits. Labs:  Recent Results (from the past 24 hour(s))   METABOLIC PANEL, COMPREHENSIVE    Collection Time: 08/07/19  3:27 AM   Result Value Ref Range    Sodium 139 136 - 145 mmol/L    Potassium 3.7 3.5 - 5.5 mmol/L    Chloride 107 100 - 111 mmol/L    CO2 27 21 - 32 mmol/L    Anion gap 5 3.0 - 18 mmol/L    Glucose 91 74 - 99 mg/dL    BUN 8 7.0 - 18 MG/DL    Creatinine 0.81 0.6 - 1.3 MG/DL    BUN/Creatinine ratio 10 (L) 12 - 20      GFR est AA >60 >60 ml/min/1.73m2    GFR est non-AA >60 >60 ml/min/1.73m2    Calcium 7.9 (L) 8.5 - 10.1 MG/DL    Bilirubin, total 3.8 (H) 0.2 - 1.0 MG/DL    ALT (SGPT) 70 (H) 16 - 61 U/L    AST (SGOT) 74 (H) 10 - 38 U/L    Alk.  phosphatase 133 (H) 45 - 117 U/L    Protein, total 7.7 6.4 - 8.2 g/dL    Albumin 3.5 3.4 - 5.0 g/dL    Globulin 4.2 (H) 2.0 - 4.0 g/dL    A-G Ratio 0.8 0.8 - 1.7     HGB & HCT    Collection Time: 08/07/19  3:27 AM   Result Value Ref Range    HGB 7.7 (L) 13.0 - 16.0 g/dL    HCT 22.8 (L) 36.0 - 48.0 %

## 2019-08-07 NOTE — DISCHARGE INSTRUCTIONS
Patient Education        Sickle Cell Crisis: Care Instructions  Your Care Instructions    Sickle cell crisis is a painful episode that may begin suddenly in a person with sickle cell disease. Sickle cell disease turns normal, round red blood cells into cells that look like jeane or crescent moons. The sickle-shaped cells can get stuck in blood vessels, blocking blood flow and causing severe pain. The pain can occur in the bones of the spine, the arms and legs, the chest, and the abdomen. An episode may be called a \"painful event\" or \"painful crisis. \" Some people who have sickle cell disease have many painful events, while others have few or none. Treatment depends on the level of pain and how long it lasts. Sometimes taking nonprescription pain relievers can help. Or you may need stronger pain relief medicine that is prescribed or given by a doctor. You may need to be treated in the hospital.  It isn't always possible to know what sets off a painful event. But triggers include being dehydrated, cold temperatures, infection, stress, and not getting enough oxygen. Follow-up care is a key part of your treatment and safety. Be sure to make and go to all appointments, and call your doctor if you are having problems. It's also a good idea to know your test results and keep a list of the medicines you take. How can you care for yourself at home? · Create a pain management plan with your doctor. This plan should include the types of medicines you can take and other actions you can take at home to relieve pain. · Drink plenty of fluids, enough so that your urine is light yellow or clear like water. If you have kidney, heart, or liver disease and have to limit fluids, talk with your doctor before you increase the amount of fluids you drink. · Take your medicines exactly as prescribed. Call your doctor if you think you are having a problem with your medicine. · Take pain medicines exactly as directed.   ? If the doctor gave you a prescription medicine for pain, take it as prescribed. ? If you are not taking a prescription pain medicine, ask your doctor if you can take an over-the-counter medicine. · Avoid alcohol. It can make you dehydrated. · Dress warmly in cold weather. The cold and windy weather can lead to severe pain. · Do not smoke. Smoking can reduce the amount of oxygen in your blood. · Get plenty of sleep. When should you call for help? Call 911 anytime you think you may need emergency care. For example, call if:    · You have symptoms of a severe problem from sickle cell.     · You have symptoms of a stroke. These may include:  ? Sudden numbness, tingling, weakness, or loss of movement in your face, arm, or leg, especially on only one side of your body. ? Sudden vision changes. ? Sudden trouble speaking. ? Sudden confusion or trouble understanding simple statements. ? Sudden problems with walking or balance. ? A sudden, severe headache that is different from past headaches.     · You are in severe pain.     · You have symptoms of a heart attack. These may include:  ? Chest pain or pressure, or a strange feeling in the chest.  ? Sweating. ? Shortness of breath. ? Nausea or vomiting. ? Pain, pressure, or a strange feeling in the back, neck, jaw, or upper belly or in one or both shoulders or arms. ? Lightheadedness or sudden weakness. ? A fast or irregular heartbeat. After you call 911, the  may tell you to chew 1 adult-strength or 2 to 4 low-dose aspirin. Wait for an ambulance. Do not try to drive yourself.    Call your doctor now or seek immediate medical care if:    · You have a fever.    Watch closely for changes in your health, and be sure to contact your doctor if you have any problems. Where can you learn more? Go to http://manuel-amadeo.info/. Enter F104 in the search box to learn more about \"Sickle Cell Crisis: Care Instructions. \"  Current as of: March 28, 2019  Content Version: 12.1  © 5363-5676 [employer]wise, [employer]. Care instructions adapted under license by [employer] (which disclaims liability or warranty for this information). If you have questions about a medical condition or this instruction, always ask your healthcare professional. [address] any warranty or liability for your use of this information. Patient armband removed and shredded       DISCHARGE SUMMARY from Nurse    PATIENT INSTRUCTIONS:    After general anesthesia or intravenous sedation, for 24 hours or while taking prescription Narcotics:  · Limit your activities  · Do not drive and operate hazardous machinery  · Do not make important personal or business decisions  · Do  not drink alcoholic beverages  · If you have not urinated within 8 hours after discharge, please contact your surgeon on call. Report the following to your surgeon:  · Excessive pain, swelling, redness or odor of or around the surgical area  · Temperature over 100.5  · Nausea and vomiting lasting longer than 4 hours or if unable to take medications  · Any signs of decreased circulation or nerve impairment to extremity: change in color, persistent  numbness, tingling, coldness or increase pain  · Any questions    What to do at Home    *  Please give a list of your current medications to your Primary Care Provider. *  Please update this list whenever your medications are discontinued, doses are      changed, or new medications (including over-the-counter products) are added. *  Please carry medication information at all times in case of emergency situations. These are general instructions for a healthy lifestyle:    No smoking/ No tobacco products/ Avoid exposure to second hand smoke  Surgeon General's Warning:  Quitting smoking now greatly reduces serious risk to your health.     Obesity, smoking, and sedentary lifestyle greatly increases your risk for illness    A healthy diet, regular physical exercise & weight monitoring are important for maintaining a healthy lifestyle    You may be retaining fluid if you have a history of heart failure or if you experience any of the following symptoms:  Weight gain of 3 pounds or more overnight or 5 pounds in a week, increased swelling in our hands or feet or shortness of breath while lying flat in bed. Please call your doctor as soon as you notice any of these symptoms; do not wait until your next office visit. The discharge information has been reviewed with the patient. The patient verbalized understanding. Discharge medications reviewed with the patient and appropriate educational materials and side effects teaching were provided. ___________________________________________________________________________________________________________________________________Discharge Instructions    Patient: Rossi Zhang MRN: [id number]  CSN: [id number]    YOB: 1980  Age: 34 y.o. Sex: male    DOA: 8/3/2019 LOS:  LOS: 4 days   Discharge Date:      DIET:  Regular Diet    · ACTIVITY: Activity as tolerated    ADDITIONAL INFORMATION: If you experience any of the following symptoms but not limited to Fever, chills, nausea, vomiting, diarrhea, change in mentation, falling, bleeding, shortness of breath, chest pain, please call your primary care physician or return to the emergency room if you cannot get hold of your doctor:     FOLLOW UP CARE:  Dr. Desire Alamo MD in approximately 2 weeks as scheduled    Barry Chester NP  8/7/2019 8:58 AM        Discharge Instructions    Patient: Rossi Zhang MRN: [id number]  CSN: [id number]    YOB: 1980  Age: 34 y.o. Sex: male    DOA: 8/3/2019 LOS:  LOS: 4 days   Discharge Date:      ACUTE DIAGNOSES:  1)  Sickle cell pain crisis.    2)  Microcytic anemia, in the setting of sickle cell disease   3)  Hyperbilirubinemia, likely due to sickle cells disease (no jaundice evidence)  4)  Elevated AST, ALT, Alk phos related to sickle cell disease and hemolysis  5)  Hyperproteinemia   6)  H/o gallstone on previous US abd      DISCHARGE MEDICATIONS:         · It is important that you take the medication exactly as they are prescribed. · Keep your medication in the bottles provided by the pharmacist and keep a list of the medication names, dosages, and times to be taken in your wallet. · Do not take other medications without consulting your doctor. DIET:  Regular Diet    ACTIVITY: Activity as tolerated    ADDITIONAL INFORMATION: If you experience any of the following symptoms then please call your primary care physician or return to the emergency room if you cannot get hold of your doctor: Fever, chills, nausea, vomiting, diarrhea, change in mentation, falling, bleeding, shortness of breath. FOLLOW UP CARE:  Dr. Nancy Carrion MD  you are to call and set up an appointment to see them in 1-2 weeks. Information obtained by :  I understand that if any problems occur once I am at home I am to contact my physician. I understand and acknowledge receipt of the instructions indicated above.                                                                                                                                            Physician's or R.N.'s Signature                                                                  Date/Time                                                                                                                                              Patient or Representative Signature                                                          Date/Time    Naima Becerril MD  8/7/2019  8:49 AM

## 2019-08-07 NOTE — PROGRESS NOTES
\"Important Message from [employer]" reviewed and explained with the patient and/or representative at bedside and signature was obtained. A signed copy provided to patient/representative. Original signed document placed in patient's chart. Jacy Montenegro MSW  Care Manager  Pager#: [phone]

## 2019-08-07 NOTE — PROGRESS NOTES
Discharge:    Patient will discharge home today (8/7/2019) with family assistance. Patient has no home health needs or orders at this time. There are no care management concerns regarding this discharge. Patient's family will transport him home at the time of discharge. This writer will continue to closely monitor for discharge planning to ensure a safe discharge home from [place]. Jacy Ochoa MSJOSE  Care Manager  Pager#: [phone]

## 2019-08-07 NOTE — PROGRESS NOTES
Problem: Pain  Goal: *Control of Pain  Outcome: Progressing Towards Goal     Problem: Patient Education: Go to Patient Education Activity  Goal: Patient/Family Education  Outcome: Progressing Towards Goal     Problem: Falls - Risk of  Goal: *Absence of Falls  Description  [facility] and appropriate interventions in the flowsheet.   Outcome: Progressing Towards Goal  Note:   Fall Risk Interventions:            Medication Interventions: Bed/chair exit alarm, Patient to call before getting OOB         History of Falls Interventions: Bed/chair exit alarm         Problem: Patient Education: Go to Patient Education Activity  Goal: Patient/Family Education  Outcome: Progressing Towards Goal

## 2019-08-07 NOTE — ROUTINE PROCESS
Bedside shift change report given to ANA Serrano (oncoming nurse) by Stephanie Buenrostro RN (offgoing nurse). Report included the following information SBAR, Kardex and MAR.

## 2019-11-03 ENCOUNTER — HOSPITAL ENCOUNTER (EMERGENCY)
Age: 39
Discharge: HOME | End: 2019-11-04
Attending: EMERGENCY MEDICINE
Payer: MEDICARE

## 2019-11-03 ENCOUNTER — APPOINTMENT (OUTPATIENT)
Dept: GENERAL RADIOLOGY | Age: 39
End: 2019-11-03
Attending: EMERGENCY MEDICINE
Payer: MEDICARE

## 2019-11-03 DIAGNOSIS — D57.00 SICKLE CELL CRISIS (HCC): Primary | ICD-10-CM

## 2019-11-03 LAB
ALBUMIN SERPL-MCNC: 3.8 G/DL (ref 3.4–5)
ALBUMIN/GLOB SERPL: 0.9 {RATIO} (ref 0.8–1.7)
ALP SERPL-CCNC: 98 U/L (ref 45–117)
ALT SERPL-CCNC: 41 U/L (ref 16–61)
ANION GAP SERPL CALC-SCNC: 5 MMOL/L (ref 3–18)
AST SERPL-CCNC: 51 U/L (ref 10–38)
BILIRUB SERPL-MCNC: 3.3 MG/DL (ref 0.2–1)
BUN SERPL-MCNC: 8 MG/DL (ref 7–18)
BUN/CREAT SERPL: 9 (ref 12–20)
CALCIUM SERPL-MCNC: 8.2 MG/DL (ref 8.5–10.1)
CHLORIDE SERPL-SCNC: 112 MMOL/L (ref 100–111)
CO2 SERPL-SCNC: 25 MMOL/L (ref 21–32)
CREAT SERPL-MCNC: 0.86 MG/DL (ref 0.6–1.3)
GLOBULIN SER CALC-MCNC: 4.3 G/DL (ref 2–4)
GLUCOSE SERPL-MCNC: 99 MG/DL (ref 74–99)
POTASSIUM SERPL-SCNC: 3.6 MMOL/L (ref 3.5–5.5)
PROT SERPL-MCNC: 8.1 G/DL (ref 6.4–8.2)
SODIUM SERPL-SCNC: 142 MMOL/L (ref 136–145)

## 2019-11-03 PROCEDURE — 96374 THER/PROPH/DIAG INJ IV PUSH: CPT

## 2019-11-03 PROCEDURE — 99282 EMERGENCY DEPT VISIT SF MDM: CPT

## 2019-11-03 PROCEDURE — 80053 COMPREHEN METABOLIC PANEL: CPT

## 2019-11-03 PROCEDURE — 71045 X-RAY EXAM CHEST 1 VIEW: CPT

## 2019-11-03 RX ORDER — FENTANYL CITRATE 50 UG/ML
25 INJECTION, SOLUTION INTRAMUSCULAR; INTRAVENOUS
Status: DISCONTINUED | OUTPATIENT
Start: 2019-11-03 | End: 2019-11-04 | Stop reason: HOSPADM

## 2019-11-03 RX ORDER — HYDROMORPHONE HYDROCHLORIDE 2 MG/ML
2 INJECTION, SOLUTION INTRAMUSCULAR; INTRAVENOUS; SUBCUTANEOUS ONCE
Status: COMPLETED | OUTPATIENT
Start: 2019-11-03 | End: 2019-11-04

## 2019-11-04 LAB
BASOPHILS # BLD: 0.2 K/UL (ref 0–0.06)
BASOPHILS NFR BLD: 2 % (ref 0–3)
DIFFERENTIAL METHOD BLD: ABNORMAL
EOSINOPHIL # BLD: 0.9 K/UL (ref 0–0.4)
EOSINOPHIL NFR BLD: 7 % (ref 0–5)
ERYTHROCYTE [DISTWIDTH] IN BLOOD BY AUTOMATED COUNT: 22.6 % (ref 11.6–14.5)
HCT VFR BLD AUTO: 19.1 % (ref 36–48)
HGB BLD-MCNC: 6.6 G/DL (ref 13–16)
LYMPHOCYTES # BLD: 5.4 K/UL (ref 0.8–3.5)
LYMPHOCYTES NFR BLD: 44 % (ref 20–51)
MCH RBC QN AUTO: 25.3 PG (ref 24–34)
MCHC RBC AUTO-ENTMCNC: 34.6 G/DL (ref 31–37)
MCV RBC AUTO: 73.2 FL (ref 74–97)
MONOCYTES # BLD: 1.1 K/UL (ref 0–1)
MONOCYTES NFR BLD: 9 % (ref 2–9)
NEUTS BAND NFR BLD MANUAL: 1 % (ref 0–5)
NEUTS SEG # BLD: 4.7 K/UL (ref 1.8–8)
NEUTS SEG NFR BLD: 37 % (ref 42–75)
NRBC BLD-RTO: 4 PER 100 WBC
PLATELET # BLD AUTO: 305 K/UL (ref 135–420)
PLATELET COMMENTS,PCOM: ABNORMAL
PMV BLD AUTO: 9.1 FL (ref 9.2–11.8)
RBC # BLD AUTO: 2.61 M/UL (ref 4.7–5.5)
RBC MORPH BLD: ABNORMAL
RETICS/RBC NFR AUTO: 9.1 % (ref 0.5–2.3)
WBC # BLD AUTO: 12.3 K/UL (ref 4.6–13.2)

## 2019-11-04 PROCEDURE — 74011250636 HC RX REV CODE- 250/636: Performed by: EMERGENCY MEDICINE

## 2019-11-04 PROCEDURE — 85025 COMPLETE CBC W/AUTO DIFF WBC: CPT

## 2019-11-04 PROCEDURE — 85045 AUTOMATED RETICULOCYTE COUNT: CPT

## 2019-11-04 RX ADMIN — SODIUM CHLORIDE 1000 ML: 900 INJECTION, SOLUTION INTRAVENOUS at 00:22

## 2019-11-04 RX ADMIN — HYDROMORPHONE HYDROCHLORIDE 2 MG: 2 INJECTION, SOLUTION INTRAMUSCULAR; INTRAVENOUS; SUBCUTANEOUS at 04:41

## 2019-11-04 RX ADMIN — SODIUM CHLORIDE 1000 ML: 900 INJECTION, SOLUTION INTRAVENOUS at 01:22

## 2019-11-04 NOTE — ED PROVIDER NOTES
EMERGENCY DEPARTMENT HISTORY AND PHYSICAL EXAM    10:48 PM  Date: 11/3/2019  Patient Name: Sunday Ríos    History of Presenting Illness     Chief Complaint   Patient presents with    Sickle Cell Crisis        History Provided By: patient    HPI: Sunday Ríos is a 37 y.o. male with past medical history of sickle cell presents with lower back pain. Denies any SOB, cough, chest pain. States that he is taking his hydroxyurea. Denies any nausea or vomiting. Followed up by a Dr. Jennifer Monroy in hematology. PCP: Jolly Galaviz MD    Past History     Past Medical History:  Past Medical History:   Diagnosis Date    B12 deficiency 03/15/2010    210    Cholelithiasis 09/17/2012    U/S Result: Cholelithiasis    Elevated alkaline phosphatase level 03/21/2010    158    Elevated ALT measurement 03/21/2010    71    Elevated AST (SGOT) 03/10/2012    38    Elevated platelet count 11/29/4348    494    Elevated total protein 12/27/2011    8.7    Hypocalcemia 07/06/2011    4.1    Hypokalemia     Hyponatremia 11/18/2009    Leukocytosis 11/16/2009    Microcytic anemia 10/25/2007    Pleural effusion on right 07/15/2015    [facility] CXR Result    Reticulocytosis 10/25/2007    7.8    Serum total bilirubin elevated 12/27/2011    T.B. 3.8, D.B. 0.4.     Sickle cell anemia with crisis ([facility]     Dr. Abena Chase    Vitamin D deficiency 01/20/2016    5.4       Past Surgical History:  Past Surgical History:   Procedure Laterality Date    HX CHOLECYSTECTOMY         Family History:  Family History   Problem Relation Age of Onset    Cancer Neg Hx     Diabetes Neg Hx     Heart Disease Neg Hx     Heart Attack Neg Hx     Hypertension Neg Hx     Stroke Neg Hx        Social History:  Social History     Tobacco Use    Smoking status: Never Smoker    Smokeless tobacco: Never Used   Substance Use Topics    Alcohol use: Yes     Comment: Occasional    Drug use: No       Allergies:   Allergies   Allergen Reactions    Eggshell Membrane Nausea and Vomiting       Review of Systems   Review of Systems   Constitutional: Negative for activity change, appetite change and chills. HENT: Negative for congestion, ear discharge, ear pain and sore throat. Eyes: Negative for photophobia and pain. Respiratory: Negative for cough and choking. Cardiovascular: Negative for palpitations and leg swelling. Gastrointestinal: Negative for anal bleeding and rectal pain. Endocrine: Negative for polydipsia and polyuria. Genitourinary: Negative for genital sores and urgency. Musculoskeletal: Positive for back pain. Negative for arthralgias and myalgias. Neurological: Negative for dizziness, seizures and speech difficulty. Psychiatric/Behavioral: Negative for hallucinations, self-injury and suicidal ideas. Physical Exam     No data found. Physical Exam   Constitutional: He is oriented to person, place, and time. He appears well-developed and well-nourished. HENT:   Head: Normocephalic and atraumatic. Eyes: Right eye exhibits no discharge. Left eye exhibits no discharge. Neck: Normal range of motion. Neck supple. Cardiovascular: Normal rate, regular rhythm, normal heart sounds and intact distal pulses. No murmur heard. Pulmonary/Chest: Effort normal and breath sounds normal. No stridor. No respiratory distress. He has no wheezes. He has no rales. He exhibits no tenderness. Abdominal: Soft. Bowel sounds are normal. He exhibits no distension. There is no tenderness. There is no rebound and no guarding. Musculoskeletal: Normal range of motion. Lower lumbar back mild tenderness   Neurological: He is alert and oriented to person, place, and time. Skin: Skin is warm and dry. Psychiatric: He has a normal mood and affect. Nursing note and vitals reviewed. Diagnostic Study Results     Labs -  No results found for this or any previous visit (from the past 12 hour(s)).     Radiologic Studies - No results found. Medical Decision Making     ED Course: Progress Notes, Reevaluation, and Consults:    10:48 PM Initial assessment performed. The patients presenting problems have been discussed, and they/their family are in agreement with the care plan formulated and outlined with them. I have encouraged them to ask questions as they arise throughout their visit. Provider Notes (Medical Decision Making): Patient with past medical history of sickle cell disease presents with lower back pain   Patient does not desire imaging at present  Reassessed multiple times, patient sleeping  we are going to hydrate back with pain medication and reassess  Hgb: 6.6 a little lower that his baseline: 7.5  Reticulocyte count:9.1 ; 8.9% in August  Patient feels better on reassessment  Wants to be discharged and f/u with hematologist as OP      Vital Signs-Reviewed the patient's vital signs. Reviewed pt's pulse ox reading. Records Reviewed:  old medical records (Time of Review: 10:48 PM)  -I am the first provider for this patient.  -I reviewed the vital signs, available nursing notes, past medical history, past surgical history, family history and social history. Current Facility-Administered Medications   Medication Dose Route Frequency Provider Last Rate Last Dose    fentaNYL citrate (PF) injection 25 mcg  25 mcg IntraVENous NOW Jase Maria MD        HYDROmorphone (PF) (DILAUDID) injection 2 mg  2 mg IntraVENous ONCE Jase Maria MD        sodium chloride 0.9 % bolus infusion 1,000 mL  1,000 mL IntraVENous ONCE Jase Maria MD        Followed by   Waldo Garg sodium chloride 0.9 % bolus infusion 1,000 mL  1,000 mL IntraVENous Stanley Madera MD         Current Outpatient Medications   Medication Sig Dispense Refill    folic acid (FOLVITE) 1 mg tablet Take 1 Tab by mouth daily. 30 Tab 0    hydroxyurea (HYDREA) 500 mg capsule Take 500 mg by mouth two (2) times a day.  Indications: SICKLE CELL ANEMIA WITH CRISIS      fentaNYL (DURAGESIC) 25 mcg/hr PATCH 1 Patch by TransDERmal route every seventy-two (72) hours. Max Daily Amount: 1 Patch. 5 Patch 0        Clinical Impression     Clinical Impression: sickle cell crisis    Disposition: discharge      DISCHARGE NOTE:   Pt has been reexamined. Patient has no new complaints, changes, or physical findings. Care plan outlined and precautions discussed. Results were reviewed with the patient. All medications were reviewed with the patient; will d/c home with PMD f/u. All of pt's questions and concerns were addressed. Patient was instructed and agrees to follow up with PMD, as well as to return to the ED upon further deterioration. Patient is ready to go home. This note was dictated utilizing voice recognition software which may lead to typographical errors. I apologize in advance if the situation occurs. If questions arise please do not hesitate to contact me or call our department. This note was dictated utilizing voice recognition software which may lead to typographical errors. I apologize in advance if the situation occurs. If questions arise please do not hesitate to contact me or call our department.     Adele Douglas MD  10:48 PM

## 2019-11-10 ENCOUNTER — HOSPITAL ENCOUNTER (EMERGENCY)
Age: 39
Discharge: HOME | End: 2019-11-10
Attending: EMERGENCY MEDICINE | Admitting: EMERGENCY MEDICINE
Payer: MEDICARE

## 2019-11-10 VITALS
HEART RATE: 59 BPM | DIASTOLIC BLOOD PRESSURE: 72 MMHG | WEIGHT: 170 LBS | BODY MASS INDEX: 23.03 KG/M2 | OXYGEN SATURATION: 98 % | TEMPERATURE: 97.5 F | SYSTOLIC BLOOD PRESSURE: 116 MMHG | HEIGHT: 72 IN | RESPIRATION RATE: 17 BRPM

## 2019-11-10 DIAGNOSIS — D57.00 HB-SS DISEASE WITH CRISIS (HCC): Primary | ICD-10-CM

## 2019-11-10 LAB
ALBUMIN SERPL-MCNC: 4.3 G/DL (ref 3.4–5)
ALBUMIN/GLOB SERPL: 0.9 {RATIO} (ref 0.8–1.7)
ALP SERPL-CCNC: 123 U/L (ref 45–117)
ALT SERPL-CCNC: 38 U/L (ref 16–61)
ANION GAP SERPL CALC-SCNC: 8 MMOL/L (ref 3–18)
AST SERPL-CCNC: 60 U/L (ref 10–38)
BASOPHILS # BLD: 0 K/UL (ref 0–0.06)
BASOPHILS NFR BLD: 0 % (ref 0–3)
BILIRUB SERPL-MCNC: 5.4 MG/DL (ref 0.2–1)
BUN SERPL-MCNC: 9 MG/DL (ref 7–18)
BUN/CREAT SERPL: 10 (ref 12–20)
CALCIUM SERPL-MCNC: 8.4 MG/DL (ref 8.5–10.1)
CHLORIDE SERPL-SCNC: 107 MMOL/L (ref 100–111)
CO2 SERPL-SCNC: 24 MMOL/L (ref 21–32)
CREAT SERPL-MCNC: 0.92 MG/DL (ref 0.6–1.3)
DIFFERENTIAL METHOD BLD: ABNORMAL
EOSINOPHIL # BLD: 2.5 K/UL (ref 0–0.4)
EOSINOPHIL NFR BLD: 16 % (ref 0–5)
ERYTHROCYTE [DISTWIDTH] IN BLOOD BY AUTOMATED COUNT: 24.4 % (ref 11.6–14.5)
GLOBULIN SER CALC-MCNC: 4.9 G/DL (ref 2–4)
GLUCOSE SERPL-MCNC: 84 MG/DL (ref 74–99)
HCT VFR BLD AUTO: 25.5 % (ref 36–48)
HGB BLD-MCNC: 9.1 G/DL (ref 13–16)
LYMPHOCYTES # BLD: 6.2 K/UL (ref 0.8–3.5)
LYMPHOCYTES NFR BLD: 40 % (ref 20–51)
MCH RBC QN AUTO: 25.8 PG (ref 24–34)
MCHC RBC AUTO-ENTMCNC: 35.7 G/DL (ref 31–37)
MCV RBC AUTO: 72.2 FL (ref 74–97)
MONOCYTES # BLD: 0.6 K/UL (ref 0–1)
MONOCYTES NFR BLD: 4 % (ref 2–9)
NEUTS SEG # BLD: 6.2 K/UL (ref 1.8–8)
NEUTS SEG NFR BLD: 40 % (ref 42–75)
NRBC BLD-RTO: 2 PER 100 WBC
PLATELET # BLD AUTO: 356 K/UL (ref 135–420)
PLATELET COMMENTS,PCOM: ABNORMAL
PMV BLD AUTO: 9.5 FL (ref 9.2–11.8)
POTASSIUM SERPL-SCNC: 3.9 MMOL/L (ref 3.5–5.5)
PROT SERPL-MCNC: 9.2 G/DL (ref 6.4–8.2)
RBC # BLD AUTO: 3.53 M/UL (ref 4.7–5.5)
RBC MORPH BLD: ABNORMAL
RETICS/RBC NFR AUTO: 11.1 % (ref 0.5–2.3)
SODIUM SERPL-SCNC: 139 MMOL/L (ref 136–145)
WBC # BLD AUTO: 15.5 K/UL (ref 4.6–13.2)

## 2019-11-10 PROCEDURE — 96374 THER/PROPH/DIAG INJ IV PUSH: CPT

## 2019-11-10 PROCEDURE — 85045 AUTOMATED RETICULOCYTE COUNT: CPT

## 2019-11-10 PROCEDURE — 85025 COMPLETE CBC W/AUTO DIFF WBC: CPT

## 2019-11-10 PROCEDURE — 74011250636 HC RX REV CODE- 250/636: Performed by: EMERGENCY MEDICINE

## 2019-11-10 PROCEDURE — 80053 COMPREHEN METABOLIC PANEL: CPT

## 2019-11-10 PROCEDURE — 99284 EMERGENCY DEPT VISIT MOD MDM: CPT

## 2019-11-10 RX ORDER — HYDROMORPHONE HYDROCHLORIDE 2 MG/ML
2 INJECTION, SOLUTION INTRAMUSCULAR; INTRAVENOUS; SUBCUTANEOUS ONCE
Status: COMPLETED | OUTPATIENT
Start: 2019-11-10 | End: 2019-11-10

## 2019-11-10 RX ADMIN — HYDROMORPHONE HYDROCHLORIDE 2 MG: 2 INJECTION, SOLUTION INTRAMUSCULAR; INTRAVENOUS; SUBCUTANEOUS at 09:06

## 2019-11-10 NOTE — ED PROVIDER NOTES
EMERGENCY DEPARTMENT HISTORY AND PHYSICAL EXAM    8:30 AM  Date: 11/10/2019  Patient Name: Stacy Caldwell    History of Presenting Illness     Chief Complaint   Patient presents with    Sickle Cell Crisis        History Provided By:     HPI: Stacy Caldwell is a 37 y.o. male with past medical history of sickle cell presents with lower back pain and leg pain. Patient was here last week and says he had improved in the meanwhile. Today he changed his fentanyl patch since beginning his hydroxyurea was seen by Dr. Walker Gallegos last week and said he had no complaints at the time         PCP: Scar Viera MD    Past History     Past Medical History:  Past Medical History:   Diagnosis Date    B12 deficiency 03/15/2010    210    Cholelithiasis 09/17/2012    U/S Result: Cholelithiasis    Elevated alkaline phosphatase level 03/21/2010    158    Elevated ALT measurement 03/21/2010    71    Elevated AST (SGOT) 03/10/2012    38    Elevated platelet count 08/52/8132    494    Elevated total protein 12/27/2011    8.7    Hypocalcemia 07/06/2011    4.1    Hypokalemia     Hyponatremia 11/18/2009    Leukocytosis 11/16/2009    Microcytic anemia 10/25/2007    Pleural effusion on right 07/15/2015    [facility] CXR Result    Reticulocytosis 10/25/2007    7.8    Serum total bilirubin elevated 12/27/2011    T.B. 3.8, D.B. 0.4.     [facility]     Dr. Stoney Elizondo.  [place]    Vitamin D deficiency 01/20/2016    5.4       Past Surgical History:  Past Surgical History:   Procedure Laterality Date    HX CHOLECYSTECTOMY         Family History:  Family History   Problem Relation Age of Onset    Cancer Neg Hx     Diabetes Neg Hx     Heart Disease Neg Hx     Heart Attack Neg Hx     Hypertension Neg Hx     Stroke Neg Hx        Social History:  Social History     Tobacco Use    Smoking status: Never Smoker    Smokeless tobacco: Never Used   Substance Use Topics    Alcohol use: Yes     Comment: Occasional    Drug use: No       Allergies: Allergies   Allergen Reactions    Eggshell Membrane Nausea and Vomiting       Review of Systems   Review of Systems   Constitutional: Negative for activity change, appetite change and chills. HENT: Negative for congestion, ear discharge, ear pain and sore throat. Eyes: Negative for photophobia and pain. Respiratory: Negative for cough and choking. Cardiovascular: Negative for palpitations and leg swelling. Gastrointestinal: Negative for anal bleeding and rectal pain. Endocrine: Negative for polydipsia and polyuria. Genitourinary: Negative for genital sores and urgency. Musculoskeletal: Negative for arthralgias and myalgias. Neurological: Negative for dizziness, seizures and speech difficulty. Psychiatric/Behavioral: Negative for hallucinations, self-injury and suicidal ideas. Physical Exam     Patient Vitals for the past 12 hrs:   Temp Pulse Resp BP SpO2   11/10/19 0750 97.5 °F (36.4 °C) 75 12 130/75 94 %       Physical Exam   Constitutional: He is oriented to person, place, and time. He appears well-developed and well-nourished. HENT:   Head: Normocephalic and atraumatic. Eyes: Right eye exhibits no discharge. Left eye exhibits no discharge. Neck: Normal range of motion. Neck supple. Cardiovascular: Normal rate, regular rhythm, normal heart sounds and intact distal pulses. No murmur heard. Pulmonary/Chest: Effort normal and breath sounds normal. No stridor. No respiratory distress. He has no wheezes. He has no rales. He exhibits no tenderness. Abdominal: Soft. Bowel sounds are normal. He exhibits no distension. There is no tenderness. There is no rebound and no guarding. Musculoskeletal: Normal range of motion. He exhibits tenderness. Lumbar midline tenderness   Neurological: He is alert and oriented to person, place, and time. Skin: Skin is warm and dry. Psychiatric: He has a normal mood and affect.    Nursing note and vitals reviewed. Diagnostic Study Results     Labs -  No results found for this or any previous visit (from the past 12 hour(s)). Radiologic Studies -   No results found. Medical Decision Making     ED Course: Progress Notes, Reevaluation, and Consults:    8:30 AM Initial assessment performed. The patients presenting problems have been discussed, and they/their family are in agreement with the care plan formulated and outlined with them. I have encouraged them to ask questions as they arise throughout their visit. Provider Notes (Medical Decision Making): Hgb: 9.1  His baseline is 7  Feels better on  Reassessment   got some IV fluids and pain medication and wants to go  Patient follow-up with his hematologist      Vital Signs-Reviewed the patient's vital signs. Reviewed pt's pulse ox reading. Records Reviewed:  old medical records   (Time of Review: 8:30 AM)  -I am the first provider for this patient.  -I reviewed the vital signs, available nursing notes, past medical history, past surgical history, family history and social history. Current Outpatient Medications   Medication Sig Dispense Refill    folic acid (FOLVITE) 1 mg tablet Take 1 Tab by mouth daily. 30 Tab 0    hydroxyurea (HYDREA) 500 mg capsule Take 500 mg by mouth two (2) times a day. Indications: SICKLE CELL ANEMIA WITH CRISIS      fentaNYL (DURAGESIC) 25 mcg/hr PATCH 1 Patch by TransDERmal route every seventy-two (72) hours. Max Daily Amount: 1 Patch. 5 Patch 0        Clinical Impression     Clinical Impression: sickle cell crisis    Disposition: discharge      DISCHARGE NOTE:   Pt has been reexamined. Patient has no new complaints, changes, or physical findings. Care plan outlined and precautions discussed. Results were reviewed with the patient. All medications were reviewed with the patient; will d/c home with PMD f/u. All of pt's questions and concerns were addressed.  Patient was instructed and agrees to follow up with PMD, as well as to return to the ED upon further deterioration. Patient is ready to go home. This note was dictated utilizing voice recognition software which may lead to typographical errors. I apologize in advance if the situation occurs. If questions arise please do not hesitate to contact me or call our department.   Leena Osullivan MD  8:30 AM

## 2019-11-10 NOTE — DISCHARGE INSTRUCTIONS
Patient Education        Sickle Cell Crisis: Care Instructions  Your Care Instructions    Sickle cell crisis is a painful episode that may begin suddenly in a person with sickle cell disease. Sickle cell disease turns normal, round red blood cells into cells that look like jeane or crescent moons. The sickle-shaped cells can get stuck in blood vessels, blocking blood flow and causing severe pain. The pain can occur in the bones of the spine, the arms and legs, the chest, and the abdomen. An episode may be called a \"painful event\" or \"painful crisis. \" Some people who have sickle cell disease have many painful events, while others have few or none. Treatment depends on the level of pain and how long it lasts. Sometimes taking nonprescription pain relievers can help. Or you may need stronger pain relief medicine that is prescribed or given by a doctor. You may need to be treated in the hospital.  It isn't always possible to know what sets off a painful event. But triggers include being dehydrated, cold temperatures, infection, stress, and not getting enough oxygen. Follow-up care is a key part of your treatment and safety. Be sure to make and go to all appointments, and call your doctor if you are having problems. It's also a good idea to know your test results and keep a list of the medicines you take. How can you care for yourself at home? · Create a pain management plan with your doctor. This plan should include the types of medicines you can take and other actions you can take at home to relieve pain. · Drink plenty of fluids, enough so that your urine is light yellow or clear like water. If you have kidney, heart, or liver disease and have to limit fluids, talk with your doctor before you increase the amount of fluids you drink. · Take your medicines exactly as prescribed. Call your doctor if you think you are having a problem with your medicine. · Take pain medicines exactly as directed.   ? If the doctor gave you a prescription medicine for pain, take it as prescribed. ? If you are not taking a prescription pain medicine, ask your doctor if you can take an over-the-counter medicine. · Avoid alcohol. It can make you dehydrated. · Dress warmly in cold weather. The cold and windy weather can lead to severe pain. · Do not smoke. Smoking can reduce the amount of oxygen in your blood. · Get plenty of sleep. When should you call for help? Call 911 anytime you think you may need emergency care. For example, call if:    · You have symptoms of a severe problem from sickle cell.     · You have symptoms of a stroke. These may include:  ? Sudden numbness, tingling, weakness, or loss of movement in your face, arm, or leg, especially on only one side of your body. ? Sudden vision changes. ? Sudden trouble speaking. ? Sudden confusion or trouble understanding simple statements. ? Sudden problems with walking or balance. ? A sudden, severe headache that is different from past headaches.     · You are in severe pain.     · You have symptoms of a heart attack. These may include:  ? Chest pain or pressure, or a strange feeling in the chest.  ? Sweating. ? Shortness of breath. ? Nausea or vomiting. ? Pain, pressure, or a strange feeling in the back, neck, jaw, or upper belly or in one or both shoulders or arms. ? Lightheadedness or sudden weakness. ? A fast or irregular heartbeat. After you call 911, the  may tell you to chew 1 adult-strength or 2 to 4 low-dose aspirin. Wait for an ambulance. Do not try to drive yourself.    Call your doctor now or seek immediate medical care if:    · You have a fever.    Watch closely for changes in your health, and be sure to contact your doctor if you have any problems. Where can you learn more? Go to http://manuel-amadeo.info/. Enter F104 in the search box to learn more about \"Sickle Cell Crisis: Care Instructions. \"  Current as of: March 28, 2019  Content Version: 12.2  © 5703-3741 [employer]. Care instructions adapted under license by [employer] (which disclaims liability or warranty for this information). If you have questions about a medical condition or this instruction, always ask your healthcare professional. [address] any warranty or liability for your use of this information.

## 2020-01-07 ENCOUNTER — HOSPITAL ENCOUNTER (INPATIENT)
Age: 40
LOS: 6 days | Discharge: HOME | DRG: 812 | End: 2020-01-14
Attending: EMERGENCY MEDICINE | Admitting: INTERNAL MEDICINE
Payer: MEDICARE

## 2020-01-07 DIAGNOSIS — D57.00 SICKLE CELL PAIN CRISIS (HCC): Primary | ICD-10-CM

## 2020-01-07 PROCEDURE — 74011250636 HC RX REV CODE- 250/636: Performed by: EMERGENCY MEDICINE

## 2020-01-07 PROCEDURE — 99284 EMERGENCY DEPT VISIT MOD MDM: CPT

## 2020-01-07 PROCEDURE — 96361 HYDRATE IV INFUSION ADD-ON: CPT

## 2020-01-07 RX ORDER — DIPHENHYDRAMINE HYDROCHLORIDE 50 MG/ML
25 INJECTION, SOLUTION INTRAMUSCULAR; INTRAVENOUS
Status: COMPLETED | OUTPATIENT
Start: 2020-01-07 | End: 2020-01-08

## 2020-01-07 RX ORDER — HYDROMORPHONE HYDROCHLORIDE 1 MG/ML
1 INJECTION, SOLUTION INTRAMUSCULAR; INTRAVENOUS; SUBCUTANEOUS ONCE
Status: COMPLETED | OUTPATIENT
Start: 2020-01-07 | End: 2020-01-08

## 2020-01-07 RX ADMIN — SODIUM CHLORIDE 1000 ML: 900 INJECTION, SOLUTION INTRAVENOUS at 23:50

## 2020-01-08 PROBLEM — D57.00 SICKLE CELL PAIN CRISIS (HCC): Status: ACTIVE | Noted: 2020-01-08

## 2020-01-08 LAB
ALBUMIN SERPL-MCNC: 3.8 G/DL (ref 3.4–5)
ALBUMIN/GLOB SERPL: 0.8 {RATIO} (ref 0.8–1.7)
ALP SERPL-CCNC: 120 U/L (ref 45–117)
ALT SERPL-CCNC: 37 U/L (ref 16–61)
ANION GAP SERPL CALC-SCNC: 4 MMOL/L (ref 3–18)
AST SERPL-CCNC: 52 U/L (ref 10–38)
BASOPHILS # BLD: 0 K/UL (ref 0–0.06)
BASOPHILS NFR BLD: 0 % (ref 0–3)
BILIRUB SERPL-MCNC: 4.4 MG/DL (ref 0.2–1)
BUN SERPL-MCNC: 6 MG/DL (ref 7–18)
BUN/CREAT SERPL: 6 (ref 12–20)
CALCIUM SERPL-MCNC: 8.8 MG/DL (ref 8.5–10.1)
CHLORIDE SERPL-SCNC: 107 MMOL/L (ref 100–111)
CO2 SERPL-SCNC: 25 MMOL/L (ref 21–32)
CREAT SERPL-MCNC: 1 MG/DL (ref 0.6–1.3)
DIFFERENTIAL METHOD BLD: ABNORMAL
EOSINOPHIL # BLD: 0.5 K/UL (ref 0–0.4)
EOSINOPHIL NFR BLD: 3 % (ref 0–5)
ERYTHROCYTE [DISTWIDTH] IN BLOOD BY AUTOMATED COUNT: 23.4 % (ref 11.6–14.5)
GLOBULIN SER CALC-MCNC: 4.9 G/DL (ref 2–4)
GLUCOSE SERPL-MCNC: 88 MG/DL (ref 74–99)
HCT VFR BLD AUTO: 26 % (ref 36–48)
HGB BLD-MCNC: 9 G/DL (ref 13–16)
LYMPHOCYTES # BLD: 5.6 K/UL (ref 0.8–3.5)
LYMPHOCYTES NFR BLD: 33 % (ref 20–51)
MCH RBC QN AUTO: 24.5 PG (ref 24–34)
MCHC RBC AUTO-ENTMCNC: 34.6 G/DL (ref 31–37)
MCV RBC AUTO: 70.7 FL (ref 74–97)
MONOCYTES # BLD: 2.1 K/UL (ref 0–1)
MONOCYTES NFR BLD: 12 % (ref 2–9)
NEUTS SEG # BLD: 8.9 K/UL (ref 1.8–8)
NEUTS SEG NFR BLD: 52 % (ref 42–75)
NRBC BLD-RTO: 2 PER 100 WBC
PLATELET # BLD AUTO: 445 K/UL (ref 135–420)
PLATELET COMMENTS,PCOM: ABNORMAL
PMV BLD AUTO: 9.4 FL (ref 9.2–11.8)
POTASSIUM SERPL-SCNC: 3.5 MMOL/L (ref 3.5–5.5)
PROT SERPL-MCNC: 8.7 G/DL (ref 6.4–8.2)
RBC # BLD AUTO: 3.68 M/UL (ref 4.7–5.5)
RBC MORPH BLD: ABNORMAL
RETICS/RBC NFR AUTO: 7.6 % (ref 0.5–2.3)
SODIUM SERPL-SCNC: 136 MMOL/L (ref 136–145)
WBC # BLD AUTO: 17.1 K/UL (ref 4.6–13.2)

## 2020-01-08 PROCEDURE — 96376 TX/PRO/DX INJ SAME DRUG ADON: CPT

## 2020-01-08 PROCEDURE — 74011250636 HC RX REV CODE- 250/636: Performed by: INTERNAL MEDICINE

## 2020-01-08 PROCEDURE — 65270000029 HC RM PRIVATE

## 2020-01-08 PROCEDURE — 96361 HYDRATE IV INFUSION ADD-ON: CPT

## 2020-01-08 PROCEDURE — 74011250636 HC RX REV CODE- 250/636: Performed by: EMERGENCY MEDICINE

## 2020-01-08 PROCEDURE — 80053 COMPREHEN METABOLIC PANEL: CPT

## 2020-01-08 PROCEDURE — 74011250637 HC RX REV CODE- 250/637: Performed by: INTERNAL MEDICINE

## 2020-01-08 PROCEDURE — 85045 AUTOMATED RETICULOCYTE COUNT: CPT

## 2020-01-08 PROCEDURE — 96375 TX/PRO/DX INJ NEW DRUG ADDON: CPT

## 2020-01-08 PROCEDURE — 96374 THER/PROPH/DIAG INJ IV PUSH: CPT

## 2020-01-08 PROCEDURE — 85025 COMPLETE CBC W/AUTO DIFF WBC: CPT

## 2020-01-08 PROCEDURE — 74011250636 HC RX REV CODE- 250/636: Performed by: HOSPITALIST

## 2020-01-08 RX ORDER — HYDROMORPHONE HYDROCHLORIDE 1 MG/ML
1 INJECTION, SOLUTION INTRAMUSCULAR; INTRAVENOUS; SUBCUTANEOUS ONCE
Status: COMPLETED | OUTPATIENT
Start: 2020-01-08 | End: 2020-01-08

## 2020-01-08 RX ORDER — FOLIC ACID 1 MG/1
1 TABLET ORAL DAILY
Status: DISCONTINUED | OUTPATIENT
Start: 2020-01-08 | End: 2020-01-15 | Stop reason: HOSPADM

## 2020-01-08 RX ORDER — ENOXAPARIN SODIUM 100 MG/ML
40 INJECTION SUBCUTANEOUS EVERY 24 HOURS
Status: DISCONTINUED | OUTPATIENT
Start: 2020-01-08 | End: 2020-01-15 | Stop reason: HOSPADM

## 2020-01-08 RX ORDER — HYDROMORPHONE HYDROCHLORIDE 1 MG/ML
1 INJECTION, SOLUTION INTRAMUSCULAR; INTRAVENOUS; SUBCUTANEOUS
Status: DISCONTINUED | OUTPATIENT
Start: 2020-01-08 | End: 2020-01-08

## 2020-01-08 RX ORDER — HYDROXYUREA 500 MG/1
500 CAPSULE ORAL 2 TIMES DAILY
Status: DISCONTINUED | OUTPATIENT
Start: 2020-01-08 | End: 2020-01-15 | Stop reason: HOSPADM

## 2020-01-08 RX ORDER — HYDROMORPHONE HYDROCHLORIDE 1 MG/ML
2 INJECTION, SOLUTION INTRAMUSCULAR; INTRAVENOUS; SUBCUTANEOUS
Status: DISCONTINUED | OUTPATIENT
Start: 2020-01-08 | End: 2020-01-10

## 2020-01-08 RX ORDER — DIPHENHYDRAMINE HCL 25 MG
25 CAPSULE ORAL
Status: DISCONTINUED | OUTPATIENT
Start: 2020-01-08 | End: 2020-01-15 | Stop reason: HOSPADM

## 2020-01-08 RX ADMIN — HYDROMORPHONE HYDROCHLORIDE 1 MG: 1 INJECTION, SOLUTION INTRAMUSCULAR; INTRAVENOUS; SUBCUTANEOUS at 02:18

## 2020-01-08 RX ADMIN — HYDROMORPHONE HYDROCHLORIDE 1 MG: 1 INJECTION, SOLUTION INTRAMUSCULAR; INTRAVENOUS; SUBCUTANEOUS at 09:00

## 2020-01-08 RX ADMIN — HYDROMORPHONE HYDROCHLORIDE 1 MG: 1 INJECTION, SOLUTION INTRAMUSCULAR; INTRAVENOUS; SUBCUTANEOUS at 04:56

## 2020-01-08 RX ADMIN — HYDROMORPHONE HYDROCHLORIDE 2 MG: 1 INJECTION, SOLUTION INTRAMUSCULAR; INTRAVENOUS; SUBCUTANEOUS at 21:38

## 2020-01-08 RX ADMIN — HYDROMORPHONE HYDROCHLORIDE 2 MG: 1 INJECTION, SOLUTION INTRAMUSCULAR; INTRAVENOUS; SUBCUTANEOUS at 17:40

## 2020-01-08 RX ADMIN — FOLIC ACID 1 MG: 1 TABLET ORAL at 08:57

## 2020-01-08 RX ADMIN — DIPHENHYDRAMINE HYDROCHLORIDE 25 MG: 25 CAPSULE ORAL at 17:40

## 2020-01-08 RX ADMIN — HYDROMORPHONE HYDROCHLORIDE 1 MG: 1 INJECTION, SOLUTION INTRAMUSCULAR; INTRAVENOUS; SUBCUTANEOUS at 01:03

## 2020-01-08 RX ADMIN — ENOXAPARIN SODIUM 40 MG: 40 INJECTION SUBCUTANEOUS at 06:41

## 2020-01-08 RX ADMIN — HYDROXYUREA 500 MG: 500 CAPSULE ORAL at 08:57

## 2020-01-08 RX ADMIN — DIPHENHYDRAMINE HYDROCHLORIDE 25 MG: 25 CAPSULE ORAL at 09:00

## 2020-01-08 RX ADMIN — DIPHENHYDRAMINE HYDROCHLORIDE 25 MG: 50 INJECTION INTRAMUSCULAR; INTRAVENOUS at 01:03

## 2020-01-08 RX ADMIN — HYDROXYUREA 500 MG: 500 CAPSULE ORAL at 22:42

## 2020-01-08 NOTE — PROGRESS NOTES
conducted an initial consultation and Spiritual Assessment for Tod Faulkner, who is a 37 y.o.,male. Patients Primary Language is: [place]. According to the patients EMR Episcopalian Affiliation is: Dm Jesus. The reason the Patient came to the hospital is:   Patient Active Problem List    Diagnosis Date Noted    Sickle cell pain crisis ([facility] 01/08/2020    Hyperbilirubinemia 04/13/2018    Bilateral leg pain 04/24/2017    Sickle cell anemia with crisis ([facility] 05/07/2015    Sickle cell crisis ([facility] 01/26/2014    Serum total bilirubin elevated 12/27/2011    Elevated total protein 12/27/2011    Elevated alkaline phosphatase level 03/21/2010    Microcytic anemia 10/25/2007    Reticulocytosis 10/25/2007        The  provided the following Interventions:  Patient is comfortably lying on bed waiting for a room as I iInitiated a relationship of care and support. Explored issues of delia, belief, spirituality and Samaritan/ritual needs while hospitalized. Listened empathically as patient shared that \"I had symptoms of sickle cell when I hit in my 17's. It was difficult at first but now I got used to it. \"  Patient had pain all over his body that brought him to the hospital.  Provided information about [facility]. Offered assurance of continued prayers on patient's behalf. Chart reviewed. The following outcomes where achieved:  Patient shared limited information about both his medical narrative and spiritual journey/beliefs.  confirmed Patient's Episcopalian Affiliation with a [employer] Patient processed feeling about current hospitalization. Patient expressed gratitude for 's visit. Assessment:  Patient does not have any Samaritan/cultural needs that will affect patients preferences in health care. There are no spiritual or Samaritan issues which require intervention at this time.      Plan:  Chaplains will continue to follow and will provide pastoral care on an as needed/requested basis.  recommends bedside caregivers page  on duty if patient shows signs of acute spiritual or emotional distress.     [address] [facility]   [phone]

## 2020-01-08 NOTE — PROGRESS NOTES
Patient seen and evaluated in the ER, has been admitted for acute sickle cell crisis. Pain control with IV Dilaudid, will consult hematology for further input. We will continue to follow the patient on the telemetry unit.

## 2020-01-08 NOTE — ED NOTES
Bedside and verbal shift report given by Chula Dupont RN (off going nurse) to [facility] [employer], RN (oncoming nurse). Report included the following information SBAR and ED Summary.

## 2020-01-08 NOTE — ED PROVIDER NOTES
EMERGENCY DEPARTMENT HISTORY AND PHYSICAL EXAM    12:53 AM      Date: 1/7/2020  Patient Name: Abdullahi Lara    History of Presenting Illness     Chief Complaint   Patient presents with    Sickle Cell Crisis         History Provided By: Patient    Additional History (Context): Abdullahi Lara is a 37 y.o. male with history of sickle cell disease who presents with complaint of back and bilateral leg pain, which he states is similar to his typical sickle cell pain. He denies chest pain, cough, shortness of breath, fever or other associated symptoms at this time. Pain at this time is aching, severe pain. He states he changed his fentanyl patch today, but has had little relief. PCP: Tod Kwan MD    Current Outpatient Medications   Medication Sig Dispense Refill    folic acid (FOLVITE) 1 mg tablet Take 1 Tab by mouth daily. 30 Tab 0    hydroxyurea (HYDREA) 500 mg capsule Take 500 mg by mouth two (2) times a day. Indications: SICKLE CELL ANEMIA WITH CRISIS      fentaNYL (DURAGESIC) 25 mcg/hr PATCH 1 Patch by TransDERmal route every seventy-two (72) hours. Max Daily Amount: 1 Patch. 5 Patch 0       Past History     Past Medical History:  Past Medical History:   Diagnosis Date    B12 deficiency 03/15/2010    210    Cholelithiasis 09/17/2012    U/S Result: Cholelithiasis    Elevated alkaline phosphatase level 03/21/2010    158    Elevated ALT measurement 03/21/2010    71    Elevated AST (SGOT) 03/10/2012    38    Elevated platelet count 43/48/2215    494    Elevated total protein 12/27/2011    8.7    Hypocalcemia 07/06/2011    4.1    Hypokalemia     Hyponatremia 11/18/2009    Leukocytosis 11/16/2009    Microcytic anemia 10/25/2007    Pleural effusion on right 07/15/2015    [facility] CXR Result    Reticulocytosis 10/25/2007    7.8    Serum total bilirubin elevated 12/27/2011    T.B. 3.8, D.B. 0.4.     Sickle cell anemia with crisis ([facility]     Dr. Huong Cruz.  [place]    Vitamin D deficiency 01/20/2016    5.4       Past Surgical History:  Past Surgical History:   Procedure Laterality Date    HX CHOLECYSTECTOMY         Family History:  Family History   Problem Relation Age of Onset    Cancer Neg Hx     Diabetes Neg Hx     Heart Disease Neg Hx     Heart Attack Neg Hx     Hypertension Neg Hx     Stroke Neg Hx        Social History:  Social History     Tobacco Use    Smoking status: Never Smoker    Smokeless tobacco: Never Used   Substance Use Topics    Alcohol use: Yes     Comment: Occasional    Drug use: No       Allergies: Allergies   Allergen Reactions    Eggshell Membrane Nausea and Vomiting         Review of Systems       Review of Systems   Constitutional: Negative for activity change and appetite change. HENT: Negative for congestion. Eyes: Negative for visual disturbance. Respiratory: Negative for cough and shortness of breath. Cardiovascular: Negative for chest pain. Gastrointestinal: Negative for abdominal pain, diarrhea, nausea and vomiting. Genitourinary: Negative for dysuria. Musculoskeletal: Positive for back pain. As in HPI   Skin: Negative for rash. Neurological: Negative for weakness and numbness. Physical Exam     Visit Vitals  /89   Pulse 97   Temp 98.1 °F (36.7 °C)   Resp 18   SpO2 97%         Physical Exam  Vitals signs and nursing note reviewed. Constitutional:       Appearance: He is well-developed. HENT:      Head: Normocephalic and atraumatic. Eyes:      Conjunctiva/sclera: Conjunctivae normal.   Neck:      Musculoskeletal: Normal range of motion and neck supple. Vascular: No JVD. Cardiovascular:      Rate and Rhythm: Normal rate and regular rhythm. Heart sounds: Normal heart sounds. No murmur. Pulmonary:      Effort: Pulmonary effort is normal.      Breath sounds: Normal breath sounds. Abdominal:      General: Bowel sounds are normal. There is no distension. Palpations: Abdomen is soft. Tenderness: There is no tenderness. Musculoskeletal: Normal range of motion. General: No deformity. Lymphadenopathy:      Cervical: No cervical adenopathy. Skin:     General: Skin is warm and dry. Findings: No rash. Neurological:      Mental Status: He is alert and oriented to person, place, and time. Coordination: Coordination normal.           Diagnostic Study Results     Labs -  Recent Results (from the past 12 hour(s))   CBC WITH AUTOMATED DIFF    Collection Time: 01/08/20  1:15 AM   Result Value Ref Range    WBC 17.1 (H) 4.6 - 13.2 K/uL    RBC 3.68 (L) 4.70 - 5.50 M/uL    HGB 9.0 (L) 13.0 - 16.0 g/dL    HCT 26.0 (L) 36.0 - 48.0 %    MCV 70.7 (L) 74.0 - 97.0 FL    MCH 24.5 24.0 - 34.0 PG    MCHC 34.6 31.0 - 37.0 g/dL    RDW 23.4 (H) 11.6 - 14.5 %    PLATELET 570 (H) 120 - 420 K/uL    MPV 9.4 9.2 - 11.8 FL    NEUTROPHILS 52 42 - 75 %    LYMPHOCYTES 33 20 - 51 %    MONOCYTES 12 (H) 2 - 9 %    EOSINOPHILS 3 0 - 5 %    BASOPHILS 0 0 - 3 %    NRBC 2.0 (H) 0  WBC    ABS. NEUTROPHILS 8.9 (H) 1.8 - 8.0 K/UL    ABS. LYMPHOCYTES 5.6 (H) 0.8 - 3.5 K/UL    ABS. MONOCYTES 2.1 (H) 0 - 1.0 K/UL    ABS. EOSINOPHILS 0.5 (H) 0.0 - 0.4 K/UL    ABS.  BASOPHILS 0.0 0.0 - 0.06 K/UL    DF AUTOMATED      PLATELET COMMENTS ADEQUATE PLATELETS      RBC COMMENTS ANISOCYTOSIS  2+        RBC COMMENTS MICROCYTOSIS  1+        RBC COMMENTS HYPOCHROMIA  1+        RBC COMMENTS POIKILOCYTOSIS  2+        RBC COMMENTS TARGET CELLS  1+        RBC COMMENTS SICKLE CELLS  2+       METABOLIC PANEL, COMPREHENSIVE    Collection Time: 01/08/20  1:15 AM   Result Value Ref Range    Sodium 136 136 - 145 mmol/L    Potassium 3.5 3.5 - 5.5 mmol/L    Chloride 107 100 - 111 mmol/L    CO2 25 21 - 32 mmol/L    Anion gap 4 3.0 - 18 mmol/L    Glucose 88 74 - 99 mg/dL    BUN 6 (L) 7.0 - 18 MG/DL    Creatinine 1.00 0.6 - 1.3 MG/DL    BUN/Creatinine ratio 6 (L) 12 - 20      GFR est AA >60 >60 ml/min/1.73m2    GFR est non-AA >60 >60 ml/min/1.73m2    Calcium 8.8 8.5 - 10.1 MG/DL    Bilirubin, total 4.4 (H) 0.2 - 1.0 MG/DL    ALT (SGPT) 37 16 - 61 U/L    AST (SGOT) 52 (H) 10 - 38 U/L    Alk. phosphatase 120 (H) 45 - 117 U/L    Protein, total 8.7 (H) 6.4 - 8.2 g/dL    Albumin 3.8 3.4 - 5.0 g/dL    Globulin 4.9 (H) 2.0 - 4.0 g/dL    A-G Ratio 0.8 0.8 - 1.7     RETICULOCYTE COUNT    Collection Time: 01/08/20  1:15 AM   Result Value Ref Range    Reticulocyte count 7.6 (H) 0.5 - 2.3 %       Radiologic Studies -   No orders to display         Medical Decision Making   I am the first provider for this patient. I reviewed the vital signs, available nursing notes, past medical history, past surgical history, family history and social history. Vital Signs-Reviewed the patient's vital signs. Records Reviewed: Nursing Notes (Time of Review: 12:53 AM)      Provider Notes (Medical Decision Making):   Roberth Aaron is a 37 y.o. male with history of sickle cell disease who presents with complaint of back and bilateral leg pain, which he states is similar to his typical sickle cell pain. He denies chest pain, cough, shortness of breath, fever or other associated symptoms at this time. Pain at this time is aching, severe pain. He states he changed his fentanyl patch today, but has had little relief. Differential Diagnosis: Suspect pain related to sickle cell crisis, though with low suspicion for aplastic crisis, acute chest or other acute etiology. Testing: CBC, CMP, reticulocyte count  Treatments: IV Dilaudid, IV Benadryl, IV fluids    Re-evaluations:  Patient's labs were unremarkable, but he has had little improvement in pain in the emergency department. We will plan on hospital admission for further management. Procedures:   Indication: unable to get IV access/blood  Skin Preparation: Hand hygiene performed prior to venous catheter insertion. The area was cleansed and prepped with chloraprep.    Procedure: 20 gauge IV placed in left AC location. Vascular probe used in live manner. Vein identified. 1 attempts. IV secured. Good venous flow. No complication. Assessment: Good patency and blood return. Post-procedure: Patient tolerated the procedure well with no immediate complications. Performed by Garrett Rodriguez MD 12:53 AM        Diagnosis     Clinical Impression:   1. Sickle cell pain crisis ([address]        Disposition: admit    Follow-up Information    None          Patient's Medications   Start Taking    No medications on file   Continue Taking    FENTANYL (DURAGESIC) 25 MCG/HR PATCH    1 Patch by TransDERmal route every seventy-two (72) hours. Max Daily Amount: 1 Patch. FOLIC ACID (FOLVITE) 1 MG TABLET    Take 1 Tab by mouth daily. HYDROXYUREA (HYDREA) 500 MG CAPSULE    Take 500 mg by mouth two (2) times a day. Indications: SICKLE CELL ANEMIA WITH CRISIS   These Medications have changed    No medications on file   Stop Taking    No medications on file     _______________________________    Attestations:  Tom Bourne MD acting as a scribe for and in the presence of Jojo Witt MD      January 08, 2020 at [facility]       Provider Attestation:      I personally performed the services described in the documentation, reviewed the documentation, as recorded by the scribe in my presence, and it accurately and completely records my words and actions.  January 08, 2020 at 5:27 AM - Jojo Witt MD    _______________________________

## 2020-01-08 NOTE — H&P
Admission History and Physical    Anushka Webb is a 37 y.o. male who is being admitted. Chief Complaint   Patient presents with    Sickle Cell Crisis       Impression/Plan     37years old presented with sickle cell pain    Patient admitted to [place] [address] for sickle cell pain crisis  IV fluid hydration  Pain control with Dilaudid  PRN Benadryl  Continue folic acid and hydroxyurea  Outpatient follow-up with hematology    Admission plan was discussed    Lovenox for DVT prophylaxis      HPI:   37years old with medical history significant for sickle cell disease presented emergency department with chief complaint of having generalized pain, mostly his back and legs typical of his sickle cell crisis. He denies any associated fever, chills, nausea, vomiting, urinary symptoms or chest pain. He received 3 doses of IV Dilaudid and remains to have significant pain in the ED. He was admitted for further management. He has fentanyl patch 25 MCG at home which failed to control his pain. He reports being compliant with his hydroxyurea and folic acid. Past Medical History:   Diagnosis Date    B12 deficiency 03/15/2010    210    Cholelithiasis 09/17/2012    U/S Result: Cholelithiasis    Elevated alkaline phosphatase level 03/21/2010    158    Elevated ALT measurement 03/21/2010    71    Elevated AST (SGOT) 03/10/2012    38    Elevated platelet count 99/59/6663    494    Elevated total protein 12/27/2011    8.7    Hypocalcemia 07/06/2011    4.1    Hypokalemia     Hyponatremia 11/18/2009    Leukocytosis 11/16/2009    Microcytic anemia 10/25/2007    Pleural effusion on right 07/15/2015    [facility] CXR Result    Reticulocytosis 10/25/2007    7.8    Serum total bilirubin elevated 12/27/2011    T.B. 3.8, D.B. 0.4.     Sickle [facility] anemia with crisis ([facility]     Dr. Vicente Branch.  [place]    Vitamin D deficiency 01/20/2016    5.4     Past Surgical History:   Procedure Laterality Date    HX CHOLECYSTECTOMY [unfilled]  Family History   Problem Relation Age of Onset    Cancer Neg Hx     Diabetes Neg Hx     Heart Disease Neg Hx     Heart Attack Neg Hx     Hypertension Neg Hx     Stroke Neg Hx      Allergies   Allergen Reactions    Eggshell Membrane Nausea and Vomiting       Home Medications:     No current facility-administered medications on file prior to encounter. Current Outpatient Medications on File Prior to Encounter   Medication Sig Dispense Refill    folic acid (FOLVITE) 1 mg tablet Take 1 Tab by mouth daily. 30 Tab 0    hydroxyurea (HYDREA) 500 mg capsule Take 500 mg by mouth two (2) times a day. Indications: SICKLE CELL ANEMIA WITH CRISIS      fentaNYL (DURAGESIC) 25 mcg/hr PATCH 1 Patch by TransDERmal route every seventy-two (72) hours. Max Daily Amount: 1 Patch. 5 Patch 0       Review of Systems:   GEN  no fever or chills, no weakness or malaise   CV  no chest pain,  Palpitations, RAMOS or syncope. PULM  No cough, No wheezing, No hemoptysis  GI  no abd pain, no melena, no nausea, no vomiting     no dysuria, no flank pain   M/S- see HPI  SKIN  no rashes, no bruising   ENDO  no temp intolerance, no polyuria, no polydypsia   NEURO  no focal weakness, no dizziness, no sensory changes, no speech changes   PSYCH  no depression, no anxiety, no hallucinations   HEENT  no headache, no ear pain, no sore throat, no blurry vision, no double vision, no neck pain     Physical Assessment:     Visit Vitals  /89   Pulse 97   Temp 98.1 °F (36.7 °C)   Resp 18   SpO2 97%     Constitutional: The patient is oriented to person, place, and time. No distress. Eyes: Pupils are equal, round, and reactive to light. No scleral icterus. Neck: No JVD present. Cardiovascular: Regular rhythm. Exam reveals no gallop and no friction rub. No murmur heard. Pulmonary/Chest: Effort normal. No stridor. No respiratory distress. has no wheezes. has no rales. Abdominal: Soft.  Bowel sounds are normal. exhibits no distension. No tenderness. No rebound and no guarding. Musculoskeletal:Normal strength. exhibits no tenderness. Neurological:alert and oriented to person, place, and time. Skin: Skin is warm and dry. Psychiatric: Memory, affect and judgment normal    Recent Results (from the past 24 hour(s))   CBC WITH AUTOMATED DIFF    Collection Time: 01/08/20  1:15 AM   Result Value Ref Range    WBC 17.1 (H) 4.6 - 13.2 K/uL    RBC 3.68 (L) 4.70 - 5.50 M/uL    HGB 9.0 (L) 13.0 - 16.0 g/dL    HCT 26.0 (L) 36.0 - 48.0 %    MCV 70.7 (L) 74.0 - 97.0 FL    MCH 24.5 24.0 - 34.0 PG    MCHC 34.6 31.0 - 37.0 g/dL    RDW 23.4 (H) 11.6 - 14.5 %    PLATELET 437 (H) 790 - 420 K/uL    MPV 9.4 9.2 - 11.8 FL    NEUTROPHILS 52 42 - 75 %    LYMPHOCYTES 33 20 - 51 %    MONOCYTES 12 (H) 2 - 9 %    EOSINOPHILS 3 0 - 5 %    BASOPHILS 0 0 - 3 %    NRBC 2.0 (H) 0  WBC    ABS. NEUTROPHILS 8.9 (H) 1.8 - 8.0 K/UL    ABS. LYMPHOCYTES 5.6 (H) 0.8 - 3.5 K/UL    ABS. MONOCYTES 2.1 (H) 0 - 1.0 K/UL    ABS. EOSINOPHILS 0.5 (H) 0.0 - 0.4 K/UL    ABS.  BASOPHILS 0.0 0.0 - 0.06 K/UL    DF AUTOMATED      PLATELET COMMENTS ADEQUATE PLATELETS      RBC COMMENTS ANISOCYTOSIS  2+        RBC COMMENTS MICROCYTOSIS  1+        RBC COMMENTS HYPOCHROMIA  1+        RBC COMMENTS POIKILOCYTOSIS  2+        RBC COMMENTS TARGET CELLS  1+        RBC COMMENTS SICKLE CELLS  2+       METABOLIC PANEL, COMPREHENSIVE    Collection Time: 01/08/20  1:15 AM   Result Value Ref Range    Sodium 136 136 - 145 mmol/L    Potassium 3.5 3.5 - 5.5 mmol/L    Chloride 107 100 - 111 mmol/L    CO2 25 21 - 32 mmol/L    Anion gap 4 3.0 - 18 mmol/L    Glucose 88 74 - 99 mg/dL    BUN 6 (L) 7.0 - 18 MG/DL    Creatinine 1.00 0.6 - 1.3 MG/DL    BUN/Creatinine ratio 6 (L) 12 - 20      GFR est AA >60 >60 ml/min/1.73m2    GFR est non-AA >60 >60 ml/min/1.73m2    Calcium 8.8 8.5 - 10.1 MG/DL    Bilirubin, total 4.4 (H) 0.2 - 1.0 MG/DL    ALT (SGPT) 37 16 - 61 U/L    AST (SGOT) 52 (H) 10 - 38 U/L    Alk.  phosphatase 120 (H) 45 - 117 U/L    Protein, total 8.7 (H) 6.4 - 8.2 g/dL    Albumin 3.8 3.4 - 5.0 g/dL    Globulin 4.9 (H) 2.0 - 4.0 g/dL    A-G Ratio 0.8 0.8 - 1.7     RETICULOCYTE COUNT    Collection Time: 01/08/20  1:15 AM   Result Value Ref Range    Reticulocyte count 7.6 (H) 0.5 - 2.3 %

## 2020-01-09 LAB
ANION GAP SERPL CALC-SCNC: 6 MMOL/L (ref 3–18)
BUN SERPL-MCNC: 10 MG/DL (ref 7–18)
BUN/CREAT SERPL: 11 (ref 12–20)
CALCIUM SERPL-MCNC: 8.4 MG/DL (ref 8.5–10.1)
CHLORIDE SERPL-SCNC: 108 MMOL/L (ref 100–111)
CO2 SERPL-SCNC: 24 MMOL/L (ref 21–32)
CREAT SERPL-MCNC: 0.87 MG/DL (ref 0.6–1.3)
ERYTHROCYTE [DISTWIDTH] IN BLOOD BY AUTOMATED COUNT: 23.3 % (ref 11.6–14.5)
GLUCOSE SERPL-MCNC: 97 MG/DL (ref 74–99)
HCT VFR BLD AUTO: 24.2 % (ref 36–48)
HGB BLD-MCNC: 8.8 G/DL (ref 13–16)
MCH RBC QN AUTO: 25.7 PG (ref 24–34)
MCHC RBC AUTO-ENTMCNC: 36.4 G/DL (ref 31–37)
MCV RBC AUTO: 70.8 FL (ref 74–97)
PLATELET # BLD AUTO: 397 K/UL (ref 135–420)
PMV BLD AUTO: 9.8 FL (ref 9.2–11.8)
POTASSIUM SERPL-SCNC: 4.1 MMOL/L (ref 3.5–5.5)
RBC # BLD AUTO: 3.42 M/UL (ref 4.7–5.5)
SODIUM SERPL-SCNC: 138 MMOL/L (ref 136–145)
WBC # BLD AUTO: 13 K/UL (ref 4.6–13.2)

## 2020-01-09 PROCEDURE — 85027 COMPLETE CBC AUTOMATED: CPT

## 2020-01-09 PROCEDURE — 74011250636 HC RX REV CODE- 250/636: Performed by: HOSPITALIST

## 2020-01-09 PROCEDURE — 80048 BASIC METABOLIC PNL TOTAL CA: CPT

## 2020-01-09 PROCEDURE — 36415 COLL VENOUS BLD VENIPUNCTURE: CPT

## 2020-01-09 PROCEDURE — 74011250636 HC RX REV CODE- 250/636: Performed by: INTERNAL MEDICINE

## 2020-01-09 PROCEDURE — 74011250637 HC RX REV CODE- 250/637: Performed by: INTERNAL MEDICINE

## 2020-01-09 PROCEDURE — 65270000029 HC RM PRIVATE

## 2020-01-09 RX ORDER — CELECOXIB 100 MG/1
200 CAPSULE ORAL 2 TIMES DAILY
Status: DISCONTINUED | OUTPATIENT
Start: 2020-01-09 | End: 2020-01-10

## 2020-01-09 RX ORDER — FENTANYL 25 UG/1
1 PATCH TRANSDERMAL
Status: DISCONTINUED | OUTPATIENT
Start: 2020-01-10 | End: 2020-01-15 | Stop reason: HOSPADM

## 2020-01-09 RX ORDER — FAMOTIDINE 20 MG/1
20 TABLET, FILM COATED ORAL 2 TIMES DAILY
Status: DISCONTINUED | OUTPATIENT
Start: 2020-01-09 | End: 2020-01-15 | Stop reason: HOSPADM

## 2020-01-09 RX ORDER — ONDANSETRON 2 MG/ML
4 INJECTION INTRAMUSCULAR; INTRAVENOUS
Status: DISCONTINUED | OUTPATIENT
Start: 2020-01-09 | End: 2020-01-15 | Stop reason: HOSPADM

## 2020-01-09 RX ORDER — SODIUM CHLORIDE 9 MG/ML
125 INJECTION, SOLUTION INTRAVENOUS CONTINUOUS
Status: DISCONTINUED | OUTPATIENT
Start: 2020-01-09 | End: 2020-01-15 | Stop reason: HOSPADM

## 2020-01-09 RX ADMIN — HYDROMORPHONE HYDROCHLORIDE 2 MG: 1 INJECTION, SOLUTION INTRAMUSCULAR; INTRAVENOUS; SUBCUTANEOUS at 10:39

## 2020-01-09 RX ADMIN — HYDROMORPHONE HYDROCHLORIDE 2 MG: 1 INJECTION, SOLUTION INTRAMUSCULAR; INTRAVENOUS; SUBCUTANEOUS at 14:28

## 2020-01-09 RX ADMIN — HYDROXYUREA 500 MG: 500 CAPSULE ORAL at 19:01

## 2020-01-09 RX ADMIN — SODIUM CHLORIDE 125 ML/HR: 900 INJECTION, SOLUTION INTRAVENOUS at 22:50

## 2020-01-09 RX ADMIN — HYDROXYUREA 500 MG: 500 CAPSULE ORAL at 08:15

## 2020-01-09 RX ADMIN — HYDROMORPHONE HYDROCHLORIDE 2 MG: 1 INJECTION, SOLUTION INTRAMUSCULAR; INTRAVENOUS; SUBCUTANEOUS at 22:43

## 2020-01-09 RX ADMIN — ENOXAPARIN SODIUM 40 MG: 40 INJECTION SUBCUTANEOUS at 05:32

## 2020-01-09 RX ADMIN — HYDROMORPHONE HYDROCHLORIDE 2 MG: 1 INJECTION, SOLUTION INTRAMUSCULAR; INTRAVENOUS; SUBCUTANEOUS at 18:44

## 2020-01-09 RX ADMIN — FAMOTIDINE 20 MG: 20 TABLET, FILM COATED ORAL at 18:43

## 2020-01-09 RX ADMIN — CELECOXIB 200 MG: 100 CAPSULE ORAL at 18:43

## 2020-01-09 RX ADMIN — HYDROMORPHONE HYDROCHLORIDE 2 MG: 1 INJECTION, SOLUTION INTRAMUSCULAR; INTRAVENOUS; SUBCUTANEOUS at 01:30

## 2020-01-09 RX ADMIN — FOLIC ACID 1 MG: 1 TABLET ORAL at 08:05

## 2020-01-09 RX ADMIN — HYDROMORPHONE HYDROCHLORIDE 2 MG: 1 INJECTION, SOLUTION INTRAMUSCULAR; INTRAVENOUS; SUBCUTANEOUS at 05:32

## 2020-01-09 NOTE — PROGRESS NOTES
[facility] Hospitalist Group  Progress Note    Patient: Candis Franklin Age: 37 y.o. : 1980 MR#: [id number] SSN: [id number]  Date: 2020     Subjective:   Patient examined independently by me. He states he came in with bilateral leg pain and back pain, his level of pain is unchanged since presentation. Denies SOB, Chest pain, abd pain. Assessment/Plan:   Mr. Belem Ferrari is a 38 yo  male with history of SS disease who presented on 2020 for bilateral leg pain and back pain consistent with SS crisis. Consults: Fernie Dhillon. 1. Sickle cell crisis:   - Follow up with Heme/Onc.  -  H/H stable. Leukocytosis resolved. - patient is comfortable on room air. No evidence of acute chest, no evidence of hypoxia. To note, chest xray not done in ED but if indicated can get . -  resume home folic acid and hydroxyurea. As needed dialudid for pain. He is not asking for escalation of this current regimen. - I encouraged patient to keep up with oral intake which he states he is.   - encourage OOB  2.  Sickle cell disease follows with Dr. Hai Dhillon     Additional Notes:      Case discussed with:  [x]Patient  []Family  []Nursing  []Case Management  DVT Prophylaxis:  [x]Lovenox  []Hep SQ  []SCDs  []Coumadin   []On Heparin gtt    Objective:   VS:   Visit Vitals  /68 (BP 1 Location: Right arm, BP Patient Position: At rest)   Pulse 91   Temp 97.1 °F (36.2 °C)   Resp 16   Ht 6' 0.01\" (1.829 m)   Wt 80.9 kg (178 lb 6.4 oz)   SpO2 95%   BMI 24.19 kg/m²      Tmax/24hrs: Temp (24hrs), Av.8 °F (36.6 °C), Min:97.1 °F (36.2 °C), Max:98.1 °F (36.7 °C)      Intake/Output Summary (Last 24 hours) at 2020 1237  Last data filed at 2020 1150  Gross per 24 hour   Intake 600 ml   Output    Net 600 ml       General:pleasant    male sitting up in bed,  NAD  Cardiovascular:  RRR  Pulmonary: on room air,  LSC throughout; respiratory effort WNL  GI: +BS in all four quadrants, soft, non-tender  Extremities:  No edema  Neuro: alert, awake and ox3. Moving all extremities, follows commands.      Labs:    Recent Results (from the past 24 hour(s))   METABOLIC PANEL, BASIC    Collection Time: 01/09/20  3:41 AM   Result Value Ref Range    Sodium 138 136 - 145 mmol/L    Potassium 4.1 3.5 - 5.5 mmol/L    Chloride 108 100 - 111 mmol/L    CO2 24 21 - 32 mmol/L    Anion gap 6 3.0 - 18 mmol/L    Glucose 97 74 - 99 mg/dL    BUN 10 7.0 - 18 MG/DL    Creatinine 0.87 0.6 - 1.3 MG/DL    BUN/Creatinine ratio 11 (L) 12 - 20      GFR est AA >60 >60 ml/min/1.73m2    GFR est non-AA >60 >60 ml/min/1.73m2    Calcium 8.4 (L) 8.5 - 10.1 MG/DL   CBC W/O DIFF    Collection Time: 01/09/20  5:50 AM   Result Value Ref Range    WBC 13.0 4.6 - 13.2 K/uL    RBC 3.42 (L) 4.70 - 5.50 M/uL    HGB 8.8 (L) 13.0 - 16.0 g/dL    HCT 24.2 (L) 36.0 - 48.0 %    MCV 70.8 (L) 74.0 - 97.0 FL    MCH 25.7 24.0 - 34.0 PG    MCHC 36.4 31.0 - 37.0 g/dL    RDW 23.3 (H) 11.6 - 14.5 %    PLATELET 802 504 - 515 K/uL    MPV 9.8 9.2 - 11.8 FL       Signed By: PHANI Pizano     January 9, 2020

## 2020-01-09 NOTE — PROGRESS NOTES
Problem: Pain  Goal: *Control of Pain  Outcome: Progressing Towards Goal  Goal: *PALLIATIVE CARE:  Alleviation of Pain  Outcome: Progressing Towards Goal     Problem: Patient Education: Go to Patient Education Activity  Goal: Patient/Family Education  Outcome: Progressing Towards Goal     Problem: Falls - Risk of  Goal: *Absence of Falls  Description  Document Theo Kendallroyer Fall Risk and appropriate interventions in the flowsheet. Outcome: Progressing Towards Goal  Note: Fall Risk Interventions:            Medication Interventions: Patient to call before getting OOB                   Problem: Patient Education: Go to Patient Education Activity  Goal: Patient/Family Education  Outcome: Progressing Towards Goal     Problem: Pressure Injury - Risk of  Goal: *Prevention of pressure injury  Description  Document Reynaldo Scale and appropriate interventions in the flowsheet.   Outcome: Progressing Towards Goal  Note: Pressure Injury Interventions:                                            Problem: Patient Education: Go to Patient Education Activity  Goal: Patient/Family Education  Outcome: Progressing Towards Goal

## 2020-01-09 NOTE — PROGRESS NOTES
Reason for Admission:  Sickle cell pain crisis ([facility] [D57.00]  Sickle cell crisis ([facility] [D57.00]                 RRAT Score:    13           Plan for utilizing home health:    No at this time                    Likelihood of Readmission:   Moderate                         Do you (patient/family) have any concerns for transition/discharge?  no    Transition of Care Plan:   Pt plans to discharge to home    Initial assessment completed with patient. Cognitive status of patient: alert and oriented. Face sheet information confirmed:  yes. The patient designates his mother Chelsea Erickson to participate in his discharge plan and to receive any needed information. This patient lives in a apartment alone. Patient is not able to navigate steps as needed. Prior to hospitalization, patient was considered to be independent with ADLs/IADLS : yes . Patient has a current ACP document on file: no     The patient's family will be available to transport patient home upon discharge. The patient does not have any medical equipment available in the home. Patient is not currently active with home health. Patient has not stayed in a skilled nursing facility or rehab. This patient is on dialysis :no    Currently, the discharge plan is Home. Pts PCP is [employer] [facility]. The patient states that he can obtain his medications from the pharmacy, and take his medications as directed. Patient's current insurance is [employer]. Care Management Interventions  PCP Verified by CM: Yes(Consuelo Chase)  Mode of Transport at Discharge:  Other (see comment)(Pt family will transport home)  Transition of Care Consult (CM Consult): Discharge Planning  Current Support Network: Lives Alone  Confirm Follow Up Transport: Family  Discharge Location  Discharge Placement: Home        KEN Robert, RN    Pager: [phone]

## 2020-01-09 NOTE — ROUTINE PROCESS
[address] care of patient from Fonda Goldmann (ED RN). Patient resting in bed with no distress. Admission assessment complete. Bed in lowest position, side rails up x3, call bell and personal belongings within reach. Will continue to monitor.        5018 Bedside shift change report given to [address] [place] [address] (oncoming nurse) by Edie Miles RN (offgoing nurse). Report included the following information SBAR, Kardex, Intake/Output, MAR and Recent Results.

## 2020-01-09 NOTE — PROGRESS NOTES
Nutrition    Pt asking for ranch dressing, but reported having eggshell membrane (currently documented in chart) and milk food allergies per [employer] worker. Spoke with pt; verified food allergies. Pt stated he still eats some foods at home that contain both these items. Explained to pt, [employer] will not be able to provide foods containing listed food allergies, including his ranch dressing during his acute care stay. Pt verbalized understanding. Reported good appetite and meal intake PTA. Will update milk food allergy in chart. Discussed with [employer] about alternative salad dressing that is safe to provide to pt. No nutrition intervention indicated at this time. Will re-screen as appropriate.          Willy Rosales, [address] [place] [address]  Pager [phone]

## 2020-01-09 NOTE — ROUTINE PROCESS
2235 TRANSFER - IN REPORT:    Verbal report received from [facility] RN (name) on Harjinder Soles  being received from ED (unit) for routine progression of care      Report consisted of patients Situation, Background, Assessment and   Recommendations(SBAR). Information from the following report(s) SBAR, Kardex, Intake/Output, MAR and Recent Results was reviewed with the receiving nurse. Opportunity for questions and clarification was provided. Assessment completed upon patients arrival to unit and care assumed.

## 2020-01-10 LAB
BASOPHILS # BLD: 0.1 K/UL (ref 0–0.06)
BASOPHILS NFR BLD: 1 % (ref 0–3)
DIFFERENTIAL METHOD BLD: ABNORMAL
EOSINOPHIL # BLD: 1.4 K/UL (ref 0–0.4)
EOSINOPHIL NFR BLD: 12 % (ref 0–5)
ERYTHROCYTE [DISTWIDTH] IN BLOOD BY AUTOMATED COUNT: 24 % (ref 11.6–14.5)
HCT VFR BLD AUTO: 24.2 % (ref 36–48)
HGB BLD-MCNC: 8.4 G/DL (ref 13–16)
LYMPHOCYTES # BLD: 5.9 K/UL (ref 0.8–3.5)
LYMPHOCYTES NFR BLD: 49 % (ref 20–51)
MCH RBC QN AUTO: 24.9 PG (ref 24–34)
MCHC RBC AUTO-ENTMCNC: 34.7 G/DL (ref 31–37)
MCV RBC AUTO: 71.6 FL (ref 74–97)
MONOCYTES # BLD: 1 K/UL (ref 0–1)
MONOCYTES NFR BLD: 8 % (ref 2–9)
NEUTS SEG # BLD: 3.6 K/UL (ref 1.8–8)
NEUTS SEG NFR BLD: 30 % (ref 42–75)
PLATELET # BLD AUTO: 430 K/UL (ref 135–420)
PLATELET COMMENTS,PCOM: ABNORMAL
PMV BLD AUTO: 9.8 FL (ref 9.2–11.8)
RBC # BLD AUTO: 3.38 M/UL (ref 4.7–5.5)
RBC MORPH BLD: ABNORMAL
WBC # BLD AUTO: 12 K/UL (ref 4.6–13.2)

## 2020-01-10 PROCEDURE — 36415 COLL VENOUS BLD VENIPUNCTURE: CPT

## 2020-01-10 PROCEDURE — 65270000029 HC RM PRIVATE

## 2020-01-10 PROCEDURE — 74011250636 HC RX REV CODE- 250/636: Performed by: HOSPITALIST

## 2020-01-10 PROCEDURE — 74011250637 HC RX REV CODE- 250/637: Performed by: INTERNAL MEDICINE

## 2020-01-10 PROCEDURE — 90686 IIV4 VACC NO PRSV 0.5 ML IM: CPT | Performed by: INTERNAL MEDICINE

## 2020-01-10 PROCEDURE — 90471 IMMUNIZATION ADMIN: CPT

## 2020-01-10 PROCEDURE — 85025 COMPLETE CBC W/AUTO DIFF WBC: CPT

## 2020-01-10 PROCEDURE — 77030027138 HC INCENT SPIROMETER -A

## 2020-01-10 PROCEDURE — 74011250636 HC RX REV CODE- 250/636: Performed by: INTERNAL MEDICINE

## 2020-01-10 RX ORDER — INDOMETHACIN 50 MG/1
50 CAPSULE ORAL
Status: COMPLETED | OUTPATIENT
Start: 2020-01-10 | End: 2020-01-11

## 2020-01-10 RX ORDER — CELECOXIB 100 MG/1
200 CAPSULE ORAL 2 TIMES DAILY
Status: DISCONTINUED | OUTPATIENT
Start: 2020-01-12 | End: 2020-01-10

## 2020-01-10 RX ORDER — HYDROMORPHONE HYDROCHLORIDE 2 MG/ML
2 INJECTION, SOLUTION INTRAMUSCULAR; INTRAVENOUS; SUBCUTANEOUS
Status: DISCONTINUED | OUTPATIENT
Start: 2020-01-10 | End: 2020-01-12

## 2020-01-10 RX ORDER — INDOMETHACIN 25 MG/1
50 CAPSULE ORAL
Status: DISCONTINUED | OUTPATIENT
Start: 2020-01-10 | End: 2020-01-10

## 2020-01-10 RX ADMIN — HYDROMORPHONE HYDROCHLORIDE 2 MG: 2 INJECTION, SOLUTION INTRAMUSCULAR; INTRAVENOUS; SUBCUTANEOUS at 19:00

## 2020-01-10 RX ADMIN — HYDROXYUREA 500 MG: 500 CAPSULE ORAL at 17:41

## 2020-01-10 RX ADMIN — HYDROMORPHONE HYDROCHLORIDE 2 MG: 2 INJECTION, SOLUTION INTRAMUSCULAR; INTRAVENOUS; SUBCUTANEOUS at 15:45

## 2020-01-10 RX ADMIN — INFLUENZA VIRUS VACCINE 0.5 ML: 15; 15; 15; 15 SUSPENSION INTRAMUSCULAR at 13:43

## 2020-01-10 RX ADMIN — FAMOTIDINE 20 MG: 20 TABLET, FILM COATED ORAL at 08:22

## 2020-01-10 RX ADMIN — INDOMETHACIN 50 MG: 50 CAPSULE ORAL at 17:51

## 2020-01-10 RX ADMIN — HYDROMORPHONE HYDROCHLORIDE 2 MG: 2 INJECTION, SOLUTION INTRAMUSCULAR; INTRAVENOUS; SUBCUTANEOUS at 22:08

## 2020-01-10 RX ADMIN — FOLIC ACID 1 MG: 1 TABLET ORAL at 08:22

## 2020-01-10 RX ADMIN — HYDROXYUREA 500 MG: 500 CAPSULE ORAL at 08:21

## 2020-01-10 RX ADMIN — SODIUM CHLORIDE 125 ML/HR: 900 INJECTION, SOLUTION INTRAVENOUS at 15:53

## 2020-01-10 RX ADMIN — HYDROMORPHONE HYDROCHLORIDE 2 MG: 1 INJECTION, SOLUTION INTRAMUSCULAR; INTRAVENOUS; SUBCUTANEOUS at 06:36

## 2020-01-10 RX ADMIN — FAMOTIDINE 20 MG: 20 TABLET, FILM COATED ORAL at 17:51

## 2020-01-10 RX ADMIN — SODIUM CHLORIDE 125 ML/HR: 900 INJECTION, SOLUTION INTRAVENOUS at 06:46

## 2020-01-10 RX ADMIN — ENOXAPARIN SODIUM 40 MG: 40 INJECTION SUBCUTANEOUS at 06:36

## 2020-01-10 RX ADMIN — HYDROMORPHONE HYDROCHLORIDE 2 MG: 1 INJECTION, SOLUTION INTRAMUSCULAR; INTRAVENOUS; SUBCUTANEOUS at 02:41

## 2020-01-10 RX ADMIN — CELECOXIB 200 MG: 100 CAPSULE ORAL at 08:22

## 2020-01-10 RX ADMIN — INDOMETHACIN 50 MG: 50 CAPSULE ORAL at 13:37

## 2020-01-10 RX ADMIN — HYDROMORPHONE HYDROCHLORIDE 2 MG: 2 INJECTION, SOLUTION INTRAMUSCULAR; INTRAVENOUS; SUBCUTANEOUS at 11:15

## 2020-01-10 NOTE — ROUTINE PROCESS
Bedside and Verbal shift change report given to Bessy Gilbert RN (oncoming nurse) by Mor Hendricks RN (offgoing nurse). Report included the following information SBAR, Kardex, MAR and Recent Results.

## 2020-01-10 NOTE — PROGRESS NOTES
Hospitalist Progress Note    Patient: Thomas Wolf Age: 37 y.o. : 1980 MR#: [id number] SSN: [id number]  Date/Time: 1/10/2020 6:17 PM    DOA: 2020  PCP: Colonel Cain MD    Subjective:     Still with pain at back and lower leg. Dr. Alexandre Pickett evaluated. Increase dilaudid, cont NSAID, fluid  No fever. ROS:  no fever/chills, no headache, no dizziness, no facial pain, no sinus congestion,   No swallowing pain, No chest pain, no palpitation, no shortness of breath, no abd pain,  No diarrhea, no urinary complaint, ++ leg pain     Assessment/Plan:   1. Sickle cell pain crisis  2. Sickle cell anemia   3. Chronic opoid dependence   4. Hyperbilirubinemia in the setting of sickle cell     Continue with IV narcotic, IV hydration and NSAID for pain control. Will try to wean him off IV narcotic back to his baseline level. Continue fentanyl patch. ICS.    Hematology follows, continue hydrea, folic acid     Full code     Additional Notes:    Time spent >30 minutes    Case discussed with:  [x]Patient  []Family  [x]Nursing  [x]Case Management  DVT Prophylaxis:  [x]Lovenox  []Hep SQ  []SCDs  []Coumadin   []On Heparin gtt    Signed By: Kenia Mitchell MD     January 10, 2020 6:17 PM              Objective:   VS:   Visit Vitals  /70 (BP 1 Location: Right arm, BP Patient Position: At rest)   Pulse 70   Temp 97.8 °F (36.6 °C)   Resp 15   Ht 6' 0.01\" (1.829 m)   Wt 80.9 kg (178 lb 6.4 oz)   SpO2 96%   BMI 24.19 kg/m²      Tmax/24hrs: Temp (24hrs), Av.1 °F (36.7 °C), Min:97.3 °F (36.3 °C), Max:98.5 °F (36.9 °C)      Intake/Output Summary (Last 24 hours) at 1/10/2020 1817  Last data filed at 1/10/2020 1747  Gross per 24 hour   Intake 1640 ml   Output 2100 ml   Net -460 ml       Tele:   General:  Cooperative, Not in acute distress, speaks in full sentence while in bed  HEENT: PERRL, EOMI, supple neck, no JVD, dry oral mucosa  Cardiovascular: S1S2 regular, no rub/gallop   Pulmonary: Clear air entry bilaterally, no wheezing, no crackle  GI:  Soft, non tender, non distended, +bs, no guarding   Extremities:  No pedal edema, +distal pulses appreciated   Tenderness at lower legs  Neuro: AOx3, moving all extremities, no gross deficit. Additional:       Current Facility-Administered Medications   Medication Dose Route Frequency    HYDROmorphone (DILAUDID) injection 2 mg  2 mg IntraVENous Q3H PRN    indomethacin (INDOCIN) capsule 50 mg  50 mg Oral TID WITH MEALS    0.9% sodium chloride infusion  125 mL/hr IntraVENous CONTINUOUS    famotidine (PEPCID) tablet 20 mg  20 mg Oral BID    fentaNYL (DURAGESIC) 25 mcg/hr patch 1 Patch  1 Patch TransDERmal Q72H    ondansetron (ZOFRAN) injection 4 mg  4 mg IntraVENous E8H PRN    folic acid (FOLVITE) tablet 1 mg  1 mg Oral DAILY    hydroxyurea (HYDREA) chemo cap 500 mg  500 mg Oral BID    enoxaparin (LOVENOX) injection 40 mg  40 mg SubCUTAneous Q24H    diphenhydrAMINE (BENADRYL) capsule 25 mg  25 mg Oral Q6H PRN            Lab/Data Review:  Labs: Results:       Chemistry Recent Labs     01/09/20  0341 01/08/20  0115   GLU 97 88    136   K 4.1 3.5    107   CO2 24 25   BUN 10 6*   CREA 0.87 1.00   BUCR 11* 6*   AGAP 6 4   CA 8.4* 8.8     Recent Labs     01/08/20  0115   ALT 37   SGOT 52*   TP 8.7*   ALB 3.8   GLOB 4.9*   AGRAT 0.8      CBC w/Diff Recent Labs     01/10/20  0543 01/09/20  0550 01/08/20  0115   WBC 12.0 13.0 17.1*   RBC 3.38* 3.42* 3.68*   HGB 8.4* 8.8* 9.0*   HCT 24.2* 24.2* 26.0*   MCV 71.6* 70.8* 70.7*   MCH 24.9 25.7 24.5   MCHC 34.7 36.4 34.6   RDW 24.0* 23.3* 23.4*   * 397 445*   GRANS 30*  --  52   LYMPH 49  --  33   EOS 12*  --  3      Coagulation No results for input(s): PTP, INR, APTT, INREXT in the last 72 hours.     Iron/Ferritin Lab Results   Component Value Date/Time    Iron 80 08/03/2019 06:55 AM    TIBC 400 08/03/2019 06:55 AM    Iron % saturation 20 08/03/2019 06:55 AM    Ferritin 215 08/03/2019 06:55 AM       BNP Cardiac Enzymes Lab Results   Component Value Date/Time     (H) 04/05/2018 08:54 PM    CK - MB <1.0 04/05/2018 08:54 PM    CK-MB Index  04/05/2018 08:54 PM     CALCULATION NOT PERFORMED WHEN RESULT IS BELOW LINEAR LIMIT    Troponin-I, QT <0.02 04/05/2018 08:54 PM        Lactic Acid    Thyroid Studies          All Micro Results     None            Images:    CT (Most Recent). XRAY (Most Recent)      EKG No results found for this or any previous visit.      2D ECHO

## 2020-01-10 NOTE — PROGRESS NOTES
[facility]  Progress Note    Patient: Driss Cordoba Age: 37 y.o. : 1980 MR#: [id number] SSN: [id number]  Date: 1/10/2020     Subjective:   Patient examined independently by me. He states he came in with bilateral leg pain and back pain, his level of pain is unchanged since presentation. Denies SOB, Chest pain, abd pain, n/v.       Assessment/Plan:   Mr. Camryn Guerrero is a 38 yo  male with history of SS disease who presented on 2020 for bilateral leg pain and back pain consistent with SS crisis. Consults: Heme     1. Sickle cell crisis:   - Follow up with Heme/Onc   -  resume current regimen: folic acid, hydroxyurea. Hold celebrex for now. As needed dialudid for pain q3 hours. And fentanyl patch which pt uses at home. - started on indomethacin today per Dr. Damon Holloway. - hydration with IVFs   - encourage OOB  2. Sickle cell anemia- stable , no indication for transfusion.    3. Sickle cell disease follows with Dr. Du Cotto     Additional Notes:  Anticipate d/c to home tomorrow pending improvement in pain     Case discussed with:  [x]Patient  []Family  []Nursing  []Case Management  DVT Prophylaxis:  [x]Lovenox  []Hep SQ  []SCDs  []Coumadin   []On Heparin gtt    Objective:   VS:   Visit Vitals  /62 (BP 1 Location: Right arm, BP Patient Position: At rest)   Pulse 68   Temp 98.3 °F (36.8 °C)   Resp 19   Ht 6' 0.01\" (1.829 m)   Wt 80.9 kg (178 lb 6.4 oz)   SpO2 94%   BMI 24.19 kg/m²      Tmax/24hrs: Temp (24hrs), Av.2 °F (36.8 °C), Min:97.6 °F (36.4 °C), Max:98.5 °F (36.9 °C)      Intake/Output Summary (Last 24 hours) at 1/10/2020 1218  Last data filed at 1/10/2020 9893  Gross per 24 hour   Intake 2450 ml   Output 2100 ml   Net 350 ml       General:pleasant   male sitting up in bed,  NAD  Cardiovascular:  RRR  Pulmonary: on room air,  LSC throughout; respiratory effort WNL  GI:  +BS in all four quadrants, soft, non-tender  Extremities:  No edema  Neuro: alert, awake and ox3. Moving all extremities, follows commands. Labs:    Recent Results (from the past 24 hour(s))   CBC WITH AUTOMATED DIFF    Collection Time: 01/10/20  5:43 AM   Result Value Ref Range    WBC 12.0 4.6 - 13.2 K/uL    RBC 3.38 (L) 4.70 - 5.50 M/uL    HGB 8.4 (L) 13.0 - 16.0 g/dL    HCT 24.2 (L) 36.0 - 48.0 %    MCV 71.6 (L) 74.0 - 97.0 FL    MCH 24.9 24.0 - 34.0 PG    MCHC 34.7 31.0 - 37.0 g/dL    RDW 24.0 (H) 11.6 - 14.5 %    PLATELET 231 (H) 027 - 420 K/uL    MPV 9.8 9.2 - 11.8 FL    NEUTROPHILS 30 (L) 42 - 75 %    LYMPHOCYTES 49 20 - 51 %    MONOCYTES 8 2 - 9 %    EOSINOPHILS 12 (H) 0 - 5 %    BASOPHILS 1 0 - 3 %    ABS. NEUTROPHILS 3.6 1.8 - 8.0 K/UL    ABS. LYMPHOCYTES 5.9 (H) 0.8 - 3.5 K/UL    ABS. MONOCYTES 1.0 0 - 1.0 K/UL    ABS. EOSINOPHILS 1.4 (H) 0.0 - 0.4 K/UL    ABS.  BASOPHILS 0.1 (H) 0.0 - 0.06 K/UL    DF MANUAL      PLATELET COMMENTS Increased Platelets      RBC COMMENTS ANISOCYTOSIS  2+        RBC COMMENTS MICROCYTOSIS  2+        RBC COMMENTS POLYCHROMASIA  1+        RBC COMMENTS HYPOCHROMIA  1+        RBC COMMENTS MERCER JOLLY BODIES  1+        RBC COMMENTS POIKILOCYTOSIS  3+        RBC COMMENTS TARGET CELLS  1+        RBC COMMENTS OVALOCYTES  1+        RBC COMMENTS SCHISTOCYTES  2+        RBC COMMENTS SICKLE CELLS  2+           Signed By: PHANI Mackey     January 10, 2020

## 2020-01-10 NOTE — PROGRESS NOTES
Hematology / Oncology Progress Note  [facility]      Patient ID:  Neil Brown  37 y.o.  1980    Admit Date: 2020    Seen earlier today    Assessment:     Active Problems:    Sickle cell crisis ([facility] [address] (2014)      [facility] ([facility])        Plan:     Track cbc, lytes  No prbc for now  Cont current mx  Increase dilaudid q 3h prn  Add indomethacin  Hold celebrex until completes indomethacin    Subjective:   Admitted because of [id number] and persistent pain in the lower backa nd legs unresponsive to home analgesics. Presently still hurting in legs and low back. No chest symptoms, no cough    Objective:     Visit Vitals  /70 (BP 1 Location: Right arm, BP Patient Position: At rest)   Pulse 70   Temp 97.8 °F (36.6 °C)   Resp 15   Ht 6' 0.01\" (1.829 m)   Wt 80.9 kg (178 lb 6.4 oz)   SpO2 96%   BMI 24.19 kg/m²             Temp (24hrs), Av.1 °F (36.7 °C), Min:97.3 °F (36.3 °C), Max:98.5 °F (36.9 °C)        Intake/Output Summary (Last 24 hours) at 1/10/2020 1749  Last data filed at 1/10/2020 1747  Gross per 24 hour   Intake 1760 ml   Output 2100 ml   Net -340 ml       Review of Systems:   Constitutional:  No Fever; No chills; No weight loss    Skin:  No rash; No itching   HEENT:  No changes in vision or hearing   Cardiovascular:  No palpitations, chest pain, angina   Respiratory:  No shortness of breath, no wheezing, no pleurisy, no cough, no  hemoptysis   Gastrointestinal:  No nausea or vomiting; No diarrhea, no dyspepsis   Genitourinary:  No dysuria; No increase in urinary frequency   Musculoskeletal:  No weakness or muscle pains   Endo:  No polyuria; no symptoms of thyroid dysfunction   Heme:  No bruising; No bleeding, no adenopathy   Neurological:  No Seizures;  No focal weakness or sensory changes   Psychiatric:  No mood changes; no suicidal ideation       Physical Exam:  General appearance - shaking in bed to relieve pain    Eyes - pupils equal and reactive, extraocular eye movements intact  Ears - not examined  Mouth - mucous membranes moist, pharynx normal without lesions  Neck - supple, no significant adenopathy  Lymphatics - no palpable lymphadenopathy, no hepatosplenomegaly  Chest - clear to auscultation, no wheezes, rales or rhonchi, symmetric air entry  Heart - normal rate, regular rhythm, normal S1, S2, no murmurs, rubs, clicks or gallops  Abdomen - soft, nontender, nondistended, no masses or organomegaly  Back exam - not examined  Neurological - alert, oriented, normal speech, no focal findings or movement disorder noted  Extremities - peripheral pulses normal, no pedal edema, no clubbing or cyanosis  Skin - normal coloration and turgor, no rashes, no suspicious skin lesions noted          Labs:  Basic Metabolic Profile   Recent Labs     01/09/20  0341 01/08/20  0115    136   CO2 24 25   BUN 10 6*        CBC w/Diff    Recent Labs     01/10/20  0543 01/09/20  0550 01/08/20  0115   WBC 12.0 13.0 17.1*   HCT 24.2* 24.2* 26.0*    No results for input(s): BANDS, LYMPHOCYTES in the last 72 hours. No lab exists for component: SEGS     Hepatic Panel   Hepatic Function  No results found for: ALBUMIN      Coagulation   No results for input(s): INR, APTT, INREXT in the last 72 hours.     No lab exists for component: PT       Current medications reviewed    Jacob John MD   [facility] [phone]

## 2020-01-10 NOTE — PROGRESS NOTES
Problem: Pain  Goal: *Control of Pain  Outcome: Progressing Towards Goal     Problem: Pressure Injury - Risk of  Goal: *Prevention of pressure injury  Description  Document Reynaldo Scale and appropriate interventions in the flowsheet. Outcome: Progressing Towards Goal  Note: Pressure Injury Interventions:             Activity Interventions: Pressure redistribution bed/mattress(bed type)         Nutrition Interventions: Document food/fluid/supplement intake

## 2020-01-11 LAB
BASOPHILS # BLD: 0 K/UL (ref 0–0.06)
BASOPHILS NFR BLD: 0 % (ref 0–3)
DIFFERENTIAL METHOD BLD: ABNORMAL
EOSINOPHIL # BLD: 0.9 K/UL (ref 0–0.4)
EOSINOPHIL NFR BLD: 9 % (ref 0–5)
ERYTHROCYTE [DISTWIDTH] IN BLOOD BY AUTOMATED COUNT: 24 % (ref 11.6–14.5)
HCT VFR BLD AUTO: 22.7 % (ref 36–48)
HGB BLD-MCNC: 7.9 G/DL (ref 13–16)
LYMPHOCYTES # BLD: 3.9 K/UL (ref 0.8–3.5)
LYMPHOCYTES NFR BLD: 38 % (ref 20–51)
MCH RBC QN AUTO: 24.7 PG (ref 24–34)
MCHC RBC AUTO-ENTMCNC: 34.8 G/DL (ref 31–37)
MCV RBC AUTO: 70.9 FL (ref 74–97)
MONOCYTES # BLD: 0.9 K/UL (ref 0–1)
MONOCYTES NFR BLD: 9 % (ref 2–9)
NEUTS SEG # BLD: 4.5 K/UL (ref 1.8–8)
NEUTS SEG NFR BLD: 44 % (ref 42–75)
NRBC BLD-RTO: 2 PER 100 WBC
PLATELET # BLD AUTO: 430 K/UL (ref 135–420)
PLATELET COMMENTS,PCOM: ABNORMAL
PMV BLD AUTO: 9.7 FL (ref 9.2–11.8)
RBC # BLD AUTO: 3.2 M/UL (ref 4.7–5.5)
RBC MORPH BLD: ABNORMAL
WBC # BLD AUTO: 10.2 K/UL (ref 4.6–13.2)

## 2020-01-11 PROCEDURE — 74011250636 HC RX REV CODE- 250/636: Performed by: INTERNAL MEDICINE

## 2020-01-11 PROCEDURE — 65270000029 HC RM PRIVATE

## 2020-01-11 PROCEDURE — 74011250637 HC RX REV CODE- 250/637: Performed by: INTERNAL MEDICINE

## 2020-01-11 PROCEDURE — 36415 COLL VENOUS BLD VENIPUNCTURE: CPT

## 2020-01-11 PROCEDURE — 85025 COMPLETE CBC W/AUTO DIFF WBC: CPT

## 2020-01-11 RX ADMIN — HYDROXYUREA 500 MG: 500 CAPSULE ORAL at 17:11

## 2020-01-11 RX ADMIN — SODIUM CHLORIDE 125 ML/HR: 900 INJECTION, SOLUTION INTRAVENOUS at 01:14

## 2020-01-11 RX ADMIN — FAMOTIDINE 20 MG: 20 TABLET, FILM COATED ORAL at 17:14

## 2020-01-11 RX ADMIN — HYDROMORPHONE HYDROCHLORIDE 2 MG: 2 INJECTION, SOLUTION INTRAMUSCULAR; INTRAVENOUS; SUBCUTANEOUS at 10:11

## 2020-01-11 RX ADMIN — HYDROMORPHONE HYDROCHLORIDE 2 MG: 2 INJECTION, SOLUTION INTRAMUSCULAR; INTRAVENOUS; SUBCUTANEOUS at 16:41

## 2020-01-11 RX ADMIN — HYDROMORPHONE HYDROCHLORIDE 2 MG: 2 INJECTION, SOLUTION INTRAMUSCULAR; INTRAVENOUS; SUBCUTANEOUS at 07:00

## 2020-01-11 RX ADMIN — INDOMETHACIN 50 MG: 50 CAPSULE ORAL at 08:27

## 2020-01-11 RX ADMIN — FOLIC ACID 1 MG: 1 TABLET ORAL at 08:28

## 2020-01-11 RX ADMIN — INDOMETHACIN 50 MG: 50 CAPSULE ORAL at 11:35

## 2020-01-11 RX ADMIN — ONDANSETRON 4 MG: 2 INJECTION INTRAMUSCULAR; INTRAVENOUS at 10:12

## 2020-01-11 RX ADMIN — INDOMETHACIN 50 MG: 50 CAPSULE ORAL at 17:13

## 2020-01-11 RX ADMIN — SODIUM CHLORIDE 125 ML/HR: 900 INJECTION, SOLUTION INTRAVENOUS at 10:23

## 2020-01-11 RX ADMIN — HYDROMORPHONE HYDROCHLORIDE 2 MG: 2 INJECTION, SOLUTION INTRAMUSCULAR; INTRAVENOUS; SUBCUTANEOUS at 20:21

## 2020-01-11 RX ADMIN — HYDROMORPHONE HYDROCHLORIDE 2 MG: 2 INJECTION, SOLUTION INTRAMUSCULAR; INTRAVENOUS; SUBCUTANEOUS at 04:10

## 2020-01-11 RX ADMIN — SODIUM CHLORIDE 125 ML/HR: 900 INJECTION, SOLUTION INTRAVENOUS at 20:21

## 2020-01-11 RX ADMIN — HYDROMORPHONE HYDROCHLORIDE 2 MG: 2 INJECTION, SOLUTION INTRAMUSCULAR; INTRAVENOUS; SUBCUTANEOUS at 23:36

## 2020-01-11 RX ADMIN — HYDROMORPHONE HYDROCHLORIDE 2 MG: 2 INJECTION, SOLUTION INTRAMUSCULAR; INTRAVENOUS; SUBCUTANEOUS at 13:26

## 2020-01-11 RX ADMIN — ENOXAPARIN SODIUM 40 MG: 40 INJECTION SUBCUTANEOUS at 06:57

## 2020-01-11 RX ADMIN — HYDROXYUREA 500 MG: 500 CAPSULE ORAL at 08:37

## 2020-01-11 RX ADMIN — HYDROMORPHONE HYDROCHLORIDE 2 MG: 2 INJECTION, SOLUTION INTRAMUSCULAR; INTRAVENOUS; SUBCUTANEOUS at 01:11

## 2020-01-11 RX ADMIN — FAMOTIDINE 20 MG: 20 TABLET, FILM COATED ORAL at 08:28

## 2020-01-11 NOTE — PROGRESS NOTES
Bedside shift change report given to Spencer RN (oncoming nurse) by Chiquita Rene RN (offgoing nurse). Report included the following information SBAR, Kardex and MAR.

## 2020-01-11 NOTE — PROGRESS NOTES
Problem: Pain  Goal: *Control of Pain  Outcome: Progressing Towards Goal     Problem: Falls - Risk of  Goal: *Absence of Falls  Description  Document Noelleanna Cliftons Fall Risk and appropriate interventions in the flowsheet. Outcome: Progressing Towards Goal  Note: Fall Risk Interventions:            Medication Interventions: Teach patient to arise slowly                   Problem: Pressure Injury - Risk of  Goal: *Prevention of pressure injury  Description  Document Reynaldo Scale and appropriate interventions in the flowsheet. Outcome: Progressing Towards Goal  Note: Pressure Injury Interventions:             Activity Interventions: Increase time out of bed         Nutrition Interventions: Document food/fluid/supplement intake

## 2020-01-11 NOTE — PROGRESS NOTES
Problem: Pain  Goal: *Control of Pain  Outcome: Progressing Towards Goal  Goal: *PALLIATIVE CARE:  Alleviation of Pain  Outcome: Progressing Towards Goal     Problem: Patient Education: Go to Patient Education Activity  Goal: Patient/Family Education  Outcome: Progressing Towards Goal     Problem: Patient Education: Go to Patient Education Activity  Goal: Patient/Family Education  Outcome: Progressing Towards Goal     Problem: Patient Education: Go to Patient Education Activity  Goal: Patient/Family Education  Outcome: Progressing Towards Goal

## 2020-01-11 NOTE — PROGRESS NOTES
Admit Date: 2020  Date of Service: 2020    Reason for follow-up: sickel cell crisis      Assessment:         1. Sickle cell pain crisis: overall improving  2. Sickle cell anemia   3. Chronic opoid dependence   4. Hyperbilirubinemia in the setting of sickle cell     Plan:   Continue with current pain management- wean if possible  Continue hydrea 500 bid  Recheck CBC in am    Current Antibtiocs:   none    Lines:   Peripheral    Case discussed with:  []Patient  []Family  []Nursing  []Case Management  DVT Prophylaxis:  []Lovenox  []Hep SQ  []SCDs  []Coumadin   []On Heparin gtt    I have independently examined the patient and reviewed all lab studies and imgaing as well as review of nursing notes and physican notes from the past 24 hours. Hossein Parisi D.O. Pager [phone]      Allergies   Allergen Reactions    Eggshell Membrane Nausea and Vomiting    Milk Nausea and Vomiting     Pt stated only milk causes him to vomit, but usually eats other foods containing dairy/milk. Subjective:      Pt seen and examined. Feeling overall better but still has ongoing low back and b/l LE pain. No CP or SOB. No n/v/d.  No other complaints today    Objective:        Visit Vitals  /58 (BP 1 Location: Right arm, BP Patient Position: At rest)   Pulse 60   Temp 97.6 °F (36.4 °C)   Resp 15   Ht 6' 0.01\" (1.829 m)   Wt 80.9 kg (178 lb 6.4 oz)   SpO2 93%   BMI 24.19 kg/m²     Temp (24hrs), Av °F (36.7 °C), Min:97.6 °F (36.4 °C), Max:98.8 °F (37.1 °C)        General:   awake alert and oriented, non-toxic   Skin:   no rashes or skin lesions noted on limited exam, dry and warm   HEENT:  No scleral icterus or pallor; oral mucosa moist, lips moist   Lymph Nodes:   not assessed today   Lungs:   non, labored; bilaterally clear to aspiration- no crackles wheezes rales or rhonchi   Heart:  RRR, s1 and s2; no murmurs rubs or gallops; no edema, + pedal pulses   Abdomen:  soft, non-distended, active bowel sounds, non-tender   Genitourinary:  deferred   Extremities:   average muscle tone; no contractures, no joint effusions   Neurologic:  No gross focal motor or sensory abnormalities; CN 2-12 intact; Follows commands. Psychiatric:   appropriate and interactive. Labs: Results:   Chemistry Recent Labs     01/09/20  0341   GLU 97      K 4.1      CO2 24   BUN 10   CREA 0.87   CA 8.4*   AGAP 6   BUCR 11*      CBC w/Diff Recent Labs     01/11/20  0358 01/10/20  0543 01/09/20  0550   WBC 10.2 12.0 13.0   RBC 3.20* 3.38* 3.42*   HGB 7.9* 8.4* 8.8*   HCT 22.7* 24.2* 24.2*   * 430* 397   GRANS 44 30*  --    LYMPH 38 49  --    EOS 9* 12*  --         Lab Results   Component Value Date/Time    Specimen Description: BLOOD 10/23/2013 11:36 AM    Specimen Description: BLOOD 10/23/2013 11:26 AM    Specimen Description: CLEAN CATCH 10/23/2013 09:00 AM    Specimen Description: BLOOD 10/20/2013 11:10 AM    Specimen Description: BLOOD 10/20/2013 11:10 AM    Lab Results   Component Value Date/Time    Culture result: NO GROWTH 1 DAY 10/15/2017 09:06 PM    Culture result: NO BETA HEMOLYTIC STREPTOCOCCUS GROUP A ISOLATED 11/11/2016 08:30 AM    Culture result: FEW  STREPTOCOCCI, BETA HEMOLYTIC GROUP F   11/11/2016 08:30 AM    Culture result: NO GROWTH 1 DAY 10/17/2015 09:42 AM    Culture result: NO GROWTH 6 DAYS 10/17/2015 03:55 AM        Results     ** No results found for the last 336 hours. **          Imaging:     No results found.

## 2020-01-11 NOTE — PROGRESS NOTES
Phone: [phone]     Hematology / Oncology Progress Note  [facility]      Patient: Wilber Weber   MRN: [id number]         CSN: [id number]    YOB: 1980      Admit Date: 2020    Assessment:     Active Problems:    Sickle cell pain crisis ([address] (2020)- SS disease. Pt is on fentanyl patch 25mcg/h, dilaudid PRN and Hydrea 500 mg BID. Plan:   - crisis seems to be doing better. - pt requesting benadryl with the pain med. It is already ordered. - asked if pt needed change in pain meds and he said no. - Hgb baseline 8-9, total 7.9 g/dl . Elvia Jenkins MD  [facility] [phone]      Subjective:   Pt feels better, pain is 7/10. Objective:     Visit Vitals  /63 (BP 1 Location: Right arm, BP Patient Position: At rest)   Pulse 75   Temp 98.8 °F (37.1 °C)   Resp 15   Ht 6' 0.01\" (1.829 m)   Wt 80.9 kg (178 lb 6.4 oz)   SpO2 94%   BMI 24.19 kg/m²             Temp (24hrs), Av.9 °F (36.6 °C), Min:97.3 °F (36.3 °C), Max:98.8 °F (37.1 °C)        Intake/Output Summary (Last 24 hours) at 2020 1129  Last data filed at 2020 0834  Gross per 24 hour   Intake 500 ml   Output 400 ml   Net 100 ml       Review of Systems:   Constitutional: negative for fevers, chills, sweats .  Mild  fatigue  Eyes: negative for visual disturbance, redness and icterus  Ears, Nose, Mouth, Throat, and Face: negative for tinnitus, epistaxis, sore mouth and hoarseness  Respiratory: negative for cough, sputum, hemoptysis, pleurisy/chest pain or wheezing  Cardiovascular: negative for chest pain, chest pressure/discomfort, palpitations, irregular heart beats, syncope, paroxysmal nocturnal dyspnea  Gastrointestinal: negative for reflux symptoms, nausea, vomiting, change in bowel habits, melena, diarrhea, constipation and abdominal pain  Genitourinary:negative for dysuria, nocturia, urinary incontinence, hesitancy and hematuria  Integument: negative for rash, skin lesion(s) and pruritus  Hematologic/Lymphatic: negative for easy bruising, bleeding and lymphadenopathy  Musculoskeletal: + joint pains and muscle pains. Neurological: negative for headaches, dizziness, seizures, paresthesia and weakness    Physical Exam:  Constitutional: Alert, oriented, not in distress. Eyes: PERRLA, icteric, no redness. Ears, nose, mouth, throat, and face: no palpable Lymph nodes, no mucositis, no thrush. Respiratory: symmetrical expansion, no rales, no rhonchi, no wheezing. Cardiovascular: S1S2, no pathologic murmur, no rub. Gastrointestinal: soft, benign, non tender, no HSM, normal bowel sounds, no mass. Integument: no rash, no petechiae, no ecchymosis. Musculoskeletal: no edema, no cyanosis, no clubbing. Neurological: intact, cranial nerves, no focal motor or sensory deficits. Labs:  Recent Results (from the past 24 hour(s))   CBC WITH AUTOMATED DIFF    Collection Time: 01/11/20  3:58 AM   Result Value Ref Range    WBC 10.2 4.6 - 13.2 K/uL    RBC 3.20 (L) 4.70 - 5.50 M/uL    HGB 7.9 (L) 13.0 - 16.0 g/dL    HCT 22.7 (L) 36.0 - 48.0 %    MCV 70.9 (L) 74.0 - 97.0 FL    MCH 24.7 24.0 - 34.0 PG    MCHC 34.8 31.0 - 37.0 g/dL    RDW 24.0 (H) 11.6 - 14.5 %    PLATELET 953 (H) 859 - 420 K/uL    MPV 9.7 9.2 - 11.8 FL    NEUTROPHILS 44 42 - 75 %    LYMPHOCYTES 38 20 - 51 %    MONOCYTES 9 2 - 9 %    EOSINOPHILS 9 (H) 0 - 5 %    BASOPHILS 0 0 - 3 %    NRBC 2.0 (H) 0  WBC    ABS. NEUTROPHILS 4.5 1.8 - 8.0 K/UL    ABS. LYMPHOCYTES 3.9 (H) 0.8 - 3.5 K/UL    ABS. MONOCYTES 0.9 0 - 1.0 K/UL    ABS. EOSINOPHILS 0.9 (H) 0.0 - 0.4 K/UL    ABS.  BASOPHILS 0.0 0.0 - 0.06 K/UL    DF MANUAL      PLATELET COMMENTS Increased Platelets      RBC COMMENTS ANISOCYTOSIS  2+        RBC COMMENTS MICROCYTOSIS  1+        RBC COMMENTS POLYCHROMASIA  1+        RBC COMMENTS HYPOCHROMIA  1+        RBC COMMENTS POIKILOCYTOSIS  2+        RBC COMMENTS SICKLE CELLS  2+        RBC COMMENTS TARGET CELLS  1+

## 2020-01-11 NOTE — ROUTINE PROCESS
Bedside and Verbal shift change report given to Chelsea Short RN (oncoming nurse) by Vernon Shen RN (offgoing nurse). Report included the following information SBAR, Kardex, MAR and Recent Results.

## 2020-01-12 LAB
ERYTHROCYTE [DISTWIDTH] IN BLOOD BY AUTOMATED COUNT: 24.9 % (ref 11.6–14.5)
HCT VFR BLD AUTO: 22.3 % (ref 36–48)
HGB BLD-MCNC: 7.9 G/DL (ref 13–16)
MCH RBC QN AUTO: 25.4 PG (ref 24–34)
MCHC RBC AUTO-ENTMCNC: 35.4 G/DL (ref 31–37)
MCV RBC AUTO: 71.7 FL (ref 74–97)
PLATELET # BLD AUTO: 395 K/UL (ref 135–420)
PMV BLD AUTO: 9.6 FL (ref 9.2–11.8)
RBC # BLD AUTO: 3.11 M/UL (ref 4.7–5.5)
WBC # BLD AUTO: 11.5 K/UL (ref 4.6–13.2)

## 2020-01-12 PROCEDURE — 36415 COLL VENOUS BLD VENIPUNCTURE: CPT

## 2020-01-12 PROCEDURE — 74011250637 HC RX REV CODE- 250/637: Performed by: INTERNAL MEDICINE

## 2020-01-12 PROCEDURE — 65270000029 HC RM PRIVATE

## 2020-01-12 PROCEDURE — 74011250636 HC RX REV CODE- 250/636: Performed by: INTERNAL MEDICINE

## 2020-01-12 PROCEDURE — 85027 COMPLETE CBC AUTOMATED: CPT

## 2020-01-12 RX ORDER — HYDROMORPHONE HYDROCHLORIDE 2 MG/1
2 TABLET ORAL
Status: DISCONTINUED | OUTPATIENT
Start: 2020-01-12 | End: 2020-01-13

## 2020-01-12 RX ORDER — HYDROMORPHONE HYDROCHLORIDE 2 MG/ML
1 INJECTION, SOLUTION INTRAMUSCULAR; INTRAVENOUS; SUBCUTANEOUS
Status: DISCONTINUED | OUTPATIENT
Start: 2020-01-12 | End: 2020-01-15 | Stop reason: HOSPADM

## 2020-01-12 RX ADMIN — HYDROMORPHONE HYDROCHLORIDE 2 MG: 2 INJECTION, SOLUTION INTRAMUSCULAR; INTRAVENOUS; SUBCUTANEOUS at 08:40

## 2020-01-12 RX ADMIN — HYDROMORPHONE HYDROCHLORIDE 2 MG: 2 TABLET ORAL at 23:10

## 2020-01-12 RX ADMIN — HYDROMORPHONE HYDROCHLORIDE 2 MG: 2 INJECTION, SOLUTION INTRAMUSCULAR; INTRAVENOUS; SUBCUTANEOUS at 02:31

## 2020-01-12 RX ADMIN — FOLIC ACID 1 MG: 1 TABLET ORAL at 08:40

## 2020-01-12 RX ADMIN — HYDROXYUREA 500 MG: 500 CAPSULE ORAL at 17:16

## 2020-01-12 RX ADMIN — ENOXAPARIN SODIUM 40 MG: 40 INJECTION SUBCUTANEOUS at 05:37

## 2020-01-12 RX ADMIN — HYDROMORPHONE HYDROCHLORIDE 2 MG: 2 TABLET ORAL at 19:07

## 2020-01-12 RX ADMIN — SODIUM CHLORIDE 125 ML/HR: 900 INJECTION, SOLUTION INTRAVENOUS at 05:36

## 2020-01-12 RX ADMIN — HYDROMORPHONE HYDROCHLORIDE 1 MG: 2 INJECTION, SOLUTION INTRAMUSCULAR; INTRAVENOUS; SUBCUTANEOUS at 15:18

## 2020-01-12 RX ADMIN — HYDROMORPHONE HYDROCHLORIDE 2 MG: 2 INJECTION, SOLUTION INTRAMUSCULAR; INTRAVENOUS; SUBCUTANEOUS at 05:37

## 2020-01-12 RX ADMIN — HYDROXYUREA 500 MG: 500 CAPSULE ORAL at 08:50

## 2020-01-12 RX ADMIN — FAMOTIDINE 20 MG: 20 TABLET, FILM COATED ORAL at 17:16

## 2020-01-12 RX ADMIN — SODIUM CHLORIDE 125 ML/HR: 900 INJECTION, SOLUTION INTRAVENOUS at 15:22

## 2020-01-12 RX ADMIN — HYDROMORPHONE HYDROCHLORIDE 2 MG: 2 INJECTION, SOLUTION INTRAMUSCULAR; INTRAVENOUS; SUBCUTANEOUS at 11:44

## 2020-01-12 RX ADMIN — FAMOTIDINE 20 MG: 20 TABLET, FILM COATED ORAL at 08:40

## 2020-01-12 NOTE — PROGRESS NOTES
Bedside shift change report given to Kendra RN (oncoming nurse) by Omayra Balderas RN (offgoing nurse). Report included the following information SBAR, Kardex and MAR.

## 2020-01-12 NOTE — PROGRESS NOTES
Bedside and Verbal shift change report given to Saira Antonio (oncoming nurse) by Joselito Aldnaa RN (offgoing nurse). Report included the following information SBAR, Kardex, ED Summary, Procedure Summary, Intake/Output, MAR and Recent Results.

## 2020-01-12 NOTE — PROGRESS NOTES
Phone: [phone]     Hematology / Oncology Progress Note  [facility]      Patient: Abdullhai Lara   MRN: [id number]         CSN: [id number]    YOB: 1980      Admit Date: 2020    Assessment:     Active Problems:    Sickle cell pain crisis ([address] (2020)- SS disease. Pt is on fentanyl patch 25mcg/h, dilaudid PRN and Hydrea 500 mg BID.       Plan:    -doing better, likely can go home later today or tomorrow. Continue dilaudid PRN , fentanyl, can convert IV dilaudid to PO to send him home on dilaudid PO.  - continue hydrea.  -No need for PRBCs today     [id number] [phone]      Subjective:     Pt feels better overall, pain 6/10. Objective:     Visit Vitals  /62   Pulse 67   Temp 97.4 °F (36.3 °C)   Resp 16   Ht 6' 0.01\" (1.829 m)   Wt 80.9 kg (178 lb 6.4 oz)   SpO2 97%   BMI 24.19 kg/m²             Temp (24hrs), Av.1 °F (36.7 °C), Min:97.4 °F (36.3 °C), Max:98.8 °F (37.1 °C)        Intake/Output Summary (Last 24 hours) at 2020 0656  Last data filed at 2020 3142  Gross per 24 hour   Intake 6972.08 ml   Output 500 ml   Net 6472.08 ml   Review of Systems:   Constitutional: negative for fevers, chills, sweats .  Mild  fatigue  Eyes: negative for visual disturbance, redness and icterus  Ears, Nose, Mouth, Throat, and Face: negative for tinnitus, epistaxis, sore mouth and hoarseness  Respiratory: negative for cough, sputum, hemoptysis, pleurisy/chest pain or wheezing  Cardiovascular: negative for chest pain, chest pressure/discomfort, palpitations, irregular heart beats, syncope, paroxysmal nocturnal dyspnea  Gastrointestinal: negative for reflux symptoms, nausea, vomiting, change in bowel habits, melena, diarrhea, constipation and abdominal pain  Genitourinary:negative for dysuria, nocturia, urinary incontinence, hesitancy and hematuria  Integument: negative for rash, skin lesion(s) and pruritus  Hematologic/Lymphatic: negative for easy bruising, bleeding and lymphadenopathy  Musculoskeletal: + joint pains and muscle pains. Neurological: negative for headaches, dizziness, seizures, paresthesia and weakness     Physical Exam:  Constitutional: Alert, oriented, not in distress. Eyes: PERRLA, icteric, no redness. Ears, nose, mouth, throat, and face: no palpable Lymph nodes, no mucositis, no thrush. Respiratory: symmetrical expansion, no rales, no rhonchi, no wheezing. Cardiovascular: S1S2, no pathologic murmur, no rub. Gastrointestinal: soft, benign, non tender, no HSM, normal bowel sounds, no mass. Integument: no rash, no petechiae, no ecchymosis. Musculoskeletal: no edema, no cyanosis, no clubbing.   Neurological: intact, cranial nerves, no focal motor or sensory deficits.     Labs:  Recent Results (from the past 24 hour(s))   CBC W/O DIFF    Collection Time: 01/12/20  3:28 AM   Result Value Ref Range    WBC 11.5 4.6 - 13.2 K/uL    RBC 3.11 (L) 4.70 - 5.50 M/uL    HGB 7.9 (L) 13.0 - 16.0 g/dL    HCT 22.3 (L) 36.0 - 48.0 %    MCV 71.7 (L) 74.0 - 97.0 FL    MCH 25.4 24.0 - 34.0 PG    MCHC 35.4 31.0 - 37.0 g/dL    RDW 24.9 (H) 11.6 - 14.5 %    PLATELET 656 330 - 477 K/uL    MPV 9.6 9.2 - 11.8 FL

## 2020-01-12 NOTE — PROGRESS NOTES
Admit Date: 2020  Date of Service: 2020    Reason for follow-up: sickel cell crisis      Assessment:         1. Sickle cell pain crisis: overall improving  2. Sickle cell anemia : Seen by oncology this morning, no plans for transfusion at this time. 3.  Chronic opoid dependence   4. Hyperbilirubinemia in the setting of sickle cell     Plan:   Continue with current pain management- wean if possible   -We will transition to Dilaudid 2 mg p.o. and decrease IV formulation to 1 mg in preparation for discharge tomorrow. Continue hydrea 500 bid and IV fluid hydration did  Recheck CBC in am  No transfusion planned for today    Plan for discharge tomorrow    Current Antibtiocs:   none    Lines:   Peripheral    Case discussed with:  [x]Patient  []Family  [x]Nursing  []Case Management  DVT Prophylaxis:  []Lovenox  [x]Hep SQ  []SCDs  []Coumadin   []On Heparin gtt    I have independently examined the patient and reviewed all lab studies and imgaing as well as review of nursing notes and physican notes from the past 24 hours. Beverley Delgado D.O. Pager [phone]      Allergies   Allergen Reactions    Eggshell Membrane Nausea and Vomiting    Milk Nausea and Vomiting     Pt stated only milk causes him to vomit, but usually eats other foods containing dairy/milk. Subjective:      Pt seen and examined. Feeling overall better but still has ongoing low back and b/l LE pain. No CP or SOB. No n/v/d.  No other complaints today    Objective:        Visit Vitals  /77 (BP 1 Location: Right arm, BP Patient Position: At rest)   Pulse 60   Temp 98.2 °F (36.8 °C)   Resp 15   Ht 6' 0.01\" (1.829 m)   Wt 80.9 kg (178 lb 6.4 oz)   SpO2 98%   BMI 24.19 kg/m²     Temp (24hrs), Av °F (36.7 °C), Min:97.4 °F (36.3 °C), Max:98.7 °F (37.1 °C)        General:   awake alert and oriented, non-toxic   Skin:   no rashes or skin lesions noted on limited exam, dry and warm   HEENT:  No scleral icterus or pallor; oral mucosa moist, lips moist   Lymph Nodes:   not assessed today   Lungs:   non, labored; bilaterally clear to aspiration- no crackles wheezes rales or rhonchi   Heart:  RRR, s1 and s2; no murmurs rubs or gallops; no edema, + pedal pulses   Abdomen:  soft, non-distended, active bowel sounds, non-tender   Genitourinary:  deferred   Extremities:   average muscle tone; no contractures, no joint effusions   Neurologic:  No gross focal motor or sensory abnormalities; CN 2-12 intact; Follows commands. Psychiatric:   appropriate and interactive. Labs: Results:   Chemistry No results for input(s): GLU, NA, K, CL, CO2, BUN, CREA, CA, AGAP, BUCR, TBIL, GPT, AP, TP, ALB, GLOB, AGRAT in the last 72 hours. CBC w/Diff Recent Labs     01/12/20  0328 01/11/20  0358 01/10/20  0543   WBC 11.5 10.2 12.0   RBC 3.11* 3.20* 3.38*   HGB 7.9* 7.9* 8.4*   HCT 22.3* 22.7* 24.2*    430* 430*   GRANS  --  44 30*   LYMPH  --  38 49   EOS  --  9* 12*        Lab Results   Component Value Date/Time    Specimen Description: BLOOD 10/23/2013 11:36 AM    Specimen Description: BLOOD 10/23/2013 11:26 AM    Specimen Description: CLEAN CATCH 10/23/2013 09:00 AM    Specimen Description: BLOOD 10/20/2013 11:10 AM    Specimen Description: BLOOD 10/20/2013 11:10 AM    Lab Results   Component Value Date/Time    Culture result: NO GROWTH 1 DAY 10/15/2017 09:06 PM    Culture result: NO BETA HEMOLYTIC STREPTOCOCCUS GROUP A ISOLATED 11/11/2016 08:30 AM    Culture result: FEW  STREPTOCOCCI, BETA HEMOLYTIC GROUP F   11/11/2016 08:30 AM    Culture result: NO GROWTH 1 DAY 10/17/2015 09:42 AM    Culture result: NO GROWTH 6 DAYS 10/17/2015 03:55 AM        Results     ** No results found for the last 336 hours. **          Imaging:     No results found.

## 2020-01-13 PROCEDURE — 74011250637 HC RX REV CODE- 250/637: Performed by: INTERNAL MEDICINE

## 2020-01-13 PROCEDURE — 74011250636 HC RX REV CODE- 250/636: Performed by: INTERNAL MEDICINE

## 2020-01-13 PROCEDURE — 65270000029 HC RM PRIVATE

## 2020-01-13 RX ORDER — HYDROMORPHONE HYDROCHLORIDE 2 MG/1
2 TABLET ORAL
Status: DISCONTINUED | OUTPATIENT
Start: 2020-01-14 | End: 2020-01-15 | Stop reason: HOSPADM

## 2020-01-13 RX ORDER — HYDROMORPHONE HYDROCHLORIDE 2 MG/1
2 TABLET ORAL EVERY 4 HOURS
Status: COMPLETED | OUTPATIENT
Start: 2020-01-13 | End: 2020-01-14

## 2020-01-13 RX ADMIN — HYDROXYUREA 500 MG: 500 CAPSULE ORAL at 17:35

## 2020-01-13 RX ADMIN — HYDROXYUREA 500 MG: 500 CAPSULE ORAL at 09:05

## 2020-01-13 RX ADMIN — HYDROMORPHONE HYDROCHLORIDE 2 MG: 2 TABLET ORAL at 16:25

## 2020-01-13 RX ADMIN — SODIUM CHLORIDE 125 ML/HR: 900 INJECTION, SOLUTION INTRAVENOUS at 21:52

## 2020-01-13 RX ADMIN — SODIUM CHLORIDE 125 ML/HR: 900 INJECTION, SOLUTION INTRAVENOUS at 06:13

## 2020-01-13 RX ADMIN — ENOXAPARIN SODIUM 40 MG: 40 INJECTION SUBCUTANEOUS at 05:51

## 2020-01-13 RX ADMIN — FAMOTIDINE 20 MG: 20 TABLET, FILM COATED ORAL at 17:35

## 2020-01-13 RX ADMIN — HYDROMORPHONE HYDROCHLORIDE 2 MG: 2 TABLET ORAL at 09:05

## 2020-01-13 RX ADMIN — HYDROMORPHONE HYDROCHLORIDE 2 MG: 2 TABLET ORAL at 04:24

## 2020-01-13 RX ADMIN — FOLIC ACID 1 MG: 1 TABLET ORAL at 09:05

## 2020-01-13 RX ADMIN — HYDROMORPHONE HYDROCHLORIDE 2 MG: 2 TABLET ORAL at 21:51

## 2020-01-13 RX ADMIN — HYDROMORPHONE HYDROCHLORIDE 2 MG: 2 TABLET ORAL at 13:18

## 2020-01-13 RX ADMIN — SODIUM CHLORIDE 125 ML/HR: 900 INJECTION, SOLUTION INTRAVENOUS at 13:21

## 2020-01-13 RX ADMIN — FAMOTIDINE 20 MG: 20 TABLET, FILM COATED ORAL at 09:05

## 2020-01-13 NOTE — PROGRESS NOTES
conducted a Follow up consultation and Spiritual Assessment for Tod Faulkner, who is a 37 y.o.,male. The  provided the following Interventions:  Continued the relationship of care and support. Listened empathically. Chart reviewed. The following outcomes were achieved:  Patient expressed gratitude for 's visit. Assessment:  There are no further spiritual or L[employer] issues which require Spiritual Care Services interventions at this time. Plan:  Chaplains will continue to follow and will provide pastoral care on an as needed/requested basis.  recommends bedside caregivers page  on duty if patient shows signs of acute spiritual or emotional distress.        [facility]   [phone]

## 2020-01-13 NOTE — PROGRESS NOTES
Pt continues on 4N in pain from sickle cell crisis. MD's working on pain management. DC plan continues to be home. Cm will continue to monitor for any additional d/c needs.      KEN Mesa, RN    Pager: [phone]

## 2020-01-13 NOTE — PROGRESS NOTES
Admit Date: 2020  Date of Service: 2020    Reason for follow-up: sickel cell crisis      Assessment:         1. Sickle cell pain crisis: overall improving  2. Sickle cell anemia : Seen by oncology this morning, no plans for transfusion at this time. 3.  Chronic opoid dependence   4. Hyperbilirubinemia in the setting of sickle cell     Plan:   Continue with current pain management- wean if possible   -We will transition to scheduled Dilaudid 2 mg p.o. and decrease IV formulation to 1 mg in preparation for discharge   Continue hydrea 500 bid and IV fluid hydration did  Recheck CBC in am  No transfusion planned for today    Heme-Onc requests that patient remain in hospital until tomorrow for better pain control. Current Antibtiocs:   none    Lines:   Peripheral    Case discussed with:  [x]Patient  []Family  [x]Nursing  []Case Management  DVT Prophylaxis:  []Lovenox  [x]Hep SQ  []SCDs  []Coumadin   []On Heparin gtt    I have independently examined the patient and reviewed all lab studies and imgaing as well as review of nursing notes and physican notes from the past 24 hours. Emmy Rodriguez D.O. Pager [phone]      Allergies   Allergen Reactions    Eggshell Membrane Nausea and Vomiting    Milk Nausea and Vomiting     Pt stated only milk causes him to vomit, but usually eats other foods containing dairy/milk. Subjective:      Pt seen and examined. Was doing well until early this morning when his back and leg pain suddenly worsened significantly. No CP or SOB. No n/v/d.  No other complaints today    Objective:        Visit Vitals  /73 (BP 1 Location: Right arm, BP Patient Position: Sitting)   Pulse 66   Temp 98.4 °F (36.9 °C)   Resp 18   Ht 6' 0.01\" (1.829 m)   Wt 80.9 kg (178 lb 6.4 oz)   SpO2 100%   BMI 24.19 kg/m²     Temp (24hrs), Av.9 °F (36.6 °C), Min:97 °F (36.1 °C), Max:98.6 °F (37 °C)        General:   awake alert and oriented, non-toxic   Skin:   no rashes or skin lesions noted on limited exam, dry and warm   HEENT:  No scleral icterus or pallor; oral mucosa moist, lips moist   Lymph Nodes:   not assessed today   Lungs:   non, labored; bilaterally clear to aspiration- no crackles wheezes rales or rhonchi   Heart:  RRR, s1 and s2; no murmurs rubs or gallops; no edema, + pedal pulses   Abdomen:  soft, non-distended, active bowel sounds, non-tender   Genitourinary:  deferred   Extremities:   average muscle tone; no contractures, no joint effusions   Neurologic:  No gross focal motor or sensory abnormalities; CN 2-12 intact; Follows commands. Psychiatric:   appropriate and interactive. Labs: Results:   Chemistry No results for input(s): GLU, NA, K, CL, CO2, BUN, CREA, CA, AGAP, BUCR, TBIL, GPT, AP, TP, ALB, GLOB, AGRAT in the last 72 hours. CBC w/Diff Recent Labs     01/12/20  0328 01/11/20  0358   WBC 11.5 10.2   RBC 3.11* 3.20*   HGB 7.9* 7.9*   HCT 22.3* 22.7*    430*   GRANS  --  44   LYMPH  --  38   EOS  --  9*        Lab Results   Component Value Date/Time    Specimen Description: BLOOD 10/23/2013 11:36 AM    Specimen Description: BLOOD 10/23/2013 11:26 AM    Specimen Description: CLEAN CATCH 10/23/2013 09:00 AM    Specimen Description: BLOOD 10/20/2013 11:10 AM    Specimen Description: BLOOD 10/20/2013 11:10 AM    Lab Results   Component Value Date/Time    Culture result: NO GROWTH 1 DAY 10/15/2017 09:06 PM    Culture result: NO BETA HEMOLYTIC STREPTOCOCCUS GROUP A ISOLATED 11/11/2016 08:30 AM    Culture result: FEW  STREPTOCOCCI, BETA HEMOLYTIC GROUP F   11/11/2016 08:30 AM    Culture result: NO GROWTH 1 DAY 10/17/2015 09:42 AM    Culture result: NO GROWTH 6 DAYS 10/17/2015 03:55 AM        Results     ** No results found for the last 336 hours. **          Imaging:     No results found.

## 2020-01-13 NOTE — PROGRESS NOTES
Bedside shift change report given to Vernon Squires (oncoming nurse) by Paola Vizcarra (offgoing nurse). Report included the following information SBAR, Kardex and MAR.

## 2020-01-13 NOTE — PROGRESS NOTES
Bedside and Verbal shift change report given to Saira Antonio (oncoming nurse) by Nona Altman RN (offgoing nurse). Report included the following information SBAR, Kardex, ED Summary, Procedure Summary, Intake/Output, MAR and Recent Results.

## 2020-01-13 NOTE — PROGRESS NOTES
Hematology / Oncology Progress Note  [facility]      Patient ID:  Vern Ramirez  37 y.o.  1980    Admit Date: 2020    Assessment:     Active Problems:    Sickle cell crisis ([facility] (2014)      Sickle cell pain crisis ([facility] [address] (2020)    pain poorly controlled overnight    Plan:     Track cbc, lytes  Increase dilausid 2 mg q 4 h rtc   pls consider defer dc x 24 hours to optimize pain control  Cont duragesic    Subjective:   Switch to po yesterday. Pain recurrent and worse overnight. Denies sob. No cough. Pain mainly in letgs and back. Objective:     Visit Vitals  /78 (BP 1 Location: Right arm, BP Patient Position: At rest)   Pulse (!) 58   Temp 98.2 °F (36.8 °C)   Resp 20   Ht 6' 0.01\" (1.829 m)   Wt 80.9 kg (178 lb 6.4 oz)   SpO2 99%   BMI 24.19 kg/m²             Temp (24hrs), Av.9 °F (36.6 °C), Min:97 °F (36.1 °C), Max:98.6 °F (37 °C)        Intake/Output Summary (Last 24 hours) at 2020 1050  Last data filed at 2020 1024  Gross per 24 hour   Intake 3486.25 ml   Output 400 ml   Net 3086.25 ml       Review of Systems:   Constitutional:  No Fever; No chills; No weight loss    Skin:  No rash; No itching   HEENT:  No changes in vision or hearing   Cardiovascular:  No palpitations, chest pain, angina   Respiratory:  No shortness of breath, no wheezing, no pleurisy, no cough, no  hemoptysis   Gastrointestinal:  No nausea or vomiting; No diarrhea, no dyspepsis   Genitourinary:  No dysuria; No increase in urinary frequency   Musculoskeletal:  No weakness or muscle pains   Endo:  No polyuria; no symptoms of thyroid dysfunction   Heme:  No bruising; No bleeding, no adenopathy   Neurological:  No Seizures;  No focal weakness or sensory changes   Psychiatric:  No mood changes; no suicidal ideation       Physical Exam:  Vss, mild distress pain  Pale, anicteric  rrr  Lungs clear   abd soft, non tend  No edema    Labs:  Basic Metabolic Profile   No results for input(s): NA, POTASSIUM, CHLORIDE, CO2, BUN in the last 72 hours. No lab exists for component: CREAT, GLUCOSE, CALCIUM, MAGNESIUM, PHOSPHORUS     CBC w/Diff    Recent Labs     01/12/20  0328 01/11/20  0358   WBC 11.5 10.2   HCT 22.3* 22.7*    No results for input(s): BANDS, LYMPHOCYTES in the last 72 hours. No lab exists for component: SEGS     Hepatic Panel   Hepatic Function  No results found for: ALBUMIN      Coagulation   No results for input(s): INR, APTT, INREXT in the last 72 hours.     No lab exists for component: PT       Current medications reviewed    Christen Taylor MD   [facility] [phone]

## 2020-01-14 VITALS
HEIGHT: 72 IN | TEMPERATURE: 98.5 F | RESPIRATION RATE: 16 BRPM | WEIGHT: 178.4 LBS | OXYGEN SATURATION: 98 % | HEART RATE: 83 BPM | SYSTOLIC BLOOD PRESSURE: 101 MMHG | DIASTOLIC BLOOD PRESSURE: 55 MMHG | BODY MASS INDEX: 24.16 KG/M2

## 2020-01-14 LAB
ERYTHROCYTE [DISTWIDTH] IN BLOOD BY AUTOMATED COUNT: 25.7 % (ref 11.6–14.5)
HCT VFR BLD AUTO: 23.7 % (ref 36–48)
HGB BLD-MCNC: 8.1 G/DL (ref 13–16)
MCH RBC QN AUTO: 24.9 PG (ref 24–34)
MCHC RBC AUTO-ENTMCNC: 34.2 G/DL (ref 31–37)
MCV RBC AUTO: 72.9 FL (ref 74–97)
PLATELET # BLD AUTO: 378 K/UL (ref 135–420)
PMV BLD AUTO: 9.7 FL (ref 9.2–11.8)
RBC # BLD AUTO: 3.25 M/UL (ref 4.7–5.5)
WBC # BLD AUTO: 14.5 K/UL (ref 4.6–13.2)

## 2020-01-14 PROCEDURE — 74011250636 HC RX REV CODE- 250/636: Performed by: INTERNAL MEDICINE

## 2020-01-14 PROCEDURE — 36415 COLL VENOUS BLD VENIPUNCTURE: CPT

## 2020-01-14 PROCEDURE — 85027 COMPLETE CBC AUTOMATED: CPT

## 2020-01-14 PROCEDURE — 74011250637 HC RX REV CODE- 250/637: Performed by: INTERNAL MEDICINE

## 2020-01-14 RX ADMIN — HYDROMORPHONE HYDROCHLORIDE 2 MG: 2 TABLET ORAL at 08:48

## 2020-01-14 RX ADMIN — FOLIC ACID 1 MG: 1 TABLET ORAL at 08:47

## 2020-01-14 RX ADMIN — ENOXAPARIN SODIUM 40 MG: 40 INJECTION SUBCUTANEOUS at 05:59

## 2020-01-14 RX ADMIN — HYDROMORPHONE HYDROCHLORIDE 2 MG: 2 TABLET ORAL at 00:31

## 2020-01-14 RX ADMIN — HYDROXYUREA 500 MG: 500 CAPSULE ORAL at 17:22

## 2020-01-14 RX ADMIN — HYDROMORPHONE HYDROCHLORIDE 2 MG: 2 TABLET ORAL at 04:25

## 2020-01-14 RX ADMIN — HYDROMORPHONE HYDROCHLORIDE 2 MG: 2 TABLET ORAL at 14:57

## 2020-01-14 RX ADMIN — SODIUM CHLORIDE 125 ML/HR: 900 INJECTION, SOLUTION INTRAVENOUS at 06:01

## 2020-01-14 RX ADMIN — HYDROXYUREA 500 MG: 500 CAPSULE ORAL at 10:09

## 2020-01-14 RX ADMIN — FAMOTIDINE 20 MG: 20 TABLET, FILM COATED ORAL at 08:48

## 2020-01-14 RX ADMIN — FAMOTIDINE 20 MG: 20 TABLET, FILM COATED ORAL at 17:22

## 2020-01-14 NOTE — ROUTINE PROCESS
Bedside and Verbal shift change report given to Kendra PEREZ (oncoming nurse) by Ramana Walker (offgoing nurse). Report included the following information SBAR, Kardex, MAR and Recent Results. Patient quietly resting on rounds, no complaints voiced.

## 2020-01-14 NOTE — ROUTINE PROCESS
Bedside verbal report received from Ms Bard Rafael PEREZ, reviewed SBAR, MAR, VS and summary of care. Patient awake watching TV, quiet without complaints. Call light in reach.

## 2020-01-14 NOTE — PROGRESS NOTES
Problem: Pain  Goal: *Control of Pain  Outcome: Progressing Towards Goal  Goal: *PALLIATIVE CARE:  Alleviation of Pain  Outcome: Progressing Towards Goal     Problem: Patient Education: Go to Patient Education Activity  Goal: Patient/Family Education  Outcome: Progressing Towards Goal     Problem: Patient Education: Go to Patient Education Activity  Goal: Patient/Family Education  Outcome: Progressing Towards Goal

## 2020-01-14 NOTE — DISCHARGE SUMMARY
Discharge Summary    Patient: Abdullahi Lara MRN: [id number]  CSN: [id number]    YOB: 1980  Age: 37 y.o. Sex: male    DOA: 1/7/2020 LOS:  LOS: 6 days   Discharge Date:      Admission Diagnoses: Sickle cell pain crisis ([facility] [D57.00]  Sickle cell crisis ([facility] [D57.00]    Discharge Diagnoses:    Problem List as of 1/14/2020 Date Reviewed: 7/1/2017          Codes Class Noted - Resolved    Sickle cell pain crisis ([facility] ICD-10-CM: D57.00  ICD-9-CM: 282.62  1/8/2020 - Present        Hyperbilirubinemia ICD-10-CM: E80.6  ICD-9-CM: 782.4  4/13/2018 - Present        Bilateral leg pain ICD-10-CM: M79.604, M79.605  ICD-9-CM: 729.5  4/24/2017 - Present        Sickle cell anemia with crisis ([facility] ICD-10-CM: D57.00  ICD-9-CM: 282.62  5/7/2015 - Present        Sickle cell crisis ([facility] ICD-10-CM: D57.00  ICD-9-CM: 282.62  1/26/2014 - Present        Serum total bilirubin elevated ICD-10-CM: R17  ICD-9-CM: 277.4  12/27/2011 - Present    Overview Signed 7/1/2017  8:24 AM by Leila KUHN 3.8, D.B. 0.4.               Elevated total protein ICD-10-CM: R77.8  ICD-9-CM: 790.99  12/27/2011 - Present    Overview Signed 7/1/2017  8:25 AM by Leila Webb     8.7             Elevated alkaline phosphatase level ICD-10-CM: R74.8  ICD-9-CM: 790.5  3/21/2010 - Present    Overview Signed 7/1/2017  8:23 AM by Leila Webb     158             Microcytic anemia ICD-10-CM: D50.9  ICD-9-CM: 280.9  10/25/2007 - Present        Reticulocytosis ICD-10-CM: R70.1  ICD-9-CM: 790.99  10/25/2007 - Present    Overview Signed 7/1/2017  8:23 AM by Leila Webb     7.8             RESOLVED: Pneumonia ICD-10-CM: J18.9  ICD-9-CM: 497  10/17/2015 - 10/21/2015        RESOLVED: Sickle cell crisis ([facility] ICD-10-CM: D57.00  ICD-9-CM: 282.62  8/3/2013 - 10/20/2013        RESOLVED: Sickle cell anemia with pain ([facility] ICD-10-CM: D57.00  ICD-9-CM: 282.62  5/11/2013 - 10/20/2013        RESOLVED: Sickle cell pain crisis ([facility] ICD-10-CM: D57.00  ICD-9-CM: 282.62  12/12/2012 - 10/25/2013              Discharge Condition: Stable    PHYSICAL EXAM  Visit Vitals  /86 (BP 1 Location: Right arm, BP Patient Position: At rest)   Pulse (!) 56   Temp 98.8 °F (37.1 °C)   Resp 18   Ht 6' 0.01\" (1.829 m)   Wt 80.9 kg (178 lb 6.4 oz)   SpO2 100%   BMI 24.19 kg/m²       General: Alert, cooperative, no acute distress    HEENT: NC, Atraumatic. PERRLA, EOMI. Anicteric sclerae. Lungs:  CTA Bilaterally. No Wheezing/Rhonchi/Rales. Heart:  Regular  rhythm,  No murmur, No Rubs, No Gallops  Abdomen: Soft, Non distended, Non tender.  +Bowel sounds, no HSM  Extremities: No c/c/e  Psych:   Good insight. Not anxious or agitated. Neurologic:  CN 2-12 grossly intact, oriented X 3. No acute neurological                                 Deficits,     Hospital Course:  1. Sickle cell pain crisis: resolved  2. Sickle cell anemia : no plans for transfusion;   3.  Chronic opoid dependence: on Fentanyl and dilaudid at home as provided by H/O.  4.  Hyperbilirubinemia in the setting of sickle cell     Consults:   Heme-Onc:  Dr. Yaritza Caldwell    Significant Diagnostic Studies:       CBC W/O DIFF    Collection Time: 01/14/20  4:13 AM   Result Value Ref Range    WBC 14.5 (H) 4.6 - 13.2 K/uL    RBC 3.25 (L) 4.70 - 5.50 M/uL    HGB 8.1 (L) 13.0 - 16.0 g/dL    HCT 23.7 (L) 36.0 - 48.0 %    MCV 72.9 (L) 74.0 - 97.0 FL    MCH 24.9 24.0 - 34.0 PG    MCHC 34.2 31.0 - 37.0 g/dL    RDW 25.7 (H) 11.6 - 14.5 %    PLATELET 476 213 - 897 K/uL    MPV 9.7 9.2 - 11.8 FL         Discharge Medications:     Current Discharge Medication List      CONTINUE these medications which have NOT CHANGED    Details   folic acid (FOLVITE) 1 mg tablet Take 1 Tab by mouth daily. Qty: 30 Tab, Refills: 0      hydroxyurea (HYDREA) 500 mg capsule Take 500 mg by mouth two (2) times a day.  Indications: SICKLE CELL ANEMIA WITH CRISIS      fentaNYL (DURAGESIC) 25 mcg/hr PATCH 1 Patch by TransDERmal route every seventy-two (72) hours. Max Daily Amount: 1 Patch.   Qty: 5 Patch, Refills: 0             Activity: activity as tolerated    Diet: Regular Diet    Wound Care: None needed    Follow-up: with PCP, Hipolito Joiner MD in 7-10days    Minutes spent on discharge: >30 minutes spent coordinating this discharge (review instructions/follow-up, prescriptions, preparing report for sign off)    Dispo:  Home with self care

## 2020-01-14 NOTE — PROGRESS NOTES
Discharge:    Patient will discharge home today (1/14/2020) with family assistance. Patient has no home health needs or orders, at this time. Patient's family will transport home, at the time of discharge. There are no care management concerns regarding this discharge. This writer will continue to monitor for discharge planning to ensure a safe discharge home from [facility]. Jacy Olson [facility]  Care Manager  Pager#: [phone]

## 2020-01-14 NOTE — DISCHARGE INSTRUCTIONS
Patient Education        Sickle Cell Crisis: Care Instructions  Your Care Instructions    Sickle cell crisis is a painful episode that may begin suddenly in a person with sickle cell disease. Sickle cell disease turns normal, round red blood cells into cells that look like jeane or crescent moons. The sickle-shaped cells can get stuck in blood vessels, blocking blood flow and causing severe pain. The pain can occur in the bones of the spine, the arms and legs, the chest, and the abdomen. An episode may be called a \"painful event\" or \"painful crisis. \" Some people who have sickle cell disease have many painful events, while others have few or none. Treatment depends on the level of pain and how long it lasts. Sometimes taking nonprescription pain relievers can help. Or you may need stronger pain relief medicine that is prescribed or given by a doctor. You may need to be treated in the hospital.  It isn't always possible to know what sets off a painful event. But triggers include being dehydrated, cold temperatures, infection, stress, and not getting enough oxygen. Follow-up care is a key part of your treatment and safety. Be sure to make and go to all appointments, and call your doctor if you are having problems. It's also a good idea to know your test results and keep a list of the medicines you take. How can you care for yourself at home? · Create a pain management plan with your doctor. This plan should include the types of medicines you can take and other actions you can take at home to relieve pain. · Drink plenty of fluids, enough so that your urine is light yellow or clear like water. If you have kidney, heart, or liver disease and have to limit fluids, talk with your doctor before you increase the amount of fluids you drink. · Take your medicines exactly as prescribed. Call your doctor if you think you are having a problem with your medicine. · Take pain medicines exactly as directed.   ? If the doctor gave you a prescription medicine for pain, take it as prescribed. ? If you are not taking a prescription pain medicine, ask your doctor if you can take an over-the-counter medicine. · Avoid alcohol. It can make you dehydrated. · Dress warmly in cold weather. The cold and windy weather can lead to severe pain. · Do not smoke. Smoking can reduce the amount of oxygen in your blood. · Get plenty of sleep. When should you call for help? Call 911 anytime you think you may need emergency care. For example, call if:    · You have symptoms of a severe problem from sickle cell.     · You have symptoms of a stroke. These may include:  ? Sudden numbness, tingling, weakness, or loss of movement in your face, arm, or leg, especially on only one side of your body. ? Sudden vision changes. ? Sudden trouble speaking. ? Sudden confusion or trouble understanding simple statements. ? Sudden problems with walking or balance. ? A sudden, severe headache that is different from past headaches.     · You are in severe pain.     · You have symptoms of a heart attack. These may include:  ? Chest pain or pressure, or a strange feeling in the chest.  ? Sweating. ? Shortness of breath. ? Nausea or vomiting. ? Pain, pressure, or a strange feeling in the back, neck, jaw, or upper belly or in one or both shoulders or arms. ? Lightheadedness or sudden weakness. ? A fast or irregular heartbeat. After you call 911, the  may tell you to chew 1 adult-strength or 2 to 4 low-dose aspirin. Wait for an ambulance. Do not try to drive yourself.    Call your doctor now or seek immediate medical care if:    · You have a fever.    Watch closely for changes in your health, and be sure to contact your doctor if you have any problems. Where can you learn more? Go to http://manuel-amadeo.info/. Enter F104 in the search box to learn more about \"Sickle Cell Crisis: Care Instructions. \"  Current as of: March 28, 2019  Content Version: 12.2  © 7584-2875 [employer]. Care instructions adapted under license by [employer] (which disclaims liability or warranty for this information). If you have questions about a medical condition or this instruction, always ask your healthcare professional. [address] any warranty or liability for your use of this information.   Patient armband removed and shredded

## 2020-01-15 NOTE — PROGRESS NOTES
Patient discharged to home with AVS/FU instructions and scripts. Transferred to front entrance via wheelchair by staff. Transportation provided by [email or web address].

## 2020-01-15 NOTE — PROGRESS NOTES
Bedside and Verbal shift change report given to [address] (oncoming nurse) by Yesy Khan RN (offgoing nurse). Report included the following information SBAR, Kardex, ED Summary, Intake/Output, MAR and Recent Results.

## 2020-02-19 ENCOUNTER — HOSPITAL ENCOUNTER (EMERGENCY)
Age: 40
Discharge: HOME | End: 2020-02-19
Attending: EMERGENCY MEDICINE
Payer: MEDICARE

## 2020-02-19 VITALS
BODY MASS INDEX: 24.11 KG/M2 | SYSTOLIC BLOOD PRESSURE: 112 MMHG | DIASTOLIC BLOOD PRESSURE: 63 MMHG | HEIGHT: 72 IN | OXYGEN SATURATION: 95 % | WEIGHT: 178 LBS | HEART RATE: 81 BPM | TEMPERATURE: 98.8 F | RESPIRATION RATE: 14 BRPM

## 2020-02-19 DIAGNOSIS — D57.00 SICKLE CELL PAIN CRISIS (HCC): Primary | ICD-10-CM

## 2020-02-19 DIAGNOSIS — D75.839 THROMBOCYTOSIS: ICD-10-CM

## 2020-02-19 LAB
ALBUMIN SERPL-MCNC: 3.8 G/DL (ref 3.4–5)
ALBUMIN/GLOB SERPL: 0.8 {RATIO} (ref 0.8–1.7)
ALP SERPL-CCNC: 123 U/L (ref 45–117)
ALT SERPL-CCNC: 23 U/L (ref 16–61)
ANION GAP SERPL CALC-SCNC: 5 MMOL/L (ref 3–18)
AST SERPL-CCNC: 56 U/L (ref 10–38)
BASOPHILS # BLD: 0.1 K/UL (ref 0–0.1)
BASOPHILS NFR BLD: 1 % (ref 0–2)
BILIRUB SERPL-MCNC: 4 MG/DL (ref 0.2–1)
BUN SERPL-MCNC: 7 MG/DL (ref 7–18)
BUN/CREAT SERPL: 8 (ref 12–20)
CALCIUM SERPL-MCNC: 8.4 MG/DL (ref 8.5–10.1)
CHLORIDE SERPL-SCNC: 110 MMOL/L (ref 100–111)
CO2 SERPL-SCNC: 26 MMOL/L (ref 21–32)
CREAT SERPL-MCNC: 0.88 MG/DL (ref 0.6–1.3)
DIFFERENTIAL METHOD BLD: ABNORMAL
EOSINOPHIL # BLD: 0.7 K/UL (ref 0–0.4)
EOSINOPHIL NFR BLD: 5 % (ref 0–5)
ERYTHROCYTE [DISTWIDTH] IN BLOOD BY AUTOMATED COUNT: 22.7 % (ref 11.6–14.5)
GLOBULIN SER CALC-MCNC: 4.9 G/DL (ref 2–4)
GLUCOSE SERPL-MCNC: 83 MG/DL (ref 74–99)
HCT VFR BLD AUTO: 21.8 % (ref 36–48)
HGB BLD-MCNC: 7.5 G/DL (ref 13–16)
LYMPHOCYTES # BLD: 4.1 K/UL (ref 0.9–3.6)
LYMPHOCYTES NFR BLD: 31 % (ref 21–52)
MCH RBC QN AUTO: 25.4 PG (ref 24–34)
MCHC RBC AUTO-ENTMCNC: 34.4 G/DL (ref 31–37)
MCV RBC AUTO: 73.9 FL (ref 74–97)
MONOCYTES # BLD: 1.3 K/UL (ref 0.05–1.2)
MONOCYTES NFR BLD: 10 % (ref 3–10)
NEUTS SEG # BLD: 7.1 K/UL (ref 1.8–8)
NEUTS SEG NFR BLD: 53 % (ref 40–73)
PLATELET # BLD AUTO: 440 K/UL (ref 135–420)
PLATELET COMMENTS,PCOM: ABNORMAL
PMV BLD AUTO: 10.1 FL (ref 9.2–11.8)
POTASSIUM SERPL-SCNC: 3.5 MMOL/L (ref 3.5–5.5)
PROT SERPL-MCNC: 8.7 G/DL (ref 6.4–8.2)
RBC # BLD AUTO: 2.95 M/UL (ref 4.7–5.5)
RBC MORPH BLD: ABNORMAL
RETICS/RBC NFR AUTO: 10.2 % (ref 0.5–2.3)
SODIUM SERPL-SCNC: 141 MMOL/L (ref 136–145)
WBC # BLD AUTO: 13.3 K/UL (ref 4.6–13.2)

## 2020-02-19 PROCEDURE — 96375 TX/PRO/DX INJ NEW DRUG ADDON: CPT

## 2020-02-19 PROCEDURE — 80053 COMPREHEN METABOLIC PANEL: CPT

## 2020-02-19 PROCEDURE — 74011250636 HC RX REV CODE- 250/636: Performed by: PHYSICIAN ASSISTANT

## 2020-02-19 PROCEDURE — 99282 EMERGENCY DEPT VISIT SF MDM: CPT

## 2020-02-19 PROCEDURE — 85045 AUTOMATED RETICULOCYTE COUNT: CPT

## 2020-02-19 PROCEDURE — 85025 COMPLETE CBC W/AUTO DIFF WBC: CPT

## 2020-02-19 PROCEDURE — 96376 TX/PRO/DX INJ SAME DRUG ADON: CPT

## 2020-02-19 PROCEDURE — 96374 THER/PROPH/DIAG INJ IV PUSH: CPT

## 2020-02-19 RX ORDER — DIPHENHYDRAMINE HYDROCHLORIDE 50 MG/ML
12.5 INJECTION, SOLUTION INTRAMUSCULAR; INTRAVENOUS
Status: COMPLETED | OUTPATIENT
Start: 2020-02-19 | End: 2020-02-19

## 2020-02-19 RX ORDER — MORPHINE SULFATE 2 MG/ML
4 INJECTION, SOLUTION INTRAMUSCULAR; INTRAVENOUS
Status: COMPLETED | OUTPATIENT
Start: 2020-02-19 | End: 2020-02-19

## 2020-02-19 RX ADMIN — MORPHINE SULFATE 4 MG: 2 INJECTION, SOLUTION INTRAMUSCULAR; INTRAVENOUS at 06:15

## 2020-02-19 RX ADMIN — DIPHENHYDRAMINE HYDROCHLORIDE 12.5 MG: 50 INJECTION, SOLUTION INTRAMUSCULAR; INTRAVENOUS at 09:31

## 2020-02-19 RX ADMIN — MORPHINE SULFATE 4 MG: 2 INJECTION, SOLUTION INTRAMUSCULAR; INTRAVENOUS at 09:32

## 2020-02-19 RX ADMIN — DIPHENHYDRAMINE HYDROCHLORIDE 12.5 MG: 50 INJECTION, SOLUTION INTRAMUSCULAR; INTRAVENOUS at 06:15

## 2020-02-19 NOTE — ED PROVIDER NOTES
EMERGENCY DEPARTMENT HISTORY AND PHYSICAL EXAM    6:28 AM      Date: 2/19/2020  Patient Name: Karol Jefferson    History of Presenting Illness     Chief Complaint   Patient presents with    Sickle Cell Crisis    Back Pain         History Provided By: Patient    Additional History (Context): Karol Jefferson is a 37 y.o. male with hx of sickle cell disease who presents with complaint of low back pain and bilateral leg pain since 11 PM last night. Patient notes symptoms feel typical of sickle cell crises in the past.  Denies fever or chills, chest pain, shortness of breath, abdominal pain, n/v/d, urinary symptoms. Notes he took Advil prior to arrival.  Notes he has a fentanyl patch and takes hydroxyurea. Hematologist: Dr. Nba Ryan. PCP: Kannan Olguin MD    Current Facility-Administered Medications   Medication Dose Route Frequency Provider Last Rate Last Dose    morphine injection 4 mg  4 mg IntraVENous NOW Violet Bland PA        diphenhydrAMINE (BENADRYL) injection 12.5 mg  12.5 mg IntraVENous NOW Violet Bland PA        sodium chloride 0.9 % bolus infusion 1,000 mL  1,000 mL IntraVENous ONCE Violet Bland [place]        morphine injection 4 mg  4 mg IntraVENous NOW Violet Bland [place]        diphenhydrAMINE (BENADRYL) injection 12.5 mg  12.5 mg IntraVENous NOW Nighat Bland PA         Current Outpatient Medications   Medication Sig Dispense Refill    folic acid (FOLVITE) 1 mg tablet Take 1 Tab by mouth daily. 30 Tab 0    hydroxyurea (HYDREA) 500 mg capsule Take 500 mg by mouth two (2) times a day. Indications: SICKLE CELL ANEMIA WITH CRISIS      fentaNYL (DURAGESIC) 25 mcg/hr PATCH 1 Patch by TransDERmal route every seventy-two (72) hours. Max Daily Amount: 1 Patch.  5 Patch 0       Past History     Past Medical History:  Past Medical History:   Diagnosis Date    B12 deficiency 03/15/2010    210    Cholelithiasis 09/17/2012    U/S Result: Cholelithiasis    Elevated alkaline phosphatase level 03/21/2010    158    Elevated ALT measurement 03/21/2010    71    Elevated AST (SGOT) 03/10/2012    38    Elevated platelet count 20/17/5391    494    Elevated total protein 12/27/2011    8.7    Hypocalcemia 07/06/2011    4.1    Hypokalemia     Hyponatremia 11/18/2009    Leukocytosis 11/16/2009    Microcytic anemia 10/25/2007    Pleural effusion on right 07/15/2015    [facility] CXR Result    Reticulocytosis 10/25/2007    7.8    Serum total bilirubin elevated 12/27/2011    T.B. 3.8, D.B. 0.4.     Sickle cell anemia with crisis ([facility] [address]     Dr. Ailyn Call. Damle    Vitamin D deficiency 01/20/2016    5.4       Past Surgical History:  Past Surgical History:   Procedure Laterality Date    HX CHOLECYSTECTOMY         Family History:  Family History   Problem Relation Age of Onset    Cancer Neg Hx     Diabetes Neg Hx     Heart Disease Neg Hx     Heart Attack Neg Hx     Hypertension Neg Hx     Stroke Neg Hx        Social History:  Social History     Tobacco Use    Smoking status: Never Smoker    Smokeless tobacco: Never Used   Substance Use Topics    Alcohol use: Yes     Comment: Occasional    Drug use: No       Allergies: Allergies   Allergen Reactions    Eggshell Membrane Nausea and Vomiting    Milk Nausea and Vomiting     Pt stated only milk causes him to vomit, but usually eats other foods containing dairy/milk. Review of Systems       Review of Systems   Constitutional: Negative for chills and fever. Respiratory: Negative for shortness of breath. Cardiovascular: Negative for chest pain. Gastrointestinal: Negative for abdominal pain, nausea and vomiting. Musculoskeletal: Positive for back pain and myalgias. Skin: Negative for rash. Neurological: Negative for weakness. All other systems reviewed and are negative.         Physical Exam     Visit Vitals  /63   Pulse 81   Temp 98.8 °F (37.1 °C)   Resp 14   Ht 6' (1.829 m)   Wt 80.7 kg (178 lb)   SpO2 91%   BMI 24.14 kg/m²         Physical Exam  Vitals signs and nursing note reviewed. Constitutional:       General: He is not in acute distress. Appearance: Normal appearance. He is well-developed. He is not ill-appearing, toxic-appearing or diaphoretic. HENT:      Head: Normocephalic and atraumatic. Mouth/Throat:      Mouth: Mucous membranes are dry. Eyes:      General: Scleral icterus (mild) present. Neck:      Musculoskeletal: Normal range of motion and neck supple. No neck rigidity. Cardiovascular:      Rate and Rhythm: Normal rate and regular rhythm. Heart sounds: Normal heart sounds. No murmur. No friction rub. No gallop. Pulmonary:      Effort: Pulmonary effort is normal. No respiratory distress. Breath sounds: Normal breath sounds. No wheezing or rales. Abdominal:      General: Abdomen is flat. There is no distension. Palpations: Abdomen is soft. Tenderness: There is no abdominal tenderness. There is no right CVA tenderness, left CVA tenderness, guarding or rebound. Musculoskeletal: Normal range of motion. Right hip: Normal.      Left hip: Normal.      Lumbar back: Normal.   Lymphadenopathy:      Cervical: No cervical adenopathy. Skin:     General: Skin is warm. Findings: No rash. Neurological:      General: No focal deficit present. Mental Status: He is alert. Cranial Nerves: Cranial nerves are intact.            Diagnostic Study Results     Labs -  Recent Results (from the past 12 hour(s))   RETICULOCYTE COUNT    Collection Time: 02/19/20  5:50 AM   Result Value Ref Range    Reticulocyte count 10.2 (H) 0.5 - 2.3 %   CBC WITH AUTOMATED DIFF    Collection Time: 02/19/20  5:50 AM   Result Value Ref Range    WBC 13.3 (H) 4.6 - 13.2 K/uL    RBC 2.95 (L) 4.70 - 5.50 M/uL    HGB 7.5 (L) 13.0 - 16.0 g/dL    HCT 21.8 (L) 36.0 - 48.0 %    MCV 73.9 (L) 74.0 - 97.0 FL    MCH 25.4 24.0 - 34.0 PG    MCHC 34.4 31.0 - 37.0 g/dL    RDW 22.7 (H) 11.6 - 14.5 %    PLATELET 190 (H) 321 - 420 K/uL    MPV 10.1 9.2 - 11.8 FL    NEUTROPHILS 53 40 - 73 %    LYMPHOCYTES 31 21 - 52 %    MONOCYTES 10 3 - 10 %    EOSINOPHILS 5 0 - 5 %    BASOPHILS 1 0 - 2 %    ABS. NEUTROPHILS 7.1 1.8 - 8.0 K/UL    ABS. LYMPHOCYTES 4.1 (H) 0.9 - 3.6 K/UL    ABS. MONOCYTES 1.3 (H) 0.05 - 1.2 K/UL    ABS. EOSINOPHILS 0.7 (H) 0.0 - 0.4 K/UL    ABS. BASOPHILS 0.1 0.0 - 0.1 K/UL    DF AUTOMATED      PLATELET COMMENTS Increased Platelets      RBC COMMENTS ANISOCYTOSIS  2+        RBC COMMENTS POLYCHROMASIA  1+        RBC COMMENTS SICKLE CELLS  1+        RBC COMMENTS TARGET CELLS  2+       METABOLIC PANEL, COMPREHENSIVE    Collection Time: 02/19/20  6:32 AM   Result Value Ref Range    Sodium 141 136 - 145 mmol/L    Potassium 3.5 3.5 - 5.5 mmol/L    Chloride 110 100 - 111 mmol/L    CO2 26 21 - 32 mmol/L    Anion gap 5 3.0 - 18 mmol/L    Glucose 83 74 - 99 mg/dL    BUN 7 7.0 - 18 MG/DL    Creatinine 0.88 0.6 - 1.3 MG/DL    BUN/Creatinine ratio 8 (L) 12 - 20      GFR est AA >60 >60 ml/min/1.73m2    GFR est non-AA >60 >60 ml/min/1.73m2    Calcium 8.4 (L) 8.5 - 10.1 MG/DL    Bilirubin, total 4.0 (H) 0.2 - 1.0 MG/DL    ALT (SGPT) 23 16 - 61 U/L    AST (SGOT) 56 (H) 10 - 38 U/L    Alk. phosphatase 123 (H) 45 - 117 U/L    Protein, total 8.7 (H) 6.4 - 8.2 g/dL    Albumin 3.8 3.4 - 5.0 g/dL    Globulin 4.9 (H) 2.0 - 4.0 g/dL    A-G Ratio 0.8 0.8 - 1.7         Radiologic Studies -   No orders to display         Medical Decision Making   I am the first provider for this patient. I reviewed the vital signs, available nursing notes, past medical history, past surgical history, family history and social history. Vital Signs-Reviewed the patient's vital signs. Records Reviewed: Nursing Notes and Old Medical Records (Time of Review: 6:28 AM)    ED Course: Progress Notes, Reevaluation, and Consults:  7:26 AM: Re-evaluated patient, eating sandwich. Notes pain has reduced slightly.  Will redose medication and reassess. 9:28 AM Reviewed results with patient. Resting comfortably, ready to go home. Discussed need for close outpatient follow-up this week for reassessment. Discussed strict return precautions, including fever, weakness, or any other medical concerns. Provider Notes (Medical Decision Making): 78-year-old male with history of sickle cell disease who presents to the ED due to low back pain and bilateral leg pain. Afebrile, nontoxic-appearing, looks well. Denies fever or chills, chest pain, shortness of breath. States symptoms feel typical of previous sickle cell crises in the past.  Labs demonstrate anemia, baseline. Reticulocyte count high. Pain controlled in the ED. Do not feel further labs or imaging are warranted. Stable for discharge with close outpatient follow-up for further assessment      Diagnosis     Clinical Impression:   1. Sickle cell pain crisis ([address]        Disposition: home    Follow-up Information     Follow up With Specialties Details Why [address] [facility] EMERGENCY DEPT Emergency Medicine  If symptoms worsen [address]  [phone]    Otilia Perkins MD Hematology and Oncology, Hematology, Oncology Schedule an appointment as soon as possible for a visit  [address] [place] [address] 105  [facility]  [phone]             Patient's Medications   Start Taking    No medications on file   Continue Taking    FENTANYL (DURAGESIC) 25 MCG/HR PATCH    1 Patch by TransDERmal route every seventy-two (72) hours. Max Daily Amount: 1 Patch. FOLIC ACID (FOLVITE) 1 MG TABLET    Take 1 Tab by mouth daily. HYDROXYUREA (HYDREA) 500 MG CAPSULE    Take 500 mg by mouth two (2) times a day.  Indications: SICKLE CELL ANEMIA WITH CRISIS   These Medications have changed    No medications on file   Stop Taking    No medications on file       Dictation disclaimer:  Please note that this dictation was completed with [employer], the [employer] voice recognition software. Quite often unanticipated grammatical, syntax, homophones, and other interpretive errors are inadvertently transcribed by the computer software. Please disregard these errors. Please excuse any errors that have escaped final proofreading.

## 2020-02-19 NOTE — DISCHARGE INSTRUCTIONS
Patient Education   Take medication as prescribed. Follow-up with your hematologist within 2 days for reassessment. Bring the results from this visit with you for their review. Return to the ED immediately for any new, worsening, or persistent symptoms, including fever, chest pain, shortness of breath, or any other medical concerns. Sickle Cell Crisis: Care Instructions  Your Care Instructions    Sickle cell crisis is a painful episode that may begin suddenly in a person with sickle cell disease. Sickle cell disease turns normal, round red blood cells into cells that look like jeane or crescent moons. The sickle-shaped cells can get stuck in blood vessels, blocking blood flow and causing severe pain. The pain can occur in the bones of the spine, the arms and legs, the chest, and the abdomen. An episode may be called a \"painful event\" or \"painful crisis. \" Some people who have sickle cell disease have many painful events, while others have few or none. Treatment depends on the level of pain and how long it lasts. Sometimes taking nonprescription pain relievers can help. Or you may need stronger pain relief medicine that is prescribed or given by a doctor. You may need to be treated in the hospital.  It isn't always possible to know what sets off a painful event. But triggers include being dehydrated, cold temperatures, infection, stress, and not getting enough oxygen. Follow-up care is a key part of your treatment and safety. Be sure to make and go to all appointments, and call your doctor if you are having problems. It's also a good idea to know your test results and keep a list of the medicines you take. How can you care for yourself at home? · Create a pain management plan with your doctor. This plan should include the types of medicines you can take and other actions you can take at home to relieve pain. · Drink plenty of fluids, enough so that your urine is light yellow or clear like water.  If you have kidney, heart, or liver disease and have to limit fluids, talk with your doctor before you increase the amount of fluids you drink. · Take your medicines exactly as prescribed. Call your doctor if you think you are having a problem with your medicine. · Take pain medicines exactly as directed. ? If the doctor gave you a prescription medicine for pain, take it as prescribed. ? If you are not taking a prescription pain medicine, ask your doctor if you can take an over-the-counter medicine. · Avoid alcohol. It can make you dehydrated. · Dress warmly in cold weather. The cold and windy weather can lead to severe pain. · Do not smoke. Smoking can reduce the amount of oxygen in your blood. · Get plenty of sleep. When should you call for help? Call 911 anytime you think you may need emergency care. For example, call if:    · You have symptoms of a severe problem from sickle cell.     · You have symptoms of a stroke. These may include:  ? Sudden numbness, tingling, weakness, or loss of movement in your face, arm, or leg, especially on only one side of your body. ? Sudden vision changes. ? Sudden trouble speaking. ? Sudden confusion or trouble understanding simple statements. ? Sudden problems with walking or balance. ? A sudden, severe headache that is different from past headaches.     · You are in severe pain.     · You have symptoms of a heart attack. These may include:  ? Chest pain or pressure, or a strange feeling in the chest.  ? Sweating. ? Shortness of breath. ? Nausea or vomiting. ? Pain, pressure, or a strange feeling in the back, neck, jaw, or upper belly or in one or both shoulders or arms. ? Lightheadedness or sudden weakness. ? A fast or irregular heartbeat. After you call 911, the  may tell you to chew 1 adult-strength or 2 to 4 low-dose aspirin. Wait for an ambulance.  Do not try to drive yourself.    Call your doctor now or seek immediate medical care if:    · You have a fever.    Watch closely for changes in your health, and be sure to contact your doctor if you have any problems. Where can you learn more? Go to http://manuel-amadeo.info/. Enter F104 in the search box to learn more about \"Sickle Cell Crisis: Care Instructions. \"  Current as of: March 28, 2019  Content Version: 12.2  © 2245-9402 [employer]. Care instructions adapted under license by [employer] (which disclaims liability or warranty for this information). If you have questions about a medical condition or this instruction, always ask your healthcare professional. [address] any warranty or liability for your use of this information.

## 2020-03-21 ENCOUNTER — APPOINTMENT (OUTPATIENT)
Dept: GENERAL RADIOLOGY | Age: 40
End: 2020-03-21
Attending: PHYSICIAN ASSISTANT
Payer: MEDICARE

## 2020-03-21 ENCOUNTER — HOSPITAL ENCOUNTER (EMERGENCY)
Age: 40
Discharge: HOME | End: 2020-03-21
Attending: EMERGENCY MEDICINE
Payer: MEDICARE

## 2020-03-21 VITALS
OXYGEN SATURATION: 98 % | HEART RATE: 80 BPM | DIASTOLIC BLOOD PRESSURE: 61 MMHG | RESPIRATION RATE: 18 BRPM | TEMPERATURE: 97.9 F | SYSTOLIC BLOOD PRESSURE: 104 MMHG

## 2020-03-21 DIAGNOSIS — S89.91XA RIGHT KNEE INJURY, INITIAL ENCOUNTER: Primary | ICD-10-CM

## 2020-03-21 DIAGNOSIS — D72.829 LEUKOCYTOSIS, UNSPECIFIED TYPE: ICD-10-CM

## 2020-03-21 LAB
ANION GAP SERPL CALC-SCNC: 6 MMOL/L (ref 3–18)
BASOPHILS # BLD: 0 K/UL (ref 0–0.06)
BASOPHILS NFR BLD: 0 % (ref 0–3)
BUN SERPL-MCNC: 10 MG/DL (ref 7–18)
BUN/CREAT SERPL: 10 (ref 12–20)
CALCIUM SERPL-MCNC: 8.3 MG/DL (ref 8.5–10.1)
CHLORIDE SERPL-SCNC: 106 MMOL/L (ref 100–111)
CO2 SERPL-SCNC: 24 MMOL/L (ref 21–32)
CREAT SERPL-MCNC: 0.97 MG/DL (ref 0.6–1.3)
DIFFERENTIAL METHOD BLD: ABNORMAL
EOSINOPHIL # BLD: 0.7 K/UL (ref 0–0.4)
EOSINOPHIL NFR BLD: 4 % (ref 0–5)
ERYTHROCYTE [DISTWIDTH] IN BLOOD BY AUTOMATED COUNT: 23.6 % (ref 11.6–14.5)
GLUCOSE SERPL-MCNC: 107 MG/DL (ref 74–99)
HCT VFR BLD AUTO: 24 % (ref 36–48)
HGB BLD-MCNC: 8.5 G/DL (ref 13–16)
LYMPHOCYTES # BLD: 6.7 K/UL (ref 0.8–3.5)
LYMPHOCYTES NFR BLD: 39 % (ref 20–51)
MCH RBC QN AUTO: 24.8 PG (ref 24–34)
MCHC RBC AUTO-ENTMCNC: 35.4 G/DL (ref 31–37)
MCV RBC AUTO: 70 FL (ref 74–97)
MONOCYTES # BLD: 0.3 K/UL (ref 0–1)
MONOCYTES NFR BLD: 2 % (ref 2–9)
NEUTS SEG # BLD: 9.5 K/UL (ref 1.8–8)
NEUTS SEG NFR BLD: 55 % (ref 42–75)
NRBC BLD-RTO: 2 PER 100 WBC
PLATELET # BLD AUTO: 413 K/UL (ref 135–420)
PLATELET COMMENTS,PCOM: ABNORMAL
PMV BLD AUTO: 9.5 FL (ref 9.2–11.8)
POTASSIUM SERPL-SCNC: 3.9 MMOL/L (ref 3.5–5.5)
RBC # BLD AUTO: 3.43 M/UL (ref 4.7–5.5)
RBC MORPH BLD: ABNORMAL
RETICS/RBC NFR AUTO: 9.4 % (ref 0.5–2.3)
SODIUM SERPL-SCNC: 136 MMOL/L (ref 136–145)
WBC # BLD AUTO: 17.2 K/UL (ref 4.6–13.2)

## 2020-03-21 PROCEDURE — 74011250636 HC RX REV CODE- 250/636: Performed by: PHYSICIAN ASSISTANT

## 2020-03-21 PROCEDURE — 99282 EMERGENCY DEPT VISIT SF MDM: CPT

## 2020-03-21 PROCEDURE — 85025 COMPLETE CBC W/AUTO DIFF WBC: CPT

## 2020-03-21 PROCEDURE — 85045 AUTOMATED RETICULOCYTE COUNT: CPT

## 2020-03-21 PROCEDURE — 96376 TX/PRO/DX INJ SAME DRUG ADON: CPT

## 2020-03-21 PROCEDURE — 73562 X-RAY EXAM OF KNEE 3: CPT

## 2020-03-21 PROCEDURE — 80048 BASIC METABOLIC PNL TOTAL CA: CPT

## 2020-03-21 PROCEDURE — 96374 THER/PROPH/DIAG INJ IV PUSH: CPT

## 2020-03-21 RX ORDER — TRAMADOL HYDROCHLORIDE 50 MG/1
50 TABLET ORAL
Qty: 12 TAB | Refills: 0 | Status: SHIPPED | OUTPATIENT
Start: 2020-03-21 | End: 2020-03-24

## 2020-03-21 RX ORDER — MORPHINE SULFATE 4 MG/ML
4 INJECTION, SOLUTION INTRAMUSCULAR; INTRAVENOUS
Status: COMPLETED | OUTPATIENT
Start: 2020-03-21 | End: 2020-03-21

## 2020-03-21 RX ADMIN — MORPHINE SULFATE 4 MG: 4 INJECTION, SOLUTION INTRAMUSCULAR; INTRAVENOUS at 06:59

## 2020-03-21 RX ADMIN — MORPHINE SULFATE 4 MG: 4 INJECTION, SOLUTION INTRAMUSCULAR; INTRAVENOUS at 08:48

## 2020-03-21 NOTE — LETTER
[facility] EMERGENCY DEPT 
[address] [facility] [address] [phone] Work/School Note Date: 3/21/2020 To Whom It May concern: 
 
Jp Toro was seen and treated today in the emergency room by the following provider(s): 
Attending Provider: Alyssa Burnett MD 
Physician Assistant: PHANI Mckeon. Jp Toro may return to work/school on 3/22/20 Sincerely, PHANI Valenzuela

## 2020-03-21 NOTE — ED NOTES
Patient stated that he was playing basketball yesterday and twisted his right ankle. He states that he thinks it made his sickle cell flare up. Patient ambulated to ER room with a limp.

## 2020-03-21 NOTE — DISCHARGE INSTRUCTIONS
Patient Education        Learning About High White Blood Cell Counts  What are white blood cells? White blood cells (leukocytes) help protect your body from infection. Normally, when germs get inside your body, your body makes more white blood cells that search for and destroy the germs. Less often, there are medical problems where the body may make a lot more white blood cells than it needs. What happens when you have a high white blood cell count? Your white blood cell count may be high because your body is fighting an infection. But other things can cause it, such as some medicines, burns, an illness, or other health problems. When your doctor sees that your white blood cell count is high, he or she will try to find out why, and then treat the cause. What are the symptoms? A high white blood cell count alone doesn't cause any symptoms. The symptoms you feel may come from the medical problem that your white blood cells are fighting. For example, if you have pneumonia, you may have a fever and trouble breathing. These are symptoms of pneumonia, not of a high white blood cell count. How is it treated? · Your doctor may do more tests to find the problem that's making your white blood cell count high. Once your doctor finds the problem, he or she may be able to treat it. · Part of your treatment may be telling your doctor if you feel worse. Watch your temperature, and call your doctor if your fever goes up and stays up. Follow-up care is a key part of your treatment and safety. Be sure to make and go to all appointments, and call your doctor if you are having problems. It's also a good idea to know your test results and keep a list of the medicines you take. Where can you learn more? Go to http://manuel-amadeo.info/  Enter V383 in the search box to learn more about \"Learning About High White Blood Cell Counts. \"  Current as of: December 8, 2019Content Version: 12.4  © 5324-9423 Healthwise, Incorporated. Care instructions adapted under license by [facility] (which disclaims liability or warranty for this information). If you have questions about a medical condition or this instruction, always ask your healthcare professional. [place] [address] any warranty or liability for your use of this information. Patient Education        Knee Pain or Injury: Care Instructions  Your Care Instructions    Injuries are a common cause of knee problems. Sudden (acute) injuries may be caused by a direct blow to the knee. They can also be caused by abnormal twisting, bending, or falling on the knee. Pain, bruising, or swelling may be severe, and may start within minutes of the injury. Overuse is another cause of knee pain. Other causes are climbing stairs, kneeling, and other activities that use the knee. Everyday wear and tear, especially as you get older, also can cause knee pain. Rest, along with home treatment, often relieves pain and allows your knee to heal. If you have a serious knee injury, you may need tests and treatment. Follow-up care is a key part of your treatment and safety. Be sure to make and go to all appointments, and call your doctor if you are having problems. It's also a good idea to know your test results and keep a list of the medicines you take. How can you care for yourself at home? · Be safe with medicines. Read and follow all instructions on the label. ? If the doctor gave you a prescription medicine for pain, take it as prescribed. ? If you are not taking a prescription pain medicine, ask your doctor if you can take an over-the-counter medicine. · Rest and protect your knee. Take a break from any activity that may cause pain. · Put ice or a cold pack on your knee for 10 to 20 minutes at a time. Put a thin cloth between the ice and your skin.   · Prop up a sore knee on a pillow when you ice it or anytime you sit or lie down for the next 3 days. Try to keep it above the level of your heart. This will help reduce swelling. · If your knee is not swollen, you can put moist heat, a heating pad, or a warm cloth on your knee. · If your doctor recommends an elastic bandage, sleeve, or other type of support for your knee, wear it as directed. · Follow your doctor's instructions about how much weight you can put on your leg. Use a cane, crutches, or a walker as instructed. · Follow your doctor's instructions about activity during your healing process. If you can do mild exercise, slowly increase your activity. · Reach and stay at a healthy weight. Extra weight can strain the joints, especially the knees and hips, and make the pain worse. Losing even a few pounds may help. When should you call for help? Call 911 anytime you think you may need emergency care. For example, call if:    · You have symptoms of a blood clot in your lung (called a pulmonary embolism). These may include:  ? Sudden chest pain. ? Trouble breathing. ? Coughing up blood.    Call your doctor now or seek immediate medical care if:    · You have severe or increasing pain.     · Your leg or foot turns cold or changes color.     · You cannot stand or put weight on your knee.     · Your knee looks twisted or bent out of shape.     · You cannot move your knee.     · You have signs of infection, such as:  ? Increased pain, swelling, warmth, or redness. ? Red streaks leading from the knee. ? Pus draining from a place on your knee. ? A fever.     · You have signs of a blood clot in your leg (called a deep vein thrombosis), such as:  ? Pain in your calf, back of the knee, thigh, or groin. ?  Redness and swelling in your leg or groin.    Watch closely for changes in your health, and be sure to contact your doctor if:    · You have tingling, weakness, or numbness in your knee.     · You have any new symptoms, such as swelling.     · You have bruises from a knee injury that last longer than 2 weeks.     · You do not get better as expected. Where can you learn more? Go to http://manuel-amadeo.info/  Enter K195 in the search box to learn more about \"Knee Pain or Injury: Care Instructions. \"  Current as of: June 26, 2019Content Version: 12.4  © 7099-6432 Healthwise, Incorporated. Care instructions adapted under license by [employer] (which disclaims liability or warranty for this information). If you have questions about a medical condition or this instruction, always ask your healthcare professional. [address] any warranty or liability for your use of this information.

## 2020-03-21 NOTE — ED PROVIDER NOTES
EMERGENCY DEPARTMENT HISTORY AND PHYSICAL EXAM    Date: 3/21/2020  Patient Name: Al Payan    History of Presenting Illness       History Provided By: Patient    Chief Complaint: Right knee pain, diffuse right-sided body pain  Duration: Yesterday  Timing: Acute  Location: Right knee  Quality: Aching  Severity: Moderate  Modifying Factors: Worse after playing basketball yesterday and someone falling into  Associated Symptoms: none       Additional History (Context): Al Payan is a 37 y.o. male with a history of sickle cell disease who presents today for history as listed above. Patient reports he was playing basketball yesterday when he injured his right knee. Patient states after that it exacerbated his sickle cell pain and has resulted in diffuse right-sided body pain. Patient denies hitting his head or LOC. Denies any history of injury to this knee in the past or surgery. Has tried Motrin without relief. Patient reports he was able to walk to the hospital but with a limp. PCP: Stefania Beltran MD    Current Facility-Administered Medications   Medication Dose Route Frequency Provider Last Rate Last Dose    morphine injection 4 mg  4 mg IntraVENous NOW Anabella Marrero PA         Current Outpatient Medications   Medication Sig Dispense Refill    traMADoL (Ultram) 50 mg tablet Take 1 Tab by mouth every six (6) hours as needed for Pain for up to 3 days. Max Daily Amount: 200 mg. 12 Tab 0    folic acid (FOLVITE) 1 mg tablet Take 1 Tab by mouth daily. 30 Tab 0    hydroxyurea (HYDREA) 500 mg capsule Take 500 mg by mouth two (2) times a day. Indications: SICKLE CELL ANEMIA WITH CRISIS      fentaNYL (DURAGESIC) 25 mcg/hr PATCH 1 Patch by TransDERmal route every seventy-two (72) hours. Max Daily Amount: 1 Patch.  5 Patch 0       Past History     Past Medical History:  Past Medical History:   Diagnosis Date    B12 deficiency 03/15/2010    210    Cholelithiasis 09/17/2012    U/S Result: Cholelithiasis    Elevated alkaline phosphatase level 03/21/2010    158    Elevated ALT measurement 03/21/2010    71    Elevated AST (SGOT) 03/10/2012    38    Elevated platelet count 15/28/7815    494    Elevated total protein 12/27/2011    8.7    Hypocalcemia 07/06/2011    4.1    Hypokalemia     Hyponatremia 11/18/2009    Leukocytosis 11/16/2009    Microcytic anemia 10/25/2007    Pleural effusion on right 07/15/2015    [facility] CXR Result    Reticulocytosis 10/25/2007    7.8    Serum total bilirubin elevated 12/27/2011    T.B. 3.8, D.B. 0.4.     [facility]     Dr. Vivienne Wilkerson. [place]    Vitamin D deficiency 01/20/2016    5.4       Past Surgical History:  Past Surgical History:   Procedure Laterality Date    HX CHOLECYSTECTOMY         Family History:  Family History   Problem Relation Age of Onset    Cancer Neg Hx     Diabetes Neg Hx     Heart Disease Neg Hx     Heart Attack Neg Hx     Hypertension Neg Hx     Stroke Neg Hx        Social History:  Social History     Tobacco Use    Smoking status: Never Smoker    Smokeless tobacco: Never Used   Substance Use Topics    Alcohol use: Yes     Comment: Occasional    Drug use: No       Allergies: Allergies   Allergen Reactions    Eggshell Membrane Nausea and Vomiting    Milk Nausea and Vomiting     Pt stated only milk causes him to vomit, but usually eats other foods containing dairy/milk. Review of Systems   Review of Systems   Constitutional: Negative for chills and fever. HENT: Negative for congestion, rhinorrhea and sore throat. Respiratory: Negative for cough and shortness of breath. Cardiovascular: Negative for chest pain. Gastrointestinal: Negative for abdominal pain, blood in stool, constipation, diarrhea, nausea and vomiting. Genitourinary: Negative for dysuria, frequency and hematuria. Musculoskeletal: Positive for arthralgias. Negative for back pain and myalgias.    Skin: Negative for rash and wound. Neurological: Negative for dizziness and headaches. All other systems reviewed and are negative. All Other Systems Negative  Physical Exam     Vitals:    03/21/20 0629   BP: 104/61   Pulse: 80   Resp: 18   Temp: 97.9 °F (36.6 °C)   SpO2: 98%     Physical Exam  Vitals signs and nursing note reviewed. Constitutional:       General: He is not in acute distress. Appearance: He is well-developed. He is not diaphoretic. Comments: No distress   HENT:      Head: Normocephalic and atraumatic. Eyes:      Conjunctiva/sclera: Conjunctivae normal.   Neck:      Musculoskeletal: Normal range of motion and neck supple. Cardiovascular:      Rate and Rhythm: Normal rate and regular rhythm. Heart sounds: Normal heart sounds. Pulmonary:      Effort: Pulmonary effort is normal. No respiratory distress. Breath sounds: Normal breath sounds. Chest:      Chest wall: No tenderness. Abdominal:      General: Bowel sounds are normal. There is no distension. Palpations: Abdomen is soft. Tenderness: There is no abdominal tenderness. There is no guarding or rebound. Musculoskeletal: Normal range of motion. General: No deformity. Right knee: He exhibits normal range of motion, no swelling, no effusion, no ecchymosis, no deformity, normal alignment, no LCL laxity and no MCL laxity. Tenderness found. Medial joint line tenderness noted. Comments: Right knee is well-appearing. No swelling, ecchymosis, erythema or warmth. Strong DP and PT pulses. Skin:     General: Skin is warm and dry. Neurological:      Mental Status: He is alert and oriented to person, place, and time.            Diagnostic Study Results     Labs -     Recent Results (from the past 12 hour(s))   CBC WITH AUTOMATED DIFF    Collection Time: 03/21/20  6:55 AM   Result Value Ref Range    WBC 17.2 (H) 4.6 - 13.2 K/uL    RBC 3.43 (L) 4.70 - 5.50 M/uL    HGB 8.5 (L) 13.0 - 16.0 g/dL    HCT 24.0 (L) 36.0 - 48.0 %    MCV 70.0 (L) 74.0 - 97.0 FL    MCH 24.8 24.0 - 34.0 PG    MCHC 35.4 31.0 - 37.0 g/dL    RDW 23.6 (H) 11.6 - 14.5 %    PLATELET 424 154 - 852 K/uL    MPV 9.5 9.2 - 11.8 FL    NEUTROPHILS 55 42 - 75 %    LYMPHOCYTES 39 20 - 51 %    MONOCYTES 2 2 - 9 %    EOSINOPHILS 4 0 - 5 %    BASOPHILS 0 0 - 3 %    NRBC 2.0 (H) 0  WBC    ABS. NEUTROPHILS 9.5 (H) 1.8 - 8.0 K/UL    ABS. LYMPHOCYTES 6.7 (H) 0.8 - 3.5 K/UL    ABS. MONOCYTES 0.3 0 - 1.0 K/UL    ABS. EOSINOPHILS 0.7 (H) 0.0 - 0.4 K/UL    ABS. BASOPHILS 0.0 0.0 - 0.06 K/UL    DF MANUAL      PLATELET COMMENTS Increased Platelets      RBC COMMENTS ANISOCYTOSIS  2+        RBC COMMENTS POLYCHROMASIA  1+        RBC COMMENTS POIKILOCYTOSIS  2+        RBC COMMENTS TARGET CELLS  1+        RBC COMMENTS SICKLE CELLS  2+       METABOLIC PANEL, BASIC    Collection Time: 03/21/20  6:55 AM   Result Value Ref Range    Sodium 136 136 - 145 mmol/L    Potassium 3.9 3.5 - 5.5 mmol/L    Chloride 106 100 - 111 mmol/L    CO2 24 21 - 32 mmol/L    Anion gap 6 3.0 - 18 mmol/L    Glucose 107 (H) 74 - 99 mg/dL    BUN 10 7.0 - 18 MG/DL    Creatinine 0.97 0.6 - 1.3 MG/DL    BUN/Creatinine ratio 10 (L) 12 - 20      GFR est AA >60 >60 ml/min/1.73m2    GFR est non-AA >60 >60 ml/min/1.73m2    Calcium 8.3 (L) 8.5 - 10.1 MG/DL   RETICULOCYTE COUNT    Collection Time: 03/21/20  6:55 AM   Result Value Ref Range    Reticulocyte count 9.4 (H) 0.5 - 2.3 %       CT Results  (Last 48 hours)    None        CXR Results  (Last 48 hours)    None            Medical Decision Making   I am the first provider for this patient. I reviewed the vital signs, available nursing notes, past medical history, past surgical history, family history and social history. Vital Signs-Reviewed the patient's vital signs.       Records Reviewed: Nursing Notes and Old Medical Records     Procedures: None   Procedures    Provider Notes (Medical Decision Making):     Differential Diagnosis: Knee strain, OA, RA, gout, ACL tear/strain, PCL tear/strain, MCL tear/strain, LCL tear/strain, medial meniscus tear, lateral meniscus tear, malingering, sickle cell crisis, avascular necrosis    Plan: We will order x-ray of knee. Will order basic labs and reticulocyte count. Have agreed to give IV morphine. Patient denies need for crutches. 8:43 AM  Have discussed overall reassuring imaging and work-up with patient. Did discuss leukocytosis with patient and Dr. Jose Roberts. No concern for septic joint on exam. Knee is well appearing without overlying erythema or warmth. Offered crutches but patient denies. Will discharge home with Ace wrap and rice care for home. Have advised patient to follow-up with Ortho. Have discussed concern for bony infarct that were most likely pre-existing prior to injury that occurred yesterday. Return precautions have been given. Have agreed to discharge home with small amount of tramadol. Patient agrees with the plan and management and states all questions have been thoroughly answered and there are no more remaining questions. MED RECONCILIATION:  Current Facility-Administered Medications   Medication Dose Route Frequency    morphine injection 4 mg  4 mg IntraVENous NOW     Current Outpatient Medications   Medication Sig    traMADoL (Ultram) 50 mg tablet Take 1 Tab by mouth every six (6) hours as needed for Pain for up to 3 days. Max Daily Amount: 555 mg.    folic acid (FOLVITE) 1 mg tablet Take 1 Tab by mouth daily.  hydroxyurea (HYDREA) 500 mg capsule Take 500 mg by mouth two (2) times a day. Indications: SICKLE CELL ANEMIA WITH CRISIS    fentaNYL (DURAGESIC) 25 mcg/hr PATCH 1 Patch by TransDERmal route every seventy-two (72) hours. Max Daily Amount: 1 Patch. Disposition:  Home     DISCHARGE NOTE:   Pt has been reexamined. Patient has no new complaints, changes, or physical findings. Care plan outlined and precautions discussed. Results of workup were reviewed with the patient.  All medications were reviewed with the patient. All of pt's questions and concerns were addressed. Patient was instructed and agrees to follow up with PCP/Ortho as well as to return to the ED upon further deterioration. Patient is ready to go home. Follow-up Information     Follow up With Specialties Details Why Contact Info    [facility] EMERGENCY DEPT Emergency Medicine  As needed [address] [facility]    Shanthi Kline MD Hematology and Oncology, Hematology, [address] [place] [phone]      [address] [facility] Orthopedic Surgery Schedule an appointment as soon as possible for a visit  [address] [facility], [address] [facility]  [phone]          Current Discharge Medication List      START taking these medications    Details   traMADoL (Ultram) 50 mg tablet Take 1 Tab by mouth every six (6) hours as needed for Pain for up to 3 days. Max Daily Amount: 200 mg. Qty: 12 Tab, Refills: 0    Associated Diagnoses: Right knee injury, initial encounter                 Diagnosis     Clinical Impression:   1. Right knee injury, initial encounter    2. Leukocytosis, unspecified type          \"Please note that this dictation was completed with [facility], the [employer] voice recognition software. Quite often unanticipated grammatical, syntax, homophones, and other interpretive errors are inadvertently transcribed by the computer software. Please disregard these errors. Please excuse any errors that have escaped final proofreading. \"

## 2020-04-27 ENCOUNTER — HOSPITAL ENCOUNTER (EMERGENCY)
Age: 40
Discharge: STILL ADMITTED OR ADMINISTRATIVE | End: 2020-04-27
Payer: MEDICARE

## 2020-04-27 PROCEDURE — 75810000275 HC EMERGENCY DEPT VISIT NO LEVEL OF CARE

## 2020-05-02 ENCOUNTER — HOSPITAL ENCOUNTER (EMERGENCY)
Age: 40
Discharge: HOME | End: 2020-05-02
Attending: EMERGENCY MEDICINE | Admitting: EMERGENCY MEDICINE
Payer: MEDICARE

## 2020-05-02 VITALS
TEMPERATURE: 97.5 F | OXYGEN SATURATION: 96 % | RESPIRATION RATE: 16 BRPM | DIASTOLIC BLOOD PRESSURE: 70 MMHG | HEART RATE: 66 BPM | SYSTOLIC BLOOD PRESSURE: 122 MMHG

## 2020-05-02 DIAGNOSIS — D57.00 SICKLE CELL PAIN CRISIS (HCC): Primary | ICD-10-CM

## 2020-05-02 PROCEDURE — 96376 TX/PRO/DX INJ SAME DRUG ADON: CPT

## 2020-05-02 PROCEDURE — 96375 TX/PRO/DX INJ NEW DRUG ADDON: CPT

## 2020-05-02 PROCEDURE — 74011250636 HC RX REV CODE- 250/636: Performed by: EMERGENCY MEDICINE

## 2020-05-02 PROCEDURE — 96374 THER/PROPH/DIAG INJ IV PUSH: CPT

## 2020-05-02 PROCEDURE — 99284 EMERGENCY DEPT VISIT MOD MDM: CPT

## 2020-05-02 RX ORDER — DIPHENHYDRAMINE HYDROCHLORIDE 50 MG/ML
12.5 INJECTION, SOLUTION INTRAMUSCULAR; INTRAVENOUS ONCE
Status: COMPLETED | OUTPATIENT
Start: 2020-05-02 | End: 2020-05-02

## 2020-05-02 RX ORDER — HYDROMORPHONE HYDROCHLORIDE 1 MG/ML
1 INJECTION, SOLUTION INTRAMUSCULAR; INTRAVENOUS; SUBCUTANEOUS ONCE
Status: COMPLETED | OUTPATIENT
Start: 2020-05-02 | End: 2020-05-02

## 2020-05-02 RX ADMIN — DIPHENHYDRAMINE HYDROCHLORIDE 12.5 MG: 50 INJECTION, SOLUTION INTRAMUSCULAR; INTRAVENOUS at 08:15

## 2020-05-02 RX ADMIN — HYDROMORPHONE HYDROCHLORIDE 1 MG: 1 INJECTION, SOLUTION INTRAMUSCULAR; INTRAVENOUS; SUBCUTANEOUS at 10:07

## 2020-05-02 RX ADMIN — HYDROMORPHONE HYDROCHLORIDE 1 MG: 1 INJECTION, SOLUTION INTRAMUSCULAR; INTRAVENOUS; SUBCUTANEOUS at 08:16

## 2020-05-02 NOTE — ED NOTES
Patient discharged in stable condition to home. Ambulated to waiting room without assistance or difficulty. Patient aware to continue home meds as prescribed and to follow up with PCP.

## 2020-05-02 NOTE — ED PROVIDER NOTES
EMERGENCY DEPARTMENT HISTORY AND PHYSICAL EXAM    8:17 AM      Date: 5/2/2020  Patient Name: Bradly Cockayne    History of Presenting Illness     Chief Complaint   Patient presents with    Sickle Cell Crisis         History Provided By: Patient    Additional History (Context): Bradly Cockayne is a 37 y.o. male with Sickle cell who presents with his usual sickle cell pain. He is complaining of bilateral pain. Patient is taking fentanyl and Dilaudid as well as hydroxyurea at home. He states that there is no improvement. Patient denies fever, vomiting, diarrhea, shortness of breath or cough. Patient denies smoking, alcohol or recreational drug use. Mila Valle PCP: Shyam Rahman MD      Current Facility-Administered Medications   Medication Dose Route Frequency Provider Last Rate Last Dose    HYDROmorphone (DILAUDID) injection 1 mg  1 mg IntraVENous ONCE Nga Teixeira, DO         Current Outpatient Medications   Medication Sig Dispense Refill    folic acid (FOLVITE) 1 mg tablet Take 1 Tab by mouth daily. 30 Tab 0    hydroxyurea (HYDREA) 500 mg capsule Take 500 mg by mouth two (2) times a day. Indications: SICKLE CELL ANEMIA WITH CRISIS      fentaNYL (DURAGESIC) 25 mcg/hr PATCH 1 Patch by TransDERmal route every seventy-two (72) hours. Max Daily Amount: 1 Patch.  5 Patch 0       Past History     Past Medical History:  Past Medical History:   Diagnosis Date    B12 deficiency 03/15/2010    210    Cholelithiasis 09/17/2012    U/S Result: Cholelithiasis    Elevated alkaline phosphatase level 03/21/2010    158    Elevated ALT measurement 03/21/2010    71    Elevated AST (SGOT) 03/10/2012    38    Elevated platelet count 98/03/5902    494    Elevated total protein 12/27/2011    8.7    Hypocalcemia 07/06/2011    4.1    Hypokalemia     Hyponatremia 11/18/2009    Leukocytosis 11/16/2009    Microcytic anemia 10/25/2007    Pleural effusion on right 07/15/2015    [facility] CXR Result    Reticulocytosis 10/25/2007    7.8    Serum total bilirubin elevated 12/27/2011    T.B. 3.8, D.B. 0.4.     Sickle cell anemia with crisis ([facility]     Dr. Ambar Echeverria. [place]    Vitamin D deficiency 01/20/2016    5.4       Past Surgical History:  Past Surgical History:   Procedure Laterality Date    HX CHOLECYSTECTOMY         Family History:  Family History   Problem Relation Age of Onset    Cancer Neg Hx     Diabetes Neg Hx     Heart Disease Neg Hx     Heart Attack Neg Hx     Hypertension Neg Hx     Stroke Neg Hx        Social History:  Social History     Tobacco Use    Smoking status: Never Smoker    Smokeless tobacco: Never Used   Substance Use Topics    Alcohol use: Yes     Comment: Occasional    Drug use: No       Allergies: Allergies   Allergen Reactions    Eggshell Membrane Nausea and Vomiting    Milk Nausea and Vomiting     Pt stated only milk causes him to vomit, but usually eats other foods containing dairy/milk. Review of Systems       Review of Systems   Constitutional: Negative. Negative for chills, diaphoresis and fever. HENT: Negative. Negative for congestion, rhinorrhea and sore throat. Eyes: Negative. Negative for pain, discharge and redness. Respiratory: Negative. Negative for cough, chest tightness, shortness of breath and wheezing. Cardiovascular: Negative. Negative for chest pain. Gastrointestinal: Negative. Negative for abdominal pain, constipation, diarrhea, nausea and vomiting. Genitourinary: Negative. Negative for dysuria, flank pain, frequency, hematuria and urgency. Musculoskeletal: Negative. Negative for back pain and neck pain. Skin: Negative. Negative for rash. Neurological: Negative. Negative for syncope, weakness, numbness and headaches. Psychiatric/Behavioral: Negative. All other systems reviewed and are negative.         Physical Exam     Visit Vitals  /78   Pulse 79   Temp 97.5 °F (36.4 °C)   Resp 19   SpO2 100%         Physical Exam  Vitals signs and nursing note reviewed. Constitutional:       General: He is not in acute distress. Appearance: Normal appearance. He is well-developed. He is not ill-appearing, toxic-appearing or diaphoretic. HENT:      Head: Normocephalic and atraumatic. Mouth/Throat:      Pharynx: No oropharyngeal exudate. Eyes:      General: No scleral icterus. Conjunctiva/sclera: Conjunctivae normal.      Pupils: Pupils are equal, round, and reactive to light. Neck:      Musculoskeletal: Normal range of motion and neck supple. Thyroid: No thyromegaly. Vascular: No hepatojugular reflux or JVD. Trachea: No tracheal deviation. Cardiovascular:      Rate and Rhythm: Normal rate and regular rhythm. Pulses: Normal pulses. Radial pulses are 2+ on the right side and 2+ on the left side. Dorsalis pedis pulses are 2+ on the right side and 2+ on the left side. Heart sounds: Normal heart sounds, S1 normal and S2 normal. No murmur. No gallop. No S3 or S4 sounds. Pulmonary:      Effort: Pulmonary effort is normal. No respiratory distress. Breath sounds: Normal breath sounds. No decreased breath sounds, wheezing, rhonchi or rales. Abdominal:      General: Bowel sounds are normal. There is no distension. Palpations: Abdomen is soft. Abdomen is not rigid. There is no mass. Tenderness: There is no abdominal tenderness. There is no guarding or rebound. Negative signs include Trotter's sign and McBurney's sign. Musculoskeletal: Normal range of motion. Comments: Strength 5 out of 5 throughout. Bilateral lower extremity tenderness with palpation   Lymphadenopathy:      Head:      Right side of head: No submental, submandibular, preauricular or occipital adenopathy. Left side of head: No submental, submandibular, preauricular or occipital adenopathy. Cervical: No cervical adenopathy. Upper Body:      Right upper body: No supraclavicular adenopathy. Left upper body: No supraclavicular adenopathy. Skin:     General: Skin is warm and dry. Findings: No rash. Neurological:      Mental Status: He is alert. He is not disoriented. GCS: GCS eye subscore is 4. GCS verbal subscore is 5. GCS motor subscore is 6. Cranial Nerves: No cranial nerve deficit. Sensory: No sensory deficit. Coordination: Coordination normal.      Gait: Gait normal.      Deep Tendon Reflexes: Reflexes are normal and symmetric. Comments: Grossly intact. Psychiatric:         Speech: Speech normal.         Behavior: Behavior normal.         Thought Content: Thought content normal.         Judgment: Judgment normal.           Diagnostic Study Results     Labs -  No results found for this or any previous visit (from the past 12 hour(s)). Radiologic Studies -   No orders to display         Medical Decision Making   Provider Notes (Medical Decision Making):  MDM  Number of Diagnoses or Management Options  Diagnosis management comments: Sickle cell exacerbation      I am the first provider for this patient. I reviewed the vital signs, available nursing notes, past medical history, past surgical history, family history and social history. Vital Signs-Reviewed the patient's vital signs. Records Reviewed: Nursing Notes (Time of Review: 8:17 AM)    ED Course: Progress Notes, Reevaluation, and Consults:    Patient pain improved very mildly. 9:27 AM 5/2/2020        Diagnosis       I have reassessed the patient. Patient is feeling better. Patient to continue his home medication. Patient follow-up with Dr. Lyle Hamm  Patient was discharged in stable condition. Patient is to return to emergency department if any new or worsening condition. Clinical Impression:   1.  Sickle cell pain crisis [facility])        Disposition: Discharged home     Follow-up Information     Follow up With Specialties Details Why Lorena Zapien MD Hematology and Oncology, Hematology, Oncology In 2 days  [address] [facility]  [address] [address]  [phone]               Attestation        Provider Attestation:     I personally performed the services described in the documentation, reviewed the documentation and it accurately and completely records my words and actions utilizing the [address] Yoanna Eek May 02, 2020 at 9:33 AM - Arcadio Teixeira DO    Disclaimer. It is dictated using utilizing voice recognition software. Unfortunately this leads to occasional typographical errors. I apologize in advance if the situation occurs. If questions arise please do not hesitate to contact me or call our department.

## 2020-05-02 NOTE — DISCHARGE INSTRUCTIONS
Patient Education        Sickle Cell Crisis: Care Instructions  Your Care Instructions    Sickle cell crisis is a painful episode that may begin suddenly in a person with sickle cell disease. Sickle cell disease turns normal, round red blood cells into cells that look like jeane or crescent moons. The sickle-shaped cells can get stuck in blood vessels, blocking blood flow and causing severe pain. The pain can occur in the bones of the spine, the arms and legs, the chest, and the abdomen. An episode may be called a \"painful event\" or \"painful crisis. \" Some people who have sickle cell disease have many painful events, while others have few or none. Treatment depends on the level of pain and how long it lasts. Sometimes taking nonprescription pain relievers can help. Or you may need stronger pain relief medicine that is prescribed or given by a doctor. You may need to be treated in the hospital.  It isn't always possible to know what sets off a painful event. But triggers include being dehydrated, cold temperatures, infection, stress, and not getting enough oxygen. Follow-up care is a key part of your treatment and safety. Be sure to make and go to all appointments, and call your doctor if you are having problems. It's also a good idea to know your test results and keep a list of the medicines you take. How can you care for yourself at home? · Create a pain management plan with your doctor. This plan should include the types of medicines you can take and other actions you can take at home to relieve pain. · Drink plenty of fluids, enough so that your urine is light yellow or clear like water. If you have kidney, heart, or liver disease and have to limit fluids, talk with your doctor before you increase the amount of fluids you drink. · Take your medicines exactly as prescribed. Call your doctor if you think you are having a problem with your medicine. · Take pain medicines exactly as directed.   ? If the doctor gave you a prescription medicine for pain, take it as prescribed. ? If you are not taking a prescription pain medicine, ask your doctor if you can take an over-the-counter medicine. · Avoid alcohol. It can make you dehydrated. · Dress warmly in cold weather. The cold and windy weather can lead to severe pain. · Do not smoke. Smoking can reduce the amount of oxygen in your blood. · Get plenty of sleep. When should you call for help? Call 911 anytime you think you may need emergency care. For example, call if:    · You have symptoms of a severe problem from sickle cell.     · You have symptoms of a stroke. These may include:  ? Sudden numbness, tingling, weakness, or loss of movement in your face, arm, or leg, especially on only one side of your body. ? Sudden vision changes. ? Sudden trouble speaking. ? Sudden confusion or trouble understanding simple statements. ? Sudden problems with walking or balance. ? A sudden, severe headache that is different from past headaches.     · You are in severe pain.     · You have symptoms of a heart attack. These may include:  ? Chest pain or pressure, or a strange feeling in the chest.  ? Sweating. ? Shortness of breath. ? Nausea or vomiting. ? Pain, pressure, or a strange feeling in the back, neck, jaw, or upper belly or in one or both shoulders or arms. ? Lightheadedness or sudden weakness. ? A fast or irregular heartbeat. After you call  911, the  may tell you to chew 1 adult-strength or 2 to 4 low-dose aspirin. Wait for an ambulance. Do not try to drive yourself.    Call your doctor now or seek immediate medical care if:    · You have a fever.    Watch closely for changes in your health, and be sure to contact your doctor if you have any problems. Where can you learn more? Go to http://manuel-amadeo.info/  Enter F104 in the search box to learn more about \"Sickle Cell Crisis: Care Instructions. \"  Current as of: November 7, 2019Content Version: 12.4  © 4951-8571 [employer]. Care instructions adapted under license by [employer] (which disclaims liability or warranty for this information). If you have questions about a medical condition or this instruction, always ask your healthcare professional. [address] any warranty or liability for your use of this information.

## 2020-05-13 ENCOUNTER — HOSPITAL ENCOUNTER (EMERGENCY)
Age: 40
Discharge: HOME | End: 2020-05-13
Attending: EMERGENCY MEDICINE
Payer: MEDICARE

## 2020-05-13 VITALS
WEIGHT: 177 LBS | BODY MASS INDEX: 24.01 KG/M2 | DIASTOLIC BLOOD PRESSURE: 71 MMHG | TEMPERATURE: 98.4 F | RESPIRATION RATE: 16 BRPM | SYSTOLIC BLOOD PRESSURE: 113 MMHG | OXYGEN SATURATION: 100 % | HEART RATE: 73 BPM

## 2020-05-13 DIAGNOSIS — D57.00 HB-SS DISEASE WITH CRISIS (HCC): Primary | ICD-10-CM

## 2020-05-13 DIAGNOSIS — M79.10 MYALGIA: ICD-10-CM

## 2020-05-13 LAB
ANION GAP SERPL CALC-SCNC: 9 MMOL/L (ref 3–18)
BASOPHILS # BLD: 0.3 K/UL (ref 0–0.06)
BASOPHILS NFR BLD: 2 % (ref 0–3)
BUN SERPL-MCNC: 10 MG/DL (ref 7–18)
BUN/CREAT SERPL: 11 (ref 12–20)
CALCIUM SERPL-MCNC: 8.6 MG/DL (ref 8.5–10.1)
CHLORIDE SERPL-SCNC: 104 MMOL/L (ref 100–111)
CK MB CFR SERPL CALC: 0.4 % (ref 0–4)
CK MB SERPL-MCNC: 2.4 NG/ML (ref 5–25)
CK SERPL-CCNC: 601 U/L (ref 39–308)
CO2 SERPL-SCNC: 24 MMOL/L (ref 21–32)
CREAT SERPL-MCNC: 0.95 MG/DL (ref 0.6–1.3)
DIFFERENTIAL METHOD BLD: ABNORMAL
EOSINOPHIL # BLD: 0.7 K/UL (ref 0–0.4)
EOSINOPHIL NFR BLD: 4 % (ref 0–5)
ERYTHROCYTE [DISTWIDTH] IN BLOOD BY AUTOMATED COUNT: 23.5 % (ref 11.6–14.5)
GLUCOSE SERPL-MCNC: 89 MG/DL (ref 74–99)
HCT VFR BLD AUTO: 25 % (ref 36–48)
HGB BLD-MCNC: 8.7 G/DL (ref 13–16)
LIPASE SERPL-CCNC: 146 U/L (ref 73–393)
LYMPHOCYTES # BLD: 5.2 K/UL (ref 0.8–3.5)
LYMPHOCYTES NFR BLD: 30 % (ref 20–51)
MCH RBC QN AUTO: 24.6 PG (ref 24–34)
MCHC RBC AUTO-ENTMCNC: 34.8 G/DL (ref 31–37)
MCV RBC AUTO: 70.6 FL (ref 74–97)
MONOCYTES # BLD: 1 K/UL (ref 0–1)
MONOCYTES NFR BLD: 6 % (ref 2–9)
NEUTS SEG # BLD: 10.1 K/UL (ref 1.8–8)
NEUTS SEG NFR BLD: 58 % (ref 42–75)
NRBC BLD-RTO: 1 PER 100 WBC
PLATELET # BLD AUTO: 426 K/UL (ref 135–420)
PLATELET COMMENTS,PCOM: ABNORMAL
PMV BLD AUTO: 9.9 FL (ref 9.2–11.8)
POTASSIUM SERPL-SCNC: 3.4 MMOL/L (ref 3.5–5.5)
RBC # BLD AUTO: 3.54 M/UL (ref 4.7–5.5)
RBC MORPH BLD: ABNORMAL
RETICS/RBC NFR AUTO: 9.3 % (ref 0.5–2.3)
SODIUM SERPL-SCNC: 137 MMOL/L (ref 136–145)
TROPONIN I SERPL-MCNC: <0.02 NG/ML (ref 0–0.04)
WBC # BLD AUTO: 17.3 K/UL (ref 4.6–13.2)

## 2020-05-13 PROCEDURE — 80048 BASIC METABOLIC PNL TOTAL CA: CPT

## 2020-05-13 PROCEDURE — 96375 TX/PRO/DX INJ NEW DRUG ADDON: CPT

## 2020-05-13 PROCEDURE — 96374 THER/PROPH/DIAG INJ IV PUSH: CPT

## 2020-05-13 PROCEDURE — 82550 ASSAY OF CK (CPK): CPT

## 2020-05-13 PROCEDURE — 85025 COMPLETE CBC W/AUTO DIFF WBC: CPT

## 2020-05-13 PROCEDURE — 99285 EMERGENCY DEPT VISIT HI MDM: CPT

## 2020-05-13 PROCEDURE — 85045 AUTOMATED RETICULOCYTE COUNT: CPT

## 2020-05-13 PROCEDURE — 74011250636 HC RX REV CODE- 250/636: Performed by: EMERGENCY MEDICINE

## 2020-05-13 PROCEDURE — 83690 ASSAY OF LIPASE: CPT

## 2020-05-13 RX ORDER — ONDANSETRON 2 MG/ML
4 INJECTION INTRAMUSCULAR; INTRAVENOUS
Status: COMPLETED | OUTPATIENT
Start: 2020-05-13 | End: 2020-05-13

## 2020-05-13 RX ORDER — MORPHINE SULFATE 10 MG/ML
6 INJECTION, SOLUTION INTRAMUSCULAR; INTRAVENOUS
Status: COMPLETED | OUTPATIENT
Start: 2020-05-13 | End: 2020-05-13

## 2020-05-13 RX ORDER — SODIUM CHLORIDE 9 MG/ML
1000 INJECTION, SOLUTION INTRAVENOUS ONCE
Status: COMPLETED | OUTPATIENT
Start: 2020-05-13 | End: 2020-05-13

## 2020-05-13 RX ADMIN — ONDANSETRON 4 MG: 2 INJECTION, SOLUTION INTRAMUSCULAR; INTRAVENOUS at 06:07

## 2020-05-13 RX ADMIN — SODIUM CHLORIDE 1000 ML: 900 INJECTION, SOLUTION INTRAVENOUS at 06:06

## 2020-05-13 RX ADMIN — MORPHINE SULFATE 6 MG: 10 INJECTION INTRAVENOUS at 06:09

## 2020-05-13 NOTE — ED NOTES
Pt sitting up in bed; affect calm/conversant with pain level 2/10. RR regular/non labored, skin warm/dry. Taking PO fluid without difficulty. Instructed to ensure follow up with his appointment with his oncologist/hematologist this am as scheduled at 0945. Pt voiced his understanding. Pending discharge/shot time.

## 2020-05-13 NOTE — ED NOTES
Pt has 25 mcg/low dose fentanyl patch on; per MD dose low enough to keep it in place with morphine. Pt remains awake/alert/oriented; affect calm/conversant without distress.

## 2020-05-13 NOTE — ED PROVIDER NOTES
Pt c/o sickle cell crisis, says freq h/o same. On fentanyl patches and dilauded tablets but not helping. Says was at work when low back and leg pain started, says typical of his crisis, gets same every month or so. No fever, no cough, no sob, no chest or abd pain. No nausea/vomiting. No rash. No urinary changes. No stool changes. Past Medical History:   Diagnosis Date    B12 deficiency 03/15/2010    210    Cholelithiasis 09/17/2012    U/S Result: Cholelithiasis    Elevated alkaline phosphatase level 03/21/2010    158    Elevated ALT measurement 03/21/2010    71    Elevated AST (SGOT) 03/10/2012    38    Elevated platelet count 03/78/3511    494    Elevated total protein 12/27/2011    8.7    History of blood transfusion     Hypocalcemia 07/06/2011    4.1    Hypokalemia     Hyponatremia 11/18/2009    Leukocytosis 11/16/2009    Microcytic anemia 10/25/2007    Pleural effusion on right 07/15/2015    [facility] CXR Result    Reticulocytosis 10/25/2007    7.8    Serum total bilirubin elevated 12/27/2011    T.B. 3.8, D.B. 0.4.     Sickle cell anemia with crisis ([facility]     Dr. Sheryl Aguila.  [place]    Vitamin D deficiency 01/20/2016    5.4       Past Surgical History:   Procedure Laterality Date    HX CHOLECYSTECTOMY           Family History:   Problem Relation Age of Onset    Cancer Neg Hx     Diabetes Neg Hx     Heart Disease Neg Hx     Heart Attack Neg Hx     Hypertension Neg Hx     Stroke Neg Hx        Social History     Socioeconomic History    Marital status:      Spouse name: Not on file    Number of children: Not on file    Years of education: Not on file    Highest education level: Not on file   Occupational History    Not on file   Social Needs    Financial resource strain: Not on file    Food insecurity     Worry: Not on file     Inability: Not on file    Transportation needs     Medical: Not on file     Non-medical: Not on file   Tobacco Use    Smoking status: Never Smoker    Smokeless tobacco: Never Used   Substance and Sexual Activity    Alcohol use: Yes     Comment: Occasional    Drug use: No    Sexual activity: Yes     Partners: Female     Birth control/protection: None   Lifestyle    Physical activity     Days per week: Not on file     Minutes per session: Not on file    Stress: Not on file   Relationships    Social connections     Talks on phone: Not on file     Gets together: Not on file     Attends Restorationism service: Not on file     Active member of club or organization: Not on file     Attends meetings of clubs or organizations: Not on file     Relationship status: Not on file    Intimate partner violence     Fear of current or ex partner: Not on file     Emotionally abused: Not on file     Physically abused: Not on file     Forced sexual activity: Not on file   Other Topics Concern    Not on file   Social History Narrative    Not on file         ALLERGIES: Eggshell membrane and Milk    Review of Systems   Constitutional: Negative for diaphoresis and fever. HENT: Negative for congestion. Respiratory: Negative for cough and shortness of breath. Cardiovascular: Negative for chest pain. Gastrointestinal: Negative for abdominal pain and nausea. Musculoskeletal: Positive for back pain and myalgias. Skin: Negative for rash. Neurological: Negative for dizziness. All other systems reviewed and are negative. Vitals:    05/13/20 0555 05/13/20 0600 05/13/20 0615 05/13/20 0627   BP:  114/61 114/73    Pulse: 77 77 79 73   Resp: 16 15 26 12   Temp:       SpO2: 100% 100% 100% 100%   Weight:                Physical Exam  Vitals signs and nursing note reviewed. Constitutional:       Appearance: He is well-developed. HENT:      Head: Normocephalic and atraumatic. Eyes:      Conjunctiva/sclera: Conjunctivae normal.   Neck:      Musculoskeletal: Normal range of motion. Cardiovascular:      Rate and Rhythm: Normal rate and regular rhythm. Pulmonary:      Effort: Pulmonary effort is normal.      Breath sounds: No wheezing. Abdominal:      Palpations: Abdomen is soft. Tenderness: There is no abdominal tenderness. Musculoskeletal:         General: No tenderness. Skin:     General: Skin is warm and dry. Capillary Refill: Capillary refill takes less than 2 seconds. Findings: No rash. Neurological:      Mental Status: He is alert and oriented to person, place, and time. MDM       Procedures    Vitals:  Patient Vitals for the past 12 hrs:   Temp Pulse Resp BP SpO2   05/13/20 0627  73 12  100 %   05/13/20 0615  79 26 114/73 100 %   05/13/20 0600  77 15 114/61 100 %   05/13/20 0555  77 16  100 %   05/13/20 0555   16     05/13/20 0527 98.4 °F (36.9 °C) 77 16 117/68 95 %         Medications ordered:   Medications   0.9% sodium chloride infusion 1,000 mL (1,000 mL IntraVENous New Bag 5/13/20 0606)   morphine 10 mg/ml injection 6 mg (6 mg IntraVENous Given 5/13/20 0609)   ondansetron (ZOFRAN) injection 4 mg (4 mg IntraVENous Given 5/13/20 0607)         Lab findings:  Recent Results (from the past 12 hour(s))   CBC WITH AUTOMATED DIFF    Collection Time: 05/13/20  5:58 AM   Result Value Ref Range    WBC 17.3 (H) 4.6 - 13.2 K/uL    RBC 3.54 (L) 4.70 - 5.50 M/uL    HGB 8.7 (L) 13.0 - 16.0 g/dL    HCT 25.0 (L) 36.0 - 48.0 %    MCV 70.6 (L) 74.0 - 97.0 FL    MCH 24.6 24.0 - 34.0 PG    MCHC 34.8 31.0 - 37.0 g/dL    RDW 23.5 (H) 11.6 - 14.5 %    PLATELET 355 (H) 590 - 420 K/uL    MPV 9.9 9.2 - 11.8 FL    NEUTROPHILS PENDING %    LYMPHOCYTES PENDING %    MONOCYTES PENDING %    EOSINOPHILS PENDING %    BASOPHILS PENDING %    ABS. NEUTROPHILS PENDING K/UL    ABS. LYMPHOCYTES PENDING K/UL    ABS. MONOCYTES PENDING K/UL    ABS. EOSINOPHILS PENDING K/UL    ABS.  BASOPHILS PENDING K/UL    DF PENDING    METABOLIC PANEL, BASIC    Collection Time: 05/13/20  5:58 AM   Result Value Ref Range    Sodium 137 136 - 145 mmol/L    Potassium 3.4 (L) 3.5 - 5.5 mmol/L    Chloride 104 100 - 111 mmol/L    CO2 24 21 - 32 mmol/L    Anion gap 9 3.0 - 18 mmol/L    Glucose 89 74 - 99 mg/dL    BUN 10 7.0 - 18 MG/DL    Creatinine 0.95 0.6 - 1.3 MG/DL    BUN/Creatinine ratio 11 (L) 12 - 20      GFR est AA >60 >60 ml/min/1.73m2    GFR est non-AA >60 >60 ml/min/1.73m2    Calcium 8.6 8.5 - 10.1 MG/DL   CARDIAC PANEL,(CK, CKMB & TROPONIN)    Collection Time: 05/13/20  5:58 AM   Result Value Ref Range     (H) 39 - 308 U/L    CK - MB 2.4 <3.6 ng/ml    CK-MB Index 0.4 0.0 - 4.0 %    Troponin-I, QT <0.02 0.0 - 0.045 NG/ML   LIPASE    Collection Time: 05/13/20  5:58 AM   Result Value Ref Range    Lipase 146 73 - 393 U/L           X-Ray, CT or other radiology findings or impressions:  No orders to display       Progress notes, Consult notes or additional Procedure notes:   6:31 AM pt feels much better, anemic but improved c/w prior. Af, nl vitals. Pt declines further ed or eval, aware retic goes to [facility], feels better and declines waiting for retic result. Says has appt w dr Manny Mora this am in 2 hours, wants dc and to make that appt, declines further tx. To dc w detailed ret inst given. No emc. Diagnosis:   1. Hb-SS disease with crisis ([facility] [address]    2. Myalgia        Disposition: home    Follow-up Information     Follow up With Specialties Details Why Contact Info    Kishore James MD Hematology and Oncology, Hematology, Oncology Schedule an appointment as soon as possible for a visit in 2 days  [address]      [id number] [facility] EMERGENCY DEPT Emergency Medicine Go to As needed, If symptoms worsen [address] [facility]  [phone]           Patient's Medications   Start Taking    No medications on file   Continue Taking    FENTANYL (DURAGESIC) 25 MCG/HR PATCH    1 Patch by TransDERmal route every seventy-two (72) hours. Max Daily Amount: 1 Patch.     FOLIC ACID (FOLVITE) 1 MG TABLET    Take 1 Tab by mouth daily. HYDROXYUREA (HYDREA) 500 MG CAPSULE    Take 500 mg by mouth two (2) times a day.  Indications: SICKLE CELL ANEMIA WITH CRISIS   These Medications have changed    No medications on file   Stop Taking    No medications on file

## 2020-05-13 NOTE — DISCHARGE INSTRUCTIONS
Return for any new or worsening pain, fever not resolving with motrin or tylenol, shortness of breath, vomiting, decreased fluid intake, weakness, numbness, dizziness, or any change or concerns. Patient Education        Sickle Cell Crisis: Care Instructions  Your Care Instructions    Sickle cell crisis is a painful episode that may begin suddenly in a person with sickle cell disease. Sickle cell disease turns normal, round red blood cells into cells that look like jeane or crescent moons. The sickle-shaped cells can get stuck in blood vessels, blocking blood flow and causing severe pain. The pain can occur in the bones of the spine, the arms and legs, the chest, and the abdomen. An episode may be called a \"painful event\" or \"painful crisis. \" Some people who have sickle cell disease have many painful events, while others have few or none. Treatment depends on the level of pain and how long it lasts. Sometimes taking nonprescription pain relievers can help. Or you may need stronger pain relief medicine that is prescribed or given by a doctor. You may need to be treated in the hospital.  It isn't always possible to know what sets off a painful event. But triggers include being dehydrated, cold temperatures, infection, stress, and not getting enough oxygen. Follow-up care is a key part of your treatment and safety. Be sure to make and go to all appointments, and call your doctor if you are having problems. It's also a good idea to know your test results and keep a list of the medicines you take. How can you care for yourself at home? · Create a pain management plan with your doctor. This plan should include the types of medicines you can take and other actions you can take at home to relieve pain. · Drink plenty of fluids, enough so that your urine is light yellow or clear like water.  If you have kidney, heart, or liver disease and have to limit fluids, talk with your doctor before you increase the amount of fluids you drink. · Take your medicines exactly as prescribed. Call your doctor if you think you are having a problem with your medicine. · Take pain medicines exactly as directed. ? If the doctor gave you a prescription medicine for pain, take it as prescribed. ? If you are not taking a prescription pain medicine, ask your doctor if you can take an over-the-counter medicine. · Avoid alcohol. It can make you dehydrated. · Dress warmly in cold weather. The cold and windy weather can lead to severe pain. · Do not smoke. Smoking can reduce the amount of oxygen in your blood. · Get plenty of sleep. When should you call for help? Call 911 anytime you think you may need emergency care. For example, call if:    · You have symptoms of a severe problem from sickle cell.     · You have symptoms of a stroke. These may include:  ? Sudden numbness, tingling, weakness, or loss of movement in your face, arm, or leg, especially on only one side of your body. ? Sudden vision changes. ? Sudden trouble speaking. ? Sudden confusion or trouble understanding simple statements. ? Sudden problems with walking or balance. ? A sudden, severe headache that is different from past headaches.     · You are in severe pain.     · You have symptoms of a heart attack. These may include:  ? Chest pain or pressure, or a strange feeling in the chest.  ? Sweating. ? Shortness of breath. ? Nausea or vomiting. ? Pain, pressure, or a strange feeling in the back, neck, jaw, or upper belly or in one or both shoulders or arms. ? Lightheadedness or sudden weakness. ? A fast or irregular heartbeat. After you call  911, the  may tell you to chew 1 adult-strength or 2 to 4 low-dose aspirin. Wait for an ambulance.  Do not try to drive yourself.    Call your doctor now or seek immediate medical care if:    · You have a fever.    Watch closely for changes in your health, and be sure to contact your doctor if you have any problems. Where can you learn more? Go to http://manuel-amadeo.info/  Enter F104 in the search box to learn more about \"Sickle Cell Crisis: Care Instructions. \"  Current as of: November 7, 2019Content Version: 12.4  © 2664-1401 Healthwise, Incorporated. Care instructions adapted under license by [employer] (which disclaims liability or warranty for this information). If you have questions about a medical condition or this instruction, always ask your healthcare professional. [address] any warranty or liability for your use of this information.

## 2020-05-13 NOTE — ED NOTES
Pt remains awake/alert/conversant without distress. Pt watching tv and texting. Rails up x 2, call light in reach, iv site clear/infusing. Pt voiced his understanding of plan of care.

## 2020-05-13 NOTE — ED TRIAGE NOTES
Acute onset of sickle cell pain tonight since 10 pm; reports bilateral leg pain which is per his usual sickle cell presentation

## 2020-05-13 NOTE — ED NOTES
Pt discharged to home; ambulatory with strong/steady gait. Instructed to return to ED for worsening pain/fever/aches/chills/other concerns. Instructed no etoh/narcotics/driving today; pt voiced his understanding.

## 2020-05-13 NOTE — LETTER
NOTIFICATION RETURN TO WORK / SCHOOL 
 
5/13/2020 6:32 AM 
 
Mr. Niyah Park [facility] [id number] To Whom It May Concern: 
 
Niyah Mai is currently under the care of [id number] [place] [address] EMERGENCY DEPT. He will return to work/school on: 5/16/20 If there are questions or concerns please have the patient contact our office.  
 
 
 
Sincerely, 
 
 
 
Tonia Mai MD

## 2020-07-04 ENCOUNTER — HOSPITAL ENCOUNTER (EMERGENCY)
Age: 40
Discharge: HOME | End: 2020-07-04
Attending: EMERGENCY MEDICINE
Payer: MEDICARE

## 2020-07-04 VITALS
TEMPERATURE: 98.8 F | HEART RATE: 88 BPM | SYSTOLIC BLOOD PRESSURE: 123 MMHG | BODY MASS INDEX: 24.79 KG/M2 | OXYGEN SATURATION: 92 % | HEIGHT: 72 IN | RESPIRATION RATE: 20 BRPM | DIASTOLIC BLOOD PRESSURE: 77 MMHG | WEIGHT: 183 LBS

## 2020-07-04 DIAGNOSIS — D57.00 SICKLE CELL PAIN CRISIS (HCC): Primary | ICD-10-CM

## 2020-07-04 LAB
ALBUMIN SERPL-MCNC: 4 G/DL (ref 3.4–5)
ALBUMIN/GLOB SERPL: 0.8 {RATIO} (ref 0.8–1.7)
ALP SERPL-CCNC: 116 U/L (ref 45–117)
ALT SERPL-CCNC: 26 U/L (ref 16–61)
ANION GAP SERPL CALC-SCNC: 5 MMOL/L (ref 3–18)
AST SERPL-CCNC: 43 U/L (ref 10–38)
BASOPHILS # BLD: 0 K/UL (ref 0–0.06)
BASOPHILS NFR BLD: 0 % (ref 0–3)
BILIRUB SERPL-MCNC: 4.7 MG/DL (ref 0.2–1)
BUN SERPL-MCNC: 7 MG/DL (ref 7–18)
BUN/CREAT SERPL: 7 (ref 12–20)
CALCIUM SERPL-MCNC: 8.4 MG/DL (ref 8.5–10.1)
CHLORIDE SERPL-SCNC: 109 MMOL/L (ref 100–111)
CO2 SERPL-SCNC: 24 MMOL/L (ref 21–32)
CREAT SERPL-MCNC: 0.95 MG/DL (ref 0.6–1.3)
DIFFERENTIAL METHOD BLD: ABNORMAL
EOSINOPHIL # BLD: 1.3 K/UL (ref 0–0.4)
EOSINOPHIL NFR BLD: 11 % (ref 0–5)
ERYTHROCYTE [DISTWIDTH] IN BLOOD BY AUTOMATED COUNT: 22.9 % (ref 11.6–14.5)
GLOBULIN SER CALC-MCNC: 4.9 G/DL (ref 2–4)
GLUCOSE SERPL-MCNC: 91 MG/DL (ref 74–99)
HCT VFR BLD AUTO: 23.4 % (ref 36–48)
HGB BLD-MCNC: 8.2 G/DL (ref 13–16)
LYMPHOCYTES # BLD: 5.2 K/UL (ref 0.8–3.5)
LYMPHOCYTES NFR BLD: 43 % (ref 20–51)
MCH RBC QN AUTO: 24.6 PG (ref 24–34)
MCHC RBC AUTO-ENTMCNC: 35 G/DL (ref 31–37)
MCV RBC AUTO: 70.1 FL (ref 74–97)
MONOCYTES # BLD: 0.5 K/UL (ref 0–1)
MONOCYTES NFR BLD: 4 % (ref 2–9)
NEUTS SEG # BLD: 5 K/UL (ref 1.8–8)
NEUTS SEG NFR BLD: 41 % (ref 42–75)
OTHER CELLS NFR BLD MANUAL: 1 %
PLATELET # BLD AUTO: 439 K/UL (ref 135–420)
PLATELET COMMENTS,PCOM: ABNORMAL
PMV BLD AUTO: 9.3 FL (ref 9.2–11.8)
POTASSIUM SERPL-SCNC: 3.4 MMOL/L (ref 3.5–5.5)
PROT SERPL-MCNC: 8.9 G/DL (ref 6.4–8.2)
RBC # BLD AUTO: 3.34 M/UL (ref 4.7–5.5)
RBC MORPH BLD: ABNORMAL
RETICS/RBC NFR AUTO: 8.2 % (ref 0.5–2.3)
SODIUM SERPL-SCNC: 138 MMOL/L (ref 136–145)
WBC # BLD AUTO: 12.2 K/UL (ref 4.6–13.2)

## 2020-07-04 PROCEDURE — 85045 AUTOMATED RETICULOCYTE COUNT: CPT

## 2020-07-04 PROCEDURE — 96374 THER/PROPH/DIAG INJ IV PUSH: CPT

## 2020-07-04 PROCEDURE — 80053 COMPREHEN METABOLIC PANEL: CPT

## 2020-07-04 PROCEDURE — 99281 EMR DPT VST MAYX REQ PHY/QHP: CPT

## 2020-07-04 PROCEDURE — 74011250636 HC RX REV CODE- 250/636: Performed by: EMERGENCY MEDICINE

## 2020-07-04 PROCEDURE — 96375 TX/PRO/DX INJ NEW DRUG ADDON: CPT

## 2020-07-04 PROCEDURE — 96376 TX/PRO/DX INJ SAME DRUG ADON: CPT

## 2020-07-04 PROCEDURE — 85025 COMPLETE CBC W/AUTO DIFF WBC: CPT

## 2020-07-04 RX ORDER — HYDROMORPHONE HYDROCHLORIDE 2 MG/1
2 TABLET ORAL
COMMUNITY

## 2020-07-04 RX ORDER — HYDROMORPHONE HYDROCHLORIDE 1 MG/ML
1 INJECTION, SOLUTION INTRAMUSCULAR; INTRAVENOUS; SUBCUTANEOUS ONCE
Status: COMPLETED | OUTPATIENT
Start: 2020-07-04 | End: 2020-07-04

## 2020-07-04 RX ORDER — DIPHENHYDRAMINE HYDROCHLORIDE 50 MG/ML
12.5 INJECTION, SOLUTION INTRAMUSCULAR; INTRAVENOUS
Status: COMPLETED | OUTPATIENT
Start: 2020-07-04 | End: 2020-07-04

## 2020-07-04 RX ADMIN — HYDROMORPHONE HYDROCHLORIDE 1 MG: 1 INJECTION, SOLUTION INTRAMUSCULAR; INTRAVENOUS; SUBCUTANEOUS at 09:53

## 2020-07-04 RX ADMIN — HYDROMORPHONE HYDROCHLORIDE 1 MG: 1 INJECTION, SOLUTION INTRAMUSCULAR; INTRAVENOUS; SUBCUTANEOUS at 07:46

## 2020-07-04 RX ADMIN — DIPHENHYDRAMINE HYDROCHLORIDE 12.5 MG: 50 INJECTION, SOLUTION INTRAMUSCULAR; INTRAVENOUS at 07:46

## 2020-07-04 NOTE — DISCHARGE INSTRUCTIONS
Patient Education        Sickle Cell Crisis: Care Instructions  Your Care Instructions     Sickle cell crisis is a painful episode that may begin suddenly in a person with sickle cell disease. Sickle cell disease turns normal, round red blood cells into cells that look like jeane or crescent moons. The sickle-shaped cells can get stuck in blood vessels, blocking blood flow and causing severe pain. The pain can occur in the bones of the spine, the arms and legs, the chest, and the abdomen. An episode may be called a \"painful event\" or \"painful crisis. \" Some people who have sickle cell disease have many painful events, while others have few or none. Treatment depends on the level of pain and how long it lasts. Sometimes taking nonprescription pain relievers can help. Or you may need stronger pain relief medicine that is prescribed or given by a doctor. You may need to be treated in the hospital.  It isn't always possible to know what sets off a painful event. But triggers include being dehydrated, cold temperatures, infection, stress, and not getting enough oxygen. Follow-up care is a key part of your treatment and safety. Be sure to make and go to all appointments, and call your doctor if you are having problems. It's also a good idea to know your test results and keep a list of the medicines you take. How can you care for yourself at home? · Create a pain management plan with your doctor. This plan should include the types of medicines you can take and other actions you can take at home to relieve pain. · Drink plenty of fluids, enough so that your urine is light yellow or clear like water. If you have kidney, heart, or liver disease and have to limit fluids, talk with your doctor before you increase the amount of fluids you drink. · Take your medicines exactly as prescribed. Call your doctor if you think you are having a problem with your medicine. · Take pain medicines exactly as directed.   ? If the doctor gave you a prescription medicine for pain, take it as prescribed. ? If you are not taking a prescription pain medicine, ask your doctor if you can take an over-the-counter medicine. · Avoid alcohol. It can make you dehydrated. · Dress warmly in cold weather. The cold and windy weather can lead to severe pain. · Do not smoke. Smoking can reduce the amount of oxygen in your blood. · Get plenty of sleep. When should you call for help? [id number] anytime you think you may need emergency care. For example, call if:  · You have symptoms of a severe problem from sickle cell. · You have symptoms of a stroke. These may include:  ? Sudden numbness, tingling, weakness, or loss of movement in your face, arm, or leg, especially on only one side of your body. ? Sudden vision changes. ? Sudden trouble speaking. ? Sudden confusion or trouble understanding simple statements. ? Sudden problems with walking or balance. ? A sudden, severe headache that is different from past headaches. · You are in severe pain. · You have symptoms of a heart attack. These may include:  ? Chest pain or pressure, or a strange feeling in the chest.  ? Sweating. ? Shortness of breath. ? Nausea or vomiting. ? Pain, pressure, or a strange feeling in the back, neck, jaw, or upper belly or in one or both shoulders or arms. ? Lightheadedness or sudden weakness. ? A fast or irregular heartbeat. After you call 911, the  may tell you to chew 1 adult-strength or 2 to 4 low-dose aspirin. Wait for an ambulance. Do not try to drive yourself. Call your doctor now or seek immediate medical care if:  · You have a fever. Watch closely for changes in your health, and be sure to contact your doctor if you have any problems. Where can you learn more? Go to http://manuel-amadeo.info/  Enter F104 in the search box to learn more about \"Sickle Cell Crisis: Care Instructions. \"  Current as of: November 8, 1343               JXPBCKI Version: 12.5  © 0481-7398 Healthwise, Incorporated. Care instructions adapted under license by [employer] (which disclaims liability or warranty for this information). If you have questions about a medical condition or this instruction, always ask your healthcare professional. [address] any warranty or liability for your use of this information.

## 2020-07-04 NOTE — ED PROVIDER NOTES
EMERGENCY DEPARTMENT HISTORY AND PHYSICAL EXAM  This was created with voice recognition software and transcription errors may be present. 7:43 AM  Date: 7/4/2020  Patient Name: Luz Ambrocio    History of Presenting Illness     Chief Complaint:    History Provided By:     HPI: Luz Ambrocio is a 37 y.o. male past medical history of hypokalemia hyponatremia leukocytosis sickle cell disease who presents with sickle cell crisis. Patient notes standard pain to back and legs. No fever no chills no cough no sick contacts. Patient states this is a standard exacerbation of his sickle cell disease    PCP: Jolly Rausch MD      Past History     Past Medical History:  Past Medical History:   Diagnosis Date    B12 deficiency 03/15/2010    210    Cholelithiasis 09/17/2012    U/S Result: Cholelithiasis    Elevated alkaline phosphatase level 03/21/2010    158    Elevated ALT measurement 03/21/2010    71    Elevated AST (SGOT) 03/10/2012    38    Elevated platelet count 86/89/2445    494    Elevated total protein 12/27/2011    8.7    History of blood transfusion     Hypocalcemia 07/06/2011    4.1    Hypokalemia     Hyponatremia 11/18/2009    Leukocytosis 11/16/2009    Microcytic anemia 10/25/2007    Pleural effusion on right 07/15/2015    [facility] CXR Result    Reticulocytosis 10/25/2007    7.8    Serum total bilirubin elevated 12/27/2011    T.B. 3.8, D.B. 0.4.     Sickle cell anemia with crisis ([facility]     Dr. Magi Chase    Vitamin D deficiency 01/20/2016    5.4       Past Surgical History:  Past Surgical History:   Procedure Laterality Date    HX CHOLECYSTECTOMY         Family History:  Family History   Problem Relation Age of Onset    Cancer Neg Hx     Diabetes Neg Hx     Heart Disease Neg Hx     Heart Attack Neg Hx     Hypertension Neg Hx     Stroke Neg Hx        Social History:  Social History     Tobacco Use    Smoking status: Never Smoker    Smokeless tobacco: Never Used Substance Use Topics    Alcohol use: Yes     Comment: Occasional    Drug use: No       Allergies: Allergies   Allergen Reactions    Eggshell Membrane Nausea and Vomiting    Milk Nausea and Vomiting     Pt stated only milk causes him to vomit, but usually eats other foods containing dairy/milk. Review of Systems     Review of Systems   All other systems reviewed and are negative. 10 point review of systems otherwise negative unless noted in HPI. Physical Exam       Physical Exam  Constitutional:       Appearance: He is well-developed. HENT:      Head: Normocephalic and atraumatic. Eyes:      Pupils: Pupils are equal, round, and reactive to light. Neck:      Musculoskeletal: Normal range of motion and neck supple. Cardiovascular:      Rate and Rhythm: Normal rate and regular rhythm. Heart sounds: Normal heart sounds. No murmur. No friction rub. Pulmonary:      Effort: Pulmonary effort is normal. No respiratory distress. Breath sounds: Normal breath sounds. No wheezing. Abdominal:      General: There is no distension. Palpations: Abdomen is soft. Tenderness: There is no abdominal tenderness. There is no guarding or rebound. Musculoskeletal: Normal range of motion. Skin:     General: Skin is warm and dry. Neurological:      Mental Status: He is alert and oriented to person, place, and time. Psychiatric:         Behavior: Behavior normal.         Thought Content: Thought content normal.         Diagnostic Study Results     Vital Signs  EKG:  Labs: CBC noted, chem is normal, reticu mildly elevated  Imaging:     Medical Decision Making     ED Course: Progress Notes, Reevaluation, and Consults:      Provider Notes (Medical Decision Making): 28-year-old gentleman presents with acute sickle cell crisis.   Will treat for pain and check basic labs and reticulocyte count    Pt feeling better, ready to go         Diagnosis     Clinical Impression: No diagnosis found.    Disposition:    Patient's Medications   Start Taking    No medications on file   Continue Taking    FENTANYL (DURAGESIC) 25 MCG/HR PATCH    1 Patch by TransDERmal route every seventy-two (72) hours. Max Daily Amount: 1 Patch. FOLIC ACID (FOLVITE) 1 MG TABLET    Take 1 Tab by mouth daily. HYDROMORPHONE (DILAUDID) 2 MG TABLET    Take 2 mg by mouth every four (4) hours as needed for Pain. HYDROXYUREA (HYDREA) 500 MG CAPSULE    Take 500 mg by mouth two (2) times a day.  Indications: SICKLE CELL ANEMIA WITH CRISIS   These Medications have changed    No medications on file   Stop Taking    No medications on file

## 2020-07-04 NOTE — ED TRIAGE NOTES
Patient A/O x 4, complaining of sickle cell pain to lower back and legs. Patient denies chest pain, SOB, N/V, abdominal pain.

## 2020-08-05 ENCOUNTER — HOSPITAL ENCOUNTER (EMERGENCY)
Age: 40
Discharge: LEFT BEFORE DISCHARGE | End: 2020-08-05
Attending: EMERGENCY MEDICINE
Payer: MEDICARE

## 2020-08-05 VITALS
DIASTOLIC BLOOD PRESSURE: 64 MMHG | TEMPERATURE: 98.3 F | BODY MASS INDEX: 24.41 KG/M2 | WEIGHT: 180 LBS | SYSTOLIC BLOOD PRESSURE: 122 MMHG | RESPIRATION RATE: 16 BRPM | HEART RATE: 70 BPM

## 2020-08-05 DIAGNOSIS — D57.00 ACUTE SICKLE CELL CRISIS (HCC): Primary | ICD-10-CM

## 2020-08-05 PROCEDURE — 99281 EMR DPT VST MAYX REQ PHY/QHP: CPT

## 2020-08-05 RX ORDER — KETOROLAC TROMETHAMINE 15 MG/ML
15 INJECTION, SOLUTION INTRAMUSCULAR; INTRAVENOUS ONCE
Status: DISCONTINUED | OUTPATIENT
Start: 2020-08-05 | End: 2020-08-05 | Stop reason: HOSPADM

## 2020-08-05 RX ORDER — HYDROMORPHONE HYDROCHLORIDE 2 MG/ML
2 INJECTION, SOLUTION INTRAMUSCULAR; INTRAVENOUS; SUBCUTANEOUS ONCE
Status: DISCONTINUED | OUTPATIENT
Start: 2020-08-05 | End: 2020-08-05 | Stop reason: HOSPADM

## 2020-08-05 NOTE — ED PROVIDER NOTES
Date: 8/5/2020  Patient Name: Adenike Hooper    History of Presenting Illness     Chief Complaint   Patient presents with    Sickle Cell Crisis       History Provided By: Patient    HPI/Chief Complaint: (Context):who presents with chief complaint of left shoulder and back pain diffuse back pain history of the same states this is exactly how his sickle crisis is he was admitted about 10 days ago for the same at another hospital  Patient denies any fever chills cough congestion chest pain shortness of breath or abdominal pain no cold exposure  No abdominal pain no vomiting no diarrhea no focal arm or leg weakness  No dysuria  Patient states he has been dealing this for a very long time he has a hematologist he has an appointment next week discharge was couple days ago when he started to feel symptoms again he has a Dilaudid and fentanyl patch at home but it is not helping this is a breakthrough pain. He has no hip pain no abdominal pain no history of avascular necrosis or any acute chest syndrome or any asplenia. No one sick at home otherwise patient's pain is constant patient has no radiation patient has no acute alleviate exacerbating factors. Pain is sharp in nature  ===  : Patient's lab from 7/4/2020 show hemoglobin 8.2 and around baseline    Associated Symptoms:   ---------  Patient's triage note is reviewed at 7:56 AM  Patient with IVA level three-car arrival  Patient with the sickle cell crisis  Vitals are stable in the emergency department patient's triage note with sickle pain left arm back and legs.   Patient's allergy to egg shell and milk  Patient's home medication include fentanyl folic acid Dilaudid hydroxyurea  Patient's medical history includes hyponatremia leukocytosis sickle cell elevated LFTs hypokalemia pleural effusion on right cholelithiasis  Surgical history cholecystectomy  Social history is no smoking occasional alcohol no drug use  Patient's chart review  Patient's last visit on 32 for sickle cell crisis with [facility] hospital  Patient on 7/9/2020 for [facility] visit for sickle cell crisis  Patient sickle cell crisis 7/4/2020      PCP: Belia Ruiz MD    Current Outpatient Medications   Medication Sig Dispense Refill    HYDROmorphone (Dilaudid) 2 mg tablet Take 2 mg by mouth every four (4) hours as needed for Pain.  folic acid (FOLVITE) 1 mg tablet Take 1 Tab by mouth daily. 30 Tab 0    hydroxyurea (HYDREA) 500 mg capsule Take 500 mg by mouth two (2) times a day. Indications: SICKLE CELL ANEMIA WITH CRISIS      fentaNYL (DURAGESIC) 25 mcg/hr PATCH 1 Patch by TransDERmal route every seventy-two (72) hours. Max Daily Amount: 1 Patch. 5 Patch 0       Past History     Past Medical History:  Past Medical History:   Diagnosis Date    B12 deficiency 03/15/2010    210    Cholelithiasis 09/17/2012    U/S Result: Cholelithiasis    Elevated alkaline phosphatase level 03/21/2010    158    Elevated ALT measurement 03/21/2010    71    Elevated AST (SGOT) 03/10/2012    38    Elevated platelet count 19/51/2163    494    Elevated total protein 12/27/2011    8.7    History of blood transfusion     Hypocalcemia 07/06/2011    4.1    Hypokalemia     Hyponatremia 11/18/2009    Leukocytosis 11/16/2009    Microcytic anemia 10/25/2007    Pleural effusion on right 07/15/2015    [facility] CXR Result    Reticulocytosis 10/25/2007    7.8    Serum total bilirubin elevated 12/27/2011    T.B. 3.8, D.B. 0.4.     Sickle [facility] with crisis [facility]     Dr. Yuliya Pierson.  [place]    Vitamin D deficiency 01/20/2016    5.4       Past Surgical History:  Past Surgical History:   Procedure Laterality Date    HX CHOLECYSTECTOMY         Family History:  Family History   Problem Relation Age of Onset    Cancer Neg Hx     Diabetes Neg Hx     Heart Disease Neg Hx     Heart Attack Neg Hx     Hypertension Neg Hx     Stroke Neg Hx        Social History:  Social History     Tobacco Use    Smoking status: Never Smoker    Smokeless tobacco: Never Used   Substance Use Topics    Alcohol use: Yes     Comment: Occasional    Drug use: No       Allergies: Allergies   Allergen Reactions    Eggshell Membrane Nausea and Vomiting    Milk Nausea and Vomiting     Pt stated only milk causes him to vomit, but usually eats other foods containing dairy/milk. Review of Systems   Review of Systems   Constitutional: Negative for activity change, fatigue and fever. HENT: Negative for congestion and rhinorrhea. Eyes: Negative for visual disturbance. Respiratory: Negative for shortness of breath. Cardiovascular: Negative for chest pain and palpitations. Gastrointestinal: Negative for abdominal pain, diarrhea, nausea and vomiting. Genitourinary: Negative for dysuria and hematuria. Musculoskeletal: Positive for arthralgias, back pain and myalgias. Negative for gait problem, joint swelling, neck pain and neck stiffness. Skin: Negative for rash. Neurological: Negative for dizziness, weakness and light-headedness. Psychiatric/Behavioral: Negative for agitation. All other systems reviewed and are negative. Physical Exam     Physical Exam  Constitutional:       Appearance: He is well-developed. HENT:      Head: Normocephalic and atraumatic. Right Ear: External ear normal.      Left Ear: External ear normal.      Mouth/Throat:      Mouth: Mucous membranes are moist.   Eyes:      Conjunctiva/sclera: Conjunctivae normal.      Pupils: Pupils are equal, round, and reactive to light. Neck:      Musculoskeletal: Normal range of motion and neck supple. Cardiovascular:      Rate and Rhythm: Normal rate and regular rhythm. Pulmonary:      Effort: Pulmonary effort is normal.      Breath sounds: Normal breath sounds. Abdominal:      General: Bowel sounds are normal.      Palpations: Abdomen is soft. Musculoskeletal: Normal range of motion. Lymphadenopathy:      Cervical: No cervical adenopathy. Skin:     General: Skin is warm. Capillary Refill: Capillary refill takes less than 2 seconds. Neurological:      General: No focal deficit present. Mental Status: He is alert. Mental status is at baseline. Psychiatric:         Mood and Affect: Mood normal.         Medical Decision Making   I am the first provider for this patient. I reviewed the vital signs, available nursing notes, past medical history, past surgical history, family history and social history. Provider Notes (Medical Decision Making): Patient with acute left shoulder and diffuse back pain. History of the same with a sickle crisis  Recent discharge for the same 2 days ago patient states he was admitted for 7 days has been given fentanyl and Dilaudid he does not feel it is working  He has discussion with his oncologist/hematologist  Was that if the pain is not in control he is to go to the emergency department  Patient states been happening frequently does not have any other severe illness no cold exposure no fever no vomiting no diarrhea  Check patient's basic labs although in prior patient's hemoglobin has been at baseline  I will discuss with the oncology/hematology physician as well for further management  Patient is getting pain management in the emergency department. Vital Signs-Reviewed the patient's vital signs. Pulse Oximetry Analysis -patient does not have a pulse ox  There is no pulse ox documented prior to patient leaving the emergency department.              Vitals:    08/05/20 0630   BP: 122/64   Pulse: 70   Resp: 16   Temp: 98.3 °F (36.8 °C)   Weight: 81.6 kg (180 lb)       Records Reviewed: Nursing Notes and Old Medical Records    ED Course:   9:05 AM  I discussed the information with the hematology oncology physician at 9 AM  She stated the patient does get dehydrated when is tried to try some hydration he should have his narcotic medications at home  She will follow him and finds him to be very reliable otherwise. We will communicate all this information with the patient and reevaluation after his medications and hydration  ---     I attempted to reevaluate patient, labs are pending  I was told the patient left the department  We attempted to call him back in have the opportunity speak with the patient  ---        Diagnostic Study Results     Orders Placed This Encounter    CBC WITH AUTOMATED DIFF     Standing Status:   Standing     Number of Occurrences:   1    METABOLIC PANEL, COMPREHENSIVE     Standing Status:   Standing     Number of Occurrences:   1    RETICULOCYTE COUNT     Standing Status:   Standing     Number of Occurrences:   1    DISCONTD: HYDROmorphone (DILAUDID) injection 2 mg    DISCONTD: ketorolac (TORADOL) injection 15 mg    DISCONTD: sodium chloride 0.9 % bolus infusion 1,000 mL       Labs -   No results found for this or any previous visit (from the past 12 hour(s)). Radiologic Studies -   No orders to display     CT Results  (Last 48 hours)    None        CXR Results  (Last 48 hours)    None              Discharge     Clinical Impression:   1.  Acute sickle cell crisis ([facility]        Disposition:    eloped    It should be noted that I will be the provider of record for this patient  Samantha Forrest MD      Follow-up Information    None         Discharge Medication List as of 8/5/2020 12:21 PM

## 2021-03-08 ENCOUNTER — TRANSCRIBE ORDER (OUTPATIENT)
Dept: SCHEDULING | Age: 41
End: 2021-03-08

## 2021-03-08 DIAGNOSIS — I10 HTN (HYPERTENSION): Primary | ICD-10-CM

## 2021-05-22 ENCOUNTER — HOSPITAL ENCOUNTER (EMERGENCY)
Age: 41
Discharge: HOME | End: 2021-05-22
Attending: EMERGENCY MEDICINE
Payer: MEDICARE

## 2021-05-22 VITALS
TEMPERATURE: 98.3 F | OXYGEN SATURATION: 100 % | SYSTOLIC BLOOD PRESSURE: 112 MMHG | RESPIRATION RATE: 14 BRPM | DIASTOLIC BLOOD PRESSURE: 66 MMHG | HEART RATE: 72 BPM

## 2021-05-22 DIAGNOSIS — D57.00 SICKLE CELL PAIN CRISIS (HCC): Primary | ICD-10-CM

## 2021-05-22 LAB
ALBUMIN SERPL-MCNC: 4.1 G/DL (ref 3.4–5)
ALBUMIN/GLOB SERPL: 0.8 {RATIO} (ref 0.8–1.7)
ALP SERPL-CCNC: 170 U/L (ref 45–117)
ALT SERPL-CCNC: 46 U/L (ref 16–61)
ANION GAP SERPL CALC-SCNC: 2 MMOL/L (ref 3–18)
AST SERPL-CCNC: 60 U/L (ref 10–38)
BASOPHILS # BLD: 0.2 K/UL (ref 0–0.1)
BASOPHILS NFR BLD: 1 % (ref 0–2)
BILIRUB SERPL-MCNC: 4.1 MG/DL (ref 0.2–1)
BUN SERPL-MCNC: 11 MG/DL (ref 7–18)
BUN/CREAT SERPL: 12 (ref 12–20)
CALCIUM SERPL-MCNC: 8.3 MG/DL (ref 8.5–10.1)
CHLORIDE SERPL-SCNC: 108 MMOL/L (ref 100–111)
CO2 SERPL-SCNC: 26 MMOL/L (ref 21–32)
CREAT SERPL-MCNC: 0.94 MG/DL (ref 0.6–1.3)
DIFFERENTIAL METHOD BLD: ABNORMAL
EOSINOPHIL # BLD: 1.2 K/UL (ref 0–0.4)
EOSINOPHIL NFR BLD: 8 % (ref 0–5)
ERYTHROCYTE [DISTWIDTH] IN BLOOD BY AUTOMATED COUNT: 24.4 % (ref 11.6–14.5)
GLOBULIN SER CALC-MCNC: 5.2 G/DL (ref 2–4)
GLUCOSE SERPL-MCNC: 127 MG/DL (ref 74–99)
HCT VFR BLD AUTO: 26.3 % (ref 36–48)
HGB BLD-MCNC: 8.8 G/DL (ref 13–16)
LYMPHOCYTES # BLD: 6.6 K/UL (ref 0.9–3.6)
LYMPHOCYTES NFR BLD: 44 % (ref 21–52)
MCH RBC QN AUTO: 24.1 PG (ref 24–34)
MCHC RBC AUTO-ENTMCNC: 33.5 G/DL (ref 31–37)
MCV RBC AUTO: 72.1 FL (ref 74–97)
MONOCYTES # BLD: 0.5 K/UL (ref 0.05–1.2)
MONOCYTES NFR BLD: 3 % (ref 3–10)
NEUTS SEG # BLD: 6.5 K/UL (ref 1.8–8)
NEUTS SEG NFR BLD: 44 % (ref 40–73)
PLATELET # BLD AUTO: 452 K/UL (ref 135–420)
PLATELET COMMENTS,PCOM: ABNORMAL
PMV BLD AUTO: 9.6 FL (ref 9.2–11.8)
POTASSIUM SERPL-SCNC: 3.8 MMOL/L (ref 3.5–5.5)
PROT SERPL-MCNC: 9.3 G/DL (ref 6.4–8.2)
RBC # BLD AUTO: 3.65 M/UL (ref 4.35–5.65)
RBC MORPH BLD: ABNORMAL
RETICS/RBC NFR AUTO: 6.2 % (ref 0.5–2.5)
SODIUM SERPL-SCNC: 136 MMOL/L (ref 136–145)
WBC # BLD AUTO: 15 K/UL (ref 4.6–13.2)

## 2021-05-22 PROCEDURE — 99281 EMR DPT VST MAYX REQ PHY/QHP: CPT

## 2021-05-22 PROCEDURE — 85025 COMPLETE CBC W/AUTO DIFF WBC: CPT

## 2021-05-22 PROCEDURE — 96375 TX/PRO/DX INJ NEW DRUG ADDON: CPT

## 2021-05-22 PROCEDURE — 80053 COMPREHEN METABOLIC PANEL: CPT

## 2021-05-22 PROCEDURE — 85045 AUTOMATED RETICULOCYTE COUNT: CPT

## 2021-05-22 PROCEDURE — 74011250636 HC RX REV CODE- 250/636: Performed by: EMERGENCY MEDICINE

## 2021-05-22 PROCEDURE — 74011250636 HC RX REV CODE- 250/636: Performed by: PHYSICIAN ASSISTANT

## 2021-05-22 PROCEDURE — 96374 THER/PROPH/DIAG INJ IV PUSH: CPT

## 2021-05-22 RX ORDER — HYDROMORPHONE HYDROCHLORIDE 1 MG/ML
1 INJECTION, SOLUTION INTRAMUSCULAR; INTRAVENOUS; SUBCUTANEOUS
Status: COMPLETED | OUTPATIENT
Start: 2021-05-22 | End: 2021-05-22

## 2021-05-22 RX ORDER — KETOROLAC TROMETHAMINE 15 MG/ML
15 INJECTION, SOLUTION INTRAMUSCULAR; INTRAVENOUS
Status: COMPLETED | OUTPATIENT
Start: 2021-05-22 | End: 2021-05-22

## 2021-05-22 RX ADMIN — HYDROMORPHONE HYDROCHLORIDE 1 MG: 1 INJECTION, SOLUTION INTRAMUSCULAR; INTRAVENOUS; SUBCUTANEOUS at 09:12

## 2021-05-22 RX ADMIN — KETOROLAC TROMETHAMINE 15 MG: 15 INJECTION, SOLUTION INTRAMUSCULAR; INTRAVENOUS at 09:10

## 2021-05-22 RX ADMIN — SODIUM CHLORIDE 1000 ML: 900 INJECTION, SOLUTION INTRAVENOUS at 07:38

## 2021-05-22 NOTE — LETTER
[address] [place] [address] Dr [facility] EMERGENCY DEPT 
[address] [facility] [address] [phone] Work/School Note Date: 5/22/2021 To Whom It May concern: 
 
Estelle Perry was seen and treated today in the emergency room by the following provider(s): 
Attending Provider: Miladsi Elizabeth MD 
Physician Assistant: Valentin Bradford. Estelle Perry may return to work on 5/24/2021.  
 
Sincerely, 
 
 
 
 
PHANI Price

## 2021-05-22 NOTE — ED PROVIDER NOTES
EMERGENCY DEPARTMENT HISTORY AND PHYSICAL EXAM    Date: 5/22/2021  Patient Name: Blake Preston    History of Presenting Illness     Chief Complaint:   Chief Complaint   Patient presents with    Sickle Cell Crisis     History Provided By: Patient    Additional History (Context): Blake Preston is a 41 y.o. male with history of sickle cell disease usually well controlled with home medications, Dilaudid 2 mg, fentanyl patch, hydroxyurea and folic acid, presents to ED ambulatory complaining of pain in lower back and bilateral extremities x1-2 days, pain is similar to prior sickle cell pains. Patient denies any chest pain, fevers, SOB. Denies abdominal pain, bowel/urinary symptoms. Last fentanyl patch changed yesterday. Patient's wife drove him to ER. PCP /hematologist: Carlos Rollins MD    Current Outpatient Medications   Medication Sig Dispense Refill    HYDROmorphone (Dilaudid) 2 mg tablet Take 2 mg by mouth every four (4) hours as needed for Pain.  folic acid (FOLVITE) 1 mg tablet Take 1 Tab by mouth daily. 30 Tab 0    hydroxyurea (HYDREA) 500 mg capsule Take 500 mg by mouth two (2) times a day. Indications: SICKLE CELL ANEMIA WITH CRISIS      fentaNYL (DURAGESIC) 25 mcg/hr PATCH 1 Patch by TransDERmal route every seventy-two (72) hours. Max Daily Amount: 1 Patch.  5 Patch 0       Past History     Past Medical History:  Past Medical History:   Diagnosis Date    B12 deficiency 03/15/2010    210    Cholelithiasis 09/17/2012    U/S Result: Cholelithiasis    Elevated alkaline phosphatase level 03/21/2010    158    Elevated ALT measurement 03/21/2010    71    Elevated AST (SGOT) 03/10/2012    38    Elevated platelet count 22/01/9864    494    Elevated total protein 12/27/2011    8.7    History of blood transfusion     Hypocalcemia 07/06/2011    4.1    Hypokalemia     Hyponatremia 11/18/2009    Leukocytosis 11/16/2009    Microcytic anemia 10/25/2007    Pleural effusion on right 07/15/2015    [facility] CXR Result    Reticulocytosis 10/25/2007    7.8    Serum total bilirubin elevated 12/27/2011    T.B. 3.8, D.B. 0.4.     Sickle cell anemia with crisis ([facility]     Dr. Kristin Sousa. Damle    Vitamin D deficiency 01/20/2016    5.4       Past Surgical History:  Past Surgical History:   Procedure Laterality Date    HX CHOLECYSTECTOMY         Family History:  Family History   Problem Relation Age of Onset    Cancer Neg Hx     Diabetes Neg Hx     Heart Disease Neg Hx     Heart Attack Neg Hx     Hypertension Neg Hx     Stroke Neg Hx        Social History:  Social History     Tobacco Use    Smoking status: Never Smoker    Smokeless tobacco: Never Used   Substance Use Topics    Alcohol use: Yes     Comment: Occasional    Drug use: No       Allergies: Allergies   Allergen Reactions    Eggshell Membrane Nausea and Vomiting    Milk Nausea and Vomiting     Pt stated only milk causes him to vomit, but usually eats other foods containing dairy/milk. Review of Systems   Review of Systems  All Other Systems Negative  Constitutional: Negative for fever. HENT: Negative for congestion. Eyes: Negative for visual disturbance. Respiratory: Negative for cough. Cardiovascular: Negative for chest pain and leg swelling. Gastrointestinal: Negative for nausea, vomiting and abdominal pain. Genitourinary: Negative for frequency, hematuria and flank pain. Musculoskeletal: Positive for back pain, extremities pain. Skin: Negative for rash. Neurological: Negative for light-headedness and headaches. Physical Exam     Vitals:    05/22/21 0724   BP: 112/66   Pulse: 72   Resp: 14   Temp: 98.3 °F (36.8 °C)   SpO2: 100%     Physical Exam     Vitals signs and nursing note reviewed. Constitutional:   Appearance: Normal appearance. HENT:   Head: Normocephalic and atraumatic. Mouth/Throat:   Pharynx: Oropharynx is clear. Eyes:   Extraocular Movements: Extraocular movements intact. Comments: Icteric conjunctiva (  Neck:   Musculoskeletal: Normal range of motion. Cardiovascular:   Rate and Rhythm: Normal rate. Pulmonary:   Effort: No respiratory distress. Breath sounds: Normal breath sounds. Abdominal:   General: There is no distension. Palpations: Abdomen is soft. Tenderness: There is no abdominal tenderness. Musculoskeletal: Normal range of motion. Comments: No midline tenderness, full range of motion of all extremities. No swelling, redness, or deformity. Skin:  General: Skin is warm. Neurological:   General: No focal deficit present. Mental Status: He is alert. Psychiatric:   Mood and Affect: Mood normal.         Diagnostic Study Results     Labs -     Recent Results (from the past 12 hour(s))   METABOLIC PANEL, COMPREHENSIVE    Collection Time: 05/22/21  7:35 AM   Result Value Ref Range    Sodium 136 136 - 145 mmol/L    Potassium 3.8 3.5 - 5.5 mmol/L    Chloride 108 100 - 111 mmol/L    CO2 26 21 - 32 mmol/L    Anion gap 2 (L) 3.0 - 18 mmol/L    Glucose 127 (H) 74 - 99 mg/dL    BUN 11 7.0 - 18 MG/DL    Creatinine 0.94 0.6 - 1.3 MG/DL    BUN/Creatinine ratio 12 12 - 20      GFR est AA >60 >60 ml/min/1.73m2    GFR est non-AA >60 >60 ml/min/1.73m2    Calcium 8.3 (L) 8.5 - 10.1 MG/DL    Bilirubin, total 4.1 (H) 0.2 - 1.0 MG/DL    ALT (SGPT) 46 16 - 61 U/L    AST (SGOT) 60 (H) 10 - 38 U/L    Alk.  phosphatase 170 (H) 45 - 117 U/L    Protein, total 9.3 (H) 6.4 - 8.2 g/dL    Albumin 4.1 3.4 - 5.0 g/dL    Globulin 5.2 (H) 2.0 - 4.0 g/dL    A-G Ratio 0.8 0.8 - 1.7     CBC WITH AUTOMATED DIFF    Collection Time: 05/22/21  7:35 AM   Result Value Ref Range    WBC 15.0 (H) 4.6 - 13.2 K/uL    RBC 3.65 (L) 4.35 - 5.65 M/uL    HGB 8.8 (L) 13.0 - 16.0 g/dL    HCT 26.3 (L) 36.0 - 48.0 %    MCV 72.1 (L) 74.0 - 97.0 FL    MCH 24.1 24.0 - 34.0 PG    MCHC 33.5 31.0 - 37.0 g/dL    RDW 24.4 (H) 11.6 - 14.5 %    PLATELET 120 (H) 721 - 420 K/uL    MPV 9.6 9.2 - 11.8 FL    NEUTROPHILS 44 40 - 73 %    LYMPHOCYTES 44 21 - 52 %    MONOCYTES 3 3 - 10 %    EOSINOPHILS 8 (H) 0 - 5 %    BASOPHILS 1 0 - 2 %    ABS. NEUTROPHILS 6.5 1.8 - 8.0 K/UL    ABS. LYMPHOCYTES 6.6 (H) 0.9 - 3.6 K/UL    ABS. MONOCYTES 0.5 0.05 - 1.2 K/UL    ABS. EOSINOPHILS 1.2 (H) 0.0 - 0.4 K/UL    ABS. BASOPHILS 0.2 (H) 0.0 - 0.1 K/UL    DF MANUAL      PLATELET COMMENTS ADEQUATE PLATELETS      RBC COMMENTS TARGET CELLS  1+        RBC COMMENTS SICKLE CELLS  3+        RBC COMMENTS POIKILOCYTOSIS  3+        RBC COMMENTS SCHISTOCYTES  1+        RBC COMMENTS OVALOCYTES  1+        RBC COMMENTS POLYCHROMASIA  1+        RBC COMMENTS HYPOCHROMIA  1+        RBC COMMENTS MICROCYTOSIS  1+        RBC COMMENTS ANISOCYTOSIS  2+        RBC COMMENTS 3 NRBC    RETICULOCYTE COUNT    Collection Time: 05/22/21  7:35 AM   Result Value Ref Range    Reticulocyte count 6.2 (H) 0.5 - 2.5 %       Radiologic Studies -   No orders to display     CT Results  (Last 48 hours)    None            Medical Decision Making   I am the first provider for this patient. I reviewed the vital signs, available nursing notes, past medical history, past surgical history, family history and social history. Vital Signs-Reviewed the patient's vital signs. Records Reviewed: Nursing Notes, Old Medical Records and Previous Laboratory Studies    Procedures:  Procedures    Provider Notes (Medical Decision Making):     Well-appearing patient presents for evaluation of sickle cell pain. He is exam is benign, he does have icterus at baseline without changes. VSS. Labs are no different from baseline, in fact, a little better than before, and anemia noted, Hgb 8.8 is the highest since Feb 2020, reticulocyte count 6.2, the lowest in the past 5 visits. Patient was hydrated, pain is controlled with Toradol and Dilaudid. Patient is feeling better and is ready for discharge. Work letter provided. MED RECONCILIATION:  No current facility-administered medications for this encounter. Current Outpatient Medications   Medication Sig    HYDROmorphone (Dilaudid) 2 mg tablet Take 2 mg by mouth every four (4) hours as needed for Pain.  folic acid (FOLVITE) 1 mg tablet Take 1 Tab by mouth daily.  hydroxyurea (HYDREA) 500 mg capsule Take 500 mg by mouth two (2) times a day. Indications: SICKLE CELL ANEMIA WITH CRISIS    fentaNYL (DURAGESIC) 25 mcg/hr PATCH 1 Patch by TransDERmal route every seventy-two (72) hours. Max Daily Amount: 1 Patch. Disposition:  home    DISCHARGE NOTE:     Pt has been reexamined. Improved dramatically. Patient has no new complaints, changes, or physical findings. Care plan outlined and precautions discussed. Results of labs were reviewed with the patient. All medications were reviewed with the patient; will d/c home. All of pt's questions and concerns were addressed. Patient was instructed and agrees to follow up with hematologist, as well as to return to the ED upon further deterioration. Patient is ready to go home. Follow-up Information     Follow up With Specialties Details Why Contact Info    Saint Books, MD Hematology and Oncology, Hematology, Oncology Schedule an appointment as soon as possible for a visit  for recheck of current symptoms by your HEMATOLOGIST [address] [facility]  [address]  [address] [place] [address] [facility] EMERGENCY DEPT Emergency Medicine  As needed, If symptoms worsen [address]  [phone]          Current Discharge Medication List              Diagnosis     Clinical Impression:   1. Sickle cell pain crisis [facility])          Dictation disclaimer:  Please note that this dictation was completed with [email or web address], the computer voice recognition software. Quite often unanticipated grammatical, syntax, homophones, and other interpretive errors are inadvertently transcribed by the computer software. Please disregard these errors.   Please excuse any errors that have escaped final proofreading.

## 2021-05-25 ENCOUNTER — HOSPITAL ENCOUNTER (OUTPATIENT)
Dept: NON INVASIVE DIAGNOSTICS | Age: 41
Discharge: HOME | End: 2021-05-25
Attending: PHYSICIAN ASSISTANT
Payer: MEDICARE

## 2021-05-25 VITALS
DIASTOLIC BLOOD PRESSURE: 66 MMHG | BODY MASS INDEX: 24.38 KG/M2 | SYSTOLIC BLOOD PRESSURE: 112 MMHG | HEIGHT: 72 IN | WEIGHT: 180 LBS

## 2021-05-25 DIAGNOSIS — I10 ESSENTIAL HYPERTENSION: ICD-10-CM

## 2021-05-25 DIAGNOSIS — I10 HTN (HYPERTENSION): ICD-10-CM

## 2021-05-25 LAB
ECHO AO ASC DIAM: 3.41 CM
ECHO AO ROOT DIAM: 3.41 CM
ECHO LA AREA 4C: 23.93 CM2
ECHO LA VOL 2C: 86.26 ML (ref 18–58)
ECHO LA VOL 4C: 79.67 ML (ref 18–58)
ECHO LA VOL BP: 94.43 ML (ref 18–58)
ECHO LA VOL/BSA BIPLANE: 46.29 ML/M2 (ref 16–28)
ECHO LA VOLUME INDEX A2C: 42.28 ML/M2 (ref 16–28)
ECHO LA VOLUME INDEX A4C: 39.05 ML/M2 (ref 16–28)
ECHO LV INTERNAL DIMENSION DIASTOLIC: 5.63 CM (ref 4.2–5.9)
ECHO LV INTERNAL DIMENSION SYSTOLIC: 4.12 CM
ECHO LV IVSD: 0.94 CM (ref 0.6–1)
ECHO LV MASS 2D: 193.4 G (ref 88–224)
ECHO LV MASS INDEX 2D: 94.8 G/M2 (ref 49–115)
ECHO LV POSTERIOR WALL DIASTOLIC: 0.86 CM (ref 0.6–1)
ECHO LVOT DIAM: 1.98 CM
ECHO LVOT PEAK GRADIENT: 5.46 MMHG
ECHO LVOT PEAK VELOCITY: 116.82 CM/S
ECHO LVOT SV: 73.1 ML
ECHO LVOT VTI: 23.83 CM
ECHO MV A VELOCITY: 62.73 CM/S
ECHO MV E DECELERATION TIME (DT): 176.21 MS
ECHO MV E VELOCITY: 88.78 CM/S
ECHO MV E/A RATIO: 1.42
LVOT MG: 2.73 MMHG

## 2021-05-25 PROCEDURE — 93306 TTE W/DOPPLER COMPLETE: CPT

## 2021-05-25 PROCEDURE — 93306 TTE W/DOPPLER COMPLETE: CPT | Performed by: INTERNAL MEDICINE

## 2021-06-28 NOTE — ED TRIAGE NOTES
Pt c/o sickle cell crisis that started yesterday both legs and lower back. Pt last took pain meds, dilaudid 2mg@ 0500. Shift assessment complete. VSS. Pt complains of belching and chest pain. Nitro administered. Maalox given. Pt reassessed and felt relief. Cpap applied. Pt now asleep. Call light within reach. Will continue to monitor.      Problem: Pain:  Goal: Pain level will decrease  Description: Pain level will decrease  6/28/2021 0019 by Amanda Malik RN  Outcome: Ongoing  6/27/2021 1829 by Tiana Ryan RN  Outcome: Ongoing  Goal: Control of acute pain  Description: Control of acute pain  Outcome: Ongoing  Goal: Control of chronic pain  Description: Control of chronic pain  Outcome: Ongoing     Problem: Falls - Risk of:  Goal: Will remain free from falls  Description: Will remain free from falls  6/28/2021 0019 by Amanda Malik RN  Outcome: Ongoing  6/27/2021 1829 by Tiana Ryan RN  Outcome: Ongoing  Goal: Absence of physical injury  Description: Absence of physical injury  Outcome: Ongoing

## 2022-01-16 ENCOUNTER — HOSPITAL ENCOUNTER (EMERGENCY)
Age: 42
Discharge: HOME | End: 2022-01-16
Attending: EMERGENCY MEDICINE
Payer: MEDICARE

## 2022-01-16 VITALS
TEMPERATURE: 98.7 F | HEIGHT: 72 IN | HEART RATE: 65 BPM | WEIGHT: 194 LBS | RESPIRATION RATE: 18 BRPM | BODY MASS INDEX: 26.28 KG/M2 | DIASTOLIC BLOOD PRESSURE: 77 MMHG | SYSTOLIC BLOOD PRESSURE: 124 MMHG | OXYGEN SATURATION: 94 %

## 2022-01-16 DIAGNOSIS — D57.00 ACUTE SICKLE CELL CRISIS (HCC): Primary | ICD-10-CM

## 2022-01-16 LAB
ALBUMIN SERPL-MCNC: 4 G/DL (ref 3.4–5)
ALBUMIN/GLOB SERPL: 0.9 {RATIO} (ref 0.8–1.7)
ALP SERPL-CCNC: 139 U/L (ref 45–117)
ALT SERPL-CCNC: 33 U/L (ref 16–61)
ANION GAP SERPL CALC-SCNC: 5 MMOL/L (ref 3–18)
AST SERPL-CCNC: 48 U/L (ref 10–38)
BASOPHILS # BLD: 0.2 K/UL (ref 0–0.1)
BASOPHILS NFR BLD: 2 % (ref 0–2)
BILIRUB SERPL-MCNC: 3.9 MG/DL (ref 0.2–1)
BUN SERPL-MCNC: 7 MG/DL (ref 7–18)
BUN/CREAT SERPL: 9 (ref 12–20)
CALCIUM SERPL-MCNC: 8.7 MG/DL (ref 8.5–10.1)
CHLORIDE SERPL-SCNC: 112 MMOL/L (ref 100–111)
CO2 SERPL-SCNC: 24 MMOL/L (ref 21–32)
CREAT SERPL-MCNC: 0.79 MG/DL (ref 0.6–1.3)
DIFFERENTIAL METHOD BLD: ABNORMAL
EOSINOPHIL # BLD: 0.4 K/UL (ref 0–0.4)
EOSINOPHIL NFR BLD: 4 % (ref 0–5)
ERYTHROCYTE [DISTWIDTH] IN BLOOD BY AUTOMATED COUNT: 23.7 % (ref 11.6–14.5)
GLOBULIN SER CALC-MCNC: 4.5 G/DL (ref 2–4)
GLUCOSE SERPL-MCNC: 94 MG/DL (ref 74–99)
HCT VFR BLD AUTO: 25.2 % (ref 36–48)
HGB BLD-MCNC: 8.3 G/DL (ref 13–16)
IMM GRANULOCYTES # BLD AUTO: 0 K/UL (ref 0–0.04)
IMM GRANULOCYTES NFR BLD AUTO: 0 % (ref 0–0.5)
LYMPHOCYTES # BLD: 3.1 K/UL (ref 0.9–3.6)
LYMPHOCYTES NFR BLD: 28 % (ref 21–52)
MCH RBC QN AUTO: 23.5 PG (ref 24–34)
MCHC RBC AUTO-ENTMCNC: 32.9 G/DL (ref 31–37)
MCV RBC AUTO: 71.4 FL (ref 78–100)
MONOCYTES # BLD: 0.7 K/UL (ref 0.05–1.2)
MONOCYTES NFR BLD: 6 % (ref 3–10)
NEUTS SEG # BLD: 6.8 K/UL (ref 1.8–8)
NEUTS SEG NFR BLD: 60 % (ref 40–73)
NRBC # BLD: 0.2 K/UL (ref 0–0.01)
NRBC BLD-RTO: 2 PER 100 WBC
PLATELET # BLD AUTO: 410 K/UL (ref 135–420)
PLATELET COMMENTS,PCOM: ABNORMAL
PMV BLD AUTO: 9.5 FL (ref 9.2–11.8)
POTASSIUM SERPL-SCNC: 3.7 MMOL/L (ref 3.5–5.5)
PROT SERPL-MCNC: 8.5 G/DL (ref 6.4–8.2)
RBC # BLD AUTO: 3.53 M/UL (ref 4.35–5.65)
RBC MORPH BLD: ABNORMAL
RETICS/RBC NFR AUTO: 4.8 % (ref 0.5–2.5)
SODIUM SERPL-SCNC: 141 MMOL/L (ref 136–145)
WBC # BLD AUTO: 11.2 K/UL (ref 4.6–13.2)

## 2022-01-16 PROCEDURE — 80053 COMPREHEN METABOLIC PANEL: CPT

## 2022-01-16 PROCEDURE — 99281 EMR DPT VST MAYX REQ PHY/QHP: CPT

## 2022-01-16 PROCEDURE — 85045 AUTOMATED RETICULOCYTE COUNT: CPT

## 2022-01-16 PROCEDURE — 85025 COMPLETE CBC W/AUTO DIFF WBC: CPT

## 2022-01-16 RX ORDER — DIPHENHYDRAMINE HYDROCHLORIDE 50 MG/ML
25 INJECTION, SOLUTION INTRAMUSCULAR; INTRAVENOUS
Status: DISCONTINUED | OUTPATIENT
Start: 2022-01-16 | End: 2022-01-16 | Stop reason: HOSPADM

## 2022-01-16 RX ORDER — HYDROMORPHONE HYDROCHLORIDE 1 MG/ML
1 INJECTION, SOLUTION INTRAMUSCULAR; INTRAVENOUS; SUBCUTANEOUS
Status: DISCONTINUED | OUTPATIENT
Start: 2022-01-16 | End: 2022-01-16 | Stop reason: HOSPADM

## 2022-01-16 NOTE — ED PROVIDER NOTES
EMERGENCY DEPARTMENT HISTORY AND PHYSICAL EXAM  This was created with voice recognition software and transcription errors may be present. 2:56 PM  Date: 1/16/2022  Patient Name: Brooks Seals    History of Presenting Illness     Chief Complaint:    History Provided By:     HPI: Brooks Seals is a 35 y.o. male past medical history of sickle cell disease hyponatremia leukocytosis presents with leg and back pain for the past few days consistent with prior sickle cell. Patient is unsure if it is job or the weather that set him off. No fevers or chills no cough is COVID vaccinated. No shortness of breath or nausea or vomiting    PCP: Shiv Orozco MD      Past History     Past Medical History:  Past Medical History:   Diagnosis Date    B12 deficiency 03/15/2010    210    Cholelithiasis 09/17/2012    U/S Result: Cholelithiasis    Elevated alkaline phosphatase level 03/21/2010    158    Elevated ALT measurement 03/21/2010    71    Elevated AST (SGOT) 03/10/2012    38    Elevated platelet count 79/90/0238    494    Elevated total protein 12/27/2011    8.7    History of blood transfusion     Hypocalcemia 07/06/2011    4.1    Hypokalemia     Hyponatremia 11/18/2009    Leukocytosis 11/16/2009    Microcytic anemia 10/25/2007    Pleural effusion on right 07/15/2015    [facility] CXR Result    Reticulocytosis 10/25/2007    7.8    Serum total bilirubin elevated 12/27/2011    T.B. 3.8, D.B. 0.4.     Sickle cell anemia with crisis ([facility] [address]     Dr. Apolinar Jane.  [place]    Vitamin D deficiency 01/20/2016    5.4       Past Surgical History:  Past Surgical History:   Procedure Laterality Date    HX CHOLECYSTECTOMY         Family History:  Family History   Problem Relation Age of Onset    Cancer Neg Hx     Diabetes Neg Hx     Heart Disease Neg Hx     Heart Attack Neg Hx     Hypertension Neg Hx     Stroke Neg Hx        Social History:  Social History     Tobacco Use    Smoking status: Never Smoker  Smokeless tobacco: Never Used   Substance Use Topics    Alcohol use: Yes     Comment: Occasional    Drug use: No       Allergies: Allergies   Allergen Reactions    Eggshell Membrane Nausea and Vomiting    Milk Nausea and Vomiting     Pt stated only milk causes him to vomit, but usually eats other foods containing dairy/milk. Review of Systems     Review of Systems   All other systems reviewed and are negative. 10 point review of systems otherwise negative unless noted in HPI. Physical Exam       Physical Exam  Constitutional:       Appearance: He is well-developed. HENT:      Head: Normocephalic and atraumatic. Eyes:      Pupils: Pupils are equal, round, and reactive to light. Cardiovascular:      Rate and Rhythm: Normal rate and regular rhythm. Heart sounds: Normal heart sounds. No murmur heard. No friction rub. Pulmonary:      Effort: Pulmonary effort is normal. No respiratory distress. Breath sounds: Normal breath sounds. No wheezing. Abdominal:      General: There is no distension. Palpations: Abdomen is soft. Tenderness: There is no abdominal tenderness. There is no guarding or rebound. Musculoskeletal:         General: Normal range of motion. Cervical back: Normal range of motion and neck supple. Skin:     General: Skin is warm and dry. Neurological:      Mental Status: He is alert and oriented to person, place, and time. Psychiatric:         Behavior: Behavior normal.         Thought Content: Thought content normal.         Diagnostic Study Results     Vital Signs    Visit Vitals  /77   Pulse 65   Temp 98.7 °F (37.1 °C)   Resp 18   Ht 6' (1.829 m)   Wt 88 kg (194 lb)   SpO2 94%   BMI 26.31 kg/m²      EKG:  Labs:   Imaging:     Medical Decision Making     ED Course: Progress Notes, Reevaluation, and Consults:    I will be the provider of record for this patient.      Provider Notes (Medical Decision Making): Well-known to me with for sickle cell disease. Have not seen him for quite some time we will check basic retake and CBC also send off chemistry. We will treat with Dilaudid and Benadryl standard medications. Patient has to leave urgently. He states there is a problem with his children and needs to go. I confirmed that the staff here has done nothing to agitate him. He states that his care here has been fine. I have known Mr. Lilly for years and have taken care of him repeatedly and wanted to ensure that he is okay with his medical care here and he assures he is. He will come back if his pain worsens in any way. Diagnosis     Clinical Impression: No diagnosis found. Disposition:        Patient's Medications   Start Taking    No medications on file   Continue Taking    FENTANYL (DURAGESIC) 25 MCG/HR PATCH    1 Patch by TransDERmal route every seventy-two (72) hours. Max Daily Amount: 1 Patch. HYDROMORPHONE (DILAUDID) 2 MG TABLET    Take 2 mg by mouth every four (4) hours as needed for Pain. HYDROXYUREA (HYDREA) 500 MG CAPSULE    Take 500 mg by mouth two (2) times a day. Indications: SICKLE CELL ANEMIA WITH CRISIS    MULTIVITAMIN (ONE A DAY) TABLET    Take 1 Tablet by mouth daily.    These Medications have changed    No medications on file   Stop Taking    No medications on file

## 2022-03-18 PROBLEM — M79.604 BILATERAL LEG PAIN: Status: ACTIVE | Noted: 2017-04-24

## 2022-03-18 PROBLEM — M79.605 BILATERAL LEG PAIN: Status: ACTIVE | Noted: 2017-04-24

## 2022-03-19 PROBLEM — D57.00 SICKLE CELL PAIN CRISIS (HCC): Status: ACTIVE | Noted: 2020-01-08

## 2022-03-20 PROBLEM — E80.6 HYPERBILIRUBINEMIA: Status: ACTIVE | Noted: 2018-04-13

## 2023-05-12 RX ORDER — HYDROXYUREA 500 MG/1
CAPSULE ORAL 2 TIMES DAILY
COMMUNITY

## 2023-05-12 RX ORDER — FENTANYL 25 UG/H
1 PATCH TRANSDERMAL
COMMUNITY
Start: 2015-10-21 | End: 2023-05-19 | Stop reason: ALTCHOICE

## 2023-05-12 RX ORDER — HYDROMORPHONE HYDROCHLORIDE 2 MG/1
TABLET ORAL EVERY 4 HOURS PRN
COMMUNITY
End: 2023-05-19 | Stop reason: ALTCHOICE

## 2023-05-19 PROBLEM — D57.01 ACUTE CHEST SYNDROME (HCC): Status: ACTIVE | Noted: 2023-05-19

## 2023-05-19 PROBLEM — D57.00 ACUTE SICKLE CELL CRISIS (HCC): Status: ACTIVE | Noted: 2023-05-19

## 2023-05-21 NOTE — ED NOTES
7:15 PM Assumed care of the patient from Dr. Gertrude Wyatt. The pt is a 43 y.o.  [place] male with a hx of sickle cell anemia who reported to the ED complaining of pain in his bilateral lower extremities. He is still complaining of pain in his legs which is consistent with his typical sickle cell pain, but worse than normal. He denies chills, SOB, fever, N/V, chest pain, cough, abdominal pain, trauma, or any further complaints. Will give the pt 2mg of Dilaudid and reevaluate. 9:45 PM Pt reevaluated. He continues to have a lot of pain in his bilateral lower extremities right greater than left. States that \"it feels like its in my bone\". Will order an additional 1 mg of IV Dilaudid along with plain films of the right tib/fib to evaluate for obvious osteomyelitis or large bone infarct. Spoke to Dr. Hi Valle, Hospitalist, and he agrees on admission. SCRIBE ATTESTATION STATEMENT  Documented by: Saint Spates for, and in the presence of, Lamine Will MD 7:21 PM     Signed by: Lynda Guzman, 04/24/17 7:21 PM     PROVIDER ATTESTATION STATEMENT  I personally performed the services described in the documentation, reviewed the documentation, as recorded by the scribe in my presence, and it accurately and completely records my words and actions.   Lamine Will MD
Assumed care of patient from [facility], Patient rates pain 10/10.  Call bell within reach medication given per [facility]
Attempted to call report on this pt, was put hold, told the room was not ready, nursing supervisor called and informed.
MD Tan at the bedside speaking to patient.  He does not feel that he is in the proper position to go home due to pain not being controlled
Patient given Warm compresses from pain at this time.  He is resting with eyes closed
Received report from [place]. Pt,. In bed complaining of leg pain 10/10. Pain killer and bolus being given per MAR. Pt. A/Ox4, but moaning in pain. Urinal dumped. PT in bed and on BP monitor.
Theadore Sings [facility] EMERGENCY DEPT      43 y.o. male with noted past medical history who presents to the emergency department with sickle cell crisis with bilateral leg pain. Normally takes fentanyl and dilaudid at home but states it is not controlling the pain. I performed a brief evaluation, including history and physical, of the patient here in triage and I have determined that pt will need further treatment and evaluation from the main side ER physician. I have placed initial orders to help in expediting patients care. Kalen Veloz M.D.
Patient a/oX3, anxious, c/o of continued left sided headache w/ blurry vision, some photophobia, neck tenderness, w/ lightheadedness and dizziness.  NSR on cardiac monitor, elevated BP, other vitals stable.  Left AC PIV #20 in place from EMS, patent; all labs sent, no complications.  CT scans done; results pending.  NIH scale score 1 for left sided face and leg numbness.  GCS 15.  Dysphagia screen passed.  RONI.  Neuro assessments ongoing hourly.  MRI pending.  EKG pending.  Stable and comfortable.  Fall precaution observed.

## 2024-04-16 ENCOUNTER — HOSPITAL ENCOUNTER (EMERGENCY)
Facility: HOSPITAL | Age: 44
Discharge: HOME | End: 2024-04-17
Payer: MEDICARE

## 2024-04-16 VITALS
HEIGHT: 72 IN | SYSTOLIC BLOOD PRESSURE: 122 MMHG | OXYGEN SATURATION: 98 % | RESPIRATION RATE: 14 BRPM | WEIGHT: 195 LBS | TEMPERATURE: 98.1 F | HEART RATE: 60 BPM | BODY MASS INDEX: 26.41 KG/M2 | DIASTOLIC BLOOD PRESSURE: 82 MMHG

## 2024-04-16 DIAGNOSIS — D57.00 VASOOCCLUSIVE SICKLE CELL CRISIS (HCC): Primary | ICD-10-CM

## 2024-04-16 LAB
ALBUMIN SERPL-MCNC: 4.2 G/DL (ref 3.4–5)
ALBUMIN/GLOB SERPL: 0.8 (ref 0.8–1.7)
ALP SERPL-CCNC: 124 U/L (ref 45–117)
ALT SERPL-CCNC: 24 U/L (ref 16–61)
ANION GAP SERPL CALC-SCNC: 3 MMOL/L (ref 3–18)
AST SERPL-CCNC: 52 U/L (ref 10–38)
BILIRUB DIRECT SERPL-MCNC: 0.6 MG/DL (ref 0–0.2)
BILIRUB SERPL-MCNC: 5 MG/DL (ref 0.2–1)
BUN SERPL-MCNC: 5 MG/DL (ref 7–18)
BUN/CREAT SERPL: 6 (ref 12–20)
CALCIUM SERPL-MCNC: 9.1 MG/DL (ref 8.5–10.1)
CHLORIDE SERPL-SCNC: 107 MMOL/L (ref 100–111)
CO2 SERPL-SCNC: 26 MMOL/L (ref 21–32)
CREAT SERPL-MCNC: 0.87 MG/DL (ref 0.6–1.3)
ERYTHROCYTE [DISTWIDTH] IN BLOOD BY AUTOMATED COUNT: 23.8 % (ref 11.6–14.5)
GLOBULIN SER CALC-MCNC: 5 G/DL (ref 2–4)
GLUCOSE SERPL-MCNC: 85 MG/DL (ref 74–99)
HCT VFR BLD AUTO: 24 % (ref 36–48)
HGB BLD-MCNC: 8.1 G/DL (ref 13–16)
LDH SERPL L TO P-CCNC: 668 U/L (ref 81–234)
MCH RBC QN AUTO: 24.8 PG (ref 24–34)
MCHC RBC AUTO-ENTMCNC: 33.8 G/DL (ref 31–37)
MCV RBC AUTO: 73.6 FL (ref 78–100)
NRBC # BLD: 0.23 K/UL (ref 0–0.01)
NRBC BLD-RTO: 1.7 PER 100 WBC
PLATELET # BLD AUTO: 386 K/UL (ref 135–420)
PMV BLD AUTO: 10 FL (ref 9.2–11.8)
POTASSIUM SERPL-SCNC: 4.4 MMOL/L (ref 3.5–5.5)
PROT SERPL-MCNC: 9.2 G/DL (ref 6.4–8.2)
RBC # BLD AUTO: 3.26 M/UL (ref 4.35–5.65)
SODIUM SERPL-SCNC: 136 MMOL/L (ref 136–145)
WBC # BLD AUTO: 13.4 K/UL (ref 4.6–13.2)

## 2024-04-16 PROCEDURE — 85045 AUTOMATED RETICULOCYTE COUNT: CPT

## 2024-04-16 PROCEDURE — 96374 THER/PROPH/DIAG INJ IV PUSH: CPT

## 2024-04-16 PROCEDURE — 96375 TX/PRO/DX INJ NEW DRUG ADDON: CPT

## 2024-04-16 PROCEDURE — 80076 HEPATIC FUNCTION PANEL: CPT

## 2024-04-16 PROCEDURE — 83615 LACTATE (LD) (LDH) ENZYME: CPT

## 2024-04-16 PROCEDURE — 99284 EMERGENCY DEPT VISIT MOD MDM: CPT

## 2024-04-16 PROCEDURE — 80048 BASIC METABOLIC PNL TOTAL CA: CPT

## 2024-04-16 PROCEDURE — 6360000002 HC RX W HCPCS: Performed by: HEALTH CARE PROVIDER

## 2024-04-16 PROCEDURE — 85027 COMPLETE CBC AUTOMATED: CPT

## 2024-04-16 PROCEDURE — 2580000003 HC RX 258: Performed by: HEALTH CARE PROVIDER

## 2024-04-16 RX ORDER — HYDROMORPHONE HYDROCHLORIDE 1 MG/ML
1 INJECTION, SOLUTION INTRAMUSCULAR; INTRAVENOUS; SUBCUTANEOUS ONCE
Status: COMPLETED | OUTPATIENT
Start: 2024-04-16 | End: 2024-04-16

## 2024-04-16 RX ORDER — DIPHENHYDRAMINE HYDROCHLORIDE 50 MG/ML
25 INJECTION INTRAMUSCULAR; INTRAVENOUS ONCE
Status: COMPLETED | OUTPATIENT
Start: 2024-04-16 | End: 2024-04-16

## 2024-04-16 RX ORDER — SODIUM CHLORIDE 450 MG/100ML
INJECTION, SOLUTION INTRAVENOUS CONTINUOUS
Status: DISCONTINUED | OUTPATIENT
Start: 2024-04-16 | End: 2024-04-17 | Stop reason: HOSPADM

## 2024-04-16 RX ADMIN — SODIUM CHLORIDE 1000 ML: 4.5 INJECTION, SOLUTION INTRAVENOUS at 21:13

## 2024-04-16 RX ADMIN — HYDROMORPHONE HYDROCHLORIDE 1 MG: 1 INJECTION, SOLUTION INTRAMUSCULAR; INTRAVENOUS; SUBCUTANEOUS at 21:14

## 2024-04-16 RX ADMIN — DIPHENHYDRAMINE HYDROCHLORIDE 25 MG: 50 INJECTION, SOLUTION INTRAMUSCULAR; INTRAVENOUS at 21:12

## 2024-04-16 ASSESSMENT — PAIN DESCRIPTION - DESCRIPTORS: DESCRIPTORS: ACHING;CRAMPING

## 2024-04-16 ASSESSMENT — ENCOUNTER SYMPTOMS
ABDOMINAL PAIN: 0
SHORTNESS OF BREATH: 0
DIARRHEA: 0
NAUSEA: 0
VOMITING: 0
COUGH: 0
BACK PAIN: 1
CHEST TIGHTNESS: 0

## 2024-04-16 ASSESSMENT — PAIN DESCRIPTION - LOCATION
LOCATION: LEG
LOCATION: LEG;BACK

## 2024-04-16 ASSESSMENT — PAIN DESCRIPTION - ORIENTATION
ORIENTATION: RIGHT;LEFT
ORIENTATION: RIGHT;LEFT;LOWER
ORIENTATION: RIGHT;LEFT

## 2024-04-16 ASSESSMENT — PAIN - FUNCTIONAL ASSESSMENT
PAIN_FUNCTIONAL_ASSESSMENT: 0-10
PAIN_FUNCTIONAL_ASSESSMENT: 0-10

## 2024-04-16 ASSESSMENT — LIFESTYLE VARIABLES
HOW OFTEN DO YOU HAVE A DRINK CONTAINING ALCOHOL: NEVER
HOW MANY STANDARD DRINKS CONTAINING ALCOHOL DO YOU HAVE ON A TYPICAL DAY: PATIENT DOES NOT DRINK

## 2024-04-16 ASSESSMENT — PAIN SCALES - GENERAL
PAINLEVEL_OUTOF10: 8
PAINLEVEL_OUTOF10: 8
PAINLEVEL_OUTOF10: 4

## 2024-04-16 ASSESSMENT — PAIN DESCRIPTION - PAIN TYPE: TYPE: ACUTE PAIN

## 2024-04-16 NOTE — ED PROVIDER NOTES
EMERGENCY DEPARTMENT HISTORY AND PHYSICAL EXAM        Date: 4/16/2024  Patient Name: Norma Galan    History of Presenting Illness     Chief Complaint   Patient presents with    Sickle Cell Pain Crisis       History Provided By: History obtained from patient    HPI: Norma Galan, 43 y.o. male presents to the ED with cc of sickle cell vaso-occlusive crisis since this morning    Patient reports having pain in both legs and his right side low back that onset this morning.  He is compliant with hydroxyurea daily prescribed by Dr. Braga hematology.  He took oral Dilaudid as instructed for breakthrough pain and this did not improve pain.  Location of pain bilateral thighs and right side low back denies any neurologic deficits, no pain with deep breaths no chest pain no shortness of breath.    No nausea, vomiting, diarrhea, fever, chills, chest pain, shortness of breath, leg swelling     There are no other complaints, changes, or physical findings at this time.    Records Reviewed: na    PCP: Catrachita Chaparro MD    No current facility-administered medications on file prior to encounter.     Current Outpatient Medications on File Prior to Encounter   Medication Sig Dispense Refill    HYDROmorphone (DILAUDID) 4 MG tablet TAKE 1 TABLET BY MOUTH EVERY 4 TO 6 HOURS AS NEEDED FOR PAIN      hydroxyurea (HYDREA) 500 MG chemo capsule Take by mouth 2 times daily             Past History     Past Medical History:  Past Medical History:   Diagnosis Date    B12 deficiency 03/15/2010    210    Cholelithiasis 09/17/2012    U/S Result: Cholelithiasis    Elevated alkaline phosphatase level 03/21/2010    158    Elevated ALT measurement 03/21/2010    71    Elevated AST (SGOT) 03/10/2012    38    Elevated platelet count 10/25/2007    494    Elevated total protein 12/27/2011    8.7    History of blood transfusion     Hypocalcemia 07/06/2011    4.1    Hypokalemia     Hyponatremia 11/18/2009    Leukocytosis 11/16/2009

## 2024-04-16 NOTE — ED TRIAGE NOTES
Pt arrived via Triage from home c/o sickle cell crisis. Pt c/o bilateral leg and lower back pain.

## 2024-04-17 LAB — RETICS/RBC NFR AUTO: 7.3 % (ref 0.5–2.5)

## 2024-04-17 NOTE — ED NOTES
Pt awake alert talking w/o complications. Reports he was having bilateral leg pain + lower back pain. Took dilaudid at home w/o relief at around 5pm.

## 2024-04-17 NOTE — DISCHARGE INSTRUCTIONS
Return to ED if any return of symptoms in chest, shortness of breath, sweating, nausea, abdominal discomfort or fever develop.    Return to ED if signs of infection such as fast heart rate or fever develop

## 2024-04-21 ENCOUNTER — HOSPITAL ENCOUNTER (INPATIENT)
Facility: HOSPITAL | Age: 44
LOS: 4 days | Discharge: HOME | End: 2024-04-25
Attending: STUDENT IN AN ORGANIZED HEALTH CARE EDUCATION/TRAINING PROGRAM | Admitting: INTERNAL MEDICINE
Payer: MEDICARE

## 2024-04-21 DIAGNOSIS — D57.00 SICKLE CELL PAIN CRISIS (HCC): Primary | ICD-10-CM

## 2024-04-21 LAB
ALBUMIN SERPL-MCNC: 3.8 G/DL (ref 3.4–5)
ALBUMIN/GLOB SERPL: 0.8 (ref 0.8–1.7)
ALP SERPL-CCNC: 120 U/L (ref 45–117)
ALT SERPL-CCNC: 19 U/L (ref 16–61)
ANION GAP SERPL CALC-SCNC: 5 MMOL/L (ref 3–18)
AST SERPL-CCNC: 41 U/L (ref 10–38)
BASOPHILS # BLD: 0.2 K/UL (ref 0–0.1)
BASOPHILS NFR BLD: 1 % (ref 0–2)
BILIRUB SERPL-MCNC: 3.2 MG/DL (ref 0.2–1)
BUN SERPL-MCNC: 7 MG/DL (ref 7–18)
BUN/CREAT SERPL: 7 (ref 12–20)
CALCIUM SERPL-MCNC: 8.5 MG/DL (ref 8.5–10.1)
CHLORIDE SERPL-SCNC: 111 MMOL/L (ref 100–111)
CO2 SERPL-SCNC: 22 MMOL/L (ref 21–32)
CREAT SERPL-MCNC: 0.94 MG/DL (ref 0.6–1.3)
DIFFERENTIAL METHOD BLD: ABNORMAL
EOSINOPHIL # BLD: 0.9 K/UL (ref 0–0.4)
EOSINOPHIL NFR BLD: 8 % (ref 0–5)
ERYTHROCYTE [DISTWIDTH] IN BLOOD BY AUTOMATED COUNT: 23.8 % (ref 11.6–14.5)
GLOBULIN SER CALC-MCNC: 4.9 G/DL (ref 2–4)
GLUCOSE SERPL-MCNC: 93 MG/DL (ref 74–99)
HCT VFR BLD AUTO: 23.8 % (ref 36–48)
HGB BLD-MCNC: 7.9 G/DL (ref 13–16)
IMM GRANULOCYTES # BLD AUTO: 0.1 K/UL (ref 0–0.04)
IMM GRANULOCYTES NFR BLD AUTO: 1 % (ref 0–0.5)
LYMPHOCYTES # BLD: 3 K/UL (ref 0.9–3.6)
LYMPHOCYTES NFR BLD: 26 % (ref 21–52)
MCH RBC QN AUTO: 24.3 PG (ref 24–34)
MCHC RBC AUTO-ENTMCNC: 33.2 G/DL (ref 31–37)
MCV RBC AUTO: 73.2 FL (ref 78–100)
MONOCYTES # BLD: 1.1 K/UL (ref 0.05–1.2)
MONOCYTES NFR BLD: 10 % (ref 3–10)
NEUTS SEG # BLD: 6.1 K/UL (ref 1.8–8)
NEUTS SEG NFR BLD: 54 % (ref 40–73)
NRBC # BLD: 0.27 K/UL (ref 0–0.01)
NRBC BLD-RTO: 2.4 PER 100 WBC
PLATELET # BLD AUTO: 424 K/UL (ref 135–420)
PMV BLD AUTO: 10 FL (ref 9.2–11.8)
POTASSIUM SERPL-SCNC: 3.6 MMOL/L (ref 3.5–5.5)
PROT SERPL-MCNC: 8.7 G/DL (ref 6.4–8.2)
RBC # BLD AUTO: 3.25 M/UL (ref 4.35–5.65)
RETICS/RBC NFR AUTO: 6.7 % (ref 0.5–2.5)
SODIUM SERPL-SCNC: 138 MMOL/L (ref 136–145)
WBC # BLD AUTO: 11.3 K/UL (ref 4.6–13.2)

## 2024-04-21 PROCEDURE — 6360000002 HC RX W HCPCS: Performed by: PHYSICIAN ASSISTANT

## 2024-04-21 PROCEDURE — 96376 TX/PRO/DX INJ SAME DRUG ADON: CPT

## 2024-04-21 PROCEDURE — 1100000000 HC RM PRIVATE

## 2024-04-21 PROCEDURE — 6370000000 HC RX 637 (ALT 250 FOR IP): Performed by: PHYSICIAN ASSISTANT

## 2024-04-21 PROCEDURE — 6360000002 HC RX W HCPCS: Performed by: STUDENT IN AN ORGANIZED HEALTH CARE EDUCATION/TRAINING PROGRAM

## 2024-04-21 PROCEDURE — 94761 N-INVAS EAR/PLS OXIMETRY MLT: CPT

## 2024-04-21 PROCEDURE — 2580000003 HC RX 258: Performed by: PHYSICIAN ASSISTANT

## 2024-04-21 PROCEDURE — 99285 EMERGENCY DEPT VISIT HI MDM: CPT

## 2024-04-21 PROCEDURE — 96374 THER/PROPH/DIAG INJ IV PUSH: CPT

## 2024-04-21 PROCEDURE — 2580000003 HC RX 258: Performed by: STUDENT IN AN ORGANIZED HEALTH CARE EDUCATION/TRAINING PROGRAM

## 2024-04-21 PROCEDURE — 6360000002 HC RX W HCPCS: Performed by: INTERNAL MEDICINE

## 2024-04-21 PROCEDURE — 99222 1ST HOSP IP/OBS MODERATE 55: CPT | Performed by: PHYSICIAN ASSISTANT

## 2024-04-21 PROCEDURE — 85045 AUTOMATED RETICULOCYTE COUNT: CPT

## 2024-04-21 PROCEDURE — 85025 COMPLETE CBC W/AUTO DIFF WBC: CPT

## 2024-04-21 PROCEDURE — 80053 COMPREHEN METABOLIC PANEL: CPT

## 2024-04-21 RX ORDER — HYDROMORPHONE HYDROCHLORIDE 1 MG/ML
0.75 INJECTION, SOLUTION INTRAMUSCULAR; INTRAVENOUS; SUBCUTANEOUS
Status: DISCONTINUED | OUTPATIENT
Start: 2024-04-21 | End: 2024-04-21

## 2024-04-21 RX ORDER — POTASSIUM CHLORIDE 20 MEQ/1
40 TABLET, EXTENDED RELEASE ORAL PRN
Status: DISCONTINUED | OUTPATIENT
Start: 2024-04-21 | End: 2024-04-25 | Stop reason: HOSPADM

## 2024-04-21 RX ORDER — BISACODYL 10 MG
10 SUPPOSITORY, RECTAL RECTAL DAILY PRN
Status: DISCONTINUED | OUTPATIENT
Start: 2024-04-21 | End: 2024-04-25 | Stop reason: HOSPADM

## 2024-04-21 RX ORDER — ACETAMINOPHEN 325 MG/1
650 TABLET ORAL EVERY 6 HOURS PRN
Status: DISCONTINUED | OUTPATIENT
Start: 2024-04-21 | End: 2024-04-25 | Stop reason: HOSPADM

## 2024-04-21 RX ORDER — MAGNESIUM SULFATE IN WATER 40 MG/ML
2000 INJECTION, SOLUTION INTRAVENOUS PRN
Status: DISCONTINUED | OUTPATIENT
Start: 2024-04-21 | End: 2024-04-25 | Stop reason: HOSPADM

## 2024-04-21 RX ORDER — 0.9 % SODIUM CHLORIDE 0.9 %
1000 INTRAVENOUS SOLUTION INTRAVENOUS ONCE
Status: COMPLETED | OUTPATIENT
Start: 2024-04-21 | End: 2024-04-21

## 2024-04-21 RX ORDER — POTASSIUM CHLORIDE 7.45 MG/ML
10 INJECTION INTRAVENOUS PRN
Status: DISCONTINUED | OUTPATIENT
Start: 2024-04-21 | End: 2024-04-25 | Stop reason: HOSPADM

## 2024-04-21 RX ORDER — HYDROMORPHONE HYDROCHLORIDE 1 MG/ML
1 INJECTION, SOLUTION INTRAMUSCULAR; INTRAVENOUS; SUBCUTANEOUS
Status: COMPLETED | OUTPATIENT
Start: 2024-04-21 | End: 2024-04-21

## 2024-04-21 RX ORDER — POLYETHYLENE GLYCOL 3350 17 G/17G
17 POWDER, FOR SOLUTION ORAL DAILY PRN
Status: DISCONTINUED | OUTPATIENT
Start: 2024-04-21 | End: 2024-04-25 | Stop reason: HOSPADM

## 2024-04-21 RX ORDER — ACETAMINOPHEN 650 MG/1
650 SUPPOSITORY RECTAL EVERY 6 HOURS PRN
Status: DISCONTINUED | OUTPATIENT
Start: 2024-04-21 | End: 2024-04-25 | Stop reason: HOSPADM

## 2024-04-21 RX ORDER — HYDROMORPHONE HYDROCHLORIDE 1 MG/ML
1 INJECTION, SOLUTION INTRAMUSCULAR; INTRAVENOUS; SUBCUTANEOUS
Status: DISCONTINUED | OUTPATIENT
Start: 2024-04-21 | End: 2024-04-25 | Stop reason: HOSPADM

## 2024-04-21 RX ORDER — ONDANSETRON 4 MG/1
4 TABLET, ORALLY DISINTEGRATING ORAL EVERY 8 HOURS PRN
Status: DISCONTINUED | OUTPATIENT
Start: 2024-04-21 | End: 2024-04-25 | Stop reason: HOSPADM

## 2024-04-21 RX ORDER — SODIUM CHLORIDE, SODIUM LACTATE, POTASSIUM CHLORIDE, CALCIUM CHLORIDE 600; 310; 30; 20 MG/100ML; MG/100ML; MG/100ML; MG/100ML
INJECTION, SOLUTION INTRAVENOUS CONTINUOUS
Status: DISCONTINUED | OUTPATIENT
Start: 2024-04-21 | End: 2024-04-21

## 2024-04-21 RX ORDER — SODIUM CHLORIDE 0.9 % (FLUSH) 0.9 %
5-40 SYRINGE (ML) INJECTION PRN
Status: DISCONTINUED | OUTPATIENT
Start: 2024-04-21 | End: 2024-04-25 | Stop reason: HOSPADM

## 2024-04-21 RX ORDER — HYDROXYUREA 500 MG/1
500 CAPSULE ORAL 2 TIMES DAILY
Status: DISCONTINUED | OUTPATIENT
Start: 2024-04-21 | End: 2024-04-25 | Stop reason: HOSPADM

## 2024-04-21 RX ORDER — SODIUM CHLORIDE 9 MG/ML
INJECTION, SOLUTION INTRAVENOUS PRN
Status: DISCONTINUED | OUTPATIENT
Start: 2024-04-21 | End: 2024-04-25 | Stop reason: HOSPADM

## 2024-04-21 RX ORDER — ONDANSETRON 2 MG/ML
4 INJECTION INTRAMUSCULAR; INTRAVENOUS EVERY 6 HOURS PRN
Status: DISCONTINUED | OUTPATIENT
Start: 2024-04-21 | End: 2024-04-25 | Stop reason: HOSPADM

## 2024-04-21 RX ORDER — NALOXONE HYDROCHLORIDE 0.4 MG/ML
0.4 INJECTION, SOLUTION INTRAMUSCULAR; INTRAVENOUS; SUBCUTANEOUS PRN
Status: DISCONTINUED | OUTPATIENT
Start: 2024-04-21 | End: 2024-04-25 | Stop reason: HOSPADM

## 2024-04-21 RX ORDER — SODIUM CHLORIDE, SODIUM LACTATE, POTASSIUM CHLORIDE, CALCIUM CHLORIDE 600; 310; 30; 20 MG/100ML; MG/100ML; MG/100ML; MG/100ML
INJECTION, SOLUTION INTRAVENOUS CONTINUOUS
Status: DISPENSED | OUTPATIENT
Start: 2024-04-21 | End: 2024-04-22

## 2024-04-21 RX ORDER — HYDROMORPHONE HYDROCHLORIDE 2 MG/1
4 TABLET ORAL EVERY 4 HOURS PRN
Status: DISCONTINUED | OUTPATIENT
Start: 2024-04-21 | End: 2024-04-25 | Stop reason: HOSPADM

## 2024-04-21 RX ORDER — SODIUM CHLORIDE 0.9 % (FLUSH) 0.9 %
5-40 SYRINGE (ML) INJECTION EVERY 12 HOURS SCHEDULED
Status: DISCONTINUED | OUTPATIENT
Start: 2024-04-21 | End: 2024-04-25 | Stop reason: HOSPADM

## 2024-04-21 RX ADMIN — HYDROMORPHONE HYDROCHLORIDE 0.1 MG: 1 INJECTION, SOLUTION INTRAMUSCULAR; INTRAVENOUS; SUBCUTANEOUS at 13:32

## 2024-04-21 RX ADMIN — SODIUM CHLORIDE 1000 ML: 9 INJECTION, SOLUTION INTRAVENOUS at 10:31

## 2024-04-21 RX ADMIN — HYDROMORPHONE HYDROCHLORIDE 1 MG: 1 INJECTION, SOLUTION INTRAMUSCULAR; INTRAVENOUS; SUBCUTANEOUS at 23:57

## 2024-04-21 RX ADMIN — SODIUM CHLORIDE 1000 ML: 9 INJECTION, SOLUTION INTRAVENOUS at 07:40

## 2024-04-21 RX ADMIN — HYDROXYUREA 500 MG: 500 CAPSULE ORAL at 23:53

## 2024-04-21 RX ADMIN — SODIUM CHLORIDE, PRESERVATIVE FREE 10 ML: 5 INJECTION INTRAVENOUS at 20:46

## 2024-04-21 RX ADMIN — HYDROMORPHONE HYDROCHLORIDE 1 MG: 1 INJECTION, SOLUTION INTRAMUSCULAR; INTRAVENOUS; SUBCUTANEOUS at 08:45

## 2024-04-21 RX ADMIN — HYDROMORPHONE HYDROCHLORIDE 1 MG: 1 INJECTION, SOLUTION INTRAMUSCULAR; INTRAVENOUS; SUBCUTANEOUS at 20:45

## 2024-04-21 RX ADMIN — SODIUM CHLORIDE, POTASSIUM CHLORIDE, SODIUM LACTATE AND CALCIUM CHLORIDE 1000 ML: 600; 310; 30; 20 INJECTION, SOLUTION INTRAVENOUS at 14:03

## 2024-04-21 RX ADMIN — HYDROMORPHONE HYDROCHLORIDE 1 MG: 1 INJECTION, SOLUTION INTRAMUSCULAR; INTRAVENOUS; SUBCUTANEOUS at 10:27

## 2024-04-21 RX ADMIN — HYDROMORPHONE HYDROCHLORIDE 1 MG: 1 INJECTION, SOLUTION INTRAMUSCULAR; INTRAVENOUS; SUBCUTANEOUS at 07:39

## 2024-04-21 RX ADMIN — HYDROMORPHONE HYDROCHLORIDE 1 MG: 1 INJECTION, SOLUTION INTRAMUSCULAR; INTRAVENOUS; SUBCUTANEOUS at 16:50

## 2024-04-21 ASSESSMENT — PAIN DESCRIPTION - PAIN TYPE
TYPE: CHRONIC PAIN

## 2024-04-21 ASSESSMENT — PAIN DESCRIPTION - FREQUENCY
FREQUENCY: CONTINUOUS

## 2024-04-21 ASSESSMENT — PAIN DESCRIPTION - ORIENTATION
ORIENTATION: LEFT;RIGHT
ORIENTATION: RIGHT;LEFT;LOWER
ORIENTATION: LOWER;RIGHT;LEFT
ORIENTATION: RIGHT;LEFT
ORIENTATION: LEFT;RIGHT
ORIENTATION: RIGHT;LEFT

## 2024-04-21 ASSESSMENT — PAIN DESCRIPTION - ONSET
ONSET: ON-GOING

## 2024-04-21 ASSESSMENT — PAIN - FUNCTIONAL ASSESSMENT
PAIN_FUNCTIONAL_ASSESSMENT: ACTIVITIES ARE NOT PREVENTED
PAIN_FUNCTIONAL_ASSESSMENT: ACTIVITIES ARE NOT PREVENTED

## 2024-04-21 ASSESSMENT — PAIN SCALES - GENERAL
PAINLEVEL_OUTOF10: 8
PAINLEVEL_OUTOF10: 6
PAINLEVEL_OUTOF10: 8
PAINLEVEL_OUTOF10: 8
PAINLEVEL_OUTOF10: 7
PAINLEVEL_OUTOF10: 7
PAINLEVEL_OUTOF10: 8
PAINLEVEL_OUTOF10: 8
PAINLEVEL_OUTOF10: 6

## 2024-04-21 ASSESSMENT — PAIN DESCRIPTION - LOCATION
LOCATION: BACK;LEG
LOCATION: LEG;BACK
LOCATION: BACK;LEG

## 2024-04-21 ASSESSMENT — PAIN DESCRIPTION - DESCRIPTORS
DESCRIPTORS: ACHING

## 2024-04-21 ASSESSMENT — PAIN DESCRIPTION - INTENSITY: RATING_2: 8

## 2024-04-21 NOTE — PLAN OF CARE
Problem: Pain  Goal: Verbalizes/displays adequate comfort level or baseline comfort level  4/21/2024 1347 by Louisa Guillaume RN  Outcome: Progressing  4/21/2024 1346 by Louisa Guillaume RN  Outcome: Progressing  Flowsheets (Taken 4/21/2024 1245)  Verbalizes/displays adequate comfort level or baseline comfort level:   Encourage patient to monitor pain and request assistance   Assess pain using appropriate pain scale   Administer analgesics based on type and severity of pain and evaluate response   Implement non-pharmacological measures as appropriate and evaluate response   Consider cultural and social influences on pain and pain management   Notify Licensed Independent Practitioner if interventions unsuccessful or patient reports new pain     Problem: Chronic Conditions and Co-morbidities  Goal: Patient's chronic conditions and co-morbidity symptoms are monitored and maintained or improved  4/21/2024 1347 by Louisa Guillaume RN  Outcome: Progressing  4/21/2024 1346 by Louisa Guillaume, RN  Outcome: Progressing

## 2024-04-21 NOTE — ED PROVIDER NOTES
EMERGENCY DEPARTMENT HISTORY AND PHYSICAL EXAM      Date: 4/21/2024  Patient Name: Norma Galan    History of Presenting Illness     Chief Complaint   Patient presents with    Sickle Cell Pain Crisis       43-year-old male with history of sickle cell disease presenting to the emergency department with complaints of pain in his lower back and his bilateral proximal thighs.  States this is consistent with his sickle cell pain crises.  Has been ongoing for the past 2 weeks now.  He has been trying to keep the pain under control with his home Dilaudid tablets but feels like it is not working.  He denies any chest pain or shortness of breath, abdominal pain, NVD, fevers or chills.          PCP: Catrachita Chaparro MD    No current facility-administered medications for this encounter.     Current Outpatient Medications   Medication Sig Dispense Refill    HYDROmorphone (DILAUDID) 4 MG tablet TAKE 1 TABLET BY MOUTH EVERY 4 TO 6 HOURS AS NEEDED FOR PAIN      hydroxyurea (HYDREA) 500 MG chemo capsule Take by mouth 2 times daily         Past History     Past Medical History:  Past Medical History:   Diagnosis Date    B12 deficiency 03/15/2010    210    Cholelithiasis 09/17/2012    U/S Result: Cholelithiasis    Elevated alkaline phosphatase level 03/21/2010    158    Elevated ALT measurement 03/21/2010    71    Elevated AST (SGOT) 03/10/2012    38    Elevated platelet count 10/25/2007    494    Elevated total protein 12/27/2011    8.7    History of blood transfusion     Hypocalcemia 07/06/2011    4.1    Hypokalemia     Hyponatremia 11/18/2009    Leukocytosis 11/16/2009    Microcytic anemia 10/25/2007    Pleural effusion on right 07/15/2015    [facility] CXR Result    Reticulocytosis 10/25/2007    7.8    Serum total bilirubin elevated 12/27/2011    T.B. 3.8, D.B. 0.4.     Sickle cell anemia with crisis (HCC)     Dr. Catrachita Chaparro    Vitamin D deficiency 01/20/2016    5.4       Past Surgical History:  Past Surgical History:  Basophils % 1 0 - 2 %    Immature Granulocytes % 1 (H) 0.0 - 0.5 %    Neutrophils Absolute 6.1 1.8 - 8.0 K/UL    Lymphocytes Absolute 3.0 0.9 - 3.6 K/UL    Monocytes Absolute 1.1 0.05 - 1.2 K/UL    Eosinophils Absolute 0.9 (H) 0.0 - 0.4 K/UL    Basophils Absolute 0.2 (H) 0.0 - 0.1 K/UL    Immature Granulocytes Absolute 0.1 (H) 0.00 - 0.04 K/UL    Differential Type AUTOMATED     Comprehensive Metabolic Panel    Collection Time: 04/21/24  7:33 AM   Result Value Ref Range    Sodium 138 136 - 145 mmol/L    Potassium 3.6 3.5 - 5.5 mmol/L    Chloride 111 100 - 111 mmol/L    CO2 22 21 - 32 mmol/L    Anion Gap 5 3.0 - 18 mmol/L    Glucose 93 74 - 99 mg/dL    BUN 7 7.0 - 18 MG/DL    Creatinine 0.94 0.6 - 1.3 MG/DL    Bun/Cre Ratio 7 (L) 12 - 20      Est, Glom Filt Rate >90 >60 ml/min/1.73m2    Calcium 8.5 8.5 - 10.1 MG/DL    Total Bilirubin 3.2 (H) 0.2 - 1.0 MG/DL    ALT 19 16 - 61 U/L    AST 41 (H) 10 - 38 U/L    Alk Phosphatase 120 (H) 45 - 117 U/L    Total Protein 8.7 (H) 6.4 - 8.2 g/dL    Albumin 3.8 3.4 - 5.0 g/dL    Globulin 4.9 (H) 2.0 - 4.0 g/dL    Albumin/Globulin Ratio 0.8 0.8 - 1.7     Reticulocytes    Collection Time: 04/21/24  7:33 AM   Result Value Ref Range    Reticulocyte Count,Automated 6.7 (H) 0.5 - 2.5 %       Radiologic Studies -   Non-plain film images such as CT, Ultrasound and MRI are read by the radiologist. Plain radiographic images are visualized and preliminarily interpreted by the emergency physician.    No orders to display           Medical Decision Making   I am the first provider for this patient.    I reviewed the vital signs, available nursing notes, past medical history, past surgical history, family history and social history.      Vital Signs-Reviewed the patient's vital signs.    EKG: All EKG's are interpreted by the Emergency Department Physician who either signs or Co-signs this chart in the absence of a cardiologist.               Interpretation per the Radiologist below, if available

## 2024-04-21 NOTE — H&P
History and Physical          Subjective     HPI: Norma Galan is a 43 y.o. male with a PMHx of sickle cell who presented to the ED with c/o sickle cell crisis x2 weeks. No improvement with home medications. Pain is severe and located in lower back and thighs. Last crisis was a couple months ago, but he was able to manage sx at home with oral dilaudid. Last time he was admitted for crisis was last year. He denies cp, sob, abd pain, nvd, cough, congestion. Sickle cell is managed by Dr. Chaparro.     In the ED, VSS but BP dropped a little after the multiple doses of IV dilaudid. Now improving. Labs with hgb 7.9 (baseline), MCV 73.2.     PMHx:  Past Medical History:   Diagnosis Date    B12 deficiency 03/15/2010    210    Cholelithiasis 09/17/2012    U/S Result: Cholelithiasis    Elevated alkaline phosphatase level 03/21/2010    158    Elevated ALT measurement 03/21/2010    71    Elevated AST (SGOT) 03/10/2012    38    Elevated platelet count 10/25/2007    494    Elevated total protein 12/27/2011    8.7    History of blood transfusion     Hypocalcemia 07/06/2011    4.1    Hypokalemia     Hyponatremia 11/18/2009    Leukocytosis 11/16/2009    Microcytic anemia 10/25/2007    Pleural effusion on right 07/15/2015    [facility] CXR Result    Reticulocytosis 10/25/2007    7.8    Serum total bilirubin elevated 12/27/2011    T.B. 3.8, D.B. 0.4.     Sickle cell anemia with crisis (HCC)     Dr. Catrachita Chaparro    Vitamin D deficiency 01/20/2016    5.4       PSurgHx:  Past Surgical History:   Procedure Laterality Date    CHOLECYSTECTOMY         SocialHx:  Social History     Socioeconomic History    Marital status:    Tobacco Use    Smoking status: Never    Smokeless tobacco: Never   Substance and Sexual Activity    Alcohol use: Yes    Drug use: No     Social Determinants of Health     Food Insecurity: No Food Insecurity (4/21/2024)    Hunger Vital Sign     Worried About Running Out of Food in the Last Year: Never true  ml/min/1.73m2    Calcium 8.5 8.5 - 10.1 MG/DL    Total Bilirubin 3.2 (H) 0.2 - 1.0 MG/DL    ALT 19 16 - 61 U/L    AST 41 (H) 10 - 38 U/L    Alk Phosphatase 120 (H) 45 - 117 U/L    Total Protein 8.7 (H) 6.4 - 8.2 g/dL    Albumin 3.8 3.4 - 5.0 g/dL    Globulin 4.9 (H) 2.0 - 4.0 g/dL    Albumin/Globulin Ratio 0.8 0.8 - 1.7     Reticulocytes    Collection Time: 04/21/24  7:33 AM   Result Value Ref Range    Reticulocyte Count,Automated 6.7 (H) 0.5 - 2.5 %       Imaging Reviewed:  No results found.        Assessment/Plan     Sickle cell crisis  - add on retic count and monitor daily  - PRN pain control with holding parameters for BP  - IV fluids  - cont hydroxyurea    Microcytic anemia  - check iron studies  - monitor H&H      Anticipated Discharge: 2 days    DVT Prophylaxis:  []Lovenox  []Hep SQ  [x]SCDs  []Coumadin []DOAC  []On Heparin gtt     I have personally reviewed all pertinent labs, films and EKGs that have officially resulted. I reviewed available electronic documentation outlining the initial presentation as well as the emergency room physician's encounter. Time spent reviewing records, independently interpreting results, obtaining history from patient or caregiver, performing physical exam, ordering tests and medications,  documenting in the chart, and coordinating overall care is 60 minutes    DONATO Weber

## 2024-04-21 NOTE — ED NOTES
Prior to Dilaudid admin this nurse spoke with Dr. Belcher and made him aware that second dose of dilaudid did drop pts BP and MAP. Nurse placed fluids. Provider placed orders for that fluids and to give dilaudid with fluids.

## 2024-04-21 NOTE — ED NOTES
Pt endorses that his pain is 8/10 to back and bilateral legs. Pt states he took his home dilaudid but it was not helpful.

## 2024-04-21 NOTE — PROGRESS NOTES
INTERIM UPDATE - 1134 EST on 4/21/2024    [facility] ER Physician calls requesting admission for a 43-year-old male who presents with complaint of Sickle Cell Pain Crisis.  Patient reportedly has been having this pain crisis of approximately 2 weeks and is unable to manage it with his home medications.  Patient reportedly states he is having his typical lower back and thigh pain associated with Sickle Cell Crisis.    [facility] ER Physician reports that Patient is afebrile without concern for focal infection.  [facility] ([facility]) ER Physician reports that Patient is borderline hypotensive, requires no ancillary oxygen, and exhibits no arrhythmia.  Notably, Patient's borderline hypotension may limit ability to administer opioids, but the marginal blood pressures may only be a transient occurrence.    Plan:  Will admit Patient to [facility] Telemetry Unit for management of Sickle Cell Crisis.

## 2024-04-21 NOTE — ED TRIAGE NOTES
Patient comes in with bilateral leg pain and back pain, states that it feels like his normal sickle cell pain. Patient states that it started a couple of days ago and just hasn't gone away.

## 2024-04-22 LAB
ANION GAP SERPL CALC-SCNC: 3 MMOL/L (ref 3–18)
BASOPHILS # BLD: 0.2 K/UL (ref 0–0.1)
BASOPHILS NFR BLD: 1 % (ref 0–2)
BUN SERPL-MCNC: 7 MG/DL (ref 7–18)
BUN/CREAT SERPL: 9 (ref 12–20)
CALCIUM SERPL-MCNC: 8.2 MG/DL (ref 8.5–10.1)
CHLORIDE SERPL-SCNC: 111 MMOL/L (ref 100–111)
CO2 SERPL-SCNC: 23 MMOL/L (ref 21–32)
CREAT SERPL-MCNC: 0.78 MG/DL (ref 0.6–1.3)
DIFFERENTIAL METHOD BLD: ABNORMAL
EOSINOPHIL # BLD: 1 K/UL (ref 0–0.4)
EOSINOPHIL NFR BLD: 8 % (ref 0–5)
ERYTHROCYTE [DISTWIDTH] IN BLOOD BY AUTOMATED COUNT: 23.8 % (ref 11.6–14.5)
FERRITIN SERPL-MCNC: 65 NG/ML (ref 8–388)
GLUCOSE SERPL-MCNC: 94 MG/DL (ref 74–99)
HCT VFR BLD AUTO: 22.6 % (ref 36–48)
HGB BLD-MCNC: 7.6 G/DL (ref 13–16)
IMM GRANULOCYTES # BLD AUTO: 0.1 K/UL (ref 0–0.04)
IMM GRANULOCYTES NFR BLD AUTO: 0 % (ref 0–0.5)
IRON SATN MFR SERPL: 27 % (ref 20–50)
IRON SERPL-MCNC: 87 UG/DL (ref 50–175)
LYMPHOCYTES # BLD: 4.6 K/UL (ref 0.9–3.6)
LYMPHOCYTES NFR BLD: 38 % (ref 21–52)
MAGNESIUM SERPL-MCNC: 2.2 MG/DL (ref 1.6–2.6)
MCH RBC QN AUTO: 24.8 PG (ref 24–34)
MCHC RBC AUTO-ENTMCNC: 33.6 G/DL (ref 31–37)
MCV RBC AUTO: 73.9 FL (ref 78–100)
MONOCYTES # BLD: 1.2 K/UL (ref 0.05–1.2)
MONOCYTES NFR BLD: 10 % (ref 3–10)
NEUTS SEG # BLD: 5.1 K/UL (ref 1.8–8)
NEUTS SEG NFR BLD: 43 % (ref 40–73)
NRBC # BLD: 0.28 K/UL (ref 0–0.01)
NRBC BLD-RTO: 2.3 PER 100 WBC
PLATELET # BLD AUTO: 355 K/UL (ref 135–420)
PMV BLD AUTO: 9.8 FL (ref 9.2–11.8)
POTASSIUM SERPL-SCNC: 3.4 MMOL/L (ref 3.5–5.5)
RBC # BLD AUTO: 3.06 M/UL (ref 4.35–5.65)
RETICS/RBC NFR AUTO: 6.7 % (ref 0.5–2.5)
SODIUM SERPL-SCNC: 137 MMOL/L (ref 136–145)
TIBC SERPL-MCNC: 322 UG/DL (ref 250–450)
WBC # BLD AUTO: 12.1 K/UL (ref 4.6–13.2)

## 2024-04-22 PROCEDURE — 6360000002 HC RX W HCPCS: Performed by: PHYSICIAN ASSISTANT

## 2024-04-22 PROCEDURE — 36415 COLL VENOUS BLD VENIPUNCTURE: CPT

## 2024-04-22 PROCEDURE — 94761 N-INVAS EAR/PLS OXIMETRY MLT: CPT

## 2024-04-22 PROCEDURE — 85025 COMPLETE CBC W/AUTO DIFF WBC: CPT

## 2024-04-22 PROCEDURE — 85045 AUTOMATED RETICULOCYTE COUNT: CPT

## 2024-04-22 PROCEDURE — 83735 ASSAY OF MAGNESIUM: CPT

## 2024-04-22 PROCEDURE — 80048 BASIC METABOLIC PNL TOTAL CA: CPT

## 2024-04-22 PROCEDURE — 82728 ASSAY OF FERRITIN: CPT

## 2024-04-22 PROCEDURE — 83550 IRON BINDING TEST: CPT

## 2024-04-22 PROCEDURE — 6370000000 HC RX 637 (ALT 250 FOR IP): Performed by: PHYSICIAN ASSISTANT

## 2024-04-22 PROCEDURE — 83540 ASSAY OF IRON: CPT

## 2024-04-22 PROCEDURE — 2580000003 HC RX 258: Performed by: PHYSICIAN ASSISTANT

## 2024-04-22 PROCEDURE — 1100000000 HC RM PRIVATE

## 2024-04-22 PROCEDURE — 99232 SBSQ HOSP IP/OBS MODERATE 35: CPT | Performed by: STUDENT IN AN ORGANIZED HEALTH CARE EDUCATION/TRAINING PROGRAM

## 2024-04-22 RX ADMIN — SODIUM CHLORIDE, POTASSIUM CHLORIDE, SODIUM LACTATE AND CALCIUM CHLORIDE: 600; 310; 30; 20 INJECTION, SOLUTION INTRAVENOUS at 14:53

## 2024-04-22 RX ADMIN — HYDROMORPHONE HYDROCHLORIDE 1 MG: 1 INJECTION, SOLUTION INTRAMUSCULAR; INTRAVENOUS; SUBCUTANEOUS at 03:00

## 2024-04-22 RX ADMIN — SODIUM CHLORIDE, POTASSIUM CHLORIDE, SODIUM LACTATE AND CALCIUM CHLORIDE: 600; 310; 30; 20 INJECTION, SOLUTION INTRAVENOUS at 08:29

## 2024-04-22 RX ADMIN — SODIUM CHLORIDE, PRESERVATIVE FREE 10 ML: 5 INJECTION INTRAVENOUS at 08:31

## 2024-04-22 RX ADMIN — POTASSIUM CHLORIDE 40 MEQ: 1500 TABLET, EXTENDED RELEASE ORAL at 06:48

## 2024-04-22 RX ADMIN — HYDROMORPHONE HYDROCHLORIDE 1 MG: 1 INJECTION, SOLUTION INTRAMUSCULAR; INTRAVENOUS; SUBCUTANEOUS at 08:31

## 2024-04-22 RX ADMIN — HYDROXYUREA 500 MG: 500 CAPSULE ORAL at 23:20

## 2024-04-22 RX ADMIN — HYDROMORPHONE HYDROCHLORIDE 1 MG: 1 INJECTION, SOLUTION INTRAMUSCULAR; INTRAVENOUS; SUBCUTANEOUS at 22:04

## 2024-04-22 RX ADMIN — HYDROXYUREA 500 MG: 500 CAPSULE ORAL at 08:31

## 2024-04-22 RX ADMIN — HYDROMORPHONE HYDROCHLORIDE 1 MG: 1 INJECTION, SOLUTION INTRAMUSCULAR; INTRAVENOUS; SUBCUTANEOUS at 14:53

## 2024-04-22 RX ADMIN — HYDROMORPHONE HYDROCHLORIDE 1 MG: 1 INJECTION, SOLUTION INTRAMUSCULAR; INTRAVENOUS; SUBCUTANEOUS at 18:16

## 2024-04-22 RX ADMIN — SODIUM CHLORIDE, PRESERVATIVE FREE 10 ML: 5 INJECTION INTRAVENOUS at 22:00

## 2024-04-22 RX ADMIN — HYDROMORPHONE HYDROCHLORIDE 1 MG: 1 INJECTION, SOLUTION INTRAMUSCULAR; INTRAVENOUS; SUBCUTANEOUS at 05:51

## 2024-04-22 RX ADMIN — HYDROMORPHONE HYDROCHLORIDE 1 MG: 1 INJECTION, SOLUTION INTRAMUSCULAR; INTRAVENOUS; SUBCUTANEOUS at 11:34

## 2024-04-22 ASSESSMENT — PAIN DESCRIPTION - PAIN TYPE
TYPE: CHRONIC PAIN
TYPE: ACUTE PAIN;CHRONIC PAIN
TYPE: CHRONIC PAIN;ACUTE PAIN
TYPE: CHRONIC PAIN
TYPE: CHRONIC PAIN
TYPE: ACUTE PAIN;CHRONIC PAIN
TYPE: CHRONIC PAIN;ACUTE PAIN

## 2024-04-22 ASSESSMENT — PAIN SCALES - GENERAL
PAINLEVEL_OUTOF10: 5
PAINLEVEL_OUTOF10: 6
PAINLEVEL_OUTOF10: 6
PAINLEVEL_OUTOF10: 5
PAINLEVEL_OUTOF10: 8
PAINLEVEL_OUTOF10: 6
PAINLEVEL_OUTOF10: 7
PAINLEVEL_OUTOF10: 7
PAINLEVEL_OUTOF10: 8
PAINLEVEL_OUTOF10: 8
PAINLEVEL_OUTOF10: 6
PAINLEVEL_OUTOF10: 7

## 2024-04-22 ASSESSMENT — PAIN DESCRIPTION - ORIENTATION
ORIENTATION: RIGHT;LEFT;LOWER
ORIENTATION: RIGHT;LEFT;LOWER
ORIENTATION: RIGHT;LEFT;INNER
ORIENTATION: RIGHT;LEFT
ORIENTATION: RIGHT;LEFT
ORIENTATION: RIGHT;LEFT;LOWER
ORIENTATION: LOWER;RIGHT;LEFT
ORIENTATION: RIGHT;LEFT;LOWER

## 2024-04-22 ASSESSMENT — PAIN DESCRIPTION - FREQUENCY
FREQUENCY: CONTINUOUS

## 2024-04-22 ASSESSMENT — PAIN DESCRIPTION - DESCRIPTORS
DESCRIPTORS: ACHING;DISCOMFORT
DESCRIPTORS: ACHING
DESCRIPTORS: ACHING
DESCRIPTORS: ACHING;DISCOMFORT
DESCRIPTORS: ACHING;DISCOMFORT
DESCRIPTORS: ACHING
DESCRIPTORS: ACHING;DISCOMFORT
DESCRIPTORS: ACHING;DISCOMFORT

## 2024-04-22 ASSESSMENT — PAIN DESCRIPTION - LOCATION
LOCATION: BACK;LEG
LOCATION: LEG;BACK
LOCATION: BACK;LEG

## 2024-04-22 ASSESSMENT — PAIN DESCRIPTION - ONSET
ONSET: ON-GOING

## 2024-04-22 NOTE — CARE COORDINATION
04/22/24 1532   Service Assessment   Patient Orientation Alert and Oriented   Cognition Alert   History Provided By Patient   Primary Caregiver Self   Support Systems Parent;Family Members   Patient's Healthcare Decision Maker is: Legal Next of Kin   PCP Verified by CM Yes  (Dr. Chaparro has been pts PCP and hematologist since 2001)   Last Visit to PCP Within last 3 months   Prior Functional Level Independent in ADLs/IADLs   Current Functional Level Independent in ADLs/IADLs   Can patient return to prior living arrangement Yes   Family able to assist with home care needs: Yes   Would you like for me to discuss the discharge plan with any other family members/significant others, and if so, who? Yes  (mom)   Financial Resources Medicare   Social/Functional History   Lives With Alone   Type of Home House  ([facility])   Home Layout Two level   Home Access Stairs to enter with rails   Entrance Stairs - Number of Steps 2 steps   Entrance Stairs - Rails Right   Bathroom Shower/Tub Tub/Shower unit;Walk-in shower  (pt states he uses both.)   Bathroom Toilet Standard   Bathroom Equipment None   Bathroom Accessibility Accessible   Home Equipment None   Receives Help From Family   ADL Assistance Independent   Homemaking Assistance Independent   Homemaking Responsibilities Yes   Ambulation Assistance Independent   Transfer Assistance Independent   Active  Yes  (pt states he can drive, but doesn't have a vehicle. 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   Discharge Planning   Type of Residence House   Living Arrangements Alone   Current Services Prior To Admission None   Potential Assistance Needed N/A   DME Ordered? No   Potential Assistance Purchasing Medications No   Type of Home Care Services None   Patient expects to be discharged to: House   One/Two Story Residence Two story   Lift Chair Available No

## 2024-04-22 NOTE — PLAN OF CARE
Problem: Pain  Goal: Verbalizes/displays adequate comfort level or baseline comfort level  Outcome: Progressing  Flowsheets (Taken 4/21/2024 2045)  Verbalizes/displays adequate comfort level or baseline comfort level:   Encourage patient to monitor pain and request assistance   Assess pain using appropriate pain scale   Administer analgesics based on type and severity of pain and evaluate response     Problem: Chronic Conditions and Co-morbidities  Goal: Patient's chronic conditions and co-morbidity symptoms are monitored and maintained or improved  Outcome: Progressing  Flowsheets (Taken 4/21/2024 2045)  Care Plan - Patient's Chronic Conditions and Co-Morbidity Symptoms are Monitored and Maintained or Improved: Monitor and assess patient's chronic conditions and comorbid symptoms for stability, deterioration, or improvement     Problem: Safety - Adult  Goal: Free from fall injury  Outcome: Progressing

## 2024-04-22 NOTE — PLAN OF CARE
Problem: Pain  Goal: Verbalizes/displays adequate comfort level or baseline comfort level  4/22/2024 0921 by Kenna Foley RN  Outcome: Progressing  4/22/2024 0616 by Kimmy Negro RN  Outcome: Progressing  Flowsheets (Taken 4/21/2024 2045)  Verbalizes/displays adequate comfort level or baseline comfort level:   Encourage patient to monitor pain and request assistance   Assess pain using appropriate pain scale   Administer analgesics based on type and severity of pain and evaluate response     Problem: Chronic Conditions and Co-morbidities  Goal: Patient's chronic conditions and co-morbidity symptoms are monitored and maintained or improved  4/22/2024 0921 by Kenna Foley RN  Outcome: Progressing  Flowsheets (Taken 4/22/2024 0800)  Care Plan - Patient's Chronic Conditions and Co-Morbidity Symptoms are Monitored and Maintained or Improved:   Monitor and assess patient's chronic conditions and comorbid symptoms for stability, deterioration, or improvement   Collaborate with multidisciplinary team to address chronic and comorbid conditions and prevent exacerbation or deterioration   Update acute care plan with appropriate goals if chronic or comorbid symptoms are exacerbated and prevent overall improvement and discharge  4/22/2024 0616 by Kimmy Negro RN  Outcome: Progressing  Flowsheets (Taken 4/21/2024 2045)  Care Plan - Patient's Chronic Conditions and Co-Morbidity Symptoms are Monitored and Maintained or Improved: Monitor and assess patient's chronic conditions and comorbid symptoms for stability, deterioration, or improvement     Problem: Safety - Adult  Goal: Free from fall injury  4/22/2024 0921 by Kenna Foley RN  Outcome: Progressing  4/22/2024 0616 by Kimmy Negro RN  Outcome: Progressing

## 2024-04-22 NOTE — PROGRESS NOTES
Progress Note  Hospitalist Service    Patient: Norma Galan MRN: [id number]   SSN: [id number]  YOB: 1980   Age: 43 y.o.  Sex: male      Admit Date: 4/21/2024    LOS: 1 day   Chief Complaint   Patient presents with    Sickle Cell Pain Crisis       Subjective:     Patient states his pain medication is helping.  Does not feel he can manage at home yet.     Objective:     Vitals:  /64   Pulse 63   Temp 98.1 °F (36.7 °C) (Oral)   Resp 18   Ht 1.829 m (6')   Wt 88.5 kg (195 lb)   SpO2 96%   BMI 26.45 kg/m²     Physical Exam:   General appearance: alert, appears stated age, and cooperative  Lungs: clear to auscultation bilaterally  Heart: regular rate and rhythm, S1, S2 normal, no murmur, click, rub or gallop  Abdomen: soft, non-tender; bowel sounds normal; no masses,  no organomegaly  Pulses: 2+ and symmetric  Skin: Skin color, texture, turgor normal. No rashes or lesions  Neuro:  normal without focal findings, mental status, speech normal, alert and oriented x3, IVONNE, and reflexes normal and symmetric    Intake and Output:  Current Shift: 04/22 0701 - 04/22 1900  In: 480 [P.O.:480]  Out: -   Last three shifts: No intake/output data recorded.    Lab/Data Review:  Recent Results (from the past 12 hour(s))   Reticulocytes    Collection Time: 04/22/24  3:00 AM   Result Value Ref Range    Reticulocyte Count,Automated 6.7 (H) 0.5 - 2.5 %   Basic Metabolic Panel w/ Reflex to MG    Collection Time: 04/22/24  3:04 AM   Result Value Ref Range    Sodium 137 136 - 145 mmol/L    Potassium 3.4 (L) 3.5 - 5.5 mmol/L    Chloride 111 100 - 111 mmol/L    CO2 23 21 - 32 mmol/L    Anion Gap 3 3.0 - 18 mmol/L    Glucose 94 74 - 99 mg/dL    BUN 7 7.0 - 18 MG/DL    Creatinine 0.78 0.6 - 1.3 MG/DL    Bun/Cre Ratio 9 (L) 12 - 20      Est, Glom Filt Rate >90 >60 ml/min/1.73m2    Calcium 8.2 (L) 8.5 - 10.1 MG/DL   CBC with Auto Differential    Collection Time: 04/22/24  3:04 AM   Result Value Ref Range    WBC  12.1 4.6 - 13.2 K/uL    RBC 3.06 (L) 4.35 - 5.65 M/uL    Hemoglobin 7.6 (L) 13.0 - 16.0 g/dL    Hematocrit 22.6 (L) 36.0 - 48.0 %    MCV 73.9 (L) 78.0 - 100.0 FL    MCH 24.8 24.0 - 34.0 PG    MCHC 33.6 31.0 - 37.0 g/dL    RDW 23.8 (H) 11.6 - 14.5 %    Platelets 355 135 - 420 K/uL    MPV 9.8 9.2 - 11.8 FL    Nucleated RBCs 2.3 (H) 0  WBC    nRBC 0.28 (H) 0.00 - 0.01 K/uL    Neutrophils % 43 40 - 73 %    Lymphocytes % 38 21 - 52 %    Monocytes % 10 3 - 10 %    Eosinophils % 8 (H) 0 - 5 %    Basophils % 1 0 - 2 %    Immature Granulocytes % 0 0.0 - 0.5 %    Neutrophils Absolute 5.1 1.8 - 8.0 K/UL    Lymphocytes Absolute 4.6 (H) 0.9 - 3.6 K/UL    Monocytes Absolute 1.2 0.05 - 1.2 K/UL    Eosinophils Absolute 1.0 (H) 0.0 - 0.4 K/UL    Basophils Absolute 0.2 (H) 0.0 - 0.1 K/UL    Immature Granulocytes Absolute 0.1 (H) 0.00 - 0.04 K/UL    Differential Type AUTOMATED     Iron and TIBC    Collection Time: 04/22/24  3:04 AM   Result Value Ref Range    Iron 87 50 - 175 ug/dL    TIBC 322 250 - 450 ug/dL    Iron % Saturation 27 20 - 50 %   Ferritin    Collection Time: 04/22/24  3:04 AM   Result Value Ref Range    Ferritin 65 8 - 388 NG/ML   Magnesium    Collection Time: 04/22/24  3:04 AM   Result Value Ref Range    Magnesium 2.2 1.6 - 2.6 mg/dL         Key Findings or tests:       Telemetry NONE   Oxygen NONE     Assessment and Plan:     Sickle cell crisis  - add on retic count and monitor daily  - PRN pain control with holding parameters for BP  - IV fluids  - cont hydroxyurea     Microcytic anemia  - check iron studies  - monitor H&H         Rajni Herbert DO, hospitalist   April 22, 2024

## 2024-04-22 NOTE — PROGRESS NOTES
Advance Care Planning   Healthcare Decision Maker:      Click here to complete Healthcare Decision Makers including selection of the Healthcare Decision Maker Relationship (ie \"Primary\").  Today we documented Decision Maker(s) consistent with Legal Next of Kin hierarchy.     Geno Galan Parent     Primary Phone: [phone] (M)Home Phone: [phone] Phone: [phone]      Maryann Galan  Spouse     Primary Phone: [phone] (M)Home Phone: [phone] Phone: [phone]           conducted an initial consultation and Spiritual Assessment for Norma Galan, who is a 43 y.o.,male. Patient's Primary Language is: English.   According to the patient's EMR Sikhism Affiliation is: Mandaen.     The reason the Patient came to the hospital is:   Patient Active Problem List    Diagnosis Date Noted    Acute chest syndrome (HCC) 05/19/2023    Acute sickle cell crisis (HCC) 05/19/2023    Sickle cell pain crisis (HCC) 01/08/2020    Hyperbilirubinemia 04/13/2018    Bilateral leg pain 04/24/2017    Sickle cell anemia with crisis (HCC) 05/07/2015    Sickle cell crisis (HCC) 01/26/2014    Elevated total protein 12/27/2011    Serum total bilirubin elevated 12/27/2011    Elevated alkaline phosphatase level 03/21/2010    Reticulocytosis 10/25/2007    Microcytic anemia 10/25/2007        The  provided the following Interventions:  Initiated a relationship of care and support.   Provided information about Spiritual Care Services.  Chart reviewed.    The following outcomes where achieved:  Patient expressed gratitude for 's visit.    Assessment:  Patient does not have any Pentecostalism/cultural needs that will affect patient's preferences in health care.  There are no spiritual or Pentecostalism issues which require intervention at this time.     Plan:  Chaplains will continue to follow and will provide pastoral care on an as needed/requested basis.   recommends bedside

## 2024-04-22 NOTE — PROGRESS NOTES
0700- Bedside shift report completed with BRYANT Oneal. Patient resting in bed, no needs at this time. Call bell within reach.    0831- Morning assessment, vitals and medication pass complete. Patient alert and oriented X4. Sats 98% on room air. All other vitals stable. Patient complaining of 8/10 pain to back and lower extremities. Patient medicated per MAR. Patient ate 100 percent of breakfast. Call bell and other personal, bell in lowest position and locked. Bed alarm disabled as patient is independent. No other needs from patient at this time.     0941- pain reassessment complete. Patient complaining of 6/10 pain to back and lower extremities after being medicated. Ok at the moment and can hold off on pain medication until next due time. No further needs at this time. Call bell and other personal belongings within reach.     1050- Patient resting in bed with eyes closed. No needs at this time.     1135. Vitals complete. Patient resting in bed complaining of 7/10 pain to back and lower extremities. Patient medicated per MAR.     1230- Patient resting in bed. Ate 100% of lunch. No needs from me at this time. Call bell within reach.    1345- Patient resting in bed. Complaining of 7/10 pain. Patient can wait until next scheduled dose to be medicated. Told patient I'll be back around 1435 to medicate. No other needs at this time. Call bell within reach.    1452- Patient complaining of 7/10 pain to back an legs. Patient medicated per MAR.     1552- Pain reassessment complete. Patient complaining of 6/10 pain. Resting in bed watching tv. Vitals complete. Patient denies any needs at this time. Call bell within reach.    1650- Patient resting in bed. No needs at this time.     1819- Patient complaining of 8/10 pain. Patient medicated per MAR.

## 2024-04-23 LAB
ANION GAP SERPL CALC-SCNC: 3 MMOL/L (ref 3–18)
BASOPHILS # BLD: 0.1 K/UL (ref 0–0.1)
BASOPHILS NFR BLD: 1 % (ref 0–2)
BUN SERPL-MCNC: 7 MG/DL (ref 7–18)
BUN/CREAT SERPL: 8 (ref 12–20)
CALCIUM SERPL-MCNC: 8.5 MG/DL (ref 8.5–10.1)
CHLORIDE SERPL-SCNC: 110 MMOL/L (ref 100–111)
CO2 SERPL-SCNC: 24 MMOL/L (ref 21–32)
CREAT SERPL-MCNC: 0.9 MG/DL (ref 0.6–1.3)
DIFFERENTIAL METHOD BLD: ABNORMAL
EOSINOPHIL # BLD: 0.4 K/UL (ref 0–0.4)
EOSINOPHIL NFR BLD: 3 % (ref 0–5)
ERYTHROCYTE [DISTWIDTH] IN BLOOD BY AUTOMATED COUNT: 23.4 % (ref 11.6–14.5)
GLUCOSE SERPL-MCNC: 103 MG/DL (ref 74–99)
HCT VFR BLD AUTO: 24.1 % (ref 36–48)
HGB BLD-MCNC: 8 G/DL (ref 13–16)
IMM GRANULOCYTES # BLD AUTO: 0 K/UL (ref 0–0.04)
IMM GRANULOCYTES NFR BLD AUTO: 0 % (ref 0–0.5)
LYMPHOCYTES # BLD: 7.5 K/UL (ref 0.9–3.6)
LYMPHOCYTES NFR BLD: 53 % (ref 21–52)
MCH RBC QN AUTO: 24.6 PG (ref 24–34)
MCHC RBC AUTO-ENTMCNC: 33.2 G/DL (ref 31–37)
MCV RBC AUTO: 74.2 FL (ref 78–100)
MONOCYTES # BLD: 0.8 K/UL (ref 0.05–1.2)
MONOCYTES NFR BLD: 6 % (ref 3–10)
NEUTS SEG # BLD: 5.2 K/UL (ref 1.8–8)
NEUTS SEG NFR BLD: 37 % (ref 40–73)
NRBC # BLD: 0.34 K/UL (ref 0–0.01)
NRBC BLD-RTO: 2.4 PER 100 WBC
PLATELET # BLD AUTO: 379 K/UL (ref 135–420)
PLATELET COMMENT: ABNORMAL
PMV BLD AUTO: 9.7 FL (ref 9.2–11.8)
POTASSIUM SERPL-SCNC: 3.7 MMOL/L (ref 3.5–5.5)
RBC # BLD AUTO: 3.25 M/UL (ref 4.35–5.65)
RBC MORPH BLD: ABNORMAL
RETICS/RBC NFR AUTO: 8.1 % (ref 0.5–2.5)
SODIUM SERPL-SCNC: 137 MMOL/L (ref 136–145)
WBC # BLD AUTO: 14 K/UL (ref 4.6–13.2)
WBC MORPH BLD: ABNORMAL

## 2024-04-23 PROCEDURE — 6360000002 HC RX W HCPCS: Performed by: PHYSICIAN ASSISTANT

## 2024-04-23 PROCEDURE — 36415 COLL VENOUS BLD VENIPUNCTURE: CPT

## 2024-04-23 PROCEDURE — 85045 AUTOMATED RETICULOCYTE COUNT: CPT

## 2024-04-23 PROCEDURE — 99232 SBSQ HOSP IP/OBS MODERATE 35: CPT | Performed by: STUDENT IN AN ORGANIZED HEALTH CARE EDUCATION/TRAINING PROGRAM

## 2024-04-23 PROCEDURE — 2580000003 HC RX 258: Performed by: PHYSICIAN ASSISTANT

## 2024-04-23 PROCEDURE — 1100000000 HC RM PRIVATE

## 2024-04-23 PROCEDURE — 94761 N-INVAS EAR/PLS OXIMETRY MLT: CPT

## 2024-04-23 PROCEDURE — 85025 COMPLETE CBC W/AUTO DIFF WBC: CPT

## 2024-04-23 PROCEDURE — 6370000000 HC RX 637 (ALT 250 FOR IP): Performed by: PHYSICIAN ASSISTANT

## 2024-04-23 PROCEDURE — 80048 BASIC METABOLIC PNL TOTAL CA: CPT

## 2024-04-23 RX ADMIN — HYDROMORPHONE HYDROCHLORIDE 1 MG: 1 INJECTION, SOLUTION INTRAMUSCULAR; INTRAVENOUS; SUBCUTANEOUS at 08:11

## 2024-04-23 RX ADMIN — HYDROMORPHONE HYDROCHLORIDE 1 MG: 1 INJECTION, SOLUTION INTRAMUSCULAR; INTRAVENOUS; SUBCUTANEOUS at 11:36

## 2024-04-23 RX ADMIN — HYDROMORPHONE HYDROCHLORIDE 1 MG: 1 INJECTION, SOLUTION INTRAMUSCULAR; INTRAVENOUS; SUBCUTANEOUS at 21:27

## 2024-04-23 RX ADMIN — HYDROMORPHONE HYDROCHLORIDE 1 MG: 1 INJECTION, SOLUTION INTRAMUSCULAR; INTRAVENOUS; SUBCUTANEOUS at 14:53

## 2024-04-23 RX ADMIN — HYDROMORPHONE HYDROCHLORIDE 1 MG: 1 INJECTION, SOLUTION INTRAMUSCULAR; INTRAVENOUS; SUBCUTANEOUS at 18:29

## 2024-04-23 RX ADMIN — HYDROMORPHONE HYDROCHLORIDE 1 MG: 1 INJECTION, SOLUTION INTRAMUSCULAR; INTRAVENOUS; SUBCUTANEOUS at 04:19

## 2024-04-23 RX ADMIN — SODIUM CHLORIDE, PRESERVATIVE FREE 10 ML: 5 INJECTION INTRAVENOUS at 08:12

## 2024-04-23 RX ADMIN — HYDROMORPHONE HYDROCHLORIDE 1 MG: 1 INJECTION, SOLUTION INTRAMUSCULAR; INTRAVENOUS; SUBCUTANEOUS at 01:03

## 2024-04-23 RX ADMIN — HYDROXYUREA 500 MG: 500 CAPSULE ORAL at 08:14

## 2024-04-23 RX ADMIN — SODIUM CHLORIDE, PRESERVATIVE FREE 10 ML: 5 INJECTION INTRAVENOUS at 21:35

## 2024-04-23 ASSESSMENT — PAIN SCALES - GENERAL
PAINLEVEL_OUTOF10: 7
PAINLEVEL_OUTOF10: 4
PAINLEVEL_OUTOF10: 6
PAINLEVEL_OUTOF10: 6
PAINLEVEL_OUTOF10: 5
PAINLEVEL_OUTOF10: 7
PAINLEVEL_OUTOF10: 4
PAINLEVEL_OUTOF10: 7
PAINLEVEL_OUTOF10: 4
PAINLEVEL_OUTOF10: 6
PAINLEVEL_OUTOF10: 7
PAINLEVEL_OUTOF10: 4
PAINLEVEL_OUTOF10: 7

## 2024-04-23 ASSESSMENT — PAIN DESCRIPTION - ORIENTATION
ORIENTATION: LOWER;RIGHT;LEFT
ORIENTATION: RIGHT;LEFT;LOWER
ORIENTATION: LOWER;RIGHT;LEFT
ORIENTATION: RIGHT;LEFT
ORIENTATION: RIGHT;LEFT
ORIENTATION: LOWER;RIGHT;LEFT
ORIENTATION: RIGHT;LEFT
ORIENTATION: RIGHT;LEFT

## 2024-04-23 ASSESSMENT — PAIN DESCRIPTION - PAIN TYPE
TYPE: CHRONIC PAIN

## 2024-04-23 ASSESSMENT — PAIN DESCRIPTION - FREQUENCY
FREQUENCY: CONTINUOUS

## 2024-04-23 ASSESSMENT — PAIN DESCRIPTION - ONSET
ONSET: GRADUAL
ONSET: ON-GOING
ONSET: ON-GOING
ONSET: GRADUAL
ONSET: ON-GOING
ONSET: ON-GOING
ONSET: GRADUAL
ONSET: ON-GOING

## 2024-04-23 ASSESSMENT — PAIN DESCRIPTION - DESCRIPTORS
DESCRIPTORS: ACHING

## 2024-04-23 ASSESSMENT — PAIN - FUNCTIONAL ASSESSMENT
PAIN_FUNCTIONAL_ASSESSMENT: PREVENTS OR INTERFERES SOME ACTIVE ACTIVITIES AND ADLS
PAIN_FUNCTIONAL_ASSESSMENT: ACTIVITIES ARE NOT PREVENTED
PAIN_FUNCTIONAL_ASSESSMENT: PREVENTS OR INTERFERES SOME ACTIVE ACTIVITIES AND ADLS

## 2024-04-23 ASSESSMENT — PAIN DESCRIPTION - LOCATION
LOCATION: BACK;LEG
LOCATION: BACK
LOCATION: LEG;BACK
LOCATION: BACK;LEG
LOCATION: BACK;LEG

## 2024-04-23 NOTE — PROGRESS NOTES
Progress Note  Hospitalist Service    Patient: Norma Galan MRN: [id number]   SSN: [id number]  YOB: 1980   Age: 43 y.o.  Sex: male      Admit Date: 4/21/2024    LOS: 2 days   Chief Complaint   Patient presents with    Sickle Cell Pain Crisis       Subjective:     Patient states his pain medication is helping.  Does not feel he can manage at home yet.   Overall feels his pain is at 6/10; states his baseline is 4/10     Objective:     Vitals:  /63   Pulse 69   Temp 98.1 °F (36.7 °C) (Oral)   Resp 17   Ht 1.829 m (6')   Wt 88.5 kg (195 lb)   SpO2 94%   BMI 26.45 kg/m²     Physical Exam:   General appearance: alert, appears stated age, and cooperative  Lungs: clear to auscultation bilaterally  Heart: regular rate and rhythm, S1, S2 normal, no murmur, click, rub or gallop  Abdomen: soft, non-tender; bowel sounds normal; no masses,  no organomegaly  Pulses: 2+ and symmetric  Skin: Skin color, texture, turgor normal. No rashes or lesions  Neuro:  normal without focal findings, mental status, speech normal, alert and oriented x3, IVONNE, and reflexes normal and symmetric    Intake and Output:  Current Shift: 04/23 0701 - 04/23 1900  In: 900 [P.O.:900]  Out: -   Last three shifts: 04/21 1901 - 04/23 0700  In: 2160 [P.O.:2160]  Out: -     Lab/Data Review:  No results found for this or any previous visit (from the past 12 hour(s)).        Key Findings or tests:       Telemetry NONE   Oxygen NONE     Assessment and Plan:     Sickle cell crisis  - add on retic count and monitor daily  - PRN pain control with holding parameters for BP  - IV fluids  - cont hydroxyurea     Microcytic anemia  - check iron studies  - monitor H&H         Rajni Herbert DO, hospitalist   April 23, 2024

## 2024-04-23 NOTE — PLAN OF CARE
Problem: Pain  Goal: Verbalizes/displays adequate comfort level or baseline comfort level  4/23/2024 1006 by Lupe Cho RN  Outcome: Progressing  4/22/2024 2347 by Terrance Zimmerman RN  Outcome: Progressing  Flowsheets (Taken 4/22/2024 1602 by Kenna Foley RN)  Verbalizes/displays adequate comfort level or baseline comfort level:   Encourage patient to monitor pain and request assistance   Assess pain using appropriate pain scale     Problem: Chronic Conditions and Co-morbidities  Goal: Patient's chronic conditions and co-morbidity symptoms are monitored and maintained or improved  4/23/2024 1006 by Lupe Cho RN  Outcome: Progressing  4/22/2024 2347 by Terrance Zimmerman RN  Outcome: Progressing     Problem: Safety - Adult  Goal: Free from fall injury  4/23/2024 1006 by Lupe Cho RN  Outcome: Progressing  4/22/2024 2347 by Terrance Zimmerman RN  Outcome: Progressing

## 2024-04-23 NOTE — PLAN OF CARE
Problem: Pain  Goal: Verbalizes/displays adequate comfort level or baseline comfort level  4/23/2024 1942 by Terrance Zimmerman RN  Outcome: Progressing  4/23/2024 1006 by Lupe Cho RN  Outcome: Progressing     Problem: Chronic Conditions and Co-morbidities  Goal: Patient's chronic conditions and co-morbidity symptoms are monitored and maintained or improved  4/23/2024 1942 by Terrance Zimmerman RN  Outcome: Progressing  4/23/2024 1006 by Lupe Cho RN  Outcome: Progressing     Problem: Safety - Adult  Goal: Free from fall injury  4/23/2024 1942 by Terrance Zimmerman RN  Outcome: Progressing  4/23/2024 1006 by Lupe Cho RN  Outcome: Progressing

## 2024-04-23 NOTE — PLAN OF CARE
Problem: Pain  Goal: Verbalizes/displays adequate comfort level or baseline comfort level  Outcome: Progressing  Flowsheets (Taken 4/22/2024 1602 by Kenna Foley RN)  Verbalizes/displays adequate comfort level or baseline comfort level:   Encourage patient to monitor pain and request assistance   Assess pain using appropriate pain scale     Problem: Chronic Conditions and Co-morbidities  Goal: Patient's chronic conditions and co-morbidity symptoms are monitored and maintained or improved  Outcome: Progressing     Problem: Safety - Adult  Goal: Free from fall injury  Outcome: Progressing

## 2024-04-24 LAB
ANION GAP SERPL CALC-SCNC: 5 MMOL/L (ref 3–18)
BASOPHILS # BLD: 0.3 K/UL (ref 0–0.1)
BASOPHILS NFR BLD: 2 % (ref 0–2)
BUN SERPL-MCNC: 5 MG/DL (ref 7–18)
BUN/CREAT SERPL: 6 (ref 12–20)
CALCIUM SERPL-MCNC: 8.7 MG/DL (ref 8.5–10.1)
CHLORIDE SERPL-SCNC: 107 MMOL/L (ref 100–111)
CO2 SERPL-SCNC: 23 MMOL/L (ref 21–32)
CREAT SERPL-MCNC: 0.79 MG/DL (ref 0.6–1.3)
DIFFERENTIAL METHOD BLD: ABNORMAL
EOSINOPHIL # BLD: 1.6 K/UL (ref 0–0.4)
EOSINOPHIL NFR BLD: 12 % (ref 0–5)
ERYTHROCYTE [DISTWIDTH] IN BLOOD BY AUTOMATED COUNT: 22.8 % (ref 11.6–14.5)
GLUCOSE SERPL-MCNC: 91 MG/DL (ref 74–99)
HCT VFR BLD AUTO: 24.5 % (ref 36–48)
HGB BLD-MCNC: 8.2 G/DL (ref 13–16)
IMM GRANULOCYTES # BLD AUTO: 0 K/UL (ref 0–0.04)
IMM GRANULOCYTES NFR BLD AUTO: 0 % (ref 0–0.5)
LYMPHOCYTES # BLD: 5.8 K/UL (ref 0.9–3.6)
LYMPHOCYTES NFR BLD: 44 % (ref 21–52)
MCH RBC QN AUTO: 24.8 PG (ref 24–34)
MCHC RBC AUTO-ENTMCNC: 33.5 G/DL (ref 31–37)
MCV RBC AUTO: 74 FL (ref 78–100)
MONOCYTES # BLD: 0.3 K/UL (ref 0.05–1.2)
MONOCYTES NFR BLD: 2 % (ref 3–10)
NEUTS SEG # BLD: 5.3 K/UL (ref 1.8–8)
NEUTS SEG NFR BLD: 40 % (ref 40–73)
NRBC # BLD: 0.21 K/UL (ref 0–0.01)
NRBC BLD-RTO: 1.6 PER 100 WBC
PLATELET # BLD AUTO: 347 K/UL (ref 135–420)
PLATELET COMMENT: ABNORMAL
PMV BLD AUTO: 10.3 FL (ref 9.2–11.8)
POTASSIUM SERPL-SCNC: 3.6 MMOL/L (ref 3.5–5.5)
RBC # BLD AUTO: 3.31 M/UL (ref 4.35–5.65)
RBC MORPH BLD: ABNORMAL
RETICS/RBC NFR AUTO: 5.7 % (ref 0.5–2.5)
SODIUM SERPL-SCNC: 135 MMOL/L (ref 136–145)
WBC # BLD AUTO: 13.3 K/UL (ref 4.6–13.2)

## 2024-04-24 PROCEDURE — 94761 N-INVAS EAR/PLS OXIMETRY MLT: CPT

## 2024-04-24 PROCEDURE — 6370000000 HC RX 637 (ALT 250 FOR IP): Performed by: PHYSICIAN ASSISTANT

## 2024-04-24 PROCEDURE — 2580000003 HC RX 258: Performed by: PHYSICIAN ASSISTANT

## 2024-04-24 PROCEDURE — 99232 SBSQ HOSP IP/OBS MODERATE 35: CPT | Performed by: STUDENT IN AN ORGANIZED HEALTH CARE EDUCATION/TRAINING PROGRAM

## 2024-04-24 PROCEDURE — 36415 COLL VENOUS BLD VENIPUNCTURE: CPT

## 2024-04-24 PROCEDURE — 1100000000 HC RM PRIVATE

## 2024-04-24 PROCEDURE — 6360000002 HC RX W HCPCS: Performed by: PHYSICIAN ASSISTANT

## 2024-04-24 PROCEDURE — 85045 AUTOMATED RETICULOCYTE COUNT: CPT

## 2024-04-24 PROCEDURE — 80048 BASIC METABOLIC PNL TOTAL CA: CPT

## 2024-04-24 PROCEDURE — 85025 COMPLETE CBC W/AUTO DIFF WBC: CPT

## 2024-04-24 RX ADMIN — HYDROMORPHONE HYDROCHLORIDE 1 MG: 1 INJECTION, SOLUTION INTRAMUSCULAR; INTRAVENOUS; SUBCUTANEOUS at 04:02

## 2024-04-24 RX ADMIN — HYDROMORPHONE HYDROCHLORIDE 1 MG: 1 INJECTION, SOLUTION INTRAMUSCULAR; INTRAVENOUS; SUBCUTANEOUS at 21:13

## 2024-04-24 RX ADMIN — HYDROMORPHONE HYDROCHLORIDE 1 MG: 1 INJECTION, SOLUTION INTRAMUSCULAR; INTRAVENOUS; SUBCUTANEOUS at 13:34

## 2024-04-24 RX ADMIN — HYDROXYUREA 500 MG: 500 CAPSULE ORAL at 00:33

## 2024-04-24 RX ADMIN — HYDROMORPHONE HYDROCHLORIDE 1 MG: 1 INJECTION, SOLUTION INTRAMUSCULAR; INTRAVENOUS; SUBCUTANEOUS at 07:06

## 2024-04-24 RX ADMIN — HYDROXYUREA 500 MG: 500 CAPSULE ORAL at 08:18

## 2024-04-24 RX ADMIN — SODIUM CHLORIDE, PRESERVATIVE FREE 10 ML: 5 INJECTION INTRAVENOUS at 21:23

## 2024-04-24 RX ADMIN — HYDROXYUREA 500 MG: 500 CAPSULE ORAL at 21:20

## 2024-04-24 RX ADMIN — SODIUM CHLORIDE, PRESERVATIVE FREE 10 ML: 5 INJECTION INTRAVENOUS at 08:18

## 2024-04-24 RX ADMIN — HYDROMORPHONE HYDROCHLORIDE 1 MG: 1 INJECTION, SOLUTION INTRAMUSCULAR; INTRAVENOUS; SUBCUTANEOUS at 16:49

## 2024-04-24 RX ADMIN — HYDROMORPHONE HYDROCHLORIDE 1 MG: 1 INJECTION, SOLUTION INTRAMUSCULAR; INTRAVENOUS; SUBCUTANEOUS at 10:30

## 2024-04-24 RX ADMIN — HYDROMORPHONE HYDROCHLORIDE 1 MG: 1 INJECTION, SOLUTION INTRAMUSCULAR; INTRAVENOUS; SUBCUTANEOUS at 00:37

## 2024-04-24 ASSESSMENT — PAIN DESCRIPTION - ONSET
ONSET: ON-GOING
ONSET: GRADUAL
ONSET: ON-GOING
ONSET: ON-GOING

## 2024-04-24 ASSESSMENT — PAIN SCALES - GENERAL
PAINLEVEL_OUTOF10: 4
PAINLEVEL_OUTOF10: 7
PAINLEVEL_OUTOF10: 4
PAINLEVEL_OUTOF10: 0
PAINLEVEL_OUTOF10: 4
PAINLEVEL_OUTOF10: 4
PAINLEVEL_OUTOF10: 7
PAINLEVEL_OUTOF10: 8
PAINLEVEL_OUTOF10: 7
PAINLEVEL_OUTOF10: 4
PAINLEVEL_OUTOF10: 7
PAINLEVEL_OUTOF10: 4
PAINLEVEL_OUTOF10: 4
PAINLEVEL_OUTOF10: 7
PAINLEVEL_OUTOF10: 6
PAINLEVEL_OUTOF10: 4

## 2024-04-24 ASSESSMENT — PAIN - FUNCTIONAL ASSESSMENT
PAIN_FUNCTIONAL_ASSESSMENT: ACTIVITIES ARE NOT PREVENTED

## 2024-04-24 ASSESSMENT — PAIN DESCRIPTION - FREQUENCY
FREQUENCY: CONTINUOUS

## 2024-04-24 ASSESSMENT — PAIN DESCRIPTION - PAIN TYPE
TYPE: CHRONIC PAIN

## 2024-04-24 ASSESSMENT — PAIN DESCRIPTION - LOCATION
LOCATION: BACK
LOCATION: BACK;LEG
LOCATION: BACK

## 2024-04-24 ASSESSMENT — PAIN DESCRIPTION - DESCRIPTORS
DESCRIPTORS: ACHING

## 2024-04-24 ASSESSMENT — PAIN DESCRIPTION - ORIENTATION
ORIENTATION: MID
ORIENTATION: LOWER;RIGHT;LEFT
ORIENTATION: RIGHT;LEFT;MID
ORIENTATION: LOWER;RIGHT;LEFT
ORIENTATION: RIGHT;LEFT
ORIENTATION: LOWER;RIGHT;LEFT
ORIENTATION: MID

## 2024-04-24 NOTE — PROGRESS NOTES
4/24/2024                         RE: Norma Galan         [address]  [facility] [address]          To Whom It May Concern,      Due to medical reasons, Norma Galan was hospitalized at [facility] from 4/21/24 to 4/24/24. He is able to return to work on Saturday, April 27, 2024.         Sincerely,          Rajni Herbert, DO

## 2024-04-24 NOTE — PLAN OF CARE
Problem: Pain  Goal: Verbalizes/displays adequate comfort level or baseline comfort level  Outcome: Progressing     Problem: Chronic Conditions and Co-morbidities  Goal: Patient's chronic conditions and co-morbidity symptoms are monitored and maintained or improved  Outcome: Progressing  Flowsheets (Taken 4/24/2024 7894)  Care Plan - Patient's Chronic Conditions and Co-Morbidity Symptoms are Monitored and Maintained or Improved: Monitor and assess patient's chronic conditions and comorbid symptoms for stability, deterioration, or improvement     Problem: Safety - Adult  Goal: Free from fall injury  Outcome: Progressing

## 2024-04-24 NOTE — DISCHARGE SUMMARY
Discharge Summary    Patient: Norma Galan MRN: [id number]  CSN: [id number]    YOB: 1980  Age: 43 y.o.  Sex: male    DOA: 4/21/2024 LOS:  LOS: 4 days   Discharge Date: 4/24/24     Admission Diagnosis: Sickle cell pain crisis (HCC) [D57.00]    Discharge Diagnosis:    Principal Problem:    Sickle cell pain crisis (HCC)  Active Problems:    Leukemoid reaction  Resolved Problems:    * No resolved hospital problems. *       Discharge Condition: Stable  Discharge Disposition: Home     PHYSICAL EXAM  Visit Vitals  /62   Pulse 76   Temp 98 °F (36.7 °C) (Oral)   Resp 18   Ht 1.829 m (6')   Wt 88.5 kg (195 lb)   SpO2 95%   BMI 26.45 kg/m²       General: Alert, cooperative, no acute distress    HEENT: NC, Atraumatic.  PERRLA, EOMI. Anicteric sclerae.  Lungs:  CTA Bilaterally. No Wheezing/Rhonchi/Rales.  Heart:  Regular  rhythm,  No murmur, No Rubs, No Gallops  Abdomen: Soft, Non distended, Non tender.  +Bowel sounds, no HSM  Extremities: No c/c/e  Psych:   Good insight. Not anxious or agitated.  Neurologic:  CN 2-12 grossly intact, oriented X 3.  No acute neurological                                 Deficits,     Hospital Course By Problem:     Sickle cell crisis  - add on retic count and monitor daily  - PRN pain control - dilaudid 4 mg oral every 4 hours PRN; Dilaudid 1 mg every 3 hours PRN.   - started on IV fluids; now discontinued   - cont hydroxyurea  -Patient states he lives at pain level 4/10. Currently feeling confident he can manage at home with his oral dilaudid.      Microcytic anemia  - monitor H&H    Leukocytosis - Patient states he feels well. No signs of infection. No URI symptoms, no abdominal pain, no chest pain. No changes in bowel habits. If patient feels ill, he will report back to ED or call PCP for appt.     Consults:   None     Significant Diagnostic Studies:   None     Discharge Medications:     Current Discharge Medication List        CONTINUE these

## 2024-04-24 NOTE — CARE COORDINATION
Discharge order noted for today. Orders received. No needs identified at this time. Case management remains available as needed.    Patient states that his sister will transport him home at time of discharge.    Natalie Van RN, BSN, [facility]  Case Management  [phone]

## 2024-04-24 NOTE — DISCHARGE INSTRUCTIONS
DISCHARGE SUMMARY from Nurse    PATIENT INSTRUCTIONS:    After general anesthesia or intravenous sedation, for 24 hours or while taking prescription Narcotics:  Limit your activities  Do not drive and operate hazardous machinery  Do not make important personal or business decisions  Do  not drink alcoholic beverages  If you have not urinated within 8 hours after discharge, please contact your surgeon on call.    Report the following to your surgeon:  Excessive pain, swelling, redness or odor of or around the surgical area  Temperature over 100.5  Nausea and vomiting lasting longer than 4 hours or if unable to take medications  Any signs of decreased circulation or nerve impairment to extremity: change in color, persistent  numbness, tingling, coldness or increase pain  Any questions    What to do at Home:  Recommended activity: activity as tolerated,     If you experience any of the following symptoms chest pain, shortness of breath, nausea, vomiting, pain unrelieved by medication, please follow up with Dr. Chaparro PCP.    *  Please give a list of your current medications to your Primary Care Provider.    *  Please update this list whenever your medications are discontinued, doses are      changed, or new medications (including over-the-counter products) are added.    *  Please carry medication information at all times in case of emergency situations.    These are general instructions for a healthy lifestyle:    No smoking/ No tobacco products/ Avoid exposure to second hand smoke  Surgeon General's Warning:  Quitting smoking now greatly reduces serious risk to your health.    Obesity, smoking, and sedentary lifestyle greatly increases your risk for illness    A healthy diet, regular physical exercise & weight monitoring are important for maintaining a healthy lifestyle    You may be retaining fluid if you have a history of heart failure or if you experience any of the following symptoms:  Weight gain of 3 pounds or  {PATIENT PARENT GUARDIAN:85824} verbalized understanding.  Discharge medications reviewed with the {Dishcarxuan meds reviewed with:[id number]} and appropriate educational materials and side effects teaching were provided.  ___________________________________________________________________________________________________________________________________

## 2024-04-25 VITALS
OXYGEN SATURATION: 95 % | HEART RATE: 76 BPM | SYSTOLIC BLOOD PRESSURE: 104 MMHG | HEIGHT: 72 IN | DIASTOLIC BLOOD PRESSURE: 62 MMHG | BODY MASS INDEX: 26.41 KG/M2 | TEMPERATURE: 98 F | WEIGHT: 195 LBS | RESPIRATION RATE: 18 BRPM

## 2024-04-25 PROBLEM — D72.823 LEUKEMOID REACTION: Status: ACTIVE | Noted: 2024-04-25

## 2024-04-25 LAB
BASOPHILS # BLD: 0.2 K/UL (ref 0–0.1)
BASOPHILS NFR BLD: 1 % (ref 0–2)
DIFFERENTIAL METHOD BLD: ABNORMAL
EOSINOPHIL # BLD: 0.8 K/UL (ref 0–0.4)
EOSINOPHIL NFR BLD: 5 % (ref 0–5)
ERYTHROCYTE [DISTWIDTH] IN BLOOD BY AUTOMATED COUNT: 23.2 % (ref 11.6–14.5)
HCT VFR BLD AUTO: 24 % (ref 36–48)
HGB BLD-MCNC: 8.2 G/DL (ref 13–16)
IMM GRANULOCYTES # BLD AUTO: 0 K/UL (ref 0–0.04)
IMM GRANULOCYTES NFR BLD AUTO: 0 % (ref 0–0.5)
LYMPHOCYTES # BLD: 7.7 K/UL (ref 0.9–3.6)
LYMPHOCYTES NFR BLD: 49 % (ref 21–52)
MCH RBC QN AUTO: 25.8 PG (ref 24–34)
MCHC RBC AUTO-ENTMCNC: 34.2 G/DL (ref 31–37)
MCV RBC AUTO: 75.5 FL (ref 78–100)
MONOCYTES # BLD: 0.8 K/UL (ref 0.05–1.2)
MONOCYTES NFR BLD: 5 % (ref 3–10)
NEUTS SEG # BLD: 6.4 K/UL (ref 1.8–8)
NEUTS SEG NFR BLD: 40 % (ref 40–73)
NRBC # BLD: 0.2 K/UL (ref 0–0.01)
NRBC BLD-RTO: 1.3 PER 100 WBC
PLATELET # BLD AUTO: 302 K/UL (ref 135–420)
PLATELET COMMENT: ABNORMAL
PMV BLD AUTO: 10.9 FL (ref 9.2–11.8)
PROCALCITONIN SERPL-MCNC: 0.1 NG/ML
RBC # BLD AUTO: 3.18 M/UL (ref 4.35–5.65)
RBC MORPH BLD: ABNORMAL
WBC # BLD AUTO: 15.9 K/UL (ref 4.6–13.2)

## 2024-04-25 PROCEDURE — 6360000002 HC RX W HCPCS: Performed by: PHYSICIAN ASSISTANT

## 2024-04-25 PROCEDURE — 2580000003 HC RX 258: Performed by: PHYSICIAN ASSISTANT

## 2024-04-25 PROCEDURE — 99232 SBSQ HOSP IP/OBS MODERATE 35: CPT | Performed by: STUDENT IN AN ORGANIZED HEALTH CARE EDUCATION/TRAINING PROGRAM

## 2024-04-25 PROCEDURE — 84145 PROCALCITONIN (PCT): CPT

## 2024-04-25 PROCEDURE — 94761 N-INVAS EAR/PLS OXIMETRY MLT: CPT

## 2024-04-25 PROCEDURE — 6370000000 HC RX 637 (ALT 250 FOR IP): Performed by: PHYSICIAN ASSISTANT

## 2024-04-25 PROCEDURE — 85025 COMPLETE CBC W/AUTO DIFF WBC: CPT

## 2024-04-25 PROCEDURE — 36415 COLL VENOUS BLD VENIPUNCTURE: CPT

## 2024-04-25 RX ADMIN — HYDROMORPHONE HYDROCHLORIDE 1 MG: 1 INJECTION, SOLUTION INTRAMUSCULAR; INTRAVENOUS; SUBCUTANEOUS at 04:38

## 2024-04-25 RX ADMIN — SODIUM CHLORIDE, PRESERVATIVE FREE 10 ML: 5 INJECTION INTRAVENOUS at 10:38

## 2024-04-25 RX ADMIN — HYDROXYUREA 500 MG: 500 CAPSULE ORAL at 08:52

## 2024-04-25 RX ADMIN — HYDROMORPHONE HYDROCHLORIDE 1 MG: 1 INJECTION, SOLUTION INTRAMUSCULAR; INTRAVENOUS; SUBCUTANEOUS at 01:14

## 2024-04-25 RX ADMIN — HYDROMORPHONE HYDROCHLORIDE 1 MG: 1 INJECTION, SOLUTION INTRAMUSCULAR; INTRAVENOUS; SUBCUTANEOUS at 08:51

## 2024-04-25 ASSESSMENT — PAIN - FUNCTIONAL ASSESSMENT
PAIN_FUNCTIONAL_ASSESSMENT: ACTIVITIES ARE NOT PREVENTED

## 2024-04-25 ASSESSMENT — PAIN DESCRIPTION - LOCATION
LOCATION: LEG
LOCATION: BACK;LEG
LOCATION: BACK;LEG

## 2024-04-25 ASSESSMENT — PAIN DESCRIPTION - DESCRIPTORS
DESCRIPTORS: ACHING;THROBBING
DESCRIPTORS: ACHING
DESCRIPTORS: ACHING

## 2024-04-25 ASSESSMENT — PAIN DESCRIPTION - ORIENTATION
ORIENTATION: RIGHT;LEFT;UPPER
ORIENTATION: LEFT;RIGHT
ORIENTATION: LEFT;RIGHT

## 2024-04-25 ASSESSMENT — PAIN SCALES - GENERAL
PAINLEVEL_OUTOF10: 7
PAINLEVEL_OUTOF10: 4
PAINLEVEL_OUTOF10: 8
PAINLEVEL_OUTOF10: 3
PAINLEVEL_OUTOF10: 2
PAINLEVEL_OUTOF10: 0
PAINLEVEL_OUTOF10: 6
PAINLEVEL_OUTOF10: 0

## 2024-04-25 ASSESSMENT — PAIN DESCRIPTION - ONSET
ONSET: GRADUAL
ONSET: GRADUAL
ONSET: ON-GOING

## 2024-04-25 ASSESSMENT — PAIN DESCRIPTION - PAIN TYPE
TYPE: CHRONIC PAIN

## 2024-04-25 ASSESSMENT — PAIN DESCRIPTION - FREQUENCY
FREQUENCY: CONTINUOUS

## 2024-04-25 NOTE — PROGRESS NOTES
Presented Discharge papers to patient, including work note. Adequate time explaining paperwork and opportunity given to ask and answer any questions to patients satisfaction.     PIV removed without complication.    Patient discharged home via sister for transportation

## 2024-04-25 NOTE — PLAN OF CARE
Problem: Pain  Goal: Verbalizes/displays adequate comfort level or baseline comfort level  Outcome: Progressing  Flowsheets (Taken 4/25/2024 0851)  Verbalizes/displays adequate comfort level or baseline comfort level:   Encourage patient to monitor pain and request assistance   Assess pain using appropriate pain scale   Administer analgesics based on type and severity of pain and evaluate response   Implement non-pharmacological measures as appropriate and evaluate response   Consider cultural and social influences on pain and pain management   Notify Licensed Independent Practitioner if interventions unsuccessful or patient reports new pain     Problem: Chronic Conditions and Co-morbidities  Goal: Patient's chronic conditions and co-morbidity symptoms are monitored and maintained or improved  Outcome: Progressing  Flowsheets (Taken 4/25/2024 0742)  Care Plan - Patient's Chronic Conditions and Co-Morbidity Symptoms are Monitored and Maintained or Improved:   Monitor and assess patient's chronic conditions and comorbid symptoms for stability, deterioration, or improvement   Collaborate with multidisciplinary team to address chronic and comorbid conditions and prevent exacerbation or deterioration   Update acute care plan with appropriate goals if chronic or comorbid symptoms are exacerbated and prevent overall improvement and discharge     Problem: Safety - Adult  Goal: Free from fall injury  Outcome: Progressing  Flowsheets (Taken 4/25/2024 0742)  Free From Fall Injury: Instruct family/caregiver on patient safety

## 2024-04-25 NOTE — PROGRESS NOTES
Progress Note  Hospitalist Service    Patient: Norma Galan MRN: [id number]   SSN: [id number]  YOB: 1980   Age: 43 y.o.  Sex: male      Admit Date: 4/21/2024    LOS: 4 days   Chief Complaint   Patient presents with    Sickle Cell Pain Crisis       Subjective:     Patient states his pain medication is helping.  Does not feel he can manage at home yet.   Overall feels pain is improving.     Objective:     Vitals:  /62   Pulse 76   Temp 98 °F (36.7 °C) (Oral)   Resp 18   Ht 1.829 m (6')   Wt 88.5 kg (195 lb)   SpO2 95%   BMI 26.45 kg/m²     Physical Exam:   General appearance: alert, appears stated age, and cooperative  Lungs: clear to auscultation bilaterally  Heart: regular rate and rhythm, S1, S2 normal, no murmur, click, rub or gallop  Abdomen: soft, non-tender; bowel sounds normal; no masses,  no organomegaly  Pulses: 2+ and symmetric  Skin: Skin color, texture, turgor normal. No rashes or lesions  Neuro:  normal without focal findings, mental status, speech normal, alert and oriented x3, IVONNE, and reflexes normal and symmetric    Intake and Output:  Current Shift: No intake/output data recorded.  Last three shifts: 04/23 1901 - 04/25 0700  In: 250 [P.O.:240; I.V.:10]  Out: 450     Lab/Data Review:  Recent Results (from the past 12 hour(s))   CBC with Auto Differential    Collection Time: 04/25/24  3:10 AM   Result Value Ref Range    WBC 15.9 (H) 4.6 - 13.2 K/uL    RBC 3.18 (L) 4.35 - 5.65 M/uL    Hemoglobin 8.2 (L) 13.0 - 16.0 g/dL    Hematocrit 24.0 (L) 36.0 - 48.0 %    MCV 75.5 (L) 78.0 - 100.0 FL    MCH 25.8 24.0 - 34.0 PG    MCHC 34.2 31.0 - 37.0 g/dL    RDW 23.2 (H) 11.6 - 14.5 %    Platelets 302 135 - 420 K/uL    MPV 10.9 9.2 - 11.8 FL    Nucleated RBCs 1.3 (H) 0  WBC    nRBC 0.20 (H) 0.00 - 0.01 K/uL    Neutrophils % 40 40 - 73 %    Lymphocytes % 49 21 - 52 %    Monocytes % 5 3 - 10 %    Eosinophils % 5 0 - 5 %    Basophils % 1 0 - 2 %    Immature Granulocytes %

## 2024-04-25 NOTE — CARE COORDINATION
Discharge order noted for today. Orders received. No needs identified at this time. Case management remains available as needed.    Patient states that his sister will transport him home at time of discharge.    Natalie Van RN, BSN, [facility]  Case Management  [phone]

## 2024-04-25 NOTE — CARE COORDINATION
04/25/24 1036   IMM Letter   IMM Letter given to Patient/Family/Significant other/Guardian/POA/by: Natalie Van   IMM Letter date given: 04/25/24   IMM Letter time given: 1027     Patient understands that he has at least 4 hours after receiving IMM to appeal discharge.  Patient's sister is on her way to transport pt home.    Natalie Van RN, BSN, [facility]  Case Management  [phone]

## 2024-05-12 ENCOUNTER — APPOINTMENT (OUTPATIENT)
Facility: HOSPITAL | Age: 44
DRG: 812 | End: 2024-05-12
Payer: MEDICARE

## 2024-05-12 ENCOUNTER — HOSPITAL ENCOUNTER (INPATIENT)
Facility: HOSPITAL | Age: 44
LOS: 2 days | Discharge: HOME | DRG: 812 | End: 2024-05-15
Attending: INTERNAL MEDICINE | Admitting: INTERNAL MEDICINE
Payer: MEDICARE

## 2024-05-12 DIAGNOSIS — D57.00 SICKLE-CELL DISEASE WITH VASO-OCCLUSIVE PAIN (HCC): Primary | ICD-10-CM

## 2024-05-12 PROCEDURE — 85025 COMPLETE CBC W/AUTO DIFF WBC: CPT

## 2024-05-12 PROCEDURE — 80048 BASIC METABOLIC PNL TOTAL CA: CPT

## 2024-05-12 PROCEDURE — 85045 AUTOMATED RETICULOCYTE COUNT: CPT

## 2024-05-12 PROCEDURE — 83615 LACTATE (LD) (LDH) ENZYME: CPT

## 2024-05-12 PROCEDURE — 99285 EMERGENCY DEPT VISIT HI MDM: CPT

## 2024-05-12 PROCEDURE — 71046 X-RAY EXAM CHEST 2 VIEWS: CPT

## 2024-05-12 PROCEDURE — 80076 HEPATIC FUNCTION PANEL: CPT

## 2024-05-12 ASSESSMENT — PAIN SCALES - GENERAL: PAINLEVEL_OUTOF10: 9

## 2024-05-12 ASSESSMENT — PAIN DESCRIPTION - LOCATION: LOCATION: LEG;BACK;CHEST

## 2024-05-12 ASSESSMENT — PAIN - FUNCTIONAL ASSESSMENT: PAIN_FUNCTIONAL_ASSESSMENT: 0-10

## 2024-05-13 LAB
ALBUMIN SERPL-MCNC: 3.9 G/DL (ref 3.4–5)
ALBUMIN/GLOB SERPL: 0.8 (ref 0.8–1.7)
ALP SERPL-CCNC: 130 U/L (ref 45–117)
ALT SERPL-CCNC: 38 U/L (ref 16–61)
ANION GAP SERPL CALC-SCNC: 5 MMOL/L (ref 3–18)
AST SERPL-CCNC: 61 U/L (ref 10–38)
BASOPHILS # BLD: 0 K/UL (ref 0–0.1)
BASOPHILS NFR BLD: 0 % (ref 0–2)
BILIRUB DIRECT SERPL-MCNC: 0.8 MG/DL (ref 0–0.2)
BILIRUB SERPL-MCNC: 5.1 MG/DL (ref 0.2–1)
BUN SERPL-MCNC: 6 MG/DL (ref 7–18)
BUN/CREAT SERPL: 7 (ref 12–20)
CALCIUM SERPL-MCNC: 8.5 MG/DL (ref 8.5–10.1)
CHLORIDE SERPL-SCNC: 106 MMOL/L (ref 100–111)
CO2 SERPL-SCNC: 25 MMOL/L (ref 21–32)
CREAT SERPL-MCNC: 0.91 MG/DL (ref 0.6–1.3)
DIFFERENTIAL METHOD BLD: ABNORMAL
EKG ATRIAL RATE: 62 BPM
EKG DIAGNOSIS: NORMAL
EKG P AXIS: 55 DEGREES
EKG P-R INTERVAL: 152 MS
EKG Q-T INTERVAL: 432 MS
EKG QRS DURATION: 92 MS
EKG QTC CALCULATION (BAZETT): 438 MS
EKG R AXIS: -17 DEGREES
EKG T AXIS: 72 DEGREES
EKG VENTRICULAR RATE: 62 BPM
EOSINOPHIL # BLD: 0.4 K/UL (ref 0–0.4)
EOSINOPHIL NFR BLD: 3 % (ref 0–5)
ERYTHROCYTE [DISTWIDTH] IN BLOOD BY AUTOMATED COUNT: 22.6 % (ref 11.6–14.5)
GLOBULIN SER CALC-MCNC: 4.7 G/DL (ref 2–4)
GLUCOSE SERPL-MCNC: 84 MG/DL (ref 74–99)
HCT VFR BLD AUTO: 24.2 % (ref 36–48)
HGB BLD-MCNC: 8.2 G/DL (ref 13–16)
IMM GRANULOCYTES # BLD AUTO: 0 K/UL (ref 0–0.04)
IMM GRANULOCYTES NFR BLD AUTO: 0 % (ref 0–0.5)
LDH SERPL L TO P-CCNC: 569 U/L (ref 81–234)
LYMPHOCYTES # BLD: 6.6 K/UL (ref 0.9–3.6)
LYMPHOCYTES NFR BLD: 53 % (ref 21–52)
MCH RBC QN AUTO: 25 PG (ref 24–34)
MCHC RBC AUTO-ENTMCNC: 33.9 G/DL (ref 31–37)
MCV RBC AUTO: 73.8 FL (ref 78–100)
MONOCYTES # BLD: 0.5 K/UL (ref 0.05–1.2)
MONOCYTES NFR BLD: 4 % (ref 3–10)
NEUTS SEG # BLD: 5 K/UL (ref 1.8–8)
NEUTS SEG NFR BLD: 40 % (ref 40–73)
NRBC # BLD: 0.21 K/UL (ref 0–0.01)
NRBC BLD-RTO: 1.7 PER 100 WBC
PLATELET # BLD AUTO: 342 K/UL (ref 135–420)
PLATELET COMMENT: ABNORMAL
PMV BLD AUTO: 9.8 FL (ref 9.2–11.8)
POTASSIUM SERPL-SCNC: 3.5 MMOL/L (ref 3.5–5.5)
PROT SERPL-MCNC: 8.6 G/DL (ref 6.4–8.2)
RBC # BLD AUTO: 3.28 M/UL (ref 4.35–5.65)
RBC MORPH BLD: ABNORMAL
RETICS/RBC NFR AUTO: 7 % (ref 0.5–2.5)
SODIUM SERPL-SCNC: 136 MMOL/L (ref 136–145)
TROPONIN I SERPL HS-MCNC: 17 NG/L (ref 0–78)
TROPONIN I SERPL HS-MCNC: 18 NG/L (ref 0–78)
WBC # BLD AUTO: 12.5 K/UL (ref 4.6–13.2)
WBC MORPH BLD: ABNORMAL

## 2024-05-13 PROCEDURE — 6370000000 HC RX 637 (ALT 250 FOR IP): Performed by: INTERNAL MEDICINE

## 2024-05-13 PROCEDURE — 2580000003 HC RX 258: Performed by: INTERNAL MEDICINE

## 2024-05-13 PROCEDURE — 93005 ELECTROCARDIOGRAM TRACING: CPT | Performed by: HEALTH CARE PROVIDER

## 2024-05-13 PROCEDURE — 96375 TX/PRO/DX INJ NEW DRUG ADDON: CPT

## 2024-05-13 PROCEDURE — 93010 ELECTROCARDIOGRAM REPORT: CPT | Performed by: INTERNAL MEDICINE

## 2024-05-13 PROCEDURE — 84484 ASSAY OF TROPONIN QUANT: CPT

## 2024-05-13 PROCEDURE — 2580000003 HC RX 258: Performed by: HEALTH CARE PROVIDER

## 2024-05-13 PROCEDURE — 1100000000 HC RM PRIVATE

## 2024-05-13 PROCEDURE — 99222 1ST HOSP IP/OBS MODERATE 55: CPT | Performed by: INTERNAL MEDICINE

## 2024-05-13 PROCEDURE — 6360000002 HC RX W HCPCS: Performed by: HEALTH CARE PROVIDER

## 2024-05-13 PROCEDURE — 96374 THER/PROPH/DIAG INJ IV PUSH: CPT

## 2024-05-13 PROCEDURE — 6360000002 HC RX W HCPCS: Performed by: INTERNAL MEDICINE

## 2024-05-13 PROCEDURE — 96376 TX/PRO/DX INJ SAME DRUG ADON: CPT

## 2024-05-13 RX ORDER — HYDROMORPHONE HYDROCHLORIDE 1 MG/ML
1 INJECTION, SOLUTION INTRAMUSCULAR; INTRAVENOUS; SUBCUTANEOUS ONCE
Status: COMPLETED | OUTPATIENT
Start: 2024-05-13 | End: 2024-05-13

## 2024-05-13 RX ORDER — SODIUM CHLORIDE 9 MG/ML
INJECTION, SOLUTION INTRAVENOUS PRN
Status: DISCONTINUED | OUTPATIENT
Start: 2024-05-13 | End: 2024-05-15 | Stop reason: HOSPADM

## 2024-05-13 RX ORDER — POTASSIUM CHLORIDE 7.45 MG/ML
10 INJECTION INTRAVENOUS PRN
Status: DISCONTINUED | OUTPATIENT
Start: 2024-05-13 | End: 2024-05-15 | Stop reason: HOSPADM

## 2024-05-13 RX ORDER — ONDANSETRON 4 MG/1
4 TABLET, ORALLY DISINTEGRATING ORAL EVERY 8 HOURS PRN
Status: DISCONTINUED | OUTPATIENT
Start: 2024-05-13 | End: 2024-05-15 | Stop reason: HOSPADM

## 2024-05-13 RX ORDER — DIPHENHYDRAMINE HYDROCHLORIDE 50 MG/ML
12.5 INJECTION INTRAMUSCULAR; INTRAVENOUS ONCE
Status: COMPLETED | OUTPATIENT
Start: 2024-05-13 | End: 2024-05-13

## 2024-05-13 RX ORDER — POTASSIUM CHLORIDE 20 MEQ/1
40 TABLET, EXTENDED RELEASE ORAL PRN
Status: DISCONTINUED | OUTPATIENT
Start: 2024-05-13 | End: 2024-05-15 | Stop reason: HOSPADM

## 2024-05-13 RX ORDER — POLYETHYLENE GLYCOL 3350 17 G/17G
17 POWDER, FOR SOLUTION ORAL DAILY PRN
Status: DISCONTINUED | OUTPATIENT
Start: 2024-05-13 | End: 2024-05-15 | Stop reason: HOSPADM

## 2024-05-13 RX ORDER — ACETAMINOPHEN 325 MG/1
650 TABLET ORAL EVERY 6 HOURS PRN
Status: DISCONTINUED | OUTPATIENT
Start: 2024-05-13 | End: 2024-05-15 | Stop reason: HOSPADM

## 2024-05-13 RX ORDER — SODIUM CHLORIDE, SODIUM LACTATE, POTASSIUM CHLORIDE, CALCIUM CHLORIDE 600; 310; 30; 20 MG/100ML; MG/100ML; MG/100ML; MG/100ML
INJECTION, SOLUTION INTRAVENOUS CONTINUOUS
Status: DISCONTINUED | OUTPATIENT
Start: 2024-05-13 | End: 2024-05-13

## 2024-05-13 RX ORDER — ONDANSETRON 2 MG/ML
4 INJECTION INTRAMUSCULAR; INTRAVENOUS EVERY 6 HOURS PRN
Status: DISCONTINUED | OUTPATIENT
Start: 2024-05-13 | End: 2024-05-15 | Stop reason: HOSPADM

## 2024-05-13 RX ORDER — SODIUM CHLORIDE, SODIUM LACTATE, POTASSIUM CHLORIDE, CALCIUM CHLORIDE 600; 310; 30; 20 MG/100ML; MG/100ML; MG/100ML; MG/100ML
INJECTION, SOLUTION INTRAVENOUS CONTINUOUS
Status: DISPENSED | OUTPATIENT
Start: 2024-05-13 | End: 2024-05-15

## 2024-05-13 RX ORDER — ENOXAPARIN SODIUM 100 MG/ML
40 INJECTION SUBCUTANEOUS DAILY
Status: DISCONTINUED | OUTPATIENT
Start: 2024-05-13 | End: 2024-05-15 | Stop reason: HOSPADM

## 2024-05-13 RX ORDER — DIPHENHYDRAMINE HYDROCHLORIDE 50 MG/ML
12.5 INJECTION INTRAMUSCULAR; INTRAVENOUS EVERY 4 HOURS PRN
Status: DISCONTINUED | OUTPATIENT
Start: 2024-05-13 | End: 2024-05-15 | Stop reason: HOSPADM

## 2024-05-13 RX ORDER — SODIUM CHLORIDE 0.9 % (FLUSH) 0.9 %
5-40 SYRINGE (ML) INJECTION EVERY 12 HOURS SCHEDULED
Status: DISCONTINUED | OUTPATIENT
Start: 2024-05-13 | End: 2024-05-15 | Stop reason: HOSPADM

## 2024-05-13 RX ORDER — SODIUM CHLORIDE 450 MG/100ML
INJECTION, SOLUTION INTRAVENOUS CONTINUOUS
Status: DISCONTINUED | OUTPATIENT
Start: 2024-05-13 | End: 2024-05-13

## 2024-05-13 RX ORDER — SODIUM CHLORIDE 0.9 % (FLUSH) 0.9 %
5-40 SYRINGE (ML) INJECTION PRN
Status: DISCONTINUED | OUTPATIENT
Start: 2024-05-13 | End: 2024-05-15 | Stop reason: HOSPADM

## 2024-05-13 RX ORDER — MAGNESIUM SULFATE IN WATER 40 MG/ML
2000 INJECTION, SOLUTION INTRAVENOUS PRN
Status: DISCONTINUED | OUTPATIENT
Start: 2024-05-13 | End: 2024-05-15 | Stop reason: HOSPADM

## 2024-05-13 RX ORDER — ACETAMINOPHEN 650 MG/1
650 SUPPOSITORY RECTAL EVERY 6 HOURS PRN
Status: DISCONTINUED | OUTPATIENT
Start: 2024-05-13 | End: 2024-05-15 | Stop reason: HOSPADM

## 2024-05-13 RX ORDER — HYDROXYUREA 500 MG/1
500 CAPSULE ORAL 2 TIMES DAILY
Status: DISCONTINUED | OUTPATIENT
Start: 2024-05-13 | End: 2024-05-15 | Stop reason: HOSPADM

## 2024-05-13 RX ORDER — HYDROMORPHONE HYDROCHLORIDE 1 MG/ML
1 INJECTION, SOLUTION INTRAMUSCULAR; INTRAVENOUS; SUBCUTANEOUS EVERY 4 HOURS PRN
Status: DISCONTINUED | OUTPATIENT
Start: 2024-05-13 | End: 2024-05-15 | Stop reason: HOSPADM

## 2024-05-13 RX ADMIN — DIPHENHYDRAMINE HYDROCHLORIDE 12.5 MG: 50 INJECTION, SOLUTION INTRAMUSCULAR; INTRAVENOUS at 05:41

## 2024-05-13 RX ADMIN — DIPHENHYDRAMINE HYDROCHLORIDE 12.5 MG: 50 INJECTION INTRAMUSCULAR; INTRAVENOUS at 01:51

## 2024-05-13 RX ADMIN — HYDROMORPHONE HYDROCHLORIDE 1 MG: 1 INJECTION, SOLUTION INTRAMUSCULAR; INTRAVENOUS; SUBCUTANEOUS at 01:51

## 2024-05-13 RX ADMIN — DIPHENHYDRAMINE HYDROCHLORIDE 12.5 MG: 50 INJECTION, SOLUTION INTRAMUSCULAR; INTRAVENOUS at 14:11

## 2024-05-13 RX ADMIN — HYDROMORPHONE HYDROCHLORIDE 1 MG: 1 INJECTION, SOLUTION INTRAMUSCULAR; INTRAVENOUS; SUBCUTANEOUS at 14:11

## 2024-05-13 RX ADMIN — HYDROMORPHONE HYDROCHLORIDE 1 MG: 1 INJECTION, SOLUTION INTRAMUSCULAR; INTRAVENOUS; SUBCUTANEOUS at 03:01

## 2024-05-13 RX ADMIN — HYDROMORPHONE HYDROCHLORIDE 1 MG: 1 INJECTION, SOLUTION INTRAMUSCULAR; INTRAVENOUS; SUBCUTANEOUS at 09:44

## 2024-05-13 RX ADMIN — DIPHENHYDRAMINE HYDROCHLORIDE 12.5 MG: 50 INJECTION INTRAMUSCULAR; INTRAVENOUS at 00:31

## 2024-05-13 RX ADMIN — DIPHENHYDRAMINE HYDROCHLORIDE 12.5 MG: 50 INJECTION, SOLUTION INTRAMUSCULAR; INTRAVENOUS at 09:49

## 2024-05-13 RX ADMIN — SODIUM CHLORIDE: 4.5 INJECTION, SOLUTION INTRAVENOUS at 01:51

## 2024-05-13 RX ADMIN — HYDROMORPHONE HYDROCHLORIDE 1 MG: 1 INJECTION, SOLUTION INTRAMUSCULAR; INTRAVENOUS; SUBCUTANEOUS at 19:38

## 2024-05-13 RX ADMIN — HYDROMORPHONE HYDROCHLORIDE 1 MG: 1 INJECTION, SOLUTION INTRAMUSCULAR; INTRAVENOUS; SUBCUTANEOUS at 00:32

## 2024-05-13 RX ADMIN — SODIUM CHLORIDE, PRESERVATIVE FREE 10 ML: 5 INJECTION INTRAVENOUS at 09:45

## 2024-05-13 RX ADMIN — SODIUM CHLORIDE, PRESERVATIVE FREE 10 ML: 5 INJECTION INTRAVENOUS at 20:51

## 2024-05-13 RX ADMIN — SODIUM CHLORIDE, POTASSIUM CHLORIDE, SODIUM LACTATE AND CALCIUM CHLORIDE: 600; 310; 30; 20 INJECTION, SOLUTION INTRAVENOUS at 09:53

## 2024-05-13 RX ADMIN — HYDROXYUREA 500 MG: 500 CAPSULE ORAL at 20:50

## 2024-05-13 RX ADMIN — HYDROMORPHONE HYDROCHLORIDE 1 MG: 1 INJECTION, SOLUTION INTRAMUSCULAR; INTRAVENOUS; SUBCUTANEOUS at 05:41

## 2024-05-13 RX ADMIN — SODIUM CHLORIDE, POTASSIUM CHLORIDE, SODIUM LACTATE AND CALCIUM CHLORIDE: 600; 310; 30; 20 INJECTION, SOLUTION INTRAVENOUS at 20:54

## 2024-05-13 RX ADMIN — DIPHENHYDRAMINE HYDROCHLORIDE 12.5 MG: 50 INJECTION INTRAMUSCULAR; INTRAVENOUS at 03:01

## 2024-05-13 ASSESSMENT — ENCOUNTER SYMPTOMS
NAUSEA: 0
BACK PAIN: 1
VOMITING: 0
SHORTNESS OF BREATH: 0
ABDOMINAL PAIN: 0
CHEST TIGHTNESS: 0
DIARRHEA: 0

## 2024-05-13 ASSESSMENT — PAIN DESCRIPTION - DESCRIPTORS
DESCRIPTORS: ACHING
DESCRIPTORS: ACHING

## 2024-05-13 ASSESSMENT — PAIN SCALES - GENERAL
PAINLEVEL_OUTOF10: 7
PAINLEVEL_OUTOF10: 4
PAINLEVEL_OUTOF10: 0
PAINLEVEL_OUTOF10: 6
PAINLEVEL_OUTOF10: 6
PAINLEVEL_OUTOF10: 8
PAINLEVEL_OUTOF10: 8

## 2024-05-13 ASSESSMENT — PAIN DESCRIPTION - ORIENTATION: ORIENTATION: POSTERIOR;LEFT

## 2024-05-13 ASSESSMENT — PAIN - FUNCTIONAL ASSESSMENT: PAIN_FUNCTIONAL_ASSESSMENT: ACTIVITIES ARE NOT PREVENTED

## 2024-05-13 ASSESSMENT — PAIN DESCRIPTION - LOCATION
LOCATION: GENERALIZED
LOCATION: BACK;LEG

## 2024-05-13 NOTE — ED TRIAGE NOTES
Pt arrived via triage ambulatory c/o sickle cell crisis. Pt having bilateral leg, back and chest pain

## 2024-05-13 NOTE — FLOWSHEET NOTE
05/13/24 0615   Vital Signs   Temp 98 °F (36.7 °C)   Temp Source Oral   Pulse 57   Respirations 14   /77   MAP (Calculated) 91   MAP (mmHg) 87   Oxygen Therapy   SpO2 94 %

## 2024-05-13 NOTE — H&P
TRANSFER - OUT REPORT:    Verbal report given to BRYANT Hall on Norma Galan  being transferred to [facility] for routine progression of patient care       Report consisted of patient's Situation, Background, Assessment and   Recommendations(SBAR).     Information from the following report(s) Nurse Handoff Report, ED Encounter Summary, ED SBAR, MAR, and Neuro Assessment was reviewed with the receiving nurse.    Lines:   Peripheral IV 05/13/24 Right (Active)   Site Assessment Clean, dry & intact 05/13/24 0004   Line Status Blood return noted 05/13/24 0004   Line Care Cap changed 05/13/24 0004   Phlebitis Assessment No symptoms 05/13/24 0004   Infiltration Assessment 0 05/13/24 0004   Alcohol Cap Used No 05/13/24 0004   Dressing Status Clean, dry & intact 05/13/24 0004   Dressing Type Transparent 05/13/24 0004   Dressing Intervention New 05/13/24 0004        Opportunity for questions and clarification was provided.      Patient transported with:  Tech       
him to vomit, but usually eats other foods containing dairy/milk.    Egg Shells Rash       Review of Systems:  GEN - no fever or chills  CV -resolved chest pain, no palpitations, GARCÍA or syncope.   PULM - No cough, No wheezing, No hemoptysis  GI - no abd pain, no melena, no nausea, no vomiting    - no dysuria, no flank pain   M/S-sickle cell pain   SKIN - no rashes, no bruising   ENDO - no polyuria, no polydypsia   NEURO - no focal weakness, no speech changes   PSYCH - no hallucinations   HEENT - no headache  Physical Exam:      Visit Vitals  /77   Pulse 69   Temp 98.5 °F (36.9 °C) (Oral)   Resp 14   Ht 1.829 m (6')   Wt 87.5 kg (193 lb)   SpO2 97%   BMI 26.18 kg/m²       Physical Exam:  Constitutional: The patient is oriented to person, place, and time. No distress.   Eyes: Pale conjunctiva.    Neck: No JVD present.   Cardiovascular: Regular rhythm. Exam reveals no gallop and no friction rub.    Pulmonary/Chest: No stridor. No respiratory distress.  has no wheezes. has no rales.   Abdominal: Soft. Bowel sounds are normal.  exhibits no distension. No tenderness.  has no rebound and no guarding.   Musculoskeletal:Normal strength.  exhibits no tenderness.   Neurological:  alert and oriented to person, place, and time.  Skin: Skin is warm and dry. No erythema. No pallor.   Psychiatric: Memory, affect and judgment normal  Labs Reviewed:    Recent Results (from the past 24 hour(s))   CBC with Auto Differential    Collection Time: 05/12/24 11:55 PM   Result Value Ref Range    WBC 12.5 4.6 - 13.2 K/uL    RBC 3.28 (L) 4.35 - 5.65 M/uL    Hemoglobin 8.2 (L) 13.0 - 16.0 g/dL    Hematocrit 24.2 (L) 36.0 - 48.0 %    MCV 73.8 (L) 78.0 - 100.0 FL    MCH 25.0 24.0 - 34.0 PG    MCHC 33.9 31.0 - 37.0 g/dL    RDW 22.6 (H) 11.6 - 14.5 %    Platelets 342 135 - 420 K/uL    MPV 9.8 9.2 - 11.8 FL    Nucleated RBCs 1.7 (H) 0  WBC    nRBC 0.21 (H) 0.00 - 0.01 K/uL    Neutrophils % PENDING %    Lymphocytes % PENDING %

## 2024-05-13 NOTE — FLOWSHEET NOTE
05/13/24 0245   Vital Signs   Pulse 54   Respirations 13   /74   MAP (Calculated) 89   MAP (mmHg) 86   Oxygen Therapy   SpO2 96 %   Pulse via Oximetry 52 beats per minute     Pt resting at this time

## 2024-05-13 NOTE — ED PROVIDER NOTES
EMERGENCY DEPARTMENT HISTORY AND PHYSICAL EXAM        Date: 5/12/2024  Patient Name: Norma Galan    History of Presenting Illness     Chief Complaint   Patient presents with    Sickle Cell Pain Crisis       History Provided By: History obtained from patient    HPI: Norma Galan, 43 y.o. male presents to the ED with cc of sickle cell pain    Patient with history of sickle cell disease hospitalized in April for pain management says that he has had persistent pain in his thighs back and chest since then.  He saw his hematologist earlier this week and they advised if pain does not improve to come to the emergency department for pain management.  He reports that his chest pain is minor, he does not have shortness of breath.  Leg pain is located in the muscle bellies he does not have pain with ambulation or with hip motion.  Back pain is in superior gluteus bilaterally no midline spinal pain.  No neurologic deficits in the upper or lower extremities.    Pmh: Acute chest syndrome, microcytic anemia, elevated alk phos    No nausea, vomiting, diarrhea, fever, chills, shortness of breath, leg swelling     There are no other complaints, changes, or physical findings at this time.    Records Reviewed:   [place] oncology office visit May 2, 2024    April 21, 2024 hospital admission for sickle cell pain crisis 4 days admission, leukemoid reaction WBC 14.0, 15.9    PCP: Catrachita Chaparro MD    No current facility-administered medications on file prior to encounter.     Current Outpatient Medications on File Prior to Encounter   Medication Sig Dispense Refill    HYDROmorphone (DILAUDID) 4 MG tablet TAKE 1 TABLET BY MOUTH EVERY 4 TO 6 HOURS AS NEEDED FOR PAIN      hydroxyurea (HYDREA) 500 MG chemo capsule Take by mouth 2 times daily             Past History     Past Medical History:  Past Medical History:   Diagnosis Date    B12 deficiency 03/15/2010    210    Cholelithiasis 09/17/2012    U/S Result:

## 2024-05-13 NOTE — ED NOTES
Assumed care of patient. Patient is sitting up in bed watching television. Patient in no distress at this time. Patient awaiting transport to go upstairs.

## 2024-05-13 NOTE — ED NOTES
Assumed care of pt. Pt A+Ox4. Pt C/O sickle cell pain in his back, legs, and chest. Pt placed on monitor.

## 2024-05-14 LAB
ANION GAP SERPL CALC-SCNC: 4 MMOL/L (ref 3–18)
BASOPHILS # BLD: 0.2 K/UL (ref 0–0.1)
BASOPHILS NFR BLD: 2 % (ref 0–2)
BUN SERPL-MCNC: 8 MG/DL (ref 7–18)
BUN/CREAT SERPL: 8 (ref 12–20)
CALCIUM SERPL-MCNC: 8.7 MG/DL (ref 8.5–10.1)
CHLORIDE SERPL-SCNC: 107 MMOL/L (ref 100–111)
CO2 SERPL-SCNC: 24 MMOL/L (ref 21–32)
CREAT SERPL-MCNC: 0.98 MG/DL (ref 0.6–1.3)
DIFFERENTIAL METHOD BLD: ABNORMAL
EOSINOPHIL # BLD: 0.8 K/UL (ref 0–0.4)
EOSINOPHIL NFR BLD: 7 % (ref 0–5)
ERYTHROCYTE [DISTWIDTH] IN BLOOD BY AUTOMATED COUNT: 22.2 % (ref 11.6–14.5)
GLUCOSE SERPL-MCNC: 88 MG/DL (ref 74–99)
HCT VFR BLD AUTO: 22.7 % (ref 36–48)
HGB BLD-MCNC: 7.6 G/DL (ref 13–16)
IMM GRANULOCYTES # BLD AUTO: 0 K/UL
IMM GRANULOCYTES NFR BLD AUTO: 0 %
LYMPHOCYTES # BLD: 4.6 K/UL (ref 0.9–3.6)
LYMPHOCYTES NFR BLD: 41 % (ref 21–52)
MCH RBC QN AUTO: 25 PG (ref 24–34)
MCHC RBC AUTO-ENTMCNC: 33.5 G/DL (ref 31–37)
MCV RBC AUTO: 74.7 FL (ref 78–100)
MONOCYTES # BLD: 1.4 K/UL (ref 0.05–1.2)
MONOCYTES NFR BLD: 12 % (ref 3–10)
NEUTS SEG # BLD: 4.3 K/UL (ref 1.8–8)
NEUTS SEG NFR BLD: 38 % (ref 40–73)
NRBC # BLD: 0.11 K/UL (ref 0–0.01)
NRBC BLD-RTO: 1 PER 100 WBC
PLATELET # BLD AUTO: 352 K/UL (ref 135–420)
PLATELET COMMENT: ABNORMAL
PMV BLD AUTO: 10.4 FL (ref 9.2–11.8)
POTASSIUM SERPL-SCNC: 3.9 MMOL/L (ref 3.5–5.5)
RBC # BLD AUTO: 3.04 M/UL (ref 4.35–5.65)
RBC MORPH BLD: ABNORMAL
SODIUM SERPL-SCNC: 135 MMOL/L (ref 136–145)
WBC # BLD AUTO: 11.3 K/UL (ref 4.6–13.2)
WBC MORPH BLD: ABNORMAL

## 2024-05-14 PROCEDURE — 6370000000 HC RX 637 (ALT 250 FOR IP): Performed by: INTERNAL MEDICINE

## 2024-05-14 PROCEDURE — 6360000002 HC RX W HCPCS: Performed by: INTERNAL MEDICINE

## 2024-05-14 PROCEDURE — 1100000000 HC RM PRIVATE

## 2024-05-14 PROCEDURE — 36415 COLL VENOUS BLD VENIPUNCTURE: CPT

## 2024-05-14 PROCEDURE — 80048 BASIC METABOLIC PNL TOTAL CA: CPT

## 2024-05-14 PROCEDURE — 2580000003 HC RX 258: Performed by: INTERNAL MEDICINE

## 2024-05-14 PROCEDURE — 85025 COMPLETE CBC W/AUTO DIFF WBC: CPT

## 2024-05-14 PROCEDURE — 99232 SBSQ HOSP IP/OBS MODERATE 35: CPT | Performed by: INTERNAL MEDICINE

## 2024-05-14 PROCEDURE — 94761 N-INVAS EAR/PLS OXIMETRY MLT: CPT

## 2024-05-14 RX ORDER — FOLIC ACID 1 MG/1
1 TABLET ORAL DAILY
Status: DISCONTINUED | OUTPATIENT
Start: 2024-05-14 | End: 2024-05-15 | Stop reason: HOSPADM

## 2024-05-14 RX ORDER — IBUPROFEN 600 MG/1
600 TABLET ORAL
Status: DISCONTINUED | OUTPATIENT
Start: 2024-05-14 | End: 2024-05-15 | Stop reason: HOSPADM

## 2024-05-14 RX ORDER — KETOROLAC TROMETHAMINE 15 MG/ML
15 INJECTION, SOLUTION INTRAMUSCULAR; INTRAVENOUS EVERY 6 HOURS PRN
Status: DISCONTINUED | OUTPATIENT
Start: 2024-05-14 | End: 2024-05-15 | Stop reason: HOSPADM

## 2024-05-14 RX ADMIN — SODIUM CHLORIDE, POTASSIUM CHLORIDE, SODIUM LACTATE AND CALCIUM CHLORIDE: 600; 310; 30; 20 INJECTION, SOLUTION INTRAVENOUS at 05:35

## 2024-05-14 RX ADMIN — SODIUM CHLORIDE, PRESERVATIVE FREE 10 ML: 5 INJECTION INTRAVENOUS at 21:00

## 2024-05-14 RX ADMIN — HYDROXYUREA 500 MG: 500 CAPSULE ORAL at 23:22

## 2024-05-14 RX ADMIN — FOLIC ACID 1 MG: 1 TABLET ORAL at 17:00

## 2024-05-14 RX ADMIN — DIPHENHYDRAMINE HYDROCHLORIDE 12.5 MG: 50 INJECTION, SOLUTION INTRAMUSCULAR; INTRAVENOUS at 17:02

## 2024-05-14 RX ADMIN — HYDROMORPHONE HYDROCHLORIDE 1 MG: 1 INJECTION, SOLUTION INTRAMUSCULAR; INTRAVENOUS; SUBCUTANEOUS at 05:29

## 2024-05-14 RX ADMIN — HYDROMORPHONE HYDROCHLORIDE 1 MG: 1 INJECTION, SOLUTION INTRAMUSCULAR; INTRAVENOUS; SUBCUTANEOUS at 13:10

## 2024-05-14 RX ADMIN — DIPHENHYDRAMINE HYDROCHLORIDE 12.5 MG: 50 INJECTION, SOLUTION INTRAMUSCULAR; INTRAVENOUS at 05:29

## 2024-05-14 RX ADMIN — SODIUM CHLORIDE, POTASSIUM CHLORIDE, SODIUM LACTATE AND CALCIUM CHLORIDE: 600; 310; 30; 20 INJECTION, SOLUTION INTRAVENOUS at 17:04

## 2024-05-14 RX ADMIN — HYDROXYUREA 500 MG: 500 CAPSULE ORAL at 09:19

## 2024-05-14 RX ADMIN — SODIUM CHLORIDE, PRESERVATIVE FREE 10 ML: 5 INJECTION INTRAVENOUS at 11:27

## 2024-05-14 RX ADMIN — DIPHENHYDRAMINE HYDROCHLORIDE 12.5 MG: 50 INJECTION, SOLUTION INTRAMUSCULAR; INTRAVENOUS at 13:10

## 2024-05-14 RX ADMIN — IBUPROFEN 600 MG: 600 TABLET, FILM COATED ORAL at 17:00

## 2024-05-14 RX ADMIN — HYDROMORPHONE HYDROCHLORIDE 0.5 MG: 1 INJECTION, SOLUTION INTRAMUSCULAR; INTRAVENOUS; SUBCUTANEOUS at 21:51

## 2024-05-14 RX ADMIN — HYDROMORPHONE HYDROCHLORIDE 1 MG: 1 INJECTION, SOLUTION INTRAMUSCULAR; INTRAVENOUS; SUBCUTANEOUS at 00:58

## 2024-05-14 RX ADMIN — HYDROMORPHONE HYDROCHLORIDE 1 MG: 1 INJECTION, SOLUTION INTRAMUSCULAR; INTRAVENOUS; SUBCUTANEOUS at 09:19

## 2024-05-14 RX ADMIN — HYDROMORPHONE HYDROCHLORIDE 1 MG: 1 INJECTION, SOLUTION INTRAMUSCULAR; INTRAVENOUS; SUBCUTANEOUS at 17:03

## 2024-05-14 RX ADMIN — DIPHENHYDRAMINE HYDROCHLORIDE 12.5 MG: 50 INJECTION, SOLUTION INTRAMUSCULAR; INTRAVENOUS at 00:58

## 2024-05-14 ASSESSMENT — PAIN SCALES - GENERAL
PAINLEVEL_OUTOF10: 2
PAINLEVEL_OUTOF10: 6
PAINLEVEL_OUTOF10: 7
PAINLEVEL_OUTOF10: 0
PAINLEVEL_OUTOF10: 4
PAINLEVEL_OUTOF10: 0
PAINLEVEL_OUTOF10: 5
PAINLEVEL_OUTOF10: 0
PAINLEVEL_OUTOF10: 7
PAINLEVEL_OUTOF10: 5
PAINLEVEL_OUTOF10: 7
PAINLEVEL_OUTOF10: 10

## 2024-05-14 ASSESSMENT — PAIN DESCRIPTION - ONSET
ONSET: ON-GOING

## 2024-05-14 ASSESSMENT — PAIN DESCRIPTION - FREQUENCY
FREQUENCY: CONTINUOUS

## 2024-05-14 ASSESSMENT — PAIN DESCRIPTION - DESCRIPTORS
DESCRIPTORS: ACHING

## 2024-05-14 ASSESSMENT — PAIN DESCRIPTION - PAIN TYPE
TYPE: CHRONIC PAIN

## 2024-05-14 ASSESSMENT — PAIN DESCRIPTION - ORIENTATION
ORIENTATION: POSTERIOR;RIGHT;LEFT
ORIENTATION: LOWER;RIGHT;LEFT
ORIENTATION: RIGHT;LEFT;POSTERIOR
ORIENTATION: RIGHT;LEFT;POSTERIOR
ORIENTATION: LEFT;RIGHT
ORIENTATION: LEFT;RIGHT

## 2024-05-14 ASSESSMENT — PAIN - FUNCTIONAL ASSESSMENT
PAIN_FUNCTIONAL_ASSESSMENT: PREVENTS OR INTERFERES WITH MANY ACTIVE NOT PASSIVE ACTIVITIES
PAIN_FUNCTIONAL_ASSESSMENT: PREVENTS OR INTERFERES SOME ACTIVE ACTIVITIES AND ADLS
PAIN_FUNCTIONAL_ASSESSMENT: ACTIVITIES ARE NOT PREVENTED
PAIN_FUNCTIONAL_ASSESSMENT: PREVENTS OR INTERFERES SOME ACTIVE ACTIVITIES AND ADLS
PAIN_FUNCTIONAL_ASSESSMENT: ACTIVITIES ARE NOT PREVENTED
PAIN_FUNCTIONAL_ASSESSMENT: ACTIVITIES ARE NOT PREVENTED

## 2024-05-14 ASSESSMENT — PAIN DESCRIPTION - LOCATION
LOCATION: BACK;LEG
LOCATION: BACK;LEG
LOCATION: LEG;BACK
LOCATION: BACK;LEG
LOCATION: LEG
LOCATION: BACK;LEG

## 2024-05-14 NOTE — CARE COORDINATION
05/14/24 0931   Service Assessment   Patient Orientation Alert and Oriented   Cognition Alert   History Provided By Patient   Primary Caregiver Self   Accompanied By/Relationship none   Support Systems Family Members   Patient's Healthcare Decision Maker is: Legal Next of Kin   PCP Verified by CM Yes   Last Visit to PCP Within last 3 months   Prior Functional Level Independent in ADLs/IADLs   Current Functional Level Independent in ADLs/IADLs   Can patient return to prior living arrangement Yes   Ability to make needs known: Good   Family able to assist with home care needs: Yes   Would you like for me to discuss the discharge plan with any other family members/significant others, and if so, who? Yes   Financial Resources Medicare   Community Resources None   Social/Functional History   Lives With Alone   Type of Home House   Home Layout Two level   Home Access Stairs to enter with rails   Entrance Stairs - Number of Steps 2   Entrance Stairs - Rails Right   Bathroom Shower/Tub Tub/Shower unit;Walk-in shower   Bathroom Toilet Standard   Bathroom Equipment None   Bathroom Accessibility Accessible   Home Equipment None   Receives Help From Family   ADL Assistance Independent   Homemaking Assistance Independent   Homemaking Responsibilities Yes   Ambulation Assistance Independent   Transfer Assistance Independent   Active  Yes   Occupation Full time employment   Type of 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 No   Potential Assistance Purchasing Medications No   Type of Home Care Services None   Patient expects to be discharged to: House   One/Two Story Residence Two story   Lift Chair Available No   History of falls? 0   Services At/After Discharge   Transition of Care Consult (CM Consult) Discharge Planning   Services

## 2024-05-14 NOTE — CARE COORDINATION
05/14/24 0929   Readmission Assessment   Number of Days since last admission? 8-30 days   Previous Disposition Home with Family   Who is being Interviewed Patient   What was the patient's/caregiver's perception as to why they think they needed to return back to the hospital? Other (Comment)  (pain from sickle cell disease)   Did you visit your Primary Care Physician after you left the hospital, before you returned this time? No   Did you see a specialist, such as Cardiac, Pulmonary, Orthopedic Physician, etc. after you left the hospital? Yes  (Dr. Chaparro)   Who advised the patient to return to the hospital? Self-referral   Does the patient report anything that got in the way of taking their medications? No   In our efforts to provide the best possible care to you and others like you, can you think of anything that we could have done to help you after you left the hospital the first time, so that you might not have needed to return so soon? Other (Comment)  (Patient could not think of anything. He stated, \"It just happens with sickle cell.\")     Natalie CASITLLO,RN, [facility]  Case Management  [phone]

## 2024-05-14 NOTE — PROGRESS NOTES
completed the initial Spiritual Assessment of the patient, and offered Pastoral Care support to this patient who was in acute pain and discomfort due to sickle cell crises. Patient is a member of a local [employer] an does trust in God for help in these times of his life. There is no advance directive completed.Patient does not have any Mu-ism/cultural needs that will affect patient’s preferences in health care. Chaplains will continue to follow and will provide pastoral care on an as needed/requested basis.    Chaplain Dejan Russell  Board Certified   Spiritual Care Department  [phone]  
 conducted a Follow up consultation and Spiritual Assessment for Norma Galan, who is a 43 y.o.,male.      The  provided the following Interventions:  Continued the relationship of care and support.   Listened empathically.  Chart reviewed.    Assessment:  There are no further spiritual or Anglican issues which require Spiritual Care Services interventions at this time.     Plan:  Chaplains will continue to follow and will provide pastoral care on an as needed/requested basis.   recommends bedside caregivers page  on duty if patient shows signs of acute spiritual or emotional distress.       Chaplain Lisbet Jo  Spiritual Care   [phone]   
Patient was admitted earlier today with sickle cell crisis    Patient seen and examined we will continue IV fluids pain management;  
acid;          ___________________________________________________    Total time spent with patient:     minutes    []  Critical Care time:      minutes    Care Plan discussed with:    [x]  Patient   []  Family    []  Care Manager  []  Nursing   []  Consultant/Specialist :      []    >50% of visit spent in counseling and coordination of care   (Discussed []  CODE status,  []  Care Plan, []  D/C Planning)    Prophylaxis:  [x]  Lovenox  []  Coumadin  []  Hep SQ  []  SCD’s  []  H2B/PPI    Disposition:  [x]  Home w/ Family   []   PT,OT,RN   []  SNF/LTC   []  SAH/Rehab  ___________________________________________________    Hospitalist: Rikki Amaya MD     ___________________________________________________    *Medications reviewed:  Current Facility-Administered Medications   Medication Dose Route Frequency    sodium chloride flush 0.9 % injection 5-40 mL  5-40 mL IntraVENous 2 times per day    sodium chloride flush 0.9 % injection 5-40 mL  5-40 mL IntraVENous PRN    0.9 % sodium chloride infusion   IntraVENous PRN    potassium chloride (KLOR-CON M) extended release tablet 40 mEq  40 mEq Oral PRN    Or    potassium bicarb-citric acid (EFFER-K) effervescent tablet 40 mEq  40 mEq Oral PRN    Or    potassium chloride 10 mEq/100 mL IVPB (Peripheral Line)  10 mEq IntraVENous PRN    magnesium sulfate 2000 mg in 50 mL IVPB premix  2,000 mg IntraVENous PRN    enoxaparin (LOVENOX) injection 40 mg  40 mg SubCUTAneous Daily    ondansetron (ZOFRAN-ODT) disintegrating tablet 4 mg  4 mg Oral Q8H PRN    Or    ondansetron (ZOFRAN) injection 4 mg  4 mg IntraVENous Q6H PRN    polyethylene glycol (GLYCOLAX) packet 17 g  17 g Oral Daily PRN    acetaminophen (TYLENOL) tablet 650 mg  650 mg Oral Q6H PRN    Or    acetaminophen (TYLENOL) suppository 650 mg  650 mg Rectal Q6H PRN    hydroxyurea (HYDREA) chemo capsule 500 mg  500 mg Oral BID    HYDROmorphone (DILAUDID) injection 0.5 mg  0.5 mg IntraVENous Q4H PRN    HYDROmorphone HCl

## 2024-05-14 NOTE — PLAN OF CARE
Problem: Pain  Goal: Verbalizes/displays adequate comfort level or baseline comfort level  5/14/2024 0023 by Jessica Westfall, RN  Outcome: Progressing  5/14/2024 0019 by Jessica Westfall, RN  Outcome: Progressing  5/13/2024 1304 by Digna Torres, RN  Outcome: Progressing     Problem: Safety - Adult  Goal: Free from fall injury  Outcome: Progressing

## 2024-05-15 VITALS
BODY MASS INDEX: 26.14 KG/M2 | OXYGEN SATURATION: 94 % | TEMPERATURE: 97.7 F | DIASTOLIC BLOOD PRESSURE: 69 MMHG | SYSTOLIC BLOOD PRESSURE: 117 MMHG | HEIGHT: 72 IN | WEIGHT: 193 LBS | RESPIRATION RATE: 18 BRPM | HEART RATE: 86 BPM

## 2024-05-15 PROCEDURE — 2580000003 HC RX 258: Performed by: INTERNAL MEDICINE

## 2024-05-15 PROCEDURE — 94761 N-INVAS EAR/PLS OXIMETRY MLT: CPT

## 2024-05-15 PROCEDURE — 6370000000 HC RX 637 (ALT 250 FOR IP): Performed by: INTERNAL MEDICINE

## 2024-05-15 PROCEDURE — 99239 HOSP IP/OBS DSCHRG MGMT >30: CPT | Performed by: INTERNAL MEDICINE

## 2024-05-15 PROCEDURE — 6360000002 HC RX W HCPCS: Performed by: INTERNAL MEDICINE

## 2024-05-15 RX ORDER — HYDROMORPHONE HYDROCHLORIDE 4 MG/1
4 TABLET ORAL 3 TIMES DAILY
Status: DISCONTINUED | OUTPATIENT
Start: 2024-05-15 | End: 2024-05-15 | Stop reason: HOSPADM

## 2024-05-15 RX ORDER — SENNA AND DOCUSATE SODIUM 50; 8.6 MG/1; MG/1
2 TABLET, FILM COATED ORAL DAILY PRN
Status: DISCONTINUED | OUTPATIENT
Start: 2024-05-15 | End: 2024-05-15

## 2024-05-15 RX ORDER — POLYETHYLENE GLYCOL 3350 17 G/17G
17 POWDER, FOR SOLUTION ORAL DAILY
Qty: 30 PACKET | Refills: 0 | Status: SHIPPED | OUTPATIENT
Start: 2024-05-15 | End: 2024-06-14

## 2024-05-15 RX ORDER — FOLIC ACID 1 MG/1
1 TABLET ORAL DAILY
Qty: 30 TABLET | Refills: 3 | Status: SHIPPED | OUTPATIENT
Start: 2024-05-16

## 2024-05-15 RX ORDER — SODIUM CHLORIDE, SODIUM LACTATE, POTASSIUM CHLORIDE, CALCIUM CHLORIDE 600; 310; 30; 20 MG/100ML; MG/100ML; MG/100ML; MG/100ML
INJECTION, SOLUTION INTRAVENOUS CONTINUOUS
Status: DISCONTINUED | OUTPATIENT
Start: 2024-05-15 | End: 2024-05-15 | Stop reason: HOSPADM

## 2024-05-15 RX ORDER — POLYETHYLENE GLYCOL 3350 17 G/17G
17 POWDER, FOR SOLUTION ORAL DAILY
Status: DISCONTINUED | OUTPATIENT
Start: 2024-05-15 | End: 2024-05-15 | Stop reason: HOSPADM

## 2024-05-15 RX ORDER — SENNA AND DOCUSATE SODIUM 50; 8.6 MG/1; MG/1
2 TABLET, FILM COATED ORAL DAILY
Status: DISCONTINUED | OUTPATIENT
Start: 2024-05-15 | End: 2024-05-15 | Stop reason: HOSPADM

## 2024-05-15 RX ADMIN — DIPHENHYDRAMINE HYDROCHLORIDE 12.5 MG: 50 INJECTION, SOLUTION INTRAMUSCULAR; INTRAVENOUS at 02:26

## 2024-05-15 RX ADMIN — SODIUM CHLORIDE, PRESERVATIVE FREE 10 ML: 5 INJECTION INTRAVENOUS at 09:20

## 2024-05-15 RX ADMIN — SODIUM CHLORIDE, POTASSIUM CHLORIDE, SODIUM LACTATE AND CALCIUM CHLORIDE: 600; 310; 30; 20 INJECTION, SOLUTION INTRAVENOUS at 04:16

## 2024-05-15 RX ADMIN — IBUPROFEN 600 MG: 600 TABLET, FILM COATED ORAL at 09:14

## 2024-05-15 RX ADMIN — IBUPROFEN 600 MG: 600 TABLET, FILM COATED ORAL at 11:39

## 2024-05-15 RX ADMIN — HYDROMORPHONE HYDROCHLORIDE 0.5 MG: 1 INJECTION, SOLUTION INTRAMUSCULAR; INTRAVENOUS; SUBCUTANEOUS at 02:30

## 2024-05-15 RX ADMIN — HYDROMORPHONE HYDROCHLORIDE 4 MG: 4 TABLET ORAL at 15:10

## 2024-05-15 RX ADMIN — FOLIC ACID 1 MG: 1 TABLET ORAL at 09:14

## 2024-05-15 RX ADMIN — HYDROMORPHONE HYDROCHLORIDE 0.5 MG: 1 INJECTION, SOLUTION INTRAMUSCULAR; INTRAVENOUS; SUBCUTANEOUS at 11:31

## 2024-05-15 RX ADMIN — HYDROMORPHONE HYDROCHLORIDE 0.5 MG: 1 INJECTION, SOLUTION INTRAMUSCULAR; INTRAVENOUS; SUBCUTANEOUS at 06:47

## 2024-05-15 RX ADMIN — DIPHENHYDRAMINE HYDROCHLORIDE 12.5 MG: 50 INJECTION, SOLUTION INTRAMUSCULAR; INTRAVENOUS at 11:38

## 2024-05-15 RX ADMIN — HYDROXYUREA 500 MG: 500 CAPSULE ORAL at 09:39

## 2024-05-15 ASSESSMENT — PAIN - FUNCTIONAL ASSESSMENT
PAIN_FUNCTIONAL_ASSESSMENT: ACTIVITIES ARE NOT PREVENTED

## 2024-05-15 ASSESSMENT — PAIN DESCRIPTION - DESCRIPTORS
DESCRIPTORS: ACHING

## 2024-05-15 ASSESSMENT — PAIN SCALES - GENERAL
PAINLEVEL_OUTOF10: 6
PAINLEVEL_OUTOF10: 4
PAINLEVEL_OUTOF10: 4
PAINLEVEL_OUTOF10: 7
PAINLEVEL_OUTOF10: 2
PAINLEVEL_OUTOF10: 4
PAINLEVEL_OUTOF10: 5
PAINLEVEL_OUTOF10: 4
PAINLEVEL_OUTOF10: 6
PAINLEVEL_OUTOF10: 2

## 2024-05-15 ASSESSMENT — PAIN DESCRIPTION - ONSET
ONSET: ON-GOING
ONSET: ON-GOING

## 2024-05-15 ASSESSMENT — PAIN DESCRIPTION - LOCATION
LOCATION: LEG
LOCATION: LEG
LOCATION: BACK;LEG
LOCATION: LEG;BACK
LOCATION: BACK;LEG

## 2024-05-15 ASSESSMENT — PAIN DESCRIPTION - ORIENTATION
ORIENTATION: RIGHT;LEFT
ORIENTATION: LOWER;RIGHT;LEFT
ORIENTATION: RIGHT;LEFT

## 2024-05-15 ASSESSMENT — PAIN DESCRIPTION - PAIN TYPE
TYPE: CHRONIC PAIN
TYPE: CHRONIC PAIN

## 2024-05-15 ASSESSMENT — PAIN DESCRIPTION - FREQUENCY
FREQUENCY: CONTINUOUS
FREQUENCY: CONTINUOUS

## 2024-05-15 NOTE — PLAN OF CARE
Problem: Pain  Goal: Verbalizes/displays adequate comfort level or baseline comfort level  Outcome: Progressing     Problem: Safety - Adult  Goal: Free from fall injury  Outcome: Progressing  Flowsheets (Taken 5/15/2024 0590)  Free From Fall Injury: Instruct family/caregiver on patient safety

## 2024-05-15 NOTE — PLAN OF CARE
Problem: Pain  Goal: Verbalizes/displays adequate comfort level or baseline comfort level  5/14/2024 2003 by Terrance Zimmerman RN  Outcome: Progressing  5/14/2024 1440 by Yesenia Pruitt RN  Outcome: Progressing     Problem: Safety - Adult  Goal: Free from fall injury  5/14/2024 2003 by Terrance Zimmermna, RN  Outcome: Progressing  5/14/2024 1440 by Yesenia Pruitt RN  Outcome: Progressing

## 2024-05-15 NOTE — DISCHARGE INSTRUCTIONS
DISCHARGE SUMMARY from Nurse    PATIENT INSTRUCTIONS:    After general anesthesia or intravenous sedation, for 24 hours or while taking prescription Narcotics:  Limit your activities  Do not drive and operate hazardous machinery  Do not make important personal or business decisions  Do  not drink alcoholic beverages  If you have not urinated within 8 hours after discharge, please contact your surgeon on call.    Report the following to your surgeon:  Excessive pain, swelling, redness or odor of or around the surgical area  Temperature over 100.5  Nausea and vomiting lasting longer than 4 hours or if unable to take medications  Any signs of decreased circulation or nerve impairment to extremity: change in color, persistent  numbness, tingling, coldness or increase pain  Any questions    What to do at Home:  Recommended activity: activity as tolerated,     If you experience any of the following symptoms chest pain, shortness of breath,nausea, vomiting, pain unrelieved by medication, return to ER if needed please follow up with Dr AYESHA Chaparro.    *  Please give a list of your current medications to your Primary Care Provider.    *  Please update this list whenever your medications are discontinued, doses are      changed, or new medications (including over-the-counter products) are added.    *  Please carry medication information at all times in case of emergency situations.    These are general instructions for a healthy lifestyle:    No smoking/ No tobacco products/ Avoid exposure to second hand smoke  Surgeon General's Warning:  Quitting smoking now greatly reduces serious risk to your health.    Obesity, smoking, and sedentary lifestyle greatly increases your risk for illness    A healthy diet, regular physical exercise & weight monitoring are important for maintaining a healthy lifestyle    You may be retaining fluid if you have a history of heart failure or if you experience any of the following symptoms:  Weight

## 2024-05-15 NOTE — DISCHARGE SUMMARY
Hospitalist Discharge Summary     Patient ID:  Norma Galan  [id number]  43 y.o.  1980    PCP on record: Catrachita Chaparro MD    Admit date: 5/12/2024  Discharge date and time: 5/15/2024    Discharge Diagnoses:and hospital course;                                            Sickle cell crises;       43 years old who is admitted for sickle cell vaso-occlusive crisis.  He presented to the ED with complaint of having bilateral thigh, lower back and chest pain not relieved by his home Dilaudid.  He received 3 doses of IV Dilaudid in ED and continued to have pain.  He is admitted for further management.  He denies having any fever, chills, cough, nausea, vomiting or change in bowel habits.  He was hospitalized for sickle cell crisis last months, and follows with hematology oncology.  He has no other concerns.    Pt was put on IV fluids and IV dilaudid; Pt was slow to imporve but now doing better and wants to go home;    He is stable to be dc;          Pertinent Lab Data:  Recent Labs     05/12/24  2355 05/14/24  0442   WBC 12.5 11.3   HGB 8.2* 7.6*   HCT 24.2* 22.7*    352     Recent Labs     05/12/24  2355 05/14/24  0442    135*   K 3.5 3.9    107   CO2 25 24   BUN 6* 8   ALT 38  --        DISCHARGE MEDICATIONS:  @     Medication List        START taking these medications      folic acid 1 MG tablet  Commonly known as: FOLVITE  Take 1 tablet by mouth daily  Start taking on: May 16, 2024     polyethylene glycol 17 g packet  Commonly known as: GLYCOLAX  Take 1 packet by mouth daily            CONTINUE taking these medications      HYDROmorphone 4 MG tablet  Commonly known as: DILAUDID     hydroxyurea 500 MG chemo capsule  Commonly known as: HYDREA               Where to Get Your Medications        These medications were sent to [id number] - [place], [place] - [address] [facility] - P [phone] - F [phone]  [facility] [facility] [address]

## 2024-05-15 NOTE — CARE COORDINATION
Discharge order noted for today. Orders received. No needs identified at this time. Case management remains available as needed.     Pt sister to drive pt home.    Tiffanie Dawson BSN RN  Case Management  [phone]

## 2024-08-15 ENCOUNTER — APPOINTMENT (OUTPATIENT)
Facility: HOSPITAL | Age: 44
DRG: 812 | End: 2024-08-15
Attending: STUDENT IN AN ORGANIZED HEALTH CARE EDUCATION/TRAINING PROGRAM
Payer: MEDICARE

## 2024-08-15 ENCOUNTER — HOSPITAL ENCOUNTER (INPATIENT)
Facility: HOSPITAL | Age: 44
LOS: 3 days | Discharge: HOME | DRG: 812 | End: 2024-08-18
Attending: STUDENT IN AN ORGANIZED HEALTH CARE EDUCATION/TRAINING PROGRAM | Admitting: STUDENT IN AN ORGANIZED HEALTH CARE EDUCATION/TRAINING PROGRAM
Payer: MEDICARE

## 2024-08-15 DIAGNOSIS — D57.00 VASO-OCCLUSIVE SICKLE CELL CRISIS (HCC): Primary | ICD-10-CM

## 2024-08-15 DIAGNOSIS — D57.00 ACUTE SICKLE CELL CRISIS (HCC): ICD-10-CM

## 2024-08-15 PROBLEM — E87.6 HYPOKALEMIA: Status: ACTIVE | Noted: 2024-08-15

## 2024-08-15 LAB
ANION GAP SERPL CALC-SCNC: 5 MMOL/L (ref 3–18)
BASOPHILS # BLD: 0.3 K/UL (ref 0–0.1)
BASOPHILS NFR BLD: 2 % (ref 0–2)
BUN SERPL-MCNC: 7 MG/DL (ref 7–18)
BUN/CREAT SERPL: 7 (ref 12–20)
CALCIUM SERPL-MCNC: 8.6 MG/DL (ref 8.5–10.1)
CHLORIDE SERPL-SCNC: 110 MMOL/L (ref 100–111)
CO2 SERPL-SCNC: 23 MMOL/L (ref 21–32)
CREAT SERPL-MCNC: 1 MG/DL (ref 0.6–1.3)
DIFFERENTIAL METHOD BLD: ABNORMAL
EOSINOPHIL # BLD: 2 K/UL (ref 0–0.4)
EOSINOPHIL NFR BLD: 14 % (ref 0–5)
ERYTHROCYTE [DISTWIDTH] IN BLOOD BY AUTOMATED COUNT: 24.5 % (ref 11.6–14.5)
GLUCOSE SERPL-MCNC: 117 MG/DL (ref 74–99)
HCT VFR BLD AUTO: 22.7 % (ref 36–48)
HGB BLD-MCNC: 7.6 G/DL (ref 13–16)
IMM GRANULOCYTES # BLD AUTO: 0 K/UL (ref 0–0.04)
IMM GRANULOCYTES NFR BLD AUTO: 0 % (ref 0–0.5)
LYMPHOCYTES # BLD: 5.4 K/UL (ref 0.9–3.6)
LYMPHOCYTES NFR BLD: 38 % (ref 21–52)
MCH RBC QN AUTO: 23.8 PG (ref 24–34)
MCHC RBC AUTO-ENTMCNC: 33.5 G/DL (ref 31–37)
MCV RBC AUTO: 70.9 FL (ref 78–100)
MONOCYTES # BLD: 0.3 K/UL (ref 0.05–1.2)
MONOCYTES NFR BLD: 2 % (ref 3–10)
NEUTS SEG # BLD: 6.3 K/UL (ref 1.8–8)
NEUTS SEG NFR BLD: 44 % (ref 40–73)
NRBC # BLD: 0.15 K/UL (ref 0–0.01)
NRBC BLD-RTO: 1 PER 100 WBC
PLATELET # BLD AUTO: 396 K/UL (ref 135–420)
PLATELET COMMENT: ABNORMAL
PMV BLD AUTO: 9.8 FL (ref 9.2–11.8)
POTASSIUM SERPL-SCNC: 3.4 MMOL/L (ref 3.5–5.5)
RBC # BLD AUTO: 3.2 M/UL (ref 4.35–5.65)
RBC MORPH BLD: ABNORMAL
RETICS/RBC NFR AUTO: 6 % (ref 0.5–2.5)
SODIUM SERPL-SCNC: 138 MMOL/L (ref 136–145)
WBC # BLD AUTO: 14.3 K/UL (ref 4.6–13.2)

## 2024-08-15 PROCEDURE — 6360000002 HC RX W HCPCS: Performed by: PHYSICIAN ASSISTANT

## 2024-08-15 PROCEDURE — 96376 TX/PRO/DX INJ SAME DRUG ADON: CPT

## 2024-08-15 PROCEDURE — 94761 N-INVAS EAR/PLS OXIMETRY MLT: CPT

## 2024-08-15 PROCEDURE — 2580000003 HC RX 258: Performed by: STUDENT IN AN ORGANIZED HEALTH CARE EDUCATION/TRAINING PROGRAM

## 2024-08-15 PROCEDURE — 85045 AUTOMATED RETICULOCYTE COUNT: CPT

## 2024-08-15 PROCEDURE — 2580000003 HC RX 258: Performed by: PHYSICIAN ASSISTANT

## 2024-08-15 PROCEDURE — 6360000002 HC RX W HCPCS: Performed by: STUDENT IN AN ORGANIZED HEALTH CARE EDUCATION/TRAINING PROGRAM

## 2024-08-15 PROCEDURE — 80048 BASIC METABOLIC PNL TOTAL CA: CPT

## 2024-08-15 PROCEDURE — 6370000000 HC RX 637 (ALT 250 FOR IP): Performed by: PHYSICIAN ASSISTANT

## 2024-08-15 PROCEDURE — 96375 TX/PRO/DX INJ NEW DRUG ADDON: CPT

## 2024-08-15 PROCEDURE — 96374 THER/PROPH/DIAG INJ IV PUSH: CPT

## 2024-08-15 PROCEDURE — 6370000000 HC RX 637 (ALT 250 FOR IP): Performed by: STUDENT IN AN ORGANIZED HEALTH CARE EDUCATION/TRAINING PROGRAM

## 2024-08-15 PROCEDURE — 99223 1ST HOSP IP/OBS HIGH 75: CPT | Performed by: PHYSICIAN ASSISTANT

## 2024-08-15 PROCEDURE — 99285 EMERGENCY DEPT VISIT HI MDM: CPT

## 2024-08-15 PROCEDURE — 85025 COMPLETE CBC W/AUTO DIFF WBC: CPT

## 2024-08-15 PROCEDURE — 71045 X-RAY EXAM CHEST 1 VIEW: CPT

## 2024-08-15 PROCEDURE — 1100000003 HC PRIVATE W/ TELEMETRY

## 2024-08-15 RX ORDER — BISACODYL 10 MG
10 SUPPOSITORY, RECTAL RECTAL DAILY PRN
Status: DISCONTINUED | OUTPATIENT
Start: 2024-08-15 | End: 2024-08-18 | Stop reason: HOSPADM

## 2024-08-15 RX ORDER — 0.9 % SODIUM CHLORIDE 0.9 %
1000 INTRAVENOUS SOLUTION INTRAVENOUS ONCE
Status: COMPLETED | OUTPATIENT
Start: 2024-08-15 | End: 2024-08-15

## 2024-08-15 RX ORDER — NALOXONE HYDROCHLORIDE 0.4 MG/ML
0.4 INJECTION, SOLUTION INTRAMUSCULAR; INTRAVENOUS; SUBCUTANEOUS PRN
Status: DISCONTINUED | OUTPATIENT
Start: 2024-08-15 | End: 2024-08-18 | Stop reason: HOSPADM

## 2024-08-15 RX ORDER — KETOROLAC TROMETHAMINE 15 MG/ML
15 INJECTION, SOLUTION INTRAMUSCULAR; INTRAVENOUS ONCE
Status: COMPLETED | OUTPATIENT
Start: 2024-08-15 | End: 2024-08-15

## 2024-08-15 RX ORDER — POTASSIUM CHLORIDE 20 MEQ/1
40 TABLET, EXTENDED RELEASE ORAL
Status: COMPLETED | OUTPATIENT
Start: 2024-08-15 | End: 2024-08-15

## 2024-08-15 RX ORDER — DIPHENHYDRAMINE HYDROCHLORIDE 50 MG/ML
25 INJECTION INTRAMUSCULAR; INTRAVENOUS ONCE
Status: DISCONTINUED | OUTPATIENT
Start: 2024-08-15 | End: 2024-08-15

## 2024-08-15 RX ORDER — HYDROMORPHONE HYDROCHLORIDE 1 MG/ML
1 INJECTION, SOLUTION INTRAMUSCULAR; INTRAVENOUS; SUBCUTANEOUS
Status: DISCONTINUED | OUTPATIENT
Start: 2024-08-15 | End: 2024-08-15

## 2024-08-15 RX ORDER — SODIUM CHLORIDE 0.9 % (FLUSH) 0.9 %
5-40 SYRINGE (ML) INJECTION EVERY 12 HOURS SCHEDULED
Status: DISCONTINUED | OUTPATIENT
Start: 2024-08-15 | End: 2024-08-18 | Stop reason: HOSPADM

## 2024-08-15 RX ORDER — M-VIT,TX,IRON,MINS/CALC/FOLIC 27MG-0.4MG
1 TABLET ORAL DAILY
COMMUNITY

## 2024-08-15 RX ORDER — HYDROMORPHONE HYDROCHLORIDE 1 MG/ML
1 INJECTION, SOLUTION INTRAMUSCULAR; INTRAVENOUS; SUBCUTANEOUS
Status: COMPLETED | OUTPATIENT
Start: 2024-08-15 | End: 2024-08-15

## 2024-08-15 RX ORDER — ONDANSETRON 4 MG/1
4 TABLET, ORALLY DISINTEGRATING ORAL EVERY 8 HOURS PRN
Status: DISCONTINUED | OUTPATIENT
Start: 2024-08-15 | End: 2024-08-18 | Stop reason: HOSPADM

## 2024-08-15 RX ORDER — ENOXAPARIN SODIUM 100 MG/ML
40 INJECTION SUBCUTANEOUS DAILY
Status: DISCONTINUED | OUTPATIENT
Start: 2024-08-16 | End: 2024-08-18 | Stop reason: HOSPADM

## 2024-08-15 RX ORDER — SODIUM CHLORIDE 0.9 % (FLUSH) 0.9 %
5-40 SYRINGE (ML) INJECTION PRN
Status: DISCONTINUED | OUTPATIENT
Start: 2024-08-15 | End: 2024-08-18 | Stop reason: HOSPADM

## 2024-08-15 RX ORDER — DOCUSATE SODIUM 100 MG/1
100 CAPSULE, LIQUID FILLED ORAL DAILY
Status: DISCONTINUED | OUTPATIENT
Start: 2024-08-16 | End: 2024-08-18 | Stop reason: HOSPADM

## 2024-08-15 RX ORDER — ACETAMINOPHEN 325 MG/1
650 TABLET ORAL EVERY 6 HOURS PRN
Status: DISCONTINUED | OUTPATIENT
Start: 2024-08-15 | End: 2024-08-18 | Stop reason: HOSPADM

## 2024-08-15 RX ORDER — DIPHENHYDRAMINE HYDROCHLORIDE 50 MG/ML
25 INJECTION INTRAMUSCULAR; INTRAVENOUS EVERY 6 HOURS PRN
Status: DISCONTINUED | OUTPATIENT
Start: 2024-08-15 | End: 2024-08-17

## 2024-08-15 RX ORDER — POTASSIUM CHLORIDE 20 MEQ/1
40 TABLET, EXTENDED RELEASE ORAL PRN
Status: DISCONTINUED | OUTPATIENT
Start: 2024-08-15 | End: 2024-08-18 | Stop reason: HOSPADM

## 2024-08-15 RX ORDER — LANOLIN ALCOHOL/MO/W.PET/CERES
400 CREAM (GRAM) TOPICAL ONCE
Status: COMPLETED | OUTPATIENT
Start: 2024-08-15 | End: 2024-08-15

## 2024-08-15 RX ORDER — DIPHENHYDRAMINE HYDROCHLORIDE 50 MG/ML
25 INJECTION INTRAMUSCULAR; INTRAVENOUS
Status: COMPLETED | OUTPATIENT
Start: 2024-08-15 | End: 2024-08-15

## 2024-08-15 RX ORDER — MAGNESIUM SULFATE IN WATER 40 MG/ML
2000 INJECTION, SOLUTION INTRAVENOUS PRN
Status: DISCONTINUED | OUTPATIENT
Start: 2024-08-15 | End: 2024-08-18 | Stop reason: HOSPADM

## 2024-08-15 RX ORDER — HYDROMORPHONE HYDROCHLORIDE 2 MG/ML
1.5 INJECTION, SOLUTION INTRAMUSCULAR; INTRAVENOUS; SUBCUTANEOUS
Status: DISCONTINUED | OUTPATIENT
Start: 2024-08-15 | End: 2024-08-17

## 2024-08-15 RX ORDER — ACETAMINOPHEN 650 MG/1
650 SUPPOSITORY RECTAL EVERY 6 HOURS PRN
Status: DISCONTINUED | OUTPATIENT
Start: 2024-08-15 | End: 2024-08-18 | Stop reason: HOSPADM

## 2024-08-15 RX ORDER — SODIUM CHLORIDE 9 MG/ML
INJECTION, SOLUTION INTRAVENOUS PRN
Status: DISCONTINUED | OUTPATIENT
Start: 2024-08-15 | End: 2024-08-18 | Stop reason: HOSPADM

## 2024-08-15 RX ORDER — HYDROXYUREA 500 MG/1
500 CAPSULE ORAL 2 TIMES DAILY
Status: DISCONTINUED | OUTPATIENT
Start: 2024-08-15 | End: 2024-08-18 | Stop reason: HOSPADM

## 2024-08-15 RX ORDER — ONDANSETRON 2 MG/ML
4 INJECTION INTRAMUSCULAR; INTRAVENOUS EVERY 6 HOURS PRN
Status: DISCONTINUED | OUTPATIENT
Start: 2024-08-15 | End: 2024-08-18 | Stop reason: HOSPADM

## 2024-08-15 RX ORDER — HYDROMORPHONE HYDROCHLORIDE 2 MG/1
4 TABLET ORAL EVERY 4 HOURS PRN
Status: DISCONTINUED | OUTPATIENT
Start: 2024-08-15 | End: 2024-08-18 | Stop reason: HOSPADM

## 2024-08-15 RX ORDER — FOLIC ACID 1 MG/1
1 TABLET ORAL DAILY
Status: DISCONTINUED | OUTPATIENT
Start: 2024-08-15 | End: 2024-08-18 | Stop reason: HOSPADM

## 2024-08-15 RX ORDER — SODIUM CHLORIDE, SODIUM LACTATE, POTASSIUM CHLORIDE, CALCIUM CHLORIDE 600; 310; 30; 20 MG/100ML; MG/100ML; MG/100ML; MG/100ML
INJECTION, SOLUTION INTRAVENOUS CONTINUOUS
Status: DISCONTINUED | OUTPATIENT
Start: 2024-08-15 | End: 2024-08-18 | Stop reason: HOSPADM

## 2024-08-15 RX ORDER — POLYETHYLENE GLYCOL 3350 17 G/17G
17 POWDER, FOR SOLUTION ORAL DAILY PRN
Status: DISCONTINUED | OUTPATIENT
Start: 2024-08-15 | End: 2024-08-18 | Stop reason: HOSPADM

## 2024-08-15 RX ORDER — POTASSIUM CHLORIDE 7.45 MG/ML
10 INJECTION INTRAVENOUS PRN
Status: DISCONTINUED | OUTPATIENT
Start: 2024-08-15 | End: 2024-08-18 | Stop reason: HOSPADM

## 2024-08-15 RX ADMIN — HYDROMORPHONE HYDROCHLORIDE 1 MG: 1 INJECTION, SOLUTION INTRAMUSCULAR; INTRAVENOUS; SUBCUTANEOUS at 11:54

## 2024-08-15 RX ADMIN — FOLIC ACID 1 MG: 1 TABLET ORAL at 15:48

## 2024-08-15 RX ADMIN — HYDROMORPHONE HYDROCHLORIDE 1.5 MG: 2 INJECTION INTRAMUSCULAR; INTRAVENOUS; SUBCUTANEOUS at 14:38

## 2024-08-15 RX ADMIN — SODIUM CHLORIDE 1000 ML: 9 INJECTION, SOLUTION INTRAVENOUS at 08:35

## 2024-08-15 RX ADMIN — POTASSIUM CHLORIDE 40 MEQ: 1500 TABLET, EXTENDED RELEASE ORAL at 09:10

## 2024-08-15 RX ADMIN — KETOROLAC TROMETHAMINE 15 MG: 15 INJECTION, SOLUTION INTRAMUSCULAR; INTRAVENOUS at 08:36

## 2024-08-15 RX ADMIN — SODIUM CHLORIDE, PRESERVATIVE FREE 10 ML: 5 INJECTION INTRAVENOUS at 21:23

## 2024-08-15 RX ADMIN — HYDROMORPHONE HYDROCHLORIDE 1 MG: 1 INJECTION, SOLUTION INTRAMUSCULAR; INTRAVENOUS; SUBCUTANEOUS at 10:34

## 2024-08-15 RX ADMIN — DIPHENHYDRAMINE HYDROCHLORIDE 25 MG: 50 INJECTION INTRAMUSCULAR; INTRAVENOUS at 09:35

## 2024-08-15 RX ADMIN — HYDROXYUREA 500 MG: 500 CAPSULE ORAL at 21:18

## 2024-08-15 RX ADMIN — SODIUM CHLORIDE, POTASSIUM CHLORIDE, SODIUM LACTATE AND CALCIUM CHLORIDE: 600; 310; 30; 20 INJECTION, SOLUTION INTRAVENOUS at 12:08

## 2024-08-15 RX ADMIN — Medication 400 MG: at 11:54

## 2024-08-15 RX ADMIN — HYDROMORPHONE HYDROCHLORIDE 1 MG: 1 INJECTION, SOLUTION INTRAMUSCULAR; INTRAVENOUS; SUBCUTANEOUS at 08:36

## 2024-08-15 RX ADMIN — HYDROMORPHONE HYDROCHLORIDE 1.5 MG: 2 INJECTION INTRAMUSCULAR; INTRAVENOUS; SUBCUTANEOUS at 19:19

## 2024-08-15 RX ADMIN — HYDROMORPHONE HYDROCHLORIDE 1 MG: 1 INJECTION, SOLUTION INTRAMUSCULAR; INTRAVENOUS; SUBCUTANEOUS at 09:11

## 2024-08-15 RX ADMIN — DIPHENHYDRAMINE HYDROCHLORIDE 25 MG: 50 INJECTION INTRAMUSCULAR; INTRAVENOUS at 12:16

## 2024-08-15 RX ADMIN — HYDROMORPHONE HYDROCHLORIDE 4 MG: 2 TABLET ORAL at 23:19

## 2024-08-15 ASSESSMENT — PAIN - FUNCTIONAL ASSESSMENT
PAIN_FUNCTIONAL_ASSESSMENT: 0-10
PAIN_FUNCTIONAL_ASSESSMENT: ACTIVITIES ARE NOT PREVENTED
PAIN_FUNCTIONAL_ASSESSMENT: ACTIVITIES ARE NOT PREVENTED

## 2024-08-15 ASSESSMENT — PAIN DESCRIPTION - LOCATION
LOCATION: GENERALIZED
LOCATION: BACK
LOCATION: ABDOMEN;LEG;BACK
LOCATION: ABDOMEN;BACK;LEG

## 2024-08-15 ASSESSMENT — PAIN SCALES - GENERAL
PAINLEVEL_OUTOF10: 9
PAINLEVEL_OUTOF10: 8
PAINLEVEL_OUTOF10: 6
PAINLEVEL_OUTOF10: 4
PAINLEVEL_OUTOF10: 8
PAINLEVEL_OUTOF10: 7
PAINLEVEL_OUTOF10: 8
PAINLEVEL_OUTOF10: 6

## 2024-08-15 ASSESSMENT — PAIN DESCRIPTION - FREQUENCY
FREQUENCY: INTERMITTENT
FREQUENCY: INTERMITTENT

## 2024-08-15 ASSESSMENT — PAIN DESCRIPTION - ONSET
ONSET: ON-GOING
ONSET: ON-GOING

## 2024-08-15 ASSESSMENT — PAIN DESCRIPTION - DESCRIPTORS
DESCRIPTORS: ACHING
DESCRIPTORS: ACHING

## 2024-08-15 ASSESSMENT — PAIN DESCRIPTION - ORIENTATION
ORIENTATION: RIGHT;LEFT;ANTERIOR
ORIENTATION: LOWER

## 2024-08-15 ASSESSMENT — PAIN DESCRIPTION - PAIN TYPE
TYPE: CHRONIC PAIN
TYPE: CHRONIC PAIN

## 2024-08-15 NOTE — ED PROVIDER NOTES
EMERGENCY DEPARTMENT HISTORY AND PHYSICAL EXAM      Date: 8/15/2024  Patient Name: Norma Galan    History of Presenting Illness     Chief Complaint   Patient presents with    Sickle Cell Pain Crisis       History (Context): Norma Galan is a 43 y.o. male  presents to the ED today with chief complaint of sickle cell crisis.  Patient states symptoms began approximate 2 days ago and have worsened since onset.  States that he is been taking his at home medication without significant improvement of his symptoms.  Locates the pain to the bilateral legs, back, and abdomen.  States this is typical of his sickle cell crises.  Denies any infectious-like symptoms including cough, fever, or chills.  Denies any further chest pain, numbness, tingling, unilateral weakness, or visual disturbances.  Denies any inciting event for his symptoms.      PCP: Catrachita Chaparro MD    Current Facility-Administered Medications   Medication Dose Route Frequency Provider Last Rate Last Admin    HYDROmorphone (DILAUDID) tablet 4 mg  4 mg Oral Q4H PRN Neetu Caputo PA        lactated ringers IV soln infusion   IntraVENous Continuous Neetu Caputo  mL/hr at 08/15/24 1550 Restarted at 08/15/24 1550    naloxone (NARCAN) injection 0.4 mg  0.4 mg IntraVENous PRN Neetu Caputo PA        diphenhydrAMINE (BENADRYL) injection 25 mg  25 mg IntraVENous Q6H PRN Se Perez MD   25 mg at 08/15/24 1216    sodium chloride flush 0.9 % injection 5-40 mL  5-40 mL IntraVENous 2 times per day Neetu Caputo PA        sodium chloride flush 0.9 % injection 5-40 mL  5-40 mL IntraVENous PRN Neetu Caputo PA        0.9 % sodium chloride infusion   IntraVENous PRN Neetu Caputo PA        potassium chloride (KLOR-CON M) extended release tablet 40 mEq  40 mEq Oral PRN Neetu Caputo PA        Or    potassium bicarb-citric acid (EFFER-K) effervescent tablet 40 mEq  40 mEq Oral PRN Neetu Caputo

## 2024-08-15 NOTE — H&P
Platelet Comment ADEQUATE PLATELETS      RBC Comment MICROCYTOSIS  1+        RBC Comment POLYCHROMASIA  2+        RBC Comment OVALOCYTES  1+        RBC Comment SCHISTOCYTES  1+        RBC Comment ANISOCYTOSIS  2+        RBC Comment SICKLE CELLS  2+        RBC Comment POIKILOCYTOSIS  3+        RBC Comment TARGET CELLS  1+       BMP    Collection Time: 08/15/24  8:39 AM   Result Value Ref Range    Sodium 138 136 - 145 mmol/L    Potassium 3.4 (L) 3.5 - 5.5 mmol/L    Chloride 110 100 - 111 mmol/L    CO2 23 21 - 32 mmol/L    Anion Gap 5 3.0 - 18 mmol/L    Glucose 117 (H) 74 - 99 mg/dL    BUN 7 7.0 - 18 MG/DL    Creatinine 1.00 0.6 - 1.3 MG/DL    BUN/Creatinine Ratio 7 (L) 12 - 20      Est, Glom Filt Rate >90 >60 ml/min/1.73m2    Calcium 8.6 8.5 - 10.1 MG/DL   Reticulocytes    Collection Time: 08/15/24  8:39 AM   Result Value Ref Range    Reticulocyte Count,Automated 6.0 (H) 0.5 - 2.5 %       Imaging Reviewed:  XR CHEST PORTABLE    Result Date: 8/15/2024  STUDY:  XR CHEST PORTABLE. DATE OF STUDY: 8/15/2024 HISTORY: Chest Pain. COMPARISONS: 5/12/2024. TECHNIQUE:  Portable AP view(s) of the chest.     FINDINGS/IMPRESSION: Life support tubes/lines: None Heart and mediastinum: Cardiomediastinal silhouette is stable and nonenlarged. Lungs: No focal consolidation. No significant airspace disease. Pleura:  No pleural effusion. No pneumothorax. Electronically signed by Christian Glaser          Assessment/Plan     Hospital Problems             Last Modified POA    * (Principal) Vaso-occlusive sickle cell crisis (HCC) 8/15/2024 Yes    Reticulocytosis 8/15/2024 Yes    Microcytic anemia 8/15/2024 Yes    Leukemoid reaction 8/15/2024 Yes    Hypokalemia 8/15/2024 Yes       - PRN IV and PO dilaudid  - IV fluids  - continue hydroxyurea and folic acid  - trend retics  - PRN potassium replacement  - monitor CBC, iron levels in April wnl  - leukocytosis likely reactive 2/2 crisis. No evidence of infection  - bowel regimen to avoid

## 2024-08-15 NOTE — CARE COORDINATION
08/15/24 1604   Service Assessment   Patient Orientation Alert and Oriented   Cognition Alert   History Provided By Patient   Primary Caregiver Self   Accompanied By/Relationship No one at bedside   Support Systems Parent   Patient's Healthcare Decision Maker is: Patient Declined (Legal Next of Kin Remains as Decision Maker)   PCP Verified by CM Yes   Last Visit to PCP Within last 3 months   Prior Functional Level Independent in ADLs/IADLs   Current Functional Level Independent in ADLs/IADLs   Can patient return to prior living arrangement Yes   Ability to make needs known: Good   Family able to assist with home care needs: Yes   Would you like for me to discuss the discharge plan with any other family members/significant others, and if so, who? Yes  (Mother- Geno Galan)   Financial Resources Medicare   Community Resources None   CM/SW Referral Other (see comment)  (N/A)   Social/Functional History   Lives With Son;Daughter   Type of Home House   Home Layout Two level   Home Access Level entry   Bathroom Shower/Tub Walk-in shower   Bathroom Toilet Standard   Bathroom Equipment None   Bathroom Accessibility Accessible   Home Equipment None   Receives Help From Family   ADL Assistance Independent   Homemaking Assistance Independent   Homemaking Responsibilities Yes   Ambulation Assistance Independent   Transfer Assistance Independent   Active  Yes   Mode of Transportation Car   Education   (N/A)   Occupation Part time employment   Type of Occupation Medication Technician   Discharge Planning   Type of Residence House   Living Arrangements Children   Current Services Prior To Admission None   Potential Assistance Needed N/A   DME Ordered? No   Potential Assistance Purchasing Medications No   Type of Home Care Services None   Patient expects to be discharged to: House   Follow Up Appointment: Best Day/Time    (N/A)   One/Two Story Residence Two story   # of Interior Steps 15   Height of Each Step (in)

## 2024-08-16 LAB
ALBUMIN SERPL-MCNC: 3.5 G/DL (ref 3.4–5)
ALBUMIN/GLOB SERPL: 0.8 (ref 0.8–1.7)
ALP SERPL-CCNC: 136 U/L (ref 45–117)
ALT SERPL-CCNC: 46 U/L (ref 16–61)
ANION GAP SERPL CALC-SCNC: 5 MMOL/L (ref 3–18)
AST SERPL-CCNC: 74 U/L (ref 10–38)
BASOPHILS # BLD: 0.3 K/UL (ref 0–0.1)
BASOPHILS NFR BLD: 2 % (ref 0–2)
BILIRUB DIRECT SERPL-MCNC: 1 MG/DL (ref 0–0.2)
BILIRUB SERPL-MCNC: 4.8 MG/DL (ref 0.2–1)
BUN SERPL-MCNC: 9 MG/DL (ref 7–18)
BUN/CREAT SERPL: 9 (ref 12–20)
CALCIUM SERPL-MCNC: 8.4 MG/DL (ref 8.5–10.1)
CHLORIDE SERPL-SCNC: 109 MMOL/L (ref 100–111)
CO2 SERPL-SCNC: 23 MMOL/L (ref 21–32)
CREAT SERPL-MCNC: 0.96 MG/DL (ref 0.6–1.3)
DIFFERENTIAL METHOD BLD: ABNORMAL
EOSINOPHIL # BLD: 0.9 K/UL (ref 0–0.4)
EOSINOPHIL NFR BLD: 7 % (ref 0–5)
ERYTHROCYTE [DISTWIDTH] IN BLOOD BY AUTOMATED COUNT: 24.5 % (ref 11.6–14.5)
GLOBULIN SER CALC-MCNC: 4.6 G/DL (ref 2–4)
GLUCOSE SERPL-MCNC: 93 MG/DL (ref 74–99)
HCT VFR BLD AUTO: 23.5 % (ref 36–48)
HGB BLD-MCNC: 7.8 G/DL (ref 13–16)
IMM GRANULOCYTES # BLD AUTO: 0 K/UL (ref 0–0.04)
IMM GRANULOCYTES NFR BLD AUTO: 0 % (ref 0–0.5)
LYMPHOCYTES # BLD: 5.8 K/UL (ref 0.9–3.6)
LYMPHOCYTES NFR BLD: 46 % (ref 21–52)
MCH RBC QN AUTO: 23.9 PG (ref 24–34)
MCHC RBC AUTO-ENTMCNC: 33.2 G/DL (ref 31–37)
MCV RBC AUTO: 71.9 FL (ref 78–100)
MONOCYTES # BLD: 1.3 K/UL (ref 0.05–1.2)
MONOCYTES NFR BLD: 10 % (ref 3–10)
NEUTS SEG # BLD: 4.4 K/UL (ref 1.8–8)
NEUTS SEG NFR BLD: 35 % (ref 40–73)
NRBC # BLD: 0.28 K/UL (ref 0–0.01)
NRBC BLD-RTO: 2.2 PER 100 WBC
PLATELET # BLD AUTO: 271 K/UL (ref 135–420)
PLATELET COMMENT: ABNORMAL
PMV BLD AUTO: 10.8 FL (ref 9.2–11.8)
POTASSIUM SERPL-SCNC: 3.8 MMOL/L (ref 3.5–5.5)
PROT SERPL-MCNC: 8.1 G/DL (ref 6.4–8.2)
RBC # BLD AUTO: 3.27 M/UL (ref 4.35–5.65)
RBC MORPH BLD: ABNORMAL
RETICS/RBC NFR AUTO: 5.4 % (ref 0.5–2.5)
SODIUM SERPL-SCNC: 137 MMOL/L (ref 136–145)
WBC # BLD AUTO: 12.7 K/UL (ref 4.6–13.2)

## 2024-08-16 PROCEDURE — 6360000002 HC RX W HCPCS: Performed by: STUDENT IN AN ORGANIZED HEALTH CARE EDUCATION/TRAINING PROGRAM

## 2024-08-16 PROCEDURE — 99232 SBSQ HOSP IP/OBS MODERATE 35: CPT | Performed by: STUDENT IN AN ORGANIZED HEALTH CARE EDUCATION/TRAINING PROGRAM

## 2024-08-16 PROCEDURE — 2580000003 HC RX 258: Performed by: PHYSICIAN ASSISTANT

## 2024-08-16 PROCEDURE — 85045 AUTOMATED RETICULOCYTE COUNT: CPT

## 2024-08-16 PROCEDURE — 6360000002 HC RX W HCPCS: Performed by: PHYSICIAN ASSISTANT

## 2024-08-16 PROCEDURE — 80048 BASIC METABOLIC PNL TOTAL CA: CPT

## 2024-08-16 PROCEDURE — 6370000000 HC RX 637 (ALT 250 FOR IP): Performed by: PHYSICIAN ASSISTANT

## 2024-08-16 PROCEDURE — 85025 COMPLETE CBC W/AUTO DIFF WBC: CPT

## 2024-08-16 PROCEDURE — 1100000003 HC PRIVATE W/ TELEMETRY

## 2024-08-16 PROCEDURE — 80076 HEPATIC FUNCTION PANEL: CPT

## 2024-08-16 PROCEDURE — 36415 COLL VENOUS BLD VENIPUNCTURE: CPT

## 2024-08-16 RX ADMIN — HYDROXYUREA 500 MG: 500 CAPSULE ORAL at 08:25

## 2024-08-16 RX ADMIN — FOLIC ACID 1 MG: 1 TABLET ORAL at 08:25

## 2024-08-16 RX ADMIN — HYDROMORPHONE HYDROCHLORIDE 4 MG: 2 TABLET ORAL at 21:50

## 2024-08-16 RX ADMIN — HYDROMORPHONE HYDROCHLORIDE 1.5 MG: 2 INJECTION INTRAMUSCULAR; INTRAVENOUS; SUBCUTANEOUS at 22:01

## 2024-08-16 RX ADMIN — HYDROMORPHONE HYDROCHLORIDE 4 MG: 2 TABLET ORAL at 18:28

## 2024-08-16 RX ADMIN — HYDROMORPHONE HYDROCHLORIDE 1.5 MG: 2 INJECTION INTRAMUSCULAR; INTRAVENOUS; SUBCUTANEOUS at 15:24

## 2024-08-16 RX ADMIN — HYDROMORPHONE HYDROCHLORIDE 1.5 MG: 2 INJECTION INTRAMUSCULAR; INTRAVENOUS; SUBCUTANEOUS at 12:09

## 2024-08-16 RX ADMIN — DIPHENHYDRAMINE HYDROCHLORIDE 25 MG: 50 INJECTION INTRAMUSCULAR; INTRAVENOUS at 21:49

## 2024-08-16 RX ADMIN — DIPHENHYDRAMINE HYDROCHLORIDE 25 MG: 50 INJECTION INTRAMUSCULAR; INTRAVENOUS at 08:26

## 2024-08-16 RX ADMIN — HYDROMORPHONE HYDROCHLORIDE 4 MG: 2 TABLET ORAL at 08:24

## 2024-08-16 RX ADMIN — HYDROXYUREA 500 MG: 500 CAPSULE ORAL at 22:00

## 2024-08-16 RX ADMIN — SODIUM CHLORIDE, POTASSIUM CHLORIDE, SODIUM LACTATE AND CALCIUM CHLORIDE: 600; 310; 30; 20 INJECTION, SOLUTION INTRAVENOUS at 02:47

## 2024-08-16 RX ADMIN — SODIUM CHLORIDE, PRESERVATIVE FREE 10 ML: 5 INJECTION INTRAVENOUS at 08:26

## 2024-08-16 RX ADMIN — SODIUM CHLORIDE, PRESERVATIVE FREE 10 ML: 5 INJECTION INTRAVENOUS at 21:59

## 2024-08-16 RX ADMIN — DIPHENHYDRAMINE HYDROCHLORIDE 25 MG: 50 INJECTION INTRAMUSCULAR; INTRAVENOUS at 02:42

## 2024-08-16 RX ADMIN — DOCUSATE SODIUM 100 MG: 100 CAPSULE, LIQUID FILLED ORAL at 08:25

## 2024-08-16 RX ADMIN — SODIUM CHLORIDE, POTASSIUM CHLORIDE, SODIUM LACTATE AND CALCIUM CHLORIDE: 600; 310; 30; 20 INJECTION, SOLUTION INTRAVENOUS at 12:12

## 2024-08-16 RX ADMIN — HYDROMORPHONE HYDROCHLORIDE 1.5 MG: 2 INJECTION INTRAMUSCULAR; INTRAVENOUS; SUBCUTANEOUS at 05:28

## 2024-08-16 RX ADMIN — HYDROMORPHONE HYDROCHLORIDE 1.5 MG: 2 INJECTION INTRAMUSCULAR; INTRAVENOUS; SUBCUTANEOUS at 02:38

## 2024-08-16 RX ADMIN — ENOXAPARIN SODIUM 40 MG: 100 INJECTION SUBCUTANEOUS at 08:25

## 2024-08-16 RX ADMIN — SODIUM CHLORIDE, POTASSIUM CHLORIDE, SODIUM LACTATE AND CALCIUM CHLORIDE: 600; 310; 30; 20 INJECTION, SOLUTION INTRAVENOUS at 22:15

## 2024-08-16 RX ADMIN — HYDROMORPHONE HYDROCHLORIDE 1.5 MG: 2 INJECTION INTRAMUSCULAR; INTRAVENOUS; SUBCUTANEOUS at 08:25

## 2024-08-16 RX ADMIN — HYDROMORPHONE HYDROCHLORIDE 4 MG: 2 TABLET ORAL at 12:09

## 2024-08-16 RX ADMIN — DIPHENHYDRAMINE HYDROCHLORIDE 25 MG: 50 INJECTION INTRAMUSCULAR; INTRAVENOUS at 15:24

## 2024-08-16 RX ADMIN — HYDROMORPHONE HYDROCHLORIDE 1.5 MG: 2 INJECTION INTRAMUSCULAR; INTRAVENOUS; SUBCUTANEOUS at 18:29

## 2024-08-16 ASSESSMENT — PAIN SCALES - GENERAL
PAINLEVEL_OUTOF10: 9
PAINLEVEL_OUTOF10: 7
PAINLEVEL_OUTOF10: 6
PAINLEVEL_OUTOF10: 7
PAINLEVEL_OUTOF10: 7
PAINLEVEL_OUTOF10: 8
PAINLEVEL_OUTOF10: 6
PAINLEVEL_OUTOF10: 8
PAINLEVEL_OUTOF10: 6
PAINLEVEL_OUTOF10: 7
PAINLEVEL_OUTOF10: 6
PAINLEVEL_OUTOF10: 6
PAINLEVEL_OUTOF10: 8
PAINLEVEL_OUTOF10: 7
PAINLEVEL_OUTOF10: 6
PAINLEVEL_OUTOF10: 7
PAINLEVEL_OUTOF10: 7

## 2024-08-16 ASSESSMENT — PAIN DESCRIPTION - DESCRIPTORS
DESCRIPTORS: ACHING

## 2024-08-16 ASSESSMENT — PAIN DESCRIPTION - ORIENTATION
ORIENTATION: RIGHT;LEFT
ORIENTATION: LOWER
ORIENTATION: RIGHT;LEFT;ANTERIOR;POSTERIOR
ORIENTATION: LOWER
ORIENTATION: RIGHT;LEFT
ORIENTATION: MID;LOWER
ORIENTATION: RIGHT;LEFT
ORIENTATION: RIGHT;LEFT;ANTERIOR;POSTERIOR
ORIENTATION: RIGHT;LEFT
ORIENTATION: MID

## 2024-08-16 ASSESSMENT — PAIN DESCRIPTION - LOCATION
LOCATION: LEG;BACK
LOCATION: BACK;LEG
LOCATION: BACK;LEG;GENERALIZED
LOCATION: BACK;LEG
LOCATION: GENERALIZED
LOCATION: GENERALIZED
LOCATION: ABDOMEN;LEG
LOCATION: ABDOMEN;BACK;LEG
LOCATION: BACK
LOCATION: BACK;LEG

## 2024-08-16 ASSESSMENT — PAIN DESCRIPTION - PAIN TYPE
TYPE: ACUTE PAIN;CHRONIC PAIN

## 2024-08-16 ASSESSMENT — PAIN DESCRIPTION - ONSET
ONSET: ON-GOING

## 2024-08-16 ASSESSMENT — PAIN DESCRIPTION - FREQUENCY
FREQUENCY: CONTINUOUS

## 2024-08-16 ASSESSMENT — PAIN - FUNCTIONAL ASSESSMENT
PAIN_FUNCTIONAL_ASSESSMENT: ACTIVITIES ARE NOT PREVENTED

## 2024-08-16 NOTE — PLAN OF CARE
Problem: Discharge Planning  Goal: Discharge to home or other facility with appropriate resources  8/16/2024 1002 by Odette Benz RN  Outcome: Progressing  8/15/2024 2248 by Shana Muñoz RN  Outcome: Progressing  Flowsheets (Taken 8/15/2024 2118)  Discharge to home or other facility with appropriate resources: Identify barriers to discharge with patient and caregiver     Problem: Pain  Goal: Verbalizes/displays adequate comfort level or baseline comfort level  8/16/2024 1002 by Odette Benz RN  Outcome: Progressing  Flowsheets (Taken 8/16/2024 0823)  Verbalizes/displays adequate comfort level or baseline comfort level: Encourage patient to monitor pain and request assistance  8/15/2024 2248 by Shana Muñoz RN  Outcome: Progressing     Problem: ABCDS Injury Assessment  Goal: Absence of physical injury  8/16/2024 1002 by Odette Benz RN  Outcome: Progressing  8/15/2024 2248 by Shana Muñoz RN  Outcome: Progressing     Problem: Safety - Adult  Goal: Free from fall injury  8/16/2024 1002 by Odette Benz RN  Outcome: Progressing  8/15/2024 2248 by Shana Muñoz RN  Outcome: Progressing     Problem: Metabolic/Fluid and Electrolytes - Adult  Goal: Electrolytes maintained within normal limits  8/16/2024 1002 by Odette Benz RN  Outcome: Progressing  8/15/2024 2248 by Shana Muñoz RN  Outcome: Progressing     Problem: Hematologic - Adult  Goal: Maintains hematologic stability  8/16/2024 1002 by Odette Benz RN  Outcome: Progressing  8/15/2024 2248 by Shana Muñoz RN  Outcome: Progressing     Problem: Musculoskeletal - Adult  Goal: Return ADL status to a safe level of function  8/16/2024 1002 by Odette Benz RN  Outcome: Progressing  8/15/2024 2248 by Shana Muñoz RN  Outcome: Progressing

## 2024-08-17 LAB
ANION GAP SERPL CALC-SCNC: 6 MMOL/L (ref 3–18)
BASOPHILS # BLD: 0.3 K/UL (ref 0–0.1)
BASOPHILS NFR BLD: 2 % (ref 0–2)
BUN SERPL-MCNC: 9 MG/DL (ref 7–18)
BUN/CREAT SERPL: 9 (ref 12–20)
CALCIUM SERPL-MCNC: 8.7 MG/DL (ref 8.5–10.1)
CHLORIDE SERPL-SCNC: 106 MMOL/L (ref 100–111)
CO2 SERPL-SCNC: 25 MMOL/L (ref 21–32)
CREAT SERPL-MCNC: 1.02 MG/DL (ref 0.6–1.3)
DIFFERENTIAL METHOD BLD: ABNORMAL
EOSINOPHIL # BLD: 1.5 K/UL (ref 0–0.4)
EOSINOPHIL NFR BLD: 11 % (ref 0–5)
ERYTHROCYTE [DISTWIDTH] IN BLOOD BY AUTOMATED COUNT: 25.1 % (ref 11.6–14.5)
GLUCOSE SERPL-MCNC: 74 MG/DL (ref 74–99)
HCT VFR BLD AUTO: 24.5 % (ref 36–48)
HGB BLD-MCNC: 8.2 G/DL (ref 13–16)
IMM GRANULOCYTES # BLD AUTO: 0 K/UL (ref 0–0.04)
IMM GRANULOCYTES NFR BLD AUTO: 0 % (ref 0–0.5)
LYMPHOCYTES # BLD: 5.4 K/UL (ref 0.9–3.6)
LYMPHOCYTES NFR BLD: 39 % (ref 21–52)
MCH RBC QN AUTO: 24 PG (ref 24–34)
MCHC RBC AUTO-ENTMCNC: 33.5 G/DL (ref 31–37)
MCV RBC AUTO: 71.6 FL (ref 78–100)
MONOCYTES # BLD: 0.8 K/UL (ref 0.05–1.2)
MONOCYTES NFR BLD: 6 % (ref 3–10)
NEUTS SEG # BLD: 5.8 K/UL (ref 1.8–8)
NEUTS SEG NFR BLD: 42 % (ref 40–73)
NRBC # BLD: 0.57 K/UL (ref 0–0.01)
NRBC BLD-RTO: 4.1 PER 100 WBC
PLATELET # BLD AUTO: 406 K/UL (ref 135–420)
PLATELET COMMENT: ABNORMAL
PMV BLD AUTO: 9.9 FL (ref 9.2–11.8)
POTASSIUM SERPL-SCNC: 4.1 MMOL/L (ref 3.5–5.5)
RBC # BLD AUTO: 3.42 M/UL (ref 4.35–5.65)
RBC MORPH BLD: ABNORMAL
RETICS/RBC NFR AUTO: 6.6 % (ref 0.5–2.5)
SODIUM SERPL-SCNC: 137 MMOL/L (ref 136–145)
WBC # BLD AUTO: 13.8 K/UL (ref 4.6–13.2)

## 2024-08-17 PROCEDURE — 99232 SBSQ HOSP IP/OBS MODERATE 35: CPT | Performed by: INTERNAL MEDICINE

## 2024-08-17 PROCEDURE — 1100000003 HC PRIVATE W/ TELEMETRY

## 2024-08-17 PROCEDURE — 85045 AUTOMATED RETICULOCYTE COUNT: CPT

## 2024-08-17 PROCEDURE — 6360000002 HC RX W HCPCS: Performed by: STUDENT IN AN ORGANIZED HEALTH CARE EDUCATION/TRAINING PROGRAM

## 2024-08-17 PROCEDURE — 6360000002 HC RX W HCPCS: Performed by: INTERNAL MEDICINE

## 2024-08-17 PROCEDURE — 6370000000 HC RX 637 (ALT 250 FOR IP): Performed by: PHYSICIAN ASSISTANT

## 2024-08-17 PROCEDURE — 85025 COMPLETE CBC W/AUTO DIFF WBC: CPT

## 2024-08-17 PROCEDURE — 6370000000 HC RX 637 (ALT 250 FOR IP): Performed by: INTERNAL MEDICINE

## 2024-08-17 PROCEDURE — 6360000002 HC RX W HCPCS: Performed by: PHYSICIAN ASSISTANT

## 2024-08-17 PROCEDURE — 36415 COLL VENOUS BLD VENIPUNCTURE: CPT

## 2024-08-17 PROCEDURE — 80048 BASIC METABOLIC PNL TOTAL CA: CPT

## 2024-08-17 PROCEDURE — 2580000003 HC RX 258: Performed by: PHYSICIAN ASSISTANT

## 2024-08-17 RX ORDER — PSEUDOEPHEDRINE HCL 30 MG
100 TABLET ORAL DAILY
Qty: 30 CAPSULE | Refills: 0 | Status: SHIPPED | OUTPATIENT
Start: 2024-08-18

## 2024-08-17 RX ORDER — DIPHENHYDRAMINE HCL 25 MG
25 CAPSULE ORAL EVERY 6 HOURS PRN
Status: DISCONTINUED | OUTPATIENT
Start: 2024-08-17 | End: 2024-08-18 | Stop reason: HOSPADM

## 2024-08-17 RX ORDER — HYDROMORPHONE HYDROCHLORIDE 1 MG/ML
1 INJECTION, SOLUTION INTRAMUSCULAR; INTRAVENOUS; SUBCUTANEOUS EVERY 4 HOURS PRN
Status: DISCONTINUED | OUTPATIENT
Start: 2024-08-17 | End: 2024-08-18 | Stop reason: HOSPADM

## 2024-08-17 RX ADMIN — HYDROXYUREA 500 MG: 500 CAPSULE ORAL at 08:23

## 2024-08-17 RX ADMIN — HYDROMORPHONE HYDROCHLORIDE 1 MG: 1 INJECTION, SOLUTION INTRAMUSCULAR; INTRAVENOUS; SUBCUTANEOUS at 10:51

## 2024-08-17 RX ADMIN — HYDROMORPHONE HYDROCHLORIDE 4 MG: 2 TABLET ORAL at 10:50

## 2024-08-17 RX ADMIN — SODIUM CHLORIDE, PRESERVATIVE FREE 10 ML: 5 INJECTION INTRAVENOUS at 23:20

## 2024-08-17 RX ADMIN — DIPHENHYDRAMINE HYDROCHLORIDE 25 MG: 50 INJECTION INTRAMUSCULAR; INTRAVENOUS at 04:55

## 2024-08-17 RX ADMIN — DIPHENHYDRAMINE HYDROCHLORIDE 25 MG: 25 CAPSULE ORAL at 17:12

## 2024-08-17 RX ADMIN — DOCUSATE SODIUM 100 MG: 100 CAPSULE, LIQUID FILLED ORAL at 08:15

## 2024-08-17 RX ADMIN — HYDROMORPHONE HYDROCHLORIDE 4 MG: 2 TABLET ORAL at 14:41

## 2024-08-17 RX ADMIN — HYDROXYUREA 500 MG: 500 CAPSULE ORAL at 23:18

## 2024-08-17 RX ADMIN — HYDROMORPHONE HYDROCHLORIDE 1.5 MG: 2 INJECTION INTRAMUSCULAR; INTRAVENOUS; SUBCUTANEOUS at 01:29

## 2024-08-17 RX ADMIN — HYDROMORPHONE HYDROCHLORIDE 4 MG: 2 TABLET ORAL at 08:14

## 2024-08-17 RX ADMIN — SODIUM CHLORIDE, PRESERVATIVE FREE 10 ML: 5 INJECTION INTRAVENOUS at 09:00

## 2024-08-17 RX ADMIN — ENOXAPARIN SODIUM 40 MG: 100 INJECTION SUBCUTANEOUS at 08:13

## 2024-08-17 RX ADMIN — HYDROMORPHONE HYDROCHLORIDE 1 MG: 1 INJECTION, SOLUTION INTRAMUSCULAR; INTRAVENOUS; SUBCUTANEOUS at 20:35

## 2024-08-17 RX ADMIN — HYDROMORPHONE HYDROCHLORIDE 4 MG: 2 TABLET ORAL at 04:56

## 2024-08-17 RX ADMIN — HYDROMORPHONE HYDROCHLORIDE 4 MG: 2 TABLET ORAL at 19:02

## 2024-08-17 RX ADMIN — HYDROMORPHONE HYDROCHLORIDE 1.5 MG: 2 INJECTION INTRAMUSCULAR; INTRAVENOUS; SUBCUTANEOUS at 05:00

## 2024-08-17 RX ADMIN — HYDROMORPHONE HYDROCHLORIDE 1 MG: 1 INJECTION, SOLUTION INTRAMUSCULAR; INTRAVENOUS; SUBCUTANEOUS at 17:12

## 2024-08-17 RX ADMIN — DIPHENHYDRAMINE HYDROCHLORIDE 25 MG: 25 CAPSULE ORAL at 23:27

## 2024-08-17 RX ADMIN — FOLIC ACID 1 MG: 1 TABLET ORAL at 08:15

## 2024-08-17 RX ADMIN — HYDROMORPHONE HYDROCHLORIDE 4 MG: 2 TABLET ORAL at 23:17

## 2024-08-17 RX ADMIN — HYDROMORPHONE HYDROCHLORIDE 4 MG: 2 TABLET ORAL at 01:33

## 2024-08-17 RX ADMIN — DIPHENHYDRAMINE HYDROCHLORIDE 25 MG: 25 CAPSULE ORAL at 10:56

## 2024-08-17 RX ADMIN — HYDROMORPHONE HYDROCHLORIDE 1.5 MG: 2 INJECTION INTRAMUSCULAR; INTRAVENOUS; SUBCUTANEOUS at 08:14

## 2024-08-17 RX ADMIN — SODIUM CHLORIDE, POTASSIUM CHLORIDE, SODIUM LACTATE AND CALCIUM CHLORIDE: 600; 310; 30; 20 INJECTION, SOLUTION INTRAVENOUS at 17:58

## 2024-08-17 ASSESSMENT — PAIN SCALES - GENERAL
PAINLEVEL_OUTOF10: 0
PAINLEVEL_OUTOF10: 0
PAINLEVEL_OUTOF10: 7
PAINLEVEL_OUTOF10: 8
PAINLEVEL_OUTOF10: 2
PAINLEVEL_OUTOF10: 7
PAINLEVEL_OUTOF10: 0
PAINLEVEL_OUTOF10: 7
PAINLEVEL_OUTOF10: 7
PAINLEVEL_OUTOF10: 6
PAINLEVEL_OUTOF10: 4
PAINLEVEL_OUTOF10: 6
PAINLEVEL_OUTOF10: 8
PAINLEVEL_OUTOF10: 6
PAINLEVEL_OUTOF10: 6
PAINLEVEL_OUTOF10: 8
PAINLEVEL_OUTOF10: 0

## 2024-08-17 ASSESSMENT — PAIN DESCRIPTION - ORIENTATION
ORIENTATION: RIGHT;LEFT
ORIENTATION: RIGHT;LEFT
ORIENTATION: RIGHT;ANTERIOR
ORIENTATION: RIGHT;ANTERIOR
ORIENTATION: RIGHT;LEFT

## 2024-08-17 ASSESSMENT — PAIN - FUNCTIONAL ASSESSMENT
PAIN_FUNCTIONAL_ASSESSMENT: ACTIVITIES ARE NOT PREVENTED

## 2024-08-17 ASSESSMENT — PAIN DESCRIPTION - DESCRIPTORS
DESCRIPTORS: ACHING
DESCRIPTORS: BURNING

## 2024-08-17 ASSESSMENT — PAIN DESCRIPTION - PAIN TYPE
TYPE: ACUTE PAIN;CHRONIC PAIN
TYPE: ACUTE PAIN
TYPE: ACUTE PAIN;CHRONIC PAIN
TYPE: ACUTE PAIN;CHRONIC PAIN
TYPE: ACUTE PAIN
TYPE: ACUTE PAIN;CHRONIC PAIN

## 2024-08-17 ASSESSMENT — PAIN DESCRIPTION - FREQUENCY
FREQUENCY: CONTINUOUS

## 2024-08-17 ASSESSMENT — PAIN DESCRIPTION - LOCATION
LOCATION: BACK;LEG
LOCATION: LEG;BACK
LOCATION: BACK;LEG
LOCATION: BACK;LEG
LOCATION: BACK
LOCATION: BACK;LEG

## 2024-08-17 ASSESSMENT — PAIN DESCRIPTION - ONSET
ONSET: ON-GOING

## 2024-08-17 NOTE — PLAN OF CARE
Problem: Discharge Planning  Goal: Discharge to home or other facility with appropriate resources  Outcome: Progressing  Flowsheets (Taken 8/17/2024 0739)  Discharge to home or other facility with appropriate resources: Identify barriers to discharge with patient and caregiver     Problem: Pain  Goal: Verbalizes/displays adequate comfort level or baseline comfort level  Outcome: Progressing     Problem: ABCDS Injury Assessment  Goal: Absence of physical injury  Outcome: Progressing     Problem: Safety - Adult  Goal: Free from fall injury  Outcome: Progressing     Problem: Metabolic/Fluid and Electrolytes - Adult  Goal: Electrolytes maintained within normal limits  Outcome: Progressing  Flowsheets (Taken 8/17/2024 0739)  Electrolytes maintained within normal limits: Monitor labs and assess patient for signs and symptoms of electrolyte imbalances     Problem: Hematologic - Adult  Goal: Maintains hematologic stability  Outcome: Progressing  Flowsheets (Taken 8/17/2024 0739)  Maintains hematologic stability: Assess for signs and symptoms of bleeding or hemorrhage     Problem: Musculoskeletal - Adult  Goal: Return ADL status to a safe level of function  Outcome: Progressing

## 2024-08-18 VITALS
HEART RATE: 73 BPM | HEIGHT: 72 IN | DIASTOLIC BLOOD PRESSURE: 78 MMHG | OXYGEN SATURATION: 95 % | BODY MASS INDEX: 26.67 KG/M2 | RESPIRATION RATE: 18 BRPM | WEIGHT: 196.87 LBS | SYSTOLIC BLOOD PRESSURE: 136 MMHG | TEMPERATURE: 98.1 F

## 2024-08-18 LAB
ANION GAP SERPL CALC-SCNC: 8 MMOL/L (ref 3–18)
BASOPHILS # BLD: 0.1 K/UL (ref 0–0.1)
BASOPHILS NFR BLD: 1 % (ref 0–2)
BUN SERPL-MCNC: 9 MG/DL (ref 7–18)
BUN/CREAT SERPL: 11 (ref 12–20)
CALCIUM SERPL-MCNC: 8.5 MG/DL (ref 8.5–10.1)
CHLORIDE SERPL-SCNC: 106 MMOL/L (ref 100–111)
CO2 SERPL-SCNC: 19 MMOL/L (ref 21–32)
CREAT SERPL-MCNC: 0.8 MG/DL (ref 0.6–1.3)
DIFFERENTIAL METHOD BLD: ABNORMAL
EOSINOPHIL # BLD: 0.7 K/UL (ref 0–0.4)
EOSINOPHIL NFR BLD: 6 % (ref 0–5)
ERYTHROCYTE [DISTWIDTH] IN BLOOD BY AUTOMATED COUNT: 25.5 % (ref 11.6–14.5)
GLUCOSE SERPL-MCNC: 90 MG/DL (ref 74–99)
HCT VFR BLD AUTO: 23.2 % (ref 36–48)
HGB BLD-MCNC: 7.8 G/DL (ref 13–16)
IMM GRANULOCYTES # BLD AUTO: 0 K/UL (ref 0–0.04)
IMM GRANULOCYTES NFR BLD AUTO: 0 % (ref 0–0.5)
LYMPHOCYTES # BLD: 5.7 K/UL (ref 0.9–3.6)
LYMPHOCYTES NFR BLD: 50 % (ref 21–52)
MCH RBC QN AUTO: 24.1 PG (ref 24–34)
MCHC RBC AUTO-ENTMCNC: 33.6 G/DL (ref 31–37)
MCV RBC AUTO: 71.8 FL (ref 78–100)
MONOCYTES # BLD: 0.3 K/UL (ref 0.05–1.2)
MONOCYTES NFR BLD: 3 % (ref 3–10)
NEUTS SEG # BLD: 4.5 K/UL (ref 1.8–8)
NEUTS SEG NFR BLD: 40 % (ref 40–73)
NRBC # BLD: 0.53 K/UL (ref 0–0.01)
NRBC BLD-RTO: 4.7 PER 100 WBC
PLATELET # BLD AUTO: 348 K/UL (ref 135–420)
PLATELET COMMENT: ABNORMAL
PMV BLD AUTO: 9.7 FL (ref 9.2–11.8)
POTASSIUM SERPL-SCNC: 3.7 MMOL/L (ref 3.5–5.5)
RBC # BLD AUTO: 3.23 M/UL (ref 4.35–5.65)
RBC MORPH BLD: ABNORMAL
RETICS/RBC NFR AUTO: 7.3 % (ref 0.5–2.5)
SODIUM SERPL-SCNC: 133 MMOL/L (ref 136–145)
WBC # BLD AUTO: 11.3 K/UL (ref 4.6–13.2)

## 2024-08-18 PROCEDURE — 6360000002 HC RX W HCPCS: Performed by: INTERNAL MEDICINE

## 2024-08-18 PROCEDURE — 80048 BASIC METABOLIC PNL TOTAL CA: CPT

## 2024-08-18 PROCEDURE — 6370000000 HC RX 637 (ALT 250 FOR IP): Performed by: PHYSICIAN ASSISTANT

## 2024-08-18 PROCEDURE — 85045 AUTOMATED RETICULOCYTE COUNT: CPT

## 2024-08-18 PROCEDURE — 85025 COMPLETE CBC W/AUTO DIFF WBC: CPT

## 2024-08-18 PROCEDURE — 6360000002 HC RX W HCPCS: Performed by: PHYSICIAN ASSISTANT

## 2024-08-18 PROCEDURE — 6370000000 HC RX 637 (ALT 250 FOR IP): Performed by: INTERNAL MEDICINE

## 2024-08-18 PROCEDURE — 2580000003 HC RX 258: Performed by: PHYSICIAN ASSISTANT

## 2024-08-18 PROCEDURE — 94761 N-INVAS EAR/PLS OXIMETRY MLT: CPT

## 2024-08-18 PROCEDURE — 99239 HOSP IP/OBS DSCHRG MGMT >30: CPT | Performed by: INTERNAL MEDICINE

## 2024-08-18 RX ADMIN — HYDROMORPHONE HYDROCHLORIDE 4 MG: 2 TABLET ORAL at 08:26

## 2024-08-18 RX ADMIN — HYDROMORPHONE HYDROCHLORIDE 1 MG: 1 INJECTION, SOLUTION INTRAMUSCULAR; INTRAVENOUS; SUBCUTANEOUS at 04:14

## 2024-08-18 RX ADMIN — HYDROMORPHONE HYDROCHLORIDE 1 MG: 1 INJECTION, SOLUTION INTRAMUSCULAR; INTRAVENOUS; SUBCUTANEOUS at 08:26

## 2024-08-18 RX ADMIN — HYDROMORPHONE HYDROCHLORIDE 1 MG: 1 INJECTION, SOLUTION INTRAMUSCULAR; INTRAVENOUS; SUBCUTANEOUS at 17:44

## 2024-08-18 RX ADMIN — FOLIC ACID 1 MG: 1 TABLET ORAL at 08:27

## 2024-08-18 RX ADMIN — HYDROMORPHONE HYDROCHLORIDE 4 MG: 2 TABLET ORAL at 13:42

## 2024-08-18 RX ADMIN — ENOXAPARIN SODIUM 40 MG: 100 INJECTION SUBCUTANEOUS at 08:27

## 2024-08-18 RX ADMIN — DIPHENHYDRAMINE HYDROCHLORIDE 25 MG: 25 CAPSULE ORAL at 08:27

## 2024-08-18 RX ADMIN — DOCUSATE SODIUM 100 MG: 100 CAPSULE, LIQUID FILLED ORAL at 08:27

## 2024-08-18 RX ADMIN — SODIUM CHLORIDE, POTASSIUM CHLORIDE, SODIUM LACTATE AND CALCIUM CHLORIDE: 600; 310; 30; 20 INJECTION, SOLUTION INTRAVENOUS at 04:18

## 2024-08-18 RX ADMIN — HYDROMORPHONE HYDROCHLORIDE 4 MG: 2 TABLET ORAL at 04:13

## 2024-08-18 RX ADMIN — HYDROMORPHONE HYDROCHLORIDE 4 MG: 2 TABLET ORAL at 17:44

## 2024-08-18 RX ADMIN — HYDROMORPHONE HYDROCHLORIDE 1 MG: 1 INJECTION, SOLUTION INTRAMUSCULAR; INTRAVENOUS; SUBCUTANEOUS at 13:42

## 2024-08-18 RX ADMIN — HYDROXYUREA 500 MG: 500 CAPSULE ORAL at 08:36

## 2024-08-18 RX ADMIN — HYDROMORPHONE HYDROCHLORIDE 1 MG: 1 INJECTION, SOLUTION INTRAMUSCULAR; INTRAVENOUS; SUBCUTANEOUS at 00:03

## 2024-08-18 ASSESSMENT — PAIN DESCRIPTION - DESCRIPTORS
DESCRIPTORS: ACHING

## 2024-08-18 ASSESSMENT — PAIN DESCRIPTION - ORIENTATION
ORIENTATION: RIGHT;LEFT

## 2024-08-18 ASSESSMENT — PAIN DESCRIPTION - LOCATION
LOCATION: BACK;LEG
LOCATION: GENERALIZED
LOCATION: BACK;LEG
LOCATION: BACK;LEG
LOCATION: BACK
LOCATION: BACK;LEG
LOCATION: BACK

## 2024-08-18 ASSESSMENT — PAIN DESCRIPTION - ONSET
ONSET: ON-GOING

## 2024-08-18 ASSESSMENT — PAIN SCALES - GENERAL
PAINLEVEL_OUTOF10: 5
PAINLEVEL_OUTOF10: 0
PAINLEVEL_OUTOF10: 6
PAINLEVEL_OUTOF10: 6
PAINLEVEL_OUTOF10: 7
PAINLEVEL_OUTOF10: 6
PAINLEVEL_OUTOF10: 7
PAINLEVEL_OUTOF10: 6
PAINLEVEL_OUTOF10: 7
PAINLEVEL_OUTOF10: 7
PAINLEVEL_OUTOF10: 5

## 2024-08-18 ASSESSMENT — PAIN DESCRIPTION - PAIN TYPE
TYPE: ACUTE PAIN

## 2024-08-18 ASSESSMENT — PAIN DESCRIPTION - FREQUENCY
FREQUENCY: CONTINUOUS

## 2024-08-18 ASSESSMENT — PAIN - FUNCTIONAL ASSESSMENT
PAIN_FUNCTIONAL_ASSESSMENT: ACTIVITIES ARE NOT PREVENTED

## 2024-08-18 NOTE — PROGRESS NOTES
4 Eyes Skin Assessment     NAME:  Norma Galan  YOB: 1980  MEDICAL RECORD NUMBER:  [id number]    The patient is being assessed for  Shift Handoff    I agree that at least one RN has performed a thorough Head to Toe Skin Assessment on the patient. ALL assessment sites listed below have been assessed.      Areas assessed by both nurses:    Head, Face, Ears, Shoulders, Back, Chest, Arms, Elbows, Hands, Sacrum. Buttock, Coccyx, Ischium, Legs. Feet and Heels, and Under Medical Devices         Does the Patient have a Wound? Yes wound(s) were present on assessment. LDA wound assessment was Initiated and completed by RN       Tima Prevention initiated by RN: Yes  Wound Care Orders initiated by RN: No    Pressure Injury (Stage 3,4, Unstageable, DTI, NWPT, and Complex wounds) if present, place Wound referral order by RN under : No    New Ostomies, if present place, Ostomy referral order under : No     Nurse 1 eSignature: Electronically signed by Shana Muñoz RN on 8/16/24 at 6:42 AM EDT    **SHARE this note so that the co-signing nurse can place an eSignature**    Nurse 2 eSignature: Electronically signed by Odette Benz RN on 8/16/24 at 7:43 AM EDT  
4 Eyes Skin Assessment     NAME:  Norma Galan  YOB: 1980  MEDICAL RECORD NUMBER:  [id number]    The patient is being assessed for  Shift Handoff    I agree that at least one RN has performed a thorough Head to Toe Skin Assessment on the patient. ALL assessment sites listed below have been assessed.      Areas assessed by both nurses:    Head, Face, Ears, Shoulders, Back, Chest, Arms, Elbows, Hands, Sacrum. Buttock, Coccyx, Ischium, Legs. Feet and Heels, and Under Medical Devices         Does the Patient have a Wound? No noted wound(s)       Tima Prevention initiated by RN: Yes  Wound Care Orders initiated by RN: No    Pressure Injury (Stage 3,4, Unstageable, DTI, NWPT, and Complex wounds) if present, place Wound referral order by RN under : No    New Ostomies, if present place, Ostomy referral order under : No     Nurse 1 eSignature: Electronically signed by ana maria garcia RN on 8/18/24 at 8:04 AM EDT    **SHARE this note so that the co-signing nurse can place an eSignature**    Nurse 2 eSignature: {Esignature:[id number]}   
4 Eyes Skin Assessment     NAME:  Norma Galan  YOB: 1980  MEDICAL RECORD NUMBER:  [id number]    The patient is being assessed for  Shift Handoff    I agree that at least one RN has performed a thorough Head to Toe Skin Assessment on the patient. ALL assessment sites listed below have been assessed.      Areas assessed by both nurses:    Head, Face, Ears, Shoulders, Back, Chest, Arms, Elbows, Hands, Sacrum. Buttock, Coccyx, Ischium, Legs. Feet and Heels, and Under Medical Devices         Does the Patient have a Wound? Yes wound(s) were present on assessment. LDA wound assessment was Initiated and completed by RN       Tima Prevention initiated by RN: No  Wound Care Orders initiated by RN: No    Pressure Injury (Stage 3,4, Unstageable, DTI, NWPT, and Complex wounds) if present, place Wound referral order by RN under : No    New Ostomies, if present place, Ostomy referral order under : No     Nurse 1 eSignature: Electronically signed by Odette Benz RN on 8/16/24 at 6:43 PM EDT    **SHARE this note so that the co-signing nurse can place an eSignature**    Nurse 2 eSignature: Electronically signed by ana maria garcia RN on 8/16/24 at 8:07 PM EDT    
4 Eyes Skin Assessment     NAME:  Norma Galan  YOB: 1980  MEDICAL RECORD NUMBER:  [id number]    The patient is being assessed for  Shift Handoff    I agree that at least one RN has performed a thorough Head to Toe Skin Assessment on the patient. ALL assessment sites listed below have been assessed.      Areas assessed by both nurses:    Head, Face, Ears, Shoulders, Back, Chest, Arms, Elbows, Hands, Sacrum. Buttock, Coccyx, Ischium, and Legs. Feet and Heels        Does the Patient have a Wound? No noted wound(s)       Tima Prevention initiated by RN: Yes  Wound Care Orders initiated by RN: No    Pressure Injury (Stage 3,4, Unstageable, DTI, NWPT, and Complex wounds) if present, place Wound referral order by RN under : No    New Ostomies, if present place, Ostomy referral order under : No     Nurse 1 eSignature: Electronically signed by Jeff Thibodeaux RN on 8/15/24 at 8:09 PM EDT    **SHARE this note so that the co-signing nurse can place an eSignature**    Nurse 2 eSignature: Electronically signed by Shana Muñoz RN on 8/15/24 at 8:09 PM EDT    
4 Eyes Skin Assessment     NAME:  Norma Galan  YOB: 1980  MEDICAL RECORD NUMBER:  [id number]    The patient is being assessed for  Shift Handoff    I agree that at least one RN has performed a thorough Head to Toe Skin Assessment on the patient. ALL assessment sites listed below have been assessed.      Areas assessed by both nurses:    Head, Face, Ears, Shoulders, Back, Chest, Arms, Elbows, Hands, Sacrum. Buttock, Coccyx, Ischium, Legs. Feet and Heels, and Under Medical Devices         Does the Patient have a Wound? No noted wound(s)       Tima Prevention initiated by RN: No  Wound Care Orders initiated by RN: No    Pressure Injury (Stage 3,4, Unstageable, DTI, NWPT, and Complex wounds) if present, place Wound referral order by RN under : No    New Ostomies, if present place, Ostomy referral order under : No     Nurse 1 eSignature: Electronically signed by Oksana Jones RN on 8/17/24 at 7:32 PM EDT    **SHARE this note so that the co-signing nurse can place an eSignature**    Nurse 2 eSignature: {Esignature:[id number]}    
Advance Care Planning   Healthcare Decision Maker:    Today we documented Decision Maker(s) consistent with Legal Next of Kin hierarchy.      Maryann Galan Spouse [phone] (M) [phone]       Spiritual Health Assessment/Progress Note  [facility]    Advance Care Planning, Spiritual/Emotional Needs,  ,  ,      Name: Norma Galan MRN: [id number]    Age: 43 y.o.     Sex: male   Language: English   Christianity: Sikh   Vaso-occlusive sickle cell crisis (HCC)     Date: 8/16/2024            Total Time Calculated: 7 min              Spiritual Assessment began in [facility] 2S TELEMETRY        Referral/Consult From: Multi-disciplinary team   Encounter Overview/Reason: Advance Care Planning, Spiritual/Emotional Needs  Service Provided For: Patient    Kristen, Belief, Meaning:   Patient has beliefs or practices that help with coping during difficult times  Family/Friends have beliefs or practices that help with coping during difficult times      Importance and Influence:  Patient has spiritual/personal beliefs that influence decisions regarding their health  Family/Friends have spiritual/personal beliefs that influence decisions regarding the patient's health    Community:  Patient feels well-supported. Support system includes: Spouse/Partner  Family/Friends Other: no family at bedside    Assessment and Plan of Care:     Patient Interventions include: Affirmed coping skills/support systems  Family/Friends Interventions include: no family at bedside    Patient Plan of Care: No spiritual needs identified for follow-up  Family/Friends Plan of Care: No spiritual needs identified for follow-up    Electronically signed by DAVID Jackson on 8/16/2024 at 12:39 PM        
[facility] Hospitalist Group  Progress Note  Date:2024       Room:[facility]  Patient Name:Norma Galan     YOB: 1980     Age:43 y.o.        Subjective    Subjective   Review of Systems    No acute events overnight.  Resting comfortably in bed.  Partner is bedside.  His pain is currently a 7 out of 10.  At admission his pain was 10 out of 10.  Pain is mostly in right leg and back.    Disposition: Likely stable for discharge home on  or  once pain is controlled with p.o. pain meds.    Objective         Vitals Last 24 Hours:  TEMPERATURE:  Temp  Av °F (36.7 °C)  Min: 97.5 °F (36.4 °C)  Max: 98.5 °F (36.9 °C)  RESPIRATIONS RANGE: Resp  Av.9  Min: 18  Max: 21  PULSE OXIMETRY RANGE: SpO2  Av.8 %  Min: 91 %  Max: 95 %  PULSE RANGE: Pulse  Av.7  Min: 65  Max: 74  BLOOD PRESSURE RANGE: Systolic (24hrs), Av , Min:122 , Max:154     ; Diastolic (24hrs), Av, Min:74, Max:89      I/O (24Hr):    Intake/Output Summary (Last 24 hours) at 2024 1502  Last data filed at 2024 1004  Gross per 24 hour   Intake 1450.17 ml   Output --   Net 1450.17 ml     Objective:  General Appearance:  Comfortable.    Vital signs: (most recent): Blood pressure (!) 154/89, pulse 74, temperature 98.5 °F (36.9 °C), temperature source Oral, resp. rate 18, height 1.829 m (6'), weight 89.3 kg (196 lb 13.9 oz), SpO2 95%.    Output: Producing urine.    HEENT: Normal HEENT exam.    Lungs:  Normal effort and normal respiratory rate.  Breath sounds clear to auscultation.    Heart: Normal rate.  Regular rhythm.    Abdomen: Abdomen is soft and flat.  Bowel sounds are normal.   There is generalized tenderness.     Extremities: Normal range of motion.    Pulses: Distal pulses are intact.    Neurological: Patient is alert and oriented to person, place and time.    Skin:  Warm and dry.          Labs/Imaging/Diagnostics    Labs:  CBC:  Recent Labs     08/15/24  3546 
[facility] Hospitalist Group  Progress Note    Patient: Norma Galan Age: 43 y.o. : 1980 MR#: [id number] SSN: [id number]  Date/Time: 2024    Subjective:     Patient is feeling fine.  He says his current pain level is about 6.  He has generalized bodyaches.  No headaches or dizziness.  He says he is able to walk back and forth to the bathroom.  Back pain is getting better.  No tingling or numbness.  No nausea vomiting or constipation currently.    Assessment:     1.  Sickle cell pain episode, slowly improving  2.  Sickle cell disease and anemia  3.  Hypokalemia, better  4.  Mild leukocytosis    PLAN:    Will continue IV fluid and hydroxyurea with folic acid currently  Will reduce dose of IV Dilaudid, patient is aware of it  Patient was advised to use p.o. Dilaudid and see how he does  Patient stated he wants his pain level about 4 before he can go home.    Discharge disposition: Can be discharged home later today otherwise tomorrow if his pain level improves    Total time of taking care of this patient was greater than 35 minutes    Case discussed with:  [x]Patient  []Family  [x]Nursing  [x]Case Management    DVT Prophylaxis:  [x]Lovenox  []Hep SQ  []SCDs  []Coumadin   []On Heparin gtt    Objective:     VS: /81   Pulse (!) 102   Temp 97.9 °F (36.6 °C) (Oral)   Resp 18   Ht 1.829 m (6')   Wt 89.3 kg (196 lb 13.9 oz)   SpO2 96%   BMI 26.70 kg/m²    Tmax/24hrs: Temp (24hrs), Av.9 °F (36.6 °C), Min:96.9 °F (36.1 °C), Max:98.5 °F (36.9 °C)    Input/Output:   Intake/Output Summary (Last 24 hours) at 2024 1035  Last data filed at 2024 1800  Gross per 24 hour   Intake 500 ml   Output --   Net 500 ml       General appearance - alert, well appearing, and in no distress  Chest -clear air entry noted in bases, no wheezes today  Heart - S1 and S2 normal  Abdomen - soft, nontender, nondistended, Bowel sounds present  Neurological - alert, oriented, normal 
Assumed care of patient at this time. Patient brought over from fast track for sickle cell crisis. Patient placed on cardiac monitor and oriented to room and call light. Patient provided with water and all questions answered. Care ongoing.   
Attempted to call report was advised that the nurse receiving this patient was in another patient's room administering meds and that she would call back to get report.   
Loss of IV access. Right IV infiltrated and pt c/o burning. Attempted IV access multiple times by multiple RN's.     1534 ED Nurse arrived with ultrasound to attempt IV access.   
Report given to BRYANT Knowles for patient going to room 211.   
None Heart and mediastinum: Cardiomediastinal silhouette is stable and nonenlarged. Lungs: No focal consolidation. No significant airspace disease. Pleura:  No pleural effusion. No pneumothorax. Electronically signed by Christian Glaser       Disclaimer: Sections of this note are dictated using utilizing voice recognition software, which may have resulted in some phonetic based errors in grammar and contents. Even though attempts were made to correct all the mistakes, some may have been missed, and remained in the body of the document. If questions arise, please contact our department.      Signed By: Alexander Suarez MD     August 18, 2024

## 2024-08-18 NOTE — DISCHARGE SUMMARY
BRYANT Benz, BRYANT Pino    Significant Diagnostic Studies:     XR CHEST PORTABLE    Result Date: 8/15/2024  FINDINGS/IMPRESSION: Life support tubes/lines: None Heart and mediastinum: Cardiomediastinal silhouette is stable and nonenlarged. Lungs: No focal consolidation. No significant airspace disease. Pleura:  No pleural effusion. No pneumothorax. Electronically signed by Christian Glaser          Discharge Medications:       Current Discharge Medication List             Details   docusate sodium (COLACE, DULCOLAX) 100 MG CAPS Take 100 mg by mouth daily  Qty: 30 capsule, Refills: 0                Details   Multiple Vitamins-Minerals (THERAPEUTIC MULTIVITAMIN-MINERALS) tablet Take 1 tablet by mouth daily      folic acid (FOLVITE) 1 MG tablet Take 1 tablet by mouth daily  Qty: 30 tablet, Refills: 3      HYDROmorphone (DILAUDID) 4 MG tablet TAKE 1 TABLET BY MOUTH EVERY 4 TO 6 HOURS AS NEEDED FOR PAIN      hydroxyurea (HYDREA) 500 MG chemo capsule Take by mouth 2 times daily      polyethylene glycol (GLYCOLAX) 17 g packet Take 1 packet by mouth daily  Qty: 30 packet, Refills: 0              Activity: activity as tolerated    Diet: regular diet    Wound Care: none needed    Follow-up:   With PCP/hematologist in 1 week    Total discharge time is greater than 35 minutes.    Disclaimer: Sections of this note are dictated using utilizing voice recognition software, which may have resulted in some phonetic based errors in grammar and contents. Even though attempts were made to correct all the mistakes, some may have been missed, and remained in the body of the document. If questions arise, please contact our department.    Alexander Suarez MD  August 18, 2024  10:19 AM

## 2024-08-18 NOTE — PLAN OF CARE
Problem: Discharge Planning  Goal: Discharge to home or other facility with appropriate resources  Outcome: Progressing     Problem: Pain  Goal: Verbalizes/displays adequate comfort level or baseline comfort level  Outcome: Progressing  Flowsheets (Taken 8/18/2024 0824)  Verbalizes/displays adequate comfort level or baseline comfort level: Encourage patient to monitor pain and request assistance     Problem: ABCDS Injury Assessment  Goal: Absence of physical injury  Outcome: Progressing     Problem: Safety - Adult  Goal: Free from fall injury  Outcome: Progressing     Problem: Metabolic/Fluid and Electrolytes - Adult  Goal: Electrolytes maintained within normal limits  Outcome: Progressing     Problem: Hematologic - Adult  Goal: Maintains hematologic stability  Outcome: Progressing     Problem: Musculoskeletal - Adult  Goal: Return ADL status to a safe level of function  Outcome: Progressing

## 2024-08-31 ENCOUNTER — HOSPITAL ENCOUNTER (EMERGENCY)
Facility: HOSPITAL | Age: 44
Discharge: HOME | End: 2024-08-31
Attending: EMERGENCY MEDICINE
Payer: MEDICARE

## 2024-08-31 VITALS
WEIGHT: 197 LBS | HEIGHT: 72 IN | RESPIRATION RATE: 16 BRPM | HEART RATE: 65 BPM | SYSTOLIC BLOOD PRESSURE: 125 MMHG | DIASTOLIC BLOOD PRESSURE: 76 MMHG | OXYGEN SATURATION: 95 % | BODY MASS INDEX: 26.68 KG/M2 | TEMPERATURE: 97.9 F

## 2024-08-31 DIAGNOSIS — D57.00 SICKLE CELL PAIN CRISIS (HCC): Primary | ICD-10-CM

## 2024-08-31 LAB
ALBUMIN SERPL-MCNC: 4 G/DL (ref 3.4–5)
ALBUMIN/GLOB SERPL: 0.9 (ref 0.8–1.7)
ALP SERPL-CCNC: 127 U/L (ref 45–117)
ALT SERPL-CCNC: 26 U/L (ref 16–61)
ANION GAP SERPL CALC-SCNC: 8 MMOL/L (ref 3–18)
AST SERPL-CCNC: 42 U/L (ref 10–38)
BASOPHILS # BLD: 0.1 K/UL (ref 0–0.1)
BASOPHILS NFR BLD: 1 % (ref 0–2)
BILIRUB SERPL-MCNC: 3.9 MG/DL (ref 0.2–1)
BUN SERPL-MCNC: 8 MG/DL (ref 7–18)
BUN/CREAT SERPL: 7 (ref 12–20)
CALCIUM SERPL-MCNC: 9.2 MG/DL (ref 8.5–10.1)
CHLORIDE SERPL-SCNC: 108 MMOL/L (ref 100–111)
CO2 SERPL-SCNC: 23 MMOL/L (ref 21–32)
CREAT SERPL-MCNC: 1.18 MG/DL (ref 0.6–1.3)
DIFFERENTIAL METHOD BLD: ABNORMAL
EOSINOPHIL # BLD: 1.2 K/UL (ref 0–0.4)
EOSINOPHIL NFR BLD: 9 % (ref 0–5)
ERYTHROCYTE [DISTWIDTH] IN BLOOD BY AUTOMATED COUNT: 23 % (ref 11.6–14.5)
GLOBULIN SER CALC-MCNC: 4.4 G/DL (ref 2–4)
GLUCOSE SERPL-MCNC: 113 MG/DL (ref 74–99)
HCT VFR BLD AUTO: 25.3 % (ref 36–48)
HGB BLD-MCNC: 8.7 G/DL (ref 13–16)
IMM GRANULOCYTES # BLD AUTO: 0 K/UL (ref 0–0.04)
IMM GRANULOCYTES NFR BLD AUTO: 0 % (ref 0–0.5)
LYMPHOCYTES # BLD: 7 K/UL (ref 0.9–3.6)
LYMPHOCYTES NFR BLD: 52 % (ref 21–52)
MCH RBC QN AUTO: 25.4 PG (ref 24–34)
MCHC RBC AUTO-ENTMCNC: 34.4 G/DL (ref 31–37)
MCV RBC AUTO: 74 FL (ref 78–100)
MONOCYTES # BLD: 1.9 K/UL (ref 0.05–1.2)
MONOCYTES NFR BLD: 14 % (ref 3–10)
NEUTS SEG # BLD: 3.2 K/UL (ref 1.8–8)
NEUTS SEG NFR BLD: 24 % (ref 40–73)
NRBC # BLD: 0.18 K/UL (ref 0–0.01)
NRBC BLD-RTO: 1.3 PER 100 WBC
PLATELET # BLD AUTO: 419 K/UL (ref 135–420)
PLATELET COMMENT: ABNORMAL
PMV BLD AUTO: 9.7 FL (ref 9.2–11.8)
POTASSIUM SERPL-SCNC: 3.8 MMOL/L (ref 3.5–5.5)
PROT SERPL-MCNC: 8.4 G/DL (ref 6.4–8.2)
RBC # BLD AUTO: 3.42 M/UL (ref 4.35–5.65)
RBC MORPH BLD: ABNORMAL
RETICS/RBC NFR AUTO: 6.5 % (ref 0.5–2.5)
SODIUM SERPL-SCNC: 139 MMOL/L (ref 136–145)
WBC # BLD AUTO: 13.4 K/UL (ref 4.6–13.2)

## 2024-08-31 PROCEDURE — 96361 HYDRATE IV INFUSION ADD-ON: CPT

## 2024-08-31 PROCEDURE — 99284 EMERGENCY DEPT VISIT MOD MDM: CPT

## 2024-08-31 PROCEDURE — 96375 TX/PRO/DX INJ NEW DRUG ADDON: CPT

## 2024-08-31 PROCEDURE — 96376 TX/PRO/DX INJ SAME DRUG ADON: CPT

## 2024-08-31 PROCEDURE — 85045 AUTOMATED RETICULOCYTE COUNT: CPT

## 2024-08-31 PROCEDURE — 6360000002 HC RX W HCPCS: Performed by: EMERGENCY MEDICINE

## 2024-08-31 PROCEDURE — 96374 THER/PROPH/DIAG INJ IV PUSH: CPT

## 2024-08-31 PROCEDURE — 2580000003 HC RX 258: Performed by: EMERGENCY MEDICINE

## 2024-08-31 PROCEDURE — 80053 COMPREHEN METABOLIC PANEL: CPT

## 2024-08-31 PROCEDURE — 85025 COMPLETE CBC W/AUTO DIFF WBC: CPT

## 2024-08-31 RX ORDER — HYDROMORPHONE HYDROCHLORIDE 1 MG/ML
1 INJECTION, SOLUTION INTRAMUSCULAR; INTRAVENOUS; SUBCUTANEOUS ONCE
Status: COMPLETED | OUTPATIENT
Start: 2024-08-31 | End: 2024-08-31

## 2024-08-31 RX ORDER — DIPHENHYDRAMINE HYDROCHLORIDE 50 MG/ML
25 INJECTION INTRAMUSCULAR; INTRAVENOUS
Status: COMPLETED | OUTPATIENT
Start: 2024-08-31 | End: 2024-08-31

## 2024-08-31 RX ORDER — 0.9 % SODIUM CHLORIDE 0.9 %
1000 INTRAVENOUS SOLUTION INTRAVENOUS ONCE
Status: COMPLETED | OUTPATIENT
Start: 2024-08-31 | End: 2024-08-31

## 2024-08-31 RX ADMIN — HYDROMORPHONE HYDROCHLORIDE 1 MG: 1 INJECTION, SOLUTION INTRAMUSCULAR; INTRAVENOUS; SUBCUTANEOUS at 03:36

## 2024-08-31 RX ADMIN — SODIUM CHLORIDE 1000 ML: 9 INJECTION, SOLUTION INTRAVENOUS at 02:19

## 2024-08-31 RX ADMIN — DIPHENHYDRAMINE HYDROCHLORIDE 25 MG: 50 INJECTION INTRAMUSCULAR; INTRAVENOUS at 02:19

## 2024-08-31 RX ADMIN — HYDROMORPHONE HYDROCHLORIDE 1 MG: 1 INJECTION, SOLUTION INTRAMUSCULAR; INTRAVENOUS; SUBCUTANEOUS at 02:19

## 2024-08-31 RX ADMIN — DIPHENHYDRAMINE HYDROCHLORIDE 25 MG: 50 INJECTION INTRAMUSCULAR; INTRAVENOUS at 03:36

## 2024-08-31 ASSESSMENT — PAIN - FUNCTIONAL ASSESSMENT: PAIN_FUNCTIONAL_ASSESSMENT: 0-10

## 2024-08-31 ASSESSMENT — PAIN SCALES - GENERAL: PAINLEVEL_OUTOF10: 8

## 2024-08-31 NOTE — ED TRIAGE NOTES
Pt arrives to triage ambulatory reporting sickle cell pain   Reports pain to left arm, both legs, and back

## 2024-08-31 NOTE — ED PROVIDER NOTES
EMERGENCY DEPARTMENT HISTORY AND PHYSICAL EXAM      Patient Name: Norma Galan  MRN: [id number]  YOB: 1980  Provider: Na Nayak MD  PCP: Catrachita Chaparro MD   Time/Date of evaluation: 4:11 AM EDT on 8/31/24    History of Presenting Illness     Chief Complaint   Patient presents with    Sickle Cell Pain Crisis       History Provided By: Patient     History (Narative):   Norma Galan is a 43 y.o. male with a PMHX of sickle cell anemia, hemoglobin SS disease,  who presents to the emergency department  by POV C/O sickle cell pain crisis.  The patient states that he is having pain in his legs, back, and left arm.  Those are his usual spots.  He denies any chest pain, shortness of breath, or fevers.  He denies ever having a CVA.  He has never had a exchange transfusion.  He does have a history of transfusions in the past and has a history of acute chest syndrome.    He is followed by Dr. Chaparro from hematology oncology.        Past History     Past Medical History:  Past Medical History:   Diagnosis Date    B12 deficiency 03/15/2010    210    Cholelithiasis 09/17/2012    U/S Result: Cholelithiasis    Elevated alkaline phosphatase level 03/21/2010    158    Elevated ALT measurement 03/21/2010    71    Elevated AST (SGOT) 03/10/2012    38    Elevated platelet count 10/25/2007    494    Elevated total protein 12/27/2011    8.7    History of blood transfusion     Hypocalcemia 07/06/2011    4.1    Hypokalemia     Hyponatremia 11/18/2009    Leukocytosis 11/16/2009    Microcytic anemia 10/25/2007    Pleural effusion on right 07/15/2015    [facility] CXR Result    Reticulocytosis 10/25/2007    7.8    Serum total bilirubin elevated 12/27/2011    T.B. 3.8, D.B. 0.4.     Sickle cell anemia with crisis (HCC)     Dr. Catrachita Chaparro    Vitamin D deficiency 01/20/2016    5.4       Past Surgical History:  Past Surgical History:   Procedure Laterality Date    CHOLECYSTECTOMY         Family History:  Family  - 13.2 K/uL    RBC 3.42 (L) 4.35 - 5.65 M/uL    Hemoglobin 8.7 (L) 13.0 - 16.0 g/dL    Hematocrit 25.3 (L) 36.0 - 48.0 %    MCV 74.0 (L) 78.0 - 100.0 FL    MCH 25.4 24.0 - 34.0 PG    MCHC 34.4 31.0 - 37.0 g/dL    RDW 23.0 (H) 11.6 - 14.5 %    Platelets 419 135 - 420 K/uL    MPV 9.7 9.2 - 11.8 FL    Nucleated RBCs 1.3 (H) 0  WBC    nRBC 0.18 (H) 0.00 - 0.01 K/uL    Neutrophils % 24 (L) 40 - 73 %    Lymphocytes % 52 21 - 52 %    Monocytes % 14 (H) 3 - 10 %    Eosinophils % 9 (H) 0 - 5 %    Basophils % 1 0 - 2 %    Immature Granulocytes % 0 0.0 - 0.5 %    Neutrophils Absolute 3.2 1.8 - 8.0 K/UL    Lymphocytes Absolute 7.0 (H) 0.9 - 3.6 K/UL    Monocytes Absolute 1.9 (H) 0.05 - 1.2 K/UL    Eosinophils Absolute 1.2 (H) 0.0 - 0.4 K/UL    Basophils Absolute 0.1 0.0 - 0.1 K/UL    Immature Granulocytes Absolute 0.0 0.00 - 0.04 K/UL    Differential Type MANUAL      Platelet Comment ADEQUATE PLATELETS      RBC Comment POLYCHROMASIA  1+        RBC Comment ANISOCYTOSIS  2+        RBC Comment POIKILOCYTOSIS  2+        RBC Comment SICKLE CELLS  2+        RBC Comment TARGET CELLS  2+       CMP    Collection Time: 08/31/24  1:45 AM   Result Value Ref Range    Sodium 139 136 - 145 mmol/L    Potassium 3.8 3.5 - 5.5 mmol/L    Chloride 108 100 - 111 mmol/L    CO2 23 21 - 32 mmol/L    Anion Gap 8 3.0 - 18 mmol/L    Glucose 113 (H) 74 - 99 mg/dL    BUN 8 7.0 - 18 MG/DL    Creatinine 1.18 0.6 - 1.3 MG/DL    BUN/Creatinine Ratio 7 (L) 12 - 20      Est, Glom Filt Rate 78 >60 ml/min/1.73m2    Calcium 9.2 8.5 - 10.1 MG/DL    Total Bilirubin 3.9 (H) 0.2 - 1.0 MG/DL    ALT 26 16 - 61 U/L    AST 42 (H) 10 - 38 U/L    Alk Phosphatase 127 (H) 45 - 117 U/L    Total Protein 8.4 (H) 6.4 - 8.2 g/dL    Albumin 4.0 3.4 - 5.0 g/dL    Globulin 4.4 (H) 2.0 - 4.0 g/dL    Albumin/Globulin Ratio 0.9 0.8 - 1.7     Reticulocytes    Collection Time: 08/31/24  1:45 AM   Result Value Ref Range    Reticulocyte Count,Automated 6.5 (H) 0.5 - 2.5 %  to be included in the SEP-1 core measure? No Exclusion criteria - the patient is NOT to be included for SEP-1 Core Measure due to: Infection is not suspected    MEDICATIONS ADMINISTERED IN THE ED:  Medications   sodium chloride 0.9 % bolus 1,000 mL (1,000 mLs IntraVENous New Bag 8/31/24 0219)   HYDROmorphone HCl PF (DILAUDID) injection 1 mg (1 mg IntraVENous Given 8/31/24 0219)   diphenhydrAMINE (BENADRYL) injection 25 mg (25 mg IntraVENous Given 8/31/24 0219)   HYDROmorphone HCl PF (DILAUDID) injection 1 mg (1 mg IntraVENous Given 8/31/24 0336)   diphenhydrAMINE (BENADRYL) injection 25 mg (25 mg IntraVENous Given 8/31/24 0336)            Medical Decision Making     CC/HPI Summary, DDx, ED Course, and Reassessment: Patient is a 43-year-old male with past medical history significant for sickle cell anemia, hemoglobin SS disease, who presents ED today with an acute pain crisis.  He denies any fevers or chest pain.  His pain is in his usual locations.  His white blood cell count is within his normal range.  His hemoglobin and his retake count are also within their normal ranges.    The patient received IV Dilaudid and IV Benadryl.  He states that it helped and his pain went from a 10 to a 5.  Here he is requesting 1 more dose.    The patient received a second dose of IV Dilaudid and Benadryl.  He will be discharged home and will be advised to follow-up with Dr. Chaparro as soon as possible.  Return precautions have been given.    Disposition Considerations (tests considered but not done, Admit vs D/C, Shared Decision Making, Pt Expectation of Test or Tx.): 0       Critical Care Time:     0    Na Nayak MD    Diagnosis and Disposition     I Na Nayak MD am the primary clinician of record.        DIAGNOSIS:   1. Sickle cell pain crisis (HCC)        DISPOSITION Decision To Discharge 08/31/2024 04:16:17 AM  Condition at Disposition: Stable      PATIENT REFERRED TO:  Catrachita Chaparro MD  [address] [facility]  [address]

## 2024-09-13 ENCOUNTER — HOSPITAL ENCOUNTER (INPATIENT)
Facility: HOSPITAL | Age: 44
LOS: 27 days | Discharge: HOME | DRG: 812 | End: 2024-10-11
Attending: HEALTH CARE PROVIDER | Admitting: HEALTH CARE PROVIDER
Payer: MEDICARE

## 2024-09-13 DIAGNOSIS — D57.00 SICKLE CELL ANEMIA WITH PAIN (HCC): ICD-10-CM

## 2024-09-13 DIAGNOSIS — D57.00 SICKLE CELL CRISIS (HCC): Primary | ICD-10-CM

## 2024-09-13 PROCEDURE — 99285 EMERGENCY DEPT VISIT HI MDM: CPT

## 2024-09-13 PROCEDURE — 85025 COMPLETE CBC W/AUTO DIFF WBC: CPT

## 2024-09-13 PROCEDURE — 80048 BASIC METABOLIC PNL TOTAL CA: CPT

## 2024-09-13 PROCEDURE — 85045 AUTOMATED RETICULOCYTE COUNT: CPT

## 2024-09-13 ASSESSMENT — PAIN SCALES - GENERAL: PAINLEVEL_OUTOF10: 8

## 2024-09-13 ASSESSMENT — PAIN - FUNCTIONAL ASSESSMENT: PAIN_FUNCTIONAL_ASSESSMENT: 0-10

## 2024-09-14 ENCOUNTER — APPOINTMENT (OUTPATIENT)
Facility: HOSPITAL | Age: 44
DRG: 812 | End: 2024-09-14
Payer: MEDICARE

## 2024-09-14 PROBLEM — D57.00 SICKLE CELL ANEMIA WITH PAIN (HCC): Status: ACTIVE | Noted: 2024-09-14

## 2024-09-14 LAB
ABO + RH BLD: NORMAL
ANION GAP SERPL CALC-SCNC: 6 MMOL/L (ref 3–18)
BASOPHILS # BLD: 0.1 K/UL (ref 0–0.1)
BASOPHILS NFR BLD: 1 % (ref 0–2)
BLOOD GROUP ANTIBODIES SERPL: NORMAL
BUN SERPL-MCNC: 5 MG/DL (ref 7–18)
BUN/CREAT SERPL: 6 (ref 12–20)
CALCIUM SERPL-MCNC: 8.7 MG/DL (ref 8.5–10.1)
CHLORIDE SERPL-SCNC: 105 MMOL/L (ref 100–111)
CO2 SERPL-SCNC: 24 MMOL/L (ref 21–32)
CREAT SERPL-MCNC: 0.9 MG/DL (ref 0.6–1.3)
DIFFERENTIAL METHOD BLD: ABNORMAL
EOSINOPHIL # BLD: 0.6 K/UL (ref 0–0.4)
EOSINOPHIL NFR BLD: 4 % (ref 0–5)
ERYTHROCYTE [DISTWIDTH] IN BLOOD BY AUTOMATED COUNT: 23.1 % (ref 11.6–14.5)
GLUCOSE SERPL-MCNC: 97 MG/DL (ref 74–99)
HCT VFR BLD AUTO: 22.7 % (ref 36–48)
HGB BLD-MCNC: 7.6 G/DL (ref 13–16)
IMM GRANULOCYTES # BLD AUTO: 0 K/UL (ref 0–0.04)
IMM GRANULOCYTES NFR BLD AUTO: 0 % (ref 0–0.5)
LYMPHOCYTES # BLD: 4.9 K/UL (ref 0.9–3.6)
LYMPHOCYTES NFR BLD: 35 % (ref 21–52)
MCH RBC QN AUTO: 24.6 PG (ref 24–34)
MCHC RBC AUTO-ENTMCNC: 33.5 G/DL (ref 31–37)
MCV RBC AUTO: 73.5 FL (ref 78–100)
MONOCYTES # BLD: 0.8 K/UL (ref 0.05–1.2)
MONOCYTES NFR BLD: 6 % (ref 3–10)
NEUTS SEG # BLD: 7.7 K/UL (ref 1.8–8)
NEUTS SEG NFR BLD: 54 % (ref 40–73)
NRBC # BLD: 0.13 K/UL (ref 0–0.01)
NRBC BLD-RTO: 0.9 PER 100 WBC
PLATELET # BLD AUTO: 348 K/UL (ref 135–420)
PLATELET COMMENT: ABNORMAL
PMV BLD AUTO: 10.2 FL (ref 9.2–11.8)
POTASSIUM SERPL-SCNC: 3.8 MMOL/L (ref 3.5–5.5)
RBC # BLD AUTO: 3.09 M/UL (ref 4.35–5.65)
RBC MORPH BLD: ABNORMAL
RETICS/RBC NFR AUTO: 6.5 % (ref 0.5–2.5)
SODIUM SERPL-SCNC: 135 MMOL/L (ref 136–145)
SPECIMEN EXP DATE BLD: NORMAL
WBC # BLD AUTO: 14.1 K/UL (ref 4.6–13.2)

## 2024-09-14 PROCEDURE — 99221 1ST HOSP IP/OBS SF/LOW 40: CPT | Performed by: HEALTH CARE PROVIDER

## 2024-09-14 PROCEDURE — 86850 RBC ANTIBODY SCREEN: CPT

## 2024-09-14 PROCEDURE — 1100000000 HC RM PRIVATE

## 2024-09-14 PROCEDURE — 6370000000 HC RX 637 (ALT 250 FOR IP): Performed by: HEALTH CARE PROVIDER

## 2024-09-14 PROCEDURE — 6360000002 HC RX W HCPCS: Performed by: PHYSICIAN ASSISTANT

## 2024-09-14 PROCEDURE — 6360000002 HC RX W HCPCS: Performed by: HEALTH CARE PROVIDER

## 2024-09-14 PROCEDURE — 6370000000 HC RX 637 (ALT 250 FOR IP): Performed by: STUDENT IN AN ORGANIZED HEALTH CARE EDUCATION/TRAINING PROGRAM

## 2024-09-14 PROCEDURE — 96375 TX/PRO/DX INJ NEW DRUG ADDON: CPT

## 2024-09-14 PROCEDURE — 96374 THER/PROPH/DIAG INJ IV PUSH: CPT

## 2024-09-14 PROCEDURE — 86901 BLOOD TYPING SEROLOGIC RH(D): CPT

## 2024-09-14 PROCEDURE — 94761 N-INVAS EAR/PLS OXIMETRY MLT: CPT

## 2024-09-14 PROCEDURE — 86900 BLOOD TYPING SEROLOGIC ABO: CPT

## 2024-09-14 PROCEDURE — 6360000002 HC RX W HCPCS

## 2024-09-14 PROCEDURE — 2580000003 HC RX 258: Performed by: HEALTH CARE PROVIDER

## 2024-09-14 PROCEDURE — 71046 X-RAY EXAM CHEST 2 VIEWS: CPT

## 2024-09-14 RX ORDER — MAGNESIUM SULFATE IN WATER 40 MG/ML
2000 INJECTION, SOLUTION INTRAVENOUS PRN
Status: DISCONTINUED | OUTPATIENT
Start: 2024-09-14 | End: 2024-10-11 | Stop reason: HOSPADM

## 2024-09-14 RX ORDER — POTASSIUM CHLORIDE 7.45 MG/ML
10 INJECTION INTRAVENOUS PRN
Status: DISCONTINUED | OUTPATIENT
Start: 2024-09-14 | End: 2024-10-11 | Stop reason: HOSPADM

## 2024-09-14 RX ORDER — FOLIC ACID 1 MG/1
1 TABLET ORAL DAILY
Status: DISCONTINUED | OUTPATIENT
Start: 2024-09-14 | End: 2024-10-11 | Stop reason: HOSPADM

## 2024-09-14 RX ORDER — SODIUM CHLORIDE, SODIUM LACTATE, POTASSIUM CHLORIDE, CALCIUM CHLORIDE 600; 310; 30; 20 MG/100ML; MG/100ML; MG/100ML; MG/100ML
INJECTION, SOLUTION INTRAVENOUS CONTINUOUS
Status: DISPENSED | OUTPATIENT
Start: 2024-09-14 | End: 2024-09-16

## 2024-09-14 RX ORDER — ONDANSETRON 4 MG/1
4 TABLET, ORALLY DISINTEGRATING ORAL EVERY 8 HOURS PRN
Status: DISCONTINUED | OUTPATIENT
Start: 2024-09-14 | End: 2024-10-11 | Stop reason: HOSPADM

## 2024-09-14 RX ORDER — ENOXAPARIN SODIUM 100 MG/ML
40 INJECTION SUBCUTANEOUS DAILY
Status: DISCONTINUED | OUTPATIENT
Start: 2024-09-14 | End: 2024-09-16

## 2024-09-14 RX ORDER — SODIUM CHLORIDE 9 MG/ML
INJECTION, SOLUTION INTRAVENOUS PRN
Status: DISCONTINUED | OUTPATIENT
Start: 2024-09-14 | End: 2024-09-18 | Stop reason: SDUPTHER

## 2024-09-14 RX ORDER — HYDROMORPHONE HYDROCHLORIDE 1 MG/ML
1 INJECTION, SOLUTION INTRAMUSCULAR; INTRAVENOUS; SUBCUTANEOUS
Status: COMPLETED | OUTPATIENT
Start: 2024-09-14 | End: 2024-09-14

## 2024-09-14 RX ORDER — ACETAMINOPHEN 650 MG/1
650 SUPPOSITORY RECTAL EVERY 6 HOURS PRN
Status: DISCONTINUED | OUTPATIENT
Start: 2024-09-14 | End: 2024-09-30

## 2024-09-14 RX ORDER — HYDROXYUREA 500 MG/1
500 CAPSULE ORAL 2 TIMES DAILY
Status: DISCONTINUED | OUTPATIENT
Start: 2024-09-14 | End: 2024-10-11 | Stop reason: HOSPADM

## 2024-09-14 RX ORDER — DIPHENHYDRAMINE HCL 25 MG
25 CAPSULE ORAL EVERY 6 HOURS PRN
Status: DISCONTINUED | OUTPATIENT
Start: 2024-09-14 | End: 2024-10-11 | Stop reason: HOSPADM

## 2024-09-14 RX ORDER — ACETAMINOPHEN 325 MG/1
650 TABLET ORAL EVERY 6 HOURS PRN
Status: DISCONTINUED | OUTPATIENT
Start: 2024-09-14 | End: 2024-10-11 | Stop reason: HOSPADM

## 2024-09-14 RX ORDER — HYDROMORPHONE HYDROCHLORIDE 1 MG/ML
1 INJECTION, SOLUTION INTRAMUSCULAR; INTRAVENOUS; SUBCUTANEOUS
Status: DISCONTINUED | OUTPATIENT
Start: 2024-09-14 | End: 2024-09-15

## 2024-09-14 RX ORDER — SODIUM CHLORIDE 0.9 % (FLUSH) 0.9 %
5-40 SYRINGE (ML) INJECTION PRN
Status: DISCONTINUED | OUTPATIENT
Start: 2024-09-14 | End: 2024-10-02

## 2024-09-14 RX ORDER — DOCUSATE SODIUM 100 MG/1
100 CAPSULE, LIQUID FILLED ORAL DAILY
Status: DISCONTINUED | OUTPATIENT
Start: 2024-09-14 | End: 2024-10-11 | Stop reason: HOSPADM

## 2024-09-14 RX ORDER — LANOLIN ALCOHOL/MO/W.PET/CERES
3 CREAM (GRAM) TOPICAL NIGHTLY
Status: DISCONTINUED | OUTPATIENT
Start: 2024-09-14 | End: 2024-09-25

## 2024-09-14 RX ORDER — DIPHENHYDRAMINE HYDROCHLORIDE 50 MG/ML
INJECTION INTRAMUSCULAR; INTRAVENOUS
Status: COMPLETED
Start: 2024-09-14 | End: 2024-09-14

## 2024-09-14 RX ORDER — SODIUM CHLORIDE 0.9 % (FLUSH) 0.9 %
5-40 SYRINGE (ML) INJECTION EVERY 12 HOURS SCHEDULED
Status: DISCONTINUED | OUTPATIENT
Start: 2024-09-14 | End: 2024-10-02

## 2024-09-14 RX ORDER — DIPHENHYDRAMINE HYDROCHLORIDE 50 MG/ML
12.5 INJECTION INTRAMUSCULAR; INTRAVENOUS
Status: COMPLETED | OUTPATIENT
Start: 2024-09-14 | End: 2024-09-14

## 2024-09-14 RX ORDER — ONDANSETRON 2 MG/ML
4 INJECTION INTRAMUSCULAR; INTRAVENOUS EVERY 6 HOURS PRN
Status: DISCONTINUED | OUTPATIENT
Start: 2024-09-14 | End: 2024-10-11 | Stop reason: HOSPADM

## 2024-09-14 RX ORDER — SENNOSIDES A AND B 8.6 MG/1
1 TABLET, FILM COATED ORAL 2 TIMES DAILY
Status: DISCONTINUED | OUTPATIENT
Start: 2024-09-14 | End: 2024-10-11 | Stop reason: HOSPADM

## 2024-09-14 RX ORDER — ACETAMINOPHEN 500 MG
1000 TABLET ORAL EVERY 6 HOURS PRN
Status: DISCONTINUED | OUTPATIENT
Start: 2024-09-14 | End: 2024-09-14 | Stop reason: SDUPTHER

## 2024-09-14 RX ADMIN — ENOXAPARIN SODIUM 40 MG: 40 INJECTION SUBCUTANEOUS at 07:53

## 2024-09-14 RX ADMIN — HYDROMORPHONE HYDROCHLORIDE 1 MG: 1 INJECTION, SOLUTION INTRAMUSCULAR; INTRAVENOUS; SUBCUTANEOUS at 18:17

## 2024-09-14 RX ADMIN — HYDROMORPHONE HYDROCHLORIDE: 1 INJECTION, SOLUTION INTRAMUSCULAR; INTRAVENOUS; SUBCUTANEOUS at 05:16

## 2024-09-14 RX ADMIN — HYDROMORPHONE HYDROCHLORIDE 1 MG: 1 INJECTION, SOLUTION INTRAMUSCULAR; INTRAVENOUS; SUBCUTANEOUS at 03:08

## 2024-09-14 RX ADMIN — FOLIC ACID 1 MG: 1 TABLET ORAL at 07:52

## 2024-09-14 RX ADMIN — DIPHENHYDRAMINE HYDROCHLORIDE 25 MG: 25 CAPSULE ORAL at 12:57

## 2024-09-14 RX ADMIN — DIPHENHYDRAMINE HYDROCHLORIDE 12.5 MG: 50 INJECTION INTRAMUSCULAR; INTRAVENOUS at 03:09

## 2024-09-14 RX ADMIN — HYDROMORPHONE HYDROCHLORIDE 1 MG: 1 INJECTION, SOLUTION INTRAMUSCULAR; INTRAVENOUS; SUBCUTANEOUS at 21:56

## 2024-09-14 RX ADMIN — SODIUM CHLORIDE, PRESERVATIVE FREE 10 ML: 5 INJECTION INTRAVENOUS at 07:52

## 2024-09-14 RX ADMIN — HYDROMORPHONE HYDROCHLORIDE 1 MG: 1 INJECTION, SOLUTION INTRAMUSCULAR; INTRAVENOUS; SUBCUTANEOUS at 03:24

## 2024-09-14 RX ADMIN — Medication 3 MG: at 20:44

## 2024-09-14 RX ADMIN — DIPHENHYDRAMINE HYDROCHLORIDE: 50 INJECTION, SOLUTION INTRAMUSCULAR; INTRAVENOUS at 05:15

## 2024-09-14 RX ADMIN — HYDROMORPHONE HYDROCHLORIDE 1 MG: 1 INJECTION, SOLUTION INTRAMUSCULAR; INTRAVENOUS; SUBCUTANEOUS at 15:19

## 2024-09-14 RX ADMIN — SODIUM CHLORIDE, POTASSIUM CHLORIDE, SODIUM LACTATE AND CALCIUM CHLORIDE: 600; 310; 30; 20 INJECTION, SOLUTION INTRAVENOUS at 05:59

## 2024-09-14 RX ADMIN — HYDROXYUREA 500 MG: 500 CAPSULE ORAL at 20:44

## 2024-09-14 RX ADMIN — SODIUM CHLORIDE, POTASSIUM CHLORIDE, SODIUM LACTATE AND CALCIUM CHLORIDE: 600; 310; 30; 20 INJECTION, SOLUTION INTRAVENOUS at 20:48

## 2024-09-14 RX ADMIN — HYDROMORPHONE HYDROCHLORIDE 1 MG: 1 INJECTION, SOLUTION INTRAMUSCULAR; INTRAVENOUS; SUBCUTANEOUS at 07:47

## 2024-09-14 RX ADMIN — HYDROXYUREA 500 MG: 500 CAPSULE ORAL at 09:19

## 2024-09-14 RX ADMIN — HYDROMORPHONE HYDROCHLORIDE 1 MG: 1 INJECTION, SOLUTION INTRAMUSCULAR; INTRAVENOUS; SUBCUTANEOUS at 11:22

## 2024-09-14 RX ADMIN — SODIUM CHLORIDE, PRESERVATIVE FREE 10 ML: 5 INJECTION INTRAVENOUS at 20:46

## 2024-09-14 ASSESSMENT — PAIN - FUNCTIONAL ASSESSMENT
PAIN_FUNCTIONAL_ASSESSMENT: ACTIVITIES ARE NOT PREVENTED

## 2024-09-14 ASSESSMENT — PAIN DESCRIPTION - DESCRIPTORS
DESCRIPTORS: ACHING

## 2024-09-14 ASSESSMENT — PAIN DESCRIPTION - FREQUENCY: FREQUENCY: CONTINUOUS

## 2024-09-14 ASSESSMENT — PAIN DESCRIPTION - LOCATION
LOCATION: BACK;LEG
LOCATION: LEG;BACK
LOCATION: BACK;LEG
LOCATION: LEG;BACK

## 2024-09-14 ASSESSMENT — PAIN DESCRIPTION - ORIENTATION
ORIENTATION: RIGHT;LEFT;LOWER
ORIENTATION: RIGHT;LEFT;POSTERIOR
ORIENTATION: RIGHT;LEFT;LOWER
ORIENTATION: RIGHT;LEFT;POSTERIOR

## 2024-09-14 ASSESSMENT — PAIN SCALES - GENERAL
PAINLEVEL_OUTOF10: 2
PAINLEVEL_OUTOF10: 2
PAINLEVEL_OUTOF10: 3
PAINLEVEL_OUTOF10: 8
PAINLEVEL_OUTOF10: 8
PAINLEVEL_OUTOF10: 0
PAINLEVEL_OUTOF10: 8
PAINLEVEL_OUTOF10: 8
PAINLEVEL_OUTOF10: 0

## 2024-09-14 ASSESSMENT — PAIN DESCRIPTION - PAIN TYPE
TYPE: CHRONIC PAIN

## 2024-09-14 NOTE — ED PROVIDER NOTES
EMERGENCY DEPARTMENT HISTORY AND PHYSICAL EXAM    4:10 AM      Date: 9/13/2024  Patient Name: Norma Galan    History of Presenting Illness     Chief Complaint   Patient presents with    Sickle Cell Pain Crisis         History Provided By: Patient    Additional History (Context): Norma Galan is a 43 y.o. male with   Past Medical History:   Diagnosis Date    B12 deficiency 03/15/2010    210    Cholelithiasis 09/17/2012    U/S Result: Cholelithiasis    Elevated alkaline phosphatase level 03/21/2010    158    Elevated ALT measurement 03/21/2010    71    Elevated AST (SGOT) 03/10/2012    38    Elevated platelet count 10/25/2007    494    Elevated total protein 12/27/2011    8.7    History of blood transfusion     Hypocalcemia 07/06/2011    4.1    Hypokalemia     Hyponatremia 11/18/2009    Leukocytosis 11/16/2009    Microcytic anemia 10/25/2007    Pleural effusion on right 07/15/2015    [facility] CXR Result    Reticulocytosis 10/25/2007    7.8    Serum total bilirubin elevated 12/27/2011    T.B. 3.8, D.B. 0.4.     Sickle cell anemia with crisis (HCC)     Dr. Catrachita Chaparro    Vitamin D deficiency 01/20/2016    5.4   } who presents with c/o b/l hip and leg pain x 4 days.  Patient notes symptoms feel typical of previous sickle cell crises in the past.  Patient denies dizziness, headache, fever or chills, chest pain, dyspnea, cough, abdominal pain, nausea or vomiting.  Patient notes he takes p.o. Dilaudid at home for symptoms without relief.  Patient notes his PMD/oncologist is Dr. Chaparro.  PCP: Catrachita Chaparro MD    Current Facility-Administered Medications   Medication Dose Route Frequency Provider Last Rate Last Admin    HYDROmorphone (DILAUDID) 1 MG/ML injection             diphenhydrAMINE (BENADRYL) 50 MG/ML injection              Current Outpatient Medications   Medication Sig Dispense Refill    HYDROmorphone (DILAUDID) 4 MG tablet TAKE 1 TABLET BY MOUTH EVERY 4 TO 6 HOURS AS NEEDED FOR PAIN

## 2024-09-14 NOTE — ED TRIAGE NOTES
Patient states he has been having sickle cell pain for past 4 days.  Pt having pain in legs and back

## 2024-09-15 LAB
ALBUMIN SERPL-MCNC: 3.5 G/DL (ref 3.4–5)
ALBUMIN/GLOB SERPL: 0.9 (ref 0.8–1.7)
ALP SERPL-CCNC: 119 U/L (ref 45–117)
ALT SERPL-CCNC: 34 U/L (ref 16–61)
ANION GAP SERPL CALC-SCNC: 8 MMOL/L (ref 3–18)
AST SERPL-CCNC: 51 U/L (ref 10–38)
BASOPHILS # BLD: 0 K/UL (ref 0–0.1)
BASOPHILS NFR BLD: 0 % (ref 0–2)
BILIRUB DIRECT SERPL-MCNC: 0.7 MG/DL (ref 0–0.2)
BILIRUB SERPL-MCNC: 3.5 MG/DL (ref 0.2–1)
BUN SERPL-MCNC: 7 MG/DL (ref 7–18)
BUN/CREAT SERPL: 9 (ref 12–20)
CALCIUM SERPL-MCNC: 8.5 MG/DL (ref 8.5–10.1)
CHLORIDE SERPL-SCNC: 108 MMOL/L (ref 100–111)
CO2 SERPL-SCNC: 21 MMOL/L (ref 21–32)
CREAT SERPL-MCNC: 0.81 MG/DL (ref 0.6–1.3)
DIFFERENTIAL METHOD BLD: ABNORMAL
EOSINOPHIL # BLD: 2 K/UL (ref 0–0.4)
EOSINOPHIL NFR BLD: 16 % (ref 0–5)
ERYTHROCYTE [DISTWIDTH] IN BLOOD BY AUTOMATED COUNT: 23.6 % (ref 11.6–14.5)
GLOBULIN SER CALC-MCNC: 4 G/DL (ref 2–4)
GLUCOSE SERPL-MCNC: 97 MG/DL (ref 74–99)
HCT VFR BLD AUTO: 23.2 % (ref 36–48)
HGB BLD-MCNC: 7.5 G/DL (ref 13–16)
IMM GRANULOCYTES # BLD AUTO: 0 K/UL (ref 0–0.04)
IMM GRANULOCYTES NFR BLD AUTO: 0 % (ref 0–0.5)
LYMPHOCYTES # BLD: 5.6 K/UL (ref 0.9–3.6)
LYMPHOCYTES NFR BLD: 47 % (ref 21–52)
MCH RBC QN AUTO: 24.4 PG (ref 24–34)
MCHC RBC AUTO-ENTMCNC: 32.3 G/DL (ref 31–37)
MCV RBC AUTO: 75.6 FL (ref 78–100)
MONOCYTES # BLD: 0.9 K/UL (ref 0.05–1.2)
MONOCYTES NFR BLD: 7 % (ref 3–10)
NEUTS SEG # BLD: 3.7 K/UL (ref 1.8–8)
NEUTS SEG NFR BLD: 30 % (ref 40–73)
NRBC # BLD: 0.11 K/UL (ref 0–0.01)
NRBC BLD-RTO: 0.9 PER 100 WBC
PLATELET # BLD AUTO: 381 K/UL (ref 135–420)
PLATELET COMMENT: ABNORMAL
PMV BLD AUTO: 10.8 FL (ref 9.2–11.8)
POTASSIUM SERPL-SCNC: 3.7 MMOL/L (ref 3.5–5.5)
PROT SERPL-MCNC: 7.5 G/DL (ref 6.4–8.2)
RBC # BLD AUTO: 3.07 M/UL (ref 4.35–5.65)
RBC MORPH BLD: ABNORMAL
SODIUM SERPL-SCNC: 137 MMOL/L (ref 136–145)
WBC # BLD AUTO: 12.2 K/UL (ref 4.6–13.2)
WBC MORPH BLD: ABNORMAL

## 2024-09-15 PROCEDURE — 94761 N-INVAS EAR/PLS OXIMETRY MLT: CPT

## 2024-09-15 PROCEDURE — 99231 SBSQ HOSP IP/OBS SF/LOW 25: CPT | Performed by: STUDENT IN AN ORGANIZED HEALTH CARE EDUCATION/TRAINING PROGRAM

## 2024-09-15 PROCEDURE — 85025 COMPLETE CBC W/AUTO DIFF WBC: CPT

## 2024-09-15 PROCEDURE — 6360000002 HC RX W HCPCS: Performed by: HEALTH CARE PROVIDER

## 2024-09-15 PROCEDURE — 6360000002 HC RX W HCPCS: Performed by: INTERNAL MEDICINE

## 2024-09-15 PROCEDURE — 80048 BASIC METABOLIC PNL TOTAL CA: CPT

## 2024-09-15 PROCEDURE — 36415 COLL VENOUS BLD VENIPUNCTURE: CPT

## 2024-09-15 PROCEDURE — 80076 HEPATIC FUNCTION PANEL: CPT

## 2024-09-15 PROCEDURE — 6370000000 HC RX 637 (ALT 250 FOR IP): Performed by: HEALTH CARE PROVIDER

## 2024-09-15 PROCEDURE — 1100000000 HC RM PRIVATE

## 2024-09-15 PROCEDURE — 2580000003 HC RX 258: Performed by: HEALTH CARE PROVIDER

## 2024-09-15 PROCEDURE — 6370000000 HC RX 637 (ALT 250 FOR IP): Performed by: STUDENT IN AN ORGANIZED HEALTH CARE EDUCATION/TRAINING PROGRAM

## 2024-09-15 RX ORDER — HYDROMORPHONE HYDROCHLORIDE 2 MG/ML
1.5 INJECTION, SOLUTION INTRAMUSCULAR; INTRAVENOUS; SUBCUTANEOUS
Status: DISCONTINUED | OUTPATIENT
Start: 2024-09-15 | End: 2024-09-19

## 2024-09-15 RX ADMIN — HYDROMORPHONE HYDROCHLORIDE 1 MG: 1 INJECTION, SOLUTION INTRAMUSCULAR; INTRAVENOUS; SUBCUTANEOUS at 01:50

## 2024-09-15 RX ADMIN — HYDROXYUREA 500 MG: 500 CAPSULE ORAL at 22:20

## 2024-09-15 RX ADMIN — HYDROMORPHONE HYDROCHLORIDE 1.5 MG: 2 INJECTION, SOLUTION INTRAMUSCULAR; INTRAVENOUS; SUBCUTANEOUS at 11:48

## 2024-09-15 RX ADMIN — SODIUM CHLORIDE, PRESERVATIVE FREE 10 ML: 5 INJECTION INTRAVENOUS at 08:43

## 2024-09-15 RX ADMIN — FOLIC ACID 1 MG: 1 TABLET ORAL at 08:41

## 2024-09-15 RX ADMIN — HYDROMORPHONE HYDROCHLORIDE 1 MG: 1 INJECTION, SOLUTION INTRAMUSCULAR; INTRAVENOUS; SUBCUTANEOUS at 05:21

## 2024-09-15 RX ADMIN — HYDROMORPHONE HYDROCHLORIDE 1.5 MG: 2 INJECTION, SOLUTION INTRAMUSCULAR; INTRAVENOUS; SUBCUTANEOUS at 15:06

## 2024-09-15 RX ADMIN — SODIUM CHLORIDE, POTASSIUM CHLORIDE, SODIUM LACTATE AND CALCIUM CHLORIDE: 600; 310; 30; 20 INJECTION, SOLUTION INTRAVENOUS at 08:40

## 2024-09-15 RX ADMIN — ENOXAPARIN SODIUM 40 MG: 40 INJECTION SUBCUTANEOUS at 08:42

## 2024-09-15 RX ADMIN — HYDROXYUREA 500 MG: 500 CAPSULE ORAL at 08:42

## 2024-09-15 RX ADMIN — HYDROMORPHONE HYDROCHLORIDE 1.5 MG: 2 INJECTION, SOLUTION INTRAMUSCULAR; INTRAVENOUS; SUBCUTANEOUS at 22:20

## 2024-09-15 RX ADMIN — SODIUM CHLORIDE, POTASSIUM CHLORIDE, SODIUM LACTATE AND CALCIUM CHLORIDE: 600; 310; 30; 20 INJECTION, SOLUTION INTRAVENOUS at 18:43

## 2024-09-15 RX ADMIN — HYDROMORPHONE HYDROCHLORIDE 1.5 MG: 2 INJECTION, SOLUTION INTRAMUSCULAR; INTRAVENOUS; SUBCUTANEOUS at 18:40

## 2024-09-15 RX ADMIN — DIPHENHYDRAMINE HYDROCHLORIDE 25 MG: 25 CAPSULE ORAL at 08:42

## 2024-09-15 RX ADMIN — Medication 3 MG: at 20:55

## 2024-09-15 RX ADMIN — HYDROMORPHONE HYDROCHLORIDE 1 MG: 1 INJECTION, SOLUTION INTRAMUSCULAR; INTRAVENOUS; SUBCUTANEOUS at 08:42

## 2024-09-15 RX ADMIN — SODIUM CHLORIDE, PRESERVATIVE FREE 10 ML: 5 INJECTION INTRAVENOUS at 20:56

## 2024-09-15 ASSESSMENT — PAIN DESCRIPTION - ORIENTATION
ORIENTATION: RIGHT;LEFT
ORIENTATION: RIGHT;LEFT;LOWER;UPPER
ORIENTATION: RIGHT;LEFT;LOWER
ORIENTATION: RIGHT;LEFT;LOWER;UPPER
ORIENTATION: RIGHT;LEFT
ORIENTATION: RIGHT;LEFT
ORIENTATION: RIGHT;LEFT;LOWER
ORIENTATION: LOWER
ORIENTATION: RIGHT;LEFT
ORIENTATION: RIGHT;LEFT;LOWER
ORIENTATION: RIGHT;LEFT

## 2024-09-15 ASSESSMENT — PAIN - FUNCTIONAL ASSESSMENT
PAIN_FUNCTIONAL_ASSESSMENT: ACTIVITIES ARE NOT PREVENTED
PAIN_FUNCTIONAL_ASSESSMENT: PREVENTS OR INTERFERES SOME ACTIVE ACTIVITIES AND ADLS
PAIN_FUNCTIONAL_ASSESSMENT: PREVENTS OR INTERFERES SOME ACTIVE ACTIVITIES AND ADLS
PAIN_FUNCTIONAL_ASSESSMENT: ACTIVITIES ARE NOT PREVENTED
PAIN_FUNCTIONAL_ASSESSMENT: PREVENTS OR INTERFERES SOME ACTIVE ACTIVITIES AND ADLS

## 2024-09-15 ASSESSMENT — PAIN DESCRIPTION - DESCRIPTORS
DESCRIPTORS: ACHING
DESCRIPTORS: ACHING;SORE
DESCRIPTORS: ACHING
DESCRIPTORS: ACHING;SORE
DESCRIPTORS: ACHING
DESCRIPTORS: ACHING

## 2024-09-15 ASSESSMENT — PAIN DESCRIPTION - FREQUENCY
FREQUENCY: CONTINUOUS

## 2024-09-15 ASSESSMENT — PAIN DESCRIPTION - LOCATION
LOCATION: ARM;LEG;BACK
LOCATION: ARM;BACK;LEG
LOCATION: ARM;LEG
LOCATION: LEG
LOCATION: ARM;LEG;BACK
LOCATION: BACK;LEG;ARM
LOCATION: ARM;BACK;LEG
LOCATION: BACK;LEG
LOCATION: LEG;ARM;BACK
LOCATION: ARM;BACK;LEG
LOCATION: ARM;LEG;BACK

## 2024-09-15 ASSESSMENT — PAIN SCALES - GENERAL
PAINLEVEL_OUTOF10: 5
PAINLEVEL_OUTOF10: 7
PAINLEVEL_OUTOF10: 6
PAINLEVEL_OUTOF10: 7
PAINLEVEL_OUTOF10: 0
PAINLEVEL_OUTOF10: 7
PAINLEVEL_OUTOF10: 6
PAINLEVEL_OUTOF10: 5
PAINLEVEL_OUTOF10: 8
PAINLEVEL_OUTOF10: 0
PAINLEVEL_OUTOF10: 6
PAINLEVEL_OUTOF10: 7

## 2024-09-15 ASSESSMENT — PAIN DESCRIPTION - ONSET
ONSET: ON-GOING

## 2024-09-15 ASSESSMENT — PAIN DESCRIPTION - PAIN TYPE
TYPE: CHRONIC PAIN
TYPE: CHRONIC PAIN;ACUTE PAIN
TYPE: ACUTE PAIN;CHRONIC PAIN
TYPE: CHRONIC PAIN;ACUTE PAIN
TYPE: ACUTE PAIN;CHRONIC PAIN
TYPE: ACUTE PAIN;CHRONIC PAIN
TYPE: CHRONIC PAIN;ACUTE PAIN
TYPE: ACUTE PAIN;CHRONIC PAIN
TYPE: CHRONIC PAIN;ACUTE PAIN

## 2024-09-15 NOTE — CARE COORDINATION
09/15/24 1055   IMM Letter   IMM Letter given to Patient/Family/Significant other/Guardian/POA/by: Kiah Yusuf   IMM Letter date given: 09/15/24   IMM Letter time given: 1047 2nd IMM letter given.             Kiah Yusuf RN  Case Management [phone]

## 2024-09-15 NOTE — CARE COORDINATION
09/15/24 1056   Readmission Assessment   Number of Days since last admission? 8-30 days   Previous Disposition Home with Family   Who is being Interviewed Patient   What was the patient's/caregiver's perception as to why they think they needed to return back to the hospital? Other (Comment)  (Sickle Cell Crisis Pain.)   Did you visit your Primary Care Physician after you left the hospital, before you returned this time? Yes   Did you see a specialist, such as Cardiac, Pulmonary, Orthopedic Physician, etc. after you left the hospital? Yes   Who advised the patient to return to the hospital? Self-referral   Does the patient report anything that got in the way of taking their medications? No   In our efforts to provide the best possible care to you and others like you, can you think of anything that we could have done to help you after you left the hospital the first time, so that you might not have needed to return so soon? Other (Comment)  (Patient said nothing the hospital could have done to prevent readmission within 30 days, and that patient came back due to Sickle Cell Crisis pain.)

## 2024-09-15 NOTE — CARE COORDINATION
09/15/24 1057   Service Assessment   Patient Orientation Alert and Oriented;Person;Place;Situation;Self   Cognition Alert   History Provided By Patient   Primary Caregiver Self   Accompanied By/Relationship No one is currently at the bedside with patient at this time.   Support Systems Children;Family Members;Spouse/Significant Other   Patient's Healthcare Decision Maker is: Legal Next of Kin   PCP Verified by CM Yes  (Patient said Dr Chaparro is his PCP and his Specialist.)   Last Visit to PCP Within last 3 months   Prior Functional Level Independent in ADLs/IADLs   Current Functional Level Independent in ADLs/IADLs   Can patient return to prior living arrangement Yes   Ability to make needs known: Good   Family able to assist with home care needs: Yes   Would you like for me to discuss the discharge plan with any other family members/significant others, and if so, who? Yes  (Patient's mother, listed in patient's contact list.)   Financial Resources Medicare   Community Resources None   CM/SW Referral Other (see comment)  (Discharge planning.)   Social/Functional History   Lives With Son;Daughter   Type of Home   (Duplex home)   Home Layout Two level   Home Access Level entry   Bathroom Shower/Tub Tub/Shower unit   Bathroom Equipment None   Bathroom Accessibility Accessible   Home Equipment None   Receives Help From Family   ADL Assistance Independent   Homemaking Assistance Independent   Homemaking Responsibilities Yes   Ambulation Assistance Independent   Transfer Assistance Independent   Active  Yes   Mode of Transportation Car   Education Not applicable.   Occupation Part time employment   Type of Occupation Medication Technician.   Discharge Planning   Type of Residence House   Living Arrangements Children   Current Services Prior To Admission None   Potential Assistance Needed N/A   DME Ordered? No   Potential Assistance Purchasing Medications No   Type of Home Care Services None   Patient expects to be

## 2024-09-15 NOTE — CARE COORDINATION
CM spoke with patient at the bedside, patient is ambulatory, self care, no DME used in the home, and drives.   No CM needs at this time.   Patient said his sister will be transporting patient home when medically stable for discharge.         2nd IMM letter given.             Kiah Yusuf RN  Case Management [phone]

## 2024-09-16 ENCOUNTER — APPOINTMENT (OUTPATIENT)
Facility: HOSPITAL | Age: 44
DRG: 812 | End: 2024-09-16
Payer: MEDICARE

## 2024-09-16 LAB
BASOPHILS # BLD: 0.1 K/UL (ref 0–0.1)
BASOPHILS NFR BLD: 1 % (ref 0–2)
DIFFERENTIAL METHOD BLD: ABNORMAL
EOSINOPHIL # BLD: 1.2 K/UL (ref 0–0.4)
EOSINOPHIL NFR BLD: 9 % (ref 0–5)
ERYTHROCYTE [DISTWIDTH] IN BLOOD BY AUTOMATED COUNT: 23.7 % (ref 11.6–14.5)
HCT VFR BLD AUTO: 23.5 % (ref 36–48)
HGB BLD-MCNC: 7.7 G/DL (ref 13–16)
IMM GRANULOCYTES # BLD AUTO: 0 K/UL (ref 0–0.04)
IMM GRANULOCYTES NFR BLD AUTO: 0 % (ref 0–0.5)
LYMPHOCYTES # BLD: 5.9 K/UL (ref 0.9–3.6)
LYMPHOCYTES NFR BLD: 45 % (ref 21–52)
MCH RBC QN AUTO: 24.6 PG (ref 24–34)
MCHC RBC AUTO-ENTMCNC: 32.8 G/DL (ref 31–37)
MCV RBC AUTO: 75.1 FL (ref 78–100)
MONOCYTES # BLD: 1.4 K/UL (ref 0.05–1.2)
MONOCYTES NFR BLD: 11 % (ref 3–10)
NEUTS SEG # BLD: 4.4 K/UL (ref 1.8–8)
NEUTS SEG NFR BLD: 34 % (ref 40–73)
NRBC # BLD: 0.1 K/UL (ref 0–0.01)
NRBC BLD-RTO: 0.8 PER 100 WBC
PLATELET # BLD AUTO: 306 K/UL (ref 135–420)
PLATELET COMMENT: ABNORMAL
PMV BLD AUTO: 10.9 FL (ref 9.2–11.8)
RBC # BLD AUTO: 3.13 M/UL (ref 4.35–5.65)
RBC MORPH BLD: ABNORMAL
WBC # BLD AUTO: 13 K/UL (ref 4.6–13.2)

## 2024-09-16 PROCEDURE — 85025 COMPLETE CBC W/AUTO DIFF WBC: CPT

## 2024-09-16 PROCEDURE — 2580000003 HC RX 258: Performed by: HEALTH CARE PROVIDER

## 2024-09-16 PROCEDURE — 99232 SBSQ HOSP IP/OBS MODERATE 35: CPT | Performed by: FAMILY MEDICINE

## 2024-09-16 PROCEDURE — 1100000000 HC RM PRIVATE

## 2024-09-16 PROCEDURE — 73590 X-RAY EXAM OF LOWER LEG: CPT

## 2024-09-16 PROCEDURE — 36415 COLL VENOUS BLD VENIPUNCTURE: CPT

## 2024-09-16 PROCEDURE — 2580000003 HC RX 258: Performed by: INTERNAL MEDICINE

## 2024-09-16 PROCEDURE — 6370000000 HC RX 637 (ALT 250 FOR IP): Performed by: HEALTH CARE PROVIDER

## 2024-09-16 PROCEDURE — 6360000002 HC RX W HCPCS: Performed by: INTERNAL MEDICINE

## 2024-09-16 PROCEDURE — 94760 N-INVAS EAR/PLS OXIMETRY 1: CPT

## 2024-09-16 RX ORDER — SODIUM CHLORIDE 9 MG/ML
INJECTION, SOLUTION INTRAVENOUS CONTINUOUS
Status: DISCONTINUED | OUTPATIENT
Start: 2024-09-16 | End: 2024-10-06

## 2024-09-16 RX ORDER — DEXAMETHASONE 4 MG/1
4 TABLET ORAL EVERY 12 HOURS SCHEDULED
Status: DISCONTINUED | OUTPATIENT
Start: 2024-09-16 | End: 2024-09-26

## 2024-09-16 RX ADMIN — HYDROMORPHONE HYDROCHLORIDE 1.5 MG: 2 INJECTION, SOLUTION INTRAMUSCULAR; INTRAVENOUS; SUBCUTANEOUS at 15:14

## 2024-09-16 RX ADMIN — HYDROMORPHONE HYDROCHLORIDE 1.5 MG: 2 INJECTION, SOLUTION INTRAMUSCULAR; INTRAVENOUS; SUBCUTANEOUS at 18:10

## 2024-09-16 RX ADMIN — SODIUM CHLORIDE: 9 INJECTION, SOLUTION INTRAVENOUS at 18:10

## 2024-09-16 RX ADMIN — DEXAMETHASONE 4 MG: 4 TABLET ORAL at 21:16

## 2024-09-16 RX ADMIN — SODIUM CHLORIDE: 9 INJECTION, SOLUTION INTRAVENOUS at 08:03

## 2024-09-16 RX ADMIN — SODIUM CHLORIDE, PRESERVATIVE FREE 10 ML: 5 INJECTION INTRAVENOUS at 08:01

## 2024-09-16 RX ADMIN — HYDROMORPHONE HYDROCHLORIDE 1.5 MG: 2 INJECTION, SOLUTION INTRAMUSCULAR; INTRAVENOUS; SUBCUTANEOUS at 09:11

## 2024-09-16 RX ADMIN — SODIUM CHLORIDE, PRESERVATIVE FREE 10 ML: 5 INJECTION INTRAVENOUS at 21:20

## 2024-09-16 RX ADMIN — FOLIC ACID 1 MG: 1 TABLET ORAL at 08:01

## 2024-09-16 RX ADMIN — HYDROMORPHONE HYDROCHLORIDE 1.5 MG: 2 INJECTION, SOLUTION INTRAMUSCULAR; INTRAVENOUS; SUBCUTANEOUS at 06:13

## 2024-09-16 RX ADMIN — HYDROMORPHONE HYDROCHLORIDE 1.5 MG: 2 INJECTION, SOLUTION INTRAMUSCULAR; INTRAVENOUS; SUBCUTANEOUS at 21:16

## 2024-09-16 RX ADMIN — HYDROXYUREA 500 MG: 500 CAPSULE ORAL at 21:24

## 2024-09-16 RX ADMIN — HYDROXYUREA 500 MG: 500 CAPSULE ORAL at 09:13

## 2024-09-16 RX ADMIN — DEXAMETHASONE 4 MG: 4 TABLET ORAL at 08:01

## 2024-09-16 RX ADMIN — Medication 3 MG: at 21:16

## 2024-09-16 RX ADMIN — HYDROMORPHONE HYDROCHLORIDE 1.5 MG: 2 INJECTION, SOLUTION INTRAMUSCULAR; INTRAVENOUS; SUBCUTANEOUS at 12:22

## 2024-09-16 RX ADMIN — HYDROMORPHONE HYDROCHLORIDE 1.5 MG: 2 INJECTION, SOLUTION INTRAMUSCULAR; INTRAVENOUS; SUBCUTANEOUS at 02:22

## 2024-09-16 ASSESSMENT — PAIN SCALES - GENERAL
PAINLEVEL_OUTOF10: 5
PAINLEVEL_OUTOF10: 6
PAINLEVEL_OUTOF10: 7
PAINLEVEL_OUTOF10: 8
PAINLEVEL_OUTOF10: 10
PAINLEVEL_OUTOF10: 7
PAINLEVEL_OUTOF10: 8
PAINLEVEL_OUTOF10: 8
PAINLEVEL_OUTOF10: 6
PAINLEVEL_OUTOF10: 7
PAINLEVEL_OUTOF10: 8
PAINLEVEL_OUTOF10: 7

## 2024-09-16 ASSESSMENT — PAIN DESCRIPTION - ONSET
ONSET: ON-GOING

## 2024-09-16 ASSESSMENT — PAIN - FUNCTIONAL ASSESSMENT
PAIN_FUNCTIONAL_ASSESSMENT: PREVENTS OR INTERFERES SOME ACTIVE ACTIVITIES AND ADLS

## 2024-09-16 ASSESSMENT — PAIN DESCRIPTION - FREQUENCY
FREQUENCY: CONTINUOUS

## 2024-09-16 ASSESSMENT — PAIN DESCRIPTION - LOCATION
LOCATION: BACK;LEG;ARM
LOCATION: BACK;ARM;LEG
LOCATION: ARM;BACK;LEG
LOCATION: ARM;CHEST;BACK;LEG
LOCATION: ARM;BACK;LEG
LOCATION: BACK;LEG;ARM
LOCATION: BACK;ARM;LEG

## 2024-09-16 ASSESSMENT — PAIN DESCRIPTION - DESCRIPTORS
DESCRIPTORS: ACHING
DESCRIPTORS: ACHING;SORE
DESCRIPTORS: ACHING;DISCOMFORT
DESCRIPTORS: ACHING
DESCRIPTORS: ACHING;SORE
DESCRIPTORS: ACHING;SORE
DESCRIPTORS: ACHING
DESCRIPTORS: ACHING;DISCOMFORT
DESCRIPTORS: ACHING;SORE
DESCRIPTORS: ACHING;SORE
DESCRIPTORS: ACHING;DISCOMFORT

## 2024-09-16 ASSESSMENT — PAIN DESCRIPTION - PAIN TYPE
TYPE: ACUTE PAIN;CHRONIC PAIN
TYPE: ACUTE PAIN;CHRONIC PAIN
TYPE: CHRONIC PAIN;ACUTE PAIN
TYPE: CHRONIC PAIN;ACUTE PAIN
TYPE: ACUTE PAIN;CHRONIC PAIN
TYPE: CHRONIC PAIN;ACUTE PAIN
TYPE: ACUTE PAIN;CHRONIC PAIN
TYPE: CHRONIC PAIN;ACUTE PAIN
TYPE: ACUTE PAIN;CHRONIC PAIN
TYPE: CHRONIC PAIN;ACUTE PAIN

## 2024-09-16 ASSESSMENT — PAIN DESCRIPTION - ORIENTATION
ORIENTATION: RIGHT;LEFT;LOWER
ORIENTATION: RIGHT;LEFT
ORIENTATION: RIGHT;LEFT;LOWER;UPPER
ORIENTATION: RIGHT;LEFT;UPPER;LOWER
ORIENTATION: RIGHT;LEFT;LOWER
ORIENTATION: RIGHT;LEFT;UPPER;LOWER
ORIENTATION: RIGHT;LEFT;LOWER;UPPER
ORIENTATION: RIGHT;LEFT;LOWER
ORIENTATION: RIGHT;LEFT

## 2024-09-17 LAB
BASOPHILS # BLD: 0.2 K/UL (ref 0–0.1)
BASOPHILS NFR BLD: 1 % (ref 0–2)
DIFFERENTIAL METHOD BLD: ABNORMAL
EOSINOPHIL # BLD: 0 K/UL (ref 0–0.4)
EOSINOPHIL NFR BLD: 0 % (ref 0–5)
ERYTHROCYTE [DISTWIDTH] IN BLOOD BY AUTOMATED COUNT: 23.2 % (ref 11.6–14.5)
HCT VFR BLD AUTO: 24.3 % (ref 36–48)
HGB BLD-MCNC: 7.8 G/DL (ref 13–16)
IMM GRANULOCYTES # BLD AUTO: 0.2 K/UL (ref 0–0.04)
IMM GRANULOCYTES NFR BLD AUTO: 1 % (ref 0–0.5)
LYMPHOCYTES # BLD: 4.9 K/UL (ref 0.9–3.6)
LYMPHOCYTES NFR BLD: 31 % (ref 21–52)
MCH RBC QN AUTO: 24.1 PG (ref 24–34)
MCHC RBC AUTO-ENTMCNC: 32.1 G/DL (ref 31–37)
MCV RBC AUTO: 75 FL (ref 78–100)
MONOCYTES # BLD: 1.4 K/UL (ref 0.05–1.2)
MONOCYTES NFR BLD: 9 % (ref 3–10)
NEUTS SEG # BLD: 9.2 K/UL (ref 1.8–8)
NEUTS SEG NFR BLD: 58 % (ref 40–73)
NRBC # BLD: 0.24 K/UL (ref 0–0.01)
NRBC BLD-RTO: 1.5 PER 100 WBC
PLATELET # BLD AUTO: 432 K/UL (ref 135–420)
PLATELET COMMENT: ABNORMAL
PMV BLD AUTO: 10.4 FL (ref 9.2–11.8)
RBC # BLD AUTO: 3.24 M/UL (ref 4.35–5.65)
RBC MORPH BLD: ABNORMAL
WBC # BLD AUTO: 15.9 K/UL (ref 4.6–13.2)

## 2024-09-17 PROCEDURE — 6370000000 HC RX 637 (ALT 250 FOR IP): Performed by: HEALTH CARE PROVIDER

## 2024-09-17 PROCEDURE — 6360000002 HC RX W HCPCS: Performed by: INTERNAL MEDICINE

## 2024-09-17 PROCEDURE — 36415 COLL VENOUS BLD VENIPUNCTURE: CPT

## 2024-09-17 PROCEDURE — 2580000003 HC RX 258: Performed by: HEALTH CARE PROVIDER

## 2024-09-17 PROCEDURE — 1100000000 HC RM PRIVATE

## 2024-09-17 PROCEDURE — 94761 N-INVAS EAR/PLS OXIMETRY MLT: CPT

## 2024-09-17 PROCEDURE — 2580000003 HC RX 258: Performed by: INTERNAL MEDICINE

## 2024-09-17 PROCEDURE — 85025 COMPLETE CBC W/AUTO DIFF WBC: CPT

## 2024-09-17 PROCEDURE — 99232 SBSQ HOSP IP/OBS MODERATE 35: CPT | Performed by: FAMILY MEDICINE

## 2024-09-17 RX ADMIN — SODIUM CHLORIDE: 9 INJECTION, SOLUTION INTRAVENOUS at 14:16

## 2024-09-17 RX ADMIN — HYDROMORPHONE HYDROCHLORIDE 1.5 MG: 2 INJECTION, SOLUTION INTRAMUSCULAR; INTRAVENOUS; SUBCUTANEOUS at 08:04

## 2024-09-17 RX ADMIN — HYDROXYUREA 500 MG: 500 CAPSULE ORAL at 07:42

## 2024-09-17 RX ADMIN — FOLIC ACID 1 MG: 1 TABLET ORAL at 07:42

## 2024-09-17 RX ADMIN — SODIUM CHLORIDE: 9 INJECTION, SOLUTION INTRAVENOUS at 05:09

## 2024-09-17 RX ADMIN — HYDROMORPHONE HYDROCHLORIDE 1.5 MG: 2 INJECTION, SOLUTION INTRAMUSCULAR; INTRAVENOUS; SUBCUTANEOUS at 11:22

## 2024-09-17 RX ADMIN — HYDROMORPHONE HYDROCHLORIDE 1.5 MG: 2 INJECTION, SOLUTION INTRAMUSCULAR; INTRAVENOUS; SUBCUTANEOUS at 14:16

## 2024-09-17 RX ADMIN — DEXAMETHASONE 4 MG: 4 TABLET ORAL at 07:42

## 2024-09-17 RX ADMIN — DEXAMETHASONE 4 MG: 4 TABLET ORAL at 22:31

## 2024-09-17 RX ADMIN — Medication 3 MG: at 22:31

## 2024-09-17 RX ADMIN — HYDROMORPHONE HYDROCHLORIDE 1.5 MG: 2 INJECTION, SOLUTION INTRAMUSCULAR; INTRAVENOUS; SUBCUTANEOUS at 00:31

## 2024-09-17 RX ADMIN — HYDROMORPHONE HYDROCHLORIDE 1.5 MG: 2 INJECTION, SOLUTION INTRAMUSCULAR; INTRAVENOUS; SUBCUTANEOUS at 05:02

## 2024-09-17 RX ADMIN — SODIUM CHLORIDE, PRESERVATIVE FREE 10 ML: 5 INJECTION INTRAVENOUS at 07:42

## 2024-09-17 RX ADMIN — HYDROXYUREA 500 MG: 500 CAPSULE ORAL at 22:31

## 2024-09-17 ASSESSMENT — PAIN SCALES - GENERAL
PAINLEVEL_OUTOF10: 7
PAINLEVEL_OUTOF10: 7
PAINLEVEL_OUTOF10: 9
PAINLEVEL_OUTOF10: 6
PAINLEVEL_OUTOF10: 7
PAINLEVEL_OUTOF10: 8
PAINLEVEL_OUTOF10: 7
PAINLEVEL_OUTOF10: 6
PAINLEVEL_OUTOF10: 7

## 2024-09-17 ASSESSMENT — PAIN - FUNCTIONAL ASSESSMENT

## 2024-09-17 ASSESSMENT — PAIN DESCRIPTION - FREQUENCY
FREQUENCY: CONTINUOUS

## 2024-09-17 ASSESSMENT — PAIN DESCRIPTION - LOCATION
LOCATION: ARM;BACK;LEG
LOCATION: ARM;BACK;LEG
LOCATION: GENERALIZED;BACK;LEG

## 2024-09-17 ASSESSMENT — PAIN DESCRIPTION - DESCRIPTORS
DESCRIPTORS: ACHING;DISCOMFORT
DESCRIPTORS: ACHING;SORE
DESCRIPTORS: ACHING;DISCOMFORT
DESCRIPTORS: ACHING

## 2024-09-17 ASSESSMENT — PAIN DESCRIPTION - DIRECTION: RADIATING_TOWARDS: N//A

## 2024-09-17 ASSESSMENT — PAIN DESCRIPTION - PAIN TYPE
TYPE: ACUTE PAIN;CHRONIC PAIN

## 2024-09-17 ASSESSMENT — PAIN DESCRIPTION - ORIENTATION
ORIENTATION: RIGHT;LEFT
ORIENTATION: RIGHT;LEFT;LOWER;UPPER
ORIENTATION: RIGHT;LEFT;LOWER
ORIENTATION: RIGHT;LEFT
ORIENTATION: RIGHT;LEFT;LOWER;UPPER
ORIENTATION: RIGHT;LEFT
ORIENTATION: LEFT;RIGHT
ORIENTATION: RIGHT;LEFT;LOWER

## 2024-09-17 ASSESSMENT — PAIN DESCRIPTION - ONSET
ONSET: ON-GOING

## 2024-09-18 ENCOUNTER — HOSPITAL ENCOUNTER (INPATIENT)
Facility: HOSPITAL | Age: 44
Discharge: HOME | DRG: 812 | End: 2024-09-21
Payer: MEDICARE

## 2024-09-18 LAB
ANION GAP SERPL CALC-SCNC: 10 MMOL/L (ref 3–18)
BUN SERPL-MCNC: 10 MG/DL (ref 7–18)
BUN/CREAT SERPL: 10 (ref 12–20)
CALCIUM SERPL-MCNC: 8.8 MG/DL (ref 8.5–10.1)
CHLORIDE SERPL-SCNC: 107 MMOL/L (ref 100–111)
CO2 SERPL-SCNC: 21 MMOL/L (ref 21–32)
CREAT SERPL-MCNC: 0.98 MG/DL (ref 0.6–1.3)
GLUCOSE SERPL-MCNC: 109 MG/DL (ref 74–99)
POTASSIUM SERPL-SCNC: 3.9 MMOL/L (ref 3.5–5.5)
SODIUM SERPL-SCNC: 138 MMOL/L (ref 136–145)

## 2024-09-18 PROCEDURE — 2580000003 HC RX 258: Performed by: HEALTH CARE PROVIDER

## 2024-09-18 PROCEDURE — 36415 COLL VENOUS BLD VENIPUNCTURE: CPT

## 2024-09-18 PROCEDURE — 2580000003 HC RX 258: Performed by: PHYSICIAN ASSISTANT

## 2024-09-18 PROCEDURE — 94761 N-INVAS EAR/PLS OXIMETRY MLT: CPT

## 2024-09-18 PROCEDURE — 6360000002 HC RX W HCPCS: Performed by: INTERNAL MEDICINE

## 2024-09-18 PROCEDURE — 6370000000 HC RX 637 (ALT 250 FOR IP): Performed by: HOSPITALIST

## 2024-09-18 PROCEDURE — 6370000000 HC RX 637 (ALT 250 FOR IP): Performed by: HEALTH CARE PROVIDER

## 2024-09-18 PROCEDURE — 2500000003 HC RX 250 WO HCPCS: Performed by: PHYSICIAN ASSISTANT

## 2024-09-18 PROCEDURE — 99233 SBSQ HOSP IP/OBS HIGH 50: CPT | Performed by: FAMILY MEDICINE

## 2024-09-18 PROCEDURE — 02HV33Z INSERTION OF INFUSION DEVICE INTO SUPERIOR VENA CAVA, PERCUTANEOUS APPROACH: ICD-10-PCS | Performed by: PHYSICIAN ASSISTANT

## 2024-09-18 PROCEDURE — 76000 FLUOROSCOPY <1 HR PHYS/QHP: CPT

## 2024-09-18 PROCEDURE — 1100000000 HC RM PRIVATE

## 2024-09-18 PROCEDURE — 80048 BASIC METABOLIC PNL TOTAL CA: CPT

## 2024-09-18 RX ORDER — SODIUM CHLORIDE 0.9 % (FLUSH) 0.9 %
5-40 SYRINGE (ML) INJECTION PRN
Status: DISCONTINUED | OUTPATIENT
Start: 2024-09-18 | End: 2024-10-11 | Stop reason: HOSPADM

## 2024-09-18 RX ORDER — LIDOCAINE HYDROCHLORIDE 10 MG/ML
INJECTION, SOLUTION EPIDURAL; INFILTRATION; INTRACAUDAL; PERINEURAL PRN
Status: DISCONTINUED | OUTPATIENT
Start: 2024-09-18 | End: 2024-09-22 | Stop reason: HOSPADM

## 2024-09-18 RX ORDER — SODIUM CHLORIDE 9 MG/ML
INJECTION, SOLUTION INTRAVENOUS PRN
Status: DISCONTINUED | OUTPATIENT
Start: 2024-09-18 | End: 2024-10-11 | Stop reason: HOSPADM

## 2024-09-18 RX ORDER — SODIUM CHLORIDE 0.9 % (FLUSH) 0.9 %
5-40 SYRINGE (ML) INJECTION EVERY 12 HOURS SCHEDULED
Status: DISCONTINUED | OUTPATIENT
Start: 2024-09-18 | End: 2024-10-11 | Stop reason: HOSPADM

## 2024-09-18 RX ORDER — OXYCODONE AND ACETAMINOPHEN 5; 325 MG/1; MG/1
1 TABLET ORAL EVERY 4 HOURS PRN
Status: DISCONTINUED | OUTPATIENT
Start: 2024-09-18 | End: 2024-09-19

## 2024-09-18 RX ADMIN — SODIUM CHLORIDE, PRESERVATIVE FREE 10 ML: 5 INJECTION INTRAVENOUS at 21:03

## 2024-09-18 RX ADMIN — OXYCODONE HYDROCHLORIDE AND ACETAMINOPHEN 1 TABLET: 5; 325 TABLET ORAL at 12:24

## 2024-09-18 RX ADMIN — HYDROMORPHONE HYDROCHLORIDE 1.5 MG: 2 INJECTION, SOLUTION INTRAMUSCULAR; INTRAVENOUS; SUBCUTANEOUS at 20:59

## 2024-09-18 RX ADMIN — HYDROMORPHONE HYDROCHLORIDE 1.5 MG: 2 INJECTION, SOLUTION INTRAMUSCULAR; INTRAVENOUS; SUBCUTANEOUS at 17:22

## 2024-09-18 RX ADMIN — OXYCODONE HYDROCHLORIDE AND ACETAMINOPHEN 1 TABLET: 5; 325 TABLET ORAL at 02:18

## 2024-09-18 RX ADMIN — HYDROXYUREA 500 MG: 500 CAPSULE ORAL at 08:35

## 2024-09-18 RX ADMIN — OXYCODONE HYDROCHLORIDE AND ACETAMINOPHEN 1 TABLET: 5; 325 TABLET ORAL at 08:29

## 2024-09-18 RX ADMIN — LIDOCAINE HYDROCHLORIDE 5 ML: 10 INJECTION, SOLUTION EPIDURAL; INFILTRATION; INTRACAUDAL; PERINEURAL at 15:37

## 2024-09-18 RX ADMIN — Medication 3 MG: at 21:02

## 2024-09-18 RX ADMIN — HYDROXYUREA 500 MG: 500 CAPSULE ORAL at 21:07

## 2024-09-18 RX ADMIN — DEXAMETHASONE 4 MG: 4 TABLET ORAL at 08:29

## 2024-09-18 RX ADMIN — DEXAMETHASONE 4 MG: 4 TABLET ORAL at 21:02

## 2024-09-18 RX ADMIN — FOLIC ACID 1 MG: 1 TABLET ORAL at 08:29

## 2024-09-18 ASSESSMENT — PAIN - FUNCTIONAL ASSESSMENT
PAIN_FUNCTIONAL_ASSESSMENT: ACTIVITIES ARE NOT PREVENTED

## 2024-09-18 ASSESSMENT — PAIN SCALES - GENERAL
PAINLEVEL_OUTOF10: 8
PAINLEVEL_OUTOF10: 7
PAINLEVEL_OUTOF10: 8
PAINLEVEL_OUTOF10: 8
PAINLEVEL_OUTOF10: 6
PAINLEVEL_OUTOF10: 0
PAINLEVEL_OUTOF10: 7
PAINLEVEL_OUTOF10: 3
PAINLEVEL_OUTOF10: 7
PAINLEVEL_OUTOF10: 2
PAINLEVEL_OUTOF10: 8
PAINLEVEL_OUTOF10: 8

## 2024-09-18 ASSESSMENT — PAIN DESCRIPTION - DESCRIPTORS
DESCRIPTORS: ACHING
DESCRIPTORS: ACHING;DULL
DESCRIPTORS: ACHING
DESCRIPTORS: ACHING;DISCOMFORT;THROBBING

## 2024-09-18 ASSESSMENT — PAIN DESCRIPTION - FREQUENCY
FREQUENCY: INTERMITTENT
FREQUENCY: CONTINUOUS
FREQUENCY: INTERMITTENT

## 2024-09-18 ASSESSMENT — PAIN DESCRIPTION - LOCATION
LOCATION: BACK;LEG
LOCATION: BACK
LOCATION: BACK;LEG
LOCATION: LEG;BACK
LOCATION: GENERALIZED;BACK;LEG
LOCATION: LEG;BACK
LOCATION: LEG;BACK
LOCATION: BACK;LEG
LOCATION: LEG;BACK
LOCATION: GENERALIZED;BACK;LEG

## 2024-09-18 ASSESSMENT — PAIN DESCRIPTION - ONSET
ONSET: ON-GOING

## 2024-09-18 ASSESSMENT — PAIN DESCRIPTION - ORIENTATION
ORIENTATION: RIGHT;LEFT;LOWER
ORIENTATION: RIGHT;LEFT
ORIENTATION: LEFT;RIGHT
ORIENTATION: LEFT;RIGHT
ORIENTATION: RIGHT;LEFT
ORIENTATION: LOWER
ORIENTATION: LEFT;RIGHT;LOWER
ORIENTATION: LEFT;RIGHT;LOWER
ORIENTATION: RIGHT;LEFT;LOWER

## 2024-09-18 ASSESSMENT — PAIN DESCRIPTION - DIRECTION: RADIATING_TOWARDS: LEGS

## 2024-09-18 ASSESSMENT — PAIN DESCRIPTION - PAIN TYPE
TYPE: ACUTE PAIN;CHRONIC PAIN

## 2024-09-18 NOTE — PROCEDURES
INTERVENTIONAL RADIOLOGY POST PICC LINE NOTE     September 18, 2024       3:44 PM     Preoperative Diagnosis:   IV Access    Postoperative Diagnosis:  Same.    : DONATO Estrada    Assistant:  None.    Attending Physician: Dr. Ramsey    Type of Anesthesia:  1% local lidocaine    Procedure/Description: right upper extremity PICC Line.    Findings:  Right brachial 5 Nepali catheter placed. Tip at the SVC/RA junction. OK to use. Length 42 cm.    Estimated blood Loss: Minimal    Specimen Removed: None    Drains: None     Complications:  None.    Condition:  Stable.    Discharge Plan:  continue present therapy

## 2024-09-19 PROCEDURE — 6360000002 HC RX W HCPCS: Performed by: INTERNAL MEDICINE

## 2024-09-19 PROCEDURE — 6370000000 HC RX 637 (ALT 250 FOR IP): Performed by: HEALTH CARE PROVIDER

## 2024-09-19 PROCEDURE — 6370000000 HC RX 637 (ALT 250 FOR IP): Performed by: STUDENT IN AN ORGANIZED HEALTH CARE EDUCATION/TRAINING PROGRAM

## 2024-09-19 PROCEDURE — 1100000000 HC RM PRIVATE

## 2024-09-19 PROCEDURE — 2580000003 HC RX 258: Performed by: PHYSICIAN ASSISTANT

## 2024-09-19 PROCEDURE — 6370000000 HC RX 637 (ALT 250 FOR IP): Performed by: HOSPITALIST

## 2024-09-19 PROCEDURE — 94761 N-INVAS EAR/PLS OXIMETRY MLT: CPT

## 2024-09-19 PROCEDURE — 99231 SBSQ HOSP IP/OBS SF/LOW 25: CPT | Performed by: FAMILY MEDICINE

## 2024-09-19 PROCEDURE — 2580000003 HC RX 258: Performed by: NURSE PRACTITIONER

## 2024-09-19 PROCEDURE — 6360000002 HC RX W HCPCS: Performed by: NURSE PRACTITIONER

## 2024-09-19 PROCEDURE — 6370000000 HC RX 637 (ALT 250 FOR IP): Performed by: INTERNAL MEDICINE

## 2024-09-19 PROCEDURE — 2580000003 HC RX 258: Performed by: INTERNAL MEDICINE

## 2024-09-19 PROCEDURE — 2580000003 HC RX 258: Performed by: HEALTH CARE PROVIDER

## 2024-09-19 RX ORDER — HYDROMORPHONE HYDROCHLORIDE 2 MG/ML
2 INJECTION, SOLUTION INTRAMUSCULAR; INTRAVENOUS; SUBCUTANEOUS
Status: DISCONTINUED | OUTPATIENT
Start: 2024-09-19 | End: 2024-09-19

## 2024-09-19 RX ORDER — NALOXONE HYDROCHLORIDE 0.4 MG/ML
0.4 INJECTION, SOLUTION INTRAMUSCULAR; INTRAVENOUS; SUBCUTANEOUS PRN
Status: DISCONTINUED | OUTPATIENT
Start: 2024-09-19 | End: 2024-10-11 | Stop reason: HOSPADM

## 2024-09-19 RX ADMIN — SODIUM CHLORIDE, PRESERVATIVE FREE 10 ML: 5 INJECTION INTRAVENOUS at 21:00

## 2024-09-19 RX ADMIN — HYDROMORPHONE HYDROCHLORIDE 1.5 MG: 2 INJECTION, SOLUTION INTRAMUSCULAR; INTRAVENOUS; SUBCUTANEOUS at 08:17

## 2024-09-19 RX ADMIN — HYDROMORPHONE HYDROCHLORIDE 1.5 MG: 2 INJECTION, SOLUTION INTRAMUSCULAR; INTRAVENOUS; SUBCUTANEOUS at 03:01

## 2024-09-19 RX ADMIN — HYDROMORPHONE HYDROCHLORIDE 2 MG: 2 INJECTION, SOLUTION INTRAMUSCULAR; INTRAVENOUS; SUBCUTANEOUS at 15:08

## 2024-09-19 RX ADMIN — FOLIC ACID 1 MG: 1 TABLET ORAL at 08:18

## 2024-09-19 RX ADMIN — HYDROMORPHONE HYDROCHLORIDE 1.5 MG: 2 INJECTION, SOLUTION INTRAMUSCULAR; INTRAVENOUS; SUBCUTANEOUS at 00:02

## 2024-09-19 RX ADMIN — HYDROXYUREA 500 MG: 500 CAPSULE ORAL at 21:29

## 2024-09-19 RX ADMIN — HYDROXYUREA 500 MG: 500 CAPSULE ORAL at 08:17

## 2024-09-19 RX ADMIN — HYDROMORPHONE HYDROCHLORIDE 2 MG: 2 INJECTION, SOLUTION INTRAMUSCULAR; INTRAVENOUS; SUBCUTANEOUS at 11:06

## 2024-09-19 RX ADMIN — DEXAMETHASONE 4 MG: 4 TABLET ORAL at 08:18

## 2024-09-19 RX ADMIN — OXYCODONE HYDROCHLORIDE AND ACETAMINOPHEN 1 TABLET: 5; 325 TABLET ORAL at 13:39

## 2024-09-19 RX ADMIN — SODIUM CHLORIDE: 9 INJECTION, SOLUTION INTRAVENOUS at 21:25

## 2024-09-19 RX ADMIN — DIPHENHYDRAMINE HYDROCHLORIDE 25 MG: 25 CAPSULE ORAL at 15:08

## 2024-09-19 RX ADMIN — HYDROMORPHONE HYDROCHLORIDE 1.5 MG: 2 INJECTION, SOLUTION INTRAMUSCULAR; INTRAVENOUS; SUBCUTANEOUS at 06:22

## 2024-09-19 RX ADMIN — Medication 3 MG: at 21:28

## 2024-09-19 RX ADMIN — SODIUM CHLORIDE: 9 INJECTION, SOLUTION INTRAVENOUS at 01:37

## 2024-09-19 RX ADMIN — OXYCODONE HYDROCHLORIDE AND ACETAMINOPHEN 1 TABLET: 5; 325 TABLET ORAL at 10:00

## 2024-09-19 RX ADMIN — DIPHENHYDRAMINE HYDROCHLORIDE 25 MG: 25 CAPSULE ORAL at 08:17

## 2024-09-19 RX ADMIN — DEXAMETHASONE 4 MG: 4 TABLET ORAL at 21:29

## 2024-09-19 RX ADMIN — Medication: at 17:25

## 2024-09-19 RX ADMIN — SODIUM CHLORIDE, PRESERVATIVE FREE 10 ML: 5 INJECTION INTRAVENOUS at 11:08

## 2024-09-19 ASSESSMENT — PAIN - FUNCTIONAL ASSESSMENT
PAIN_FUNCTIONAL_ASSESSMENT: PREVENTS OR INTERFERES SOME ACTIVE ACTIVITIES AND ADLS
PAIN_FUNCTIONAL_ASSESSMENT: PREVENTS OR INTERFERES SOME ACTIVE ACTIVITIES AND ADLS
PAIN_FUNCTIONAL_ASSESSMENT: PREVENTS OR INTERFERES WITH MANY ACTIVE NOT PASSIVE ACTIVITIES
PAIN_FUNCTIONAL_ASSESSMENT: PREVENTS OR INTERFERES SOME ACTIVE ACTIVITIES AND ADLS
PAIN_FUNCTIONAL_ASSESSMENT: ACTIVITIES ARE NOT PREVENTED
PAIN_FUNCTIONAL_ASSESSMENT: PREVENTS OR INTERFERES SOME ACTIVE ACTIVITIES AND ADLS
PAIN_FUNCTIONAL_ASSESSMENT: PREVENTS OR INTERFERES SOME ACTIVE ACTIVITIES AND ADLS
PAIN_FUNCTIONAL_ASSESSMENT: ACTIVITIES ARE NOT PREVENTED

## 2024-09-19 ASSESSMENT — PAIN DESCRIPTION - ORIENTATION
ORIENTATION: LOWER
ORIENTATION: LOWER
ORIENTATION: OTHER (COMMENT)
ORIENTATION: LOWER

## 2024-09-19 ASSESSMENT — PAIN DESCRIPTION - ONSET
ONSET: ON-GOING

## 2024-09-19 ASSESSMENT — PAIN DESCRIPTION - PAIN TYPE
TYPE: CHRONIC PAIN
TYPE: CHRONIC PAIN;ACUTE PAIN
TYPE: INTRACTABLE PAIN
TYPE: INTRACTABLE PAIN
TYPE: ACUTE PAIN;CHRONIC PAIN
TYPE: INTRACTABLE PAIN

## 2024-09-19 ASSESSMENT — PAIN DESCRIPTION - DIRECTION
RADIATING_TOWARDS: LEGS

## 2024-09-19 ASSESSMENT — PAIN DESCRIPTION - DESCRIPTORS
DESCRIPTORS: ACHING
DESCRIPTORS: ACHING;SHARP
DESCRIPTORS: ACHING;SHARP
DESCRIPTORS: ACHING

## 2024-09-19 ASSESSMENT — PAIN DESCRIPTION - FREQUENCY
FREQUENCY: CONTINUOUS
FREQUENCY: INTERMITTENT
FREQUENCY: CONTINUOUS
FREQUENCY: INTERMITTENT
FREQUENCY: INTERMITTENT

## 2024-09-19 ASSESSMENT — PAIN DESCRIPTION - LOCATION
LOCATION: GENERALIZED
LOCATION: GENERALIZED
LOCATION: BACK
LOCATION: BACK
LOCATION: GENERALIZED
LOCATION: GENERALIZED
LOCATION: BACK
LOCATION: GENERALIZED
LOCATION: GENERALIZED

## 2024-09-19 ASSESSMENT — PAIN SCALES - GENERAL
PAINLEVEL_OUTOF10: 10
PAINLEVEL_OUTOF10: 8
PAINLEVEL_OUTOF10: 9
PAINLEVEL_OUTOF10: 10
PAINLEVEL_OUTOF10: 7
PAINLEVEL_OUTOF10: 0
PAINLEVEL_OUTOF10: 10
PAINLEVEL_OUTOF10: 10
PAINLEVEL_OUTOF10: 8
PAINLEVEL_OUTOF10: 9
PAINLEVEL_OUTOF10: 7
PAINLEVEL_OUTOF10: 10

## 2024-09-19 NOTE — CARE COORDINATION
CM Chart Review.       Discharge Plan continues to be home at discharge, no CM needs at this time, patient's sister to transport patient home when medically stable for discharge.   CM will continue to monitor and follow patient for transition of care needs.             Kiah Yusuf RN  Case Management [phone]

## 2024-09-19 NOTE — PALLIATIVE CARE
Received call by oncology hematology.  Patient sickle cell and crisis who has extreme pain despite Dilaudid 2 mg IV and Percocet oral.  Patient seen at bedside writhing in pain describes mostly the back and and leg.  Will start Dilaudid PCA basal dose of 0.4 mg/h with the patient controlled dose of 0.2 mg every 15 minutes as a lockout.  Will give at 0.5 mg loading dose.  Patient aware of PCA he has been on a PCA pump for.  Discussed with oncology attending and nursing.

## 2024-09-20 PROBLEM — Z71.89 ADVANCED CARE PLANNING/COUNSELING DISCUSSION: Status: ACTIVE | Noted: 2024-09-20

## 2024-09-20 PROBLEM — D72.829 LEUKOCYTOSIS: Status: ACTIVE | Noted: 2024-09-20

## 2024-09-20 PROBLEM — Z51.5 ENCOUNTER FOR PALLIATIVE CARE: Status: ACTIVE | Noted: 2024-09-20

## 2024-09-20 PROCEDURE — 2580000003 HC RX 258: Performed by: HEALTH CARE PROVIDER

## 2024-09-20 PROCEDURE — 94761 N-INVAS EAR/PLS OXIMETRY MLT: CPT

## 2024-09-20 PROCEDURE — 6360000002 HC RX W HCPCS: Performed by: INTERNAL MEDICINE

## 2024-09-20 PROCEDURE — 2580000003 HC RX 258: Performed by: INTERNAL MEDICINE

## 2024-09-20 PROCEDURE — 99222 1ST HOSP IP/OBS MODERATE 55: CPT | Performed by: NURSE PRACTITIONER

## 2024-09-20 PROCEDURE — 6370000000 HC RX 637 (ALT 250 FOR IP): Performed by: HEALTH CARE PROVIDER

## 2024-09-20 PROCEDURE — 2580000003 HC RX 258: Performed by: PHYSICIAN ASSISTANT

## 2024-09-20 PROCEDURE — 6370000000 HC RX 637 (ALT 250 FOR IP): Performed by: FAMILY MEDICINE

## 2024-09-20 PROCEDURE — 99233 SBSQ HOSP IP/OBS HIGH 50: CPT | Performed by: FAMILY MEDICINE

## 2024-09-20 PROCEDURE — 2580000003 HC RX 258: Performed by: FAMILY MEDICINE

## 2024-09-20 PROCEDURE — 1100000000 HC RM PRIVATE

## 2024-09-20 RX ORDER — MULTIVITAMIN WITH IRON
1 TABLET ORAL DAILY
Status: DISCONTINUED | OUTPATIENT
Start: 2024-09-20 | End: 2024-10-11 | Stop reason: HOSPADM

## 2024-09-20 RX ADMIN — DEXAMETHASONE 4 MG: 4 TABLET ORAL at 21:46

## 2024-09-20 RX ADMIN — Medication: at 12:55

## 2024-09-20 RX ADMIN — Medication 3 MG: at 21:46

## 2024-09-20 RX ADMIN — HYDROXYUREA 500 MG: 500 CAPSULE ORAL at 21:47

## 2024-09-20 RX ADMIN — FOLIC ACID 1 MG: 1 TABLET ORAL at 08:53

## 2024-09-20 RX ADMIN — THERA TABS 1 TABLET: TAB at 17:46

## 2024-09-20 RX ADMIN — SODIUM CHLORIDE, PRESERVATIVE FREE 10 ML: 5 INJECTION INTRAVENOUS at 21:00

## 2024-09-20 RX ADMIN — HYDROXYUREA 500 MG: 500 CAPSULE ORAL at 08:53

## 2024-09-20 RX ADMIN — HYDROMORPHONE HYDROCHLORIDE 0.25 MG: 1 INJECTION, SOLUTION INTRAMUSCULAR; INTRAVENOUS; SUBCUTANEOUS at 07:04

## 2024-09-20 RX ADMIN — SODIUM CHLORIDE, PRESERVATIVE FREE 10 ML: 5 INJECTION INTRAVENOUS at 08:56

## 2024-09-20 RX ADMIN — SODIUM CHLORIDE: 9 INJECTION, SOLUTION INTRAVENOUS at 23:59

## 2024-09-20 RX ADMIN — DEXAMETHASONE 4 MG: 4 TABLET ORAL at 08:53

## 2024-09-20 RX ADMIN — Medication: at 23:35

## 2024-09-20 RX ADMIN — SODIUM CHLORIDE: 9 INJECTION, SOLUTION INTRAVENOUS at 06:41

## 2024-09-20 ASSESSMENT — PAIN - FUNCTIONAL ASSESSMENT
PAIN_FUNCTIONAL_ASSESSMENT: PREVENTS OR INTERFERES SOME ACTIVE ACTIVITIES AND ADLS
PAIN_FUNCTIONAL_ASSESSMENT: PREVENTS OR INTERFERES SOME ACTIVE ACTIVITIES AND ADLS

## 2024-09-20 ASSESSMENT — PAIN DESCRIPTION - LOCATION
LOCATION: GENERALIZED
LOCATION: GENERALIZED

## 2024-09-20 ASSESSMENT — PAIN SCALES - GENERAL
PAINLEVEL_OUTOF10: 7
PAINLEVEL_OUTOF10: 8
PAINLEVEL_OUTOF10: 7
PAINLEVEL_OUTOF10: 7
PAINLEVEL_OUTOF10: 9
PAINLEVEL_OUTOF10: 8

## 2024-09-20 ASSESSMENT — PAIN DESCRIPTION - ONSET
ONSET: ON-GOING
ONSET: ON-GOING

## 2024-09-20 ASSESSMENT — PAIN DESCRIPTION - PAIN TYPE
TYPE: CHRONIC PAIN
TYPE: CHRONIC PAIN

## 2024-09-20 ASSESSMENT — PAIN DESCRIPTION - FREQUENCY
FREQUENCY: CONTINUOUS
FREQUENCY: CONTINUOUS

## 2024-09-20 ASSESSMENT — PAIN DESCRIPTION - DESCRIPTORS
DESCRIPTORS: ACHING;SHARP
DESCRIPTORS: ACHING;SHARP

## 2024-09-20 NOTE — PALLIATIVE CARE
Re check on patient. Alert still with 7/10 pain but he feels it is improving. Using Patient controlled doses.     Will continue same PCA dose as patient feels it is becoming effective. Denies nausea and feels he can eat some dinner. ON nasal cannula denies shortness of breath. Tells me he can rest a little.

## 2024-09-20 NOTE — CONSULTS
Interventional Radiology     Consult received from Dr. Lanier for evaluation of vascular access.     43 year old male with history of sickle cell anemia who was admitted for acute pain crisis. Patient evaluated by vascular access team and unable to place a line. IR consulted for line placement. PICC line placed.   Line ready for use      Thank you,  DONATO Estrada  [phone]  
[]Other:  Cardiovascular: [x] Regular rate/rhythm  [] Arrhythmia  [] Other:  Chest: [x] Effort normal  []Lungs clear  [] Respiratory distress  []Tachypnea  [] Other:  Abdomen: [x] Soft/non-tender  [] Normal appearance  [] Distended  [] Ascites  [] Other:  Neurological: [x] Normal speech  [] Normal sensation  []Deficits present:  Extremity: [x] Normal skin color/temp  [] Clubbing/cyanosis  [] No edema  [] Other:    Wt Readings from Last 15 Encounters:   09/13/24 87.5 kg (193 lb)   08/31/24 89.4 kg (197 lb)   08/16/24 89.3 kg (196 lb 13.9 oz)   05/12/24 87.5 kg (193 lb)   04/21/24 88.5 kg (195 lb)   04/16/24 88.5 kg (195 lb)   05/25/23 91.8 kg (202 lb 6.1 oz)        Current Diet: ADULT DIET; Regular       PSYCHOSOCIAL/SPIRITUAL SCREENING:   Palliative IDT has assessed this patient for cultural preferences / practices and a referral made as appropriate to needs (Cultural Services, Patient Advocacy, Ethics, etc.)    Spiritual Affiliation: Alevism    Any spiritual / Buddhism concerns:  [] Yes /  [x] No   If \"Yes\" to discuss with pastoral care during IDT     Does caregiver feel burdened by caring for their loved one:   [] Yes /  [x] No /  [] No Caregiver Present/Available [] No Caregiver [] Pt Lives at Facility  If \"Yes\" to discuss with social work during IDT    Anticipatory grief assessment:   [x] Normal  / [] Maladaptive     If \"Maladaptive\" to discuss with social work during IDT    ESAS Anxiety: Anxiety Score: Not anxious    ESAS Depression: Depression Score: Not depressed        LAB AND IMAGING FINDINGS:   Objective data reviewed:  labs, images, records, medication use, vitals, and chart     FINAL COMMENTS   Thank you for allowing Palliative Medicine to participate in the care of Norma Galan.    Only check if applicable and billing time based rather than MDM  [x] The total encounter time on this service date was __55__ minutes which was spent performing a face-to-face encounter and personally completing

## 2024-09-21 LAB
ANION GAP SERPL CALC-SCNC: 7 MMOL/L (ref 3–18)
BUN SERPL-MCNC: 14 MG/DL (ref 7–18)
BUN/CREAT SERPL: 16 (ref 12–20)
CALCIUM SERPL-MCNC: 8.1 MG/DL (ref 8.5–10.1)
CHLORIDE SERPL-SCNC: 109 MMOL/L (ref 100–111)
CO2 SERPL-SCNC: 21 MMOL/L (ref 21–32)
CREAT SERPL-MCNC: 0.89 MG/DL (ref 0.6–1.3)
ERYTHROCYTE [DISTWIDTH] IN BLOOD BY AUTOMATED COUNT: 23.5 % (ref 11.6–14.5)
GLUCOSE SERPL-MCNC: 112 MG/DL (ref 74–99)
HCT VFR BLD AUTO: 24 % (ref 36–48)
HGB BLD-MCNC: 8.1 G/DL (ref 13–16)
MCH RBC QN AUTO: 25.7 PG (ref 24–34)
MCHC RBC AUTO-ENTMCNC: 33.8 G/DL (ref 31–37)
MCV RBC AUTO: 76.2 FL (ref 78–100)
NRBC # BLD: 1.35 K/UL (ref 0–0.01)
NRBC BLD-RTO: 6.6 PER 100 WBC
PLATELET # BLD AUTO: 310 K/UL (ref 135–420)
PMV BLD AUTO: 10.4 FL (ref 9.2–11.8)
POTASSIUM SERPL-SCNC: 3.6 MMOL/L (ref 3.5–5.5)
RBC # BLD AUTO: 3.15 M/UL (ref 4.35–5.65)
SODIUM SERPL-SCNC: 137 MMOL/L (ref 136–145)
WBC # BLD AUTO: 20.4 K/UL (ref 4.6–13.2)

## 2024-09-21 PROCEDURE — 6370000000 HC RX 637 (ALT 250 FOR IP): Performed by: HEALTH CARE PROVIDER

## 2024-09-21 PROCEDURE — 6360000002 HC RX W HCPCS: Performed by: INTERNAL MEDICINE

## 2024-09-21 PROCEDURE — 85027 COMPLETE CBC AUTOMATED: CPT

## 2024-09-21 PROCEDURE — 2700000000 HC OXYGEN THERAPY PER DAY

## 2024-09-21 PROCEDURE — 80048 BASIC METABOLIC PNL TOTAL CA: CPT

## 2024-09-21 PROCEDURE — 36592 COLLECT BLOOD FROM PICC: CPT

## 2024-09-21 PROCEDURE — 2580000003 HC RX 258: Performed by: PHYSICIAN ASSISTANT

## 2024-09-21 PROCEDURE — 2580000003 HC RX 258: Performed by: INTERNAL MEDICINE

## 2024-09-21 PROCEDURE — 99232 SBSQ HOSP IP/OBS MODERATE 35: CPT | Performed by: FAMILY MEDICINE

## 2024-09-21 PROCEDURE — 2580000003 HC RX 258: Performed by: FAMILY MEDICINE

## 2024-09-21 PROCEDURE — 1100000000 HC RM PRIVATE

## 2024-09-21 PROCEDURE — 6370000000 HC RX 637 (ALT 250 FOR IP): Performed by: FAMILY MEDICINE

## 2024-09-21 PROCEDURE — 94761 N-INVAS EAR/PLS OXIMETRY MLT: CPT

## 2024-09-21 RX ADMIN — FOLIC ACID 1 MG: 1 TABLET ORAL at 08:29

## 2024-09-21 RX ADMIN — SODIUM CHLORIDE, PRESERVATIVE FREE 10 ML: 5 INJECTION INTRAVENOUS at 22:15

## 2024-09-21 RX ADMIN — DEXAMETHASONE 4 MG: 4 TABLET ORAL at 08:29

## 2024-09-21 RX ADMIN — HYDROXYUREA 500 MG: 500 CAPSULE ORAL at 08:35

## 2024-09-21 RX ADMIN — HYDROXYUREA 500 MG: 500 CAPSULE ORAL at 20:24

## 2024-09-21 RX ADMIN — SODIUM CHLORIDE: 9 INJECTION, SOLUTION INTRAVENOUS at 08:28

## 2024-09-21 RX ADMIN — Medication 3 MG: at 20:24

## 2024-09-21 RX ADMIN — THERA TABS 1 TABLET: TAB at 08:29

## 2024-09-21 RX ADMIN — Medication: at 13:14

## 2024-09-21 RX ADMIN — DEXAMETHASONE 4 MG: 4 TABLET ORAL at 20:26

## 2024-09-21 RX ADMIN — SODIUM CHLORIDE, PRESERVATIVE FREE 10 ML: 5 INJECTION INTRAVENOUS at 08:30

## 2024-09-21 ASSESSMENT — PAIN SCALES - GENERAL
PAINLEVEL_OUTOF10: 8
PAINLEVEL_OUTOF10: 7
PAINLEVEL_OUTOF10: 8
PAINLEVEL_OUTOF10: 6
PAINLEVEL_OUTOF10: 8
PAINLEVEL_OUTOF10: 7

## 2024-09-21 ASSESSMENT — PAIN DESCRIPTION - ONSET
ONSET: ON-GOING

## 2024-09-21 ASSESSMENT — PAIN DESCRIPTION - LOCATION
LOCATION: BACK;LEG
LOCATION: LEG
LOCATION: BACK;LEG
LOCATION: GENERALIZED

## 2024-09-21 ASSESSMENT — PAIN DESCRIPTION - DESCRIPTORS
DESCRIPTORS: SHARP
DESCRIPTORS: SHARP
DESCRIPTORS: ACHING;SHARP

## 2024-09-21 ASSESSMENT — PAIN DESCRIPTION - DIRECTION
RADIATING_TOWARDS: GENERALIZED
RADIATING_TOWARDS: GERALIZED

## 2024-09-21 ASSESSMENT — PAIN DESCRIPTION - FREQUENCY
FREQUENCY: CONTINUOUS

## 2024-09-21 ASSESSMENT — PAIN DESCRIPTION - PAIN TYPE
TYPE: CHRONIC PAIN

## 2024-09-21 ASSESSMENT — PAIN DESCRIPTION - ORIENTATION
ORIENTATION: OTHER (COMMENT)
ORIENTATION: LOWER

## 2024-09-22 PROCEDURE — 1100000000 HC RM PRIVATE

## 2024-09-22 PROCEDURE — 6360000002 HC RX W HCPCS: Performed by: INTERNAL MEDICINE

## 2024-09-22 PROCEDURE — 2580000003 HC RX 258: Performed by: FAMILY MEDICINE

## 2024-09-22 PROCEDURE — 99232 SBSQ HOSP IP/OBS MODERATE 35: CPT | Performed by: FAMILY MEDICINE

## 2024-09-22 PROCEDURE — 6360000002 HC RX W HCPCS: Performed by: FAMILY MEDICINE

## 2024-09-22 PROCEDURE — 2700000000 HC OXYGEN THERAPY PER DAY

## 2024-09-22 PROCEDURE — 6370000000 HC RX 637 (ALT 250 FOR IP): Performed by: FAMILY MEDICINE

## 2024-09-22 PROCEDURE — 2580000003 HC RX 258: Performed by: INTERNAL MEDICINE

## 2024-09-22 PROCEDURE — 6370000000 HC RX 637 (ALT 250 FOR IP): Performed by: HEALTH CARE PROVIDER

## 2024-09-22 PROCEDURE — 94761 N-INVAS EAR/PLS OXIMETRY MLT: CPT

## 2024-09-22 PROCEDURE — 2580000003 HC RX 258: Performed by: PHYSICIAN ASSISTANT

## 2024-09-22 PROCEDURE — 2580000003 HC RX 258: Performed by: HEALTH CARE PROVIDER

## 2024-09-22 RX ADMIN — HYDROXYUREA 500 MG: 500 CAPSULE ORAL at 08:11

## 2024-09-22 RX ADMIN — FOLIC ACID 1 MG: 1 TABLET ORAL at 08:11

## 2024-09-22 RX ADMIN — SODIUM CHLORIDE, PRESERVATIVE FREE 10 ML: 5 INJECTION INTRAVENOUS at 22:02

## 2024-09-22 RX ADMIN — Medication: at 07:15

## 2024-09-22 RX ADMIN — THERA TABS 1 TABLET: TAB at 08:11

## 2024-09-22 RX ADMIN — SODIUM CHLORIDE: 9 INJECTION, SOLUTION INTRAVENOUS at 15:20

## 2024-09-22 RX ADMIN — CHLORPROMAZINE HYDROCHLORIDE 50 MG: 25 INJECTION INTRAMUSCULAR at 17:44

## 2024-09-22 RX ADMIN — SODIUM CHLORIDE: 9 INJECTION, SOLUTION INTRAVENOUS at 00:14

## 2024-09-22 RX ADMIN — Medication: at 00:54

## 2024-09-22 RX ADMIN — Medication: at 19:14

## 2024-09-22 RX ADMIN — SODIUM CHLORIDE, PRESERVATIVE FREE 10 ML: 5 INJECTION INTRAVENOUS at 08:12

## 2024-09-22 RX ADMIN — Medication: at 15:13

## 2024-09-22 RX ADMIN — SODIUM CHLORIDE, PRESERVATIVE FREE 10 ML: 5 INJECTION INTRAVENOUS at 22:01

## 2024-09-22 RX ADMIN — SODIUM CHLORIDE: 9 INJECTION, SOLUTION INTRAVENOUS at 08:11

## 2024-09-22 RX ADMIN — DEXAMETHASONE 4 MG: 4 TABLET ORAL at 21:40

## 2024-09-22 RX ADMIN — DEXAMETHASONE 4 MG: 4 TABLET ORAL at 08:11

## 2024-09-22 RX ADMIN — HYDROXYUREA 500 MG: 500 CAPSULE ORAL at 21:44

## 2024-09-22 RX ADMIN — SODIUM CHLORIDE, PRESERVATIVE FREE 10 ML: 5 INJECTION INTRAVENOUS at 08:13

## 2024-09-22 ASSESSMENT — PAIN DESCRIPTION - PAIN TYPE
TYPE: CHRONIC PAIN

## 2024-09-22 ASSESSMENT — PAIN DESCRIPTION - ORIENTATION
ORIENTATION: LOWER

## 2024-09-22 ASSESSMENT — PAIN DESCRIPTION - ONSET
ONSET: ON-GOING

## 2024-09-22 ASSESSMENT — PAIN DESCRIPTION - DESCRIPTORS
DESCRIPTORS: ACHING;SHARP
DESCRIPTORS: ACHING
DESCRIPTORS: SHARP
DESCRIPTORS: SHARP
DESCRIPTORS: ACHING

## 2024-09-22 ASSESSMENT — PAIN DESCRIPTION - FREQUENCY
FREQUENCY: CONTINUOUS

## 2024-09-22 ASSESSMENT — PAIN SCALES - GENERAL
PAINLEVEL_OUTOF10: 7
PAINLEVEL_OUTOF10: 6
PAINLEVEL_OUTOF10: 8
PAINLEVEL_OUTOF10: 6
PAINLEVEL_OUTOF10: 6
PAINLEVEL_OUTOF10: 7

## 2024-09-22 ASSESSMENT — PAIN DESCRIPTION - LOCATION
LOCATION: BACK;LEG
LOCATION: LEG
LOCATION: BACK;LEG
LOCATION: BACK;LEG

## 2024-09-22 ASSESSMENT — PAIN DESCRIPTION - DIRECTION
RADIATING_TOWARDS: GENERALIZED
RADIATING_TOWARDS: GENERALIZED

## 2024-09-23 PROCEDURE — 6370000000 HC RX 637 (ALT 250 FOR IP): Performed by: HEALTH CARE PROVIDER

## 2024-09-23 PROCEDURE — 99232 SBSQ HOSP IP/OBS MODERATE 35: CPT | Performed by: NURSE PRACTITIONER

## 2024-09-23 PROCEDURE — 6370000000 HC RX 637 (ALT 250 FOR IP): Performed by: FAMILY MEDICINE

## 2024-09-23 PROCEDURE — 2580000003 HC RX 258: Performed by: HEALTH CARE PROVIDER

## 2024-09-23 PROCEDURE — 2580000003 HC RX 258: Performed by: FAMILY MEDICINE

## 2024-09-23 PROCEDURE — 2580000003 HC RX 258: Performed by: INTERNAL MEDICINE

## 2024-09-23 PROCEDURE — 2700000000 HC OXYGEN THERAPY PER DAY

## 2024-09-23 PROCEDURE — 6370000000 HC RX 637 (ALT 250 FOR IP): Performed by: INTERNAL MEDICINE

## 2024-09-23 PROCEDURE — 6360000002 HC RX W HCPCS: Performed by: INTERNAL MEDICINE

## 2024-09-23 PROCEDURE — 6370000000 HC RX 637 (ALT 250 FOR IP): Performed by: STUDENT IN AN ORGANIZED HEALTH CARE EDUCATION/TRAINING PROGRAM

## 2024-09-23 PROCEDURE — 2580000003 HC RX 258: Performed by: PHYSICIAN ASSISTANT

## 2024-09-23 PROCEDURE — 99232 SBSQ HOSP IP/OBS MODERATE 35: CPT | Performed by: FAMILY MEDICINE

## 2024-09-23 PROCEDURE — 1100000000 HC RM PRIVATE

## 2024-09-23 PROCEDURE — 94761 N-INVAS EAR/PLS OXIMETRY MLT: CPT

## 2024-09-23 RX ORDER — PANTOPRAZOLE SODIUM 40 MG/1
40 TABLET, DELAYED RELEASE ORAL
Status: DISCONTINUED | OUTPATIENT
Start: 2024-09-23 | End: 2024-10-11 | Stop reason: HOSPADM

## 2024-09-23 RX ORDER — BACLOFEN 10 MG/1
10 TABLET ORAL 3 TIMES DAILY
Status: DISCONTINUED | OUTPATIENT
Start: 2024-09-23 | End: 2024-09-24

## 2024-09-23 RX ADMIN — Medication: at 22:38

## 2024-09-23 RX ADMIN — Medication: at 09:20

## 2024-09-23 RX ADMIN — ACETAMINOPHEN 325MG 650 MG: 325 TABLET ORAL at 13:08

## 2024-09-23 RX ADMIN — DEXAMETHASONE 4 MG: 4 TABLET ORAL at 21:07

## 2024-09-23 RX ADMIN — FOLIC ACID 1 MG: 1 TABLET ORAL at 09:26

## 2024-09-23 RX ADMIN — BACLOFEN 10 MG: 10 TABLET ORAL at 13:08

## 2024-09-23 RX ADMIN — SODIUM CHLORIDE, PRESERVATIVE FREE 10 ML: 5 INJECTION INTRAVENOUS at 09:26

## 2024-09-23 RX ADMIN — HYDROXYUREA 500 MG: 500 CAPSULE ORAL at 21:06

## 2024-09-23 RX ADMIN — SODIUM CHLORIDE: 9 INJECTION, SOLUTION INTRAVENOUS at 00:36

## 2024-09-23 RX ADMIN — SODIUM CHLORIDE, PRESERVATIVE FREE 10 ML: 5 INJECTION INTRAVENOUS at 21:10

## 2024-09-23 RX ADMIN — Medication 3 MG: at 21:07

## 2024-09-23 RX ADMIN — PANTOPRAZOLE SODIUM 40 MG: 40 TABLET, DELAYED RELEASE ORAL at 02:53

## 2024-09-23 RX ADMIN — DEXAMETHASONE 4 MG: 4 TABLET ORAL at 09:25

## 2024-09-23 RX ADMIN — BACLOFEN 10 MG: 10 TABLET ORAL at 09:25

## 2024-09-23 RX ADMIN — BACLOFEN 10 MG: 10 TABLET ORAL at 21:07

## 2024-09-23 RX ADMIN — THERA TABS 1 TABLET: TAB at 09:24

## 2024-09-23 RX ADMIN — HYDROXYUREA 500 MG: 500 CAPSULE ORAL at 09:30

## 2024-09-23 ASSESSMENT — PAIN DESCRIPTION - PAIN TYPE
TYPE: CHRONIC PAIN

## 2024-09-23 ASSESSMENT — PAIN SCALES - GENERAL
PAINLEVEL_OUTOF10: 7
PAINLEVEL_OUTOF10: 4
PAINLEVEL_OUTOF10: 7
PAINLEVEL_OUTOF10: 7
PAINLEVEL_OUTOF10: 4
PAINLEVEL_OUTOF10: 7
PAINLEVEL_OUTOF10: 4
PAINLEVEL_OUTOF10: 7

## 2024-09-23 ASSESSMENT — PAIN DESCRIPTION - FREQUENCY
FREQUENCY: CONTINUOUS

## 2024-09-23 ASSESSMENT — PAIN - FUNCTIONAL ASSESSMENT
PAIN_FUNCTIONAL_ASSESSMENT: ACTIVITIES ARE NOT PREVENTED
PAIN_FUNCTIONAL_ASSESSMENT: PREVENTS OR INTERFERES SOME ACTIVE ACTIVITIES AND ADLS
PAIN_FUNCTIONAL_ASSESSMENT: ACTIVITIES ARE NOT PREVENTED
PAIN_FUNCTIONAL_ASSESSMENT: PREVENTS OR INTERFERES SOME ACTIVE ACTIVITIES AND ADLS
PAIN_FUNCTIONAL_ASSESSMENT: PREVENTS OR INTERFERES SOME ACTIVE ACTIVITIES AND ADLS

## 2024-09-23 ASSESSMENT — PAIN DESCRIPTION - DESCRIPTORS
DESCRIPTORS: ACHING

## 2024-09-23 ASSESSMENT — PAIN DESCRIPTION - ONSET
ONSET: ON-GOING
ONSET: PROGRESSIVE

## 2024-09-23 ASSESSMENT — PAIN DESCRIPTION - LOCATION
LOCATION: BACK;LEG
LOCATION: BACK;LEG
LOCATION: LEG;BACK
LOCATION: LEG;BACK
LOCATION: BACK;LEG

## 2024-09-23 ASSESSMENT — PAIN DESCRIPTION - ORIENTATION
ORIENTATION: RIGHT;LEFT;LOWER
ORIENTATION: LOWER
ORIENTATION: RIGHT;LEFT;LOWER
ORIENTATION: LOWER
ORIENTATION: RIGHT;LEFT;LOWER

## 2024-09-24 ENCOUNTER — APPOINTMENT (OUTPATIENT)
Facility: HOSPITAL | Age: 44
DRG: 812 | End: 2024-09-24
Payer: MEDICARE

## 2024-09-24 LAB
ANION GAP SERPL CALC-SCNC: 6 MMOL/L (ref 3–18)
BASOPHILS # BLD: 0 K/UL (ref 0–0.1)
BASOPHILS NFR BLD: 0 % (ref 0–2)
BUN SERPL-MCNC: 13 MG/DL (ref 7–18)
BUN/CREAT SERPL: 17 (ref 12–20)
CALCIUM SERPL-MCNC: 9 MG/DL (ref 8.5–10.1)
CHLORIDE SERPL-SCNC: 104 MMOL/L (ref 100–111)
CO2 SERPL-SCNC: 24 MMOL/L (ref 21–32)
CREAT SERPL-MCNC: 0.75 MG/DL (ref 0.6–1.3)
DIFFERENTIAL METHOD BLD: ABNORMAL
EOSINOPHIL # BLD: 0 K/UL (ref 0–0.4)
EOSINOPHIL NFR BLD: 0 % (ref 0–5)
ERYTHROCYTE [DISTWIDTH] IN BLOOD BY AUTOMATED COUNT: 22.6 % (ref 11.6–14.5)
GLUCOSE SERPL-MCNC: 115 MG/DL (ref 74–99)
HCT VFR BLD AUTO: 22.7 % (ref 36–48)
HGB BLD-MCNC: 7.8 G/DL (ref 13–16)
IMM GRANULOCYTES # BLD AUTO: 0 K/UL
IMM GRANULOCYTES NFR BLD AUTO: 0 %
LYMPHOCYTES # BLD: 4.1 K/UL (ref 0.9–3.6)
LYMPHOCYTES NFR BLD: 17 % (ref 21–52)
MCH RBC QN AUTO: 25.7 PG (ref 24–34)
MCHC RBC AUTO-ENTMCNC: 34.4 G/DL (ref 31–37)
MCV RBC AUTO: 74.7 FL (ref 78–100)
MONOCYTES # BLD: 1 K/UL (ref 0.05–1.2)
MONOCYTES NFR BLD: 4 % (ref 3–10)
NEUTS SEG # BLD: 19.1 K/UL (ref 1.8–8)
NEUTS SEG NFR BLD: 79 % (ref 40–73)
NRBC # BLD: 2.47 K/UL (ref 0–0.01)
NRBC BLD-RTO: 10.2 PER 100 WBC
PLATELET # BLD AUTO: 352 K/UL (ref 135–420)
PLATELET COMMENT: ABNORMAL
PMV BLD AUTO: 9.6 FL (ref 9.2–11.8)
POTASSIUM SERPL-SCNC: 4.1 MMOL/L (ref 3.5–5.5)
RBC # BLD AUTO: 3.04 M/UL (ref 4.35–5.65)
RBC MORPH BLD: ABNORMAL
SODIUM SERPL-SCNC: 134 MMOL/L (ref 136–145)
WBC # BLD AUTO: 24.2 K/UL (ref 4.6–13.2)

## 2024-09-24 PROCEDURE — 2580000003 HC RX 258: Performed by: PHYSICIAN ASSISTANT

## 2024-09-24 PROCEDURE — 6360000002 HC RX W HCPCS: Performed by: NURSE PRACTITIONER

## 2024-09-24 PROCEDURE — 2580000003 HC RX 258: Performed by: FAMILY MEDICINE

## 2024-09-24 PROCEDURE — 6370000000 HC RX 637 (ALT 250 FOR IP): Performed by: INTERNAL MEDICINE

## 2024-09-24 PROCEDURE — 71045 X-RAY EXAM CHEST 1 VIEW: CPT

## 2024-09-24 PROCEDURE — 6360000002 HC RX W HCPCS: Performed by: INTERNAL MEDICINE

## 2024-09-24 PROCEDURE — 6360000002 HC RX W HCPCS: Performed by: STUDENT IN AN ORGANIZED HEALTH CARE EDUCATION/TRAINING PROGRAM

## 2024-09-24 PROCEDURE — 94761 N-INVAS EAR/PLS OXIMETRY MLT: CPT

## 2024-09-24 PROCEDURE — 2580000003 HC RX 258: Performed by: HEALTH CARE PROVIDER

## 2024-09-24 PROCEDURE — 85025 COMPLETE CBC W/AUTO DIFF WBC: CPT

## 2024-09-24 PROCEDURE — 2580000003 HC RX 258: Performed by: INTERNAL MEDICINE

## 2024-09-24 PROCEDURE — 6370000000 HC RX 637 (ALT 250 FOR IP): Performed by: STUDENT IN AN ORGANIZED HEALTH CARE EDUCATION/TRAINING PROGRAM

## 2024-09-24 PROCEDURE — 99232 SBSQ HOSP IP/OBS MODERATE 35: CPT | Performed by: NURSE PRACTITIONER

## 2024-09-24 PROCEDURE — 80048 BASIC METABOLIC PNL TOTAL CA: CPT

## 2024-09-24 PROCEDURE — 1100000000 HC RM PRIVATE

## 2024-09-24 PROCEDURE — 6370000000 HC RX 637 (ALT 250 FOR IP): Performed by: FAMILY MEDICINE

## 2024-09-24 PROCEDURE — 2700000000 HC OXYGEN THERAPY PER DAY

## 2024-09-24 PROCEDURE — 6370000000 HC RX 637 (ALT 250 FOR IP): Performed by: HEALTH CARE PROVIDER

## 2024-09-24 RX ORDER — HYDROMORPHONE HYDROCHLORIDE 4 MG/1
4 TABLET ORAL EVERY 8 HOURS
Status: DISCONTINUED | OUTPATIENT
Start: 2024-09-24 | End: 2024-09-30

## 2024-09-24 RX ORDER — IBUPROFEN 400 MG/1
800 TABLET, FILM COATED ORAL EVERY 8 HOURS
Status: COMPLETED | OUTPATIENT
Start: 2024-09-24 | End: 2024-09-27

## 2024-09-24 RX ORDER — BACLOFEN 10 MG/1
10 TABLET ORAL 3 TIMES DAILY PRN
Status: DISCONTINUED | OUTPATIENT
Start: 2024-09-24 | End: 2024-09-26

## 2024-09-24 RX ORDER — KETOROLAC TROMETHAMINE 15 MG/ML
15 INJECTION, SOLUTION INTRAMUSCULAR; INTRAVENOUS EVERY 6 HOURS PRN
Status: DISCONTINUED | OUTPATIENT
Start: 2024-09-24 | End: 2024-09-26

## 2024-09-24 RX ORDER — HYDROMORPHONE HYDROCHLORIDE 1 MG/ML
1 INJECTION, SOLUTION INTRAMUSCULAR; INTRAVENOUS; SUBCUTANEOUS ONCE
Status: COMPLETED | OUTPATIENT
Start: 2024-09-24 | End: 2024-09-24

## 2024-09-24 RX ADMIN — PANTOPRAZOLE SODIUM 40 MG: 40 TABLET, DELAYED RELEASE ORAL at 06:15

## 2024-09-24 RX ADMIN — IBUPROFEN 800 MG: 400 TABLET, FILM COATED ORAL at 17:18

## 2024-09-24 RX ADMIN — Medication: at 09:44

## 2024-09-24 RX ADMIN — SODIUM CHLORIDE, PRESERVATIVE FREE 10 ML: 5 INJECTION INTRAVENOUS at 21:00

## 2024-09-24 RX ADMIN — Medication: at 21:27

## 2024-09-24 RX ADMIN — DEXAMETHASONE 4 MG: 4 TABLET ORAL at 20:57

## 2024-09-24 RX ADMIN — FOLIC ACID 1 MG: 1 TABLET ORAL at 08:35

## 2024-09-24 RX ADMIN — DEXAMETHASONE 4 MG: 4 TABLET ORAL at 08:35

## 2024-09-24 RX ADMIN — SODIUM CHLORIDE: 9 INJECTION, SOLUTION INTRAVENOUS at 23:04

## 2024-09-24 RX ADMIN — SODIUM CHLORIDE: 9 INJECTION, SOLUTION INTRAVENOUS at 09:49

## 2024-09-24 RX ADMIN — BACLOFEN 10 MG: 10 TABLET ORAL at 20:57

## 2024-09-24 RX ADMIN — HYDROMORPHONE HYDROCHLORIDE 4 MG: 4 TABLET ORAL at 15:13

## 2024-09-24 RX ADMIN — Medication 3 MG: at 20:57

## 2024-09-24 RX ADMIN — SODIUM CHLORIDE, PRESERVATIVE FREE 10 ML: 5 INJECTION INTRAVENOUS at 08:35

## 2024-09-24 RX ADMIN — HYDROMORPHONE HYDROCHLORIDE 0.2 MG: 1 INJECTION, SOLUTION INTRAMUSCULAR; INTRAVENOUS; SUBCUTANEOUS at 11:34

## 2024-09-24 RX ADMIN — HYDROXYUREA 500 MG: 500 CAPSULE ORAL at 08:35

## 2024-09-24 RX ADMIN — SODIUM CHLORIDE, PRESERVATIVE FREE 10 ML: 5 INJECTION INTRAVENOUS at 09:49

## 2024-09-24 RX ADMIN — THERA TABS 1 TABLET: TAB at 08:35

## 2024-09-24 RX ADMIN — BACLOFEN 10 MG: 10 TABLET ORAL at 08:39

## 2024-09-24 RX ADMIN — HYDROXYUREA 500 MG: 500 CAPSULE ORAL at 20:58

## 2024-09-24 RX ADMIN — HYDROMORPHONE HYDROCHLORIDE 4 MG: 4 TABLET ORAL at 23:02

## 2024-09-24 RX ADMIN — HYDROMORPHONE HYDROCHLORIDE 1 MG: 1 INJECTION, SOLUTION INTRAMUSCULAR; INTRAVENOUS; SUBCUTANEOUS at 16:30

## 2024-09-24 RX ADMIN — HYDROMORPHONE HYDROCHLORIDE 0.25 MG: 1 INJECTION, SOLUTION INTRAMUSCULAR; INTRAVENOUS; SUBCUTANEOUS at 07:46

## 2024-09-24 ASSESSMENT — PAIN - FUNCTIONAL ASSESSMENT
PAIN_FUNCTIONAL_ASSESSMENT: PREVENTS OR INTERFERES SOME ACTIVE ACTIVITIES AND ADLS

## 2024-09-24 ASSESSMENT — PAIN DESCRIPTION - ONSET
ONSET: ON-GOING

## 2024-09-24 ASSESSMENT — PAIN DESCRIPTION - DIRECTION
RADIATING_TOWARDS: GENERALUZED
RADIATING_TOWARDS: GERALIZED
RADIATING_TOWARDS: GENERALIZED
RADIATING_TOWARDS: GENERALIZED
RADIATING_TOWARDS: GENERALISED
RADIATING_TOWARDS: GENERALIZED
RADIATING_TOWARDS: GENERALISED
RADIATING_TOWARDS: GENERALIZED
RADIATING_TOWARDS: GENERALISED
RADIATING_TOWARDS: GENERALIZED

## 2024-09-24 ASSESSMENT — PAIN SCALES - GENERAL
PAINLEVEL_OUTOF10: 7
PAINLEVEL_OUTOF10: 9
PAINLEVEL_OUTOF10: 7
PAINLEVEL_OUTOF10: 6
PAINLEVEL_OUTOF10: 4
PAINLEVEL_OUTOF10: 8
PAINLEVEL_OUTOF10: 8
PAINLEVEL_OUTOF10: 7
PAINLEVEL_OUTOF10: 7
PAINLEVEL_OUTOF10: 4
PAINLEVEL_OUTOF10: 8
PAINLEVEL_OUTOF10: 8
PAINLEVEL_OUTOF10: 10
PAINLEVEL_OUTOF10: 8
PAINLEVEL_OUTOF10: 7
PAINLEVEL_OUTOF10: 7
PAINLEVEL_OUTOF10: 8
PAINLEVEL_OUTOF10: 8
PAINLEVEL_OUTOF10: 6
PAINLEVEL_OUTOF10: 7

## 2024-09-24 ASSESSMENT — PAIN DESCRIPTION - FREQUENCY
FREQUENCY: CONTINUOUS

## 2024-09-24 ASSESSMENT — PAIN DESCRIPTION - LOCATION
LOCATION: LEG
LOCATION: LEG
LOCATION: BACK;LEG
LOCATION: LEG;BACK
LOCATION: BACK;LEG

## 2024-09-24 ASSESSMENT — PAIN DESCRIPTION - PAIN TYPE
TYPE: CHRONIC PAIN

## 2024-09-24 ASSESSMENT — PAIN DESCRIPTION - ORIENTATION
ORIENTATION: LEFT;LOWER;RIGHT
ORIENTATION: RIGHT;LEFT;POSTERIOR
ORIENTATION: RIGHT;LEFT;LOWER
ORIENTATION: RIGHT;LEFT;LOWER
ORIENTATION: LEFT;RIGHT;LOWER
ORIENTATION: RIGHT;LEFT;LOWER
ORIENTATION: LEFT;RIGHT
ORIENTATION: RIGHT;LEFT
ORIENTATION: RIGHT;LEFT;LOWER
ORIENTATION: LEFT;RIGHT;LOWER

## 2024-09-24 ASSESSMENT — PAIN DESCRIPTION - DESCRIPTORS
DESCRIPTORS: ACHING

## 2024-09-25 LAB
BASOPHILS # BLD: 0 K/UL (ref 0–0.1)
BASOPHILS NFR BLD: 0 % (ref 0–2)
DIFFERENTIAL METHOD BLD: ABNORMAL
EOSINOPHIL # BLD: 0 K/UL (ref 0–0.4)
EOSINOPHIL NFR BLD: 0 % (ref 0–5)
ERYTHROCYTE [DISTWIDTH] IN BLOOD BY AUTOMATED COUNT: 23.6 % (ref 11.6–14.5)
HCT VFR BLD AUTO: 25.3 % (ref 36–48)
HGB BLD-MCNC: 8.4 G/DL (ref 13–16)
IMM GRANULOCYTES # BLD AUTO: 0 K/UL (ref 0–0.04)
IMM GRANULOCYTES NFR BLD AUTO: 0 % (ref 0–0.5)
LYMPHOCYTES # BLD: 3.9 K/UL (ref 0.9–3.6)
LYMPHOCYTES NFR BLD: 16 % (ref 21–52)
MCH RBC QN AUTO: 25.3 PG (ref 24–34)
MCHC RBC AUTO-ENTMCNC: 33.2 G/DL (ref 31–37)
MCV RBC AUTO: 76.2 FL (ref 78–100)
MONOCYTES # BLD: 2.2 K/UL (ref 0.05–1.2)
MONOCYTES NFR BLD: 9 % (ref 3–10)
NEUTS SEG # BLD: 18.5 K/UL (ref 1.8–8)
NEUTS SEG NFR BLD: 75 % (ref 40–73)
NRBC # BLD: 2.12 K/UL (ref 0–0.01)
NRBC BLD-RTO: 8.6 PER 100 WBC
PLATELET # BLD AUTO: 300 K/UL (ref 135–420)
PMV BLD AUTO: 9.8 FL (ref 9.2–11.8)
RBC # BLD AUTO: 3.32 M/UL (ref 4.35–5.65)
RBC MORPH BLD: ABNORMAL
WBC # BLD AUTO: 24.6 K/UL (ref 4.6–13.2)

## 2024-09-25 PROCEDURE — 6370000000 HC RX 637 (ALT 250 FOR IP): Performed by: HEALTH CARE PROVIDER

## 2024-09-25 PROCEDURE — 99232 SBSQ HOSP IP/OBS MODERATE 35: CPT

## 2024-09-25 PROCEDURE — 6370000000 HC RX 637 (ALT 250 FOR IP): Performed by: STUDENT IN AN ORGANIZED HEALTH CARE EDUCATION/TRAINING PROGRAM

## 2024-09-25 PROCEDURE — 36415 COLL VENOUS BLD VENIPUNCTURE: CPT

## 2024-09-25 PROCEDURE — 99232 SBSQ HOSP IP/OBS MODERATE 35: CPT | Performed by: STUDENT IN AN ORGANIZED HEALTH CARE EDUCATION/TRAINING PROGRAM

## 2024-09-25 PROCEDURE — 6360000002 HC RX W HCPCS: Performed by: INTERNAL MEDICINE

## 2024-09-25 PROCEDURE — 2580000003 HC RX 258: Performed by: HEALTH CARE PROVIDER

## 2024-09-25 PROCEDURE — 94761 N-INVAS EAR/PLS OXIMETRY MLT: CPT

## 2024-09-25 PROCEDURE — 6360000002 HC RX W HCPCS: Performed by: STUDENT IN AN ORGANIZED HEALTH CARE EDUCATION/TRAINING PROGRAM

## 2024-09-25 PROCEDURE — 2580000003 HC RX 258: Performed by: STUDENT IN AN ORGANIZED HEALTH CARE EDUCATION/TRAINING PROGRAM

## 2024-09-25 PROCEDURE — 6370000000 HC RX 637 (ALT 250 FOR IP): Performed by: FAMILY MEDICINE

## 2024-09-25 PROCEDURE — 2580000003 HC RX 258: Performed by: FAMILY MEDICINE

## 2024-09-25 PROCEDURE — 1100000000 HC RM PRIVATE

## 2024-09-25 PROCEDURE — 85025 COMPLETE CBC W/AUTO DIFF WBC: CPT

## 2024-09-25 PROCEDURE — 2580000003 HC RX 258: Performed by: PHYSICIAN ASSISTANT

## 2024-09-25 RX ADMIN — DEXAMETHASONE 4 MG: 4 TABLET ORAL at 08:51

## 2024-09-25 RX ADMIN — IBUPROFEN 800 MG: 400 TABLET, FILM COATED ORAL at 08:50

## 2024-09-25 RX ADMIN — SODIUM CHLORIDE: 9 INJECTION, SOLUTION INTRAVENOUS at 04:44

## 2024-09-25 RX ADMIN — SODIUM CHLORIDE, PRESERVATIVE FREE 10 ML: 5 INJECTION INTRAVENOUS at 20:43

## 2024-09-25 RX ADMIN — SODIUM CHLORIDE, PRESERVATIVE FREE 10 ML: 5 INJECTION INTRAVENOUS at 09:04

## 2024-09-25 RX ADMIN — DEXAMETHASONE 4 MG: 4 TABLET ORAL at 20:40

## 2024-09-25 RX ADMIN — HYDROMORPHONE HYDROCHLORIDE 4 MG: 4 TABLET ORAL at 14:48

## 2024-09-25 RX ADMIN — THERA TABS 1 TABLET: TAB at 08:51

## 2024-09-25 RX ADMIN — SODIUM CHLORIDE: 9 INJECTION, SOLUTION INTRAVENOUS at 20:44

## 2024-09-25 RX ADMIN — HYDROXYUREA 500 MG: 500 CAPSULE ORAL at 09:02

## 2024-09-25 RX ADMIN — HYDROMORPHONE HYDROCHLORIDE 4 MG: 4 TABLET ORAL at 07:07

## 2024-09-25 RX ADMIN — HYDROXYUREA 500 MG: 500 CAPSULE ORAL at 20:40

## 2024-09-25 RX ADMIN — SODIUM CHLORIDE: 9 INJECTION, SOLUTION INTRAVENOUS at 12:31

## 2024-09-25 RX ADMIN — HYDROMORPHONE HYDROCHLORIDE 4 MG: 4 TABLET ORAL at 23:05

## 2024-09-25 RX ADMIN — SODIUM CHLORIDE, PRESERVATIVE FREE 10 ML: 5 INJECTION INTRAVENOUS at 08:52

## 2024-09-25 RX ADMIN — FOLIC ACID 1 MG: 1 TABLET ORAL at 08:51

## 2024-09-25 RX ADMIN — IBUPROFEN 800 MG: 400 TABLET, FILM COATED ORAL at 01:09

## 2024-09-25 RX ADMIN — HYDROMORPHONE HYDROCHLORIDE 0.5 MG: 1 INJECTION, SOLUTION INTRAMUSCULAR; INTRAVENOUS; SUBCUTANEOUS at 00:12

## 2024-09-25 RX ADMIN — PANTOPRAZOLE SODIUM 40 MG: 40 TABLET, DELAYED RELEASE ORAL at 07:08

## 2024-09-25 RX ADMIN — IBUPROFEN 800 MG: 400 TABLET, FILM COATED ORAL at 16:56

## 2024-09-25 RX ADMIN — Medication: at 20:49

## 2024-09-25 RX ADMIN — Medication: at 08:52

## 2024-09-25 RX ADMIN — KETOROLAC TROMETHAMINE 15 MG: 15 INJECTION, SOLUTION INTRAMUSCULAR; INTRAVENOUS at 04:42

## 2024-09-25 ASSESSMENT — PAIN SCALES - GENERAL
PAINLEVEL_OUTOF10: 4
PAINLEVEL_OUTOF10: 5
PAINLEVEL_OUTOF10: 6
PAINLEVEL_OUTOF10: 7
PAINLEVEL_OUTOF10: 5
PAINLEVEL_OUTOF10: 4
PAINLEVEL_OUTOF10: 5
PAINLEVEL_OUTOF10: 7
PAINLEVEL_OUTOF10: 5
PAINLEVEL_OUTOF10: 6

## 2024-09-25 ASSESSMENT — PAIN DESCRIPTION - ORIENTATION
ORIENTATION: RIGHT;LEFT;LOWER
ORIENTATION: RIGHT;LEFT;LOWER
ORIENTATION: RIGHT;LEFT
ORIENTATION: LEFT;RIGHT;LOWER
ORIENTATION: RIGHT;LEFT;LOWER

## 2024-09-25 ASSESSMENT — PAIN DESCRIPTION - ONSET
ONSET: ON-GOING

## 2024-09-25 ASSESSMENT — PAIN DESCRIPTION - PAIN TYPE
TYPE: CHRONIC PAIN

## 2024-09-25 ASSESSMENT — PAIN - FUNCTIONAL ASSESSMENT
PAIN_FUNCTIONAL_ASSESSMENT: PREVENTS OR INTERFERES SOME ACTIVE ACTIVITIES AND ADLS
PAIN_FUNCTIONAL_ASSESSMENT: ACTIVITIES ARE NOT PREVENTED
PAIN_FUNCTIONAL_ASSESSMENT: PREVENTS OR INTERFERES SOME ACTIVE ACTIVITIES AND ADLS
PAIN_FUNCTIONAL_ASSESSMENT: ACTIVITIES ARE NOT PREVENTED
PAIN_FUNCTIONAL_ASSESSMENT: PREVENTS OR INTERFERES SOME ACTIVE ACTIVITIES AND ADLS

## 2024-09-25 ASSESSMENT — PAIN DESCRIPTION - FREQUENCY
FREQUENCY: CONTINUOUS

## 2024-09-25 ASSESSMENT — PAIN DESCRIPTION - LOCATION
LOCATION: LEG;BACK
LOCATION: BACK;LEG
LOCATION: LEG;BACK
LOCATION: LEG;BACK
LOCATION: BACK;LEG
LOCATION: BACK;LEG
LOCATION: LEG;BACK

## 2024-09-25 ASSESSMENT — PAIN DESCRIPTION - DESCRIPTORS
DESCRIPTORS: ACHING

## 2024-09-25 NOTE — CARE COORDINATION
Discharge Barrier:  Sickle Cell Crisis/Pain control  Planned Disposition: Home with family support  Anticipated Discharge Date: 9/27/4  Current LOS:11          Patient not ready for discharge. Per MD, \"likely stable for discharge home on 9/27 once pain is controlled:. Currently on PCA pump. Plan is home with family support when ready.                          Odette Andujar, MSN, RN  Care Manager    [place] [address]  Office: [phone]  Fax: [phone]

## 2024-09-25 NOTE — ACP (ADVANCE CARE PLANNING)
Advance Care Planning     Palliative Team Advance Care Planning (ACP) Conversation    Date of Conversation: 09/25/24       ACP documents on file prior to discussion:  -None    Healthcare Decision Maker:  No formal health care decision maker on file.     Conversation Summary:    Follow up visit made to patient along with Palliative team member ESTEBAN De Leon NP. Patient is resting quietly in bed. States his pain is \"better\" today, rates at a 5/10. Shared that he did not sleep well last night, but has been resting all morning. Appetite is good, drinking fluids well.   No changes made to medication regimen.    Palliative team remains available to provide support to Mr. Galan and his family during this hospital stay.    Resuscitation Status:   Code Status: Full Code     Documentation Completed:  -No new documents completed.    Maude Rainey RN  Palliative Medicine Inpatient RN  Palliative COPE Line: [phone]

## 2024-09-26 LAB
BASOPHILS # BLD: 0 K/UL (ref 0–0.1)
BASOPHILS NFR BLD: 0 % (ref 0–2)
DIFFERENTIAL METHOD BLD: ABNORMAL
EOSINOPHIL # BLD: 0 K/UL (ref 0–0.4)
EOSINOPHIL NFR BLD: 0 % (ref 0–5)
ERYTHROCYTE [DISTWIDTH] IN BLOOD BY AUTOMATED COUNT: 23.5 % (ref 11.6–14.5)
HCT VFR BLD AUTO: 22.2 % (ref 36–48)
HGB BLD-MCNC: 7.5 G/DL (ref 13–16)
IMM GRANULOCYTES # BLD AUTO: 0 K/UL (ref 0–0.04)
IMM GRANULOCYTES NFR BLD AUTO: 0 % (ref 0–0.5)
LYMPHOCYTES # BLD: 2.8 K/UL (ref 0.9–3.6)
LYMPHOCYTES NFR BLD: 13 % (ref 21–52)
MCH RBC QN AUTO: 25.5 PG (ref 24–34)
MCHC RBC AUTO-ENTMCNC: 33.8 G/DL (ref 31–37)
MCV RBC AUTO: 75.5 FL (ref 78–100)
MONOCYTES # BLD: 0.2 K/UL (ref 0.05–1.2)
MONOCYTES NFR BLD: 1 % (ref 3–10)
NEUTS SEG # BLD: 18.2 K/UL (ref 1.8–8)
NEUTS SEG NFR BLD: 86 % (ref 40–73)
NRBC # BLD: 2.31 K/UL (ref 0–0.01)
NRBC BLD-RTO: 10.9 PER 100 WBC
PLATELET # BLD AUTO: 254 K/UL (ref 135–420)
PLATELET COMMENT: ABNORMAL
PMV BLD AUTO: 9.6 FL (ref 9.2–11.8)
RBC # BLD AUTO: 2.94 M/UL (ref 4.35–5.65)
RBC MORPH BLD: ABNORMAL
WBC # BLD AUTO: 21.2 K/UL (ref 4.6–13.2)

## 2024-09-26 PROCEDURE — 6370000000 HC RX 637 (ALT 250 FOR IP): Performed by: FAMILY MEDICINE

## 2024-09-26 PROCEDURE — 94761 N-INVAS EAR/PLS OXIMETRY MLT: CPT

## 2024-09-26 PROCEDURE — 85025 COMPLETE CBC W/AUTO DIFF WBC: CPT

## 2024-09-26 PROCEDURE — 2580000003 HC RX 258: Performed by: PHYSICIAN ASSISTANT

## 2024-09-26 PROCEDURE — 99232 SBSQ HOSP IP/OBS MODERATE 35: CPT | Performed by: STUDENT IN AN ORGANIZED HEALTH CARE EDUCATION/TRAINING PROGRAM

## 2024-09-26 PROCEDURE — 2580000003 HC RX 258: Performed by: STUDENT IN AN ORGANIZED HEALTH CARE EDUCATION/TRAINING PROGRAM

## 2024-09-26 PROCEDURE — 6360000002 HC RX W HCPCS: Performed by: STUDENT IN AN ORGANIZED HEALTH CARE EDUCATION/TRAINING PROGRAM

## 2024-09-26 PROCEDURE — 6370000000 HC RX 637 (ALT 250 FOR IP): Performed by: HEALTH CARE PROVIDER

## 2024-09-26 PROCEDURE — 2700000000 HC OXYGEN THERAPY PER DAY

## 2024-09-26 PROCEDURE — 2580000003 HC RX 258: Performed by: HEALTH CARE PROVIDER

## 2024-09-26 PROCEDURE — 99232 SBSQ HOSP IP/OBS MODERATE 35: CPT | Performed by: NURSE PRACTITIONER

## 2024-09-26 PROCEDURE — 6370000000 HC RX 637 (ALT 250 FOR IP): Performed by: STUDENT IN AN ORGANIZED HEALTH CARE EDUCATION/TRAINING PROGRAM

## 2024-09-26 PROCEDURE — 1100000000 HC RM PRIVATE

## 2024-09-26 PROCEDURE — 36592 COLLECT BLOOD FROM PICC: CPT

## 2024-09-26 PROCEDURE — 2580000003 HC RX 258: Performed by: FAMILY MEDICINE

## 2024-09-26 RX ORDER — BACLOFEN 10 MG/1
10 TABLET ORAL 3 TIMES DAILY
Status: COMPLETED | OUTPATIENT
Start: 2024-09-26 | End: 2024-09-28

## 2024-09-26 RX ORDER — CHLORPROMAZINE HYDROCHLORIDE 25 MG/1
25 TABLET, FILM COATED ORAL 3 TIMES DAILY
Status: DISCONTINUED | OUTPATIENT
Start: 2024-09-26 | End: 2024-09-26

## 2024-09-26 RX ADMIN — BACLOFEN 10 MG: 10 TABLET ORAL at 17:31

## 2024-09-26 RX ADMIN — IBUPROFEN 800 MG: 400 TABLET, FILM COATED ORAL at 17:31

## 2024-09-26 RX ADMIN — IBUPROFEN 800 MG: 400 TABLET, FILM COATED ORAL at 08:52

## 2024-09-26 RX ADMIN — SODIUM CHLORIDE, PRESERVATIVE FREE 10 ML: 5 INJECTION INTRAVENOUS at 22:11

## 2024-09-26 RX ADMIN — Medication: at 09:28

## 2024-09-26 RX ADMIN — SODIUM CHLORIDE: 9 INJECTION, SOLUTION INTRAVENOUS at 04:49

## 2024-09-26 RX ADMIN — HYDROMORPHONE HYDROCHLORIDE 4 MG: 4 TABLET ORAL at 06:56

## 2024-09-26 RX ADMIN — SODIUM CHLORIDE: 9 INJECTION, SOLUTION INTRAVENOUS at 22:08

## 2024-09-26 RX ADMIN — PANTOPRAZOLE SODIUM 40 MG: 40 TABLET, DELAYED RELEASE ORAL at 06:56

## 2024-09-26 RX ADMIN — BACLOFEN 10 MG: 10 TABLET ORAL at 22:08

## 2024-09-26 RX ADMIN — HYDROMORPHONE HYDROCHLORIDE 4 MG: 4 TABLET ORAL at 15:09

## 2024-09-26 RX ADMIN — Medication: at 18:34

## 2024-09-26 RX ADMIN — FOLIC ACID 1 MG: 1 TABLET ORAL at 08:53

## 2024-09-26 RX ADMIN — THERA TABS 1 TABLET: TAB at 08:54

## 2024-09-26 RX ADMIN — SODIUM CHLORIDE: 9 INJECTION, SOLUTION INTRAVENOUS at 12:54

## 2024-09-26 RX ADMIN — SODIUM CHLORIDE, PRESERVATIVE FREE 10 ML: 5 INJECTION INTRAVENOUS at 22:10

## 2024-09-26 RX ADMIN — HYDROXYUREA 500 MG: 500 CAPSULE ORAL at 08:55

## 2024-09-26 RX ADMIN — HYDROXYUREA 500 MG: 500 CAPSULE ORAL at 22:09

## 2024-09-26 RX ADMIN — HYDROMORPHONE HYDROCHLORIDE 4 MG: 4 TABLET ORAL at 23:51

## 2024-09-26 RX ADMIN — IBUPROFEN 800 MG: 400 TABLET, FILM COATED ORAL at 01:06

## 2024-09-26 ASSESSMENT — PAIN DESCRIPTION - LOCATION
LOCATION: BACK;LEG

## 2024-09-26 ASSESSMENT — PAIN DESCRIPTION - ONSET
ONSET: ON-GOING

## 2024-09-26 ASSESSMENT — PAIN DESCRIPTION - ORIENTATION
ORIENTATION: RIGHT;LEFT;POSTERIOR
ORIENTATION: RIGHT;LEFT;ANTERIOR;POSTERIOR
ORIENTATION: RIGHT;LEFT;ANTERIOR;POSTERIOR
ORIENTATION: RIGHT;LEFT
ORIENTATION: RIGHT;LEFT;POSTERIOR

## 2024-09-26 ASSESSMENT — PAIN DESCRIPTION - FREQUENCY
FREQUENCY: CONTINUOUS

## 2024-09-26 ASSESSMENT — PAIN DESCRIPTION - DESCRIPTORS
DESCRIPTORS: ACHING

## 2024-09-26 ASSESSMENT — PAIN SCALES - GENERAL
PAINLEVEL_OUTOF10: 5
PAINLEVEL_OUTOF10: 7
PAINLEVEL_OUTOF10: 7
PAINLEVEL_OUTOF10: 5
PAINLEVEL_OUTOF10: 5
PAINLEVEL_OUTOF10: 6
PAINLEVEL_OUTOF10: 5
PAINLEVEL_OUTOF10: 7
PAINLEVEL_OUTOF10: 7
PAINLEVEL_OUTOF10: 5

## 2024-09-26 ASSESSMENT — PAIN DESCRIPTION - PAIN TYPE
TYPE: CHRONIC PAIN

## 2024-09-27 ENCOUNTER — APPOINTMENT (OUTPATIENT)
Facility: HOSPITAL | Age: 44
DRG: 812 | End: 2024-09-27
Payer: MEDICARE

## 2024-09-27 LAB
ANION GAP SERPL CALC-SCNC: 7 MMOL/L (ref 3–18)
BASOPHILS # BLD: 0 K/UL (ref 0–0.1)
BASOPHILS NFR BLD: 0 % (ref 0–2)
BUN SERPL-MCNC: 14 MG/DL (ref 7–18)
BUN/CREAT SERPL: 16 (ref 12–20)
CALCIUM SERPL-MCNC: 8.8 MG/DL (ref 8.5–10.1)
CHLORIDE SERPL-SCNC: 104 MMOL/L (ref 100–111)
CO2 SERPL-SCNC: 23 MMOL/L (ref 21–32)
CREAT SERPL-MCNC: 0.86 MG/DL (ref 0.6–1.3)
DIFFERENTIAL METHOD BLD: ABNORMAL
EOSINOPHIL # BLD: 0 K/UL (ref 0–0.4)
EOSINOPHIL NFR BLD: 0 % (ref 0–5)
ERYTHROCYTE [DISTWIDTH] IN BLOOD BY AUTOMATED COUNT: 24.2 % (ref 11.6–14.5)
GLUCOSE SERPL-MCNC: 111 MG/DL (ref 74–99)
HCT VFR BLD AUTO: 21.9 % (ref 36–48)
HGB BLD-MCNC: 7.6 G/DL (ref 13–16)
HISTORY CHECK: NORMAL
IMM GRANULOCYTES # BLD AUTO: 0 K/UL
IMM GRANULOCYTES NFR BLD AUTO: 0 %
LYMPHOCYTES # BLD: 2.5 K/UL (ref 0.9–3.6)
LYMPHOCYTES NFR BLD: 9 % (ref 21–52)
MCH RBC QN AUTO: 26.4 PG (ref 24–34)
MCHC RBC AUTO-ENTMCNC: 34.7 G/DL (ref 31–37)
MCV RBC AUTO: 76 FL (ref 78–100)
MONOCYTES # BLD: 2.2 K/UL (ref 0.05–1.2)
MONOCYTES NFR BLD: 8 % (ref 3–10)
NEUTS SEG # BLD: 23.2 K/UL (ref 1.8–8)
NEUTS SEG NFR BLD: 83 % (ref 40–73)
NRBC # BLD: 6.93 K/UL (ref 0–0.01)
NRBC BLD-RTO: 24.9 PER 100 WBC
PLATELET # BLD AUTO: 264 K/UL (ref 135–420)
PLATELET COMMENT: ABNORMAL
PMV BLD AUTO: 9.3 FL (ref 9.2–11.8)
POTASSIUM SERPL-SCNC: 4.2 MMOL/L (ref 3.5–5.5)
RBC # BLD AUTO: 2.88 M/UL (ref 4.35–5.65)
RBC MORPH BLD: ABNORMAL
SODIUM SERPL-SCNC: 134 MMOL/L (ref 136–145)
WBC # BLD AUTO: 27.9 K/UL (ref 4.6–13.2)

## 2024-09-27 PROCEDURE — 99232 SBSQ HOSP IP/OBS MODERATE 35: CPT | Performed by: STUDENT IN AN ORGANIZED HEALTH CARE EDUCATION/TRAINING PROGRAM

## 2024-09-27 PROCEDURE — 86850 RBC ANTIBODY SCREEN: CPT

## 2024-09-27 PROCEDURE — 6360000002 HC RX W HCPCS: Performed by: STUDENT IN AN ORGANIZED HEALTH CARE EDUCATION/TRAINING PROGRAM

## 2024-09-27 PROCEDURE — 2580000003 HC RX 258: Performed by: STUDENT IN AN ORGANIZED HEALTH CARE EDUCATION/TRAINING PROGRAM

## 2024-09-27 PROCEDURE — 2700000000 HC OXYGEN THERAPY PER DAY

## 2024-09-27 PROCEDURE — 6360000002 HC RX W HCPCS: Performed by: INTERNAL MEDICINE

## 2024-09-27 PROCEDURE — 2580000003 HC RX 258: Performed by: PHYSICIAN ASSISTANT

## 2024-09-27 PROCEDURE — 86901 BLOOD TYPING SEROLOGIC RH(D): CPT

## 2024-09-27 PROCEDURE — 6360000002 HC RX W HCPCS: Performed by: SURGERY

## 2024-09-27 PROCEDURE — 6370000000 HC RX 637 (ALT 250 FOR IP): Performed by: STUDENT IN AN ORGANIZED HEALTH CARE EDUCATION/TRAINING PROGRAM

## 2024-09-27 PROCEDURE — 6370000000 HC RX 637 (ALT 250 FOR IP): Performed by: HEALTH CARE PROVIDER

## 2024-09-27 PROCEDURE — 94761 N-INVAS EAR/PLS OXIMETRY MLT: CPT

## 2024-09-27 PROCEDURE — 86902 BLOOD TYPE ANTIGEN DONOR EA: CPT

## 2024-09-27 PROCEDURE — 80048 BASIC METABOLIC PNL TOTAL CA: CPT

## 2024-09-27 PROCEDURE — 2580000003 HC RX 258: Performed by: INTERNAL MEDICINE

## 2024-09-27 PROCEDURE — 86923 COMPATIBILITY TEST ELECTRIC: CPT

## 2024-09-27 PROCEDURE — 30233N1 TRANSFUSION OF NONAUTOLOGOUS RED BLOOD CELLS INTO PERIPHERAL VEIN, PERCUTANEOUS APPROACH: ICD-10-PCS | Performed by: INTERNAL MEDICINE

## 2024-09-27 PROCEDURE — 02HV33Z INSERTION OF INFUSION DEVICE INTO SUPERIOR VENA CAVA, PERCUTANEOUS APPROACH: ICD-10-PCS | Performed by: SURGERY

## 2024-09-27 PROCEDURE — 36415 COLL VENOUS BLD VENIPUNCTURE: CPT

## 2024-09-27 PROCEDURE — 2580000003 HC RX 258: Performed by: HEALTH CARE PROVIDER

## 2024-09-27 PROCEDURE — 85660 RBC SICKLE CELL TEST: CPT

## 2024-09-27 PROCEDURE — 36556 INSERT NON-TUNNEL CV CATH: CPT

## 2024-09-27 PROCEDURE — 6370000000 HC RX 637 (ALT 250 FOR IP): Performed by: FAMILY MEDICINE

## 2024-09-27 PROCEDURE — 71045 X-RAY EXAM CHEST 1 VIEW: CPT

## 2024-09-27 PROCEDURE — 1100000000 HC RM PRIVATE

## 2024-09-27 PROCEDURE — 2500000003 HC RX 250 WO HCPCS: Performed by: SURGERY

## 2024-09-27 PROCEDURE — 99232 SBSQ HOSP IP/OBS MODERATE 35: CPT

## 2024-09-27 PROCEDURE — 86900 BLOOD TYPING SEROLOGIC ABO: CPT

## 2024-09-27 PROCEDURE — 6360000002 HC RX W HCPCS: Performed by: HOSPITALIST

## 2024-09-27 PROCEDURE — 85025 COMPLETE CBC W/AUTO DIFF WBC: CPT

## 2024-09-27 RX ORDER — HYDROMORPHONE HYDROCHLORIDE 1 MG/ML
1 INJECTION, SOLUTION INTRAMUSCULAR; INTRAVENOUS; SUBCUTANEOUS ONCE
Status: COMPLETED | OUTPATIENT
Start: 2024-09-27 | End: 2024-09-27

## 2024-09-27 RX ORDER — KETOROLAC TROMETHAMINE 15 MG/ML
15 INJECTION, SOLUTION INTRAMUSCULAR; INTRAVENOUS ONCE
Status: COMPLETED | OUTPATIENT
Start: 2024-09-27 | End: 2024-09-27

## 2024-09-27 RX ORDER — DIPHENHYDRAMINE HYDROCHLORIDE 50 MG/ML
25 INJECTION INTRAMUSCULAR; INTRAVENOUS
OUTPATIENT
Start: 2024-09-27 | End: 2024-09-28

## 2024-09-27 RX ORDER — ACETAMINOPHEN 325 MG/1
650 TABLET ORAL EVERY 4 HOURS PRN
Status: DISCONTINUED | OUTPATIENT
Start: 2024-09-27 | End: 2024-10-11 | Stop reason: HOSPADM

## 2024-09-27 RX ORDER — HEPARIN SODIUM (PORCINE) LOCK FLUSH IV SOLN 100 UNIT/ML 100 UNIT/ML
1000 SOLUTION INTRAVENOUS PRN
OUTPATIENT
Start: 2024-09-27

## 2024-09-27 RX ORDER — LIDOCAINE HYDROCHLORIDE 10 MG/ML
5 INJECTION, SOLUTION EPIDURAL; INFILTRATION; INTRACAUDAL; PERINEURAL ONCE
Status: COMPLETED | OUTPATIENT
Start: 2024-09-27 | End: 2024-09-27

## 2024-09-27 RX ORDER — CALCIUM GLUCONATE 20 MG/ML
1000 INJECTION, SOLUTION INTRAVENOUS
Status: ACTIVE | OUTPATIENT
Start: 2024-09-27 | End: 2024-09-28

## 2024-09-27 RX ORDER — HEPARIN SODIUM 1000 [USP'U]/ML
1000 INJECTION, SOLUTION INTRAVENOUS; SUBCUTANEOUS ONCE
Status: COMPLETED | OUTPATIENT
Start: 2024-09-27 | End: 2024-09-27

## 2024-09-27 RX ORDER — HEPARIN SODIUM 1000 [USP'U]/ML
1000 INJECTION, SOLUTION INTRAVENOUS; SUBCUTANEOUS ONCE
OUTPATIENT
Start: 2024-09-27 | End: 2024-09-27

## 2024-09-27 RX ORDER — SODIUM CHLORIDE 9 MG/ML
INJECTION, SOLUTION INTRAVENOUS PRN
Status: DISCONTINUED | OUTPATIENT
Start: 2024-09-27 | End: 2024-09-30

## 2024-09-27 RX ADMIN — BACLOFEN 10 MG: 10 TABLET ORAL at 22:05

## 2024-09-27 RX ADMIN — BACLOFEN 10 MG: 10 TABLET ORAL at 08:31

## 2024-09-27 RX ADMIN — SODIUM CHLORIDE, PRESERVATIVE FREE 10 ML: 5 INJECTION INTRAVENOUS at 08:33

## 2024-09-27 RX ADMIN — HEPARIN SODIUM 1000 UNITS: 1000 INJECTION INTRAVENOUS; SUBCUTANEOUS at 11:19

## 2024-09-27 RX ADMIN — HYDROMORPHONE HYDROCHLORIDE 4 MG: 4 TABLET ORAL at 13:54

## 2024-09-27 RX ADMIN — Medication: at 14:13

## 2024-09-27 RX ADMIN — HYDROXYUREA 500 MG: 500 CAPSULE ORAL at 22:05

## 2024-09-27 RX ADMIN — FOLIC ACID 1 MG: 1 TABLET ORAL at 08:31

## 2024-09-27 RX ADMIN — HYDROMORPHONE HYDROCHLORIDE 0.25 MG: 1 INJECTION, SOLUTION INTRAMUSCULAR; INTRAVENOUS; SUBCUTANEOUS at 08:22

## 2024-09-27 RX ADMIN — PANTOPRAZOLE SODIUM 40 MG: 40 TABLET, DELAYED RELEASE ORAL at 08:10

## 2024-09-27 RX ADMIN — HYDROMORPHONE HYDROCHLORIDE 0.25 MG: 1 INJECTION, SOLUTION INTRAMUSCULAR; INTRAVENOUS; SUBCUTANEOUS at 14:50

## 2024-09-27 RX ADMIN — SODIUM CHLORIDE, PRESERVATIVE FREE 10 ML: 5 INJECTION INTRAVENOUS at 22:14

## 2024-09-27 RX ADMIN — HYDROMORPHONE HYDROCHLORIDE 4 MG: 4 TABLET ORAL at 08:10

## 2024-09-27 RX ADMIN — BACLOFEN 10 MG: 10 TABLET ORAL at 12:37

## 2024-09-27 RX ADMIN — KETOROLAC TROMETHAMINE 15 MG: 15 INJECTION, SOLUTION INTRAMUSCULAR; INTRAVENOUS at 10:08

## 2024-09-27 RX ADMIN — HYDROMORPHONE HYDROCHLORIDE 1 MG: 1 INJECTION, SOLUTION INTRAMUSCULAR; INTRAVENOUS; SUBCUTANEOUS at 03:01

## 2024-09-27 RX ADMIN — IBUPROFEN 800 MG: 400 TABLET, FILM COATED ORAL at 02:13

## 2024-09-27 RX ADMIN — HYDROMORPHONE HYDROCHLORIDE 4 MG: 4 TABLET ORAL at 22:05

## 2024-09-27 RX ADMIN — HYDROXYUREA 500 MG: 500 CAPSULE ORAL at 08:37

## 2024-09-27 RX ADMIN — SODIUM CHLORIDE, PRESERVATIVE FREE 10 ML: 5 INJECTION INTRAVENOUS at 22:15

## 2024-09-27 RX ADMIN — THERA TABS 1 TABLET: TAB at 08:28

## 2024-09-27 RX ADMIN — SODIUM CHLORIDE: 9 INJECTION, SOLUTION INTRAVENOUS at 10:14

## 2024-09-27 RX ADMIN — Medication: at 23:39

## 2024-09-27 RX ADMIN — IBUPROFEN 800 MG: 400 TABLET, FILM COATED ORAL at 08:28

## 2024-09-27 RX ADMIN — LIDOCAINE HYDROCHLORIDE 5 ML: 10 INJECTION, SOLUTION EPIDURAL; INFILTRATION; INTRACAUDAL; PERINEURAL at 10:37

## 2024-09-27 RX ADMIN — SODIUM CHLORIDE: 9 INJECTION, SOLUTION INTRAVENOUS at 22:13

## 2024-09-27 RX ADMIN — Medication: at 03:45

## 2024-09-27 ASSESSMENT — PAIN DESCRIPTION - PAIN TYPE
TYPE: CHRONIC PAIN

## 2024-09-27 ASSESSMENT — PAIN SCALES - GENERAL
PAINLEVEL_OUTOF10: 7
PAINLEVEL_OUTOF10: 7
PAINLEVEL_OUTOF10: 8
PAINLEVEL_OUTOF10: 7
PAINLEVEL_OUTOF10: 8
PAINLEVEL_OUTOF10: 7
PAINLEVEL_OUTOF10: 8
PAINLEVEL_OUTOF10: 8
PAINLEVEL_OUTOF10: 7
PAINLEVEL_OUTOF10: 8
PAINLEVEL_OUTOF10: 7
PAINLEVEL_OUTOF10: 7
PAINLEVEL_OUTOF10: 9
PAINLEVEL_OUTOF10: 7
PAINLEVEL_OUTOF10: 7
PAINLEVEL_OUTOF10: 8
PAINLEVEL_OUTOF10: 6
PAINLEVEL_OUTOF10: 9
PAINLEVEL_OUTOF10: 9
PAINLEVEL_OUTOF10: 6
PAINLEVEL_OUTOF10: 8

## 2024-09-27 ASSESSMENT — PAIN DESCRIPTION - LOCATION
LOCATION: BACK;LEG
LOCATION: LEG;BACK
LOCATION: BACK;LEG

## 2024-09-27 ASSESSMENT — PAIN - FUNCTIONAL ASSESSMENT
PAIN_FUNCTIONAL_ASSESSMENT: PREVENTS OR INTERFERES SOME ACTIVE ACTIVITIES AND ADLS
PAIN_FUNCTIONAL_ASSESSMENT: PREVENTS OR INTERFERES SOME ACTIVE ACTIVITIES AND ADLS
PAIN_FUNCTIONAL_ASSESSMENT: PREVENTS OR INTERFERES WITH MANY ACTIVE NOT PASSIVE ACTIVITIES
PAIN_FUNCTIONAL_ASSESSMENT: PREVENTS OR INTERFERES SOME ACTIVE ACTIVITIES AND ADLS

## 2024-09-27 ASSESSMENT — PAIN DESCRIPTION - ONSET
ONSET: ON-GOING

## 2024-09-27 ASSESSMENT — PAIN DESCRIPTION - DESCRIPTORS
DESCRIPTORS: ACHING

## 2024-09-27 ASSESSMENT — PAIN DESCRIPTION - ORIENTATION
ORIENTATION: RIGHT;LEFT;ANTERIOR;POSTERIOR
ORIENTATION: RIGHT;LEFT;POSTERIOR
ORIENTATION: RIGHT;LEFT;ANTERIOR;POSTERIOR
ORIENTATION: RIGHT;LEFT;ANTERIOR;POSTERIOR
ORIENTATION: RIGHT;LEFT
ORIENTATION: RIGHT;LEFT;POSTERIOR

## 2024-09-27 ASSESSMENT — PAIN DESCRIPTION - FREQUENCY
FREQUENCY: CONTINUOUS

## 2024-09-27 NOTE — ACP (ADVANCE CARE PLANNING)
Advance Care Planning     Palliative Team Advance Care Planning (ACP) Conversation    Date of Conversation: 09/27/24     ACP documents on file prior to discussion:  -None    Healthcare Decision Maker:  No formal health care decision maker on file.     Conversation Summary:    Follow up visit made to patient along with Palliative team member ESTEBAN De Leon NP. Patient is lying in bed, does not appear comfortable, is restless. States he is having pain \"all over\", rating it a 7/10. States it was 9-10 earlier this morning. Oxygen remains in use.   Patient is aware of Dr. Chaparro's treatment plan for red cell exchange today. He remains in agreement. States he just wants to feel better.  Provided support, turned lights down and allowed patient to rest.    Palliative team remains available to provide support to Mr. Galan and his family during this hospital stay.    Resuscitation Status:   Code Status: Full Code     Documentation Completed:  -No new documents completed.    Maude Rainey RN  Palliative Medicine Inpatient RN  Palliative COPE Line: [phone]

## 2024-09-27 NOTE — PROCEDURES
PROCEDURE NOTE  Date: 9/27/2024   Name: Norma Galan  YOB: 1980    Patient: Norma Camejo  Patient ID: [id number]    Date of Procedure: September 27th 2024    Pre-op diagnosis: sickle cell crisis  Post-op diagnosis: sickle cell crisis    Surgeon:  CDR MARLON Torres Jr., M.D., Attending Vascular Surgery    Anesthesia: none    EBL: negligible    Anticoagulation: the catheter ports were packed with heparin at a concentration of 1000 units per CC    Antibiotics: none    Transfusions: none    Complications: none    Condition at the end of the procedure: good      Procedure:   insertion of a right transjugular non tunneled dialysis catheter    Procedure in detail:   the patient is a 44 year old male in urgent need of dialysis apheresis for sickle cell crisis. Urgent vascular surgery consultation was obtained for the insertion of a non tunneled, temporary dialysis catheter.   The risks and benefits of the procedure were discussed in detail with the patient. Risks discussed with the patient included, but were not limited to, infection, pneumothorax, and cardiac arrhythmia. Informed consent was obtained for insertion of the dialysis catheter.   The patient was prepped and draped in the normal sterile fashion in trendelenburg position in his room on the pressley. Under 1% lidocaine local anesthesia, a small gauge needle was used to localize the right internal jugular vein just lateral to the common carotid artery, about midway up the neck. An 18 gauge needle was then used to access the same vein, being lined up with the prior 22 gauge needle. A 0.035 inch diameter Foster guidewire was then inserted through the 18 gauge needle into the central veins of the chest. Both needles were removed. A small nick was made in the skin at the site of the Bentson guidewire. The insertion site track was dilated with two over-the -maryse dilators. The three port dialysis catheter itself was then inserted into the

## 2024-09-28 PROCEDURE — 2580000003 HC RX 258: Performed by: STUDENT IN AN ORGANIZED HEALTH CARE EDUCATION/TRAINING PROGRAM

## 2024-09-28 PROCEDURE — 2580000003 HC RX 258: Performed by: HEALTH CARE PROVIDER

## 2024-09-28 PROCEDURE — 6370000000 HC RX 637 (ALT 250 FOR IP): Performed by: STUDENT IN AN ORGANIZED HEALTH CARE EDUCATION/TRAINING PROGRAM

## 2024-09-28 PROCEDURE — 99232 SBSQ HOSP IP/OBS MODERATE 35: CPT | Performed by: EMERGENCY MEDICINE

## 2024-09-28 PROCEDURE — 1100000000 HC RM PRIVATE

## 2024-09-28 PROCEDURE — P9016 RBC LEUKOCYTES REDUCED: HCPCS

## 2024-09-28 PROCEDURE — 6370000000 HC RX 637 (ALT 250 FOR IP): Performed by: HEALTH CARE PROVIDER

## 2024-09-28 PROCEDURE — 6360000002 HC RX W HCPCS: Performed by: STUDENT IN AN ORGANIZED HEALTH CARE EDUCATION/TRAINING PROGRAM

## 2024-09-28 PROCEDURE — 6360000002 HC RX W HCPCS: Performed by: INTERNAL MEDICINE

## 2024-09-28 PROCEDURE — 2580000003 HC RX 258: Performed by: PHYSICIAN ASSISTANT

## 2024-09-28 PROCEDURE — 36430 TRANSFUSION BLD/BLD COMPNT: CPT

## 2024-09-28 PROCEDURE — 94761 N-INVAS EAR/PLS OXIMETRY MLT: CPT

## 2024-09-28 PROCEDURE — 6360000002 HC RX W HCPCS: Performed by: EMERGENCY MEDICINE

## 2024-09-28 PROCEDURE — 2700000000 HC OXYGEN THERAPY PER DAY

## 2024-09-28 PROCEDURE — 6370000000 HC RX 637 (ALT 250 FOR IP): Performed by: FAMILY MEDICINE

## 2024-09-28 RX ORDER — HYDROMORPHONE HYDROCHLORIDE 1 MG/ML
1 INJECTION, SOLUTION INTRAMUSCULAR; INTRAVENOUS; SUBCUTANEOUS ONCE
Status: COMPLETED | OUTPATIENT
Start: 2024-09-28 | End: 2024-09-28

## 2024-09-28 RX ORDER — ENOXAPARIN SODIUM 100 MG/ML
40 INJECTION SUBCUTANEOUS DAILY
Status: DISCONTINUED | OUTPATIENT
Start: 2024-09-29 | End: 2024-10-11 | Stop reason: HOSPADM

## 2024-09-28 RX ORDER — HYDROMORPHONE HYDROCHLORIDE 1 MG/ML
1 INJECTION, SOLUTION INTRAMUSCULAR; INTRAVENOUS; SUBCUTANEOUS EVERY 4 HOURS PRN
Status: DISCONTINUED | OUTPATIENT
Start: 2024-09-28 | End: 2024-10-09

## 2024-09-28 RX ADMIN — FOLIC ACID 1 MG: 1 TABLET ORAL at 08:22

## 2024-09-28 RX ADMIN — HYDROMORPHONE HYDROCHLORIDE 4 MG: 4 TABLET ORAL at 15:23

## 2024-09-28 RX ADMIN — DIPHENHYDRAMINE HYDROCHLORIDE 25 MG: 25 CAPSULE ORAL at 09:32

## 2024-09-28 RX ADMIN — Medication: at 19:17

## 2024-09-28 RX ADMIN — HYDROXYUREA 500 MG: 500 CAPSULE ORAL at 21:09

## 2024-09-28 RX ADMIN — SENNOSIDES 8.6 MG: 8.6 TABLET, FILM COATED ORAL at 08:22

## 2024-09-28 RX ADMIN — PANTOPRAZOLE SODIUM 40 MG: 40 TABLET, DELAYED RELEASE ORAL at 06:43

## 2024-09-28 RX ADMIN — HYDROMORPHONE HYDROCHLORIDE 1 MG: 1 INJECTION, SOLUTION INTRAMUSCULAR; INTRAVENOUS; SUBCUTANEOUS at 06:38

## 2024-09-28 RX ADMIN — SODIUM CHLORIDE, PRESERVATIVE FREE 10 ML: 5 INJECTION INTRAVENOUS at 08:22

## 2024-09-28 RX ADMIN — BACLOFEN 10 MG: 10 TABLET ORAL at 09:32

## 2024-09-28 RX ADMIN — HYDROMORPHONE HYDROCHLORIDE 4 MG: 4 TABLET ORAL at 08:22

## 2024-09-28 RX ADMIN — SODIUM CHLORIDE, PRESERVATIVE FREE 10 ML: 5 INJECTION INTRAVENOUS at 21:14

## 2024-09-28 RX ADMIN — HYDROMORPHONE HYDROCHLORIDE 1 MG: 1 INJECTION, SOLUTION INTRAMUSCULAR; INTRAVENOUS; SUBCUTANEOUS at 10:32

## 2024-09-28 RX ADMIN — HYDROMORPHONE HYDROCHLORIDE 1 MG: 1 INJECTION, SOLUTION INTRAMUSCULAR; INTRAVENOUS; SUBCUTANEOUS at 14:26

## 2024-09-28 RX ADMIN — Medication: at 10:34

## 2024-09-28 RX ADMIN — HYDROMORPHONE HYDROCHLORIDE 1 MG: 1 INJECTION, SOLUTION INTRAMUSCULAR; INTRAVENOUS; SUBCUTANEOUS at 18:27

## 2024-09-28 RX ADMIN — SODIUM CHLORIDE: 9 INJECTION, SOLUTION INTRAVENOUS at 10:41

## 2024-09-28 RX ADMIN — ACETAMINOPHEN 325MG 650 MG: 325 TABLET ORAL at 09:32

## 2024-09-28 RX ADMIN — SODIUM CHLORIDE: 9 INJECTION, SOLUTION INTRAVENOUS at 21:09

## 2024-09-28 RX ADMIN — HYDROXYUREA 500 MG: 500 CAPSULE ORAL at 08:22

## 2024-09-28 RX ADMIN — HYDROMORPHONE HYDROCHLORIDE 4 MG: 4 TABLET ORAL at 23:09

## 2024-09-28 RX ADMIN — THERA TABS 1 TABLET: TAB at 08:22

## 2024-09-28 RX ADMIN — HYDROMORPHONE HYDROCHLORIDE 1 MG: 1 INJECTION, SOLUTION INTRAMUSCULAR; INTRAVENOUS; SUBCUTANEOUS at 02:31

## 2024-09-28 ASSESSMENT — PAIN DESCRIPTION - ORIENTATION
ORIENTATION: RIGHT;LEFT;LOWER
ORIENTATION: RIGHT;LEFT;ANTERIOR;POSTERIOR
ORIENTATION: RIGHT;LEFT;ANTERIOR;POSTERIOR

## 2024-09-28 ASSESSMENT — PAIN DESCRIPTION - ONSET
ONSET: ON-GOING

## 2024-09-28 ASSESSMENT — PAIN SCALES - GENERAL
PAINLEVEL_OUTOF10: 9
PAINLEVEL_OUTOF10: 6
PAINLEVEL_OUTOF10: 10
PAINLEVEL_OUTOF10: 9
PAINLEVEL_OUTOF10: 7
PAINLEVEL_OUTOF10: 8
PAINLEVEL_OUTOF10: 7
PAINLEVEL_OUTOF10: 8
PAINLEVEL_OUTOF10: 6
PAINLEVEL_OUTOF10: 10
PAINLEVEL_OUTOF10: 7
PAINLEVEL_OUTOF10: 8
PAINLEVEL_OUTOF10: 9
PAINLEVEL_OUTOF10: 8
PAINLEVEL_OUTOF10: 7
PAINLEVEL_OUTOF10: 8
PAINLEVEL_OUTOF10: 7

## 2024-09-28 ASSESSMENT — PAIN DESCRIPTION - FREQUENCY
FREQUENCY: CONTINUOUS

## 2024-09-28 ASSESSMENT — PAIN - FUNCTIONAL ASSESSMENT
PAIN_FUNCTIONAL_ASSESSMENT: PREVENTS OR INTERFERES SOME ACTIVE ACTIVITIES AND ADLS
PAIN_FUNCTIONAL_ASSESSMENT: PREVENTS OR INTERFERES WITH MANY ACTIVE NOT PASSIVE ACTIVITIES

## 2024-09-28 ASSESSMENT — PAIN DESCRIPTION - LOCATION
LOCATION: GENERALIZED
LOCATION: BACK;LEG
LOCATION: GENERALIZED
LOCATION: BACK;LEG
LOCATION: BACK;LEG
LOCATION: GENERALIZED

## 2024-09-28 ASSESSMENT — PAIN DESCRIPTION - DESCRIPTORS
DESCRIPTORS: ACHING

## 2024-09-28 ASSESSMENT — PAIN DESCRIPTION - PAIN TYPE
TYPE: CHRONIC PAIN

## 2024-09-28 NOTE — CONSENT
Informed Consent for Blood Component Transfusion Note    I have discussed with the patient the rationale for blood component transfusion; its benefits in treating or preventing fatigue, organ damage, or death; and its risk which includes mild transfusion reactions, rare risk of blood borne infection, or more serious but rare reactions. I have discussed the alternatives to transfusion, including the risk and consequences of not receiving transfusion. The patient had an opportunity to ask questions and had agreed to proceed with transfusion of blood components.    Electronically signed by Hernandez Solis MD on 9/28/24 at 1:04 PM EDT

## 2024-09-29 LAB
ABO + RH BLD: NORMAL
ANTIGENS PRESENT RBC DONR: NORMAL
BASOPHILS # BLD: 0 K/UL (ref 0–0.1)
BASOPHILS NFR BLD: 0 % (ref 0–2)
BLD PROD TYP BPU: NORMAL
BLOOD BANK BLOOD PRODUCT EXPIRATION DATE: NORMAL
BLOOD BANK CMNT PATIENT-IMP: NORMAL
BLOOD BANK DISPENSE STATUS: NORMAL
BLOOD BANK ISBT PRODUCT BLOOD TYPE: 5100
BLOOD BANK ISBT PRODUCT BLOOD TYPE: 9500
BLOOD BANK ISBT PRODUCT BLOOD TYPE: 9500
BLOOD BANK PRODUCT CODE: NORMAL
BLOOD BANK UNIT TYPE AND RH: NORMAL
BLOOD GROUP ANTIBODIES SERPL: NORMAL
BPU ID: NORMAL
CALLED TO: NORMAL
CROSSMATCH RESULT: NORMAL
DIFFERENTIAL METHOD BLD: ABNORMAL
EOSINOPHIL # BLD: 0 K/UL (ref 0–0.4)
EOSINOPHIL NFR BLD: 0 % (ref 0–5)
ERYTHROCYTE [DISTWIDTH] IN BLOOD BY AUTOMATED COUNT: 20.5 % (ref 11.6–14.5)
HCT VFR BLD AUTO: 26.8 % (ref 36–48)
HGB BLD-MCNC: 9.5 G/DL (ref 13–16)
IMM GRANULOCYTES # BLD AUTO: 0 K/UL (ref 0–0.04)
IMM GRANULOCYTES NFR BLD AUTO: 0 % (ref 0–0.5)
LYMPHOCYTES # BLD: 2.7 K/UL (ref 0.9–3.6)
LYMPHOCYTES NFR BLD: 11 % (ref 21–52)
MCH RBC QN AUTO: 29 PG (ref 24–34)
MCHC RBC AUTO-ENTMCNC: 35.4 G/DL (ref 31–37)
MCV RBC AUTO: 81.7 FL (ref 78–100)
MONOCYTES # BLD: 0.7 K/UL (ref 0.05–1.2)
MONOCYTES NFR BLD: 3 % (ref 3–10)
NEUTS SEG # BLD: 20.9 K/UL (ref 1.8–8)
NEUTS SEG NFR BLD: 86 % (ref 40–73)
NRBC # BLD: 6.15 K/UL (ref 0–0.01)
NRBC BLD-RTO: 25.4 PER 100 WBC
PLATELET # BLD AUTO: 189 K/UL (ref 135–420)
PLATELET COMMENT: ABNORMAL
PMV BLD AUTO: 9.7 FL (ref 9.2–11.8)
RBC # BLD AUTO: 3.28 M/UL (ref 4.35–5.65)
RBC MORPH BLD: ABNORMAL
SPECIMEN EXP DATE BLD: NORMAL
UNIT DIVISION: 0
UNIT ISSUE DATE/TIME: NORMAL
WBC # BLD AUTO: 24.3 K/UL (ref 4.6–13.2)

## 2024-09-29 PROCEDURE — 2580000003 HC RX 258: Performed by: HEALTH CARE PROVIDER

## 2024-09-29 PROCEDURE — 85025 COMPLETE CBC W/AUTO DIFF WBC: CPT

## 2024-09-29 PROCEDURE — 36592 COLLECT BLOOD FROM PICC: CPT

## 2024-09-29 PROCEDURE — 1100000000 HC RM PRIVATE

## 2024-09-29 PROCEDURE — 6370000000 HC RX 637 (ALT 250 FOR IP): Performed by: HEALTH CARE PROVIDER

## 2024-09-29 PROCEDURE — 2580000003 HC RX 258: Performed by: PHYSICIAN ASSISTANT

## 2024-09-29 PROCEDURE — 6370000000 HC RX 637 (ALT 250 FOR IP): Performed by: STUDENT IN AN ORGANIZED HEALTH CARE EDUCATION/TRAINING PROGRAM

## 2024-09-29 PROCEDURE — 2580000003 HC RX 258: Performed by: STUDENT IN AN ORGANIZED HEALTH CARE EDUCATION/TRAINING PROGRAM

## 2024-09-29 PROCEDURE — 6360000002 HC RX W HCPCS: Performed by: INTERNAL MEDICINE

## 2024-09-29 PROCEDURE — 94761 N-INVAS EAR/PLS OXIMETRY MLT: CPT

## 2024-09-29 PROCEDURE — 99232 SBSQ HOSP IP/OBS MODERATE 35: CPT | Performed by: EMERGENCY MEDICINE

## 2024-09-29 PROCEDURE — 2700000000 HC OXYGEN THERAPY PER DAY

## 2024-09-29 PROCEDURE — 6370000000 HC RX 637 (ALT 250 FOR IP): Performed by: FAMILY MEDICINE

## 2024-09-29 PROCEDURE — 6360000002 HC RX W HCPCS: Performed by: EMERGENCY MEDICINE

## 2024-09-29 PROCEDURE — 6360000002 HC RX W HCPCS: Performed by: STUDENT IN AN ORGANIZED HEALTH CARE EDUCATION/TRAINING PROGRAM

## 2024-09-29 RX ADMIN — HYDROMORPHONE HYDROCHLORIDE 1 MG: 1 INJECTION, SOLUTION INTRAMUSCULAR; INTRAVENOUS; SUBCUTANEOUS at 00:06

## 2024-09-29 RX ADMIN — Medication: at 21:47

## 2024-09-29 RX ADMIN — HYDROXYUREA 500 MG: 500 CAPSULE ORAL at 21:40

## 2024-09-29 RX ADMIN — SODIUM CHLORIDE, PRESERVATIVE FREE 10 ML: 5 INJECTION INTRAVENOUS at 21:35

## 2024-09-29 RX ADMIN — HYDROMORPHONE HYDROCHLORIDE 1 MG: 1 INJECTION, SOLUTION INTRAMUSCULAR; INTRAVENOUS; SUBCUTANEOUS at 04:06

## 2024-09-29 RX ADMIN — PANTOPRAZOLE SODIUM 40 MG: 40 TABLET, DELAYED RELEASE ORAL at 06:48

## 2024-09-29 RX ADMIN — Medication: at 05:01

## 2024-09-29 RX ADMIN — HYDROMORPHONE HYDROCHLORIDE 1 MG: 1 INJECTION, SOLUTION INTRAMUSCULAR; INTRAVENOUS; SUBCUTANEOUS at 17:03

## 2024-09-29 RX ADMIN — Medication: at 14:08

## 2024-09-29 RX ADMIN — SODIUM CHLORIDE: 9 INJECTION, SOLUTION INTRAVENOUS at 06:48

## 2024-09-29 RX ADMIN — ENOXAPARIN SODIUM 40 MG: 100 INJECTION SUBCUTANEOUS at 08:59

## 2024-09-29 RX ADMIN — HYDROMORPHONE HYDROCHLORIDE 4 MG: 4 TABLET ORAL at 06:48

## 2024-09-29 RX ADMIN — HYDROXYUREA 500 MG: 500 CAPSULE ORAL at 09:08

## 2024-09-29 RX ADMIN — HYDROMORPHONE HYDROCHLORIDE 4 MG: 4 TABLET ORAL at 14:15

## 2024-09-29 RX ADMIN — SODIUM CHLORIDE: 9 INJECTION, SOLUTION INTRAVENOUS at 17:00

## 2024-09-29 RX ADMIN — FOLIC ACID 1 MG: 1 TABLET ORAL at 09:01

## 2024-09-29 RX ADMIN — THERA TABS 1 TABLET: TAB at 09:01

## 2024-09-29 RX ADMIN — HYDROMORPHONE HYDROCHLORIDE 1 MG: 1 INJECTION, SOLUTION INTRAMUSCULAR; INTRAVENOUS; SUBCUTANEOUS at 12:46

## 2024-09-29 RX ADMIN — HYDROMORPHONE HYDROCHLORIDE 1 MG: 1 INJECTION, SOLUTION INTRAMUSCULAR; INTRAVENOUS; SUBCUTANEOUS at 21:36

## 2024-09-29 RX ADMIN — HYDROMORPHONE HYDROCHLORIDE 1 MG: 1 INJECTION, SOLUTION INTRAMUSCULAR; INTRAVENOUS; SUBCUTANEOUS at 09:00

## 2024-09-29 RX ADMIN — HYDROMORPHONE HYDROCHLORIDE 4 MG: 4 TABLET ORAL at 23:13

## 2024-09-29 RX ADMIN — SODIUM CHLORIDE, PRESERVATIVE FREE 10 ML: 5 INJECTION INTRAVENOUS at 08:59

## 2024-09-29 ASSESSMENT — PAIN DESCRIPTION - LOCATION
LOCATION: BACK;LEG
LOCATION: BACK

## 2024-09-29 ASSESSMENT — PAIN SCALES - GENERAL
PAINLEVEL_OUTOF10: 7
PAINLEVEL_OUTOF10: 5
PAINLEVEL_OUTOF10: 6
PAINLEVEL_OUTOF10: 5
PAINLEVEL_OUTOF10: 5
PAINLEVEL_OUTOF10: 7
PAINLEVEL_OUTOF10: 7
PAINLEVEL_OUTOF10: 5
PAINLEVEL_OUTOF10: 7
PAINLEVEL_OUTOF10: 4
PAINLEVEL_OUTOF10: 7
PAINLEVEL_OUTOF10: 7
PAINLEVEL_OUTOF10: 5

## 2024-09-29 ASSESSMENT — PAIN DESCRIPTION - DESCRIPTORS
DESCRIPTORS: ACHING

## 2024-09-29 ASSESSMENT — PAIN DESCRIPTION - ORIENTATION
ORIENTATION: RIGHT;LEFT;LOWER
ORIENTATION: RIGHT;LEFT;LOWER
ORIENTATION: LOWER
ORIENTATION: LOWER
ORIENTATION: RIGHT;LEFT;LOWER
ORIENTATION: LOWER

## 2024-09-29 ASSESSMENT — PAIN - FUNCTIONAL ASSESSMENT
PAIN_FUNCTIONAL_ASSESSMENT: ACTIVITIES ARE NOT PREVENTED
PAIN_FUNCTIONAL_ASSESSMENT: PREVENTS OR INTERFERES WITH MANY ACTIVE NOT PASSIVE ACTIVITIES
PAIN_FUNCTIONAL_ASSESSMENT: PREVENTS OR INTERFERES WITH MANY ACTIVE NOT PASSIVE ACTIVITIES

## 2024-09-30 LAB
ANION GAP SERPL CALC-SCNC: 8 MMOL/L (ref 3–18)
BASOPHILS # BLD: 0 K/UL (ref 0–0.1)
BASOPHILS NFR BLD: 0 % (ref 0–2)
BUN SERPL-MCNC: 11 MG/DL (ref 7–18)
BUN/CREAT SERPL: 16 (ref 12–20)
CALCIUM SERPL-MCNC: 8.9 MG/DL (ref 8.5–10.1)
CHLORIDE SERPL-SCNC: 96 MMOL/L (ref 100–111)
CO2 SERPL-SCNC: 25 MMOL/L (ref 21–32)
CREAT SERPL-MCNC: 0.68 MG/DL (ref 0.6–1.3)
DIFFERENTIAL METHOD BLD: ABNORMAL
EOSINOPHIL # BLD: 0 K/UL (ref 0–0.4)
EOSINOPHIL NFR BLD: 0 % (ref 0–5)
ERYTHROCYTE [DISTWIDTH] IN BLOOD BY AUTOMATED COUNT: 21 % (ref 11.6–14.5)
GLUCOSE SERPL-MCNC: 118 MG/DL (ref 74–99)
HCT VFR BLD AUTO: 27.1 % (ref 36–48)
HGB BLD-MCNC: 9.1 G/DL (ref 13–16)
IMM GRANULOCYTES # BLD AUTO: 0 K/UL (ref 0–0.04)
IMM GRANULOCYTES NFR BLD AUTO: 0 % (ref 0–0.5)
LYMPHOCYTES # BLD: 6.5 K/UL (ref 0.9–3.6)
LYMPHOCYTES NFR BLD: 28 % (ref 21–52)
MCH RBC QN AUTO: 27.9 PG (ref 24–34)
MCHC RBC AUTO-ENTMCNC: 33.6 G/DL (ref 31–37)
MCV RBC AUTO: 83.1 FL (ref 78–100)
MONOCYTES # BLD: 0.7 K/UL (ref 0.05–1.2)
MONOCYTES NFR BLD: 3 % (ref 3–10)
NEUTS SEG # BLD: 16.1 K/UL (ref 1.8–8)
NEUTS SEG NFR BLD: 69 % (ref 40–73)
NRBC # BLD: 6.43 K/UL (ref 0–0.01)
NRBC BLD-RTO: 27.6 PER 100 WBC
PLATELET # BLD AUTO: 203 K/UL (ref 135–420)
PLATELET COMMENT: ABNORMAL
PMV BLD AUTO: 9.9 FL (ref 9.2–11.8)
POTASSIUM SERPL-SCNC: 4 MMOL/L (ref 3.5–5.5)
RBC # BLD AUTO: 3.26 M/UL (ref 4.35–5.65)
RBC MORPH BLD: ABNORMAL
RBC MORPH BLD: ABNORMAL
SODIUM SERPL-SCNC: 129 MMOL/L (ref 136–145)
WBC # BLD AUTO: 23.3 K/UL (ref 4.6–13.2)

## 2024-09-30 PROCEDURE — 80048 BASIC METABOLIC PNL TOTAL CA: CPT

## 2024-09-30 PROCEDURE — 6360000002 HC RX W HCPCS: Performed by: STUDENT IN AN ORGANIZED HEALTH CARE EDUCATION/TRAINING PROGRAM

## 2024-09-30 PROCEDURE — 6370000000 HC RX 637 (ALT 250 FOR IP): Performed by: STUDENT IN AN ORGANIZED HEALTH CARE EDUCATION/TRAINING PROGRAM

## 2024-09-30 PROCEDURE — 2580000003 HC RX 258: Performed by: STUDENT IN AN ORGANIZED HEALTH CARE EDUCATION/TRAINING PROGRAM

## 2024-09-30 PROCEDURE — 6360000002 HC RX W HCPCS: Performed by: INTERNAL MEDICINE

## 2024-09-30 PROCEDURE — 36592 COLLECT BLOOD FROM PICC: CPT

## 2024-09-30 PROCEDURE — 6370000000 HC RX 637 (ALT 250 FOR IP): Performed by: HEALTH CARE PROVIDER

## 2024-09-30 PROCEDURE — 6370000000 HC RX 637 (ALT 250 FOR IP): Performed by: INTERNAL MEDICINE

## 2024-09-30 PROCEDURE — 2700000000 HC OXYGEN THERAPY PER DAY

## 2024-09-30 PROCEDURE — 85025 COMPLETE CBC W/AUTO DIFF WBC: CPT

## 2024-09-30 PROCEDURE — 2580000003 HC RX 258: Performed by: PHYSICIAN ASSISTANT

## 2024-09-30 PROCEDURE — 6370000000 HC RX 637 (ALT 250 FOR IP): Performed by: FAMILY MEDICINE

## 2024-09-30 PROCEDURE — 2580000003 HC RX 258: Performed by: HEALTH CARE PROVIDER

## 2024-09-30 PROCEDURE — 1100000000 HC RM PRIVATE

## 2024-09-30 PROCEDURE — 99232 SBSQ HOSP IP/OBS MODERATE 35: CPT | Performed by: HOSPITALIST

## 2024-09-30 PROCEDURE — 6360000002 HC RX W HCPCS: Performed by: EMERGENCY MEDICINE

## 2024-09-30 PROCEDURE — 94760 N-INVAS EAR/PLS OXIMETRY 1: CPT

## 2024-09-30 RX ORDER — HYDROMORPHONE HYDROCHLORIDE 4 MG/1
4 TABLET ORAL EVERY 6 HOURS
Status: DISCONTINUED | OUTPATIENT
Start: 2024-09-30 | End: 2024-10-03

## 2024-09-30 RX ADMIN — HYDROMORPHONE HYDROCHLORIDE 1 MG: 1 INJECTION, SOLUTION INTRAMUSCULAR; INTRAVENOUS; SUBCUTANEOUS at 09:36

## 2024-09-30 RX ADMIN — FOLIC ACID 1 MG: 1 TABLET ORAL at 08:48

## 2024-09-30 RX ADMIN — SODIUM CHLORIDE, PRESERVATIVE FREE 10 ML: 5 INJECTION INTRAVENOUS at 21:13

## 2024-09-30 RX ADMIN — HYDROMORPHONE HYDROCHLORIDE 1 MG: 1 INJECTION, SOLUTION INTRAMUSCULAR; INTRAVENOUS; SUBCUTANEOUS at 23:41

## 2024-09-30 RX ADMIN — SODIUM CHLORIDE, PRESERVATIVE FREE 10 ML: 5 INJECTION INTRAVENOUS at 21:15

## 2024-09-30 RX ADMIN — PANTOPRAZOLE SODIUM 40 MG: 40 TABLET, DELAYED RELEASE ORAL at 07:33

## 2024-09-30 RX ADMIN — HYDROXYUREA 500 MG: 500 CAPSULE ORAL at 08:54

## 2024-09-30 RX ADMIN — Medication: at 21:01

## 2024-09-30 RX ADMIN — SODIUM CHLORIDE, PRESERVATIVE FREE 10 ML: 5 INJECTION INTRAVENOUS at 08:49

## 2024-09-30 RX ADMIN — SODIUM CHLORIDE: 9 INJECTION, SOLUTION INTRAVENOUS at 03:01

## 2024-09-30 RX ADMIN — Medication: at 05:33

## 2024-09-30 RX ADMIN — ENOXAPARIN SODIUM 40 MG: 100 INJECTION SUBCUTANEOUS at 08:48

## 2024-09-30 RX ADMIN — Medication: at 13:20

## 2024-09-30 RX ADMIN — HYDROMORPHONE HYDROCHLORIDE 4 MG: 4 TABLET ORAL at 21:12

## 2024-09-30 RX ADMIN — HYDROMORPHONE HYDROCHLORIDE 4 MG: 4 TABLET ORAL at 13:00

## 2024-09-30 RX ADMIN — SODIUM CHLORIDE: 9 INJECTION, SOLUTION INTRAVENOUS at 22:47

## 2024-09-30 RX ADMIN — HYDROMORPHONE HYDROCHLORIDE 4 MG: 4 TABLET ORAL at 07:32

## 2024-09-30 RX ADMIN — SODIUM CHLORIDE, PRESERVATIVE FREE 10 ML: 5 INJECTION INTRAVENOUS at 08:48

## 2024-09-30 RX ADMIN — HYDROXYUREA 500 MG: 500 CAPSULE ORAL at 21:12

## 2024-09-30 RX ADMIN — HYDROMORPHONE HYDROCHLORIDE 1 MG: 1 INJECTION, SOLUTION INTRAMUSCULAR; INTRAVENOUS; SUBCUTANEOUS at 14:28

## 2024-09-30 RX ADMIN — HYDROMORPHONE HYDROCHLORIDE 1 MG: 1 INJECTION, SOLUTION INTRAMUSCULAR; INTRAVENOUS; SUBCUTANEOUS at 18:52

## 2024-09-30 RX ADMIN — HYDROMORPHONE HYDROCHLORIDE 1 MG: 1 INJECTION, SOLUTION INTRAMUSCULAR; INTRAVENOUS; SUBCUTANEOUS at 05:27

## 2024-09-30 RX ADMIN — SODIUM CHLORIDE: 9 INJECTION, SOLUTION INTRAVENOUS at 12:10

## 2024-09-30 RX ADMIN — HYDROMORPHONE HYDROCHLORIDE 1 MG: 1 INJECTION, SOLUTION INTRAMUSCULAR; INTRAVENOUS; SUBCUTANEOUS at 01:32

## 2024-09-30 RX ADMIN — THERA TABS 1 TABLET: TAB at 08:48

## 2024-09-30 ASSESSMENT — PAIN DESCRIPTION - DESCRIPTORS
DESCRIPTORS: ACHING
DESCRIPTORS: ACHING;DISCOMFORT
DESCRIPTORS: ACHING

## 2024-09-30 ASSESSMENT — PAIN DESCRIPTION - ORIENTATION
ORIENTATION: LEFT;RIGHT;LOWER
ORIENTATION: RIGHT;LEFT;LOWER
ORIENTATION: LEFT;RIGHT;LOWER
ORIENTATION: RIGHT;LEFT;MID
ORIENTATION: RIGHT;LEFT;LOWER
ORIENTATION: RIGHT;LEFT;LOWER
ORIENTATION: LEFT;RIGHT;POSTERIOR
ORIENTATION: LEFT;LOWER;RIGHT
ORIENTATION: RIGHT;LEFT;MID
ORIENTATION: RIGHT;LEFT;LOWER

## 2024-09-30 ASSESSMENT — PAIN DESCRIPTION - PAIN TYPE
TYPE: CHRONIC PAIN

## 2024-09-30 ASSESSMENT — PAIN SCALES - GENERAL
PAINLEVEL_OUTOF10: 7
PAINLEVEL_OUTOF10: 5
PAINLEVEL_OUTOF10: 6
PAINLEVEL_OUTOF10: 4
PAINLEVEL_OUTOF10: 7
PAINLEVEL_OUTOF10: 6
PAINLEVEL_OUTOF10: 7
PAINLEVEL_OUTOF10: 6
PAINLEVEL_OUTOF10: 6

## 2024-09-30 ASSESSMENT — PAIN DESCRIPTION - LOCATION
LOCATION: BACK;LEG

## 2024-09-30 ASSESSMENT — PAIN DESCRIPTION - ONSET
ONSET: ON-GOING

## 2024-09-30 ASSESSMENT — PAIN DESCRIPTION - FREQUENCY
FREQUENCY: CONTINUOUS

## 2024-09-30 ASSESSMENT — PAIN - FUNCTIONAL ASSESSMENT
PAIN_FUNCTIONAL_ASSESSMENT: PREVENTS OR INTERFERES WITH ALL ACTIVE AND SOME PASSIVE ACTIVITIES
PAIN_FUNCTIONAL_ASSESSMENT: PREVENTS OR INTERFERES WITH ALL ACTIVE AND SOME PASSIVE ACTIVITIES
PAIN_FUNCTIONAL_ASSESSMENT: ACTIVITIES ARE NOT PREVENTED
PAIN_FUNCTIONAL_ASSESSMENT: PREVENTS OR INTERFERES SOME ACTIVE ACTIVITIES AND ADLS
PAIN_FUNCTIONAL_ASSESSMENT: ACTIVITIES ARE NOT PREVENTED
PAIN_FUNCTIONAL_ASSESSMENT: PREVENTS OR INTERFERES SOME ACTIVE ACTIVITIES AND ADLS
PAIN_FUNCTIONAL_ASSESSMENT: PREVENTS OR INTERFERES WITH ALL ACTIVE AND SOME PASSIVE ACTIVITIES
PAIN_FUNCTIONAL_ASSESSMENT: ACTIVITIES ARE NOT PREVENTED
PAIN_FUNCTIONAL_ASSESSMENT: PREVENTS OR INTERFERES SOME ACTIVE ACTIVITIES AND ADLS
PAIN_FUNCTIONAL_ASSESSMENT: ACTIVITIES ARE NOT PREVENTED

## 2024-09-30 NOTE — ACP (ADVANCE CARE PLANNING)
Advance Care Planning     Palliative Team Advance Care Planning (ACP) Conversation    Date of Conversation: 09/30/24     ACP documents on file prior to discussion:  -None    Healthcare Decision Maker:  No formal health care decision maker on file.     Conversation Summary:    Follow up visit made to patient. He is lying in bed, awake and alert. Wife at the bedside. States he is feeling \"better\" than last week. Endorses pain is \"constant\" and rates it as a \"5/10\". Has been able to rest more since the blood exchange last Friday 9/27/24.    Resuscitation Status:   Code Status: Full Code     Documentation Completed:  -No new documents completed.    Maude Rainey RN  Palliative Medicine Inpatient RN  Palliative COPE Line: [phone]

## 2024-10-01 PROCEDURE — 6370000000 HC RX 637 (ALT 250 FOR IP): Performed by: HEALTH CARE PROVIDER

## 2024-10-01 PROCEDURE — 6360000002 HC RX W HCPCS: Performed by: INTERNAL MEDICINE

## 2024-10-01 PROCEDURE — 2580000003 HC RX 258: Performed by: STUDENT IN AN ORGANIZED HEALTH CARE EDUCATION/TRAINING PROGRAM

## 2024-10-01 PROCEDURE — 2700000000 HC OXYGEN THERAPY PER DAY

## 2024-10-01 PROCEDURE — 99232 SBSQ HOSP IP/OBS MODERATE 35: CPT | Performed by: HOSPITALIST

## 2024-10-01 PROCEDURE — 6370000000 HC RX 637 (ALT 250 FOR IP): Performed by: STUDENT IN AN ORGANIZED HEALTH CARE EDUCATION/TRAINING PROGRAM

## 2024-10-01 PROCEDURE — 2580000003 HC RX 258: Performed by: INTERNAL MEDICINE

## 2024-10-01 PROCEDURE — 6370000000 HC RX 637 (ALT 250 FOR IP): Performed by: INTERNAL MEDICINE

## 2024-10-01 PROCEDURE — 6360000002 HC RX W HCPCS: Performed by: STUDENT IN AN ORGANIZED HEALTH CARE EDUCATION/TRAINING PROGRAM

## 2024-10-01 PROCEDURE — 2580000003 HC RX 258: Performed by: HEALTH CARE PROVIDER

## 2024-10-01 PROCEDURE — 6370000000 HC RX 637 (ALT 250 FOR IP): Performed by: FAMILY MEDICINE

## 2024-10-01 PROCEDURE — 1100000000 HC RM PRIVATE

## 2024-10-01 PROCEDURE — 2580000003 HC RX 258: Performed by: PHYSICIAN ASSISTANT

## 2024-10-01 PROCEDURE — 6360000002 HC RX W HCPCS: Performed by: EMERGENCY MEDICINE

## 2024-10-01 PROCEDURE — 94761 N-INVAS EAR/PLS OXIMETRY MLT: CPT

## 2024-10-01 RX ADMIN — HYDROMORPHONE HYDROCHLORIDE 4 MG: 4 TABLET ORAL at 03:13

## 2024-10-01 RX ADMIN — ENOXAPARIN SODIUM 40 MG: 100 INJECTION SUBCUTANEOUS at 08:56

## 2024-10-01 RX ADMIN — SODIUM CHLORIDE, PRESERVATIVE FREE 10 ML: 5 INJECTION INTRAVENOUS at 21:06

## 2024-10-01 RX ADMIN — Medication: at 10:44

## 2024-10-01 RX ADMIN — Medication: at 13:28

## 2024-10-01 RX ADMIN — HYDROMORPHONE HYDROCHLORIDE 1 MG: 1 INJECTION, SOLUTION INTRAMUSCULAR; INTRAVENOUS; SUBCUTANEOUS at 22:04

## 2024-10-01 RX ADMIN — THERA TABS 1 TABLET: TAB at 08:57

## 2024-10-01 RX ADMIN — HYDROXYUREA 500 MG: 500 CAPSULE ORAL at 15:17

## 2024-10-01 RX ADMIN — HYDROMORPHONE HYDROCHLORIDE 4 MG: 4 TABLET ORAL at 14:51

## 2024-10-01 RX ADMIN — HYDROXYUREA 500 MG: 500 CAPSULE ORAL at 21:02

## 2024-10-01 RX ADMIN — HYDROMORPHONE HYDROCHLORIDE 1 MG: 1 INJECTION, SOLUTION INTRAMUSCULAR; INTRAVENOUS; SUBCUTANEOUS at 09:14

## 2024-10-01 RX ADMIN — SODIUM CHLORIDE, PRESERVATIVE FREE 10 ML: 5 INJECTION INTRAVENOUS at 08:58

## 2024-10-01 RX ADMIN — HYDROMORPHONE HYDROCHLORIDE 1 MG: 1 INJECTION, SOLUTION INTRAMUSCULAR; INTRAVENOUS; SUBCUTANEOUS at 13:24

## 2024-10-01 RX ADMIN — SODIUM CHLORIDE, PRESERVATIVE FREE 10 ML: 5 INJECTION INTRAVENOUS at 08:59

## 2024-10-01 RX ADMIN — HYDROMORPHONE HYDROCHLORIDE 1 MG: 1 INJECTION, SOLUTION INTRAMUSCULAR; INTRAVENOUS; SUBCUTANEOUS at 04:11

## 2024-10-01 RX ADMIN — HYDROMORPHONE HYDROCHLORIDE 1 MG: 1 INJECTION, SOLUTION INTRAMUSCULAR; INTRAVENOUS; SUBCUTANEOUS at 17:18

## 2024-10-01 RX ADMIN — HYDROMORPHONE HYDROCHLORIDE 4 MG: 4 TABLET ORAL at 21:00

## 2024-10-01 RX ADMIN — FOLIC ACID 1 MG: 1 TABLET ORAL at 08:57

## 2024-10-01 RX ADMIN — Medication: at 04:55

## 2024-10-01 RX ADMIN — PANTOPRAZOLE SODIUM 40 MG: 40 TABLET, DELAYED RELEASE ORAL at 06:14

## 2024-10-01 RX ADMIN — Medication: at 22:26

## 2024-10-01 ASSESSMENT — PAIN SCALES - GENERAL
PAINLEVEL_OUTOF10: 7
PAINLEVEL_OUTOF10: 7
PAINLEVEL_OUTOF10: 6
PAINLEVEL_OUTOF10: 7
PAINLEVEL_OUTOF10: 6
PAINLEVEL_OUTOF10: 7
PAINLEVEL_OUTOF10: 6
PAINLEVEL_OUTOF10: 7

## 2024-10-01 ASSESSMENT — PAIN - FUNCTIONAL ASSESSMENT
PAIN_FUNCTIONAL_ASSESSMENT: ACTIVITIES ARE NOT PREVENTED

## 2024-10-01 ASSESSMENT — PAIN DESCRIPTION - LOCATION
LOCATION: BACK;LEG
LOCATION: BACK;LEG
LOCATION: GENERALIZED
LOCATION: BACK;LEG
LOCATION: GENERALIZED
LOCATION: BACK;LEG
LOCATION: GENERALIZED
LOCATION: GENERALIZED

## 2024-10-01 ASSESSMENT — PAIN DESCRIPTION - DESCRIPTORS
DESCRIPTORS: ACHING
DESCRIPTORS: ACHING;DISCOMFORT
DESCRIPTORS: ACHING;DISCOMFORT
DESCRIPTORS: ACHING

## 2024-10-01 ASSESSMENT — PAIN DESCRIPTION - PAIN TYPE
TYPE: CHRONIC PAIN
TYPE: ACUTE PAIN
TYPE: CHRONIC PAIN

## 2024-10-01 ASSESSMENT — PAIN DESCRIPTION - ONSET
ONSET: ON-GOING

## 2024-10-01 ASSESSMENT — PAIN DESCRIPTION - FREQUENCY
FREQUENCY: CONTINUOUS

## 2024-10-01 ASSESSMENT — PAIN DESCRIPTION - ORIENTATION
ORIENTATION: RIGHT;LEFT
ORIENTATION: RIGHT;LEFT
ORIENTATION: RIGHT;LEFT;MID
ORIENTATION: RIGHT;LEFT;MID

## 2024-10-02 LAB
BASOPHILS # BLD: 0 K/UL (ref 0–0.1)
BASOPHILS NFR BLD: 0 % (ref 0–2)
DIFFERENTIAL METHOD BLD: ABNORMAL
EOSINOPHIL # BLD: 0.2 K/UL (ref 0–0.4)
EOSINOPHIL NFR BLD: 1 % (ref 0–5)
ERYTHROCYTE [DISTWIDTH] IN BLOOD BY AUTOMATED COUNT: 21 % (ref 11.6–14.5)
HCT VFR BLD AUTO: 26.1 % (ref 36–48)
HGB BLD-MCNC: 8.8 G/DL (ref 13–16)
IMM GRANULOCYTES # BLD AUTO: 0 K/UL (ref 0–0.04)
IMM GRANULOCYTES NFR BLD AUTO: 0 % (ref 0–0.5)
LYMPHOCYTES # BLD: 2.6 K/UL (ref 0.9–3.6)
LYMPHOCYTES NFR BLD: 17 % (ref 21–52)
MCH RBC QN AUTO: 28.6 PG (ref 24–34)
MCHC RBC AUTO-ENTMCNC: 33.7 G/DL (ref 31–37)
MCV RBC AUTO: 84.7 FL (ref 78–100)
MONOCYTES # BLD: 0.5 K/UL (ref 0.05–1.2)
MONOCYTES NFR BLD: 3 % (ref 3–10)
NEUTS SEG # BLD: 12.1 K/UL (ref 1.8–8)
NEUTS SEG NFR BLD: 79 % (ref 40–73)
NRBC # BLD: 3.42 K/UL (ref 0–0.01)
NRBC BLD-RTO: 22.2 PER 100 WBC
PLATELET # BLD AUTO: 237 K/UL (ref 135–420)
PLATELET COMMENT: ABNORMAL
PMV BLD AUTO: 10.8 FL (ref 9.2–11.8)
RBC # BLD AUTO: 3.08 M/UL (ref 4.35–5.65)
RBC MORPH BLD: ABNORMAL
WBC # BLD AUTO: 15.4 K/UL (ref 4.6–13.2)

## 2024-10-02 PROCEDURE — 2580000003 HC RX 258: Performed by: HEALTH CARE PROVIDER

## 2024-10-02 PROCEDURE — 1100000000 HC RM PRIVATE

## 2024-10-02 PROCEDURE — 6360000002 HC RX W HCPCS: Performed by: INTERNAL MEDICINE

## 2024-10-02 PROCEDURE — 6370000000 HC RX 637 (ALT 250 FOR IP): Performed by: HEALTH CARE PROVIDER

## 2024-10-02 PROCEDURE — 94761 N-INVAS EAR/PLS OXIMETRY MLT: CPT

## 2024-10-02 PROCEDURE — 6370000000 HC RX 637 (ALT 250 FOR IP): Performed by: FAMILY MEDICINE

## 2024-10-02 PROCEDURE — 99232 SBSQ HOSP IP/OBS MODERATE 35: CPT | Performed by: HOSPITALIST

## 2024-10-02 PROCEDURE — 2580000003 HC RX 258: Performed by: PHYSICIAN ASSISTANT

## 2024-10-02 PROCEDURE — 2580000003 HC RX 258: Performed by: INTERNAL MEDICINE

## 2024-10-02 PROCEDURE — 6370000000 HC RX 637 (ALT 250 FOR IP): Performed by: STUDENT IN AN ORGANIZED HEALTH CARE EDUCATION/TRAINING PROGRAM

## 2024-10-02 PROCEDURE — 2700000000 HC OXYGEN THERAPY PER DAY

## 2024-10-02 PROCEDURE — 85025 COMPLETE CBC W/AUTO DIFF WBC: CPT

## 2024-10-02 PROCEDURE — 6360000002 HC RX W HCPCS: Performed by: EMERGENCY MEDICINE

## 2024-10-02 PROCEDURE — 6370000000 HC RX 637 (ALT 250 FOR IP): Performed by: INTERNAL MEDICINE

## 2024-10-02 RX ADMIN — HYDROMORPHONE HYDROCHLORIDE 4 MG: 4 TABLET ORAL at 21:13

## 2024-10-02 RX ADMIN — HYDROMORPHONE HYDROCHLORIDE 1 MG: 1 INJECTION, SOLUTION INTRAMUSCULAR; INTRAVENOUS; SUBCUTANEOUS at 16:56

## 2024-10-02 RX ADMIN — HYDROMORPHONE HYDROCHLORIDE 1 MG: 1 INJECTION, SOLUTION INTRAMUSCULAR; INTRAVENOUS; SUBCUTANEOUS at 07:05

## 2024-10-02 RX ADMIN — HYDROXYUREA 500 MG: 500 CAPSULE ORAL at 21:13

## 2024-10-02 RX ADMIN — THERA TABS 1 TABLET: TAB at 09:45

## 2024-10-02 RX ADMIN — PANTOPRAZOLE SODIUM 40 MG: 40 TABLET, DELAYED RELEASE ORAL at 05:44

## 2024-10-02 RX ADMIN — HYDROXYUREA 500 MG: 500 CAPSULE ORAL at 09:45

## 2024-10-02 RX ADMIN — HYDROMORPHONE HYDROCHLORIDE 1 MG: 1 INJECTION, SOLUTION INTRAMUSCULAR; INTRAVENOUS; SUBCUTANEOUS at 23:39

## 2024-10-02 RX ADMIN — Medication: at 07:10

## 2024-10-02 RX ADMIN — HYDROMORPHONE HYDROCHLORIDE 1 MG: 1 INJECTION, SOLUTION INTRAMUSCULAR; INTRAVENOUS; SUBCUTANEOUS at 02:27

## 2024-10-02 RX ADMIN — HYDROMORPHONE HYDROCHLORIDE 4 MG: 4 TABLET ORAL at 09:45

## 2024-10-02 RX ADMIN — SODIUM CHLORIDE, PRESERVATIVE FREE 10 ML: 5 INJECTION INTRAVENOUS at 09:49

## 2024-10-02 RX ADMIN — HYDROMORPHONE HYDROCHLORIDE 4 MG: 4 TABLET ORAL at 03:40

## 2024-10-02 RX ADMIN — SODIUM CHLORIDE, PRESERVATIVE FREE 10 ML: 5 INJECTION INTRAVENOUS at 21:14

## 2024-10-02 RX ADMIN — HYDROMORPHONE HYDROCHLORIDE 1 MG: 1 INJECTION, SOLUTION INTRAMUSCULAR; INTRAVENOUS; SUBCUTANEOUS at 11:48

## 2024-10-02 RX ADMIN — Medication: at 17:00

## 2024-10-02 RX ADMIN — FOLIC ACID 1 MG: 1 TABLET ORAL at 09:45

## 2024-10-02 RX ADMIN — HYDROMORPHONE HYDROCHLORIDE 4 MG: 4 TABLET ORAL at 15:53

## 2024-10-02 RX ADMIN — ENOXAPARIN SODIUM 40 MG: 100 INJECTION SUBCUTANEOUS at 09:45

## 2024-10-02 ASSESSMENT — PAIN DESCRIPTION - DESCRIPTORS
DESCRIPTORS: ACHING

## 2024-10-02 ASSESSMENT — PAIN DESCRIPTION - ONSET
ONSET: ON-GOING

## 2024-10-02 ASSESSMENT — PAIN SCALES - GENERAL
PAINLEVEL_OUTOF10: 7
PAINLEVEL_OUTOF10: 5
PAINLEVEL_OUTOF10: 6
PAINLEVEL_OUTOF10: 6
PAINLEVEL_OUTOF10: 7
PAINLEVEL_OUTOF10: 6
PAINLEVEL_OUTOF10: 7
PAINLEVEL_OUTOF10: 5
PAINLEVEL_OUTOF10: 7
PAINLEVEL_OUTOF10: 6
PAINLEVEL_OUTOF10: 5

## 2024-10-02 ASSESSMENT — PAIN DESCRIPTION - FREQUENCY
FREQUENCY: CONTINUOUS

## 2024-10-02 ASSESSMENT — PAIN - FUNCTIONAL ASSESSMENT

## 2024-10-02 ASSESSMENT — PAIN DESCRIPTION - LOCATION
LOCATION: BACK;LEG

## 2024-10-02 ASSESSMENT — PAIN DESCRIPTION - PAIN TYPE
TYPE: CHRONIC PAIN

## 2024-10-02 ASSESSMENT — PAIN DESCRIPTION - ORIENTATION
ORIENTATION: RIGHT;LEFT
ORIENTATION: RIGHT;LEFT;POSTERIOR
ORIENTATION: RIGHT;LEFT
ORIENTATION: LEFT;RIGHT
ORIENTATION: LEFT;RIGHT
ORIENTATION: RIGHT;LEFT
ORIENTATION: LEFT;RIGHT

## 2024-10-03 LAB
ANION GAP SERPL CALC-SCNC: 7 MMOL/L (ref 3–18)
BASOPHILS # BLD: 0 K/UL (ref 0–0.1)
BASOPHILS NFR BLD: 0 % (ref 0–2)
BUN SERPL-MCNC: 8 MG/DL (ref 7–18)
BUN/CREAT SERPL: 13 (ref 12–20)
CALCIUM SERPL-MCNC: 10.3 MG/DL (ref 8.5–10.1)
CHLORIDE SERPL-SCNC: 94 MMOL/L (ref 100–111)
CO2 SERPL-SCNC: 26 MMOL/L (ref 21–32)
CREAT SERPL-MCNC: 0.64 MG/DL (ref 0.6–1.3)
DIFFERENTIAL METHOD BLD: ABNORMAL
EOSINOPHIL # BLD: 0.1 K/UL (ref 0–0.4)
EOSINOPHIL NFR BLD: 0 % (ref 0–5)
ERYTHROCYTE [DISTWIDTH] IN BLOOD BY AUTOMATED COUNT: 20.6 % (ref 11.6–14.5)
GLUCOSE SERPL-MCNC: 109 MG/DL (ref 74–99)
HCT VFR BLD AUTO: 27.2 % (ref 36–48)
HGB BLD-MCNC: 8.7 G/DL (ref 13–16)
IMM GRANULOCYTES # BLD AUTO: 0.1 K/UL (ref 0–0.04)
IMM GRANULOCYTES NFR BLD AUTO: 1 % (ref 0–0.5)
LYMPHOCYTES # BLD: 2 K/UL (ref 0.9–3.6)
LYMPHOCYTES NFR BLD: 14 % (ref 21–52)
MCH RBC QN AUTO: 27.1 PG (ref 24–34)
MCHC RBC AUTO-ENTMCNC: 32 G/DL (ref 31–37)
MCV RBC AUTO: 84.7 FL (ref 78–100)
MONOCYTES # BLD: 1.1 K/UL (ref 0.05–1.2)
MONOCYTES NFR BLD: 8 % (ref 3–10)
NEUTS SEG # BLD: 10.8 K/UL (ref 1.8–8)
NEUTS SEG NFR BLD: 77 % (ref 40–73)
NRBC # BLD: 1.18 K/UL (ref 0–0.01)
NRBC BLD-RTO: 8.4 PER 100 WBC
PLATELET # BLD AUTO: 533 K/UL (ref 135–420)
PMV BLD AUTO: 9.7 FL (ref 9.2–11.8)
POTASSIUM SERPL-SCNC: 4 MMOL/L (ref 3.5–5.5)
RBC # BLD AUTO: 3.21 M/UL (ref 4.35–5.65)
SODIUM SERPL-SCNC: 127 MMOL/L (ref 136–145)
WBC # BLD AUTO: 14 K/UL (ref 4.6–13.2)

## 2024-10-03 PROCEDURE — 6360000002 HC RX W HCPCS: Performed by: EMERGENCY MEDICINE

## 2024-10-03 PROCEDURE — 6370000000 HC RX 637 (ALT 250 FOR IP): Performed by: STUDENT IN AN ORGANIZED HEALTH CARE EDUCATION/TRAINING PROGRAM

## 2024-10-03 PROCEDURE — 99232 SBSQ HOSP IP/OBS MODERATE 35: CPT | Performed by: HOSPITALIST

## 2024-10-03 PROCEDURE — 1100000000 HC RM PRIVATE

## 2024-10-03 PROCEDURE — 2580000003 HC RX 258: Performed by: PHYSICIAN ASSISTANT

## 2024-10-03 PROCEDURE — 6370000000 HC RX 637 (ALT 250 FOR IP): Performed by: HEALTH CARE PROVIDER

## 2024-10-03 PROCEDURE — 6370000000 HC RX 637 (ALT 250 FOR IP): Performed by: FAMILY MEDICINE

## 2024-10-03 PROCEDURE — 2580000003 HC RX 258: Performed by: INTERNAL MEDICINE

## 2024-10-03 PROCEDURE — 6360000002 HC RX W HCPCS: Performed by: INTERNAL MEDICINE

## 2024-10-03 PROCEDURE — 85025 COMPLETE CBC W/AUTO DIFF WBC: CPT

## 2024-10-03 PROCEDURE — 6370000000 HC RX 637 (ALT 250 FOR IP): Performed by: INTERNAL MEDICINE

## 2024-10-03 PROCEDURE — 2700000000 HC OXYGEN THERAPY PER DAY

## 2024-10-03 PROCEDURE — 94761 N-INVAS EAR/PLS OXIMETRY MLT: CPT

## 2024-10-03 PROCEDURE — 80048 BASIC METABOLIC PNL TOTAL CA: CPT

## 2024-10-03 RX ORDER — HYDROMORPHONE HYDROCHLORIDE 4 MG/1
4 TABLET ORAL EVERY 4 HOURS
Status: DISCONTINUED | OUTPATIENT
Start: 2024-10-03 | End: 2024-10-11 | Stop reason: HOSPADM

## 2024-10-03 RX ADMIN — PANTOPRAZOLE SODIUM 40 MG: 40 TABLET, DELAYED RELEASE ORAL at 07:20

## 2024-10-03 RX ADMIN — SODIUM CHLORIDE, PRESERVATIVE FREE 10 ML: 5 INJECTION INTRAVENOUS at 10:03

## 2024-10-03 RX ADMIN — SODIUM CHLORIDE, PRESERVATIVE FREE 10 ML: 5 INJECTION INTRAVENOUS at 21:44

## 2024-10-03 RX ADMIN — HYDROXYUREA 500 MG: 500 CAPSULE ORAL at 22:22

## 2024-10-03 RX ADMIN — ENOXAPARIN SODIUM 40 MG: 100 INJECTION SUBCUTANEOUS at 10:01

## 2024-10-03 RX ADMIN — HYDROMORPHONE HYDROCHLORIDE 4 MG: 4 TABLET ORAL at 10:00

## 2024-10-03 RX ADMIN — HYDROMORPHONE HYDROCHLORIDE 4 MG: 4 TABLET ORAL at 17:01

## 2024-10-03 RX ADMIN — Medication: at 03:21

## 2024-10-03 RX ADMIN — HYDROMORPHONE HYDROCHLORIDE 4 MG: 4 TABLET ORAL at 21:43

## 2024-10-03 RX ADMIN — SODIUM CHLORIDE: 9 INJECTION, SOLUTION INTRAVENOUS at 21:49

## 2024-10-03 RX ADMIN — HYDROMORPHONE HYDROCHLORIDE 4 MG: 4 TABLET ORAL at 11:45

## 2024-10-03 RX ADMIN — HYDROMORPHONE HYDROCHLORIDE 1 MG: 1 INJECTION, SOLUTION INTRAMUSCULAR; INTRAVENOUS; SUBCUTANEOUS at 14:36

## 2024-10-03 RX ADMIN — HYDROMORPHONE HYDROCHLORIDE 1 MG: 1 INJECTION, SOLUTION INTRAMUSCULAR; INTRAVENOUS; SUBCUTANEOUS at 04:48

## 2024-10-03 RX ADMIN — HYDROMORPHONE HYDROCHLORIDE 4 MG: 4 TABLET ORAL at 03:14

## 2024-10-03 RX ADMIN — HYDROMORPHONE HYDROCHLORIDE 1 MG: 1 INJECTION, SOLUTION INTRAMUSCULAR; INTRAVENOUS; SUBCUTANEOUS at 23:43

## 2024-10-03 RX ADMIN — FOLIC ACID 1 MG: 1 TABLET ORAL at 10:01

## 2024-10-03 RX ADMIN — HYDROMORPHONE HYDROCHLORIDE 1 MG: 1 INJECTION, SOLUTION INTRAMUSCULAR; INTRAVENOUS; SUBCUTANEOUS at 09:57

## 2024-10-03 RX ADMIN — HYDROXYUREA 500 MG: 500 CAPSULE ORAL at 10:07

## 2024-10-03 RX ADMIN — THERA TABS 1 TABLET: TAB at 10:01

## 2024-10-03 RX ADMIN — HYDROMORPHONE HYDROCHLORIDE 1 MG: 1 INJECTION, SOLUTION INTRAMUSCULAR; INTRAVENOUS; SUBCUTANEOUS at 18:55

## 2024-10-03 RX ADMIN — SODIUM CHLORIDE: 9 INJECTION, SOLUTION INTRAVENOUS at 01:41

## 2024-10-03 ASSESSMENT — PAIN DESCRIPTION - FREQUENCY
FREQUENCY: CONTINUOUS

## 2024-10-03 ASSESSMENT — PAIN DESCRIPTION - LOCATION
LOCATION: BACK;LEG

## 2024-10-03 ASSESSMENT — PAIN SCALES - GENERAL
PAINLEVEL_OUTOF10: 7
PAINLEVEL_OUTOF10: 5
PAINLEVEL_OUTOF10: 5
PAINLEVEL_OUTOF10: 7
PAINLEVEL_OUTOF10: 6
PAINLEVEL_OUTOF10: 6
PAINLEVEL_OUTOF10: 5
PAINLEVEL_OUTOF10: 7
PAINLEVEL_OUTOF10: 6
PAINLEVEL_OUTOF10: 5
PAINLEVEL_OUTOF10: 6

## 2024-10-03 ASSESSMENT — PAIN DESCRIPTION - ONSET
ONSET: ON-GOING

## 2024-10-03 ASSESSMENT — PAIN DESCRIPTION - DESCRIPTORS
DESCRIPTORS: ACHING

## 2024-10-03 ASSESSMENT — PAIN DESCRIPTION - ORIENTATION
ORIENTATION: RIGHT;LEFT
ORIENTATION: RIGHT;LEFT;ANTERIOR;POSTERIOR
ORIENTATION: RIGHT;LEFT
ORIENTATION: LEFT;RIGHT
ORIENTATION: RIGHT;LEFT
ORIENTATION: RIGHT;LEFT;ANTERIOR;POSTERIOR
ORIENTATION: RIGHT;LEFT

## 2024-10-03 ASSESSMENT — PAIN - FUNCTIONAL ASSESSMENT
PAIN_FUNCTIONAL_ASSESSMENT: PREVENTS OR INTERFERES SOME ACTIVE ACTIVITIES AND ADLS

## 2024-10-03 ASSESSMENT — PAIN DESCRIPTION - PAIN TYPE
TYPE: CHRONIC PAIN
TYPE: ACUTE PAIN;CHRONIC PAIN
TYPE: CHRONIC PAIN
TYPE: ACUTE PAIN;CHRONIC PAIN

## 2024-10-03 NOTE — CARE COORDINATION
Discharge Barrier: Pain Control. PCA Dilaudid  Planned Disposition: Home vs Outpt  Anticipated Discharge Date:10/5/24  Current LOS: 19         Patient not medically stable. Sickle Cell Crisis. Sickle Cell Crisis. Pain still not controlled. S/p red cell exchange 9/28/24. Patient is still on dilaudid PCA. Per Hemoc, “continue to titrate PCA pain meds down, Increase frequency of po scheduled dose of dilaudid to q4 h\". Transition plan is home with family support.          Odette Andujar, MSN, RN  Care Manager    [place] [address]  Office: [phone]  Fax: [phone]

## 2024-10-04 LAB
ANION GAP SERPL CALC-SCNC: 7 MMOL/L (ref 3–18)
BASOPHILS # BLD: 0 K/UL (ref 0–0.1)
BASOPHILS NFR BLD: 0 % (ref 0–2)
BUN SERPL-MCNC: 9 MG/DL (ref 7–18)
BUN/CREAT SERPL: 13 (ref 12–20)
CALCIUM SERPL-MCNC: 10.9 MG/DL (ref 8.5–10.1)
CHLORIDE SERPL-SCNC: 96 MMOL/L (ref 100–111)
CO2 SERPL-SCNC: 27 MMOL/L (ref 21–32)
CREAT SERPL-MCNC: 0.69 MG/DL (ref 0.6–1.3)
DIFFERENTIAL METHOD BLD: ABNORMAL
EOSINOPHIL # BLD: 0 K/UL (ref 0–0.4)
EOSINOPHIL NFR BLD: 0 % (ref 0–5)
ERYTHROCYTE [DISTWIDTH] IN BLOOD BY AUTOMATED COUNT: 20.9 % (ref 11.6–14.5)
GLUCOSE SERPL-MCNC: 97 MG/DL (ref 74–99)
HCT VFR BLD AUTO: 26.2 % (ref 36–48)
HGB BLD-MCNC: 8.4 G/DL (ref 13–16)
IMM GRANULOCYTES # BLD AUTO: 0.1 K/UL (ref 0–0.04)
IMM GRANULOCYTES NFR BLD AUTO: 1 % (ref 0–0.5)
LYMPHOCYTES # BLD: 1.5 K/UL (ref 0.9–3.6)
LYMPHOCYTES NFR BLD: 12 % (ref 21–52)
MCH RBC QN AUTO: 27.5 PG (ref 24–34)
MCHC RBC AUTO-ENTMCNC: 32.1 G/DL (ref 31–37)
MCV RBC AUTO: 85.9 FL (ref 78–100)
MONOCYTES # BLD: 1 K/UL (ref 0.05–1.2)
MONOCYTES NFR BLD: 8 % (ref 3–10)
NEUTS SEG # BLD: 10.1 K/UL (ref 1.8–8)
NEUTS SEG NFR BLD: 79 % (ref 40–73)
NRBC # BLD: 0.51 K/UL (ref 0–0.01)
NRBC BLD-RTO: 4 PER 100 WBC
PLATELET # BLD AUTO: 697 K/UL (ref 135–420)
PMV BLD AUTO: 10 FL (ref 9.2–11.8)
POTASSIUM SERPL-SCNC: 4.1 MMOL/L (ref 3.5–5.5)
RBC # BLD AUTO: 3.05 M/UL (ref 4.35–5.65)
SODIUM SERPL-SCNC: 130 MMOL/L (ref 136–145)
WBC # BLD AUTO: 12.7 K/UL (ref 4.6–13.2)

## 2024-10-04 PROCEDURE — 1100000000 HC RM PRIVATE

## 2024-10-04 PROCEDURE — 6370000000 HC RX 637 (ALT 250 FOR IP): Performed by: HEALTH CARE PROVIDER

## 2024-10-04 PROCEDURE — 2580000003 HC RX 258: Performed by: INTERNAL MEDICINE

## 2024-10-04 PROCEDURE — 6370000000 HC RX 637 (ALT 250 FOR IP): Performed by: INTERNAL MEDICINE

## 2024-10-04 PROCEDURE — 99232 SBSQ HOSP IP/OBS MODERATE 35: CPT | Performed by: HOSPITALIST

## 2024-10-04 PROCEDURE — 6360000002 HC RX W HCPCS: Performed by: INTERNAL MEDICINE

## 2024-10-04 PROCEDURE — 6370000000 HC RX 637 (ALT 250 FOR IP): Performed by: STUDENT IN AN ORGANIZED HEALTH CARE EDUCATION/TRAINING PROGRAM

## 2024-10-04 PROCEDURE — 2580000003 HC RX 258: Performed by: PHYSICIAN ASSISTANT

## 2024-10-04 PROCEDURE — 94761 N-INVAS EAR/PLS OXIMETRY MLT: CPT

## 2024-10-04 PROCEDURE — 6370000000 HC RX 637 (ALT 250 FOR IP): Performed by: FAMILY MEDICINE

## 2024-10-04 PROCEDURE — 80048 BASIC METABOLIC PNL TOTAL CA: CPT

## 2024-10-04 PROCEDURE — 85025 COMPLETE CBC W/AUTO DIFF WBC: CPT

## 2024-10-04 PROCEDURE — 2700000000 HC OXYGEN THERAPY PER DAY

## 2024-10-04 PROCEDURE — 6360000002 HC RX W HCPCS: Performed by: EMERGENCY MEDICINE

## 2024-10-04 RX ADMIN — SODIUM CHLORIDE, PRESERVATIVE FREE 10 ML: 5 INJECTION INTRAVENOUS at 08:53

## 2024-10-04 RX ADMIN — HYDROMORPHONE HYDROCHLORIDE 4 MG: 4 TABLET ORAL at 13:36

## 2024-10-04 RX ADMIN — HYDROMORPHONE HYDROCHLORIDE 1 MG: 1 INJECTION, SOLUTION INTRAMUSCULAR; INTRAVENOUS; SUBCUTANEOUS at 05:47

## 2024-10-04 RX ADMIN — HYDROMORPHONE HYDROCHLORIDE 1 MG: 1 INJECTION, SOLUTION INTRAMUSCULAR; INTRAVENOUS; SUBCUTANEOUS at 11:46

## 2024-10-04 RX ADMIN — HYDROMORPHONE HYDROCHLORIDE 1 MG: 1 INJECTION, SOLUTION INTRAMUSCULAR; INTRAVENOUS; SUBCUTANEOUS at 21:05

## 2024-10-04 RX ADMIN — PANTOPRAZOLE SODIUM 40 MG: 40 TABLET, DELAYED RELEASE ORAL at 05:53

## 2024-10-04 RX ADMIN — THERA TABS 1 TABLET: TAB at 08:49

## 2024-10-04 RX ADMIN — HYDROXYUREA 500 MG: 500 CAPSULE ORAL at 21:06

## 2024-10-04 RX ADMIN — FOLIC ACID 1 MG: 1 TABLET ORAL at 08:49

## 2024-10-04 RX ADMIN — ENOXAPARIN SODIUM 40 MG: 100 INJECTION SUBCUTANEOUS at 08:49

## 2024-10-04 RX ADMIN — HYDROMORPHONE HYDROCHLORIDE 4 MG: 4 TABLET ORAL at 19:00

## 2024-10-04 RX ADMIN — HYDROXYUREA 500 MG: 500 CAPSULE ORAL at 11:47

## 2024-10-04 RX ADMIN — SODIUM CHLORIDE: 9 INJECTION, SOLUTION INTRAVENOUS at 19:33

## 2024-10-04 RX ADMIN — Medication: at 17:57

## 2024-10-04 RX ADMIN — HYDROMORPHONE HYDROCHLORIDE 4 MG: 4 TABLET ORAL at 04:31

## 2024-10-04 RX ADMIN — SODIUM CHLORIDE, PRESERVATIVE FREE 10 ML: 5 INJECTION INTRAVENOUS at 21:08

## 2024-10-04 RX ADMIN — HYDROMORPHONE HYDROCHLORIDE 4 MG: 4 TABLET ORAL at 08:49

## 2024-10-04 RX ADMIN — HYDROMORPHONE HYDROCHLORIDE 1 MG: 1 INJECTION, SOLUTION INTRAMUSCULAR; INTRAVENOUS; SUBCUTANEOUS at 16:31

## 2024-10-04 ASSESSMENT — PAIN SCALES - GENERAL
PAINLEVEL_OUTOF10: 8
PAINLEVEL_OUTOF10: 6
PAINLEVEL_OUTOF10: 7
PAINLEVEL_OUTOF10: 8
PAINLEVEL_OUTOF10: 7
PAINLEVEL_OUTOF10: 8
PAINLEVEL_OUTOF10: 7
PAINLEVEL_OUTOF10: 6
PAINLEVEL_OUTOF10: 7
PAINLEVEL_OUTOF10: 6
PAINLEVEL_OUTOF10: 7

## 2024-10-04 ASSESSMENT — PAIN DESCRIPTION - FREQUENCY
FREQUENCY: CONTINUOUS

## 2024-10-04 ASSESSMENT — PAIN - FUNCTIONAL ASSESSMENT
PAIN_FUNCTIONAL_ASSESSMENT: ACTIVITIES ARE NOT PREVENTED
PAIN_FUNCTIONAL_ASSESSMENT: ACTIVITIES ARE NOT PREVENTED
PAIN_FUNCTIONAL_ASSESSMENT: PREVENTS OR INTERFERES SOME ACTIVE ACTIVITIES AND ADLS
PAIN_FUNCTIONAL_ASSESSMENT: ACTIVITIES ARE NOT PREVENTED
PAIN_FUNCTIONAL_ASSESSMENT: PREVENTS OR INTERFERES SOME ACTIVE ACTIVITIES AND ADLS
PAIN_FUNCTIONAL_ASSESSMENT: ACTIVITIES ARE NOT PREVENTED

## 2024-10-04 ASSESSMENT — PAIN DESCRIPTION - LOCATION
LOCATION: BACK;LEG
LOCATION: LEG;BACK
LOCATION: ABDOMEN;LEG
LOCATION: BACK;LEG

## 2024-10-04 ASSESSMENT — PAIN DESCRIPTION - ORIENTATION
ORIENTATION: RIGHT;LEFT
ORIENTATION: RIGHT;LEFT;ANTERIOR;POSTERIOR
ORIENTATION: RIGHT;LEFT
ORIENTATION: RIGHT;LEFT;ANTERIOR;POSTERIOR
ORIENTATION: LEFT;RIGHT
ORIENTATION: RIGHT;LEFT;ANTERIOR;POSTERIOR

## 2024-10-04 ASSESSMENT — PAIN DESCRIPTION - PAIN TYPE
TYPE: ACUTE PAIN;CHRONIC PAIN

## 2024-10-04 ASSESSMENT — PAIN DESCRIPTION - ONSET
ONSET: ON-GOING

## 2024-10-04 ASSESSMENT — PAIN DESCRIPTION - DESCRIPTORS
DESCRIPTORS: ACHING

## 2024-10-04 NOTE — CARE COORDINATION
454    Discharge Barrier: Pain Control. PCA Dilaudid  Planned Disposition: Home vs Outpt  Anticipated Discharge Date:10/7/24  Current LOS: 19            Patient not medically ready.Sickle Cell Crisis. Per Oncology, \"continue the current PCA and po dilaudid dosing. PCA Not titrated down today\". Cm will continue to follow for any needs.     Odette Andujar, MSN, RN  Care Manager     [place] [address]  Office: [phone]  Fax: [phone]

## 2024-10-05 PROCEDURE — 6370000000 HC RX 637 (ALT 250 FOR IP): Performed by: FAMILY MEDICINE

## 2024-10-05 PROCEDURE — 99232 SBSQ HOSP IP/OBS MODERATE 35: CPT | Performed by: HOSPITALIST

## 2024-10-05 PROCEDURE — 6360000002 HC RX W HCPCS: Performed by: EMERGENCY MEDICINE

## 2024-10-05 PROCEDURE — 6370000000 HC RX 637 (ALT 250 FOR IP): Performed by: HEALTH CARE PROVIDER

## 2024-10-05 PROCEDURE — 1100000000 HC RM PRIVATE

## 2024-10-05 PROCEDURE — 2580000003 HC RX 258: Performed by: PHYSICIAN ASSISTANT

## 2024-10-05 PROCEDURE — 6370000000 HC RX 637 (ALT 250 FOR IP): Performed by: INTERNAL MEDICINE

## 2024-10-05 PROCEDURE — 6360000002 HC RX W HCPCS: Performed by: INTERNAL MEDICINE

## 2024-10-05 PROCEDURE — 6370000000 HC RX 637 (ALT 250 FOR IP): Performed by: STUDENT IN AN ORGANIZED HEALTH CARE EDUCATION/TRAINING PROGRAM

## 2024-10-05 PROCEDURE — 2580000003 HC RX 258: Performed by: INTERNAL MEDICINE

## 2024-10-05 RX ADMIN — HYDROMORPHONE HYDROCHLORIDE 4 MG: 4 TABLET ORAL at 20:32

## 2024-10-05 RX ADMIN — SODIUM CHLORIDE: 9 INJECTION, SOLUTION INTRAVENOUS at 16:34

## 2024-10-05 RX ADMIN — PANTOPRAZOLE SODIUM 40 MG: 40 TABLET, DELAYED RELEASE ORAL at 07:01

## 2024-10-05 RX ADMIN — HYDROMORPHONE HYDROCHLORIDE 1 MG: 1 INJECTION, SOLUTION INTRAMUSCULAR; INTRAVENOUS; SUBCUTANEOUS at 19:05

## 2024-10-05 RX ADMIN — SODIUM CHLORIDE, PRESERVATIVE FREE 10 ML: 5 INJECTION INTRAVENOUS at 20:33

## 2024-10-05 RX ADMIN — HYDROMORPHONE HYDROCHLORIDE 4 MG: 4 TABLET ORAL at 12:06

## 2024-10-05 RX ADMIN — HYDROMORPHONE HYDROCHLORIDE 4 MG: 4 TABLET ORAL at 00:26

## 2024-10-05 RX ADMIN — HYDROMORPHONE HYDROCHLORIDE 4 MG: 4 TABLET ORAL at 16:28

## 2024-10-05 RX ADMIN — THERA TABS 1 TABLET: TAB at 08:49

## 2024-10-05 RX ADMIN — ENOXAPARIN SODIUM 40 MG: 100 INJECTION SUBCUTANEOUS at 08:49

## 2024-10-05 RX ADMIN — HYDROMORPHONE HYDROCHLORIDE 1 MG: 1 INJECTION, SOLUTION INTRAMUSCULAR; INTRAVENOUS; SUBCUTANEOUS at 07:03

## 2024-10-05 RX ADMIN — HYDROMORPHONE HYDROCHLORIDE 4 MG: 4 TABLET ORAL at 08:48

## 2024-10-05 RX ADMIN — HYDROMORPHONE HYDROCHLORIDE 4 MG: 4 TABLET ORAL at 04:53

## 2024-10-05 RX ADMIN — Medication: at 20:27

## 2024-10-05 RX ADMIN — HYDROXYUREA 500 MG: 500 CAPSULE ORAL at 12:06

## 2024-10-05 RX ADMIN — HYDROMORPHONE HYDROCHLORIDE 1 MG: 1 INJECTION, SOLUTION INTRAMUSCULAR; INTRAVENOUS; SUBCUTANEOUS at 13:52

## 2024-10-05 RX ADMIN — HYDROXYUREA 500 MG: 500 CAPSULE ORAL at 20:32

## 2024-10-05 RX ADMIN — FOLIC ACID 1 MG: 1 TABLET ORAL at 08:48

## 2024-10-05 RX ADMIN — Medication: at 07:13

## 2024-10-05 ASSESSMENT — PAIN DESCRIPTION - LOCATION
LOCATION: BACK;BUTTOCKS
LOCATION: BACK
LOCATION: BACK;LEG

## 2024-10-05 ASSESSMENT — PAIN DESCRIPTION - DIRECTION: RADIATING_TOWARDS: BILATERAL THIGHS

## 2024-10-05 ASSESSMENT — PAIN SCALES - GENERAL
PAINLEVEL_OUTOF10: 8
PAINLEVEL_OUTOF10: 7
PAINLEVEL_OUTOF10: 5
PAINLEVEL_OUTOF10: 0
PAINLEVEL_OUTOF10: 6
PAINLEVEL_OUTOF10: 6
PAINLEVEL_OUTOF10: 8
PAINLEVEL_OUTOF10: 6
PAINLEVEL_OUTOF10: 7
PAINLEVEL_OUTOF10: 6
PAINLEVEL_OUTOF10: 7
PAINLEVEL_OUTOF10: 6
PAINLEVEL_OUTOF10: 8
PAINLEVEL_OUTOF10: 7

## 2024-10-05 ASSESSMENT — PAIN DESCRIPTION - PAIN TYPE
TYPE: ACUTE PAIN;CHRONIC PAIN
TYPE: ACUTE PAIN;CHRONIC PAIN
TYPE: CHRONIC PAIN
TYPE: CHRONIC PAIN
TYPE: ACUTE PAIN;CHRONIC PAIN
TYPE: ACUTE PAIN;CHRONIC PAIN

## 2024-10-05 ASSESSMENT — PAIN DESCRIPTION - ORIENTATION
ORIENTATION: RIGHT;ANTERIOR;LOWER;POSTERIOR
ORIENTATION: RIGHT;LEFT;ANTERIOR;POSTERIOR
ORIENTATION: RIGHT;ANTERIOR;LOWER;POSTERIOR
ORIENTATION: LOWER
ORIENTATION: POSTERIOR
ORIENTATION: RIGHT;LEFT;ANTERIOR;POSTERIOR
ORIENTATION: RIGHT;ANTERIOR;LOWER;POSTERIOR
ORIENTATION: RIGHT;LEFT;ANTERIOR;POSTERIOR

## 2024-10-05 ASSESSMENT — PAIN - FUNCTIONAL ASSESSMENT
PAIN_FUNCTIONAL_ASSESSMENT: ACTIVITIES ARE NOT PREVENTED

## 2024-10-05 ASSESSMENT — PAIN DESCRIPTION - DESCRIPTORS
DESCRIPTORS: ACHING

## 2024-10-05 ASSESSMENT — PAIN DESCRIPTION - ONSET
ONSET: ON-GOING

## 2024-10-05 ASSESSMENT — PAIN DESCRIPTION - FREQUENCY
FREQUENCY: CONTINUOUS

## 2024-10-06 LAB
ANION GAP SERPL CALC-SCNC: 4 MMOL/L (ref 3–18)
BASOPHILS # BLD: 0 K/UL (ref 0–0.1)
BASOPHILS NFR BLD: 0 % (ref 0–2)
BUN SERPL-MCNC: 7 MG/DL (ref 7–18)
BUN/CREAT SERPL: 11 (ref 12–20)
CALCIUM SERPL-MCNC: 10.3 MG/DL (ref 8.5–10.1)
CHLORIDE SERPL-SCNC: 95 MMOL/L (ref 100–111)
CO2 SERPL-SCNC: 27 MMOL/L (ref 21–32)
CREAT SERPL-MCNC: 0.64 MG/DL (ref 0.6–1.3)
DIFFERENTIAL METHOD BLD: ABNORMAL
EOSINOPHIL # BLD: 0.1 K/UL (ref 0–0.4)
EOSINOPHIL NFR BLD: 1 % (ref 0–5)
ERYTHROCYTE [DISTWIDTH] IN BLOOD BY AUTOMATED COUNT: 20.7 % (ref 11.6–14.5)
GLUCOSE SERPL-MCNC: 102 MG/DL (ref 74–99)
HCT VFR BLD AUTO: 25.2 % (ref 36–48)
HGB BLD-MCNC: 8.4 G/DL (ref 13–16)
IMM GRANULOCYTES # BLD AUTO: 0.1 K/UL (ref 0–0.04)
IMM GRANULOCYTES NFR BLD AUTO: 1 % (ref 0–0.5)
LYMPHOCYTES # BLD: 2.3 K/UL (ref 0.9–3.6)
LYMPHOCYTES NFR BLD: 21 % (ref 21–52)
MCH RBC QN AUTO: 28 PG (ref 24–34)
MCHC RBC AUTO-ENTMCNC: 33.3 G/DL (ref 31–37)
MCV RBC AUTO: 84 FL (ref 78–100)
MONOCYTES # BLD: 0.8 K/UL (ref 0.05–1.2)
MONOCYTES NFR BLD: 7 % (ref 3–10)
NEUTS SEG # BLD: 7.8 K/UL (ref 1.8–8)
NEUTS SEG NFR BLD: 70 % (ref 40–73)
NRBC # BLD: 0.19 K/UL (ref 0–0.01)
NRBC BLD-RTO: 1.7 PER 100 WBC
PLATELET # BLD AUTO: 948 K/UL (ref 135–420)
PMV BLD AUTO: 8.9 FL (ref 9.2–11.8)
POTASSIUM SERPL-SCNC: 4 MMOL/L (ref 3.5–5.5)
RBC # BLD AUTO: 3 M/UL (ref 4.35–5.65)
SODIUM SERPL-SCNC: 126 MMOL/L (ref 136–145)
WBC # BLD AUTO: 11.2 K/UL (ref 4.6–13.2)

## 2024-10-06 PROCEDURE — 6370000000 HC RX 637 (ALT 250 FOR IP): Performed by: HEALTH CARE PROVIDER

## 2024-10-06 PROCEDURE — 6360000002 HC RX W HCPCS: Performed by: INTERNAL MEDICINE

## 2024-10-06 PROCEDURE — 2700000000 HC OXYGEN THERAPY PER DAY

## 2024-10-06 PROCEDURE — 94761 N-INVAS EAR/PLS OXIMETRY MLT: CPT

## 2024-10-06 PROCEDURE — 6370000000 HC RX 637 (ALT 250 FOR IP): Performed by: STUDENT IN AN ORGANIZED HEALTH CARE EDUCATION/TRAINING PROGRAM

## 2024-10-06 PROCEDURE — 6370000000 HC RX 637 (ALT 250 FOR IP): Performed by: INTERNAL MEDICINE

## 2024-10-06 PROCEDURE — 80048 BASIC METABOLIC PNL TOTAL CA: CPT

## 2024-10-06 PROCEDURE — 85025 COMPLETE CBC W/AUTO DIFF WBC: CPT

## 2024-10-06 PROCEDURE — 2580000003 HC RX 258: Performed by: PHYSICIAN ASSISTANT

## 2024-10-06 PROCEDURE — 6360000002 HC RX W HCPCS: Performed by: EMERGENCY MEDICINE

## 2024-10-06 PROCEDURE — 1100000000 HC RM PRIVATE

## 2024-10-06 PROCEDURE — 99232 SBSQ HOSP IP/OBS MODERATE 35: CPT | Performed by: HOSPITALIST

## 2024-10-06 PROCEDURE — 6370000000 HC RX 637 (ALT 250 FOR IP): Performed by: FAMILY MEDICINE

## 2024-10-06 RX ORDER — IBUPROFEN 600 MG/1
600 TABLET, FILM COATED ORAL 2 TIMES DAILY
Status: COMPLETED | OUTPATIENT
Start: 2024-10-06 | End: 2024-10-07

## 2024-10-06 RX ADMIN — THERA TABS 1 TABLET: TAB at 10:18

## 2024-10-06 RX ADMIN — HYDROMORPHONE HYDROCHLORIDE 1 MG: 1 INJECTION, SOLUTION INTRAMUSCULAR; INTRAVENOUS; SUBCUTANEOUS at 07:19

## 2024-10-06 RX ADMIN — HYDROMORPHONE HYDROCHLORIDE 4 MG: 4 TABLET ORAL at 10:17

## 2024-10-06 RX ADMIN — HYDROMORPHONE HYDROCHLORIDE 4 MG: 4 TABLET ORAL at 04:54

## 2024-10-06 RX ADMIN — FOLIC ACID 1 MG: 1 TABLET ORAL at 10:17

## 2024-10-06 RX ADMIN — HYDROMORPHONE HYDROCHLORIDE 4 MG: 4 TABLET ORAL at 12:33

## 2024-10-06 RX ADMIN — HYDROMORPHONE HYDROCHLORIDE 4 MG: 4 TABLET ORAL at 16:44

## 2024-10-06 RX ADMIN — HYDROXYUREA 500 MG: 500 CAPSULE ORAL at 10:18

## 2024-10-06 RX ADMIN — IBUPROFEN 600 MG: 600 TABLET, FILM COATED ORAL at 16:44

## 2024-10-06 RX ADMIN — PANTOPRAZOLE SODIUM 40 MG: 40 TABLET, DELAYED RELEASE ORAL at 07:14

## 2024-10-06 RX ADMIN — ENOXAPARIN SODIUM 40 MG: 100 INJECTION SUBCUTANEOUS at 10:16

## 2024-10-06 RX ADMIN — HYDROXYUREA 500 MG: 500 CAPSULE ORAL at 21:45

## 2024-10-06 RX ADMIN — HYDROMORPHONE HYDROCHLORIDE 4 MG: 4 TABLET ORAL at 00:31

## 2024-10-06 RX ADMIN — IBUPROFEN 600 MG: 600 TABLET, FILM COATED ORAL at 21:37

## 2024-10-06 RX ADMIN — HYDROMORPHONE HYDROCHLORIDE 4 MG: 4 TABLET ORAL at 21:37

## 2024-10-06 RX ADMIN — Medication: at 21:44

## 2024-10-06 RX ADMIN — HYDROMORPHONE HYDROCHLORIDE 1 MG: 1 INJECTION, SOLUTION INTRAMUSCULAR; INTRAVENOUS; SUBCUTANEOUS at 18:03

## 2024-10-06 RX ADMIN — SODIUM CHLORIDE, PRESERVATIVE FREE 10 ML: 5 INJECTION INTRAVENOUS at 12:34

## 2024-10-06 RX ADMIN — SODIUM CHLORIDE, PRESERVATIVE FREE 10 ML: 5 INJECTION INTRAVENOUS at 21:39

## 2024-10-06 ASSESSMENT — PAIN DESCRIPTION - LOCATION
LOCATION: BACK
LOCATION: BACK;BUTTOCKS
LOCATION: GENERALIZED
LOCATION: BACK;BUTTOCKS
LOCATION: BACK;BUTTOCKS

## 2024-10-06 ASSESSMENT — PAIN DESCRIPTION - DESCRIPTORS
DESCRIPTORS: ACHING

## 2024-10-06 ASSESSMENT — PAIN DESCRIPTION - ORIENTATION
ORIENTATION: POSTERIOR
ORIENTATION: POSTERIOR
ORIENTATION: MID;LOWER
ORIENTATION: POSTERIOR

## 2024-10-06 ASSESSMENT — PAIN SCALES - GENERAL
PAINLEVEL_OUTOF10: 7
PAINLEVEL_OUTOF10: 7
PAINLEVEL_OUTOF10: 6
PAINLEVEL_OUTOF10: 5
PAINLEVEL_OUTOF10: 6
PAINLEVEL_OUTOF10: 6
PAINLEVEL_OUTOF10: 7
PAINLEVEL_OUTOF10: 7
PAINLEVEL_OUTOF10: 6
PAINLEVEL_OUTOF10: 7

## 2024-10-06 ASSESSMENT — PAIN DESCRIPTION - DIRECTION
RADIATING_TOWARDS: THIGHS
RADIATING_TOWARDS: THIGHS

## 2024-10-06 ASSESSMENT — PAIN DESCRIPTION - PAIN TYPE
TYPE: CHRONIC PAIN
TYPE: CHRONIC PAIN

## 2024-10-06 ASSESSMENT — PAIN - FUNCTIONAL ASSESSMENT
PAIN_FUNCTIONAL_ASSESSMENT: ACTIVITIES ARE NOT PREVENTED

## 2024-10-06 ASSESSMENT — PAIN DESCRIPTION - FREQUENCY
FREQUENCY: CONTINUOUS
FREQUENCY: CONTINUOUS

## 2024-10-06 ASSESSMENT — PAIN DESCRIPTION - ONSET
ONSET: ON-GOING
ONSET: ON-GOING

## 2024-10-06 NOTE — SIGNIFICANT EVENT
INTERIM UPDATE - 0724 EST on 10/06/2024    Nursing Staff calls to report that AM labs have returned with abnormal findings of Serum Na+ 126 mmol/L.    Plan:  Ordered Fluid Restriction 1.5 L/day.

## 2024-10-07 PROCEDURE — 6370000000 HC RX 637 (ALT 250 FOR IP): Performed by: INTERNAL MEDICINE

## 2024-10-07 PROCEDURE — 6360000002 HC RX W HCPCS: Performed by: INTERNAL MEDICINE

## 2024-10-07 PROCEDURE — 1100000000 HC RM PRIVATE

## 2024-10-07 PROCEDURE — 6360000002 HC RX W HCPCS: Performed by: EMERGENCY MEDICINE

## 2024-10-07 PROCEDURE — 2580000003 HC RX 258: Performed by: PHYSICIAN ASSISTANT

## 2024-10-07 PROCEDURE — 2700000000 HC OXYGEN THERAPY PER DAY

## 2024-10-07 PROCEDURE — 6370000000 HC RX 637 (ALT 250 FOR IP): Performed by: STUDENT IN AN ORGANIZED HEALTH CARE EDUCATION/TRAINING PROGRAM

## 2024-10-07 PROCEDURE — 99232 SBSQ HOSP IP/OBS MODERATE 35: CPT | Performed by: HOSPITALIST

## 2024-10-07 PROCEDURE — 6370000000 HC RX 637 (ALT 250 FOR IP): Performed by: HEALTH CARE PROVIDER

## 2024-10-07 PROCEDURE — 94761 N-INVAS EAR/PLS OXIMETRY MLT: CPT

## 2024-10-07 PROCEDURE — 6370000000 HC RX 637 (ALT 250 FOR IP): Performed by: FAMILY MEDICINE

## 2024-10-07 RX ADMIN — FOLIC ACID 1 MG: 1 TABLET ORAL at 09:16

## 2024-10-07 RX ADMIN — ENOXAPARIN SODIUM 40 MG: 100 INJECTION SUBCUTANEOUS at 09:15

## 2024-10-07 RX ADMIN — HYDROMORPHONE HYDROCHLORIDE 4 MG: 4 TABLET ORAL at 05:00

## 2024-10-07 RX ADMIN — SODIUM CHLORIDE, PRESERVATIVE FREE 10 ML: 5 INJECTION INTRAVENOUS at 21:22

## 2024-10-07 RX ADMIN — HYDROMORPHONE HYDROCHLORIDE 4 MG: 4 TABLET ORAL at 00:08

## 2024-10-07 RX ADMIN — HYDROMORPHONE HYDROCHLORIDE 4 MG: 4 TABLET ORAL at 16:12

## 2024-10-07 RX ADMIN — HYDROMORPHONE HYDROCHLORIDE 1 MG: 1 INJECTION, SOLUTION INTRAMUSCULAR; INTRAVENOUS; SUBCUTANEOUS at 10:10

## 2024-10-07 RX ADMIN — HYDROMORPHONE HYDROCHLORIDE 4 MG: 4 TABLET ORAL at 20:15

## 2024-10-07 RX ADMIN — HYDROXYUREA 500 MG: 500 CAPSULE ORAL at 09:15

## 2024-10-07 RX ADMIN — HYDROMORPHONE HYDROCHLORIDE 4 MG: 4 TABLET ORAL at 13:00

## 2024-10-07 RX ADMIN — THERA TABS 1 TABLET: TAB at 09:16

## 2024-10-07 RX ADMIN — SODIUM CHLORIDE, PRESERVATIVE FREE 10 ML: 5 INJECTION INTRAVENOUS at 09:21

## 2024-10-07 RX ADMIN — IBUPROFEN 600 MG: 600 TABLET, FILM COATED ORAL at 09:17

## 2024-10-07 RX ADMIN — HYDROXYUREA 500 MG: 500 CAPSULE ORAL at 21:19

## 2024-10-07 RX ADMIN — HYDROMORPHONE HYDROCHLORIDE 4 MG: 4 TABLET ORAL at 09:20

## 2024-10-07 RX ADMIN — IBUPROFEN 600 MG: 600 TABLET, FILM COATED ORAL at 21:18

## 2024-10-07 RX ADMIN — HYDROMORPHONE HYDROCHLORIDE 1 MG: 1 INJECTION, SOLUTION INTRAMUSCULAR; INTRAVENOUS; SUBCUTANEOUS at 18:40

## 2024-10-07 RX ADMIN — PANTOPRAZOLE SODIUM 40 MG: 40 TABLET, DELAYED RELEASE ORAL at 05:00

## 2024-10-07 ASSESSMENT — PAIN DESCRIPTION - DESCRIPTORS
DESCRIPTORS: ACHING
DESCRIPTORS: ACHING;CRAMPING
DESCRIPTORS: ACHING
DESCRIPTORS: CRAMPING;ACHING
DESCRIPTORS: ACHING

## 2024-10-07 ASSESSMENT — PAIN SCALES - GENERAL
PAINLEVEL_OUTOF10: 6
PAINLEVEL_OUTOF10: 5
PAINLEVEL_OUTOF10: 5
PAINLEVEL_OUTOF10: 6
PAINLEVEL_OUTOF10: 5
PAINLEVEL_OUTOF10: 6
PAINLEVEL_OUTOF10: 5
PAINLEVEL_OUTOF10: 7
PAINLEVEL_OUTOF10: 5
PAINLEVEL_OUTOF10: 4
PAINLEVEL_OUTOF10: 6
PAINLEVEL_OUTOF10: 5
PAINLEVEL_OUTOF10: 5

## 2024-10-07 ASSESSMENT — PAIN DESCRIPTION - ONSET
ONSET: ON-GOING

## 2024-10-07 ASSESSMENT — PAIN - FUNCTIONAL ASSESSMENT

## 2024-10-07 ASSESSMENT — PAIN DESCRIPTION - LOCATION
LOCATION: GENERALIZED
LOCATION: BACK;LEG
LOCATION: BACK;LEG
LOCATION: GENERALIZED
LOCATION: BACK;LEG

## 2024-10-07 ASSESSMENT — PAIN DESCRIPTION - FREQUENCY
FREQUENCY: INTERMITTENT

## 2024-10-07 ASSESSMENT — PAIN DESCRIPTION - ORIENTATION
ORIENTATION: LOWER;RIGHT;LEFT
ORIENTATION: RIGHT;LEFT
ORIENTATION: RIGHT
ORIENTATION: LOWER;RIGHT;LEFT
ORIENTATION: RIGHT
ORIENTATION: RIGHT;LEFT
ORIENTATION: RIGHT

## 2024-10-07 ASSESSMENT — PAIN DESCRIPTION - PAIN TYPE
TYPE: CHRONIC PAIN

## 2024-10-07 NOTE — CARE COORDINATION
Discharge Barrier: Sickle Cell Crisis. Pain control  Planned Disposition: Home vs Outpt  Anticipated Discharge Date:10/8/24  Current LOS: 23      Patient is not medically ready to discharge. PCA dilaudid d/c today. Now on PO dilaudid with IV dilaudid prn only. CM will continue to follow for any needs.    Odette Andujar MSN, RN  Care Manager    [place] [address]  Office: [phone]  Fax: [phone]     [facility] Care Managers are on-call evenings from 4:30 pm until 7:00 pm. After 7:00 pm, please contact Nurse’s Admin at ext. [phone] for LYFT rides only.                                   Odette Andujar MSN, RN  Care Manager    [place] [address]  Office: [phone]  Fax: [phone]       [facility] Care Managers are on-call evenings from 4:30 pm until 7:00 pm. After 7:00 pm, please contact Nurse’s Admin at ext. [phone] for LYFT rides only.     Thank You

## 2024-10-08 LAB
ANION GAP SERPL CALC-SCNC: 6 MMOL/L (ref 3–18)
BUN SERPL-MCNC: 7 MG/DL (ref 7–18)
BUN/CREAT SERPL: 8 (ref 12–20)
CALCIUM SERPL-MCNC: 9.4 MG/DL (ref 8.5–10.1)
CHLORIDE SERPL-SCNC: 104 MMOL/L (ref 100–111)
CO2 SERPL-SCNC: 25 MMOL/L (ref 21–32)
CREAT SERPL-MCNC: 0.84 MG/DL (ref 0.6–1.3)
ERYTHROCYTE [DISTWIDTH] IN BLOOD BY AUTOMATED COUNT: 21.2 % (ref 11.6–14.5)
GLUCOSE SERPL-MCNC: 107 MG/DL (ref 74–99)
HCT VFR BLD AUTO: 24.8 % (ref 36–48)
HGB BLD-MCNC: 8 G/DL (ref 13–16)
MCH RBC QN AUTO: 27.7 PG (ref 24–34)
MCHC RBC AUTO-ENTMCNC: 32.3 G/DL (ref 31–37)
MCV RBC AUTO: 85.8 FL (ref 78–100)
NRBC # BLD: 0.08 K/UL (ref 0–0.01)
NRBC BLD-RTO: 1.1 PER 100 WBC
PLATELET # BLD AUTO: 764 K/UL (ref 135–420)
PMV BLD AUTO: 10 FL (ref 9.2–11.8)
POTASSIUM SERPL-SCNC: 3.6 MMOL/L (ref 3.5–5.5)
RBC # BLD AUTO: 2.89 M/UL (ref 4.35–5.65)
SODIUM SERPL-SCNC: 135 MMOL/L (ref 136–145)
WBC # BLD AUTO: 7.6 K/UL (ref 4.6–13.2)

## 2024-10-08 PROCEDURE — 1100000000 HC RM PRIVATE

## 2024-10-08 PROCEDURE — 80048 BASIC METABOLIC PNL TOTAL CA: CPT

## 2024-10-08 PROCEDURE — 6370000000 HC RX 637 (ALT 250 FOR IP): Performed by: FAMILY MEDICINE

## 2024-10-08 PROCEDURE — 6370000000 HC RX 637 (ALT 250 FOR IP): Performed by: STUDENT IN AN ORGANIZED HEALTH CARE EDUCATION/TRAINING PROGRAM

## 2024-10-08 PROCEDURE — 2580000003 HC RX 258: Performed by: HOSPITALIST

## 2024-10-08 PROCEDURE — 6370000000 HC RX 637 (ALT 250 FOR IP): Performed by: HEALTH CARE PROVIDER

## 2024-10-08 PROCEDURE — 2580000003 HC RX 258: Performed by: PHYSICIAN ASSISTANT

## 2024-10-08 PROCEDURE — 99232 SBSQ HOSP IP/OBS MODERATE 35: CPT | Performed by: HOSPITALIST

## 2024-10-08 PROCEDURE — 6360000002 HC RX W HCPCS: Performed by: EMERGENCY MEDICINE

## 2024-10-08 PROCEDURE — 6360000002 HC RX W HCPCS: Performed by: HOSPITALIST

## 2024-10-08 PROCEDURE — 85027 COMPLETE CBC AUTOMATED: CPT

## 2024-10-08 PROCEDURE — 6370000000 HC RX 637 (ALT 250 FOR IP): Performed by: INTERNAL MEDICINE

## 2024-10-08 PROCEDURE — 6360000002 HC RX W HCPCS: Performed by: INTERNAL MEDICINE

## 2024-10-08 PROCEDURE — 94761 N-INVAS EAR/PLS OXIMETRY MLT: CPT

## 2024-10-08 RX ADMIN — FOLIC ACID 1 MG: 1 TABLET ORAL at 08:34

## 2024-10-08 RX ADMIN — HYDROXYUREA 500 MG: 500 CAPSULE ORAL at 08:34

## 2024-10-08 RX ADMIN — SODIUM CHLORIDE, PRESERVATIVE FREE 10 ML: 5 INJECTION INTRAVENOUS at 21:20

## 2024-10-08 RX ADMIN — ENOXAPARIN SODIUM 40 MG: 100 INJECTION SUBCUTANEOUS at 08:33

## 2024-10-08 RX ADMIN — HYDROMORPHONE HYDROCHLORIDE 1 MG: 1 INJECTION, SOLUTION INTRAMUSCULAR; INTRAVENOUS; SUBCUTANEOUS at 07:09

## 2024-10-08 RX ADMIN — HYDROMORPHONE HYDROCHLORIDE 4 MG: 4 TABLET ORAL at 21:16

## 2024-10-08 RX ADMIN — HYDROMORPHONE HYDROCHLORIDE 1 MG: 1 INJECTION, SOLUTION INTRAMUSCULAR; INTRAVENOUS; SUBCUTANEOUS at 15:20

## 2024-10-08 RX ADMIN — HYDROMORPHONE HYDROCHLORIDE 4 MG: 4 TABLET ORAL at 17:04

## 2024-10-08 RX ADMIN — HYDROMORPHONE HYDROCHLORIDE 4 MG: 4 TABLET ORAL at 12:45

## 2024-10-08 RX ADMIN — HYDROMORPHONE HYDROCHLORIDE 1 MG: 1 INJECTION, SOLUTION INTRAMUSCULAR; INTRAVENOUS; SUBCUTANEOUS at 19:45

## 2024-10-08 RX ADMIN — PANTOPRAZOLE SODIUM 40 MG: 40 TABLET, DELAYED RELEASE ORAL at 05:55

## 2024-10-08 RX ADMIN — SODIUM CHLORIDE, PRESERVATIVE FREE 10 ML: 5 INJECTION INTRAVENOUS at 08:34

## 2024-10-08 RX ADMIN — WATER 1 MG: 1 INJECTION INTRAMUSCULAR; INTRAVENOUS; SUBCUTANEOUS at 06:09

## 2024-10-08 RX ADMIN — HYDROMORPHONE HYDROCHLORIDE 4 MG: 4 TABLET ORAL at 04:12

## 2024-10-08 RX ADMIN — THERA TABS 1 TABLET: TAB at 08:33

## 2024-10-08 RX ADMIN — HYDROMORPHONE HYDROCHLORIDE 4 MG: 4 TABLET ORAL at 00:05

## 2024-10-08 RX ADMIN — HYDROMORPHONE HYDROCHLORIDE 4 MG: 4 TABLET ORAL at 08:33

## 2024-10-08 RX ADMIN — HYDROMORPHONE HYDROCHLORIDE 1 MG: 1 INJECTION, SOLUTION INTRAMUSCULAR; INTRAVENOUS; SUBCUTANEOUS at 01:40

## 2024-10-08 RX ADMIN — HYDROXYUREA 500 MG: 500 CAPSULE ORAL at 21:16

## 2024-10-08 RX ADMIN — HYDROMORPHONE HYDROCHLORIDE 1 MG: 1 INJECTION, SOLUTION INTRAMUSCULAR; INTRAVENOUS; SUBCUTANEOUS at 11:15

## 2024-10-08 RX ADMIN — WATER 1 MG: 1 INJECTION INTRAMUSCULAR; INTRAVENOUS; SUBCUTANEOUS at 06:10

## 2024-10-08 RX ADMIN — HYDROMORPHONE HYDROCHLORIDE 1 MG: 1 INJECTION, SOLUTION INTRAMUSCULAR; INTRAVENOUS; SUBCUTANEOUS at 23:57

## 2024-10-08 ASSESSMENT — PAIN SCALES - GENERAL
PAINLEVEL_OUTOF10: 7
PAINLEVEL_OUTOF10: 6
PAINLEVEL_OUTOF10: 6
PAINLEVEL_OUTOF10: 7
PAINLEVEL_OUTOF10: 6
PAINLEVEL_OUTOF10: 7
PAINLEVEL_OUTOF10: 6
PAINLEVEL_OUTOF10: 6
PAINLEVEL_OUTOF10: 7
PAINLEVEL_OUTOF10: 7
PAINLEVEL_OUTOF10: 6
PAINLEVEL_OUTOF10: 5
PAINLEVEL_OUTOF10: 6

## 2024-10-08 ASSESSMENT — PAIN DESCRIPTION - PAIN TYPE
TYPE: CHRONIC PAIN

## 2024-10-08 ASSESSMENT — PAIN DESCRIPTION - ONSET
ONSET: ON-GOING

## 2024-10-08 ASSESSMENT — PAIN - FUNCTIONAL ASSESSMENT
PAIN_FUNCTIONAL_ASSESSMENT: ACTIVITIES ARE NOT PREVENTED

## 2024-10-08 ASSESSMENT — PAIN DESCRIPTION - ORIENTATION
ORIENTATION: LOWER
ORIENTATION: LOWER;RIGHT;LEFT
ORIENTATION: LOWER
ORIENTATION: LOWER;RIGHT;LEFT
ORIENTATION: LOWER
ORIENTATION: LOWER;RIGHT;LEFT

## 2024-10-08 ASSESSMENT — PAIN DESCRIPTION - LOCATION
LOCATION: BACK;LEG
LOCATION: BACK
LOCATION: BACK;LEG
LOCATION: BACK;LEG
LOCATION: BACK
LOCATION: BACK;LEG
LOCATION: BACK

## 2024-10-08 ASSESSMENT — PAIN DESCRIPTION - FREQUENCY
FREQUENCY: CONTINUOUS
FREQUENCY: INTERMITTENT
FREQUENCY: CONTINUOUS

## 2024-10-08 ASSESSMENT — PAIN DESCRIPTION - DESCRIPTORS
DESCRIPTORS: ACHING

## 2024-10-08 NOTE — CARE COORDINATION
Discharge Barrier: MRI T Spine  Planned Disposition: Home vs Outpt  Anticipated Discharge Date:10/9/24  Current LOS: 24          Patient not ready for discharge.PCA has been discontinued by hematology. Patient continues to complain of back pain. MRI T-spine for further evaluation of back pain. CM will continue to follow for any needs.      Odette Andujar, MSN, RN  Care Manager    [place] [address]  Office: [phone]  Fax: [phone]       [facility] Care Managers are on-call evenings from 4:30 pm until 7:00 pm. After 7:00 pm, please contact Nurse’s Admin at ext. [phone] for LYFT rides only.     Thank You

## 2024-10-09 ENCOUNTER — APPOINTMENT (OUTPATIENT)
Facility: HOSPITAL | Age: 44
DRG: 812 | End: 2024-10-09
Payer: MEDICARE

## 2024-10-09 PROCEDURE — 6360000002 HC RX W HCPCS: Performed by: INTERNAL MEDICINE

## 2024-10-09 PROCEDURE — 6370000000 HC RX 637 (ALT 250 FOR IP): Performed by: STUDENT IN AN ORGANIZED HEALTH CARE EDUCATION/TRAINING PROGRAM

## 2024-10-09 PROCEDURE — 99232 SBSQ HOSP IP/OBS MODERATE 35: CPT | Performed by: HOSPITALIST

## 2024-10-09 PROCEDURE — 72157 MRI CHEST SPINE W/O & W/DYE: CPT

## 2024-10-09 PROCEDURE — 6370000000 HC RX 637 (ALT 250 FOR IP): Performed by: HEALTH CARE PROVIDER

## 2024-10-09 PROCEDURE — 2580000003 HC RX 258: Performed by: PHYSICIAN ASSISTANT

## 2024-10-09 PROCEDURE — 94761 N-INVAS EAR/PLS OXIMETRY MLT: CPT

## 2024-10-09 PROCEDURE — 6370000000 HC RX 637 (ALT 250 FOR IP): Performed by: FAMILY MEDICINE

## 2024-10-09 PROCEDURE — 1100000000 HC RM PRIVATE

## 2024-10-09 PROCEDURE — 6360000004 HC RX CONTRAST MEDICATION: Performed by: INTERNAL MEDICINE

## 2024-10-09 PROCEDURE — 6360000002 HC RX W HCPCS: Performed by: EMERGENCY MEDICINE

## 2024-10-09 PROCEDURE — A9577 INJ MULTIHANCE: HCPCS | Performed by: INTERNAL MEDICINE

## 2024-10-09 PROCEDURE — 6370000000 HC RX 637 (ALT 250 FOR IP): Performed by: INTERNAL MEDICINE

## 2024-10-09 RX ORDER — OXYCODONE HCL 20 MG/1
20 TABLET, FILM COATED, EXTENDED RELEASE ORAL EVERY 12 HOURS SCHEDULED
Status: DISCONTINUED | OUTPATIENT
Start: 2024-10-09 | End: 2024-10-11 | Stop reason: HOSPADM

## 2024-10-09 RX ORDER — LORAZEPAM 2 MG/ML
1 INJECTION INTRAMUSCULAR
Status: ACTIVE | OUTPATIENT
Start: 2024-10-09 | End: 2024-10-10

## 2024-10-09 RX ORDER — HYDROMORPHONE HYDROCHLORIDE 1 MG/ML
1 INJECTION, SOLUTION INTRAMUSCULAR; INTRAVENOUS; SUBCUTANEOUS
Status: DISCONTINUED | OUTPATIENT
Start: 2024-10-09 | End: 2024-10-10

## 2024-10-09 RX ADMIN — SODIUM CHLORIDE, PRESERVATIVE FREE 10 ML: 5 INJECTION INTRAVENOUS at 22:12

## 2024-10-09 RX ADMIN — HYDROMORPHONE HYDROCHLORIDE 4 MG: 4 TABLET ORAL at 03:10

## 2024-10-09 RX ADMIN — HYDROMORPHONE HYDROCHLORIDE 1 MG: 1 INJECTION, SOLUTION INTRAMUSCULAR; INTRAVENOUS; SUBCUTANEOUS at 04:53

## 2024-10-09 RX ADMIN — HYDROXYUREA 500 MG: 500 CAPSULE ORAL at 22:10

## 2024-10-09 RX ADMIN — FOLIC ACID 1 MG: 1 TABLET ORAL at 08:47

## 2024-10-09 RX ADMIN — HYDROMORPHONE HYDROCHLORIDE 1 MG: 1 INJECTION, SOLUTION INTRAMUSCULAR; INTRAVENOUS; SUBCUTANEOUS at 15:03

## 2024-10-09 RX ADMIN — HYDROMORPHONE HYDROCHLORIDE 4 MG: 4 TABLET ORAL at 08:46

## 2024-10-09 RX ADMIN — HYDROMORPHONE HYDROCHLORIDE 1 MG: 1 INJECTION, SOLUTION INTRAMUSCULAR; INTRAVENOUS; SUBCUTANEOUS at 09:05

## 2024-10-09 RX ADMIN — HYDROMORPHONE HYDROCHLORIDE 4 MG: 4 TABLET ORAL at 13:07

## 2024-10-09 RX ADMIN — ENOXAPARIN SODIUM 40 MG: 100 INJECTION SUBCUTANEOUS at 08:47

## 2024-10-09 RX ADMIN — SODIUM CHLORIDE, PRESERVATIVE FREE 10 ML: 5 INJECTION INTRAVENOUS at 09:06

## 2024-10-09 RX ADMIN — PANTOPRAZOLE SODIUM 40 MG: 40 TABLET, DELAYED RELEASE ORAL at 07:18

## 2024-10-09 RX ADMIN — HYDROXYUREA 500 MG: 500 CAPSULE ORAL at 09:10

## 2024-10-09 RX ADMIN — HYDROMORPHONE HYDROCHLORIDE 4 MG: 4 TABLET ORAL at 23:53

## 2024-10-09 RX ADMIN — GADOBENATE DIMEGLUMINE 17 ML: 529 INJECTION, SOLUTION INTRAVENOUS at 12:15

## 2024-10-09 RX ADMIN — HYDROMORPHONE HYDROCHLORIDE 4 MG: 4 TABLET ORAL at 17:08

## 2024-10-09 RX ADMIN — HYDROMORPHONE HYDROCHLORIDE 1 MG: 1 INJECTION, SOLUTION INTRAMUSCULAR; INTRAVENOUS; SUBCUTANEOUS at 18:22

## 2024-10-09 RX ADMIN — OXYCODONE HYDROCHLORIDE 20 MG: 20 TABLET, FILM COATED, EXTENDED RELEASE ORAL at 22:08

## 2024-10-09 RX ADMIN — HYDROMORPHONE HYDROCHLORIDE 1 MG: 1 INJECTION, SOLUTION INTRAMUSCULAR; INTRAVENOUS; SUBCUTANEOUS at 11:39

## 2024-10-09 RX ADMIN — OXYCODONE HYDROCHLORIDE 20 MG: 20 TABLET, FILM COATED, EXTENDED RELEASE ORAL at 08:47

## 2024-10-09 RX ADMIN — THERA TABS 1 TABLET: TAB at 08:47

## 2024-10-09 ASSESSMENT — PAIN SCALES - GENERAL
PAINLEVEL_OUTOF10: 6
PAINLEVEL_OUTOF10: 6
PAINLEVEL_OUTOF10: 8
PAINLEVEL_OUTOF10: 6
PAINLEVEL_OUTOF10: 5
PAINLEVEL_OUTOF10: 6
PAINLEVEL_OUTOF10: 7
PAINLEVEL_OUTOF10: 6
PAINLEVEL_OUTOF10: 6
PAINLEVEL_OUTOF10: 7
PAINLEVEL_OUTOF10: 6
PAINLEVEL_OUTOF10: 8
PAINLEVEL_OUTOF10: 8
PAINLEVEL_OUTOF10: 7
PAINLEVEL_OUTOF10: 7
PAINLEVEL_OUTOF10: 6
PAINLEVEL_OUTOF10: 8
PAINLEVEL_OUTOF10: 6
PAINLEVEL_OUTOF10: 7
PAINLEVEL_OUTOF10: 6
PAINLEVEL_OUTOF10: 6

## 2024-10-09 ASSESSMENT — PAIN DESCRIPTION - PAIN TYPE
TYPE: CHRONIC PAIN

## 2024-10-09 ASSESSMENT — PAIN DESCRIPTION - DESCRIPTORS
DESCRIPTORS: ACHING

## 2024-10-09 ASSESSMENT — PAIN DESCRIPTION - LOCATION
LOCATION: BACK;LEG
LOCATION: BACK
LOCATION: BACK
LOCATION: BACK;LEG

## 2024-10-09 ASSESSMENT — PAIN - FUNCTIONAL ASSESSMENT
PAIN_FUNCTIONAL_ASSESSMENT: ACTIVITIES ARE NOT PREVENTED

## 2024-10-09 ASSESSMENT — PAIN DESCRIPTION - ORIENTATION
ORIENTATION: LOWER
ORIENTATION: LOWER;RIGHT;LEFT
ORIENTATION: LOWER;RIGHT
ORIENTATION: LOWER;RIGHT;LEFT
ORIENTATION: LOWER;LEFT
ORIENTATION: LEFT;LOWER
ORIENTATION: LOWER

## 2024-10-09 ASSESSMENT — PAIN DESCRIPTION - ONSET
ONSET: ON-GOING

## 2024-10-09 ASSESSMENT — PAIN DESCRIPTION - FREQUENCY
FREQUENCY: CONTINUOUS

## 2024-10-10 PROCEDURE — 6370000000 HC RX 637 (ALT 250 FOR IP): Performed by: INTERNAL MEDICINE

## 2024-10-10 PROCEDURE — 94761 N-INVAS EAR/PLS OXIMETRY MLT: CPT

## 2024-10-10 PROCEDURE — 6360000002 HC RX W HCPCS: Performed by: INTERNAL MEDICINE

## 2024-10-10 PROCEDURE — 6370000000 HC RX 637 (ALT 250 FOR IP): Performed by: HEALTH CARE PROVIDER

## 2024-10-10 PROCEDURE — 6360000002 HC RX W HCPCS: Performed by: EMERGENCY MEDICINE

## 2024-10-10 PROCEDURE — 1100000000 HC RM PRIVATE

## 2024-10-10 PROCEDURE — 2580000003 HC RX 258: Performed by: PHYSICIAN ASSISTANT

## 2024-10-10 PROCEDURE — 99232 SBSQ HOSP IP/OBS MODERATE 35: CPT | Performed by: INTERNAL MEDICINE

## 2024-10-10 PROCEDURE — 6370000000 HC RX 637 (ALT 250 FOR IP): Performed by: STUDENT IN AN ORGANIZED HEALTH CARE EDUCATION/TRAINING PROGRAM

## 2024-10-10 PROCEDURE — 6370000000 HC RX 637 (ALT 250 FOR IP): Performed by: FAMILY MEDICINE

## 2024-10-10 RX ORDER — IBUPROFEN 400 MG/1
400 TABLET, FILM COATED ORAL EVERY 6 HOURS PRN
Status: DISCONTINUED | OUTPATIENT
Start: 2024-10-10 | End: 2024-10-11 | Stop reason: HOSPADM

## 2024-10-10 RX ORDER — HYDROMORPHONE HYDROCHLORIDE 1 MG/ML
1 INJECTION, SOLUTION INTRAMUSCULAR; INTRAVENOUS; SUBCUTANEOUS EVERY 4 HOURS PRN
Status: DISCONTINUED | OUTPATIENT
Start: 2024-10-10 | End: 2024-10-11 | Stop reason: HOSPADM

## 2024-10-10 RX ADMIN — HYDROMORPHONE HYDROCHLORIDE 4 MG: 4 TABLET ORAL at 11:57

## 2024-10-10 RX ADMIN — HYDROXYUREA 500 MG: 500 CAPSULE ORAL at 21:27

## 2024-10-10 RX ADMIN — HYDROMORPHONE HYDROCHLORIDE 4 MG: 4 TABLET ORAL at 20:02

## 2024-10-10 RX ADMIN — HYDROXYUREA 500 MG: 500 CAPSULE ORAL at 09:58

## 2024-10-10 RX ADMIN — OXYCODONE HYDROCHLORIDE 20 MG: 20 TABLET, FILM COATED, EXTENDED RELEASE ORAL at 10:00

## 2024-10-10 RX ADMIN — THERA TABS 1 TABLET: TAB at 10:00

## 2024-10-10 RX ADMIN — PANTOPRAZOLE SODIUM 40 MG: 40 TABLET, DELAYED RELEASE ORAL at 06:02

## 2024-10-10 RX ADMIN — HYDROMORPHONE HYDROCHLORIDE 4 MG: 4 TABLET ORAL at 16:06

## 2024-10-10 RX ADMIN — HYDROMORPHONE HYDROCHLORIDE 1 MG: 1 INJECTION, SOLUTION INTRAMUSCULAR; INTRAVENOUS; SUBCUTANEOUS at 06:00

## 2024-10-10 RX ADMIN — ENOXAPARIN SODIUM 40 MG: 100 INJECTION SUBCUTANEOUS at 09:59

## 2024-10-10 RX ADMIN — SODIUM CHLORIDE, PRESERVATIVE FREE 10 ML: 5 INJECTION INTRAVENOUS at 10:01

## 2024-10-10 RX ADMIN — FOLIC ACID 1 MG: 1 TABLET ORAL at 10:00

## 2024-10-10 RX ADMIN — HYDROMORPHONE HYDROCHLORIDE 4 MG: 4 TABLET ORAL at 04:18

## 2024-10-10 RX ADMIN — OXYCODONE HYDROCHLORIDE 20 MG: 20 TABLET, FILM COATED, EXTENDED RELEASE ORAL at 21:23

## 2024-10-10 RX ADMIN — HYDROMORPHONE HYDROCHLORIDE 1 MG: 1 INJECTION, SOLUTION INTRAMUSCULAR; INTRAVENOUS; SUBCUTANEOUS at 02:32

## 2024-10-10 RX ADMIN — HYDROMORPHONE HYDROCHLORIDE 4 MG: 4 TABLET ORAL at 08:36

## 2024-10-10 ASSESSMENT — PAIN - FUNCTIONAL ASSESSMENT
PAIN_FUNCTIONAL_ASSESSMENT: PREVENTS OR INTERFERES SOME ACTIVE ACTIVITIES AND ADLS
PAIN_FUNCTIONAL_ASSESSMENT: ACTIVITIES ARE NOT PREVENTED
PAIN_FUNCTIONAL_ASSESSMENT: PREVENTS OR INTERFERES SOME ACTIVE ACTIVITIES AND ADLS
PAIN_FUNCTIONAL_ASSESSMENT: PREVENTS OR INTERFERES SOME ACTIVE ACTIVITIES AND ADLS

## 2024-10-10 ASSESSMENT — PAIN DESCRIPTION - ONSET
ONSET: ON-GOING

## 2024-10-10 ASSESSMENT — PAIN DESCRIPTION - LOCATION
LOCATION: LEG;BACK
LOCATION: BACK;LEG
LOCATION: LEG;BACK
LOCATION: BACK;LEG
LOCATION: BACK;LEG
LOCATION: LEG;BACK
LOCATION: BACK;FOOT
LOCATION: BACK;LEG

## 2024-10-10 ASSESSMENT — PAIN SCALES - GENERAL
PAINLEVEL_OUTOF10: 6
PAINLEVEL_OUTOF10: 6
PAINLEVEL_OUTOF10: 7
PAINLEVEL_OUTOF10: 6
PAINLEVEL_OUTOF10: 0
PAINLEVEL_OUTOF10: 8
PAINLEVEL_OUTOF10: 7
PAINLEVEL_OUTOF10: 4
PAINLEVEL_OUTOF10: 6
PAINLEVEL_OUTOF10: 6
PAINLEVEL_OUTOF10: 4
PAINLEVEL_OUTOF10: 4
PAINLEVEL_OUTOF10: 7
PAINLEVEL_OUTOF10: 4

## 2024-10-10 ASSESSMENT — PAIN DESCRIPTION - FREQUENCY
FREQUENCY: CONTINUOUS

## 2024-10-10 ASSESSMENT — PAIN DESCRIPTION - DESCRIPTORS
DESCRIPTORS: ACHING;CRAMPING
DESCRIPTORS: ACHING;CRAMPING
DESCRIPTORS: ACHING
DESCRIPTORS: ACHING;CRAMPING
DESCRIPTORS: ACHING
DESCRIPTORS: ACHING;CRAMPING
DESCRIPTORS: ACHING;CRAMPING
DESCRIPTORS: ACHING
DESCRIPTORS: ACHING

## 2024-10-10 ASSESSMENT — PAIN DESCRIPTION - ORIENTATION
ORIENTATION: LEFT;RIGHT
ORIENTATION: RIGHT
ORIENTATION: RIGHT
ORIENTATION: LOWER;RIGHT
ORIENTATION: RIGHT

## 2024-10-10 ASSESSMENT — PAIN DESCRIPTION - PAIN TYPE
TYPE: CHRONIC PAIN

## 2024-10-10 ASSESSMENT — PAIN DESCRIPTION - DIRECTION
RADIATING_TOWARDS: BACK
RADIATING_TOWARDS: BACK

## 2024-10-10 NOTE — PALLIATIVE CARE
Palliative Medicine    Goals of care defined. Patient has voiced his wishes for Full Code. Pain is being managed by oncology.      Will sign off.  Please re-consult team as needed/if appropriate.     Thank you for the Palliative Medicine consult and allowing us to participate in the care of Mr. Galan      CODE STATUS:FULL CODE      Maude Rainey RN  Palliative Medicine Inpatient RN  [facility]  Palliative COPE Line: [phone] ([phone])

## 2024-10-11 VITALS
SYSTOLIC BLOOD PRESSURE: 104 MMHG | WEIGHT: 193 LBS | BODY MASS INDEX: 26.14 KG/M2 | RESPIRATION RATE: 18 BRPM | HEART RATE: 104 BPM | OXYGEN SATURATION: 97 % | DIASTOLIC BLOOD PRESSURE: 68 MMHG | TEMPERATURE: 98.2 F | HEIGHT: 72 IN

## 2024-10-11 LAB
ALBUMIN SERPL-MCNC: 2.3 G/DL (ref 3.4–5)
ALBUMIN/GLOB SERPL: 0.4 (ref 0.8–1.7)
ALP SERPL-CCNC: 482 U/L (ref 45–117)
ALT SERPL-CCNC: 52 U/L (ref 16–61)
ANION GAP SERPL CALC-SCNC: 5 MMOL/L (ref 3–18)
AST SERPL-CCNC: 53 U/L (ref 10–38)
BASOPHILS # BLD: 0.1 K/UL (ref 0–0.1)
BASOPHILS NFR BLD: 1 % (ref 0–2)
BILIRUB SERPL-MCNC: 1.4 MG/DL (ref 0.2–1)
BUN SERPL-MCNC: 7 MG/DL (ref 7–18)
BUN/CREAT SERPL: 9 (ref 12–20)
CALCIUM SERPL-MCNC: 9.2 MG/DL (ref 8.5–10.1)
CHLORIDE SERPL-SCNC: 96 MMOL/L (ref 100–111)
CO2 SERPL-SCNC: 27 MMOL/L (ref 21–32)
CREAT SERPL-MCNC: 0.82 MG/DL (ref 0.6–1.3)
DIFFERENTIAL METHOD BLD: ABNORMAL
EOSINOPHIL # BLD: 0.2 K/UL (ref 0–0.4)
EOSINOPHIL NFR BLD: 2 % (ref 0–5)
ERYTHROCYTE [DISTWIDTH] IN BLOOD BY AUTOMATED COUNT: 19.9 % (ref 11.6–14.5)
GLOBULIN SER CALC-MCNC: 6.4 G/DL (ref 2–4)
GLUCOSE SERPL-MCNC: 117 MG/DL (ref 74–99)
HCT VFR BLD AUTO: 25.8 % (ref 36–48)
HGB BLD-MCNC: 8.5 G/DL (ref 13–16)
IMM GRANULOCYTES # BLD AUTO: 0 K/UL (ref 0–0.04)
IMM GRANULOCYTES NFR BLD AUTO: 0 % (ref 0–0.5)
LYMPHOCYTES # BLD: 3.1 K/UL (ref 0.9–3.6)
LYMPHOCYTES NFR BLD: 35 % (ref 21–52)
MCH RBC QN AUTO: 28.1 PG (ref 24–34)
MCHC RBC AUTO-ENTMCNC: 32.9 G/DL (ref 31–37)
MCV RBC AUTO: 85.4 FL (ref 78–100)
MONOCYTES # BLD: 0.9 K/UL (ref 0.05–1.2)
MONOCYTES NFR BLD: 10 % (ref 3–10)
NEUTS SEG # BLD: 4.5 K/UL (ref 1.8–8)
NEUTS SEG NFR BLD: 52 % (ref 40–73)
NRBC # BLD: 0.04 K/UL (ref 0–0.01)
NRBC BLD-RTO: 0.5 PER 100 WBC
PLATELET # BLD AUTO: 986 K/UL (ref 135–420)
PMV BLD AUTO: 8.3 FL (ref 9.2–11.8)
POTASSIUM SERPL-SCNC: 3.9 MMOL/L (ref 3.5–5.5)
PROT SERPL-MCNC: 8.7 G/DL (ref 6.4–8.2)
RBC # BLD AUTO: 3.02 M/UL (ref 4.35–5.65)
SODIUM SERPL-SCNC: 128 MMOL/L (ref 136–145)
WBC # BLD AUTO: 8.8 K/UL (ref 4.6–13.2)

## 2024-10-11 PROCEDURE — 6370000000 HC RX 637 (ALT 250 FOR IP): Performed by: STUDENT IN AN ORGANIZED HEALTH CARE EDUCATION/TRAINING PROGRAM

## 2024-10-11 PROCEDURE — 6370000000 HC RX 637 (ALT 250 FOR IP): Performed by: INTERNAL MEDICINE

## 2024-10-11 PROCEDURE — 2580000003 HC RX 258: Performed by: PHYSICIAN ASSISTANT

## 2024-10-11 PROCEDURE — 6360000002 HC RX W HCPCS: Performed by: EMERGENCY MEDICINE

## 2024-10-11 PROCEDURE — 6370000000 HC RX 637 (ALT 250 FOR IP): Performed by: HEALTH CARE PROVIDER

## 2024-10-11 PROCEDURE — 6370000000 HC RX 637 (ALT 250 FOR IP): Performed by: FAMILY MEDICINE

## 2024-10-11 PROCEDURE — 94761 N-INVAS EAR/PLS OXIMETRY MLT: CPT

## 2024-10-11 PROCEDURE — 99239 HOSP IP/OBS DSCHRG MGMT >30: CPT | Performed by: INTERNAL MEDICINE

## 2024-10-11 PROCEDURE — 80053 COMPREHEN METABOLIC PANEL: CPT

## 2024-10-11 PROCEDURE — 85025 COMPLETE CBC W/AUTO DIFF WBC: CPT

## 2024-10-11 RX ORDER — IBUPROFEN 400 MG/1
400 TABLET, FILM COATED ORAL EVERY 8 HOURS PRN
Qty: 120 TABLET | Refills: 3 | Status: SHIPPED | OUTPATIENT
Start: 2024-10-11

## 2024-10-11 RX ORDER — PANTOPRAZOLE SODIUM 40 MG/1
40 TABLET, DELAYED RELEASE ORAL
Qty: 30 TABLET | Refills: 3 | Status: SHIPPED | OUTPATIENT
Start: 2024-10-12

## 2024-10-11 RX ADMIN — HYDROMORPHONE HYDROCHLORIDE 4 MG: 4 TABLET ORAL at 03:48

## 2024-10-11 RX ADMIN — FOLIC ACID 1 MG: 1 TABLET ORAL at 08:42

## 2024-10-11 RX ADMIN — THERA TABS 1 TABLET: TAB at 08:42

## 2024-10-11 RX ADMIN — OXYCODONE HYDROCHLORIDE 20 MG: 20 TABLET, FILM COATED, EXTENDED RELEASE ORAL at 10:04

## 2024-10-11 RX ADMIN — HYDROMORPHONE HYDROCHLORIDE 4 MG: 4 TABLET ORAL at 12:30

## 2024-10-11 RX ADMIN — SODIUM CHLORIDE, PRESERVATIVE FREE 10 ML: 5 INJECTION INTRAVENOUS at 08:43

## 2024-10-11 RX ADMIN — HYDROMORPHONE HYDROCHLORIDE 4 MG: 4 TABLET ORAL at 08:42

## 2024-10-11 RX ADMIN — HYDROXYUREA 500 MG: 500 CAPSULE ORAL at 08:42

## 2024-10-11 RX ADMIN — PANTOPRAZOLE SODIUM 40 MG: 40 TABLET, DELAYED RELEASE ORAL at 05:35

## 2024-10-11 RX ADMIN — HYDROMORPHONE HYDROCHLORIDE 4 MG: 4 TABLET ORAL at 00:01

## 2024-10-11 RX ADMIN — ENOXAPARIN SODIUM 40 MG: 100 INJECTION SUBCUTANEOUS at 08:43

## 2024-10-11 ASSESSMENT — PAIN SCALES - GENERAL
PAINLEVEL_OUTOF10: 0
PAINLEVEL_OUTOF10: 5
PAINLEVEL_OUTOF10: 4
PAINLEVEL_OUTOF10: 4
PAINLEVEL_OUTOF10: 5
PAINLEVEL_OUTOF10: 4
PAINLEVEL_OUTOF10: 6
PAINLEVEL_OUTOF10: 6

## 2024-10-11 ASSESSMENT — PAIN DESCRIPTION - DESCRIPTORS
DESCRIPTORS: ACHING;CRAMPING
DESCRIPTORS: ACHING;CRUSHING

## 2024-10-11 ASSESSMENT — PAIN DESCRIPTION - LOCATION: LOCATION: BACK

## 2024-10-11 ASSESSMENT — PAIN DESCRIPTION - PAIN TYPE
TYPE: CHRONIC PAIN
TYPE: CHRONIC PAIN

## 2024-10-11 ASSESSMENT — PAIN DESCRIPTION - FREQUENCY
FREQUENCY: CONTINUOUS
FREQUENCY: CONTINUOUS

## 2024-10-11 ASSESSMENT — PAIN DESCRIPTION - ONSET: ONSET: GRADUAL

## 2024-10-11 ASSESSMENT — PAIN DESCRIPTION - ORIENTATION
ORIENTATION: MID
ORIENTATION: MID

## 2024-10-11 NOTE — CARE COORDINATION
10/11/24 1250   IMM Letter   IMM Letter given to Patient/Family/Significant other/Guardian/POA/by: Odtete Andujar RN   IMM Letter date given: 10/11/24   IMM Letter time given: 1250  (Patient waived 4 hour notice of discharge requirement by Medicare)       Medicare pt has received, reviewed, and signed 2nd IM letter informing them of their right to appeal the discharge.  Signed copy has been placed on pt bedside chart.       Odette Andujar, MSN, RN  Care Manager    [place] [address]  Office: [phone]  Fax: [phone]     [facility] Care Managers are on-call evenings from 4:30 pm until 7:00 pm. After 7:00 pm, please contact Nurse’s Admin at ext. [phone] for LYFT rides only.

## 2024-10-11 NOTE — CARE COORDINATION
10/11/24 1309   /Social Work Whiteboard Notes   /Social Work Whiteboard Green-10/11/24. patient is ready to discharge. Family will provide transport. No needs from .        Odette Andujar, MSN, RN  Care Manager    [place] [address]  Office: [phone]  Fax: [phone]     [facility] Care Managers are on-call evenings from 4:30 pm until 7:00 pm. After 7:00 pm, please contact Nurse’s Admin at ext. [phone] for LYFT rides only.

## 2024-10-11 NOTE — PLAN OF CARE
Problem: Pain  Goal: Verbalizes/displays adequate comfort level or baseline comfort level  10/1/2024 2256 by Radha Kyle, RN  Outcome: Progressing  10/1/2024 1819 by Kimberly Torres RN  Outcome: Progressing  Flowsheets  Taken 10/1/2024 1400  Verbalizes/displays adequate comfort level or baseline comfort level:   Encourage patient to monitor pain and request assistance   Assess pain using appropriate pain scale  Taken 10/1/2024 1324  Verbalizes/displays adequate comfort level or baseline comfort level:   Encourage patient to monitor pain and request assistance   Assess pain using appropriate pain scale  Taken 10/1/2024 1044  Verbalizes/displays adequate comfort level or baseline comfort level:   Encourage patient to monitor pain and request assistance   Assess pain using appropriate pain scale  Taken 10/1/2024 0944  Verbalizes/displays adequate comfort level or baseline comfort level:   Encourage patient to monitor pain and request assistance   Assess pain using appropriate pain scale  Taken 10/1/2024 0914  Verbalizes/displays adequate comfort level or baseline comfort level:   Encourage patient to monitor pain and request assistance   Assess pain using appropriate pain scale     Problem: Safety - Adult  Goal: Free from fall injury  10/1/2024 2256 by Radha Kyle, RN  Outcome: Progressing  10/1/2024 1819 by Kimberly Torres RN  Outcome: Progressing     Problem: Nutrition Deficit:  Goal: Optimize nutritional status  10/1/2024 2256 by Radha Kyle, RN  Outcome: Progressing  10/1/2024 1819 by Kimberly Torres, RN  Outcome: Progressing     
  Problem: Pain  Goal: Verbalizes/displays adequate comfort level or baseline comfort level  10/10/2024 0129 by Isabel Lima RN  Outcome: Progressing  10/9/2024 1614 by Torie Helms RN  Outcome: Progressing  Goal: Pain level will decrease  Description: Pain level will be managed throughout shift with pharmaceutical and non pharmaceutical interventions to ensure patient comfort and satisfaction   10/10/2024 0129 by Isabel Lima RN  Outcome: Progressing  10/9/2024 1614 by Torie Helms RN  Outcome: Progressing  Goal: Pain controlled to Patient's (Family's) desired goal  10/10/2024 0129 by Isabel Lima RN  Outcome: Progressing  10/9/2024 1614 by Torie Helms RN  Outcome: Progressing     Problem: Safety - Adult  Goal: Free from fall injury  Description: Patient educated on use of call bell  Patient remained free of injury and falls throughout duration of shift   Bedside table and call bell always within patient reach  Bed in the lowest position with x2 side railings up  10/10/2024 0129 by Isabel Lima RN  Outcome: Progressing  10/9/2024 1614 by Troie Helms RN  Outcome: Progressing     Problem: Nutrition Deficit:  Goal: Optimize nutritional status  Description: Encourage patient to eat at least 75% of meals  10/10/2024 0129 by Isabel Lima RN  Outcome: Progressing  10/9/2024 1614 by Torie Helms RN  Outcome: Progressing  Goal: Bowel function will improve  Description: Patient will have normal   10/10/2024 0129 by Isabel Lima RN  Outcome: Progressing  10/9/2024 1614 by Torie Helms RN  Outcome: Progressing     
  Problem: Pain  Goal: Verbalizes/displays adequate comfort level or baseline comfort level  10/11/2024 1524 by Louisa Guillaume, RN  Outcome: Adequate for Discharge  Flowsheets (Taken 10/11/2024 1457)  Verbalizes/displays adequate comfort level or baseline comfort level:   Encourage patient to monitor pain and request assistance   Assess pain using appropriate pain scale   Administer analgesics based on type and severity of pain and evaluate response   Implement non-pharmacological measures as appropriate and evaluate response   Notify Licensed Independent Practitioner if interventions unsuccessful or patient reports new pain  10/11/2024 0146 by Christy Larsen RN  Outcome: Progressing  Goal: Pain level will decrease  Description: Pain level will be managed throughout shift with pharmaceutical and non pharmaceutical interventions to ensure patient comfort and satisfaction   Outcome: Adequate for Discharge  Goal: Pain controlled to Patient's (Family's) desired goal  Description: Patients pain will be controlled throughout duration of shift with use of prescribed medications. Patient satisfied.  Outcome: Adequate for Discharge     Problem: Safety - Adult  Goal: Free from fall injury  Description: Patient educated on use of call bell  Patient remained free of injury and falls throughout duration of shift   Bedside table and call bell always within patient reach  Bed in the lowest position with x2 side railings up  Outcome: Adequate for Discharge     Problem: Nutrition Deficit:  Goal: Optimize nutritional status  Description: Encourage patient to eat at least 75% of meals  Outcome: Adequate for Discharge  Goal: Bowel function will improve  Outcome: Adequate for Discharge     
  Problem: Pain  Goal: Verbalizes/displays adequate comfort level or baseline comfort level  10/2/2024 2150 by Radha Kyle RN  Outcome: Progressing  10/2/2024 1452 by Catalina Madera RN  Outcome: Progressing  Flowsheets  Taken 10/2/2024 0340 by Radha Kyle RN  Verbalizes/displays adequate comfort level or baseline comfort level:   Encourage patient to monitor pain and request assistance   Assess pain using appropriate pain scale  Taken 10/2/2024 0227 by Radha Kyle RN  Verbalizes/displays adequate comfort level or baseline comfort level:   Encourage patient to monitor pain and request assistance   Assess pain using appropriate pain scale     Problem: Safety - Adult  Goal: Free from fall injury  10/2/2024 2150 by Radha Kyle RN  Outcome: Progressing  10/2/2024 1452 by Catalina Madera RN  Outcome: Progressing  Flowsheets (Taken 10/2/2024 1451)  Free From Fall Injury: Instruct family/caregiver on patient safety     Problem: Nutrition Deficit:  Goal: Optimize nutritional status  10/2/2024 2150 by Radha Kyle RN  Outcome: Progressing  10/2/2024 1452 by Catalina Madera RN  Outcome: Progressing  Flowsheets (Taken 10/1/2024 0417 by Latia Smalls RN)  Nutrient intake appropriate for improving, restoring, or maintaining nutritional needs:   Assess nutritional status and recommend course of action   Monitor oral intake, labs, and treatment plans  Note: Encourage patient to eat at least 50% of meals.        
  Problem: Pain  Goal: Verbalizes/displays adequate comfort level or baseline comfort level  10/4/2024 0303 by Stephy Gómez RN  Outcome: Progressing  10/3/2024 1516 by Jared Ryder RN  Outcome: Progressing  Flowsheets  Taken 10/3/2024 0448 by Radha Kyle RN  Verbalizes/displays adequate comfort level or baseline comfort level:   Encourage patient to monitor pain and request assistance   Assess pain using appropriate pain scale  Taken 10/3/2024 0314 by Radha Kyle RN  Verbalizes/displays adequate comfort level or baseline comfort level:   Encourage patient to monitor pain and request assistance   Assess pain using appropriate pain scale     Problem: Safety - Adult  Goal: Free from fall injury  10/4/2024 0303 by Stephy Gómez RN  Outcome: Progressing  10/3/2024 1516 by Jared Ryder RN  Outcome: Progressing     Problem: Nutrition Deficit:  Goal: Optimize nutritional status  10/3/2024 1516 by Jared Ryder RN  Outcome: Progressing     
  Problem: Pain  Goal: Verbalizes/displays adequate comfort level or baseline comfort level  10/4/2024 1339 by Jared Ryder RN  Outcome: Progressing  10/4/2024 0303 by Stephy Gómez RN  Outcome: Progressing     Problem: Safety - Adult  Goal: Free from fall injury  10/4/2024 1339 by Jared Ryder RN  Outcome: Progressing  10/4/2024 0303 by Stephy Gómez RN  Outcome: Progressing     Problem: Nutrition Deficit:  Goal: Optimize nutritional status  Outcome: Progressing     
  Problem: Pain  Goal: Verbalizes/displays adequate comfort level or baseline comfort level  10/4/2024 2323 by Stephy Gómez RN  Outcome: Progressing  10/4/2024 1339 by Jared Ryder RN  Outcome: Progressing     Problem: Safety - Adult  Goal: Free from fall injury  10/4/2024 2323 by Stephy Gómez RN  Outcome: Progressing  10/4/2024 1339 by Jared Ryder RN  Outcome: Progressing     Problem: Nutrition Deficit:  Goal: Optimize nutritional status  10/4/2024 2323 by Stephy Gómez RN  Outcome: Progressing  10/4/2024 1339 by Jared Ryder RN  Outcome: Progressing     
  Problem: Pain  Goal: Verbalizes/displays adequate comfort level or baseline comfort level  10/5/2024 1455 by Louisa Guillaume RN  Outcome: Progressing  Flowsheets (Taken 10/5/2024 1455)  Verbalizes/displays adequate comfort level or baseline comfort level:   Encourage patient to monitor pain and request assistance   Assess pain using appropriate pain scale   Administer analgesics based on type and severity of pain and evaluate response   Implement non-pharmacological measures as appropriate and evaluate response   Notify Licensed Independent Practitioner if interventions unsuccessful or patient reports new pain   Consider cultural and social influences on pain and pain management  10/5/2024 1454 by Louias Guillaume RN  Outcome: Progressing  Goal: Pain level will decrease  Description: Pain level will decrease  10/5/2024 1455 by Louisa Guillaume RN  Outcome: Progressing  10/5/2024 1454 by Louisa Guillaume RN  Outcome: Progressing  Goal: Pain controlled to Patient's (Family's) desired goal  10/5/2024 1455 by Louisa Guillaume RN  Outcome: Progressing  10/5/2024 1454 by Louisa Guillaume RN  Outcome: Progressing     Problem: Safety - Adult  Goal: Free from fall injury  10/5/2024 1455 by Louisa Guillaume RN  Outcome: Progressing  10/5/2024 1454 by Louisa Guillaume RN  Outcome: Progressing     Problem: Nutrition Deficit:  Goal: Optimize nutritional status  Description: Encourage patient to eat at least 75% of meals  10/5/2024 1455 by Louisa Guillaume RN  Outcome: Progressing  10/5/2024 1454 by Louisa Guillaume RN  Outcome: Progressing  Goal: Bowel function will improve  Description: Bowel function will improve  10/5/2024 1455 by Louisa Guillaume RN  Outcome: Progressing  10/5/2024 1454 by Louisa Guillaume RN  Outcome: Progressing        
  Problem: Pain  Goal: Verbalizes/displays adequate comfort level or baseline comfort level  10/6/2024 0222 by Caroline Stanley RN  Outcome: Progressing  Flowsheets (Taken 10/6/2024 0222)  Verbalizes/displays adequate comfort level or baseline comfort level:   Encourage patient to monitor pain and request assistance   Assess pain using appropriate pain scale   Administer analgesics based on type and severity of pain and evaluate response   Implement non-pharmacological measures as appropriate and evaluate response  10/5/2024 1455 by Louisa Guillaume RN  Outcome: Progressing  Flowsheets (Taken 10/5/2024 1455)  Verbalizes/displays adequate comfort level or baseline comfort level:   Encourage patient to monitor pain and request assistance   Assess pain using appropriate pain scale   Administer analgesics based on type and severity of pain and evaluate response   Implement non-pharmacological measures as appropriate and evaluate response   Notify Licensed Independent Practitioner if interventions unsuccessful or patient reports new pain   Consider cultural and social influences on pain and pain management  10/5/2024 1454 by Louisa Guillaume RN  Outcome: Progressing  Goal: Pain level will decrease  Description: Pain level will decrease  10/6/2024 0222 by Caroline Stanley RN  Outcome: Progressing  10/5/2024 1455 by Louisa Guillaume RN  Outcome: Progressing  10/5/2024 1454 by Louisa Guillaume RN  Outcome: Progressing  Goal: Pain controlled to Patient's (Family's) desired goal  10/6/2024 0222 by Caroline Stanley RN  Outcome: Progressing  10/5/2024 1455 by Louisa Guillaume RN  Outcome: Progressing  10/5/2024 1454 by Louisa Guillaume RN  Outcome: Progressing     Problem: Safety - Adult  Goal: Free from fall injury  10/6/2024 0222 by Caroline Stanley RN  Outcome: Progressing  Flowsheets (Taken 10/6/2024 0222)  Free From Fall Injury: Instruct family/caregiver on patient safety  Note: Enforce patient safety due to around 
  Problem: Pain  Goal: Verbalizes/displays adequate comfort level or baseline comfort level  10/7/2024 0721 by Cely Catherine, RN  Outcome: Progressing     Problem: Safety - Adult  Goal: Free from fall injury  10/7/2024 0721 by Cely Catherine, RN  Outcome: Progressing     
  Problem: Pain  Goal: Verbalizes/displays adequate comfort level or baseline comfort level  10/7/2024 0721 by Cely Catherine, RN  Outcome: Progressing     Problem: Safety - Adult  Goal: Free from fall injury  Description: Patient educated on use of call bell  Patient remained free of injury and falls throughout duration of shift   Bedside table and call bell always within patient reach  Bed in the lowest position with x2 side railings up  Recent Flowsheet Documentation  Taken 10/7/2024 0733 by Cookie Cullen, RN  Free From Fall Injury: Instruct family/caregiver on patient safety  10/7/2024 0721 by Cely Catherine, RN  Outcome: Progressing     
  Problem: Pain  Goal: Verbalizes/displays adequate comfort level or baseline comfort level  10/9/2024 0829 by Terrance Zimmerman RN  Outcome: Progressing  10/8/2024 1953 by Louisa Guillaume RN  Outcome: Progressing  Goal: Pain level will decrease  Description: Pain level will be managed throughout shift with pharmaceutical and non pharmaceutical interventions to ensure patient comfort and satisfaction   10/9/2024 0829 by Terrance Zimmerman RN  Outcome: Progressing  10/8/2024 1953 by Louisa Guillaume RN  Outcome: Progressing  Goal: Pain controlled to Patient's (Family's) desired goal  10/9/2024 0829 by Terrance Zimmerman RN  Outcome: Progressing  10/8/2024 1953 by Louisa Guillaume RN  Outcome: Progressing     
  Problem: Pain  Goal: Verbalizes/displays adequate comfort level or baseline comfort level  10/9/2024 1614 by Torie Helms RN  Outcome: Progressing  10/9/2024 0829 by Terrance Zimmerman RN  Outcome: Progressing  Goal: Pain level will decrease  Description: Pain level will be managed throughout shift with pharmaceutical and non pharmaceutical interventions to ensure patient comfort and satisfaction   10/9/2024 1614 by Torie Helms RN  Outcome: Progressing  10/9/2024 0829 by Terrance Zimmerman RN  Outcome: Progressing  Goal: Pain controlled to Patient's (Family's) desired goal  10/9/2024 1614 by Torie Helms RN  Outcome: Progressing  10/9/2024 0829 by Terrance Zimmerman RN  Outcome: Progressing     Problem: Safety - Adult  Goal: Free from fall injury  Description: Patient educated on use of call bell  Patient remained free of injury and falls throughout duration of shift   Bedside table and call bell always within patient reach  Bed in the lowest position with x2 side railings up  10/9/2024 1614 by Torie Helms RN  Outcome: Progressing  10/9/2024 0829 by Terrance Zimmerman RN  Outcome: Progressing     Problem: Nutrition Deficit:  Goal: Optimize nutritional status  Description: Encourage patient to eat at least 75% of meals  10/9/2024 1614 by Torie Helms RN  Outcome: Progressing  10/9/2024 0829 by Terrance Zimmerman RN  Outcome: Progressing  Goal: Bowel function will improve  Description: Patient will have normal   10/9/2024 1614 by Torie Helms RN  Outcome: Progressing  10/9/2024 0829 by Terrance Zimmerman RN  Outcome: Progressing     
  Problem: Pain  Goal: Verbalizes/displays adequate comfort level or baseline comfort level  9/14/2024 0805 by Aleksandra Ortega, RN  Outcome: Progressing  Flowsheets (Taken 9/14/2024 0805)  Verbalizes/displays adequate comfort level or baseline comfort level:   Encourage patient to monitor pain and request assistance   Assess pain using appropriate pain scale   Implement non-pharmacological measures as appropriate and evaluate response   Notify Licensed Independent Practitioner if interventions unsuccessful or patient reports new pain   Consider cultural and social influences on pain and pain management   Administer analgesics based on type and severity of pain and evaluate response  9/14/2024 0702 by Rosemarie Catherine, RN  Outcome: Progressing     Problem: Safety - Adult  Goal: Free from fall injury  9/14/2024 0805 by Aleksandra Ortega, RN  Outcome: Progressing  Flowsheets (Taken 9/14/2024 0630 by Rosemarie Catherine, RN)  Free From Fall Injury: Instruct family/caregiver on patient safety  9/14/2024 0702 by Rosemarie Catherine, RN  Outcome: Progressing  Flowsheets (Taken 9/14/2024 0630)  Free From Fall Injury: Instruct family/caregiver on patient safety     
  Problem: Pain  Goal: Verbalizes/displays adequate comfort level or baseline comfort level  9/15/2024 0956 by Kenna Foley, RN  Outcome: Progressing  Flowsheets  Taken 9/15/2024 0842  Verbalizes/displays adequate comfort level or baseline comfort level:   Encourage patient to monitor pain and request assistance   Assess pain using appropriate pain scale  Taken 9/15/2024 0724  Verbalizes/displays adequate comfort level or baseline comfort level:   Encourage patient to monitor pain and request assistance   Assess pain using appropriate pain scale  9/14/2024 2254 by Radha Kyle, RN  Outcome: Progressing     Problem: Safety - Adult  Goal: Free from fall injury  9/15/2024 0956 by Kenna Foley, RN  Outcome: Progressing  9/14/2024 2254 by Radha Kyle, RN  Outcome: Progressing     
  Problem: Pain  Goal: Verbalizes/displays adequate comfort level or baseline comfort level  9/16/2024 2332 by Stephy Gómez RN  Outcome: Progressing  Flowsheets  Taken 9/16/2024 1810 by Kenna Foley RN  Verbalizes/displays adequate comfort level or baseline comfort level:   Encourage patient to monitor pain and request assistance   Assess pain using appropriate pain scale  Taken 9/16/2024 1602 by Kenna Foley RN  Verbalizes/displays adequate comfort level or baseline comfort level:   Encourage patient to monitor pain and request assistance   Assess pain using appropriate pain scale  Taken 9/16/2024 1514 by Kenna Foley RN  Verbalizes/displays adequate comfort level or baseline comfort level:   Encourage patient to monitor pain and request assistance   Assess pain using appropriate pain scale  Taken 9/16/2024 1322 by Kenna Foley RN  Verbalizes/displays adequate comfort level or baseline comfort level:   Encourage patient to monitor pain and request assistance   Assess pain using appropriate pain scale  Taken 9/16/2024 1222 by Kenna Foley RN  Verbalizes/displays adequate comfort level or baseline comfort level:   Encourage patient to monitor pain and request assistance   Assess pain using appropriate pain scale  Taken 9/16/2024 1011 by Kenna Foley RN  Verbalizes/displays adequate comfort level or baseline comfort level:   Encourage patient to monitor pain and request assistance   Assess pain using appropriate pain scale  9/16/2024 0933 by Kenna Foley RN  Outcome: Progressing  Flowsheets (Taken 9/16/2024 0911)  Verbalizes/displays adequate comfort level or baseline comfort level:   Encourage patient to monitor pain and request assistance   Assess pain using appropriate pain scale     Problem: Safety - Adult  Goal: Free from fall injury  9/16/2024 2332 by Stephy Gómez RN  Outcome: Progressing  9/16/2024 0933 by Kenna Foley RN  Outcome: Progressing     
  Problem: Pain  Goal: Verbalizes/displays adequate comfort level or baseline comfort level  9/19/2024 0346 by Ethel Leach, RN  Outcome: Progressing  9/18/2024 1530 by Virginia Graham RN  Outcome: Progressing  Flowsheets  Taken 9/18/2024 0318 by Adal Steve RN  Verbalizes/displays adequate comfort level or baseline comfort level:   Assess pain using appropriate pain scale   Administer analgesics based on type and severity of pain and evaluate response  Taken 9/18/2024 0218 by Adal Steve RN  Verbalizes/displays adequate comfort level or baseline comfort level:   Encourage patient to monitor pain and request assistance   Assess pain using appropriate pain scale   Administer analgesics based on type and severity of pain and evaluate response     Problem: Safety - Adult  Goal: Free from fall injury  9/19/2024 0346 by Ethel Leach, RN  Outcome: Progressing  9/18/2024 1530 by Virginia Graham RN  Outcome: Progressing     
  Problem: Pain  Goal: Verbalizes/displays adequate comfort level or baseline comfort level  9/19/2024 1245 by Nola Stephenson, BRYANT  Outcome: Progressing  9/19/2024 0346 by Ethel Leach, RN  Outcome: Progressing     Problem: Safety - Adult  Goal: Free from fall injury  9/19/2024 1245 by Nola Stephenson, BRYANT  Outcome: Progressing  9/19/2024 0346 by Ethel Leach, RN  Outcome: Progressing     
  Problem: Pain  Goal: Verbalizes/displays adequate comfort level or baseline comfort level  9/19/2024 2235 by Terrance Zimmerman RN  Outcome: Progressing  9/19/2024 1245 by Nola Stephenson RN  Outcome: Progressing     Problem: Safety - Adult  Goal: Free from fall injury  9/19/2024 2235 by Terrance Zimmerman RN  Outcome: Progressing  9/19/2024 1245 by Nola Stephenson RN  Outcome: Progressing     
  Problem: Pain  Goal: Verbalizes/displays adequate comfort level or baseline comfort level  9/20/2024 1036 by Ethan Jang, RN  Outcome: Progressing  9/19/2024 2235 by Terrance Zimmerman RN  Outcome: Progressing     Problem: Safety - Adult  Goal: Free from fall injury  9/20/2024 1036 by Ethan Jang, RN  Outcome: Progressing  9/19/2024 2235 by Terrance Zimmerman, RN  Outcome: Progressing     
  Problem: Pain  Goal: Verbalizes/displays adequate comfort level or baseline comfort level  9/20/2024 2008 by Terrance Zimmerman RN  Outcome: Progressing  9/20/2024 1036 by Ethan Jang RN  Outcome: Progressing     Problem: Safety - Adult  Goal: Free from fall injury  9/20/2024 2008 by Terrance Zimmerman, RN  Outcome: Progressing  9/20/2024 1036 by Ethan Jang RN  Outcome: Progressing     Problem: Nutrition Deficit:  Goal: Optimize nutritional status  Outcome: Progressing     
  Problem: Pain  Goal: Verbalizes/displays adequate comfort level or baseline comfort level  9/21/2024 1002 by Ethel Hamm RN  Outcome: Progressing  Flowsheets (Taken 9/21/2024 0716)  Verbalizes/displays adequate comfort level or baseline comfort level:   Encourage patient to monitor pain and request assistance   Assess pain using appropriate pain scale   Administer analgesics based on type and severity of pain and evaluate response   Implement non-pharmacological measures as appropriate and evaluate response   Consider cultural and social influences on pain and pain management  9/20/2024 2008 by Terrance Zimmerman RN  Outcome: Progressing     Problem: Safety - Adult  Goal: Free from fall injury  9/21/2024 1002 by Ethel Hamm RN  Outcome: Progressing  9/20/2024 2008 by Terrance Zimmerman RN  Outcome: Progressing     Problem: Nutrition Deficit:  Goal: Optimize nutritional status  9/21/2024 1002 by Ethel Hamm RN  Outcome: Progressing  9/20/2024 2008 by Terrance Zimmerman RN  Outcome: Progressing     
  Problem: Pain  Goal: Verbalizes/displays adequate comfort level or baseline comfort level  9/22/2024 1054 by Ethel Hamm RN  Outcome: Progressing  Flowsheets (Taken 9/22/2024 0808)  Verbalizes/displays adequate comfort level or baseline comfort level:   Administer analgesics based on type and severity of pain and evaluate response   Encourage patient to monitor pain and request assistance   Assess pain using appropriate pain scale   Implement non-pharmacological measures as appropriate and evaluate response   Consider cultural and social influences on pain and pain management   Notify Licensed Independent Practitioner if interventions unsuccessful or patient reports new pain  9/22/2024 0025 by Rosemarie Catherine, RN  Outcome: Progressing     Problem: Safety - Adult  Goal: Free from fall injury  9/22/2024 1054 by Ethel Hamm RN  Outcome: Progressing  9/22/2024 0025 by Rosemarie Catherine, RN  Outcome: Progressing     Problem: Nutrition Deficit:  Goal: Optimize nutritional status  9/22/2024 1054 by Ethel Hamm, RN  Outcome: Progressing  9/22/2024 0025 by Rosemarie Catherine, RN  Outcome: Progressing     
  Problem: Pain  Goal: Verbalizes/displays adequate comfort level or baseline comfort level  9/24/2024 1141 by Ethel Hamm RN  Outcome: Progressing  9/23/2024 2154 by Radha Kyle, RN  Outcome: Progressing     Problem: Safety - Adult  Goal: Free from fall injury  9/24/2024 1141 by Ethel Hamm RN  Outcome: Progressing  9/23/2024 2154 by Radha Kyle, RN  Outcome: Progressing     Problem: Nutrition Deficit:  Goal: Optimize nutritional status  9/24/2024 1141 by Ethel Hamm RN  Outcome: Progressing  9/23/2024 2154 by Radha Kyle, RN  Outcome: Progressing     
  Problem: Pain  Goal: Verbalizes/displays adequate comfort level or baseline comfort level  9/24/2024 2201 by Radha Kyle, RN  Outcome: Progressing  9/24/2024 1141 by Ethel Hamm, BRYANT  Outcome: Progressing     Problem: Safety - Adult  Goal: Free from fall injury  9/24/2024 2201 by Radha Kyle, RN  Outcome: Progressing  9/24/2024 1141 by Ethel Hamm, RN  Outcome: Progressing     Problem: Nutrition Deficit:  Goal: Optimize nutritional status  9/24/2024 2201 by Radha Kyle, RN  Outcome: Progressing  9/24/2024 1141 by Ethel Hamm, RN  Outcome: Progressing     
  Problem: Pain  Goal: Verbalizes/displays adequate comfort level or baseline comfort level  9/25/2024 1036 by Ethel Hamm, RN  Outcome: Progressing  Flowsheets (Taken 9/25/2024 0718)  Verbalizes/displays adequate comfort level or baseline comfort level:   Encourage patient to monitor pain and request assistance   Assess pain using appropriate pain scale   Administer analgesics based on type and severity of pain and evaluate response   Implement non-pharmacological measures as appropriate and evaluate response   Consider cultural and social influences on pain and pain management  9/24/2024 2201 by Radha Kyle, RN  Outcome: Progressing     Problem: Safety - Adult  Goal: Free from fall injury  9/25/2024 1036 by Ethel Hamm, RN  Outcome: Progressing  9/24/2024 2201 by Radha Kyle, RN  Outcome: Progressing     Problem: Nutrition Deficit:  Goal: Optimize nutritional status  9/25/2024 1036 by Ethel Hamm, RN  Outcome: Progressing  9/24/2024 2201 by Radha Kyle, RN  Outcome: Progressing     
  Problem: Pain  Goal: Verbalizes/displays adequate comfort level or baseline comfort level  9/25/2024 2201 by Drew Early, RN  Outcome: Progressing  9/25/2024 1036 by Ethel Hamm, RN  Outcome: Progressing  Flowsheets (Taken 9/25/2024 0718)  Verbalizes/displays adequate comfort level or baseline comfort level:   Encourage patient to monitor pain and request assistance   Assess pain using appropriate pain scale   Administer analgesics based on type and severity of pain and evaluate response   Implement non-pharmacological measures as appropriate and evaluate response   Consider cultural and social influences on pain and pain management     Problem: Safety - Adult  Goal: Free from fall injury  9/25/2024 2201 by Drew Early, RN  Outcome: Progressing  9/25/2024 1036 by Ethel Hamm, RN  Outcome: Progressing     Problem: Nutrition Deficit:  Goal: Optimize nutritional status  9/25/2024 2201 by Drew Early, RN  Outcome: Progressing  9/25/2024 1036 by Ethel Hamm, RN  Outcome: Progressing     
  Problem: Pain  Goal: Verbalizes/displays adequate comfort level or baseline comfort level  9/26/2024 1144 by Gerard Hamm RN  Outcome: Progressing  9/25/2024 2201 by Drew Early RN  Outcome: Progressing     Problem: Safety - Adult  Goal: Free from fall injury  9/26/2024 1144 by Gerard Hamm RN  Outcome: Progressing  Flowsheets (Taken 9/26/2024 1140)  Free From Fall Injury: Instruct family/caregiver on patient safety  9/25/2024 2201 by Drew Early, RN  Outcome: Progressing     Problem: Nutrition Deficit:  Goal: Optimize nutritional status  9/26/2024 1144 by Gerard Hamm RN  Outcome: Progressing  9/25/2024 2201 by Drew Early RN  Outcome: Progressing     
  Problem: Pain  Goal: Verbalizes/displays adequate comfort level or baseline comfort level  9/27/2024 0100 by Stephy Gómez RN  Outcome: Progressing  9/26/2024 1144 by Gerard Hamm RN  Outcome: Progressing     Problem: Safety - Adult  Goal: Free from fall injury  9/27/2024 0100 by Stephy Gómez RN  Outcome: Progressing  9/26/2024 1144 by Gerard Hamm RN  Outcome: Progressing  Flowsheets (Taken 9/26/2024 1140)  Free From Fall Injury: Instruct family/caregiver on patient safety     Problem: Nutrition Deficit:  Goal: Optimize nutritional status  9/26/2024 1144 by Gerard Hamm RN  Outcome: Progressing     
  Problem: Pain  Goal: Verbalizes/displays adequate comfort level or baseline comfort level  9/28/2024 1319 by Suzy Weir, RN  Outcome: Progressing  9/28/2024 0315 by Stephy Gómez, RN  Outcome: Progressing     Problem: Safety - Adult  Goal: Free from fall injury  Outcome: Progressing     Problem: Nutrition Deficit:  Goal: Optimize nutritional status  Outcome: Not Progressing     
  Problem: Pain  Goal: Verbalizes/displays adequate comfort level or baseline comfort level  9/28/2024 2045 by Drew Early RN  Outcome: Progressing  9/28/2024 1319 by Suzy Weir RN  Outcome: Progressing     Problem: Safety - Adult  Goal: Free from fall injury  9/28/2024 2045 by Drew Early RN  Outcome: Progressing  9/28/2024 1319 by Suzy Weir RN  Outcome: Progressing     Problem: Nutrition Deficit:  Goal: Optimize nutritional status  9/28/2024 2045 by Drew Early RN  Outcome: Progressing  9/28/2024 1319 by Suzy Weir RN  Outcome: Not Progressing     Problem: Nutrition Deficit:  Goal: Optimize nutritional status  9/28/2024 2045 by Drew Early RN  Outcome: Progressing  9/28/2024 1319 by Suzy Weir RN  Outcome: Not Progressing     
  Problem: Pain  Goal: Verbalizes/displays adequate comfort level or baseline comfort level  9/29/2024 1006 by Delmy Thomas, RN  Outcome: Progressing  9/28/2024 2045 by Drew Early, RN  Outcome: Progressing     Problem: Safety - Adult  Goal: Free from fall injury  9/29/2024 1006 by Delmy Thomas, RN  Outcome: Progressing  9/28/2024 2045 by Drew Early, RN  Outcome: Progressing     Problem: Nutrition Deficit:  Goal: Optimize nutritional status  9/29/2024 1006 by Delmy Thomas, RN  Outcome: Progressing  9/28/2024 2045 by Drew Early, RN  Outcome: Progressing     
  Problem: Pain  Goal: Verbalizes/displays adequate comfort level or baseline comfort level  9/29/2024 2256 by Drew Early, RN  Outcome: Progressing  9/29/2024 1006 by Delmy Thomas, RN  Outcome: Progressing     Problem: Safety - Adult  Goal: Free from fall injury  9/29/2024 2256 by Drew Early, RN  Outcome: Progressing  9/29/2024 1006 by Delmy Thomas, RN  Outcome: Progressing     Problem: Nutrition Deficit:  Goal: Optimize nutritional status  9/29/2024 2256 by Drew Early, RN  Outcome: Progressing  9/29/2024 1006 by Delmy Thomas, RN  Outcome: Progressing     
  Problem: Pain  Goal: Verbalizes/displays adequate comfort level or baseline comfort level  9/30/2024 1039 by Ethel Hamm RN  Outcome: Progressing  Flowsheets (Taken 9/30/2024 0936)  Verbalizes/displays adequate comfort level or baseline comfort level:   Encourage patient to monitor pain and request assistance   Assess pain using appropriate pain scale   Administer analgesics based on type and severity of pain and evaluate response   Implement non-pharmacological measures as appropriate and evaluate response   Consider cultural and social influences on pain and pain management   Notify Licensed Independent Practitioner if interventions unsuccessful or patient reports new pain  9/29/2024 2256 by Drew Early, RN  Outcome: Progressing     Problem: Safety - Adult  Goal: Free from fall injury  9/30/2024 1039 by Ethel Hamm RN  Outcome: Progressing  9/29/2024 2256 by Drew Early, RN  Outcome: Progressing     Problem: Nutrition Deficit:  Goal: Optimize nutritional status  9/30/2024 1039 by Ethel Hamm, RN  Outcome: Progressing  9/29/2024 2256 by Drew Early, RN  Outcome: Progressing     
  Problem: Pain  Goal: Verbalizes/displays adequate comfort level or baseline comfort level  Note: Patient's pain is controlled adequately.  Goal: Pain level will decrease  Description: Pain level will be managed throughout shift with pharmaceutical and non pharmaceutical interventions to ensure patient comfort and satisfaction   Outcome: Progressing  Note: Pain level will be at patient's goal.     Problem: Pain  Goal: Pain level will decrease  Description: Pain level will be managed throughout shift with pharmaceutical and non pharmaceutical interventions to ensure patient comfort and satisfaction   Outcome: Progressing  Note: Pain level will be at patient's goal.     Problem: Safety - Adult  Goal: Free from fall injury  Description: Patient educated on use of call bell  Patient remained free of injury and falls throughout duration of shift   Bedside table and call bell always within patient reach  Bed in the lowest position with x2 side railings up  Note: Patient will be free from any fall/ injury.     
  Problem: Pain  Goal: Verbalizes/displays adequate comfort level or baseline comfort level  Outcome: Progressing     
  Problem: Pain  Goal: Verbalizes/displays adequate comfort level or baseline comfort level  Outcome: Progressing     
  Problem: Pain  Goal: Verbalizes/displays adequate comfort level or baseline comfort level  Outcome: Progressing     Problem: Safety - Adult  Goal: Free from fall injury  Outcome: Progressing     
  Problem: Pain  Goal: Verbalizes/displays adequate comfort level or baseline comfort level  Outcome: Progressing     Problem: Safety - Adult  Goal: Free from fall injury  Outcome: Progressing     
  Problem: Pain  Goal: Verbalizes/displays adequate comfort level or baseline comfort level  Outcome: Progressing     Problem: Safety - Adult  Goal: Free from fall injury  Outcome: Progressing     Problem: Nutrition Deficit:  Goal: Optimize nutritional status  Outcome: Progressing     
  Problem: Pain  Goal: Verbalizes/displays adequate comfort level or baseline comfort level  Outcome: Progressing     Problem: Safety - Adult  Goal: Free from fall injury  Outcome: Progressing     Problem: Nutrition Deficit:  Goal: Optimize nutritional status  Outcome: Progressing     
  Problem: Pain  Goal: Verbalizes/displays adequate comfort level or baseline comfort level  Outcome: Progressing     Problem: Safety - Adult  Goal: Free from fall injury  Outcome: Progressing  Flowsheets (Taken 9/14/2024 6130)  Free From Fall Injury: Instruct family/caregiver on patient safety     
  Problem: Pain  Goal: Verbalizes/displays adequate comfort level or baseline comfort level  Outcome: Progressing  Flowsheets  Taken 10/1/2024 1400  Verbalizes/displays adequate comfort level or baseline comfort level:   Encourage patient to monitor pain and request assistance   Assess pain using appropriate pain scale  Taken 10/1/2024 1324  Verbalizes/displays adequate comfort level or baseline comfort level:   Encourage patient to monitor pain and request assistance   Assess pain using appropriate pain scale  Taken 10/1/2024 1044  Verbalizes/displays adequate comfort level or baseline comfort level:   Encourage patient to monitor pain and request assistance   Assess pain using appropriate pain scale  Taken 10/1/2024 0944  Verbalizes/displays adequate comfort level or baseline comfort level:   Encourage patient to monitor pain and request assistance   Assess pain using appropriate pain scale  Taken 10/1/2024 0914  Verbalizes/displays adequate comfort level or baseline comfort level:   Encourage patient to monitor pain and request assistance   Assess pain using appropriate pain scale     Problem: Safety - Adult  Goal: Free from fall injury  Outcome: Progressing     Problem: Nutrition Deficit:  Goal: Optimize nutritional status  Outcome: Progressing     
  Problem: Pain  Goal: Verbalizes/displays adequate comfort level or baseline comfort level  Outcome: Progressing  Flowsheets  Taken 10/2/2024 0340 by Radha Kyle, RN  Verbalizes/displays adequate comfort level or baseline comfort level:   Encourage patient to monitor pain and request assistance   Assess pain using appropriate pain scale  Taken 10/2/2024 0227 by Radha Kyle, RN  Verbalizes/displays adequate comfort level or baseline comfort level:   Encourage patient to monitor pain and request assistance   Assess pain using appropriate pain scale     Problem: Safety - Adult  Goal: Free from fall injury  Outcome: Progressing  Flowsheets (Taken 10/2/2024 1451)  Free From Fall Injury: Instruct family/caregiver on patient safety     Problem: Nutrition Deficit:  Goal: Optimize nutritional status  Outcome: Progressing  Flowsheets (Taken 10/1/2024 0417 by Latia Smalls, RN)  Nutrient intake appropriate for improving, restoring, or maintaining nutritional needs:   Assess nutritional status and recommend course of action   Monitor oral intake, labs, and treatment plans  Note: Encourage patient to eat at least 50% of meals.        
  Problem: Pain  Goal: Verbalizes/displays adequate comfort level or baseline comfort level  Outcome: Progressing  Flowsheets  Taken 10/3/2024 0448 by Radha Kyle, RN  Verbalizes/displays adequate comfort level or baseline comfort level:   Encourage patient to monitor pain and request assistance   Assess pain using appropriate pain scale  Taken 10/3/2024 0314 by Radha Kyle, RN  Verbalizes/displays adequate comfort level or baseline comfort level:   Encourage patient to monitor pain and request assistance   Assess pain using appropriate pain scale     Problem: Safety - Adult  Goal: Free from fall injury  Outcome: Progressing     Problem: Nutrition Deficit:  Goal: Optimize nutritional status  Outcome: Progressing     
  Problem: Pain  Goal: Verbalizes/displays adequate comfort level or baseline comfort level  Outcome: Progressing  Flowsheets  Taken 9/15/2024 1840 by Kenna Foley RN  Verbalizes/displays adequate comfort level or baseline comfort level:   Encourage patient to monitor pain and request assistance   Assess pain using appropriate pain scale  Taken 9/15/2024 1605 by Kenna Foley RN  Verbalizes/displays adequate comfort level or baseline comfort level:   Encourage patient to monitor pain and request assistance   Assess pain using appropriate pain scale  Taken 9/15/2024 1600 by Kenna Foley RN  Verbalizes/displays adequate comfort level or baseline comfort level:   Encourage patient to monitor pain and request assistance   Assess pain using appropriate pain scale  Taken 9/15/2024 1506 by Kenna Foley RN  Verbalizes/displays adequate comfort level or baseline comfort level:   Encourage patient to monitor pain and request assistance   Assess pain using appropriate pain scale  Taken 9/15/2024 1248 by Kenna Foley RN  Verbalizes/displays adequate comfort level or baseline comfort level:   Assess pain using appropriate pain scale   Encourage patient to monitor pain and request assistance  Taken 9/15/2024 1148 by Kenna Foley RN  Verbalizes/displays adequate comfort level or baseline comfort level:   Encourage patient to monitor pain and request assistance   Assess pain using appropriate pain scale  Taken 9/15/2024 1122 by Kenna Foley RN  Verbalizes/displays adequate comfort level or baseline comfort level:   Encourage patient to monitor pain and request assistance   Assess pain using appropriate pain scale     Problem: Safety - Adult  Goal: Free from fall injury  Outcome: Progressing     
  Problem: Pain  Goal: Verbalizes/displays adequate comfort level or baseline comfort level  Outcome: Progressing  Flowsheets  Taken 9/18/2024 0318 by Adal Steve RN  Verbalizes/displays adequate comfort level or baseline comfort level:   Assess pain using appropriate pain scale   Administer analgesics based on type and severity of pain and evaluate response  Taken 9/18/2024 0218 by Adal Steve RN  Verbalizes/displays adequate comfort level or baseline comfort level:   Encourage patient to monitor pain and request assistance   Assess pain using appropriate pain scale   Administer analgesics based on type and severity of pain and evaluate response     Problem: Safety - Adult  Goal: Free from fall injury  Outcome: Progressing     
  Problem: Pain  Goal: Verbalizes/displays adequate comfort level or baseline comfort level  Outcome: Progressing  Flowsheets (Taken 9/30/2024 0936 by Ethel Hamm, RN)  Verbalizes/displays adequate comfort level or baseline comfort level:   Encourage patient to monitor pain and request assistance   Assess pain using appropriate pain scale   Administer analgesics based on type and severity of pain and evaluate response   Implement non-pharmacological measures as appropriate and evaluate response   Consider cultural and social influences on pain and pain management   Notify Licensed Independent Practitioner if interventions unsuccessful or patient reports new pain     Problem: Safety - Adult  Goal: Free from fall injury  Outcome: Progressing  Flowsheets (Taken 9/27/2024 1142 by Gerard Hamm RN)  Free From Fall Injury: Instruct family/caregiver on patient safety     Problem: Nutrition Deficit:  Goal: Optimize nutritional status  Outcome: Progressing  Flowsheets (Taken 10/1/2024 7446)  Nutrient intake appropriate for improving, restoring, or maintaining nutritional needs:   Assess nutritional status and recommend course of action   Monitor oral intake, labs, and treatment plans     
  Problem: Pain  Goal: Verbalizes/displays adequate comfort level or baseline comfort level  Outcome: Progressing  Goal: Pain level will decrease  Description: Pain level will be managed throughout shift with pharmaceutical and non pharmaceutical interventions to ensure patient comfort and satisfaction   Outcome: Progressing  Goal: Pain controlled to Patient's (Family's) desired goal  Outcome: Progressing     Problem: Safety - Adult  Goal: Free from fall injury  Description: Patient educated on use of call bell  Patient remained free of injury and falls throughout duration of shift   Bedside table and call bell always within patient reach  Bed in the lowest position with x2 side railings up  Outcome: Progressing     Problem: Nutrition Deficit:  Goal: Optimize nutritional status  Description: Encourage patient to eat at least 75% of meals  Outcome: Progressing  Goal: Bowel function will improve  Description: Patient will have normal   Outcome: Progressing     
  Problem: Pain  Goal: Verbalizes/displays adequate comfort level or baseline comfort level  Outcome: Progressing  Goal: Pain level will decrease  Description: Pain level will decrease  Outcome: Progressing  Goal: Pain controlled to Patient's (Family's) desired goal  Outcome: Progressing     Problem: Safety - Adult  Goal: Free from fall injury  Outcome: Progressing     Problem: Nutrition Deficit:  Goal: Optimize nutritional status  Description: Encourage patient to eat at least 75% of meals  Outcome: Progressing  Goal: Bowel function will improve  Description: Bowel function will improve  Outcome: Progressing     
  Problem: Safety - Adult  Goal: Free from fall injury  Description: Patient educated on use of call bell  Patient remained free of injury and falls throughout duration of shift   Bedside table and call bell always within patient reach  Bed in the lowest position with x2 side railings up  Recent Flowsheet Documentation  Taken 10/10/2024 0800 by Cookie Cullen, RN  Free From Fall Injury: Instruct family/caregiver on patient safety     
- Adult  Goal: Free from fall injury  9/17/2024 0913 by Kenna Foley, RN  Outcome: Progressing  9/16/2024 2332 by Stephy Gómez, RN  Outcome: Progressing     
scale     Problem: Safety - Adult  Goal: Free from fall injury  9/16/2024 0933 by Kenna Foley, RN  Outcome: Progressing  9/16/2024 0102 by Stephy Gómez, RN  Outcome: Progressing

## 2024-10-11 NOTE — PROGRESS NOTES
Patient: Norma Galan   MRN: [id number]         CSN: [id number]    YOB: 1980      Admit Date: [facility]    Assessment:     Principal Problem:    Sickle cell anemia with pain (HCC)  Resolved Problems:    * No resolved hospital problems. *    Sickle cell anemia, pain crisis  hemloysis  Plan:     Stat xray rt tibia, hematoma vs infarct   IV dilaudid 1.5 mg q3hr prn, add dexamethasone 4mg bid  IVF  Supportive care    Catrachita Chapraro MD  [facility]  Office [phone]      Subjective:     Lump on the right shin    Objective:     /82   Pulse 66   Temp 98 °F (36.7 °C) (Oral)   Resp 16   Ht 1.829 m (6')   Wt 87.5 kg (193 lb)   SpO2 96%   BMI 26.18 kg/m²           Temp (24hrs), Av.4 °F (36.9 °C), Min:97.9 °F (36.6 °C), Max:98.9 °F (37.2 °C)        Intake/Output Summary (Last 24 hours) at 2024 0739  Last data filed at 9/15/2024 1746  Gross per 24 hour   Intake 1440 ml   Output --   Net 1440 ml     Review of Systems:   Constitutional: negative for fevers, chills, sweats and fatigue  Eyes: negative for visual disturbance,  icterus  Ears, Nose, Mouth, Throat, and Face: negative for tinnitus, epistaxis, sore mouth and hoarseness  Respiratory: negative for cough, sputum, hemoptysis, pleurisy/chest pain or wheezing  Cardiovascular: negative for chest pain, , palpitations,  syncope, paroxysmal nocturnal dyspnea  Gastrointestinal: negative for reflux symptoms, nausea, vomiting, melena, diarrhea, constipation and abdominal pain  Genitourinary:negative for dysuria, nocturia, urinary incontinence, hesitancy and hematuria  Endocrine: no polydipsia, no goitre  Integument: negative for rash, skin lesion(s) and pruritus  Hematologic/Lymphatic: negative for easy bruising, bleeding and lymphadenopathy  Musculoskeletal: back pain, rt leg pain  Neurological: negative for headaches, dizziness, seizures, paresthesia and weakness    Physical Exam: ECOG : 
                Patient: Norma Galan   MRN: [id number]         CSN: [id number]    YOB: 1980      [facility]mit Date: [facility]    Assessment:     Principal Problem:    Sickle cell anemia with pain (HCC)  Active Problems:    Encounter for palliative care    Advanced care planning/counseling discussion    Leukocytosis  Resolved Problems:    * No resolved hospital problems. *    Refractory sickle cell pain crisis  S/p red cell exchange 24  Hemolysis  hyponatremia  Plan:     - Pain is better today, continue the current PCA and po dilaudid dosing for now. If the pain is better tomorrow, could start titrating his pain med then transition to oral pain med once pain level is <6/10.  - O2 support and IS.   - Heating pad prn for the back  - Monitor CBC, consider transfusion PRN to keep HgB >7.   - Adequate hydration.   - DVT prophylaxis.     Pili Monroy MD  [facility]  Office [phone]      Subjective:     He feels better and reports having less pain today. No SOB or chest pain. No other complaints.     Objective:     /71   Pulse 94   Temp 98.1 °F (36.7 °C) (Oral)   Resp 16   Ht 1.829 m (6' 0.01\")   Wt 87.5 kg (193 lb)   SpO2 100%   BMI 26.17 kg/m²           Temp (24hrs), Av.4 °F (36.9 °C), Min:98.1 °F (36.7 °C), Max:98.6 °F (37 °C)        Intake/Output Summary (Last 24 hours) at 10/5/2024 1805  Last data filed at 10/5/2024 1253  Gross per 24 hour   Intake 1740 ml   Output 3300 ml   Net -1560 ml     Review of Systems:   Constitutional: negative for fevers, chills, sweats and fatigue  Eyes: negative for visual disturbance,  icterus  Ears, Nose, Mouth, Throat, and Face: negative for tinnitus, epistaxis, sore mouth and hoarseness  Respiratory: negative for cough, sputum, hemoptysis, pleurisy/chest pain or wheezing  Cardiovascular: negative for chest pain, , palpitations,  syncope, paroxysmal nocturnal dyspnea  Gastrointestinal: negative for reflux symptoms, 
                Patient: Norma Galan   MRN: [id number]         CSN: [id number]    YOB: 1980      [facility]mit [facility]: [facility]    Assessment:     Principal Problem:    Sickle cell anemia with pain (HCC)  Active Problems:    Encounter for palliative care    Advanced care planning/counseling discussion    Leukocytosis  Resolved Problems:    * No resolved hospital problems. *    Refractory sickle cell pain crisis  S/p red cell exchange 24  Hemolysis  hyponatremia  Plan:     Reduce dilaudid  PCA 1.5mg/hr, with max upto 3.0mg/hr, titrated down  Continue scheduled dose of po dilaudid  BMP in am tomorrow, monitor Sodium level  D/w nursing    Catrachita Chaparro MD  [facility]  Office [phone]      Subjective:     Tolerated titration down of PCA overnight    Objective:     /87   Pulse (!) 118   Temp 98 °F (36.7 °C) (Oral)   Resp 18   Ht 1.829 m (6' 0.01\")   Wt 87.5 kg (193 lb)   SpO2 100%   BMI 26.17 kg/m²           Temp (24hrs), Av.3 °F (36.8 °C), Min:98 °F (36.7 °C), Max:98.7 °F (37.1 °C)        Intake/Output Summary (Last 24 hours) at 10/2/2024 0737  Last data filed at 10/1/2024 2204  Gross per 24 hour   Intake 1069.98 ml   Output 1200 ml   Net -130.02 ml     Review of Systems:   Constitutional: negative for fevers, chills, sweats and fatigue  Eyes: negative for visual disturbance,  icterus  Ears, Nose, Mouth, Throat, and Face: negative for tinnitus, epistaxis, sore mouth and hoarseness  Respiratory: negative for cough, sputum, hemoptysis, pleurisy/chest pain or wheezing  Cardiovascular: negative for chest pain, , palpitations,  syncope, paroxysmal nocturnal dyspnea  Gastrointestinal: negative for reflux symptoms, nausea, vomiting, melena, diarrhea, constipation and abdominal pain  Genitourinary:negative for dysuria, nocturia, urinary incontinence, hesitancy and hematuria  Endocrine: no polydipsia, no goitre  Integument: negative for rash, skin lesion(s) and 
                Patient: Norma Galan   MRN: [id number]         CSN: [id number]    YOB: 1980      Admit Date: [facility]    Assessment:     Principal Problem:    Sickle cell anemia with pain (HCC)  Active Problems:    Encounter for palliative care    Advanced care planning/counseling discussion    Leukocytosis  Resolved Problems:    * No resolved hospital problems. *    Refractory sickle cell pain crisis  S/p red cell exchange 24  Hemolysis  hyponatremia  Plan:     Reduce dilaudid  PCA 2 mg/hr, with max upto 3.5mg/hr  Continue scheduled dose of po dilaudid  Encouraged gatorade, juice.  reduce free water     Catrachita Chaparro MD  [facility]  Office [phone]      Subjective:     Pain continuous to improve    Objective:     /87   Pulse (!) 114   Temp 99.1 °F (37.3 °C) (Oral)   Resp 16   Ht 1.829 m (6' 0.01\")   Wt 87.5 kg (193 lb)   SpO2 99%   BMI 26.17 kg/m²           Temp (24hrs), Av.6 °F (37 °C), Min:97.8 °F (36.6 °C), Max:99.1 °F (37.3 °C)        Intake/Output Summary (Last 24 hours) at 10/1/2024 0758  Last data filed at 10/1/2024 0703  Gross per 24 hour   Intake 3615.41 ml   Output 900 ml   Net 2715.41 ml     Review of Systems:   Constitutional: negative for fevers, chills, sweats and fatigue  Eyes: negative for visual disturbance,  icterus  Ears, Nose, Mouth, Throat, and Face: negative for tinnitus, epistaxis, sore mouth and hoarseness  Respiratory: negative for cough, sputum, hemoptysis, pleurisy/chest pain or wheezing  Cardiovascular: negative for chest pain, , palpitations,  syncope, paroxysmal nocturnal dyspnea  Gastrointestinal: negative for reflux symptoms, nausea, vomiting, melena, diarrhea, constipation and abdominal pain  Genitourinary:negative for dysuria, nocturia, urinary incontinence, hesitancy and hematuria  Endocrine: no polydipsia, no goitre  Integument: negative for rash, skin lesion(s) and pruritus  Hematologic/Lymphatic: 
                Patient: Norma Galan   MRN: [id number]         CSN: [id number]    YOB: 1980      [facility]    Assessment:     Principal Problem:    Sickle cell anemia with pain (HCC)  Resolved Problems:    * No resolved hospital problems. *    Sickle cell anemia, pain crisis  hemloysis  Plan:      IV dilaudid 2mg q3hr prn, dexamethasone 4mg bid  Po percocet  5/325q 4 prn alternating with IV meds  IVF  Supportive care  D/w nusring      Pt NOT READY for discharge    Catrachita Chaparro MD  [facility]  Office [phone]      Subjective:     Severe back pain.did not receive his IV meds on time with loss of IV access x 24 hr  In distress  Objective:     BP (!) 140/84   Pulse 69   Temp 98.2 °F (36.8 °C) (Oral)   Resp 20   Ht 1.829 m (6')   Wt 87.5 kg (193 lb)   SpO2 100%   BMI 26.18 kg/m²           Temp (24hrs), Av.1 °F (36.7 °C), Min:97.9 °F (36.6 °C), Max:98.4 °F (36.9 °C)        Intake/Output Summary (Last 24 hours) at 2024 1017  Last data filed at 2024 0659  Gross per 24 hour   Intake 1440 ml   Output 1500 ml   Net -60 ml     Review of Systems:   Constitutional: negative for fevers, chills, sweats and fatigue  Eyes: negative for visual disturbance,  icterus  Ears, Nose, Mouth, Throat, and Face: negative for tinnitus, epistaxis, sore mouth and hoarseness  Respiratory: negative for cough, sputum, hemoptysis, pleurisy/chest pain or wheezing  Cardiovascular: negative for chest pain, , palpitations,  syncope, paroxysmal nocturnal dyspnea  Gastrointestinal: negative for reflux symptoms, nausea, vomiting, melena, diarrhea, constipation and abdominal pain  Genitourinary:negative for dysuria, nocturia, urinary incontinence, hesitancy and hematuria  Endocrine: no polydipsia, no goitre  Integument: negative for rash, skin lesion(s) and pruritus  Hematologic/Lymphatic: negative for easy bruising, bleeding and lymphadenopathy  Musculoskeletal: back 
                Patient: Norma Galan   MRN: [id number]         CSN: [id number]    YOB: 1980      [facility]    Assessment:     Principal Problem:    Sickle cell anemia with pain (HCC)  Active Problems:    Encounter for palliative care    Advanced care planning/counseling discussion    Leukocytosis  Resolved Problems:    * No resolved hospital problems. *    Sickle cell pain crisis  Plan:     Pain better than Friday, however still uncomfortable   dilaudid PCA,  0.6mg/hr continuous dose and 0.5 mg 15 min self administered  Monitor respiration and sedation  Continue dex 4mg po q12  Add baclofen po tid  D/c thorazine    Monitor closely and adjust meds        Catrachita Chaparro MD  [facility]  Office [phone]      Subjective:     Hiccups , very groggy with thorazine  Requesting alternate med    Still with pain, of level 6  Objective:     /85   Pulse 95   Temp 97.8 °F (36.6 °C) (Oral)   Resp 20   Ht 1.829 m (6' 0.01\")   Wt 87.5 kg (193 lb)   SpO2 100%   BMI 26.17 kg/m²           Temp (24hrs), Av.1 °F (36.7 °C), Min:97.8 °F (36.6 °C), Max:98.5 °F (36.9 °C)        Intake/Output Summary (Last 24 hours) at 2024 0713  Last data filed at 2024 1844  Gross per 24 hour   Intake 2907.95 ml   Output 2400 ml   Net 507.95 ml     Review of Systems:   Constitutional: negative for fevers, chills, sweats and fatigue, positive for hiccups  Eyes: negative for visual disturbance,  icterus  Ears, Nose, Mouth, Throat, and Face: negative for tinnitus, epistaxis, sore mouth and hoarseness  Respiratory: negative for cough, sputum, hemoptysis, pleurisy/chest pain or wheezing  Cardiovascular: negative for chest pain, , palpitations,  syncope, paroxysmal nocturnal dyspnea  Gastrointestinal: negative for reflux symptoms, nausea, vomiting, melena, diarrhea, constipation and abdominal pain  Genitourinary:negative for dysuria, nocturia, urinary incontinence, 
                Patient: Norma Galan   MRN: [id number]         CSN: [id number]    YOB: 1980      Admit [facility]: [facility]    Assessment:     Principal Problem:    Sickle cell anemia with pain (HCC)  Active Problems:    Encounter for palliative care    Advanced care planning/counseling discussion    Leukocytosis  Resolved Problems:    * No resolved hospital problems. *    Refractory sickle cell pain crisis  S/p red cell exchange 24  Hemolysis  hyponatremia  Plan:     D/c dilaudid PCA  Continue po dilaudid 4mg q 4  Iv dilaudid prn only  Continue ibuprofen      Catrachita Chaparro MD  [facility]  Office [phone]      Subjective:   Pain better, slept well last night    Objective:     /73   Pulse 82   Temp 97.4 °F (36.3 °C) (Oral)   Resp 18   Ht 1.829 m (6' 0.01\")   Wt 87.5 kg (193 lb)   SpO2 100%   BMI 26.17 kg/m²           Temp (24hrs), Av °F (36.7 °C), Min:97.4 °F (36.3 °C), Max:98.5 °F (36.9 °C)        Intake/Output Summary (Last 24 hours) at 10/7/2024 0726  Last data filed at 10/6/2024 2018  Gross per 24 hour   Intake 2109.88 ml   Output 1300 ml   Net 809.88 ml     Review of Systems:   Constitutional: negative for fevers, chills, sweats and fatigue  Eyes: negative for visual disturbance,  icterus  Ears, Nose, Mouth, Throat, and Face: negative for tinnitus, epistaxis, sore mouth and hoarseness  Respiratory: negative for cough, sputum, hemoptysis, pleurisy/chest pain or wheezing  Cardiovascular: negative for chest pain, , palpitations,  syncope, paroxysmal nocturnal dyspnea  Gastrointestinal: negative for reflux symptoms, nausea, vomiting, melena, diarrhea, constipation and abdominal pain  Genitourinary:negative for dysuria, nocturia, urinary incontinence, hesitancy and hematuria  Endocrine: no polydipsia, no goitre  Integument: negative for rash, skin lesion(s) and pruritus  Hematologic/Lymphatic: negative for easy bruising, bleeding and 
                Patient: Norma Galan   MRN: [id number]         CSN: [id number]    YOB: 1980      Admit [facility]: [facility]    Assessment:     Principal Problem:    Sickle cell anemia with pain (HCC)  Resolved Problems:    * No resolved hospital problems. *    Sickle cell pain crisis  Plan:     Changed to dilaudid PCA yesterday afternoon  Additional bolus dose IV dilaudid this am 0.25 mg now titrate PCA  To 0.6mg/hr continuous dose and 04mg q 10min self administered  Monitor respiration and sedation  Reviewed with nursing and pt  Continue dex 4mg po q12    Monitor closely and adjust meds        Catrachita Chaparro MD  [facility]  Office [phone]      Subjective:     Called by staff yesterday afternoon with continued and worsening pain crisis  Back pain, pt crying     Objective:     /77   Pulse 80   Temp 99 °F (37.2 °C) (Oral)   Resp 18   Ht 1.829 m (6')   Wt 87.5 kg (193 lb)   SpO2 95%   BMI 26.18 kg/m²           Temp (24hrs), Av.4 °F (36.9 °C), Min:98.2 °F (36.8 °C), Max:99 °F (37.2 °C)        Intake/Output Summary (Last 24 hours) at 2024 0658  Last data filed at 2024 0443  Gross per 24 hour   Intake 2604.62 ml   Output 3100 ml   Net -495.38 ml     Review of Systems:   Constitutional: negative for fevers, chills, sweats and fatigue  Eyes: negative for visual disturbance,  icterus  Ears, Nose, Mouth, Throat, and Face: negative for tinnitus, epistaxis, sore mouth and hoarseness  Respiratory: negative for cough, sputum, hemoptysis, pleurisy/chest pain or wheezing  Cardiovascular: negative for chest pain, , palpitations,  syncope, paroxysmal nocturnal dyspnea  Gastrointestinal: negative for reflux symptoms, nausea, vomiting, melena, diarrhea, constipation and abdominal pain  Genitourinary:negative for dysuria, nocturia, urinary incontinence, hesitancy and hematuria  Endocrine: no polydipsia, no goitre  Integument: negative for rash, skin lesion(s) 
                Patient: Norma Galan   MRN: [id number]         CSN: [id number]    YOB: 1980      Admit Date: [facility]    Assessment:     Principal Problem:    Sickle cell anemia with pain (HCC)  Active Problems:    Encounter for palliative care    Advanced care planning/counseling discussion    Leukocytosis  Resolved Problems:    * No resolved hospital problems. *    Sickle cell pain crisis  Plan:     Required additional bolus iv dilaudid with toradol over last 24 hrs  Ct with current PCA settings for dilaudid  Maintain interval of at least 2 hr between toradol prn dose and alleve scheduled dose  Monitor for sedation, resp  PPI    Catrachita Chaparro MD  [facility]  Office [phone]      Subjective:      Generalized Pain decreased to 5 this am, no fever or sob  Objective:     /78   Pulse 100   Temp 98.2 °F (36.8 °C) (Oral)   Resp 24   Ht 1.829 m (6' 0.01\")   Wt 87.5 kg (193 lb)   SpO2 100%   BMI 26.17 kg/m²           Temp (24hrs), Av.9 °F (36.6 °C), Min:97.5 °F (36.4 °C), Max:98.2 °F (36.8 °C)        Intake/Output Summary (Last 24 hours) at 2024 0728  Last data filed at 2024 0724  Gross per 24 hour   Intake 4378.46 ml   Output 3475 ml   Net 903.46 ml     Review of Systems:   Constitutional: in distress from painEyes: negative for visual disturbance,  icterus  Ears, Nose, Mouth, Throat, and Face: negative for tinnitus, epistaxis, sore mouth and hoarseness  Respiratory: negative for cough, sputum, hemoptysis, pleurisy/chest pain or wheezing  Cardiovascular: negative for chest pain, , palpitations,  syncope, paroxysmal nocturnal dyspnea  Gastrointestinal: negative for reflux symptoms, nausea, vomiting, melena, diarrhea, constipation and abdominal pain  Genitourinary:negative for dysuria, nocturia, urinary incontinence, hesitancy and hematuria  Endocrine: no polydipsia, no goitre  Integument: negative for rash, skin lesion(s) and 
                Patient: Norma Galan   MRN: [id number]         CSN: [id number]    YOB: 1980      Admit Date: [facility]    Assessment:     Principal Problem:    Sickle cell anemia with pain (HCC)  Active Problems:    Encounter for palliative care    Advanced care planning/counseling discussion    Leukocytosis  Resolved Problems:    * No resolved hospital problems. *    Sickle cell pain crisis  Plan:     No communication by nursing with the physician to titrate pain meds over night  Discussed with charge nurse  Bolus iv dilaudid 0.25 mg iv now, adjust PCA  Will need to reevaluate in 4-6 hr, d/w hospitalist and palliative care team  Continue rest of meds   'monitor for sedation and respiration    Catrachita Chaparro MD  [facility]  Office [phone]      Subjective:     Crying in pain  Has felt severe generalized pain over last 8hr  Objective:     BP (!) 157/85   Pulse 80   Temp 98.1 °F (36.7 °C) (Oral)   Resp 19   Ht 1.829 m (6' 0.01\")   Wt 87.5 kg (193 lb)   SpO2 100%   BMI 26.17 kg/m²           Temp (24hrs), Av.2 °F (36.8 °C), Min:98 °F (36.7 °C), Max:98.4 °F (36.9 °C)        Intake/Output Summary (Last 24 hours) at 2024 0737  Last data filed at 2024 0446  Gross per 24 hour   Intake 3251.41 ml   Output 2250 ml   Net 1001.41 ml     Review of Systems:   Constitutional: in distress from painEyes: negative for visual disturbance,  icterus  Ears, Nose, Mouth, Throat, and Face: negative for tinnitus, epistaxis, sore mouth and hoarseness  Respiratory: negative for cough, sputum, hemoptysis, pleurisy/chest pain or wheezing  Cardiovascular: negative for chest pain, , palpitations,  syncope, paroxysmal nocturnal dyspnea  Gastrointestinal: negative for reflux symptoms, nausea, vomiting, melena, diarrhea, constipation and abdominal pain  Genitourinary:negative for dysuria, nocturia, urinary incontinence, hesitancy and hematuria  Endocrine: no polydipsia, no 
                Patient: Norma Galan   MRN: [id number]         CSN: [id number]    YOB: 1980      [facility]: [facility]    Assessment:     Principal Problem:    Sickle cell anemia with pain (HCC)  Resolved Problems:    * No resolved hospital problems. *    Sickle cell anemia, pain crisis  hemloysis  Plan:     Stat xray rt tibia, old infarct   IV dilaudid 1.5 mg q3hr prn, dexamethasone 4mg bid  IVF  Supportive care, warm packs to local site    Catrachita Chaparro MD  [facility]  Office [phone]      Subjective:     Lump on the right shin    Objective:     /67   Pulse 62   Temp 97.8 °F (36.6 °C) (Oral)   Resp 18   Ht 1.829 m (6')   Wt 87.5 kg (193 lb)   SpO2 94%   BMI 26.18 kg/m²           Temp (24hrs), Av.1 °F (36.7 °C), Min:97.8 °F (36.6 °C), Max:98.4 °F (36.9 °C)        Intake/Output Summary (Last 24 hours) at 2024 0736  Last data filed at 2024 2200  Gross per 24 hour   Intake 1200 ml   Output --   Net 1200 ml     Review of Systems:   Constitutional: negative for fevers, chills, sweats and fatigue  Eyes: negative for visual disturbance,  icterus  Ears, Nose, Mouth, Throat, and Face: negative for tinnitus, epistaxis, sore mouth and hoarseness  Respiratory: negative for cough, sputum, hemoptysis, pleurisy/chest pain or wheezing  Cardiovascular: negative for chest pain, , palpitations,  syncope, paroxysmal nocturnal dyspnea  Gastrointestinal: negative for reflux symptoms, nausea, vomiting, melena, diarrhea, constipation and abdominal pain  Genitourinary:negative for dysuria, nocturia, urinary incontinence, hesitancy and hematuria  Endocrine: no polydipsia, no goitre  Integument: negative for rash, skin lesion(s) and pruritus  Hematologic/Lymphatic: negative for easy bruising, bleeding and lymphadenopathy  Musculoskeletal: back pain, rt leg pain  Neurological: negative for headaches, dizziness, seizures, paresthesia and weakness    Physical 
                Patient: Norma Galan   MRN: [id number]         CSN: [id number]    YOB: 1980      [facility]mit Date: [facility]    Assessment:     Principal Problem:    Sickle cell anemia with pain (HCC)  Active Problems:    Encounter for palliative care    Advanced care planning/counseling discussion    Leukocytosis  Resolved Problems:    * No resolved hospital problems. *    Sickle cell anemia, pain crisis  hemolysis    Plan:     Continue the PCA dilaudid at the same settings with scheduled po dilaudid and po  alleve  If stable and well controlled pain  over the next 12 hr, consider reducing the self adm bolus to q  15 min  D/c dexamethasone. Hold toradol.  Monitor hb.    D/w nursing , pt  If questions please call me.    Catrachita Chaparro MD  [facility]  Office [phone]      Subjective:     Pain at 5, slept intermittently over night    Objective:     BP (!) 152/94   Pulse 89   Temp 98.1 °F (36.7 °C) (Oral)   Resp 18   Ht 1.829 m (6' 0.01\")   Wt 87.5 kg (193 lb)   SpO2 100%   BMI 26.17 kg/m²           Temp (24hrs), Av °F (36.7 °C), Min:97.8 °F (36.6 °C), Max:98.1 °F (36.7 °C)        Intake/Output Summary (Last 24 hours) at 2024 0732  Last data filed at 2024 0723  Gross per 24 hour   Intake 5444.67 ml   Output 4250 ml   Net 1194.67 ml     Review of Systems:   Constitutional: negative for fevers, chills, sweats and fatigue  Eyes: negative for visual disturbance,  icterus  Ears, Nose, Mouth, Throat, and Face: negative for tinnitus, epistaxis, sore mouth and hoarseness  Respiratory: negative for cough, sputum, hemoptysis, pleurisy/chest pain or wheezing  Cardiovascular: negative for chest pain, , palpitations,  syncope, paroxysmal nocturnal dyspnea  Gastrointestinal: negative for reflux symptoms, nausea, vomiting, melena, diarrhea, constipation and abdominal pain  Genitourinary:negative for dysuria, nocturia, urinary incontinence, hesitancy and 
                Patient: Norma Galan   MRN: [id number]         CSN: [id number]    YOB: 1980      Admit Date: [facility]    Assessment:     Principal Problem:    Sickle cell anemia with pain (HCC)  Active Problems:    Encounter for palliative care    Advanced care planning/counseling discussion    Leukocytosis  Resolved Problems:    * No resolved hospital problems. *    Sickle cell anemia, pain crisis, refractory  hemolysis    Plan:     Dilaudid 0.25mg iv given stat  Dilaudid Pca titrated up  Discussed red cell exchange, to target HbS 30%, will need 6 units of sickle neg red cells asap for refractory pain crisis  Pt agreeable to proceed  Reviewed at length with [employer], Blood bank, if 4/5/6 units available to proceed ( difficult to find and cross match red cells )    Vascular surgery to put in apheresis catheter stat  Mother called , unable to reach, cell on the board in room  Pt completed consent  Continue NSAIDS  Iv toradol now      D/w nursing   Dr Dash to cover this weekend , [phone]    Catrachita Chaparro MD  [facility]  Office [phone]      Subjective:     On distress from pain for several hours , crying    Objective:     BP (!) 135/93   Pulse (!) 108   Temp 98.2 °F (36.8 °C) (Oral)   Resp 19   Ht 1.829 m (6' 0.01\")   Wt 87.5 kg (193 lb)   SpO2 100%   BMI 26.17 kg/m²           Temp (24hrs), Av.4 °F (36.9 °C), Min:98.2 °F (36.8 °C), Max:98.6 °F (37 °C)        Intake/Output Summary (Last 24 hours) at 2024 0834  Last data filed at 2024 0230  Gross per 24 hour   Intake 2664.52 ml   Output 4650 ml   Net -1985.48 ml     Review of Systems:   Constitutional: negative for fevers, chills, sweats and fatigue  Eyes: negative for visual disturbance,  icterus  Ears, Nose, Mouth, Throat, and Face: negative for tinnitus, epistaxis, sore mouth and hoarseness  Respiratory: negative for cough, sputum, hemoptysis, pleurisy/chest pain or 
                Patient: Norma Galan   MRN: [id number]         CSN: [id number]    YOB: 1980      Admit Date: 4[facility]    Assessment:     Principal Problem:    Sickle cell anemia with pain (HCC)  Active Problems:    Encounter for palliative care    Advanced care planning/counseling discussion    Leukocytosis  Resolved Problems:    * No resolved hospital problems. *    Refractory sickle cell pain crisis  S/p red cell exchange 24  Hemolysis  hyponatremia  Plan:      Pt not ready for long acting pain med, he had previously used fentanyl patch. He works full time .  Continue to titrate PCA  pain meds down  Increase frequency of po scheduled dose of dilaudid to  q4 h  Supp care      Catrachita Chaparro MD  [facility]  Office [phone]      Subjective:     Describes pain of 5    Objective:     /82   Pulse (!) 113   Temp 98.6 °F (37 °C) (Oral)   Resp 17   Ht 1.829 m (6' 0.01\")   Wt 87.5 kg (193 lb)   SpO2 98%   BMI 26.17 kg/m²           Temp (24hrs), Av.6 °F (37 °C), Min:98.3 °F (36.8 °C), Max:98.8 °F (37.1 °C)        Intake/Output Summary (Last 24 hours) at 10/3/2024 0742  Last data filed at 10/3/2024 0331  Gross per 24 hour   Intake 992.92 ml   Output 4875 ml   Net -3882.08 ml     Review of Systems:   Constitutional: negative for fevers, chills, sweats and fatigue  Eyes: negative for visual disturbance,  icterus  Ears, Nose, Mouth, Throat, and Face: negative for tinnitus, epistaxis, sore mouth and hoarseness  Respiratory: negative for cough, sputum, hemoptysis, pleurisy/chest pain or wheezing  Cardiovascular: negative for chest pain, , palpitations,  syncope, paroxysmal nocturnal dyspnea  Gastrointestinal: negative for reflux symptoms, nausea, vomiting, melena, diarrhea, constipation and abdominal pain  Genitourinary:negative for dysuria, nocturia, urinary incontinence, hesitancy and hematuria  Endocrine: no polydipsia, no goitre  Integument: negative 
                Patient: Norma Galan   MRN: [id number]         CSN: [id number]    YOB: 1980      Admit Date: [facility]    Assessment:     Principal Problem:    Sickle cell anemia with pain (HCC)  Resolved Problems:    * No resolved hospital problems. *    Sickle cell anemia, pain crisis  hemloysis  Plan:     Titrate pain meds IV dilaudid 1.5 mg q3hr prn  Hydration  Incentive spirometry  Ct hydrea , po folic acid    Catrachita Chaparro MD  [facility]  Office [phone]      Subjective:     Back pain    Objective:     /74   Pulse 70   Temp 98.3 °F (36.8 °C) (Oral)   Resp 16   Ht 1.829 m (6')   Wt 87.5 kg (193 lb)   SpO2 96%   BMI 26.18 kg/m²           Temp (24hrs), Av.1 °F (36.7 °C), Min:97.4 °F (36.3 °C), Max:98.6 °F (37 °C)        Intake/Output Summary (Last 24 hours) at 9/15/2024 1047  Last data filed at 9/15/2024 0800  Gross per 24 hour   Intake 840 ml   Output --   Net 840 ml     Review of Systems:   Constitutional: negative for fevers, chills, sweats and fatigue  Eyes: negative for visual disturbance,  icterus  Ears, Nose, Mouth, Throat, and Face: negative for tinnitus, epistaxis, sore mouth and hoarseness  Respiratory: negative for cough, sputum, hemoptysis, pleurisy/chest pain or wheezing  Cardiovascular: negative for chest pain, , palpitations,  syncope, paroxysmal nocturnal dyspnea  Gastrointestinal: negative for reflux symptoms, nausea, vomiting, melena, diarrhea, constipation and abdominal pain  Genitourinary:negative for dysuria, nocturia, urinary incontinence, hesitancy and hematuria  Endocrine: no polydipsia, no goitre  Integument: negative for rash, skin lesion(s) and pruritus  Hematologic/Lymphatic: negative for easy bruising, bleeding and lymphadenopathy  Musculoskeletal: back pain  Neurological: negative for headaches, dizziness, seizures, paresthesia and weakness    Physical Exam: ECOG : 1  Constitutional: Alert, oriented, not in 
      Hematology / Oncology Progress Note      Patient: Norma Galan  Gender: male   MRN: [id number]    YOB: 1980 Age: 44 y.o.   CSN: [id number]    LOS:  LOS: 26 days   Admit Date: 9/13/2024     PCP: Catrachita Chaparro MD    Date of evaluation: 10/10/2024    Assessment:   Principal Problem:    Sickle cell anemia with pain (HCC)  Active Problems:    Encounter for palliative care    Advanced care planning/counseling discussion    Leukocytosis  Resolved Problems:    * No resolved hospital problems. *    Refractory sickle cell pain crisis, now improving  S/p red cell exchange 9/28/24  Hemolysis    Plan:   -pt has been doing well on regimen of scheduled po dialudid 4mg po q4, MS contin 20mg BID, and scheduled ibuprofen  -we will send this regimen to his pharmacy at discharge (possibly tomorrow)  -MRI T-spine with no acute findings      Subjective:   Pt states that he felt really good today. He is sitting in the chair this evening. His current oral regimen has been working well, and he would like to keep this at discharge. States that he has not needed the IV dilaudid at all today. Would like to go home tomorrow if his pain is still controlled.     Past Medical History:     Patient Active Problem List    Diagnosis Date Noted    Encounter for palliative care 09/20/2024    Advanced care planning/counseling discussion 09/20/2024    Leukocytosis 09/20/2024    Sickle cell anemia with pain (HCC) 09/14/2024    Vaso-occlusive sickle cell crisis (HCC) 08/15/2024    Hypokalemia 08/15/2024    Leukemoid reaction 04/25/2024    Acute chest syndrome (HCC) 05/19/2023    Acute sickle cell crisis (HCC) 05/19/2023    Sickle cell pain crisis (HCC) 01/08/2020    Hyperbilirubinemia 04/13/2018    Bilateral leg pain 04/24/2017    Sickle cell anemia with crisis (HCC) 05/07/2015    Sickle cell crisis (HCC) 01/26/2014    Elevated total protein 12/27/2011    Serum total bilirubin elevated 12/27/2011    Elevated alkaline 
      Hematology / Oncology Progress Note      Patient: Norma Galan  Gender: male   MRN: [id number]    YOB: 1980 Age: 44 y.o.   CSN: [id number]    LOS:  LOS: 27 days   Admit Date: 9/13/2024     PCP: Catrachita Chaparro MD    Date of evaluation: 10/11/2024    Assessment:   Principal Problem:    Sickle cell anemia with pain (HCC)  Active Problems:    Encounter for palliative care    Advanced care planning/counseling discussion    Leukocytosis  Resolved Problems:    * No resolved hospital problems. *    Refractory sickle cell pain crisis, now resolved  S/p red cell exchange 9/28/24  Hemolysis    Plan:   -OK for discharge today from hematology standpoint  -I sent over Rx of dialudid 4mg po q4, MS contin 20mg BID, and hydrea to his outpt pharmacy this morning ([facility] per pt preference)  -We will call patient next week regarding follow-up with Dr. Chaparro  -MRI T-spine with no acute findings      Subjective:   Pt continues to feel well, ready to go home today.    Past Medical History:     Patient Active Problem List    Diagnosis Date Noted    Encounter for palliative care 09/20/2024    Advanced care planning/counseling discussion 09/20/2024    Leukocytosis 09/20/2024    Sickle cell anemia with pain (HCC) 09/14/2024    Vaso-occlusive sickle cell crisis (HCC) 08/15/2024    Hypokalemia 08/15/2024    Leukemoid reaction 04/25/2024    Acute chest syndrome (HCC) 05/19/2023    Acute sickle cell crisis (HCC) 05/19/2023    Sickle cell pain crisis (HCC) 01/08/2020    Hyperbilirubinemia 04/13/2018    Bilateral leg pain 04/24/2017    Sickle cell anemia with crisis (HCC) 05/07/2015    Sickle cell crisis (HCC) 01/26/2014    Elevated total protein 12/27/2011    Serum total bilirubin elevated 12/27/2011    Elevated alkaline phosphatase level 03/21/2010    Reticulocytosis 10/25/2007    Microcytic anemia 10/25/2007             Review of Systems:   Reviewed and negative.    Physical Assessment:   /68   Pulse (!) 104  
   Hematology / Oncology Progress Note      Patient: Norma Galan  Gender: male   MRN: [id number]    YOB: 1980 Age: 44 y.o.   CSN: [id number]    LOS:  LOS: 8 days   Admit Date: 9/13/2024     PCP: Catrachita Chapraro MD    Assessment:   Principal Problem:    Sickle cell anemia with pain (HCC)  Active Problems:    Encounter for palliative care    Advanced care planning/counseling discussion    Leukocytosis  Resolved Problems:    * No resolved hospital problems. *      Plan:     Acute sickle cell pain crisis.  Patient seems to be slightly better.  However is still in severe pain and requiring PCA.    Subjective:   Patient is still in pain crisis.    Objective:   /84   Pulse 86   Temp 98.4 °F (36.9 °C) (Oral)   Resp 18   Ht 1.829 m (6' 0.01\")   Wt 87.5 kg (193 lb)   SpO2 100%   BMI 26.17 kg/m²           Intake/Output Summary (Last 24 hours) at 9/22/2024 1055  Last data filed at 9/21/2024 1943  Gross per 24 hour   Intake 1496.63 ml   Output 900 ml   Net 596.63 ml       Review of Systems:   Constitutional: negative for fevers, chills, sweats and fatigue  Eyes: negative for visual disturbance, redness and icterus  Ears, Nose, Mouth, Throat, and Face: negative for tinnitus, epistaxis, sore mouth and hoarseness  Respiratory: negative for cough, sputum, hemoptysis, pleurisy/chest pain or wheezing  Cardiovascular: negative for chest pain, chest pressure/discomfort, palpitations, irregular heart beats, syncope, paroxysmal nocturnal dyspnea  Gastrointestinal: negative for reflux symptoms, nausea, vomiting, change in bowel habits, melena, diarrhea, constipation and abdominal pain  Genitourinary:negative for dysuria, nocturia, urinary incontinence, hesitancy and hematuria  Integument: negative for rash, skin lesion(s) and pruritus  Hematologic/Lymphatic: negative for easy bruising, bleeding and lymphadenopathy  Musculoskeletal diffuse pain on his back and lower legs.  Neurological: negative for 
   Hematology / Oncology Progress Note      Patient: Norma Galan  Gender: male   MRN: [id number]    YOB: 1980 Age: 44 y.o.   CSN: [id number]    LOS:  LOS: 7 days   Admit Date: 9/13/2024     PCP: Catrachita Chaparro MD    Assessment:   Principal Problem:    Sickle cell anemia with pain (HCC)  Active Problems:    Encounter for palliative care    Advanced care planning/counseling discussion    Leukocytosis  Resolved Problems:    * No resolved hospital problems. *      Plan:     Acute severe sickle cell pain crisis.  Patient still has a lot of pain.  Continue supportive management with IV fluids O2 and PCA pump  Titrate pain medication.  No signs of respiratory depression.  CBC stable.  No need for transfusion.    Subjective:   Patient currently on PCA pump.  Still having a lot of pain    Objective:   BP (!) 146/90   Pulse 99   Temp 98.7 °F (37.1 °C) (Oral)   Resp 24   Ht 1.829 m (6' 0.01\")   Wt 87.5 kg (193 lb)   SpO2 98%   BMI 26.17 kg/m²           Intake/Output Summary (Last 24 hours) at 9/21/2024 0919  Last data filed at 9/21/2024 0720  Gross per 24 hour   Intake 3238 ml   Output 3275 ml   Net -37 ml       Review of Systems:   Constitutional: negative for fevers, chills, sweats and fatigue  Eyes: negative for visual disturbance, redness and icterus  Ears, Nose, Mouth, Throat, and Face: negative for tinnitus, epistaxis, sore mouth and hoarseness  Respiratory: negative for cough, sputum, hemoptysis, pleurisy/chest pain or wheezing  Cardiovascular: negative for chest pain, chest pressure/discomfort, palpitations, irregular heart beats, syncope, paroxysmal nocturnal dyspnea  Gastrointestinal: negative for reflux symptoms, nausea, vomiting, change in bowel habits, melena, diarrhea, constipation and abdominal pain  Genitourinary:negative for dysuria, nocturia, urinary incontinence, hesitancy and hematuria  Integument: negative for rash, skin lesion(s) and pruritus  Hematologic/Lymphatic: 
 conducted an initial consultation and Spiritual Assessment for Norma Galan, who is a 43 y.o.,male. Patient's Primary Language is: English.   According to the patient's EMR Mosque Affiliation is: Orthodox.     The reason the Patient came to the hospital is:   Patient Active Problem List    Diagnosis Date Noted    Sickle cell anemia with pain (HCC) 09/14/2024    Vaso-occlusive sickle cell crisis (HCC) 08/15/2024    Hypokalemia 08/15/2024    Leukemoid reaction 04/25/2024    Acute chest syndrome (HCC) 05/19/2023    Acute sickle cell crisis (HCC) 05/19/2023    Sickle cell pain crisis (HCC) 01/08/2020    Hyperbilirubinemia 04/13/2018    Bilateral leg pain 04/24/2017    Sickle cell anemia with crisis (HCC) 05/07/2015    Sickle cell crisis (HCC) 01/26/2014    Elevated total protein 12/27/2011    Serum total bilirubin elevated 12/27/2011    Elevated alkaline phosphatase level 03/21/2010    Reticulocytosis 10/25/2007    Microcytic anemia 10/25/2007        The  provided the following Interventions:  Initiated a relationship of care and support.   Explored issues of efra, belief, spirituality and Anabaptist/ritual needs while hospitalized.  Listened empathically.  Provided chaplaincy education concerning Advance Medical Directive.  Provided information about Spiritual Care Services.  Offered prayer and assurance of continued prayers on patient's behalf.   Chart reviewed.    The following outcomes where achieved:  Patient shared limited information about both their medical narrative and spiritual journey/beliefs.   confirmed Patient's Mosque Affiliation.  Patient processed feeling about current hospitalization.  Patient expressed gratitude for 's visit.    Assessment:  Patient does not have any Anabaptist/cultural needs that will affect patient's preferences in health care.  There are no spiritual or Anabaptist issues which require intervention at this time.     Plan:  Chaplains 
 met patient and mom at bedside.    Patient was unable to communicate. Mom said patient was in the hospital due to Sickle Cell pain crisis. Mom thanked  for the visit.     provided presence and support for patient and mom.    Chaplains will provide follow-up care for patient and family as needed.    Spiritual Health History and Assessment/Progress Note  [facility]    Palliative Care,  ,  ,      Name: Norma Galan MRN: [id number]    Age: 44 y.o.     Sex: male   Language: English   Yazdanism: Latter day   Sickle cell anemia with pain (HCC)     Date: 9/21/2024            Total Time Calculated: 6 min              Spiritual Assessment began in [facility]            Encounter Overview/Reason: Palliative Care  Service Provided For: Patient and family together    Kristen, Belief, Meaning:   Patient unable to assess at this time  Family/Friends Other: Did not discuss.      Importance and Influence:  Patient unable to assess at this time  Family/Friends Other: Did not discuss.    Community:  Patient Other: Unable to assess.  Family/Friends Other: Did no discuss    Assessment and Plan of Care:     Patient Interventions include: Other: Unable to assess  Family/Friends Interventions include: Facilitated expression of thoughts and feelings    Patient Plan of Care: No spiritual needs identified for follow-up  Family/Friends Plan of Care: No spiritual needs identified for follow-up    Electronically signed by Chaplain Kelsea on 9/21/2024 at 2:12 PM   
0745: Bedside and Verbal shift change report given to BRYANT Almonte (oncoming nurse) by BRYANT Keyes (offgoing nurse). Report included the following information Nurse Handoff Report, Index, Adult Overview, Intake/Output, MAR, Recent Results, Cardiac Rhythm NSR-ST, Alarm Parameters, and Quality Measures.      1045: BRYANT Interiano, here from [place], 6 units of PRBCs to be transfused by aphresis. Vitals obtained and RN at bedside. Pre-meds of benadryl and tylenol given PO. Blood consents obtained.    1100: Pt reassessed after first 15 min, vitals stable.    1300: Pt eyes closed, PCA pump reprogrammed and pt has other dose of dilaudid on board. Pt given hot packs and applied to lower back.    1900: Bedside and Verbal shift change report given to BRYANT Russell (oncoming nurse) by BRYANT Almonte (offgoing nurse). Report included the following information Nurse Handoff Report, Index, Adult Overview, Intake/Output, MAR, Recent Results, Cardiac Rhythm NSR-ST, and Alarm Parameters.    
4 Eyes Skin Assessment     NAME:  Noram Galan  YOB: 1980  MEDICAL RECORD NUMBER:  [id number]    The patient is being assessed for  Shift Handoff    I agree that at least one RN has performed a thorough Head to Toe Skin Assessment on the patient. ALL assessment sites listed below have been assessed.      Areas assessed by both nurses:    Head, Face, Ears, Shoulders, Back, Chest, Arms, Elbows, Hands, Sacrum. Buttock, Coccyx, Ischium, Legs. Feet and Heels, and Under Medical Devices         Does the Patient have a Wound? No noted wound(s)       Tima Prevention initiated by RN: No  Wound Care Orders initiated by RN: No    Pressure Injury (Stage 3,4, Unstageable, DTI, NWPT, and Complex wounds) if present, place Wound referral order by RN under : No    New Ostomies, if present place, Ostomy referral order under : No     Nurse 1 eSignature: Electronically signed by Cookie Cullen RN on 10/10/24 at 1:27 PM EDT    **SHARE this note so that the co-signing nurse can place an eSignature**    Nurse 2 eSignature: {Esignature:[id number]}    
4 Eyes Skin Assessment     NAME:  Norma Galan  YOB: 1980  MEDICAL RECORD NUMBER:  [id number]    The patient is being assessed for  Shift Handoff    I agree that at least one RN has performed a thorough Head to Toe Skin Assessment on the patient. ALL assessment sites listed below have been assessed.      Areas assessed by both nurses:    Head, Face, Ears, Shoulders, Back, Chest, Arms, Elbows, Hands, Sacrum. Buttock, Coccyx, Ischium, Legs. Feet and Heels, and Under Medical Devices         Does the Patient have a Wound? No noted wound(s)       Tima Prevention initiated by RN: No  Wound Care Orders initiated by RN: No    Pressure Injury (Stage 3,4, Unstageable, DTI, NWPT, and Complex wounds) if present, place Wound referral order by RN under : No    New Ostomies, if present place, Ostomy referral order under : No     Nurse 1 eSignature: Electronically signed by Cookie Cullen RN on 10/7/24 at 6:34 PM EDT    **SHARE this note so that the co-signing nurse can place an eSignature**    Nurse 2 eSignature: Electronically signed by Terrance Zimmerman RN on 10/8/24 at 8:15 AM EDT    
4 Eyes Skin Assessment     NAME:  Norma Galan  YOB: 1980  MEDICAL RECORD NUMBER:  [id number]    The patient is being assessed for  Shift Handoff    I agree that at least one RN has performed a thorough Head to Toe Skin Assessment on the patient. ALL assessment sites listed below have been assessed.      Areas assessed by both nurses:    Head, Face, Ears, Shoulders, Back, Chest, Arms, Elbows, Hands, Sacrum. Buttock, Coccyx, Ischium, and Legs. Feet and Heels        Does the Patient have a Wound? No noted wound(s)       Tima Prevention initiated by RN: Yes  Wound Care Orders initiated by RN: No    Pressure Injury (Stage 3,4, Unstageable, DTI, NWPT, and Complex wounds) if present, place Wound referral order by RN under : No    New Ostomies, if present place, Ostomy referral order under : No     Nurse 1 eSignature: Electronically signed by Ethel Hamm RN on 9/25/24 at 7:34 PM EDT    **SHARE this note so that the co-signing nurse can place an eSignature**    Nurse 2 eSignature: Electronically signed by Drew Early RN on 9/25/24 at 7:35 PM EDT   
4 Eyes Skin Assessment     NAME:  Norma Galan  YOB: 1980  MEDICAL RECORD NUMBER:  [id number]    The patient is being assessed for  Shift Handoff    I agree that at least one RN has performed a thorough Head to Toe Skin Assessment on the patient. ALL assessment sites listed below have been assessed.      Areas assessed by both nurses:    Head, Face, Ears, Shoulders, Back, Chest, Arms, Elbows, Hands, Sacrum. Buttock, Coccyx, Ischium, and Legs. Feet and Heels        Does the Patient have a Wound? No noted wound(s)       Tima Prevention initiated by RN: No  Wound Care Orders initiated by RN: No    Pressure Injury (Stage 3,4, Unstageable, DTI, NWPT, and Complex wounds) if present, place Wound referral order by RN under : No    New Ostomies, if present place, Ostomy referral order under : Yes     Nurse 1 eSignature: Electronically signed by Ethel Hamm RN on 9/30/24 at 8:18 PM EDT    **SHARE this note so that the co-signing nurse can place an eSignature**    Nurse 2 eSignature: {Esignature:[id number]}   
4 Eyes Skin Assessment     NAME:  Norma Galan  YOB: 1980  MEDICAL RECORD NUMBER:  [id number]    The patient is being assessed for  Shift Handoff    I agree that at least one RN has performed a thorough Head to Toe Skin Assessment on the patient. ALL assessment sites listed below have been assessed.      Areas assessed by both nurses:    Head, Face, Ears, Shoulders, Back, Chest, Arms, Elbows, Hands, Sacrum. Buttock, Coccyx, Ischium, Legs. Feet and Heels, and Under Medical Devices         Does the Patient have a Wound? No noted wound(s)       Tima Prevention initiated by RN: Yes  Wound Care Orders initiated by RN: No    Pressure Injury (Stage 3,4, Unstageable, DTI, NWPT, and Complex wounds) if present, place Wound referral order by RN under : No    New Ostomies, if present place, Ostomy referral order under : No     Nurse 1 eSignature: Electronically signed by Delmy Orozco RN on 9/29/24 at 7:01 PM EDT    **SHARE this note so that the co-signing nurse can place an eSignature**    Nurse 2 eSignature: Electronically signed by Drew Early RN on 9/30/24 at 9:21 AM EDT    
4 Eyes Skin Assessment     NAME:  Norma Galan  YOB: 1980  MEDICAL RECORD NUMBER:  [id number]    The patient is being assessed for  Shift Handoff    I agree that at least one RN has performed a thorough Head to Toe Skin Assessment on the patient. ALL assessment sites listed below have been assessed.      Areas assessed by both nurses:    Head, Face, Ears, Shoulders, Back, Chest, Arms, Elbows, Hands, Sacrum. Buttock, Coccyx, Ischium, and Legs. Feet and Heels        Does the Patient have a Wound? No noted wound(s)       Tima Prevention initiated by RN: Yes  Wound Care Orders initiated by RN: No    Pressure Injury (Stage 3,4, Unstageable, DTI, NWPT, and Complex wounds) if present, place Wound referral order by RN under : No    New Ostomies, if present place, Ostomy referral order under : No     Nurse 1 eSignature: Electronically signed by Jared Ryder RN on 10/3/24 at 9:24 PM EDT    **SHARE this note so that the co-signing nurse can place an eSignature**    Nurse 2 eSignature: Electronically signed by Stephy Gómez RN on 10/4/24 at 5:03 AM EDT   
4 Eyes Skin Assessment     NAME:  Norma Galan  YOB: 1980  MEDICAL RECORD NUMBER:  [id number]    The patient is being assessed for  Shift Handoff    I agree that at least one RN has performed a thorough Head to Toe Skin Assessment on the patient. ALL assessment sites listed below have been assessed.      Areas assessed by both nurses:    Head, Face, Ears, Shoulders, Back, Chest, Arms, Elbows, Hands, Sacrum. Buttock, Coccyx, Ischium, and Legs. Feet and Heels        Does the Patient have a Wound? No noted wound(s)       Tima Prevention initiated by RN: No  Wound Care Orders initiated by RN: No    Pressure Injury (Stage 3,4, Unstageable, DTI, NWPT, and Complex wounds) if present, place Wound referral order by RN under : No    New Ostomies, if present place, Ostomy referral order under : No     Nurse 1 eSignature: Electronically signed by Gerard Whitaker RN on 9/26/24 at 8:24 PM EDT    **SHARE this note so that the co-signing nurse can place an eSignature**    Nurse 2 eSignature: Electronically signed by Stephy Gómez RN on 9/26/24 at 8:39 PM EDT   
4 Eyes Skin Assessment     NAME:  Norma Galan  YOB: 1980  MEDICAL RECORD NUMBER:  [id number]    The patient is being assessed for  Shift Handoff    I agree that at least one RN has performed a thorough Head to Toe Skin Assessment on the patient. ALL assessment sites listed below have been assessed.      Areas assessed by both nurses:    Head, Face, Ears, Shoulders, Back, Chest, Arms, Elbows, Hands, Sacrum. Buttock, Coccyx, Ischium, and Legs. Feet and Heels        Does the Patient have a Wound? No noted wound(s)       Tima Prevention initiated by RN: No  Wound Care Orders initiated by RN: No    Pressure Injury (Stage 3,4, Unstageable, DTI, NWPT, and Complex wounds) if present, place Wound referral order by RN under : No    New Ostomies, if present place, Ostomy referral order under : No     Nurse 1 eSignature: Electronically signed by Lupe Maria RN on 9/23/24 at 8:54 PM EDT    **SHARE this note so that the co-signing nurse can place an eSignature**    Nurse 2 eSignature: {Esignature:[id number]}   
4 Eyes Skin Assessment     NAME:  Norma Galan  YOB: 1980  MEDICAL RECORD NUMBER:  [id number]    The patient is being assessed for  Shift Handoff    I agree that at least one RN has performed a thorough Head to Toe Skin Assessment on the patient. ALL assessment sites listed below have been assessed.      Areas assessed by both nurses:    Head, Face, Ears, Shoulders, Back, Chest, Arms, Elbows, Hands, Sacrum. Buttock, Coccyx, Ischium, Legs. Feet and Heels, and Under Medical Devices         Does the Patient have a Wound? No noted wound(s)       Tima Prevention initiated by RN: No  Wound Care Orders initiated by RN: No    Pressure Injury (Stage 3,4, Unstageable, DTI, NWPT, and Complex wounds) if present, place Wound referral order by RN under : No    New Ostomies, if present place, Ostomy referral order under : No     Nurse 1 eSignature: Electronically signed by Drew Early RN on 9/30/24 at 9:22 AM EDT    **SHARE this note so that the co-signing nurse can place an eSignature**    Nurse 2 eSignature: Electronically signed by Ethel Hamm RN on 9/30/24 at 9:35 AM EDT    
4 Eyes Skin Assessment     NAME:  Norma Galan  YOB: 1980  MEDICAL RECORD NUMBER:  [id number]    The patient is being assessed for  Shift Handoff    I agree that at least one RN has performed a thorough Head to Toe Skin Assessment on the patient. ALL assessment sites listed below have been assessed.      Areas assessed by both nurses:    Head, Face, Ears, Shoulders, Back, Chest, Arms, Elbows, Hands, Sacrum. Buttock, Coccyx, Ischium, and Legs. Feet and Heels        Does the Patient have a Wound? No noted wound(s)       Tima Prevention initiated by RN: No  Wound Care Orders initiated by RN: No    Pressure Injury (Stage 3,4, Unstageable, DTI, NWPT, and Complex wounds) if present, place Wound referral order by RN under : No    New Ostomies, if present place, Ostomy referral order under : No     Nurse 1 eSignature: Electronically signed by Catalina Madera RN on 10/2/24 at 8:43 PM EDT    **SHARE this note so that the co-signing nurse can place an eSignature**    Nurse 2 eSignature: Electronically signed by Radha Guerrero RN on 10/2/24 at 10:05 PM EDT    
4 Eyes Skin Assessment     NAME:  Norma Galan  YOB: 1980  MEDICAL RECORD NUMBER:  [id number]    The patient is being assessed for  Shift Handoff    I agree that at least one RN has performed a thorough Head to Toe Skin Assessment on the patient. ALL assessment sites listed below have been assessed.      Areas assessed by both nurses:    Head, Face, Ears, Shoulders, Back, Chest, Arms, Elbows, Hands, Sacrum. Buttock, Coccyx, Ischium, and Legs. Feet and Heels        Does the Patient have a Wound? No noted wound(s)       Tima Prevention initiated by RN: Yes  Wound Care Orders initiated by RN: No    Pressure Injury (Stage 3,4, Unstageable, DTI, NWPT, and Complex wounds) if present, place Wound referral order by RN under : No    New Ostomies, if present place, Ostomy referral order under : No     Nurse 1 eSignature: Electronically signed by Radha Guerrero RN on 9/25/24 at 8:06 AM EDT    **SHARE this note so that the co-signing nurse can place an eSignature**    Nurse 2 eSignature: Electronically signed by Ethel Hamm RN on 9/25/24 at 8:08 AM EDT   
4 Eyes Skin Assessment     NAME:  Norma Galan  YOB: 1980  MEDICAL RECORD NUMBER:  [id number]    The patient is being assessed for  Shift Handoff    I agree that at least one RN has performed a thorough Head to Toe Skin Assessment on the patient. ALL assessment sites listed below have been assessed.      Areas assessed by both nurses:    Head, Face, Ears, Shoulders, Back, Chest, Arms, Elbows, Hands, Sacrum. Buttock, Coccyx, Ischium, Legs. Feet and Heels, and Under Medical Devices         Does the Patient have a Wound? No noted wound(s)       Tima Prevention initiated by RN: No  Wound Care Orders initiated by RN: No    Pressure Injury (Stage 3,4, Unstageable, DTI, NWPT, and Complex wounds) if present, place Wound referral order by RN under : No    New Ostomies, if present place, Ostomy referral order under : No     Nurse 1 eSignature: Electronically signed by Drew Early RN on 9/26/24 at 8:14 AM EDT    **SHARE this note so that the co-signing nurse can place an eSignature**    Nurse 2 eSignature: Electronically signed by Gerard Whitaker RN on 9/26/24 at 8:16 AM EDT    
4 Eyes Skin Assessment     NAME:  Norma Galan  YOB: 1980  MEDICAL RECORD NUMBER:  [id number]    The patient is being assessed for  Shift Handoff    I agree that at least one RN has performed a thorough Head to Toe Skin Assessment on the patient. ALL assessment sites listed below have been assessed.      Areas assessed by both nurses:    Head, Face, Ears, Shoulders, Back, Chest, Arms, Elbows, Hands, Sacrum. Buttock, Coccyx, Ischium, Legs. Feet and Heels, and Under Medical Devices         Does the Patient have a Wound? No noted wound(s)       Tima Prevention initiated by RN: No  Wound Care Orders initiated by RN: No    Pressure Injury (Stage 3,4, Unstageable, DTI, NWPT, and Complex wounds) if present, place Wound referral order by RN under : No    New Ostomies, if present place, Ostomy referral order under : No     Nurse 1 eSignature: Electronically signed by Christy Larsen RN on 10/11/24 at 5:42 AM EDT    **SHARE this note so that the co-signing nurse can place an eSignature**    Nurse 2 eSignature: {Esignature:[id number]} eugenia  
4 Eyes Skin Assessment     NAME:  Norma Galan  YOB: 1980  MEDICAL RECORD NUMBER:  [id number]    The patient is being assessed for  Shift Handoff    I agree that at least one RN has performed a thorough Head to Toe Skin Assessment on the patient. ALL assessment sites listed below have been assessed.      Areas assessed by both nurses:    Head, Face, Ears, Shoulders, Back, Chest, Arms, Elbows, Hands, Sacrum. Buttock, Coccyx, Ischium, Legs. Feet and Heels, and Under Medical Devices         Does the Patient have a Wound? No noted wound(s)       Tima Prevention initiated by RN: No  Wound Care Orders initiated by RN: No    Pressure Injury (Stage 3,4, Unstageable, DTI, NWPT, and Complex wounds) if present, place Wound referral order by RN under : No    New Ostomies, if present place, Ostomy referral order under : No     Nurse 1 eSignature: Electronically signed by Torie Zelaya RN on 10/6/24 at 7:11 PM EDT    **SHARE this note so that the co-signing nurse can place an eSignature**    Nurse 2 eSignature: {Esignature:[id number]}   
4 Eyes Skin Assessment     NAME:  Norma Galan  YOB: 1980  MEDICAL RECORD NUMBER:  [id number]    The patient is being assessed for  Shift Handoff    I agree that at least one RN has performed a thorough Head to Toe Skin Assessment on the patient. ALL assessment sites listed below have been assessed.      Areas assessed by both nurses:    Head, Face, Ears, Shoulders, Back, Chest, Arms, Elbows, Hands, Sacrum. Buttock, Coccyx, Ischium, Legs. Feet and Heels, and Under Medical Devices         Does the Patient have a Wound? No noted wound(s)       Tima Prevention initiated by RN: No  Wound Care Orders initiated by RN: No    Pressure Injury (Stage 3,4, Unstageable, DTI, NWPT, and Complex wounds) if present, place Wound referral order by RN under : No    New Ostomies, if present place, Ostomy referral order under : No     Nurse 1 eSignature: Electronically signed by Stephy Gómez RN on 10/5/24 at 8:25 AM EDT    **SHARE this note so that the co-signing nurse can place an eSignature**    Nurse 2 eSignature: Electronically signed by Torie Zelaya RN on 10/5/24 at 12:33 PM EDT   
4 Eyes Skin Assessment     NAME:  Norma Galan  YOB: 1980  MEDICAL RECORD NUMBER:  [id number]    The patient is being assessed for  Shift Handoff    I agree that at least one RN has performed a thorough Head to Toe Skin Assessment on the patient. ALL assessment sites listed below have been assessed.      Areas assessed by both nurses:    Head, Face, Ears, Shoulders, Back, Chest, Arms, Elbows, Hands, Sacrum. Buttock, Coccyx, Ischium, Legs. Feet and Heels, and Under Medical Devices         Does the Patient have a Wound? No noted wound(s)       Tima Prevention initiated by RN: No  Wound Care Orders initiated by RN: No    Pressure Injury (Stage 3,4, Unstageable, DTI, NWPT, and Complex wounds) if present, place Wound referral order by RN under : No    New Ostomies, if present place, Ostomy referral order under : No     Nurse 1 eSignature: Electronically signed by MILO JAY RN on 10/10/24 at 9:11 AM EDT    **SHARE this note so that the co-signing nurse can place an eSignature**    Nurse 2 eSignature: Electronically signed by Cookie Cullen RN on 10/10/24 at 1:26 PM EDT   
4 Eyes Skin Assessment     NAME:  Norma Galan  YOB: 1980  MEDICAL RECORD NUMBER:  [id number]    The patient is being assessed for  Shift Handoff    I agree that at least one RN has performed a thorough Head to Toe Skin Assessment on the patient. ALL assessment sites listed below have been assessed.      Areas assessed by both nurses:    Head, Face, Ears, Shoulders, Back, Chest, Arms, Elbows, Hands, Sacrum. Buttock, Coccyx, Ischium, and Legs. Feet and Heels        Does the Patient have a Wound? No noted wound(s)       Tima Prevention initiated by RN: Yes  Wound Care Orders initiated by RN: No    Pressure Injury (Stage 3,4, Unstageable, DTI, NWPT, and Complex wounds) if present, place Wound referral order by RN under : No    New Ostomies, if present place, Ostomy referral order under : No     Nurse 1 eSignature: Electronically signed by Jared Ryder RN on 10/4/24 at 8:18 PM EDT    **SHARE this note so that the co-signing nurse can place an eSignature**    Nurse 2 eSignature: Electronically signed by Stephy Gómez RN on 10/5/24 at 5:31 AM EDT    
4 Eyes Skin Assessment     NAME:  Norma Galan  YOB: 1980  MEDICAL RECORD NUMBER:  [id number]    The patient is being assessed for  Shift Handoff    I agree that at least one RN has performed a thorough Head to Toe Skin Assessment on the patient. ALL assessment sites listed below have been assessed.      Areas assessed by both nurses:    Head, Face, Ears, Shoulders, Back, Chest, Arms, Elbows, Hands, Sacrum. Buttock, Coccyx, Ischium, Legs. Feet and Heels, Under Medical Devices , and Other ***        Does the Patient have a Wound? No noted wound(s)       Tima Prevention initiated by RN: Yes  Wound Care Orders initiated by RN: Yes    Pressure Injury (Stage 3,4, Unstageable, DTI, NWPT, and Complex wounds) if present, place Wound referral order by RN under : No    New Ostomies, if present place, Ostomy referral order under : No     Nurse 1 eSignature: Electronically signed by Stephy Gómez RN on 9/27/24 at 8:18 AM EDT    **SHARE this note so that the co-signing nurse can place an eSignature**    Nurse 2 eSignature: {Esignature:[id number]}   
4 Eyes Skin Assessment     NAME:  Norma Galan  YOB: 1980  MEDICAL RECORD NUMBER:  [id number]    The patient is being assessed for  Shift Handoff    I agree that at least one RN has performed a thorough Head to Toe Skin Assessment on the patient. ALL assessment sites listed below have been assessed.      Areas assessed by both nurses:    Head, Face, Ears, Shoulders, Back, Chest, Arms, Elbows, Hands, Sacrum. Buttock, Coccyx, Ischium, Legs. Feet and Heels, and Under Medical Devices         Does the Patient have a Wound? No noted wound(s)       Tima Prevention initiated by RN: Yes  Wound Care Orders initiated by RN: No    Pressure Injury (Stage 3,4, Unstageable, DTI, NWPT, and Complex wounds) if present, place Wound referral order by RN under : No    New Ostomies, if present place, Ostomy referral order under : No     Nurse 1 eSignature: Electronically signed by Stephy Gómez RN on 10/4/24 at 8:07 AM EDT    **SHARE this note so that the co-signing nurse can place an eSignature**    Nurse 2 eSignature: Electronically signed by Jared Ryder RN on 10/4/24 at 8:18 PM EDT   
4 Eyes Skin Assessment     NAME:  Norma Galan  YOB: 1980  MEDICAL RECORD NUMBER:  [id number]    The patient is being assessed for  Shift Handoff    I agree that at least one RN has performed a thorough Head to Toe Skin Assessment on the patient. ALL assessment sites listed below have been assessed.      Areas assessed by both nurses:    Head, Face, Ears, Shoulders, Back, Chest, Arms, Elbows, Hands, Sacrum. Buttock, Coccyx, Ischium, Legs. Feet and Heels, and Under Medical Devices         Does the Patient have a Wound? No noted wound(s)       Tima Prevention initiated by RN: No  Wound Care Orders initiated by RN: No    Pressure Injury (Stage 3,4, Unstageable, DTI, NWPT, and Complex wounds) if present, place Wound referral order by RN under : No    New Ostomies, if present place, Ostomy referral order under : No     Nurse 1 eSignature: Electronically signed by Torie Zelaya RN on 10/8/24 at 8:36 PM EDT    **SHARE this note so that the co-signing nurse can place an eSignature**    Nurse 2 eSignature: {Esignature:[id number]}   
4 Eyes Skin Assessment     NAME:  Norma Galan  YOB: 1980  MEDICAL RECORD NUMBER:  [id number]    The patient is being assessed for  Shift Handoff    I agree that at least one RN has performed a thorough Head to Toe Skin Assessment on the patient. ALL assessment sites listed below have been assessed.      Areas assessed by both nurses:    Head, Face, Ears, Shoulders, Back, Chest, Arms, Elbows, Hands, Sacrum. Buttock, Coccyx, Ischium, Legs. Feet and Heels, and Under Medical Devices         Does the Patient have a Wound? No noted wound(s)       Tima Prevention initiated by RN: No  Wound Care Orders initiated by RN: No    Pressure Injury (Stage 3,4, Unstageable, DTI, NWPT, and Complex wounds) if present, place Wound referral order by RN under : No    New Ostomies, if present place, Ostomy referral order under : No     Nurse 1 eSignature: Electronically signed by Radha Guerrero RN on 9/24/24 at 8:21 AM EDT    **SHARE this note so that the co-signing nurse can place an eSignature**    Nurse 2 eSignature: Electronically signed by Ethel Hamm RN on 9/24/24 at 1:40 PM EDT   
4 Eyes Skin Assessment     NAME:  Norma Galan  YOB: 1980  MEDICAL RECORD NUMBER:  [id number]    The patient is being assessed for  Shift Handoff    I agree that at least one RN has performed a thorough Head to Toe Skin Assessment on the patient. ALL assessment sites listed below have been assessed.      Areas assessed by both nurses:    Head, Face, Ears, Shoulders, Back, Chest, Arms, Elbows, Hands, Sacrum. Buttock, Coccyx, Ischium, Legs. Feet and Heels, and Under Medical Devices         Does the Patient have a Wound? No noted wound(s)       Tima Prevention initiated by RN: No  Wound Care Orders initiated by RN: No    Pressure Injury (Stage 3,4, Unstageable, DTI, NWPT, and Complex wounds) if present, place Wound referral order by RN under : No    New Ostomies, if present place, Ostomy referral order under : No     Nurse 1 eSignature: Electronically signed by Torie Zelaya RN on 10/9/24 at 7:59 PM EDT    **SHARE this note so that the co-signing nurse can place an eSignature**    Nurse 2 eSignature: Electronically signed by MILO AJY RN on 10/10/24 at 9:11 AM EDT   
4 Eyes Skin Assessment     NAME:  Norma Galan  YOB: 1980  MEDICAL RECORD NUMBER:  [id number]    The patient is being assessed for  Shift Handoff    I agree that at least one RN has performed a thorough Head to Toe Skin Assessment on the patient. ALL assessment sites listed below have been assessed.      Areas assessed by both nurses:    Head, Face, Ears, Shoulders, Back, Chest, Arms, Elbows, Hands, Sacrum. Buttock, Coccyx, Ischium, Legs. Feet and Heels, and Under Medical Devices         Does the Patient have a Wound? No noted wound(s)       Tima Prevention initiated by RN: Yes  Wound Care Orders initiated by RN: No    Pressure Injury (Stage 3,4, Unstageable, DTI, NWPT, and Complex wounds) if present, place Wound referral order by RN under : No    New Ostomies, if present place, Ostomy referral order under : No     Nurse 1 eSignature: Electronically signed by Ethel Hamm RN on 9/24/24 at 7:03 PM EDT    **SHARE this note so that the co-signing nurse can place an eSignature**    Nurse 2 eSignature: Electronically signed by Radha Guerrero RN on 9/24/24 at 9:54 PM EDT   
4 Eyes Skin Assessment     NAME:  Norma Galan  YOB: 1980  MEDICAL RECORD NUMBER:  [id number]    The patient is being assessed for  Shift Handoff    I agree that at least one RN has performed a thorough Head to Toe Skin Assessment on the patient. ALL assessment sites listed below have been assessed.      Areas assessed by both nurses:    Head, Face, Ears, Shoulders, Back, Chest, Arms, Elbows, Hands, Sacrum. Buttock, Coccyx, Ischium, Legs. Feet and Heels, and Under Medical Devices         Does the Patient have a Wound? No noted wound(s)       Tima Prevention initiated by RN: No  Wound Care Orders initiated by RN: Yes    Pressure Injury (Stage 3,4, Unstageable, DTI, NWPT, and Complex wounds) if present, place Wound referral order by RN under : No    New Ostomies, if present place, Ostomy referral order under : No     Nurse 1 eSignature: Electronically signed by Radha Guerrero RN on 10/3/24 at 8:27 AM EDT    **SHARE this note so that the co-signing nurse can place an eSignature**    Nurse 2 eSignature: Electronically signed by Jared Ryder RN on 10/3/24 at 12:10 PM EDT    
4 Eyes Skin Assessment     NAME:  Norma Galan  YOB: 1980  MEDICAL RECORD NUMBER:  [id number]    The patient is being assessed for  Shift Handoff    I agree that at least one RN has performed a thorough Head to Toe Skin Assessment on the patient. ALL assessment sites listed below have been assessed.      Areas assessed by both nurses:    Head, Face, Ears, Shoulders, Back, Chest, Arms, Elbows, Hands, Sacrum. Buttock, Coccyx, Ischium, Legs. Feet and Heels, and Under Medical Devices         Does the Patient have a Wound? No noted wound(s)       Tima Prevention initiated by RN: Yes  Wound Care Orders initiated by RN: No    Pressure Injury (Stage 3,4, Unstageable, DTI, NWPT, and Complex wounds) if present, place Wound referral order by RN under : No    New Ostomies, if present place, Ostomy referral order under : No     Nurse 1 eSignature: Electronically signed by Radha Guerrero RN on 10/2/24 at 9:13 AM EDT    **SHARE this note so that the co-signing nurse can place an eSignature**    Nurse 2 eSignature: Electronically signed by Catalina Madera RN on 10/2/24    
4 Eyes Skin Assessment     NAME:  Norma Galan  YOB: 1980  MEDICAL RECORD NUMBER:  [id number]    The patient is being assessed for  Shift Handoff    I agree that at least one RN has performed a thorough Head to Toe Skin Assessment on the patient. ALL assessment sites listed below have been assessed.      Areas assessed by both nurses:    Head, Face, Ears, Shoulders, Back, Chest, Arms, Elbows, Hands, Sacrum. Buttock, Coccyx, Ischium, and Legs. Feet and Heels        Does the Patient have a Wound? No noted wound(s)       Tima Prevention initiated by RN: No  Wound Care Orders initiated by RN: No    Pressure Injury (Stage 3,4, Unstageable, DTI, NWPT, and Complex wounds) if present, place Wound referral order by RN under : No    New Ostomies, if present place, Ostomy referral order under : No     Nurse 1 eSignature: Electronically signed by Terrance Zimmerman RN on 10/8/24 at 8:15 AM EDT    **SHARE this note so that the co-signing nurse can place an eSignature**    Nurse 2 eSignature: Electronically signed by Louisa Guillaume RN on 10/8/24 at 11:48 AM EDT   
4 Eyes Skin Assessment     NAME:  Norma Galan  YOB: 1980  MEDICAL RECORD NUMBER:  [id number]    The patient is being assessed for  Shift Handoff    I agree that at least one RN has performed a thorough Head to Toe Skin Assessment on the patient. ALL assessment sites listed below have been assessed.      Areas assessed by both nurses:    Head, Face, Ears, Shoulders, Back, Chest, Arms, Elbows, Hands, Sacrum. Buttock, Coccyx, Ischium, Legs. Feet and Heels, and Under Medical Devices         Does the Patient have a Wound? No noted wound(s)       Tima Prevention initiated by RN: No  Wound Care Orders initiated by RN: No    Pressure Injury (Stage 3,4, Unstageable, DTI, NWPT, and Complex wounds) if present, place Wound referral order by RN under : No    New Ostomies, if present place, Ostomy referral order under : No     Nurse 1 eSignature: Electronically signed by Torie Zelaya RN on 10/5/24 at 12:33 PM EDT    **SHARE this note so that the co-signing nurse can place an eSignature**.    Nurse 2 eSignature: {Esignature:[id number]}   
4 Eyes Skin Assessment     NAME:  Norma Galan  YOB: 1980  MEDICAL RECORD NUMBER:  [id number]    The patient is being assessed for  Shift Handoff    I agree that at least one RN has performed a thorough Head to Toe Skin Assessment on the patient. ALL assessment sites listed below have been assessed.      Areas assessed by both nurses:    Head, Face, Ears, Shoulders, Back, Chest, Arms, Elbows, Hands, Sacrum. Buttock, Coccyx, Ischium, and Legs. Feet and Heels        Does the Patient have a Wound? No noted wound(s)       Tima Prevention initiated by RN: No  Wound Care Orders initiated by RN: No    Pressure Injury (Stage 3,4, Unstageable, DTI, NWPT, and Complex wounds) if present, place Wound referral order by RN under : No    New Ostomies, if present place, Ostomy referral order under : No     Nurse 1 eSignature: Electronically signed by Gerard Whitaker RN on 9/27/24 at 7:54 PM EDT    **SHARE this note so that the co-signing nurse can place an eSignature**    Nurse 2 eSignature: {Esignature:[id number]}   
@2057 - Patient was assessed and rated pain level 7/10. Patient was given PRN baclofen 10 mg given and patient was advised to use the PCA pump button when it turns green.     @2127 - New syringe was inserted in the pump. Patient rated his pain level a 7/10. No acute distress noted.    @2157 - Patient was assessed and he rated pain level a 6/10.    @2257 - Patient  was assessed and he rated pain level a 6/10. Patient was moaning and was advised to use the PCA pump button.    @2307 - Patient was assessed and rated his pain level 7/10. Dilaudid 4 mg tb was given.     @2354 - This nurse contacted Dr. Medina concerning patient's pain and he ordered  IV Dilaudid 0.25 mg and PRN IV Toradol 15 mg.    @0012 - Patient was assessed and rated his pain level a 7/10. IV Dilaudid  0.2 mg was given.  Patient was reassessed after 30 mins and rated his pain level  a 6/10.    @0109 - Patient rated his pain level a 6/10. Tb ibuprofen 800 mg was given. Patient was reassessed after an hour and rated pain level 5/10.    @0442 - Patient was assessed and he rated pain level 5/10. IV Toradol 15 mg was given. Patient was reassessed after 30 mins and rated pain level 4/10.     @0707 - Patient was assessed and he rated his pain level 5/10. Tb Dilaudid 4 mg was given.            
@2110 - initial assessment done. Patient is A/O x 4, calm in bed, bedside table and call bell within reach. No acute distress noted.    @2238 - Patient assessed and asked what his pain level was and he rated it a 7/10, patient has been advised to use the PCA pump button when the light is green.    @2308 - Patient was assessed and rated his  pain a 4/10. Patient was calm in bed, no acute distress noted.    @0416 - Patient was assessed and his pain level was still 4/10. No acute distress noted.     @0615 - Patient AM meds given. This nurse asked patient how was doing, he states \"I'm okay\". No acute distress noted.      
[facility]  Progress Note  Date:10/3/2024       Room:[facility]  Patient Name:Norma Galan     YOB: 1980     Age:44 y.o.        Subjective      Patient seen evaluated, lying in bed, no acute distress.  Pain is controlled with the PCA pump.      10/1-hematology note reviewed, reduce Dilaudid PCA to 2 mg/h with max up to 3.5 mg/h.  Reduce free water.  Continue current treatment plan, will follow.    10/2-no new events overnight.  Per hematology reduce Dilaudid PCA 1.5 mg/h max up to 3 mg/h.  Continue p.o. Dilaudid.  BMP in AM.  Continue current treatment plan, will follow.    10/3-no new events overnight, per hematology patient not ready for long-acting pain medications, previously he has used a fentanyl patch.  Continue to titrate PCA pain medications down, increase frequency of p.o. scheduled dose to every 4 hourly.  Continue supportive care.  Labs reviewed      Objective         Vitals Last 24 Hours:  TEMPERATURE:  Temp  Av.5 °F (36.9 °C)  Min: 97.7 °F (36.5 °C)  Max: 98.8 °F (37.1 °C)  RESPIRATIONS RANGE: Resp  Av.3  Min: 17  Max: 22  PULSE OXIMETRY RANGE: SpO2  Av.7 %  Min: 98 %  Max: 100 %  PULSE RANGE: Pulse  Av.7  Min: 107  Max: 115  BLOOD PRESSURE RANGE: Systolic (24hrs), Av , Min:111 , Max:134     ; Diastolic (24hrs), Av, Min:73, Max:84      I/O (24Hr):    Intake/Output Summary (Last 24 hours) at 10/3/2024 1252  Last data filed at 10/3/2024 0909  Gross per 24 hour   Intake 1112.92 ml   Output 3750 ml   Net -2637.08 ml     Objective:  General Appearance:  In pain and in distress.    Vital signs: (most recent): Blood pressure 111/73, pulse (!) 109, temperature 97.7 °F (36.5 °C), temperature source Oral, resp. rate 18, height 1.829 m (6' 0.01\"), weight 87.5 kg (193 lb), SpO2 100%.    Output: Producing urine.    HEENT: Normal HEENT exam.    Lungs:  Normal effort and normal respiratory rate.  Breath sounds clear to 
Ashish [facility] Hospitalist Group  Progress Note    Patient: Norma Galan Age: 44 y.o. : 1980 MR#: [id number] SSN: [id number]  Date/Time: 10/11/2024     Subjective:   Patient doing well, has not needed IV dilaudid, anticipate discharge tomorrow. MRT t spine with no acute findings.    Review of systems  General: No fevers or chills.  Cardiovascular: No chest pain or pressure. No palpitations.   Pulmonary: No shortness of breath, cough or wheeze.   Gastrointestinal: No abdominal pain, nausea, vomiting or diarrhea.   Genitourinary: No urinary frequency, urgency, hesitancy or dysuria.   Musculoskeletal: No joint or muscle pain, no back pain, no recent trauma.    Neurologic: No headache, numbness, tingling or weakness.   Assessment/Plan:   Sickle cell crisis  Microcytic anemia  Leukocytosis secondary to steroid use  Hypokalemia    Admitted to 4n  Continue regular diet  Continue current pain regimen  Oncology following  Possible dc tomorrow.  Status post RBC exchange earlier on    Status post baclofen for hiccups-improved  Thrombocytosis, defer management to hematology.  Hyponatremia-continue fluid restriction.      I spent 40 minutes with the patient in face-to-face consultation, of which greater than 50% was spent in counseling and coordination of care as described above.    Case discussed with:  [x]Patient  []Family  []Nursing  []Case Management  DVT Prophylaxis:  [x]Lovenox  []Hep SQ  []SCDs  []Coumadin   []Eliquis/Xarelto     Objective:   VS: /72   Pulse 90   Temp 98.6 °F (37 °C) (Oral)   Resp 18   Ht 1.829 m (6' 0.01\")   Wt 87.5 kg (193 lb)   SpO2 100%   BMI 26.17 kg/m²    Tmax/24hrs: Temp (24hrs), Av.7 °F (37.1 °C), Min:98.4 °F (36.9 °C), Max:98.9 °F (37.2 °C)  IOBRIEF  Intake/Output Summary (Last 24 hours) at 10/11/2024 0818  Last data filed at 10/11/2024 0745  Gross per 24 hour   Intake 960 ml   Output 1200 ml   Net -240 ml       General:  Alert, 
Ashish [facility] Hospitalist Group  Progress Note  Date:9/15/2024       Room:[facility]  Patient Name:Norma Galan     YOB: 1980     Age:43 y.o.        Subjective      No acute events overnight.  Resting comfortably in bed. Patient has a painful nodule inferior to right knee that is tender on palpation.    Disposition: Likely stable for discharge home on  vs  once pain is controlled with p.o. pain meds.    Objective         Vitals Last 24 Hours:  TEMPERATURE:  Temp  Av.1 °F (36.7 °C)  Min: 97.4 °F (36.3 °C)  Max: 98.6 °F (37 °C)  RESPIRATIONS RANGE: Resp  Av.7  Min: 16  Max: 20  PULSE OXIMETRY RANGE: SpO2  Av %  Min: 95 %  Max: 100 %  PULSE RANGE: Pulse  Av.1  Min: 57  Max: 77  BLOOD PRESSURE RANGE: Systolic (24hrs), Av , Min:119 , Max:124     ; Diastolic (24hrs), Av, Min:65, Max:75      I/O (24Hr):    Intake/Output Summary (Last 24 hours) at 9/15/2024 0958  Last data filed at 9/15/2024 0800  Gross per 24 hour   Intake 840 ml   Output --   Net 840 ml     Objective:  General Appearance:  Comfortable.    Vital signs: (most recent): Blood pressure 123/74, pulse 70, temperature 98.3 °F (36.8 °C), temperature source Oral, resp. rate 16, height 1.829 m (6'), weight 87.5 kg (193 lb), SpO2 96%.    Output: Producing urine.    HEENT: Normal HEENT exam.    Lungs:  Normal effort and normal respiratory rate.  Breath sounds clear to auscultation.    Heart: Normal rate.  Regular rhythm.    Abdomen: Abdomen is soft and flat.  Bowel sounds are normal.   There is generalized tenderness.     Extremities: Normal range of motion.    Pulses: Distal pulses are intact.    Neurological: Patient is alert and oriented to person, place and time.    Skin:  Warm and dry.          Labs/Imaging/Diagnostics    Labs:  CBC:  Recent Labs     24  2325 09/15/24  0252   WBC 14.1* 12.2   RBC 3.09* 3.07*   HGB 7.6* 7.5*   HCT 22.7* 23.2*   MCV 73.5* 75.6*   RDW 23.1* 23.6* 
Ashish [facility] Hospitalist Group  Progress Note  Date:2024       Room:[facility]  Patient Name:Norma Galan     YOB: 1980     Age:44 y.o.        Subjective      Overnight line for PCA pump was occluded. Patient did not ask for assistance. Therefore, patient was not getting pain medication for a few hours.  This morning, pain is more uncontrolled.    Disposition: Likely stable for discharge home on 10/1 once pain is controlled.  Currently on PCA pump.    Objective         Vitals Last 24 Hours:  TEMPERATURE:  Temp  Av.4 °F (36.9 °C)  Min: 97.9 °F (36.6 °C)  Max: 98.6 °F (37 °C)  RESPIRATIONS RANGE: Resp  Av.1  Min: 18  Max: 24  PULSE OXIMETRY RANGE: SpO2  Av.4 %  Min: 92 %  Max: 100 %  PULSE RANGE: Pulse  Av.2  Min: 85  Max: 122  BLOOD PRESSURE RANGE: Systolic (24hrs), Av , Min:135 , Max:147     ; Diastolic (24hrs), Av, Min:81, Max:93      I/O (24Hr):    Intake/Output Summary (Last 24 hours) at 2024 1758  Last data filed at 2024 1014  Gross per 24 hour   Intake 2557.2 ml   Output 900 ml   Net 1657.2 ml     Objective:  General Appearance:  In pain and in distress.    Vital signs: (most recent): Blood pressure 139/81, pulse (!) 106, temperature 97.9 °F (36.6 °C), temperature source Oral, resp. rate 21, height 1.829 m (6' 0.01\"), weight 87.5 kg (193 lb), SpO2 92%.    Output: Producing urine.    HEENT: Normal HEENT exam.    Lungs:  Normal effort and normal respiratory rate.  Breath sounds clear to auscultation.    Heart: Normal rate.  Regular rhythm.    Abdomen: Abdomen is soft and flat.  Bowel sounds are normal.   There is generalized tenderness.     Extremities: Normal range of motion.    Neurological: Patient is alert and oriented to person, place and time.    Skin:  Warm and dry.          Labs/Imaging/Diagnostics    Labs:  CBC:  Recent Labs     24  1001 24  0344 24  0854   WBC 24.6* 21.2* 27.9*   RBC 3.32* 2.94* 
[facility]ish [facility] Hospitalist Group  Progress Note    Patient: Norma Galan Age: 44 y.o. : 1980 MR#: [id number] SSN: [id number]      Subjective/24-hour events:     Pain control has improved over weekend but still not back to previous baseline.  Remains afebrile, no chest pain or shortness of breath.    Assessment:   Sickle cell anemia with acute pain crisis  Leukocytosis, steroid-induced.    Plan:   PCA being continued for uncontrolled pain.  Lockout dose increased some today per hematology.  Remains afebrile, no chest pain or shortness of breath.  Continue folic acid/Decadron/multivitamin/Hydrea.  Thorazine as needed for hiccups.  Mobilize as able.    Anticipated discharge: -    Objective:   VS: /85   Pulse 95   Temp 97.8 °F (36.6 °C) (Oral)   Resp 20   Ht 1.829 m (6' 0.01\")   Wt 87.5 kg (193 lb)   SpO2 100%   BMI 26.17 kg/m²      Tmax/24hrs: Temp (24hrs), Av °F (36.7 °C), Min:97.8 °F (36.6 °C), Max:98.5 °F (36.9 °C)    Intake/Output Summary (Last 24 hours) at 2024 0940  Last data filed at 2024 0655  Gross per 24 hour   Intake 4073.09 ml   Output 3200 ml   Net 873.09 ml       Gen: Appears uncomfortable but in NAD.  Nontoxic appearing.  Lungs: Clear, no wheezes  Effort nonlabored.  CV: RRR.  Abdomen: Soft, NTTP.  Extremities: Warm, no pitting edema or ischemia.  Neuro:  Awake and alert, moves extremities spontaneously.    Current Facility-Administered Medications   Medication Dose Route Frequency    pantoprazole (PROTONIX) tablet 40 mg  40 mg Oral QAM AC    baclofen (LIORESAL) tablet 10 mg  10 mg Oral TID    multivitamin 1 tablet  1 tablet Oral Daily    HYDROmorphone (DILAUDID) 1 mg/mL PCA   IntraVENous Continuous    naloxone (NARCAN) injection 0.4 mg  0.4 mg IntraVENous PRN    sodium chloride flush 0.9 % injection 5-40 mL  5-40 mL IntraVENous 2 times per day    sodium chloride flush 0.9 % injection 5-40 mL  5-40 mL IntraVENous PRN    0.9 % sodium 
Ashish [facility] Hospitalist Group  Progress Note    Patient: Norma Galan Age: 44 y.o. : 1980 MR#: [id number] SSN: [id number]      Subjective/24-hour events:     Pain markedly improved over the course of the past couple days.  Still not at previous levels prior to loss of IV access, however.  Major complaint currently is that of hiccups.    Assessment:   Sickle cell anemia with acute pain crisis  Leukocytosis, suspect steroid-induced.    Plan:   Continue IV analgesia with PCA as well as IV fluids as ordered.  Folic acid, multivitamin, decadron, hydrea.  Trial of thorazine for hiccups.  Mobilize as much as tolerated.  Supportive care to continue otherwise.    Anticipated discharge:       Objective:   VS: /84   Pulse 86   Temp 98.4 °F (36.9 °C) (Oral)   Resp 18   Ht 1.829 m (6' 0.01\")   Wt 87.5 kg (193 lb)   SpO2 100%   BMI 26.17 kg/m²      Tmax/24hrs: Temp (24hrs), Av.5 °F (36.9 °C), Min:98.1 °F (36.7 °C), Max:99.1 °F (37.3 °C)    Intake/Output Summary (Last 24 hours) at 2024 0847  Last data filed at 2024 1943  Gross per 24 hour   Intake 1496.63 ml   Output 1300 ml   Net 196.63 ml       Gen: Appears uncomfortable but in NAD.  Nontoxic appearing.  Lungs: Clear, no wheezes  Effort nonlabored.  CV: RRR.  Abdomen: Soft, NTTP.  Extremities: Warm, no pitting edema or ischemia.  Neuro:  Awake and alert, moves extremities spontaneously.    Current Facility-Administered Medications   Medication Dose Route Frequency    multivitamin 1 tablet  1 tablet Oral Daily    HYDROmorphone (DILAUDID) 1 mg/mL PCA   IntraVENous Continuous    naloxone (NARCAN) injection 0.4 mg  0.4 mg IntraVENous PRN    sodium chloride flush 0.9 % injection 5-40 mL  5-40 mL IntraVENous 2 times per day    sodium chloride flush 0.9 % injection 5-40 mL  5-40 mL IntraVENous PRN    0.9 % sodium chloride infusion   IntraVENous PRN    dexAMETHasone (DECADRON) tablet 4 mg  4 mg Oral 2 times per day 
Ashish [facility] Hospitalist Group  Progress Note  Date:10/2/2024       Room:[facility]  Patient Name:Norma Galan     YOB: 1980     Age:44 y.o.        Subjective      Patient seen evaluated, lying in bed, no acute distress.  Pain is controlled with the PCA pump.  Family member at bedside.  Patient mentions he is doing good.  Continue current treatment plan    10/1-hematology note reviewed, reduce Dilaudid PCA to 2 mg/h with max up to 3.5 mg/h.  Reduce free water.  Continue current treatment plan, will follow.    10/2-no new events overnight.  Per hematology reduce Dilaudid PCA 1.5 mg/h max up to 3 mg/h.  Continue p.o. Dilaudid.  BMP in AM.  Continue current treatment plan, will follow.      Objective         Vitals Last 24 Hours:  TEMPERATURE:  Temp  Av.3 °F (36.8 °C)  Min: 98 °F (36.7 °C)  Max: 98.7 °F (37.1 °C)  RESPIRATIONS RANGE: Resp  Av  Min: 18  Max: 18  PULSE OXIMETRY RANGE: SpO2  Av %  Min: 100 %  Max: 100 %  PULSE RANGE: Pulse  Av.2  Min: 106  Max: 118  BLOOD PRESSURE RANGE: Systolic (24hrs), Av , Min:122 , Max:131     ; Diastolic (24hrs), Av, Min:81, Max:87      I/O (24Hr):    Intake/Output Summary (Last 24 hours) at 10/2/2024 1116  Last data filed at 10/2/2024 0659  Gross per 24 hour   Intake 840 ml   Output 2000 ml   Net -1160 ml     Objective:  General Appearance:  In pain and in distress.    Vital signs: (most recent): Blood pressure 125/87, pulse (!) 118, temperature 98 °F (36.7 °C), temperature source Oral, resp. rate 18, height 1.829 m (6' 0.01\"), weight 87.5 kg (193 lb), SpO2 100%.    Output: Producing urine.    HEENT: Normal HEENT exam.    Lungs:  Normal effort and normal respiratory rate.  Breath sounds clear to auscultation.    Heart: Normal rate.  Regular rhythm.    Abdomen: Abdomen is soft and flat.  Bowel sounds are normal.   There is generalized tenderness.     Extremities: Normal range of motion.    Neurological: Patient 
Ashish [facility] Hospitalist Group  Progress Note  Date:2024       Room:[facility]  Patient Name:Norma Galan     YOB: 1980     Age:44 y.o.        Subjective      No acute events overnight.  Pain is more controlled today. Patient reports it is a 5/10 today    Disposition: Likely stable for discharge home on  once pain is controlled.  Currently on PCA pump    Objective         Vitals Last 24 Hours:  TEMPERATURE:  Temp  Av.9 °F (36.6 °C)  Min: 97.5 °F (36.4 °C)  Max: 98.2 °F (36.8 °C)  RESPIRATIONS RANGE: Resp  Av.8  Min: 18  Max: 40  PULSE OXIMETRY RANGE: SpO2  Av %  Min: 100 %  Max: 100 %  PULSE RANGE: Pulse  Av.9  Min: 78  Max: 101  BLOOD PRESSURE RANGE: Systolic (24hrs), Av , Min:133 , Max:155     ; Diastolic (24hrs), Av, Min:71, Max:92      I/O (24Hr):    Intake/Output Summary (Last 24 hours) at 2024 0857  Last data filed at 2024 0737  Gross per 24 hour   Intake 4498.46 ml   Output 3825 ml   Net 673.46 ml     Objective:  General Appearance:  In pain.    Vital signs: (most recent): Blood pressure (!) 145/92, pulse 88, temperature 98.1 °F (36.7 °C), temperature source Oral, resp. rate 19, height 1.829 m (6' 0.01\"), weight 87.5 kg (193 lb), SpO2 100%.    Output: Producing urine.    HEENT: Normal HEENT exam.    Lungs:  Normal effort and normal respiratory rate.  Breath sounds clear to auscultation.    Heart: Normal rate.  Regular rhythm.    Abdomen: Abdomen is soft and flat.  Bowel sounds are normal.   There is generalized tenderness.     Extremities: Normal range of motion.    Pulses: Distal pulses are intact.    Neurological: Patient is alert and oriented to person, place and time.    Skin:  Warm and dry.          Labs/Imaging/Diagnostics    Labs:  CBC:  Recent Labs     24  0624   WBC 24.2*   RBC 3.04*   HGB 7.8*   HCT 22.7*   MCV 74.7*   RDW 22.6*        CHEMISTRIES:  Recent Labs     24  0624   *   K 4.1 
Ashish [facility] Hospitalist Group  Progress Note  Date:2024       Room:[facility] [place] 2]  Patient Name:Norma Galan     YOB: 1980     Age:44 y.o.        Subjective      I personally saw and evaluated this patient face-to-face today.  Patient is sitting in bed in no apparent distress.  On Dilaudid PCA.    Disposition: Tentatively ready for discharge home on 10/1 once pain is controlled.  Currently on PCA pump.    Objective         Vitals Last 24 Hours:  TEMPERATURE:  Temp  Av °F (37.2 °C)  Min: 98.2 °F (36.8 °C)  Max: 99.8 °F (37.7 °C)  RESPIRATIONS RANGE: Resp  Av.4  Min: 20  Max: 32  PULSE OXIMETRY RANGE: SpO2  Av.6 %  Min: 98 %  Max: 100 %  PULSE RANGE: Pulse  Av.9  Min: 115  Max: 129  BLOOD PRESSURE RANGE: Systolic (24hrs), Av , Min:131 , Max:150     ; Diastolic (24hrs), Av, Min:82, Max:100      I/O (24Hr):    Intake/Output Summary (Last 24 hours) at 2024 1833  Last data filed at 2024 1832  Gross per 24 hour   Intake 2607.2 ml   Output 4800 ml   Net -2192.8 ml     Objective:  General Appearance:  In pain and in distress.    Vital signs: (most recent): Blood pressure (!) 147/98, pulse (!) 119, temperature 99.3 °F (37.4 °C), temperature source Oral, resp. rate 23, height 1.829 m (6' 0.01\"), weight 87.5 kg (193 lb), SpO2 100%.    Output: Producing urine.    HEENT: Normal HEENT exam.    Lungs:  Normal effort and normal respiratory rate.  Breath sounds clear to auscultation.    Heart: Normal rate.  Regular rhythm.    Abdomen: Abdomen is soft and flat.  Bowel sounds are normal.   There is generalized tenderness.     Extremities: Normal range of motion.    Neurological: Patient is alert and oriented to person, place and time.    Skin:  Warm and dry.          Labs/Imaging/Diagnostics    Labs:  CBC:  Recent Labs     24  0344 24  0854   WBC 21.2* 27.9*   RBC 2.94* 2.88*   HGB 7.5* 7.6*   HCT 22.2* 21.9*   MCV 75.5* 76.0*   RDW 23.5* 
Ashish [facility] Hospitalist Group  Progress Note  Date:10/1/2024       Room:[facility]  Patient Name:Norma Galan     YOB: 1980     Age:44 y.o.        Subjective      Patient seen evaluated, lying in bed, no acute distress.  Pain is controlled with the PCA pump.  Family member at bedside.  Patient mentions he is doing good.  Continue current treatment plan    10/1-hematology note reviewed, reduce Dilaudid PCA to 2 mg/h with max up to 3.5 mg/h.  Reduce free water.  Continue current treatment plan, will follow.    Objective         Vitals Last 24 Hours:  TEMPERATURE:  Temp  Av.6 °F (37 °C)  Min: 97.8 °F (36.6 °C)  Max: 99.1 °F (37.3 °C)  RESPIRATIONS RANGE: Resp  Av.5  Min: 16  Max: 23  PULSE OXIMETRY RANGE: SpO2  Av %  Min: 97 %  Max: 100 %  PULSE RANGE: Pulse  Av.9  Min: 91  Max: 122  BLOOD PRESSURE RANGE: Systolic (24hrs), Av , Min:120 , Max:141     ; Diastolic (24hrs), Av, Min:79, Max:90      I/O (24Hr):    Intake/Output Summary (Last 24 hours) at 10/1/2024 1030  Last data filed at 10/1/2024 0947  Gross per 24 hour   Intake 3333.25 ml   Output 900 ml   Net 2433.25 ml     Objective:  General Appearance:  In pain and in distress.    Vital signs: (most recent): Blood pressure 126/87, pulse (!) 114, temperature 99.1 °F (37.3 °C), temperature source Oral, resp. rate 16, height 1.829 m (6' 0.01\"), weight 87.5 kg (193 lb), SpO2 97%.    Output: Producing urine.    HEENT: Normal HEENT exam.    Lungs:  Normal effort and normal respiratory rate.  Breath sounds clear to auscultation.    Heart: Normal rate.  Regular rhythm.    Abdomen: Abdomen is soft and flat.  Bowel sounds are normal.   There is generalized tenderness.     Extremities: Normal range of motion.    Neurological: Patient is alert and oriented to person, place and time.    Skin:  Warm and dry.          Labs/Imaging/Diagnostics    Labs:  CBC:  Recent Labs     24  0645 24  0309   WBC 
Ashish Uribe [facility] Hospitalist Group  Progress Note  Date:10/7/2024       Room:[facility]  Patient Name:Norma Galan     YOB: 1980     Age:44 y.o.        Subjective      Patient seen evaluated, lying in bed, no acute distress.  Pain is controlled with the PCA pump.      10/1-hematology note reviewed, reduce Dilaudid PCA to 2 mg/h with max up to 3.5 mg/h.  Reduce free water.  Continue current treatment plan, will follow.    10/2-no new events overnight.  Per hematology reduce Dilaudid PCA 1.5 mg/h max up to 3 mg/h.  Continue p.o. Dilaudid.  BMP in AM.  Continue current treatment plan, will follow.    10/3-no new events overnight, per hematology patient not ready for long-acting pain medications, previously he has used a fentanyl patch.  Continue to titrate PCA pain medications down, increase frequency of p.o. scheduled dose to every 4 hourly.  Continue supportive care.  Labs reviewed    10/4-no new events overnight, hematology recommending to continue PCA with p.o. Dilaudid dosing, PCA not titrated down today.  Lab draw on 10/6.  Will continue current treatment plan, to follow.    10/5-no new events overnight, hematology managing PCA.  Continue current treatment plan, will follow.    10/6 -no new events overnight, hematology managing PCA, continue current treatment plan, will follow.  Labs reviewed, sodium 126  Platelet count 948    10/7-no new events overnight, PCA discontinued by hematology today, switch to p.o. Dilaudid.  Anticipate discharge home in a.m.  Repeat labs in AM.    Objective         Vitals Last 24 Hours:  TEMPERATURE:  Temp  Av.9 °F (36.6 °C)  Min: 97.4 °F (36.3 °C)  Max: 98.5 °F (36.9 °C)  RESPIRATIONS RANGE: Resp  Av.6  Min: 16  Max: 20  PULSE OXIMETRY RANGE: SpO2  Av %  Min: 100 %  Max: 100 %  PULSE RANGE: Pulse  Av.8  Min: 82  Max: 99  BLOOD PRESSURE RANGE: Systolic (24hrs), Av , Min:107 , Max:119     ; Diastolic (24hrs), Av, 
Ashish Uribe [facility] Hospitalist Group  Progress Note  Date:10/5/2024       Room:[facility]  Patient Name:Norma Galan     YOB: 1980     Age:44 y.o.        Subjective      Patient seen evaluated, lying in bed, no acute distress.  Pain is controlled with the PCA pump.      10/1-hematology note reviewed, reduce Dilaudid PCA to 2 mg/h with max up to 3.5 mg/h.  Reduce free water.  Continue current treatment plan, will follow.    10/2-no new events overnight.  Per hematology reduce Dilaudid PCA 1.5 mg/h max up to 3 mg/h.  Continue p.o. Dilaudid.  BMP in AM.  Continue current treatment plan, will follow.    10/3-no new events overnight, per hematology patient not ready for long-acting pain medications, previously he has used a fentanyl patch.  Continue to titrate PCA pain medications down, increase frequency of p.o. scheduled dose to every 4 hourly.  Continue supportive care.  Labs reviewed    10/4-no new events overnight, hematology recommending to continue PCA with p.o. Dilaudid dosing, PCA not titrated down today.  Lab draw on 10/6.  Will continue current treatment plan, to follow.    10/5-no new events overnight, hematology managing PCA.  Continue current treatment plan, will follow.      Objective         Vitals Last 24 Hours:  TEMPERATURE:  Temp  Av.4 °F (36.9 °C)  Min: 98.2 °F (36.8 °C)  Max: 98.6 °F (37 °C)  RESPIRATIONS RANGE: Resp  Av.5  Min: 15  Max: 25  PULSE OXIMETRY RANGE: SpO2  Av %  Min: 100 %  Max: 100 %  PULSE RANGE: Pulse  Av.1  Min: 94  Max: 112  BLOOD PRESSURE RANGE: Systolic (24hrs), Av , Min:118 , Max:124     ; Diastolic (24hrs), Av, Min:72, Max:82      I/O (24Hr):    Intake/Output Summary (Last 24 hours) at 10/5/2024 1158  Last data filed at 10/5/2024 1125  Gross per 24 hour   Intake 1320 ml   Output 4000 ml   Net -2680 ml     Objective:  General Appearance:  In pain and in distress.    Vital signs: (most recent): Blood pressure 
Ashish Uribe [facility] Hospitalist Group  Progress Note    Patient: Norma Galan Age: 44 y.o. : 1980 MR#: [id number] SSN: [id number]      Subjective/24-hour events:     Required initiation of PCA yesterday due to uncontrolled pain after being off of IV fluids and analgesia due to lack of IV access.    Assessment:   Sickle cell anemia with acute pain crisis  Leukocytosis, suspect steroid-induced.    Plan:   Continue PCA as ordered.  Will follow up again this afternoon and increase PCA dosing if still complaining of pain that is poorly controlled.  D/W  palliative care team (assisting w/pain management) and hematology this AM.    Increase IVF to 125 ml/hr for now.  Continue Hydrea/Decadron/analgesia/IV hydration.   Mobilize as tolerated.  Supportive care otherwise.  Follow.    Anticipated discharge:     Case discussed with:  [x]Patient  []Family  [x] Nursing  [x]Case Management  DVT Prophylaxis:  []Lovenox  []Hep SQ  []SCDs  []Coumadin   []On Heparin gtt []PO anticoagulant    Objective:   VS: /73   Pulse 79   Temp 98.3 °F (36.8 °C) (Oral)   Resp 19   Ht 1.829 m (6')   Wt 87.5 kg (193 lb)   SpO2 99%   BMI 26.18 kg/m²      Tmax/24hrs: Temp (24hrs), Av.5 °F (36.9 °C), Min:98.2 °F (36.8 °C), Max:99 °F (37.2 °C)    Intake/Output Summary (Last 24 hours) at 2024 0809  Last data filed at 2024 0443  Gross per 24 hour   Intake 1404.62 ml   Output 3100 ml   Net -1695.38 ml       Gen: Appears uncomfortable but in NAD.  Nontoxic appearing.  Lungs: Clear, no wheezes  Effort nonlabored.  CV: RRR.  Abdomen: Soft, NTTP.  Extremities: Warm, no pitting edema or ischemia.  Neuro:  Awake and alert, moves extremities spontaneously.    Current Facility-Administered Medications   Medication Dose Route Frequency    HYDROmorphone (DILAUDID) 1 mg/mL PCA   IntraVENous Continuous    naloxone (NARCAN) injection 0.4 mg  0.4 mg IntraVENous PRN    sodium chloride flush 0.9 % injection 5-40 
Ashish Uribe [facility] Hospitalist Group  Progress Note    Patient: Norma Galan Age: 43 y.o. : 1980 MR#: [id number] SSN: [id number]      Subjective/24-hour events:     Pain a bit better today.  No acute SOB or chest pain.  Denies fever.    Assessment:   Sickle cell anemia with acute pain crisis  Leukocytosis, suspect steroid-induced.    Plan:   WBCs up slightly following initiation of decadron yesterday per heme/onc.   Remains afebrile, no obvious s/sx of infection.  Continue to monitor.  Repeat BMP in AM.  Continue decadron, IV analgesia, IV hydration hydrea, and folic acid as ordered.  Continue analgesia, bowel regimen.  Encouraged to mobilize.  Follow.    Case discussed with:  [x]Patient  []Family  [x] Nursing  [x]Case Management  DVT Prophylaxis:  []Lovenox  []Hep SQ  []SCDs  []Coumadin   []On Heparin gtt []PO anticoagulant    Objective:   VS: /67   Pulse 62   Temp 97.8 °F (36.6 °C) (Oral)   Resp 18   Ht 1.829 m (6')   Wt 87.5 kg (193 lb)   SpO2 94%   BMI 26.18 kg/m²      Tmax/24hrs: Temp (24hrs), Av °F (36.7 °C), Min:97.8 °F (36.6 °C), Max:98.4 °F (36.9 °C)    Intake/Output Summary (Last 24 hours) at 2024 0716  Last data filed at 2024 2200  Gross per 24 hour   Intake 1200 ml   Output --   Net 1200 ml       Gen:  In NAD.  Nontoxic appearing.  Lungs: Clear, no wheezes  Effort nonlabored.  CV: RRR.  Abdomen: Soft, NTTP.  Extremities: Warm, no pitting edema or ischemia.  Neuro:  Awake and alert, moves extremities spontaneously.    Current Facility-Administered Medications   Medication Dose Route Frequency    dexAMETHasone (DECADRON) tablet 4 mg  4 mg Oral 2 times per day    0.9 % sodium chloride infusion   IntraVENous Continuous    HYDROmorphone HCl PF (DILAUDID) injection 1.5 mg  1.5 mg IntraVENous Q3H PRN    sodium chloride flush 0.9 % injection 5-40 mL  5-40 mL IntraVENous 2 times per day    sodium chloride flush 0.9 % injection 5-40 mL  5-40 mL 
Ashish Uribe [facility] Hospitalist Group  Progress Note    Patient: Norma Galan Age: 43 y.o. : 1980 MR#: [id number] SSN: [id number]      Subjective/24-hour events:     Lost IV access late yesterday evening.  Access team unable to obtain new IV overnight.  Continues to complain of pain and requesting IV medication.    Assessment:   Sickle cell anemia with acute pain crisis  Leukocytosis, suspect steroid-induced.    Plan:   Initial plan was for access team to reattempt IV placement late this morning.  Apparently, however, individual that was to reattempt access is not here today per discussion with nursing.  Discussed with IR by phone this afternoon.  Consult order placed and they will see if patient can be added.  Assistance appreciated in advance.  Will plan to resume IV fluids and IV analgesia as soon as able.  In the meantime, we will continue with oral hydration and oral pain medication as ordered.  Supportive care otherwise.  Follow.    Anticipated discharge: -    Case discussed with:  [x]Patient  []Family  [x] Nursing  [x]Case Management  DVT Prophylaxis:  []Lovenox  []Hep SQ  []SCDs  []Coumadin   []On Heparin gtt []PO anticoagulant    Objective:   VS: /84   Pulse 64   Temp 98.2 °F (36.8 °C) (Oral)   Resp 18   Ht 1.829 m (6')   Wt 87.5 kg (193 lb)   SpO2 98%   BMI 26.18 kg/m²      Tmax/24hrs: Temp (24hrs), Av.4 °F (36.9 °C), Min:98 °F (36.7 °C), Max:98.8 °F (37.1 °C)    Intake/Output Summary (Last 24 hours) at 2024 0719  Last data filed at 2024 0340  Gross per 24 hour   Intake 1920 ml   Output --   Net 1920 ml       Gen: Appears uncomfortable but in NAD.  Nontoxic appearing.  Lungs: Clear, no wheezes  Effort nonlabored.  CV: RRR.  Abdomen: Soft, NTTP.  Extremities: Warm, no pitting edema or ischemia.  Neuro:  Awake and alert, moves extremities spontaneously.    Current Facility-Administered Medications   Medication Dose Route Frequency    
Ashish Uribe [facility] Hospitalist Group  Progress Note  Date:10/9/2024       Room:[facility]  Patient Name:Norma Galan     YOB: 1980     Age:44 y.o.        Subjective      Patient seen evaluated, lying in bed, no acute distress.  Pain is controlled with the PCA pump.      10/1-hematology note reviewed, reduce Dilaudid PCA to 2 mg/h with max up to 3.5 mg/h.  Reduce free water.  Continue current treatment plan, will follow.    10/2-no new events overnight.  Per hematology reduce Dilaudid PCA 1.5 mg/h max up to 3 mg/h.  Continue p.o. Dilaudid.  BMP in AM.  Continue current treatment plan, will follow.    10/3-no new events overnight, per hematology patient not ready for long-acting pain medications, previously he has used a fentanyl patch.  Continue to titrate PCA pain medications down, increase frequency of p.o. scheduled dose to every 4 hourly.  Continue supportive care.  Labs reviewed    10/4-no new events overnight, hematology recommending to continue PCA with p.o. Dilaudid dosing, PCA not titrated down today.  Lab draw on 10/6.  Will continue current treatment plan, to follow.    10/5-no new events overnight, hematology managing PCA.  Continue current treatment plan, will follow.    10/6 -no new events overnight, hematology managing PCA, continue current treatment plan, will follow.  Labs reviewed, sodium 126  Platelet count 948    10/7-no new events overnight, PCA discontinued by hematology today, switch to p.o. Dilaudid.  Anticipate discharge home in a.m.  Repeat labs in AM.    10/8-no new events overnight, patient continues to complain of back pain.  PCA has been discontinued by hematology.  MRI T-spine for further evaluation of back pain.  Continue to follow.    10/9-no new events overnight, MRI T-spine completed today.  Hematology note reviewed, continue p.o. Dilaudid 4 mg every 4 along with IV as needed Dilaudid and scheduled ibuprofen.  Suggest addition of long-acting pain 
Ashish Uribe [facility] Hospitalist Group  Progress Note    Patient: Norma Galan Age: 44 y.o. : 1980 MR#: [id number] SSN: [id number]      Subjective/24-hour events:     Continues to have quite a bit of pain but feeling better overall.  No shortness of breath or chest pain reported.  Mother present at bedside on visit.    Assessment:   Sickle cell anemia with acute pain crisis  Leukocytosis, suspect steroid-induced.    Plan:   Continue PCA.  Further adjustments with dosing as necessary.  Hematology continues to follow.  Continued assistance appreciated.  Continue IV fluids as ordered.  Rate increased to 125 cc/h yesterday.  Continue Decadron and Hydrea.  Mobilize as able.  Continue supportive care otherwise.    Anticipated discharge:     Case discussed with:  [x]Patient  [x]Family  [x] Nursing  []Case Management  DVT Prophylaxis:  []Lovenox  []Hep SQ  []SCDs  []Coumadin   []On Heparin gtt []PO anticoagulant    Objective:   VS: BP (!) 143/90   Pulse (!) 108   Temp 98.5 °F (36.9 °C) (Oral)   Resp 22   Ht 1.829 m (6' 0.01\")   Wt 87.5 kg (193 lb)   SpO2 97%   BMI 26.17 kg/m²      Tmax/24hrs: Temp (24hrs), Av.7 °F (37.1 °C), Min:98.1 °F (36.7 °C), Max:99.2 °F (37.3 °C)    Intake/Output Summary (Last 24 hours) at 2024 1346  Last data filed at 2024 0720  Gross per 24 hour   Intake 3229.27 ml   Output 3025 ml   Net 204.27 ml       Gen: Appears uncomfortable but in NAD.  Nontoxic appearing.  Lungs: Clear, no wheezes  Effort nonlabored.  CV: RRR.  Abdomen: Soft, NTTP.  Extremities: Warm, no pitting edema or ischemia.  Neuro:  Awake and alert, moves extremities spontaneously.    Current Facility-Administered Medications   Medication Dose Route Frequency    multivitamin 1 tablet  1 tablet Oral Daily    HYDROmorphone (DILAUDID) 1 mg/mL PCA   IntraVENous Continuous    naloxone (NARCAN) injection 0.4 mg  0.4 mg IntraVENous PRN    sodium chloride flush 0.9 % injection 5-40 mL  
Ashish Uribe [facility] Hospitalist Group  Progress Note    Patient: Nomra Galan Age: 43 y.o. : 1980 MR#: [id number] SSN: [id number]      Subjective/24-hour events:     Required IR involvement yesterday due to inability of nursing staff and vascular access team to obtain IV access despite multiple attempts.  PICC  placed yesterday afternoon by IR.  Pain worse today since off of IV medications due to IV access issues.    Assessment:   Sickle cell anemia with acute pain crisis  Leukocytosis, suspect steroid-induced.    Plan:   Continue Hydrea/Decadron/analgesia/IV hydration as ordered.  Hematology continued to follow.  Mobilize as tolerated.  Supportive care o/w.  Follow.    Anticipated discharge: -    Case discussed with:  [x]Patient  []Family  [x] Nursing  [x]Case Management  DVT Prophylaxis:  []Lovenox  []Hep SQ  []SCDs  []Coumadin   []On Heparin gtt []PO anticoagulant    Objective:   VS: BP (!) 140/84   Pulse 69   Temp 98.2 °F (36.8 °C) (Oral)   Resp 20   Ht 1.829 m (6')   Wt 87.5 kg (193 lb)   SpO2 100%   BMI 26.18 kg/m²      Tmax/24hrs: Temp (24hrs), Av.1 °F (36.7 °C), Min:97.9 °F (36.6 °C), Max:98.4 °F (36.9 °C)    Intake/Output Summary (Last 24 hours) at 2024 0911  Last data filed at 2024 0632  Gross per 24 hour   Intake 240 ml   Output 1500 ml   Net -1260 ml       Gen: Appears uncomfortable but in NAD.  Nontoxic appearing.  Lungs: Clear, no wheezes  Effort nonlabored.  CV: RRR.  Abdomen: Soft, NTTP.  Extremities: Warm, no pitting edema or ischemia.  Neuro:  Awake and alert, moves extremities spontaneously.    Current Facility-Administered Medications   Medication Dose Route Frequency    oxyCODONE-acetaminophen (PERCOCET) 5-325 MG per tablet 1 tablet  1 tablet Oral Q4H PRN    sodium chloride flush 0.9 % injection 5-40 mL  5-40 mL IntraVENous 2 times per day    sodium chloride flush 0.9 % injection 5-40 mL  5-40 mL IntraVENous PRN    0.9 % sodium 
Ashish Uribe [facility] Hospitalist Group  Progress Note  Date:10/6/2024       Room:[facility]  Patient Name:Norma Galan     YOB: 1980     Age:44 y.o.        Subjective      Patient seen evaluated, lying in bed, no acute distress.  Pain is controlled with the PCA pump.      10/1-hematology note reviewed, reduce Dilaudid PCA to 2 mg/h with max up to 3.5 mg/h.  Reduce free water.  Continue current treatment plan, will follow.    10/2-no new events overnight.  Per hematology reduce Dilaudid PCA 1.5 mg/h max up to 3 mg/h.  Continue p.o. Dilaudid.  BMP in AM.  Continue current treatment plan, will follow.    10/3-no new events overnight, per hematology patient not ready for long-acting pain medications, previously he has used a fentanyl patch.  Continue to titrate PCA pain medications down, increase frequency of p.o. scheduled dose to every 4 hourly.  Continue supportive care.  Labs reviewed    10/4-no new events overnight, hematology recommending to continue PCA with p.o. Dilaudid dosing, PCA not titrated down today.  Lab draw on 10/6.  Will continue current treatment plan, to follow.    10/5-no new events overnight, hematology managing PCA.  Continue current treatment plan, will follow.    10/6 -no new events overnight, hematology managing PCA, continue current treatment plan, will follow.  Labs reviewed, sodium 126  Platelet count 948      Objective         Vitals Last 24 Hours:  TEMPERATURE:  Temp  Av.2 °F (36.8 °C)  Min: 98.1 °F (36.7 °C)  Max: 98.3 °F (36.8 °C)  RESPIRATIONS RANGE: Resp  Av.9  Min: 16  Max: 18  PULSE OXIMETRY RANGE: SpO2  Av %  Min: 100 %  Max: 100 %  PULSE RANGE: Pulse  Av.3  Min: 83  Max: 107  BLOOD PRESSURE RANGE: Systolic (24hrs), Av , Min:110 , Max:125     ; Diastolic (24hrs), Av, Min:69, Max:79      I/O (24Hr):    Intake/Output Summary (Last 24 hours) at 10/6/2024 1139  Last data filed at 10/6/2024 1129  Gross per 24 hour   Intake 
[facility]ish [facility] Hospitalist Group  Progress Note    Patient: Norma Galan Age: 43 y.o. : 1980 MR#: [id number] SSN: [id number]      Subjective/24-hour events:     Afebrile overnight.  No chest pain or shortness of breath acutely.    Assessment:   Sickle cell anemia with acute pain crisis    Plan:   Continue IV analgesia, IV hydration hydrea, decadron and folic acid as ordered.  Analgesia and bowel regimen as needed.  Mobilize as tolerated.    Case discussed with:  [x]Patient  []Family  [x] Nursing  [x]Case Management  DVT Prophylaxis:  []Lovenox  []Hep SQ  []SCDs  []Coumadin   []On Heparin gtt []PO anticoagulant    Objective:   VS: /89   Pulse 52   Temp 97.9 °F (36.6 °C) (Oral)   Resp 18   Ht 1.829 m (6')   Wt 87.5 kg (193 lb)   SpO2 97%   BMI 26.18 kg/m²      Tmax/24hrs: Temp (24hrs), Av.2 °F (36.8 °C), Min:97.8 °F (36.6 °C), Max:98.9 °F (37.2 °C)    Intake/Output Summary (Last 24 hours) at 2024 1227  Last data filed at 2024 0800  Gross per 24 hour   Intake 1440 ml   Output --   Net 1440 ml       Gen:  In NAD.  Nontoxic appearing.  Lungs: Clear, no wheezes  Effort nonlabored.  CV: RRR.  Abdomen: Soft, NTTP.  Extremities: Warm, no pitting edema or ischemia.  Neuro:  Awake and alert, moves extremities spontaneously.    Current Facility-Administered Medications   Medication Dose Route Frequency    dexAMETHasone (DECADRON) tablet 4 mg  4 mg Oral 2 times per day    0.9 % sodium chloride infusion   IntraVENous Continuous    HYDROmorphone HCl PF (DILAUDID) injection 1.5 mg  1.5 mg IntraVENous Q3H PRN    sodium chloride flush 0.9 % injection 5-40 mL  5-40 mL IntraVENous 2 times per day    sodium chloride flush 0.9 % injection 5-40 mL  5-40 mL IntraVENous PRN    0.9 % sodium chloride infusion   IntraVENous PRN    potassium chloride 10 mEq/100 mL IVPB (Peripheral Line)  10 mEq IntraVENous PRN    magnesium sulfate 2000 mg in 50 mL IVPB premix  2,000 mg IntraVENous 
Attempts piv, unsuccessful, nursing supervisor notified, PICC team consult for piv by Dr. Wlyie.  
Called [facility] to request midline    
Comprehensive Nutrition Assessment    Type and Reason for Visit:  Follow Up    Nutrition Recommendations/Plan:   Continue current diet as tolerated.  Continue oral supplements: Gelatein 20 (each provides 80 kcal, 20g protein) BID  Continue multivitamin supplementation daily  Continue to monitor tolerance of PO, compliance of oral supplements, weight, labs, and plan of care during admission.     Malnutrition Assessment:  Malnutrition Status:  No malnutrition (24 1647)    Context:  Chronic Illness     Findings of the 6 clinical characteristics of malnutrition:      Nutrition Assessment:    Admitted for sickle cell anemia with pain. Pt seen for - LOS. Pt seen awake and oriented. Per pt, appetite has declined since admission 2/2 pain. Per flowsheeets pt consuming 50-75% of meals. Plan to order ONS to increase opportunity for protein-energy intake, and MVI to support nutrition status. Pt denies any GI issues. Pt had no nutrition related questions/concerns at this time. Will continue to monitor per protocol.    Norma  continues to consume on average about 50-75% of meals.    Tolerating oral supplement.    Well developed and overwt BMI- 26, wt gain 6 lb in 1 week.     Pain management ongoing with dilaudid.    Improved NA- 135, Slight dropn H/H- 8.0, 24.8- folvite given.    MRI- today awaiting results    PCA pump in pace.    Stable nutritional status.    Nutrition Related Findings:          Pertinent Meds: Folvite, MVI, Senokot, Protonix  Pertinent Labs: NA- 135, H/H- 8.0, 24.8  Recent Labs     10/08/24  0701   GLUCOSE 107*   BUN 7   CREATININE 0.84   *   K 3.6      CO2 25   CALCIUM 9.4     No results for input(s): \"POCGLU\" in the last 72 hours.  Last BM: 10/08/24  Skin: Wound Type: None  Edema:    None                       Temp: 98.4 °F (36.9 °C)  Temp (24hrs), Av.6 °F (37 °C), Min:98.4 °F (36.9 °C), Max:98.9 °F (37.2 °C)       Current Nutrition Intake & Therapies:    Average Meal Intake: 51-75%   
Comprehensive Nutrition Assessment    Type and Reason for Visit:  Initial, RD Nutrition Re-Screen/LOS    Nutrition Recommendations/Plan:   Continue current diet as tolerated.  Order Gelatine (each provides 80 kcal, 20g protein) BID  Recommend/order MVI/min supplementation daily, Daily wts.  Continue to monitor tolerance of PO, compliance of oral supplements, weight, labs, and plan of care during admission.     Malnutrition Assessment:  Malnutrition Status:  No malnutrition (09/20/24 1647)    Context:  Chronic Illness     Findings of the 6 clinical characteristics of malnutrition:  Energy Intake:  Mild decrease in energy intake (Comment) (reduced appetite 2/2 pain)  Weight Loss:  No significant weight loss     Body Fat Loss:  No significant body fat loss     Muscle Mass Loss:  No significant muscle mass loss    Fluid Accumulation:  No significant fluid accumulation     Strength:  Not Performed    Nutrition History and Allergies:      Past Medical History:   Diagnosis Date    B12 deficiency 03/15/2010    210    Cholelithiasis 09/17/2012    U/S Result: Cholelithiasis    Elevated alkaline phosphatase level 03/21/2010    158    Elevated ALT measurement 03/21/2010    71    Elevated AST (SGOT) 03/10/2012    38    Elevated platelet count 10/25/2007    494    Elevated total protein 12/27/2011    8.7    History of blood transfusion     Hypocalcemia 07/06/2011    4.1    Hypokalemia     Hyponatremia 11/18/2009    Leukocytosis 11/16/2009    Microcytic anemia 10/25/2007    Pleural effusion on right 07/15/2015    [facility] CXR Result    Reticulocytosis 10/25/2007    7.8    Serum total bilirubin elevated 12/27/2011    T.B. 3.8, D.B. 0.4.     Sickle cell anemia with crisis (HCC)     Dr. Catrachita Chaparro    Vitamin D deficiency 01/20/2016    5.4     PTA: Per pt prior to admission appetite was at baseline. Pt reports  lbs and has not had any recent weight loss.     Weight Hx: 195 lbs +/- 5 lbs; weight stable - not significant. 
Comprehensive Nutrition Assessment    Type and Reason for Visit: follow up     Nutrition Recommendations/Plan:   Continue current diet as tolerated.  Continue oral supplements: Gelatein 20 (each provides 80 kcal, 20g protein) BID  Continue folic acid  Continue to monitor tolerance of PO, compliance of oral supplements, weight, labs, and plan of care during admission.     Malnutrition Assessment:  Malnutrition Status:  No malnutrition (09/20/24 1647)      Nutrition Assessment:    Admitted for sickle cell anemia with pain. Pt with variable intake related to pain.  Gelatein started for additional protein, and MVI to support nutrition status. Palliative following for pain control. Will continue to monitor per protocol.     Nutrition Related Findings:      PO intake consistently 75% of meals/supplements.  Meeting estimated needs over the last week. Weight stable.     Pertinent Meds: folvite   Pertinent Labs:  Recent Labs     10/03/24  0626   GLUCOSE 109*   BUN 8   CREATININE 0.64   *   K 4.0   CL 94*   CO2 26   CALCIUM 10.3*     No results for input(s): \"POCGLU\" in the last 72 hours.  Last BM: 10/01/24    Current Nutrition Intake & Therapies:    Average Meal Intake: 51-75%     ADULT DIET; Regular  ADULT ORAL NUTRITION SUPPLEMENT; Breakfast, Dinner; Fortified Gelatin Oral Supplement    Anthropometric Measures:  Height: 182.9 cm (6' 0.01\")  Ideal Body Weight (IBW): 178 lbs (81 kg)    Admission Body Weight: 87.5 kg (192 lb 14.4 oz); stable   Current Body Weight: 87.5 kg (192 lb 14.4 oz), 108.4 % IBW. Weight Source: Bed Scale  Current BMI (kg/m2): 26.2  Usual Body Weight: 86.2 kg (190 lb)  % Weight Change (Calculated): 1.5  Weight Adjustment For: No Adjustment                 BMI Categories: Overweight (BMI 25.0-29.9)    Estimated Daily Nutrient Needs:  Energy Requirements Based On: Kcal/kg  Weight Used for Energy Requirements: Current  Energy (kcal/day): 1930-4273 kcal/d (28-32 kcal/kg)  Weight Used for Protein 
Did follow up for Fatoumata Welsh coordinator about his apharesis. As per Blaise he informed Dr. Chaparro why he was not able to do it today. He said he will do Apharesis tomorrow morning.  
Discharge instruction received.     Discharge paperwork presented and reviewed with the patient. Adequate time given to review and answer any and all questions to patients satisfaction.     PICC removed without complication, pressure applied. Telemetry removed without complication.    Patient escorted to outpatient pharmacy via wheelchair. Transported home via family.     Armbands removed and shredded.  
Empty Dilaudid PCA syringe disposed, witness by BRYANT Ta. New syringe inserted.   
MRI screening form needs to be filled out and faxed to [phone] BEFORE MRI can be scheduled.  If unable to obtain information from patient , [employer] needs to be contacted . If patient is claustrophobic or will needs pain meds, please have ordered in advance in order to facilitate exam.   
PCA Dilaudid wasted 3.74 mL. Witnessed by Juany Tobias RN  
Palliative Medicine  Patient Name: Norma Galan  YOB: 1980  MRN: [id number]  Age: 44 y.o.  Gender: male    Date of Initial Consult: 9/20/2024   Date of Service: 9/24/2024  Time: 12:40 PM  Provider: LISSETH Marcos NP  Hospital Day: 12  Admit Date: 9/13/2024  Referring Provider: Shankar        Reasons for Consultation:  Overwhelming Symptoms and Psychosocial Distress    HISTORY OF PRESENT ILLNESS (HPI):   Norma Galan is a 44 y.o. male with a past medical history of sickle cell, acute chest syndrome, who was admitted on 9/13/2024 from home with a diagnosis of sickle cell pain crisis.  Patient presented to the hospital with pain in hips and back.  Dilaudid was no longer effective.  Palliative medicine was consulted to assist in pain management.    September 23, 2024 siting up on side of bed. Looks much better. Pain much improved. More talkative. + Hiccups. Remains on Dilaudid PCA.     September 20, 2024 patient now Dilaudid PCA with some improvement in pain.  He is alert oriented on nasal cannula oxygen denies dyspnea.      PALLIATIVE DIAGNOSES:    Encounter for palliative care  Advance care plan discussions  Sickle cell pain crisis  Leukocytosis      ASSESSMENT AND PLAN:   Encounter for palliative care/sickle cell pain crisis    September 24, 2024 lying on side this am in pain. Patient shared he was feeling very well yesterday then \" did to much\" and pain escalated. He is still with back and leg pain. Feels it is slightly improving but still in 8/10 pain. No complaints of shortness of breath or nausea. Will give a small bolus of Dilaudid 0.2 mg IV  now to help with pain. Hiccups much better. Appreciate hematology Dilaudid PCA increased to a continuous rate of 0.8 mg / hr. PCA doses of 0.6 mg with lock out of 10 minutes. Will re assess tomorrow for PCA wean if able.     Please see below for previous notes per palliative team     September 23, 2024 patient seen at bedside.  Looks 
Palliative Medicine  Patient Name: Norma Galan  YOB: 1980  MRN: [id number]  Age: 44 y.o.  Gender: male    Date of Initial Consult: 9/20/2024   Date of Service: 9/23/2024  Time: 2:40 PM  Provider: LISSETH Marcos NP  Hospital Day: 11  Admit Date: 9/13/2024  Referring Provider: Shankar        Reasons for Consultation:  Overwhelming Symptoms and Psychosocial Distress    HISTORY OF PRESENT ILLNESS (HPI):   Norma Galan is a 44 y.o. male with a past medical history of sickle cell, acute chest syndrome, who was admitted on 9/13/2024 from home with a diagnosis of sickle cell pain crisis.  Patient presented to the hospital with pain in hips and back.  Dilaudid was no longer effective.  Palliative medicine was consulted to assist in pain management.    September 23, 2024 siting up on side of bed. Looks much better. Pain much improved. More talkative. + Hiccups. Remains on Dilaudid PCA.     September 20, 2024 patient now Dilaudid PCA with some improvement in pain.  He is alert oriented on nasal cannula oxygen denies dyspnea.      PALLIATIVE DIAGNOSES:    Encounter for palliative care  Advance care plan discussions  Sickle cell pain crisis  Leukocytosis      ASSESSMENT AND PLAN:   Encounter for palliative care/sickle cell pain crisis    September 23, 2024 patient seen at bedside.  Looks much better today sitting on side of bed.  More talkative tells me his pain is improving.  He remains on a PCA with Dilaudid 0.6 mg/h continues to use and 0.5 mg with a 15-minute lockout.  Would continue same PCA dose today. If pain continues to do well consider PCA wean tomorrow. Still with hiccups, Thorazine caused sedation and d/c'd.  Baclofen added today po. No nausea this am.     Please see below for previous notes per palliative team    September 20, 2024 patient seen at bedside.  PCA with Dilaudid was started yesterday evening.  Had some improvement with pain in his legs and back it was down to 8 out 
Palliative Medicine Progress Note  Patient Name: Norma Galan  YOB: 1980  MRN: [id number]  Age: 44 y.o.  Gender: male    Date of Initial Consult: 9/20/2024   Date of Service: 9/26/2024  Time: 1:51 PM  Provider: LISSETH Marcos NP  Hospital Day: 14  Admit Date: 9/13/2024  Referring Provider: Shankar        Reasons for Consultation:  Overwhelming Symptoms and Psychosocial Distress    HISTORY OF PRESENT ILLNESS (HPI):   Norma Galan is a 44 y.o. male with a past medical history of sickle cell, acute chest syndrome, who was admitted on 9/13/2024 from home with a diagnosis of sickle cell pain crisis.  Patient presented to the hospital with pain in hips and back.  Dilaudid was no longer effective.  Palliative medicine was consulted to assist in pain management.    9/26/2024 looks great this am. Smiling, pain much better. He is trying to not \" do to much\". No complaints of shortness of breath.Continues on PCA.    9/25/2024 Patient resting with eyes closed, awoke to name. States pain is better, 'keeping body calm.' On RA, appetite ok.    September 23, 2024 siting up on side of bed. Looks much better. Pain much improved. More talkative. + Hiccups. Remains on Dilaudid PCA.     September 20, 2024 patient now Dilaudid PCA with some improvement in pain.  He is alert oriented on nasal cannula oxygen denies dyspnea.      PALLIATIVE DIAGNOSES:    Encounter for palliative care  Advance care plan discussions  Sickle cell pain crisis  Leukocytosis      ASSESSMENT AND PLAN:   Encounter for palliative care/sickle cell pain crisis    September 26, 2024 Doing very well this am. Smiling pain much improved on continuous Dilaudid of 1.4 mg / hr PCA dose of 0.5 mg lockout of 10 minutes. He is not having to use PCA dose as much. Able to sleep and rest better. Now on oral dilaudid 4 mg q 8 hrs. Also using non opioids such as ibuprofen to adjunct pain relief. He tells me he does not feel to over sedated or 
Palliative Medicine Progress Note  Patient Name: Norma Galan  YOB: 1980  MRN: [id number]  Age: 44 y.o.  Gender: male    Date of Initial Consult: 9/20/2024   Date of Service: 9/27/2024  Time: 3:15 PM  Provider: RADHA Coto  Hospital Day: 15  Admit Date: 9/13/2024  Referring Provider: Shankar        Reasons for Consultation:  Overwhelming Symptoms and Psychosocial Distress    HISTORY OF PRESENT ILLNESS (HPI):   Norma Galan is a 44 y.o. male with a past medical history of sickle cell, acute chest syndrome, who was admitted on 9/13/2024 from home with a diagnosis of sickle cell pain crisis.  Patient presented to the hospital with pain in hips and back.  Dilaudid was no longer effective.  Palliative medicine was consulted to assist in pain management.    9/27/2024 Patient resting in bed, opened eyes to name. Looks acutely ill. Pain worse this morning, but now '7 out of 10.' On 2L NC. Awaiting red cell exchange, s/p catheter placed.    9/26/2024 looks great this am. Smiling, pain much better. He is trying to not \" do to much\". No complaints of shortness of breath.Continues on PCA.    9/25/2024 Patient resting with eyes closed, awoke to name. States pain is better, 'keeping body calm.' On RA, appetite ok.    September 23, 2024 siting up on side of bed. Looks much better. Pain much improved. More talkative. + Hiccups. Remains on Dilaudid PCA.     September 20, 2024 patient now Dilaudid PCA with some improvement in pain.  He is alert oriented on nasal cannula oxygen denies dyspnea.      PALLIATIVE DIAGNOSES:    Encounter for palliative care  Advance care plan discussions  Sickle cell pain crisis  Leukocytosis      ASSESSMENT AND PLAN:   Encounter for palliative care/sickle cell pain crisis    9/27/2024 Patient seen and examined along with Maude HURTADO and Virginia MONTANEZ. Patient had increased pain overnight and this am, now doing somewhat better with '7 out of 10' pain 'all over.' He 
Palliative Medicine Progress Note  Patient Name: Norma Galan  YOB: 1980  MRN: [id number]  Age: 44 y.o.  Gender: male    Date of Initial Consult: 9/20/2024   Date of Service: 9/25/2024  Time: 2:04 PM  Provider: RADHA Coto  Hospital Day: 13  Admit Date: 9/13/2024  Referring Provider: Shankar        Reasons for Consultation:  Overwhelming Symptoms and Psychosocial Distress    HISTORY OF PRESENT ILLNESS (HPI):   Norma Galan is a 44 y.o. male with a past medical history of sickle cell, acute chest syndrome, who was admitted on 9/13/2024 from home with a diagnosis of sickle cell pain crisis.  Patient presented to the hospital with pain in hips and back.  Dilaudid was no longer effective.  Palliative medicine was consulted to assist in pain management.    9/25/2024 Patient resting with eyes closed, awoke to name. States pain is better, 'keeping body calm.' On RA, appetite ok.    September 23, 2024 siting up on side of bed. Looks much better. Pain much improved. More talkative. + Hiccups. Remains on Dilaudid PCA.     September 20, 2024 patient now Dilaudid PCA with some improvement in pain.  He is alert oriented on nasal cannula oxygen denies dyspnea.      PALLIATIVE DIAGNOSES:    Encounter for palliative care  Advance care plan discussions  Sickle cell pain crisis  Leukocytosis      ASSESSMENT AND PLAN:   Encounter for palliative care/sickle cell pain crisis    9/25/2024 Patient seen and examined along with Maude Rainey RN. He awoke to name, denies drowsiness r/t medication, pain now is '5:10' and is 'better. I'm keeping my body calm.' States appetite is 'ok.' Review pain medication regimen with patient that currently includes PCA Dilaudid with a basal rate, Dilaudid tablet, Baclofen, Motrin, Decadron for inflammation, IV Toradol if needed. Patient takes 4mg Dilaudid every 4-6 hours as needed as outpatient. Goal is to resume this at discharge as needed for pain. PLAN: Patient 
Patient does not have IV access.  Vascular access team tried to place IV access but unsuccessful.  Patient mentions he does not want to p.o. pain medications.  Will start Percocet 5/325 1 tablet p.o. every 4 as needed will continue to follow.    
Patient with 15 beats of v-tach per telemetry technician, made aware.  
Patient's sister, (Reean Galan), received [facility]'s home medications from BRYANT Gary. Medication taken to sister's house and not left at bedside.   Reena Galan's phone number is [phone]  
Received report from BRYANT Mcclellan. Pt AAOx3, NAD, breathing non labored, on room air, HOB up. PICC site clean, dry and intact. IVF going per order.  Bed at the lowest level on lock position, call bell w/i reach.    4 Eyes Skin Assessment     NAME:  Norma Galan  YOB: 1980  MEDICAL RECORD NUMBER:  [id number]    The patient is being assessed for  Shift Handoff    I agree that at least one RN has performed a thorough Head to Toe Skin Assessment on the patient. ALL assessment sites listed below have been assessed.      Areas assessed by both nurses:    Head, Face, Ears, Shoulders, Back, Chest, Arms, Elbows, Hands, Sacrum. Buttock, Coccyx, Ischium, and Legs. Feet and Heels        Does the Patient have a Wound? No noted wound(s)       Tima Prevention initiated by RN: Yes  Wound Care Orders initiated by RN: No    Pressure Injury (Stage 3,4, Unstageable, DTI, NWPT, and Complex wounds) if present, place Wound referral order by RN under : No    New Ostomies, if present place, Ostomy referral order under : No     Nurse 1 eSignature: {Esignature:[id number]}    **SHARE this note so that the co-signing nurse can place an eSignature**    Nurse 2 eSignature: {Esignature:[id number]}      
Received report from BRYANT Mcclellan. Pt AAOx3, NAD, breathing non labored, on room air, HOB up. PICC site clean, dry and intact. IVF going per order. Bed at the lowest level on lock position, call bell w/i reach.    
Replaced C02 NC   09/27/24 0910   Oxygen Therapy/Pulse Ox   O2 Therapy Oxygen   $Oxygen $Daily Charge   O2 Device Nasal cannula   O2 Flow Rate (L/min) 2 L/min   $Pulse Oximeter $Spot check (multiple/continuous)       
Sodium 126    1444 Drew Ibrahim, DO aware of the above. Ordered fluid restriction 1.5 L/day   
Spiritual Health Assessment/Progress Note  [facility]    Spiritual/Emotional Needs,  ,  ,      Name: Norma Galan MRN: [id number]    Age: 43 y.o.     Sex: male   Language: English   Pentecostal: Hinduism   Sickle cell anemia with pain (HCC)     Date: 9/14/2024            Total Time Calculated: 6 min              Spiritual Assessment began in [facility] [place] [facility]            Encounter Overview/Reason: Spiritual/Emotional Needs  Service Provided For: Patient    Kristen, Belief, Meaning:   Patient identifies as spiritual and has beliefs or practices that help with coping during difficult times  Family/Friends No family/friends present      Importance and Influence:  Patient has spiritual/personal beliefs that influence decisions regarding their health  Family/Friends no family/friends present    Community:  Patient feels well-supported. Support system includes: Spouse/Partner and Extended family  Family/Friends Other: n/a    Assessment and Plan of Care:     Patient Interventions include: Facilitated expression of thoughts and feelings and Engaged in life review and/or legacy  Family/Friends Interventions include: Other: n/a    Patient Plan of Care: Spiritual Care available upon further referral  Family/Friends Plan of Care: Other: n/a    Electronically signed by Chaplain Lucero on 9/14/2024 at 9:59 AM   
The right trans-jugular dialysis catheter is in the distal superior vena cava, just proximal to the right atrium.  There is no evidence of pneumothorax.  May use the catheter now.    Dr. Torres  Vascular Surgery  
Unsuccessful LUE midline. Able to thread wire and line, but unable to draw back and pt c/o pressure to armpit with flushing. Attempted x4 US guided BUE PIV, flash back obtained but unable to fully advance cath on any of them, possibly due to valves. When catheters removed, sharp 90deg bends noted. Pt and staff state this is typical. Unfortunately, not able to gain venous access at this time. Primary RN Lyric informed and she will call attending for further orders. Pt tolerated ok but he is frustrated. Call light and belongings in reach, bed low.   
  HCT 26.8* 27.1*   MCV 81.7 83.1   RDW 20.5* 21.0*    203     CHEMISTRIES:  Recent Labs     09/30/24  0309   *   K 4.0   CL 96*   CO2 25   BUN 11   CREATININE 0.68   GLUCOSE 118*     PT/INR:No results for input(s): \"PROTIME\", \"INR\" in the last 72 hours.  APTT:No results for input(s): \"APTT\" in the last 72 hours.  LIVER PROFILE:  No results for input(s): \"AST\", \"ALT\", \"BILIDIR\", \"BILITOT\", \"ALKPHOS\" in the last 72 hours.      Imaging:  XR CHEST PORTABLE    Result Date: 8/15/2024  FINDINGS/IMPRESSION: Life support tubes/lines: None Heart and mediastinum: Cardiomediastinal silhouette is stable and nonenlarged. Lungs: No focal consolidation. No significant airspace disease. Pleura:  No pleural effusion. No pneumothorax. Electronically signed by Christian Glaser      Current Medications:  Current Facility-Administered Medications: HYDROmorphone (DILAUDID) tablet 4 mg, 4 mg, Oral, Q6H  HYDROmorphone HCl PF (DILAUDID) injection 1 mg, 1 mg, IntraVENous, Q4H PRN  enoxaparin (LOVENOX) injection 40 mg, 40 mg, SubCUTAneous, Daily  acetaminophen (TYLENOL) tablet 650 mg, 650 mg, Oral, Q4H PRN  pantoprazole (PROTONIX) tablet 40 mg, 40 mg, Oral, QAM AC  multivitamin 1 tablet, 1 tablet, Oral, Daily  HYDROmorphone (DILAUDID) 1 mg/mL PCA, , IntraVENous, Continuous  naloxone (NARCAN) injection 0.4 mg, 0.4 mg, IntraVENous, PRN  sodium chloride flush 0.9 % injection 5-40 mL, 5-40 mL, IntraVENous, 2 times per day  sodium chloride flush 0.9 % injection 5-40 mL, 5-40 mL, IntraVENous, PRN  0.9 % sodium chloride infusion, , IntraVENous, PRN  0.9 % sodium chloride infusion, , IntraVENous, Continuous  sodium chloride flush 0.9 % injection 5-40 mL, 5-40 mL, IntraVENous, 2 times per day  sodium chloride flush 0.9 % injection 5-40 mL, 5-40 mL, IntraVENous, PRN  potassium chloride 10 mEq/100 mL IVPB (Peripheral Line), 10 mEq, IntraVENous, PRN  magnesium sulfate 2000 mg in 50 mL IVPB premix, 2,000 mg, IntraVENous, PRN  ondansetron 
diarrhea, constipation and abdominal pain  Genitourinary:negative for dysuria, nocturia, urinary incontinence, hesitancy and hematuria  Endocrine: no polydipsia, no goitre  Integument: negative for rash, skin lesion(s) and pruritus  Hematologic/Lymphatic: negative for easy bruising, bleeding and lymphadenopathy  Musculoskeletal bone pain left knee and back  Neurological: negative for headaches, dizziness, seizures, paresthesia and weakness    Physical Exam: in distress from pain  Constitutional: Alert, oriented,Eyes: PERRLA, icteric,  pallor  ENT: no palpable Lymph nodes, no mucositis, no thrush.  Respiratory: bilateral air entry, no rales,  no wheezing.  Cardiovascular: S1S2 regular , no  murmur, no pedal edemaGastrointestinal: soft,  non tender, no HSM, normal bowel sounds.  Integument: no rash, no petechiae or ecchymosis.  Neurological: intact cranial nerves, no focal motor or sensory deficits.      Labs:  No results found for this or any previous visit (from the past 24 hour(s)).        
for cough, sputum, hemoptysis, pleurisy/chest pain or wheezing  Cardiovascular: negative for chest pain, , palpitations,  syncope, paroxysmal nocturnal dyspnea  Gastrointestinal: negative for reflux symptoms, nausea, vomiting, melena, diarrhea, constipation and abdominal pain  Genitourinary:negative for dysuria, nocturia, urinary incontinence, hesitancy and hematuria  Endocrine: no polydipsia, no goitre  Integument: negative for rash, skin lesion(s) and pruritus  Hematologic/Lymphatic: negative for easy bruising, bleeding and lymphadenopathy  Musculoskeletal: bone pain better controlled  Neurological: negative for headaches, dizziness, seizures, paresthesia and weakness    Physical Exam: Constitutional: Alert, oriented,  in distress  Eyes: PERRLA, icteric,  pallor  ENT: no palpable Lymph nodes, no mucositis, no thrush.  Respiratory: bilateral air entry, no rales,  no wheezing.  Cardiovascular: S1S2 regular , no  murmur, no pedal edema  Gastrointestinal: soft,  non tender, no HSM, normal bowel sounds.  Integument: no rash, no petechiae or ecchymosis.  Neurological: intact cranial nerves, no focal motor or sensory deficits.      Labs:  Recent Results (from the past 24 hour(s))   Basic Metabolic Panel w/ Reflex to MG    Collection Time: 09/27/24  5:00 AM   Result Value Ref Range    Sodium 134 (L) 136 - 145 mmol/L    Potassium 4.2 3.5 - 5.5 mmol/L    Chloride 104 100 - 111 mmol/L    CO2 23 21 - 32 mmol/L    Anion Gap 7 3.0 - 18 mmol/L    Glucose 111 (H) 74 - 99 mg/dL    BUN 14 7.0 - 18 MG/DL    Creatinine 0.86 0.6 - 1.3 MG/DL    BUN/Creatinine Ratio 16 12 - 20      Est, Glom Filt Rate >90 >60 ml/min/1.73m2    Calcium 8.8 8.5 - 10.1 MG/DL       
lymphadenopathy  Musculoskeletal bone pain better  Neurological: negative for headaches, dizziness, seizures, paresthesia and weakness    Physical Exam: Constitutional: Alert, oriented,Eyes: PERRLA, icteric,  pallor  ENT: no palpable Lymph nodes, no mucositis, no thrush.  Respiratory: bilateral air entry, no rales,  no wheezing.  Cardiovascular: S1S2 regular , no  murmur, no pedal edemaGastrointestinal: soft,  non tender, no HSM, normal bowel sounds.  Integument: no rash, no petechiae or ecchymosis.  Neurological: intact cranial nerves, no focal motor or sensory deficits.      Labs:  Recent Results (from the past 24 hour(s))   CBC    Collection Time: 10/08/24  7:01 AM   Result Value Ref Range    WBC 7.6 4.6 - 13.2 K/uL    RBC 2.89 (L) 4.35 - 5.65 M/uL    Hemoglobin 8.0 (L) 13.0 - 16.0 g/dL    Hematocrit 24.8 (L) 36.0 - 48.0 %    MCV 85.8 78.0 - 100.0 FL    MCH 27.7 24.0 - 34.0 PG    MCHC 32.3 31.0 - 37.0 g/dL    RDW 21.2 (H) 11.6 - 14.5 %    Platelets 764 (H) 135 - 420 K/uL    MPV 10.0 9.2 - 11.8 FL    Nucleated RBCs 1.1 (H) 0  WBC    nRBC 0.08 (H) 0.00 - 0.01 K/uL   Basic Metabolic Panel    Collection Time: 10/08/24  7:01 AM   Result Value Ref Range    Sodium 135 (L) 136 - 145 mmol/L    Potassium 3.6 3.5 - 5.5 mmol/L    Chloride 104 100 - 111 mmol/L    CO2 25 21 - 32 mmol/L    Anion Gap 6 3.0 - 18 mmol/L    Glucose 107 (H) 74 - 99 mg/dL    BUN 7 7.0 - 18 MG/DL    Creatinine 0.84 0.6 - 1.3 MG/DL    BUN/Creatinine Ratio 8 (L) 12 - 20      Est, Glom Filt Rate >90 >60 ml/min/1.73m2    Calcium 9.4 8.5 - 10.1 MG/DL           
protein-energy intake related to pain as evidenced by intake 51-75%    Nutrition Interventions:   Food and/or Nutrient Delivery: Continue Current Diet, Vitamin Supplement, Start Oral Nutrition Supplement  Nutrition Education/Counseling: No recommendation at this time  Coordination of Nutrition Care: Continue to monitor while inpatient  Plan of Care discussed with: Pt      Goals:     Goals: PO intake 75% or greater, by next RD assessment       Nutrition Monitoring and Evaluation:   Behavioral-Environmental Outcomes: None Identified  Food/Nutrient Intake Outcomes: Food and Nutrient Intake, Supplement Intake, Vitamin/Mineral Intake  Physical Signs/Symptoms Outcomes: Biochemical Data, GI Status, Nutrition Focused Physical Findings, Skin, Weight    Discharge Planning:    Continue current diet, Continue Oral Nutrition Supplement     Sandra Cordova RD  Contact: clinical nutrition     
**OR** ondansetron (ZOFRAN) injection 4 mg, 4 mg, IntraVENous, Q6H PRN  acetaminophen (TYLENOL) tablet 650 mg, 650 mg, Oral, Q6H PRN **OR** acetaminophen (TYLENOL) suppository 650 mg, 650 mg, Rectal, Q6H PRN  senna (SENOKOT) tablet 8.6 mg, 1 tablet, Oral, BID  [Held by provider] docusate sodium (COLACE) capsule 100 mg, 100 mg, Oral, Daily  folic acid (FOLVITE) tablet 1 mg, 1 mg, Oral, Daily  hydroxyurea (HYDREA) chemo capsule 500 mg, 500 mg, Oral, BID  diphenhydrAMINE (BENADRYL) capsule 25 mg, 25 mg, Oral, Q6H PRN      Assessment//Plan           Hospital Problems             Last Modified POA    * (Principal) Sickle cell anemia with pain (HCC) 9/14/2024 Yes    Encounter for palliative care 9/20/2024 Yes    Advanced care planning/counseling discussion 9/20/2024 Yes    Leukocytosis 9/20/2024 Yes     Assessment:    Condition: In stable condition.  Improving.   (    #Sickle cell crisis, improving  #Microcytic anemia  #Leukocytosis secondary to steroid use  #Hypokalemia).     Plan:   Regular diet.  Administer medications as ordered and give fluids.   (    - Patient's pain is more controlled with increased continuous rate of dilaudid on PCA pump. Continue continuous rate 1.4 mg/h. Decreased 0.5mg bolus to q15min for max rate of 3.4mg every hour.   - Continue home p.o. Dilaudid 4 mg scheduled every 8 hours  - Ordered ibuprofen 800 mg every 8 hours to be given in between p.o. Dilaudid doses- Although patient has been given a significant amount of Dilaudid each day, his respiratory rate is increased and he is hypertensive and very alert.  Advised staff to monitor for respiratory depression or somnolence  - Continue IV fluids  - Continue hydroxyurea and folic acid  - Replace potassium as needed  - Monitor CBC  - Bowel regimen  - Scheduled baclofen for hiccups. Patient was too somnolent on chorpropazime ).       Case discussed with:  [x]Patient  []Family  [x]Nursing  [x]Case Management  DVT Prophylaxis:  [x]Lovenox  []Hep SQ  
11.8 FL    Nucleated RBCs 1.7 (H) 0  WBC    nRBC 0.19 (H) 0.00 - 0.01 K/uL    Neutrophils % 70 40 - 73 %    Lymphocytes % 21 21 - 52 %    Monocytes % 7 3 - 10 %    Eosinophils % 1 0 - 5 %    Basophils % 0 0 - 2 %    Immature Granulocytes % 1 (H) 0.0 - 0.5 %    Neutrophils Absolute 7.8 1.8 - 8.0 K/UL    Lymphocytes Absolute 2.3 0.9 - 3.6 K/UL    Monocytes Absolute 0.8 0.05 - 1.2 K/UL    Eosinophils Absolute 0.1 0.0 - 0.4 K/UL    Basophils Absolute 0.0 0.0 - 0.1 K/UL    Immature Granulocytes Absolute 0.1 (H) 0.00 - 0.04 K/UL    Differential Type AUTOMATED             
50 mL IVPB premix, 2,000 mg, IntraVENous, PRN  ondansetron (ZOFRAN-ODT) disintegrating tablet 4 mg, 4 mg, Oral, Q8H PRN **OR** ondansetron (ZOFRAN) injection 4 mg, 4 mg, IntraVENous, Q6H PRN  acetaminophen (TYLENOL) tablet 650 mg, 650 mg, Oral, Q6H PRN **OR** acetaminophen (TYLENOL) suppository 650 mg, 650 mg, Rectal, Q6H PRN  senna (SENOKOT) tablet 8.6 mg, 1 tablet, Oral, BID  [Held by provider] docusate sodium (COLACE) capsule 100 mg, 100 mg, Oral, Daily  folic acid (FOLVITE) tablet 1 mg, 1 mg, Oral, Daily  hydroxyurea (HYDREA) chemo capsule 500 mg, 500 mg, Oral, BID  diphenhydrAMINE (BENADRYL) capsule 25 mg, 25 mg, Oral, Q6H PRN      Assessment//Plan           Hospital Problems             Last Modified POA    * (Principal) Sickle cell anemia with pain (HCC) 9/14/2024 Yes    Encounter for palliative care 9/20/2024 Yes    Advanced care planning/counseling discussion 9/20/2024 Yes    Leukocytosis 9/20/2024 Yes     Assessment:    Condition: In stable condition.  Improving.   (    #Sickle cell crisis,   #Microcytic anemia  #Leukocytosis secondary to steroid use  #Hypokalemia).     Plan:   Regular diet.  Administer medications as ordered and give fluids.   (    -Continue Dilaudid PCA  Discussed with patient regarding pain control  -Status post RBC exchange earlier on September 28  Hematology is following  - Continue IV fluids  - Continue hydroxyurea and folic acid  - Monitor CBC  - Bowel regimen  -Status post baclofen for hiccups-improved  I discussed with patient and family at bedside  I discussed with RN).         Case discussed with:  [x]Patient  []Family  [x]Nursing  [x]Case Management  DVT Prophylaxis:  [x]Lovenox  []Hep SQ  [x]SCDs  []Coumadin   []Heparin gtt   []Xarelto   []Ish Solis MD      
6.5 (H) 0.9 - 3.6 K/UL    Monocytes Absolute 0.7 0.05 - 1.2 K/UL    Eosinophils Absolute 0.0 0.0 - 0.4 K/UL    Basophils Absolute 0.0 0.0 - 0.1 K/UL    Immature Granulocytes Absolute 0.0 0.00 - 0.04 K/UL    Differential Type MANUAL      Platelet Comment ADEQUATE PLATELETS      RBC Comment ANISOCYTOSIS  1+        RBC Comment POLYCHROMASIA  1+           
Gap 7 3.0 - 18 mmol/L    Glucose 97 74 - 99 mg/dL    BUN 9 7.0 - 18 MG/DL    Creatinine 0.69 0.6 - 1.3 MG/DL    BUN/Creatinine Ratio 13 12 - 20      Est, Glom Filt Rate >90 >60 ml/min/1.73m2    Calcium 10.9 (H) 8.5 - 10.1 MG/DL         
  #Microcytic anemia  #Leukocytosis secondary to steroid use  #Hypokalemia).     Plan:   Regular diet.  Administer medications as ordered and give fluids.   (    -Continue Dilaudid PCA  Discussed with patient regarding pain control  -Status post RBC exchange earlier on September 28  Hematology is following  - Continue hydroxyurea and folic acid  - Monitor CBC  - Bowel regimen  -Status post baclofen for hiccups-improved  -Thrombocytosis, defer management to hematology.  - Hyponatremia-continue fluid restriction.).         Case discussed with:  [x]Patient  []Family  [x]Nursing  [x]Case Management  DVT Prophylaxis:  [x]Lovenox  []Hep SQ  [x]SCDs  []Coumadin   []Heparin gtt   []Xarelto   []Ish Benjamin MD      
500 mg at 09/27/24 2205    diphenhydrAMINE (BENADRYL) capsule 25 mg, 25 mg, Oral, Q6H PRN, Christiano Beltran DO, 25 mg at 09/19/24 1508        Thomas Dash M.D.  [facility]  Office [phone]  
DO Madhav, 500 mg at 09/29/24 0908    diphenhydrAMINE (BENADRYL) capsule 25 mg, 25 mg, Oral, Q6H PRN, Christiano Beltran DO, 25 mg at 09/28/24 0932        Thomas Dash M.D.  [facility]  Office [phone]  
baclofen for hiccups-improved  I discussed with patient and family at bedside  I discussed with RN).         Case discussed with:  [x]Patient  []Family  [x]Nursing  [x]Case Management  DVT Prophylaxis:  [x]Lovenox  []Hep SQ  [x]SCDs  []Coumadin   []Heparin gtt   []Xarelto   []Ish Benjamin MD      
fluids  - Continue hydroxyurea and folic acid  - Replace potassium as needed  - Monitor CBC  - Bowel regimen).       Case discussed with:  [x]Patient  []Family  [x]Nursing  [x]Case Management  DVT Prophylaxis:  [x]Lovenox  []Hep SQ  []SCDs  []Coumadin   []Heparin gtt   []Xarelto   []Eliquis    Christiano Beltran DO, [place], MS  [facility]  Hospitalist

## 2024-10-11 NOTE — DISCHARGE SUMMARY
Discharge Summary    Patient: Norma Galan MRN: [id number]  [facility]: [id number]    YOB: 1980  Age: 44 y.o.  Sex: male    DOA: 9/13/2024 LOS:  LOS: 27 days   Discharge Date:      Admission Diagnosis: Sickle cell crisis (HCC) [D57.00]  Sickle cell anemia with pain (HCC) [D57.00]    Discharge Diagnosis:    Sickle cell crisis  Microcytic anemia  Leukocytosis secondary to steroid use  Hypokalemia    Discharge Condition: Stable    PHYSICAL EXAM  Visit Vitals  /68   Pulse (!) 104   Temp 98.2 °F (36.8 °C) (Oral)   Resp 18   Ht 1.829 m (6' 0.01\")   Wt 87.5 kg (193 lb)   SpO2 97%   BMI 26.17 kg/m²       General: Alert, cooperative, no acute distress    HEENT: NC, Atraumatic.  PERRLA, EOMI. Anicteric sclerae.  Lungs:  CTA Bilaterally. No Wheezing/Rhonchi/Rales.  Heart:  Regular  rhythm,  No murmur, No Rubs, No Gallops  Abdomen: Soft, Non distended, Non tender.  +Bowel sounds, no HSM  Extremities: No c/c/e  Psych:   Good insight. Not anxious or agitated.  Neurologic:  CN 2-12 grossly intact, oriented X 3.  No acute neurological                                 Deficits,     Hospital Course: Per the H&P:  Norma is a 43 y.o.   male with history of sickle cell disease, past sickle cell pain crisis, past acute chest syndrome who presents in day 4 of a pain crisis which is no longer amenable to home p.o. Dilaudid.  Pain present in hips, back.  Takes hydroxyurea and folic acid.  Notes that he had some mild discomfort in his chest this morning although currently without any chest discomfort, no dyspnea, no fevers, no cough.     Patient admitted and placed on IV Dilaudid and a chest x-ray was obtained.  Oncology was consulted who recommended titrating pain meds upward.  They also recommended hydration, incentive spirometry and folic acid and hydroxyurea.  Patient was noted to have a nodule in the right shin and a x-ray of the right tibia was ordered.  The tibial and fibula x-ray showed

## 2024-10-11 NOTE — DISCHARGE INSTRUCTIONS
Eliu Tucker you came in for sickle cell crisis.  You were treated by the oncology/hematology team.  Please take care.  Your pain medicines have been sent to the pharmacy.

## 2025-02-21 NOTE — ED NOTES
Hourly rounding-pt sleeping on stretcher, call bell in reach, still c/o pain all over, states the meds arent helping, vitals stable, will continue to monitor. Was this taken care of?

## 2025-03-31 ENCOUNTER — TRANSCRIBE ORDERS (OUTPATIENT)
Facility: HOSPITAL | Age: 45
End: 2025-03-31

## 2025-03-31 DIAGNOSIS — I10 HYPERTENSION, UNSPECIFIED TYPE: Primary | ICD-10-CM

## 2025-04-24 ENCOUNTER — HOSPITAL ENCOUNTER (INPATIENT)
Facility: HOSPITAL | Age: 45
LOS: 3 days | Discharge: HOME | DRG: 812 | End: 2025-04-28
Attending: EMERGENCY MEDICINE | Admitting: INTERNAL MEDICINE
Payer: MEDICARE

## 2025-04-24 DIAGNOSIS — M54.50 ACUTE BILATERAL LOW BACK PAIN WITHOUT SCIATICA: ICD-10-CM

## 2025-04-24 DIAGNOSIS — D57.00 SICKLE CELL PAIN CRISIS (HCC): Primary | ICD-10-CM

## 2025-04-24 LAB
ALBUMIN SERPL-MCNC: 4.1 G/DL (ref 3.4–5)
ALBUMIN/GLOB SERPL: 0.9 (ref 0.8–1.7)
ALP SERPL-CCNC: 136 U/L (ref 45–117)
ALT SERPL-CCNC: 23 U/L (ref 16–61)
ANION GAP SERPL CALC-SCNC: 4 MMOL/L (ref 3–18)
AST SERPL-CCNC: 50 U/L (ref 10–38)
BILIRUB SERPL-MCNC: 5.2 MG/DL (ref 0.2–1)
BUN SERPL-MCNC: 6 MG/DL (ref 7–18)
BUN/CREAT SERPL: 7 (ref 12–20)
CALCIUM SERPL-MCNC: 8.9 MG/DL (ref 8.5–10.1)
CHLORIDE SERPL-SCNC: 108 MMOL/L (ref 100–111)
CO2 SERPL-SCNC: 25 MMOL/L (ref 21–32)
CREAT SERPL-MCNC: 0.81 MG/DL (ref 0.6–1.3)
GLOBULIN SER CALC-MCNC: 4.6 G/DL (ref 2–4)
GLUCOSE SERPL-MCNC: 86 MG/DL (ref 74–99)
POTASSIUM SERPL-SCNC: 3.4 MMOL/L (ref 3.5–5.5)
PROT SERPL-MCNC: 8.7 G/DL (ref 6.4–8.2)
SODIUM SERPL-SCNC: 137 MMOL/L (ref 136–145)

## 2025-04-24 PROCEDURE — 96375 TX/PRO/DX INJ NEW DRUG ADDON: CPT

## 2025-04-24 PROCEDURE — 80053 COMPREHEN METABOLIC PANEL: CPT

## 2025-04-24 PROCEDURE — 2580000003 HC RX 258: Performed by: EMERGENCY MEDICINE

## 2025-04-24 PROCEDURE — 6360000002 HC RX W HCPCS

## 2025-04-24 PROCEDURE — 99285 EMERGENCY DEPT VISIT HI MDM: CPT

## 2025-04-24 PROCEDURE — 6370000000 HC RX 637 (ALT 250 FOR IP)

## 2025-04-24 PROCEDURE — 96361 HYDRATE IV INFUSION ADD-ON: CPT

## 2025-04-24 PROCEDURE — 96374 THER/PROPH/DIAG INJ IV PUSH: CPT

## 2025-04-24 RX ORDER — 0.9 % SODIUM CHLORIDE 0.9 %
1000 INTRAVENOUS SOLUTION INTRAVENOUS ONCE
Status: COMPLETED | OUTPATIENT
Start: 2025-04-24 | End: 2025-04-25

## 2025-04-24 RX ORDER — DIPHENHYDRAMINE HYDROCHLORIDE 50 MG/ML
25 INJECTION, SOLUTION INTRAMUSCULAR; INTRAVENOUS
Status: COMPLETED | OUTPATIENT
Start: 2025-04-24 | End: 2025-04-24

## 2025-04-24 RX ORDER — HYDROMORPHONE HYDROCHLORIDE 2 MG/ML
2 INJECTION, SOLUTION INTRAMUSCULAR; INTRAVENOUS; SUBCUTANEOUS ONCE
Status: COMPLETED | OUTPATIENT
Start: 2025-04-24 | End: 2025-04-24

## 2025-04-24 RX ORDER — HYDROMORPHONE HYDROCHLORIDE 1 MG/ML
1 INJECTION, SOLUTION INTRAMUSCULAR; INTRAVENOUS; SUBCUTANEOUS ONCE
Status: DISCONTINUED | OUTPATIENT
Start: 2025-04-24 | End: 2025-04-24

## 2025-04-24 RX ORDER — KETOROLAC TROMETHAMINE 15 MG/ML
15 INJECTION, SOLUTION INTRAMUSCULAR; INTRAVENOUS ONCE
Status: COMPLETED | OUTPATIENT
Start: 2025-04-24 | End: 2025-04-24

## 2025-04-24 RX ORDER — ACETAMINOPHEN 500 MG
1000 TABLET ORAL
Status: COMPLETED | OUTPATIENT
Start: 2025-04-24 | End: 2025-04-24

## 2025-04-24 RX ADMIN — ACETAMINOPHEN 1000 MG: 500 TABLET ORAL at 22:19

## 2025-04-24 RX ADMIN — HYDROMORPHONE HYDROCHLORIDE 2 MG: 2 INJECTION, SOLUTION INTRAMUSCULAR; INTRAVENOUS; SUBCUTANEOUS at 22:22

## 2025-04-24 RX ADMIN — SODIUM CHLORIDE 1000 ML: 0.9 INJECTION, SOLUTION INTRAVENOUS at 23:04

## 2025-04-24 RX ADMIN — KETOROLAC TROMETHAMINE 15 MG: 15 INJECTION, SOLUTION INTRAMUSCULAR; INTRAVENOUS at 22:24

## 2025-04-24 RX ADMIN — DIPHENHYDRAMINE HYDROCHLORIDE 25 MG: 50 INJECTION INTRAMUSCULAR; INTRAVENOUS at 22:20

## 2025-04-24 RX ADMIN — HYDROMORPHONE HYDROCHLORIDE 2 MG: 2 INJECTION, SOLUTION INTRAMUSCULAR; INTRAVENOUS; SUBCUTANEOUS at 23:38

## 2025-04-24 ASSESSMENT — PAIN DESCRIPTION - LOCATION
LOCATION: LEG;BACK
LOCATION: LEG;BACK
LOCATION: BACK;LEG

## 2025-04-24 ASSESSMENT — PAIN SCALES - GENERAL
PAINLEVEL_OUTOF10: 9
PAINLEVEL_OUTOF10: 8
PAINLEVEL_OUTOF10: 8

## 2025-04-24 ASSESSMENT — PAIN DESCRIPTION - ORIENTATION
ORIENTATION: RIGHT;LEFT
ORIENTATION: RIGHT;LEFT

## 2025-04-25 LAB
APPEARANCE UR: CLEAR
BACTERIA URNS QL MICRO: ABNORMAL /HPF
BASOPHILS # BLD: 0.35 K/UL (ref 0–0.1)
BASOPHILS NFR BLD: 3 % (ref 0–2)
BILIRUB UR QL: NEGATIVE
COLOR UR: YELLOW
DIFFERENTIAL METHOD BLD: ABNORMAL
EOSINOPHIL # BLD: 1.04 K/UL (ref 0–0.4)
EOSINOPHIL NFR BLD: 9 % (ref 0–5)
EPITH CASTS URNS QL MICRO: ABNORMAL /LPF (ref 0–5)
ERYTHROCYTE [DISTWIDTH] IN BLOOD BY AUTOMATED COUNT: 24 % (ref 11.6–14.5)
FLUAV RNA SPEC QL NAA+PROBE: NOT DETECTED
FLUBV RNA SPEC QL NAA+PROBE: NOT DETECTED
GLUCOSE UR STRIP.AUTO-MCNC: NEGATIVE MG/DL
HCT VFR BLD AUTO: 21.1 % (ref 36–48)
HGB BLD-MCNC: 7.3 G/DL (ref 13–16)
HGB UR QL STRIP: ABNORMAL
IMM GRANULOCYTES # BLD AUTO: 0 K/UL (ref 0–0.04)
IMM GRANULOCYTES NFR BLD AUTO: 0 % (ref 0–0.5)
KETONES UR QL STRIP.AUTO: NEGATIVE MG/DL
LEUKOCYTE ESTERASE UR QL STRIP.AUTO: ABNORMAL
LYMPHOCYTES # BLD: 6.08 K/UL (ref 0.9–3.6)
LYMPHOCYTES NFR BLD: 53 % (ref 21–52)
MCH RBC QN AUTO: 27.5 PG (ref 24–34)
MCHC RBC AUTO-ENTMCNC: 34.6 G/DL (ref 31–37)
MCV RBC AUTO: 79.6 FL (ref 78–100)
MONOCYTES # BLD: 0.46 K/UL (ref 0.05–1.2)
MONOCYTES NFR BLD: 4 % (ref 3–10)
NEUTS SEG # BLD: 3.57 K/UL (ref 1.8–8)
NEUTS SEG NFR BLD: 31 % (ref 40–73)
NITRITE UR QL STRIP.AUTO: NEGATIVE
NRBC # BLD: 0.61 K/UL (ref 0–0.01)
NRBC BLD-RTO: 5.3 PER 100 WBC
PH UR STRIP: 5.5 (ref 5–8)
PLATELET # BLD AUTO: 327 K/UL (ref 135–420)
PLATELET COMMENT: ABNORMAL
PMV BLD AUTO: 9.6 FL (ref 9.2–11.8)
PROT UR STRIP-MCNC: 30 MG/DL
RBC # BLD AUTO: 2.65 M/UL (ref 4.35–5.65)
RBC #/AREA URNS HPF: NEGATIVE /HPF (ref 0–5)
RBC MORPH BLD: ABNORMAL
RETICS/RBC NFR AUTO: 9.4 % (ref 0.5–2.5)
SARS-COV-2 RNA RESP QL NAA+PROBE: NOT DETECTED
SOURCE: NORMAL
SP GR UR REFRACTOMETRY: 1.01 (ref 1–1.03)
UROBILINOGEN UR QL STRIP.AUTO: 1 EU/DL (ref 0.2–1)
WBC # BLD AUTO: 11.5 K/UL (ref 4.6–13.2)
WBC URNS QL MICRO: ABNORMAL /HPF (ref 0–5)

## 2025-04-25 PROCEDURE — 2580000003 HC RX 258: Performed by: HOSPITALIST

## 2025-04-25 PROCEDURE — 1100000003 HC PRIVATE W/ TELEMETRY

## 2025-04-25 PROCEDURE — 94761 N-INVAS EAR/PLS OXIMETRY MLT: CPT

## 2025-04-25 PROCEDURE — 2700000000 HC OXYGEN THERAPY PER DAY

## 2025-04-25 PROCEDURE — 2580000003 HC RX 258: Performed by: INTERNAL MEDICINE

## 2025-04-25 PROCEDURE — 85045 AUTOMATED RETICULOCYTE COUNT: CPT

## 2025-04-25 PROCEDURE — 87636 SARSCOV2 & INF A&B AMP PRB: CPT

## 2025-04-25 PROCEDURE — 6360000002 HC RX W HCPCS

## 2025-04-25 PROCEDURE — 99222 1ST HOSP IP/OBS MODERATE 55: CPT | Performed by: INTERNAL MEDICINE

## 2025-04-25 PROCEDURE — 2500000003 HC RX 250 WO HCPCS: Performed by: INTERNAL MEDICINE

## 2025-04-25 PROCEDURE — 6360000002 HC RX W HCPCS: Performed by: HOSPITALIST

## 2025-04-25 PROCEDURE — 6360000002 HC RX W HCPCS: Performed by: INTERNAL MEDICINE

## 2025-04-25 PROCEDURE — 6370000000 HC RX 637 (ALT 250 FOR IP): Performed by: INTERNAL MEDICINE

## 2025-04-25 PROCEDURE — 85025 COMPLETE CBC W/AUTO DIFF WBC: CPT

## 2025-04-25 PROCEDURE — 81001 URINALYSIS AUTO W/SCOPE: CPT

## 2025-04-25 PROCEDURE — 96376 TX/PRO/DX INJ SAME DRUG ADON: CPT

## 2025-04-25 RX ORDER — HYDROMORPHONE HYDROCHLORIDE 4 MG/1
4 TABLET ORAL EVERY 4 HOURS PRN
Status: DISCONTINUED | OUTPATIENT
Start: 2025-04-25 | End: 2025-04-28 | Stop reason: HOSPADM

## 2025-04-25 RX ORDER — DEXTROSE MONOHYDRATE AND SODIUM CHLORIDE 5; .45 G/100ML; G/100ML
INJECTION, SOLUTION INTRAVENOUS CONTINUOUS
Status: DISPENSED | OUTPATIENT
Start: 2025-04-25 | End: 2025-04-28

## 2025-04-25 RX ORDER — ENOXAPARIN SODIUM 100 MG/ML
40 INJECTION SUBCUTANEOUS DAILY
Status: DISCONTINUED | OUTPATIENT
Start: 2025-04-25 | End: 2025-04-28 | Stop reason: HOSPADM

## 2025-04-25 RX ORDER — POTASSIUM CHLORIDE 7.45 MG/ML
10 INJECTION INTRAVENOUS PRN
Status: DISCONTINUED | OUTPATIENT
Start: 2025-04-25 | End: 2025-04-28 | Stop reason: HOSPADM

## 2025-04-25 RX ORDER — POTASSIUM CHLORIDE 1500 MG/1
40 TABLET, EXTENDED RELEASE ORAL PRN
Status: DISCONTINUED | OUTPATIENT
Start: 2025-04-25 | End: 2025-04-28 | Stop reason: HOSPADM

## 2025-04-25 RX ORDER — SODIUM CHLORIDE 0.9 % (FLUSH) 0.9 %
5-40 SYRINGE (ML) INJECTION PRN
Status: DISCONTINUED | OUTPATIENT
Start: 2025-04-25 | End: 2025-04-28 | Stop reason: HOSPADM

## 2025-04-25 RX ORDER — SODIUM CHLORIDE 0.9 % (FLUSH) 0.9 %
5-40 SYRINGE (ML) INJECTION EVERY 12 HOURS SCHEDULED
Status: DISCONTINUED | OUTPATIENT
Start: 2025-04-25 | End: 2025-04-28 | Stop reason: HOSPADM

## 2025-04-25 RX ORDER — HYDROMORPHONE HYDROCHLORIDE 1 MG/ML
1 INJECTION, SOLUTION INTRAMUSCULAR; INTRAVENOUS; SUBCUTANEOUS
Status: DISCONTINUED | OUTPATIENT
Start: 2025-04-25 | End: 2025-04-28 | Stop reason: HOSPADM

## 2025-04-25 RX ORDER — HYDROMORPHONE HYDROCHLORIDE 1 MG/ML
1 INJECTION, SOLUTION INTRAMUSCULAR; INTRAVENOUS; SUBCUTANEOUS
Status: DISCONTINUED | OUTPATIENT
Start: 2025-04-25 | End: 2025-04-25

## 2025-04-25 RX ORDER — DOCUSATE SODIUM 100 MG/1
100 CAPSULE, LIQUID FILLED ORAL DAILY
Status: DISCONTINUED | OUTPATIENT
Start: 2025-04-25 | End: 2025-04-28 | Stop reason: HOSPADM

## 2025-04-25 RX ORDER — ACETAMINOPHEN 650 MG/1
650 SUPPOSITORY RECTAL EVERY 6 HOURS PRN
Status: DISCONTINUED | OUTPATIENT
Start: 2025-04-25 | End: 2025-04-28 | Stop reason: HOSPADM

## 2025-04-25 RX ORDER — ONDANSETRON 4 MG/1
4 TABLET, ORALLY DISINTEGRATING ORAL EVERY 8 HOURS PRN
Status: DISCONTINUED | OUTPATIENT
Start: 2025-04-25 | End: 2025-04-28 | Stop reason: HOSPADM

## 2025-04-25 RX ORDER — HYDROMORPHONE HYDROCHLORIDE 1 MG/ML
1 INJECTION, SOLUTION INTRAMUSCULAR; INTRAVENOUS; SUBCUTANEOUS ONCE
Status: COMPLETED | OUTPATIENT
Start: 2025-04-25 | End: 2025-04-25

## 2025-04-25 RX ORDER — SODIUM CHLORIDE 9 MG/ML
INJECTION, SOLUTION INTRAVENOUS PRN
Status: DISCONTINUED | OUTPATIENT
Start: 2025-04-25 | End: 2025-04-28 | Stop reason: HOSPADM

## 2025-04-25 RX ORDER — DEXTROSE MONOHYDRATE AND SODIUM CHLORIDE 5; .45 G/100ML; G/100ML
INJECTION, SOLUTION INTRAVENOUS CONTINUOUS
Status: DISCONTINUED | OUTPATIENT
Start: 2025-04-25 | End: 2025-04-25

## 2025-04-25 RX ORDER — DIPHENHYDRAMINE HYDROCHLORIDE 50 MG/ML
25 INJECTION, SOLUTION INTRAMUSCULAR; INTRAVENOUS
Status: COMPLETED | OUTPATIENT
Start: 2025-04-25 | End: 2025-04-25

## 2025-04-25 RX ORDER — HYDROXYUREA 500 MG/1
500 CAPSULE ORAL 2 TIMES DAILY
Status: DISCONTINUED | OUTPATIENT
Start: 2025-04-25 | End: 2025-04-28 | Stop reason: HOSPADM

## 2025-04-25 RX ORDER — MAGNESIUM SULFATE IN WATER 40 MG/ML
2000 INJECTION, SOLUTION INTRAVENOUS PRN
Status: DISCONTINUED | OUTPATIENT
Start: 2025-04-25 | End: 2025-04-28 | Stop reason: HOSPADM

## 2025-04-25 RX ORDER — ACETAMINOPHEN 325 MG/1
650 TABLET ORAL EVERY 6 HOURS PRN
Status: DISCONTINUED | OUTPATIENT
Start: 2025-04-25 | End: 2025-04-28 | Stop reason: HOSPADM

## 2025-04-25 RX ORDER — HYDROMORPHONE HYDROCHLORIDE 2 MG/ML
2 INJECTION, SOLUTION INTRAMUSCULAR; INTRAVENOUS; SUBCUTANEOUS
Status: COMPLETED | OUTPATIENT
Start: 2025-04-25 | End: 2025-04-25

## 2025-04-25 RX ORDER — FOLIC ACID 1 MG/1
1 TABLET ORAL DAILY
Status: DISCONTINUED | OUTPATIENT
Start: 2025-04-25 | End: 2025-04-28 | Stop reason: HOSPADM

## 2025-04-25 RX ORDER — ONDANSETRON 2 MG/ML
4 INJECTION INTRAMUSCULAR; INTRAVENOUS EVERY 6 HOURS PRN
Status: DISCONTINUED | OUTPATIENT
Start: 2025-04-25 | End: 2025-04-28 | Stop reason: HOSPADM

## 2025-04-25 RX ORDER — PANTOPRAZOLE SODIUM 40 MG/1
40 TABLET, DELAYED RELEASE ORAL
Status: DISCONTINUED | OUTPATIENT
Start: 2025-04-25 | End: 2025-04-28 | Stop reason: HOSPADM

## 2025-04-25 RX ORDER — MULTIVITAMIN WITH IRON
1 TABLET ORAL DAILY
Status: DISCONTINUED | OUTPATIENT
Start: 2025-04-25 | End: 2025-04-28 | Stop reason: HOSPADM

## 2025-04-25 RX ORDER — HYDROMORPHONE HYDROCHLORIDE 2 MG/ML
1.5 INJECTION, SOLUTION INTRAMUSCULAR; INTRAVENOUS; SUBCUTANEOUS
Status: DISCONTINUED | OUTPATIENT
Start: 2025-04-25 | End: 2025-04-25

## 2025-04-25 RX ORDER — POLYETHYLENE GLYCOL 3350 17 G/17G
17 POWDER, FOR SOLUTION ORAL DAILY PRN
Status: DISCONTINUED | OUTPATIENT
Start: 2025-04-25 | End: 2025-04-28 | Stop reason: HOSPADM

## 2025-04-25 RX ADMIN — HYDROMORPHONE HYDROCHLORIDE 1 MG: 1 INJECTION, SOLUTION INTRAMUSCULAR; INTRAVENOUS; SUBCUTANEOUS at 15:08

## 2025-04-25 RX ADMIN — DEXTROSE MONOHYDRATE AND SODIUM CHLORIDE: 5; .45 INJECTION, SOLUTION INTRAVENOUS at 18:18

## 2025-04-25 RX ADMIN — HYDROMORPHONE HYDROCHLORIDE 1 MG: 1 INJECTION, SOLUTION INTRAMUSCULAR; INTRAVENOUS; SUBCUTANEOUS at 08:44

## 2025-04-25 RX ADMIN — DIPHENHYDRAMINE HYDROCHLORIDE 25 MG: 50 INJECTION INTRAMUSCULAR; INTRAVENOUS at 06:06

## 2025-04-25 RX ADMIN — HYDROMORPHONE HYDROCHLORIDE 1 MG: 1 INJECTION, SOLUTION INTRAMUSCULAR; INTRAVENOUS; SUBCUTANEOUS at 11:55

## 2025-04-25 RX ADMIN — THERA TABS 1 TABLET: TAB at 08:32

## 2025-04-25 RX ADMIN — SODIUM CHLORIDE, PRESERVATIVE FREE 10 ML: 5 INJECTION INTRAVENOUS at 08:32

## 2025-04-25 RX ADMIN — SODIUM CHLORIDE, PRESERVATIVE FREE 10 ML: 5 INJECTION INTRAVENOUS at 21:58

## 2025-04-25 RX ADMIN — HYDROMORPHONE HYDROCHLORIDE 1 MG: 1 INJECTION, SOLUTION INTRAMUSCULAR; INTRAVENOUS; SUBCUTANEOUS at 18:17

## 2025-04-25 RX ADMIN — DEXTROSE AND SODIUM CHLORIDE 75 ML/HR: 5; 450 INJECTION, SOLUTION INTRAVENOUS at 05:48

## 2025-04-25 RX ADMIN — HYDROMORPHONE HYDROCHLORIDE 1 MG: 1 INJECTION, SOLUTION INTRAMUSCULAR; INTRAVENOUS; SUBCUTANEOUS at 21:57

## 2025-04-25 RX ADMIN — FOLIC ACID 1 MG: 1 TABLET ORAL at 08:32

## 2025-04-25 RX ADMIN — HYDROXYUREA 500 MG: 500 CAPSULE ORAL at 08:32

## 2025-04-25 RX ADMIN — HYDROXYUREA 500 MG: 500 CAPSULE ORAL at 22:01

## 2025-04-25 RX ADMIN — HYDROMORPHONE HYDROCHLORIDE 1 MG: 1 INJECTION, SOLUTION INTRAMUSCULAR; INTRAVENOUS; SUBCUTANEOUS at 03:05

## 2025-04-25 RX ADMIN — HYDROMORPHONE HYDROCHLORIDE 2 MG: 2 INJECTION, SOLUTION INTRAMUSCULAR; INTRAVENOUS; SUBCUTANEOUS at 00:49

## 2025-04-25 RX ADMIN — DEXTROSE MONOHYDRATE AND SODIUM CHLORIDE: 5; .45 INJECTION, SOLUTION INTRAVENOUS at 15:45

## 2025-04-25 RX ADMIN — ENOXAPARIN SODIUM 40 MG: 100 INJECTION SUBCUTANEOUS at 08:32

## 2025-04-25 RX ADMIN — HYDROMORPHONE HYDROCHLORIDE 1 MG: 1 INJECTION, SOLUTION INTRAMUSCULAR; INTRAVENOUS; SUBCUTANEOUS at 05:38

## 2025-04-25 RX ADMIN — PANTOPRAZOLE SODIUM 40 MG: 40 TABLET, DELAYED RELEASE ORAL at 05:39

## 2025-04-25 ASSESSMENT — PAIN DESCRIPTION - ONSET
ONSET: ON-GOING

## 2025-04-25 ASSESSMENT — PAIN DESCRIPTION - ORIENTATION
ORIENTATION: RIGHT;LEFT

## 2025-04-25 ASSESSMENT — PAIN DESCRIPTION - PAIN TYPE
TYPE: CHRONIC PAIN

## 2025-04-25 ASSESSMENT — PAIN DESCRIPTION - DESCRIPTORS
DESCRIPTORS: ACHING

## 2025-04-25 ASSESSMENT — PAIN - FUNCTIONAL ASSESSMENT
PAIN_FUNCTIONAL_ASSESSMENT: ACTIVITIES ARE NOT PREVENTED

## 2025-04-25 ASSESSMENT — PAIN DESCRIPTION - LOCATION
LOCATION: GENERALIZED
LOCATION: BACK;LEG
LOCATION: BACK;LEG
LOCATION: GENERALIZED
LOCATION: BACK;LEG
LOCATION: GENERALIZED
LOCATION: GENERALIZED
LOCATION: HEAD;GENERALIZED
LOCATION: GENERALIZED
LOCATION: BACK;LEG
LOCATION: GENERALIZED

## 2025-04-25 ASSESSMENT — PAIN SCALES - WONG BAKER
WONGBAKER_NUMERICALRESPONSE: NO HURT
WONGBAKER_NUMERICALRESPONSE: NO HURT
WONGBAKER_NUMERICALRESPONSE: HURTS A LITTLE BIT
WONGBAKER_NUMERICALRESPONSE: 2;0
WONGBAKER_NUMERICALRESPONSE: HURTS A LITTLE BIT
WONGBAKER_NUMERICALRESPONSE: NO HURT
WONGBAKER_NUMERICALRESPONSE: HURTS A LITTLE BIT

## 2025-04-25 ASSESSMENT — PAIN SCALES - GENERAL
PAINLEVEL_OUTOF10: 3
PAINLEVEL_OUTOF10: 4
PAINLEVEL_OUTOF10: 5
PAINLEVEL_OUTOF10: 4
PAINLEVEL_OUTOF10: 8
PAINLEVEL_OUTOF10: 7
PAINLEVEL_OUTOF10: 6
PAINLEVEL_OUTOF10: 4
PAINLEVEL_OUTOF10: 4
PAINLEVEL_OUTOF10: 8
PAINLEVEL_OUTOF10: 4
PAINLEVEL_OUTOF10: 4
PAINLEVEL_OUTOF10: 7
PAINLEVEL_OUTOF10: 5
PAINLEVEL_OUTOF10: 8
PAINLEVEL_OUTOF10: 4
PAINLEVEL_OUTOF10: 5
PAINLEVEL_OUTOF10: 0

## 2025-04-25 ASSESSMENT — PAIN DESCRIPTION - FREQUENCY
FREQUENCY: CONTINUOUS

## 2025-04-25 NOTE — PROGRESS NOTES
Physical Therapy  Orders received, chart reviewed, and discussed patient status with RN. Patient has been mobilizing independently in room. Patient confirms, he has no concerns regarding his mobility status despite the amount of pain he is experiencing. Reports he does not need therapy services. Will sign off, please re-consult if patient status changes.  Naheed Thibodeaux PT, DPT

## 2025-04-25 NOTE — PROGRESS NOTES
OT orders received and medical chart review completed.   Pt reports he is at baseline as independent with ADLs and functional mobility w/o AD. Formal OT evaluation not indicated at this time. OT to sign off.

## 2025-04-25 NOTE — ED PROVIDER NOTES
trauma, lower concern for fracture or epidural abscess.  Patient without lower extremity edema, no history of DVT, no redness on exam, lower concern for DVT.  He has absolutely no chest pain, no shortness of breath, no lightheadedness or dizziness, no cough, no palpitations, reassuring vitals, low concern for ACS or acute chest.  Patient without cough, congestion, fevers, lower concern for viral URI as precipitant.  Unclear etiology as to precipitant to this sickle cell pain crisis syndrome.  In the past he has similarly not had clearly identified precipitating etiology.    2320: Reassessed, HDS, on monitors; will give 2nd dose of dilaudid. Pain now 7/10. Will reassess after.     There was significant delay in obtaining labs, the CBC and reticulocyte count hemolyzed 4 times.    0000: Reassessed; will give 3rd dose of dilaudid. Pain now 7/10. Will reassess after.     0100: Reassessed, pain still not well controlled. Now 6/10. Pt requesting admission for IV pain control.     0220: Spoke to hospitalist Dr. Lr who agreed to admit patient for IV pain control.  Requested I ordered UA as well as COVID and flu swab.  Informed patient at bedside who felt reassured.  Patient verbalized agreement and understanding of the treatment plan and agreed to be admitted at this time.  All questions answered.    Isaiah Stewart MD  Emergency Medicine PGY-2  [facility]     REASSESSMENT          CRITICAL CARE TIME   Total Critical Care time was 0 minutes, excluding separately reportable procedures.  There was a high probability of clinically significant/life threatening deterioration in the patient's condition which required my urgent intervention.      CONSULTS:  None    PROCEDURES:  Unless otherwise noted below, none     Procedures      FINAL IMPRESSION      1. Sickle cell pain crisis (HCC)    2. Acute bilateral low back pain without sciatica          DISPOSITION/PLAN   DISPOSITION Admitted 04/25/2025 02:31:02

## 2025-04-25 NOTE — ED TRIAGE NOTES
Pt arrived to ED with c/o bilat leg and back pain from sickle cell. Pt states his home medications are not controlling pain at this time. A&Ox4. Ambulatory.

## 2025-04-25 NOTE — PLAN OF CARE
Problem: Discharge Planning  Goal: Discharge to home or other facility with appropriate resources  4/25/2025 1138 by Yael Jefferson, RN  Outcome: Progressing  Flowsheets (Taken 4/25/2025 1138)  Discharge to home or other facility with appropriate resources:   Identify barriers to discharge with patient and caregiver   Arrange for needed discharge resources and transportation as appropriate  4/25/2025 0653 by Trinh Ferraro, RN  Outcome: Progressing     Problem: Pain  Goal: Verbalizes/displays adequate comfort level or baseline comfort level  4/25/2025 1138 by Yael Jefferson, RN  Outcome: Progressing  Flowsheets (Taken 4/25/2025 1138)  Verbalizes/displays adequate comfort level or baseline comfort level:   Encourage patient to monitor pain and request assistance   Assess pain using appropriate pain scale  4/25/2025 0653 by Trinh Ferraro, RN  Outcome: Progressing

## 2025-04-25 NOTE — PROGRESS NOTES
conducted an initial consultation and Spiritual Assessment for Norma Galan, who is a 44 y.o.,male. Patient's Primary Language is: English.   According to the patient's EMR Sikh Affiliation is: Faith.     The reason the Patient came to the hospital is:   Patient Active Problem List    Diagnosis Date Noted    Encounter for palliative care 09/20/2024    Advanced care planning/counseling discussion 09/20/2024    Leukocytosis 09/20/2024    Sickle cell anemia with pain (HCC) 09/14/2024    Vaso-occlusive sickle cell crisis (HCC) 08/15/2024    Hypokalemia 08/15/2024    Leukemoid reaction 04/25/2024    Acute chest syndrome (HCC) 05/19/2023    Acute sickle cell crisis (HCC) 05/19/2023    Sickle cell pain crisis (HCC) 01/08/2020    Hyperbilirubinemia 04/13/2018    Bilateral leg pain 04/24/2017    Sickle cell anemia with crisis (HCC) 05/07/2015    Sickle cell crisis (HCC) 01/26/2014    Elevated total protein 12/27/2011    Serum total bilirubin elevated 12/27/2011    Elevated alkaline phosphatase level 03/21/2010    Reticulocytosis 10/25/2007    Microcytic anemia 10/25/2007        The  provided the following Interventions:  Initiated a relationship of care and support.   Explored issues of efra, belief, spirituality and Restorationism/ritual needs while hospitalized.  Listened empathically.  Provided chaplaincy education.  Provided information about Spiritual Care Services.  Offered prayer and assurance of continued prayers on patient's behalf.   Chart reviewed.    The following outcomes where achieved:  Patient shared limited information about both their medical narrative and spiritual journey/beliefs.   confirmed Patient's Sikh Affiliation.  Patient processed feeling about current hospitalization.  Patient expressed gratitude for 's visit.    Assessment:  Patient does not have any Restorationism/cultural needs that will affect patient's preferences in health care.  There are no

## 2025-04-25 NOTE — H&P
nondistended abdomen bowel sounds are present  5 out of 5 strength in upper and lower extremities 2 out of 4 deep tendon flexes upper lower extremities no edema in lower extremities.  Skin exam no petechiae no chronic lesions ulceration noted    DATA:  04/25/25 0125  Reticulocytes  Collected: 04/25/25 0113  Final result  Specimen: WHOLE BLOOD    Reticulocyte Count,Automated 9.4 High  %            04/24/25 2235  CMP  Collected: 04/24/25 2125  Final result  Specimen: Blood from Serum or Plasma    Sodium 137 mmol/L Calcium 8.9 MG/DL   Potassium 3.4 Low  mmol/L Total Bilirubin 5.2 High  MG/DL   Chloride 108 mmol/L ALT 23 U/L   CO2 25 mmol/L AST 50 High  U/L   Anion Gap 4 mmol/L Alk Phosphatase 136 High  U/L   Glucose 86 mg/dL Total Protein 8.7 High  g/dL   BUN 6 Low  MG/DL Albumin 4.1 g/dL   Creatinine 0.81 MG/DL Globulin 4.6 High  g/dL   BUN/Creatinine Ratio 7 Low    Albumin/Globulin Ratio 0.9          ASSESSMENT AND PLAN:      Sickle cell pain crisis (HCC)  -This is patient's second episode in approximately 1 month's time.  -The trigger for patient's episodes is not clear at this time.  -I believe it would be prudent to obtain a COVID flu RSV check.  I believe would also be prudent to obtain a urinalysis on patient.  -Will start patient on half-normal saline and continuous oxygen therapy as a means to encourage sickled blood cells to reform.  - Patient may benefit from an increase of his hydroxyurea  -Will continue patient's home narcotic pain medications during hospital stay  -Will also place patient on low-dose IV narcotic pain medications as needed    Hypokalemia  -Replete  -Check patient's magnesium level and act accordingly    Anemia  -It is questionable whether an anemia workup would be beneficial in this patient who received blood transfusion approximate 1 month prior to current presentation.  -Will attempt a workup on patient  -Will hold off on additional blood transfusion at this time

## 2025-04-25 NOTE — PROGRESS NOTES
Patient admitted this morning, seen for follow-up  Patient still has some leg pain, no chest pain  We will continue current management  Will increase IV fluids  Anticipate discharge in 1 to 2 days

## 2025-04-26 LAB
ALBUMIN SERPL-MCNC: 3.4 G/DL (ref 3.4–5)
ALBUMIN/GLOB SERPL: 0.9 (ref 0.8–1.7)
ALP SERPL-CCNC: 118 U/L (ref 45–117)
ALT SERPL-CCNC: 24 U/L (ref 16–61)
ANION GAP SERPL CALC-SCNC: 6 MMOL/L (ref 3–18)
AST SERPL-CCNC: 44 U/L (ref 10–38)
BASOPHILS # BLD: 0 K/UL (ref 0–0.1)
BASOPHILS NFR BLD: 0 % (ref 0–2)
BILIRUB SERPL-MCNC: 4.3 MG/DL (ref 0.2–1)
BUN SERPL-MCNC: 5 MG/DL (ref 7–18)
BUN/CREAT SERPL: 6 (ref 12–20)
CALCIUM SERPL-MCNC: 8.4 MG/DL (ref 8.5–10.1)
CHLORIDE SERPL-SCNC: 111 MMOL/L (ref 100–111)
CO2 SERPL-SCNC: 21 MMOL/L (ref 21–32)
CREAT SERPL-MCNC: 0.85 MG/DL (ref 0.6–1.3)
DIFFERENTIAL METHOD BLD: ABNORMAL
EOSINOPHIL # BLD: 0.93 K/UL (ref 0–0.4)
EOSINOPHIL NFR BLD: 9 % (ref 0–5)
ERYTHROCYTE [DISTWIDTH] IN BLOOD BY AUTOMATED COUNT: 23.9 % (ref 11.6–14.5)
GLOBULIN SER CALC-MCNC: 3.9 G/DL (ref 2–4)
GLUCOSE SERPL-MCNC: 94 MG/DL (ref 74–99)
HCT VFR BLD AUTO: 22.2 % (ref 36–48)
HGB BLD-MCNC: 7.6 G/DL (ref 13–16)
IMM GRANULOCYTES # BLD AUTO: 0 K/UL (ref 0–0.04)
IMM GRANULOCYTES NFR BLD AUTO: 0 % (ref 0–0.5)
LYMPHOCYTES # BLD: 4.83 K/UL (ref 0.9–3.6)
LYMPHOCYTES NFR BLD: 47 % (ref 21–52)
MAGNESIUM SERPL-MCNC: 2.1 MG/DL (ref 1.6–2.6)
MCH RBC QN AUTO: 27.2 PG (ref 24–34)
MCHC RBC AUTO-ENTMCNC: 34.2 G/DL (ref 31–37)
MCV RBC AUTO: 79.6 FL (ref 78–100)
MONOCYTES # BLD: 0.52 K/UL (ref 0.05–1.2)
MONOCYTES NFR BLD: 5 % (ref 3–10)
NEUTS SEG # BLD: 4.02 K/UL (ref 1.8–8)
NEUTS SEG NFR BLD: 39 % (ref 40–73)
NRBC # BLD: 0.59 K/UL (ref 0–0.01)
NRBC BLD-RTO: 5.7 PER 100 WBC
PLATELET # BLD AUTO: 270 K/UL (ref 135–420)
PLATELET COMMENT: ABNORMAL
PMV BLD AUTO: 10.6 FL (ref 9.2–11.8)
POTASSIUM SERPL-SCNC: 3.5 MMOL/L (ref 3.5–5.5)
PROT SERPL-MCNC: 7.3 G/DL (ref 6.4–8.2)
RBC # BLD AUTO: 2.79 M/UL (ref 4.35–5.65)
RBC MORPH BLD: ABNORMAL
SODIUM SERPL-SCNC: 138 MMOL/L (ref 136–145)
WBC # BLD AUTO: 10.3 K/UL (ref 4.6–13.2)

## 2025-04-26 PROCEDURE — 1100000003 HC PRIVATE W/ TELEMETRY

## 2025-04-26 PROCEDURE — 2700000000 HC OXYGEN THERAPY PER DAY

## 2025-04-26 PROCEDURE — 85025 COMPLETE CBC W/AUTO DIFF WBC: CPT

## 2025-04-26 PROCEDURE — 80053 COMPREHEN METABOLIC PANEL: CPT

## 2025-04-26 PROCEDURE — 83735 ASSAY OF MAGNESIUM: CPT

## 2025-04-26 PROCEDURE — 2580000003 HC RX 258: Performed by: HOSPITALIST

## 2025-04-26 PROCEDURE — 36415 COLL VENOUS BLD VENIPUNCTURE: CPT

## 2025-04-26 PROCEDURE — 99232 SBSQ HOSP IP/OBS MODERATE 35: CPT | Performed by: EMERGENCY MEDICINE

## 2025-04-26 PROCEDURE — 6370000000 HC RX 637 (ALT 250 FOR IP): Performed by: INTERNAL MEDICINE

## 2025-04-26 PROCEDURE — 6360000002 HC RX W HCPCS: Performed by: HOSPITALIST

## 2025-04-26 PROCEDURE — 94761 N-INVAS EAR/PLS OXIMETRY MLT: CPT

## 2025-04-26 PROCEDURE — 6360000002 HC RX W HCPCS: Performed by: INTERNAL MEDICINE

## 2025-04-26 PROCEDURE — 6360000002 HC RX W HCPCS: Performed by: HEALTH CARE PROVIDER

## 2025-04-26 PROCEDURE — 2500000003 HC RX 250 WO HCPCS: Performed by: INTERNAL MEDICINE

## 2025-04-26 RX ORDER — DIPHENHYDRAMINE HYDROCHLORIDE 50 MG/ML
25 INJECTION, SOLUTION INTRAMUSCULAR; INTRAVENOUS EVERY 6 HOURS PRN
Status: DISCONTINUED | OUTPATIENT
Start: 2025-04-26 | End: 2025-04-28 | Stop reason: HOSPADM

## 2025-04-26 RX ADMIN — DIPHENHYDRAMINE HYDROCHLORIDE 25 MG: 50 INJECTION INTRAMUSCULAR; INTRAVENOUS at 14:23

## 2025-04-26 RX ADMIN — DIPHENHYDRAMINE HYDROCHLORIDE 25 MG: 50 INJECTION INTRAMUSCULAR; INTRAVENOUS at 11:41

## 2025-04-26 RX ADMIN — DIPHENHYDRAMINE HYDROCHLORIDE 25 MG: 50 INJECTION INTRAMUSCULAR; INTRAVENOUS at 20:44

## 2025-04-26 RX ADMIN — HYDROMORPHONE HYDROCHLORIDE 1 MG: 1 INJECTION, SOLUTION INTRAMUSCULAR; INTRAVENOUS; SUBCUTANEOUS at 23:05

## 2025-04-26 RX ADMIN — DEXTROSE MONOHYDRATE AND SODIUM CHLORIDE: 5; .45 INJECTION, SOLUTION INTRAVENOUS at 16:39

## 2025-04-26 RX ADMIN — HYDROMORPHONE HYDROCHLORIDE 1 MG: 1 INJECTION, SOLUTION INTRAMUSCULAR; INTRAVENOUS; SUBCUTANEOUS at 17:53

## 2025-04-26 RX ADMIN — HYDROXYUREA 500 MG: 500 CAPSULE ORAL at 20:39

## 2025-04-26 RX ADMIN — HYDROMORPHONE HYDROCHLORIDE 1 MG: 1 INJECTION, SOLUTION INTRAMUSCULAR; INTRAVENOUS; SUBCUTANEOUS at 02:37

## 2025-04-26 RX ADMIN — DIPHENHYDRAMINE HYDROCHLORIDE 25 MG: 50 INJECTION INTRAMUSCULAR; INTRAVENOUS at 17:52

## 2025-04-26 RX ADMIN — PANTOPRAZOLE SODIUM 40 MG: 40 TABLET, DELAYED RELEASE ORAL at 06:05

## 2025-04-26 RX ADMIN — DIPHENHYDRAMINE HYDROCHLORIDE 25 MG: 50 INJECTION INTRAMUSCULAR; INTRAVENOUS at 08:32

## 2025-04-26 RX ADMIN — DIPHENHYDRAMINE HYDROCHLORIDE 25 MG: 50 INJECTION INTRAMUSCULAR; INTRAVENOUS at 06:39

## 2025-04-26 RX ADMIN — HYDROMORPHONE HYDROCHLORIDE 1 MG: 1 INJECTION, SOLUTION INTRAMUSCULAR; INTRAVENOUS; SUBCUTANEOUS at 11:41

## 2025-04-26 RX ADMIN — SODIUM CHLORIDE, PRESERVATIVE FREE 10 ML: 5 INJECTION INTRAVENOUS at 11:40

## 2025-04-26 RX ADMIN — THERA TABS 1 TABLET: TAB at 08:31

## 2025-04-26 RX ADMIN — HYDROXYUREA 500 MG: 500 CAPSULE ORAL at 08:33

## 2025-04-26 RX ADMIN — HYDROMORPHONE HYDROCHLORIDE 1 MG: 1 INJECTION, SOLUTION INTRAMUSCULAR; INTRAVENOUS; SUBCUTANEOUS at 14:24

## 2025-04-26 RX ADMIN — DOCUSATE SODIUM 100 MG: 100 CAPSULE, LIQUID FILLED ORAL at 08:33

## 2025-04-26 RX ADMIN — FOLIC ACID 1 MG: 1 TABLET ORAL at 08:32

## 2025-04-26 RX ADMIN — HYDROMORPHONE HYDROCHLORIDE 1 MG: 1 INJECTION, SOLUTION INTRAMUSCULAR; INTRAVENOUS; SUBCUTANEOUS at 06:05

## 2025-04-26 RX ADMIN — DEXTROSE MONOHYDRATE AND SODIUM CHLORIDE 100 ML/HR: 5; .45 INJECTION, SOLUTION INTRAVENOUS at 06:15

## 2025-04-26 RX ADMIN — ENOXAPARIN SODIUM 40 MG: 100 INJECTION SUBCUTANEOUS at 08:32

## 2025-04-26 RX ADMIN — SODIUM CHLORIDE, PRESERVATIVE FREE 10 ML: 5 INJECTION INTRAVENOUS at 20:47

## 2025-04-26 RX ADMIN — HYDROMORPHONE HYDROCHLORIDE 1 MG: 1 INJECTION, SOLUTION INTRAMUSCULAR; INTRAVENOUS; SUBCUTANEOUS at 08:33

## 2025-04-26 ASSESSMENT — PAIN DESCRIPTION - ORIENTATION
ORIENTATION: ANTERIOR
ORIENTATION: ANTERIOR
ORIENTATION: RIGHT;LEFT
ORIENTATION: ANTERIOR
ORIENTATION: RIGHT;LEFT
ORIENTATION: ANTERIOR
ORIENTATION: ANTERIOR

## 2025-04-26 ASSESSMENT — PAIN - FUNCTIONAL ASSESSMENT
PAIN_FUNCTIONAL_ASSESSMENT: ACTIVITIES ARE NOT PREVENTED

## 2025-04-26 ASSESSMENT — PAIN DESCRIPTION - DESCRIPTORS
DESCRIPTORS: ACHING

## 2025-04-26 ASSESSMENT — PAIN DESCRIPTION - LOCATION
LOCATION: LEG
LOCATION: LEG
LOCATION: BACK;LEG
LOCATION: BACK
LOCATION: BACK;LEG

## 2025-04-26 ASSESSMENT — PAIN SCALES - WONG BAKER
WONGBAKER_NUMERICALRESPONSE: HURTS EVEN MORE
WONGBAKER_NUMERICALRESPONSE: HURTS LITTLE MORE
WONGBAKER_NUMERICALRESPONSE: NO HURT
WONGBAKER_NUMERICALRESPONSE: HURTS LITTLE MORE
WONGBAKER_NUMERICALRESPONSE: NO HURT
WONGBAKER_NUMERICALRESPONSE: HURTS LITTLE MORE
WONGBAKER_NUMERICALRESPONSE: HURTS EVEN MORE
WONGBAKER_NUMERICALRESPONSE: HURTS A LITTLE BIT

## 2025-04-26 ASSESSMENT — PAIN SCALES - GENERAL
PAINLEVEL_OUTOF10: 5
PAINLEVEL_OUTOF10: 7
PAINLEVEL_OUTOF10: 7
PAINLEVEL_OUTOF10: 5
PAINLEVEL_OUTOF10: 0
PAINLEVEL_OUTOF10: 5
PAINLEVEL_OUTOF10: 7
PAINLEVEL_OUTOF10: 5
PAINLEVEL_OUTOF10: 3
PAINLEVEL_OUTOF10: 7
PAINLEVEL_OUTOF10: 3
PAINLEVEL_OUTOF10: 5
PAINLEVEL_OUTOF10: 7
PAINLEVEL_OUTOF10: 6

## 2025-04-26 ASSESSMENT — PAIN DESCRIPTION - PAIN TYPE
TYPE: CHRONIC PAIN

## 2025-04-26 ASSESSMENT — PAIN DESCRIPTION - ONSET
ONSET: ON-GOING

## 2025-04-26 ASSESSMENT — PAIN DESCRIPTION - FREQUENCY
FREQUENCY: CONTINUOUS

## 2025-04-26 NOTE — PLAN OF CARE
Problem: Discharge Planning  Goal: Discharge to home or other facility with appropriate resources  4/26/2025 1104 by Oksana Jones, RN  Outcome: Progressing  Flowsheets (Taken 4/25/2025 1138 by Yael Jefferson, RN)  Discharge to home or other facility with appropriate resources:   Identify barriers to discharge with patient and caregiver   Arrange for needed discharge resources and transportation as appropriate  4/26/2025 0511 by Trinh Ferraro RN  Outcome: Progressing  4/26/2025 0510 by Trinh Ferraro RN  Outcome: Progressing  4/26/2025 0510 by Trinh Ferraro RN  Outcome: Progressing     Problem: Pain  Goal: Verbalizes/displays adequate comfort level or baseline comfort level  4/26/2025 1104 by Oksana Jones RN  Outcome: Progressing  Flowsheets (Taken 4/25/2025 1138 by Yael Jefferson, RN)  Verbalizes/displays adequate comfort level or baseline comfort level:   Encourage patient to monitor pain and request assistance   Assess pain using appropriate pain scale  4/26/2025 0511 by Trinh Ferraro RN  Outcome: Progressing  4/26/2025 0510 by Trinh Ferraro RN  Outcome: Progressing  4/26/2025 0510 by Trinh Ferraro RN  Outcome: Progressing

## 2025-04-26 NOTE — PROGRESS NOTES
4 Eyes Skin Assessment     NAME:  Norma Galan  YOB: 1980  MEDICAL RECORD NUMBER:  [id number]    The patient is being assessed for  Shift Handoff    I agree that at least one RN has performed a thorough Head to Toe Skin Assessment on the patient. ALL assessment sites listed below have been assessed.      Areas assessed by both nurses:    Head, Face, Ears, Shoulders, Back, Chest, Arms, Elbows, Hands, Sacrum. Buttock, Coccyx, Ischium, Legs. Feet and Heels, and Under Medical Devices         Does the Patient have a Wound? No noted wound(s)       Tima Prevention initiated by RN: No  Wound Care Orders initiated by RN: No    Pressure Injury (Stage 3,4, Unstageable, DTI, NWPT, and Complex wounds) if present, place Wound referral order by RN under : No    New Ostomies, if present place, Ostomy referral order under : No     Nurse 1 eSignature: Electronically signed by Oksana Jones RN on 4/26/25 at 7:39 PM EDT    **SHARE this note so that the co-signing nurse can place an eSignature**    Nurse 2 eSignature: {Esignature:[id number]}

## 2025-04-26 NOTE — PROGRESS NOTES
[facility]  Progress Note    Patient: Norma Galan Age: 44 y.o. : 1980 MR#: [id number] SSN: [id number]  Date/Time: 2025    Subjective:     I personally saw and evaluated this patient face-to-face today.  Patient is sitting in in bed, awake and alert.  Patient still complaining of bodyaches and back pain and bilateral lower extremity pain.  Patient states that this is the typical presentation of sickle cell crisis for him.  Patient denies any shortness of breath or chest pain    Assessment/Plan:     Sickle cell pain crisis.  Anemia    Plan  Judicious pain control.  I discussed with patient and that current regimen is working for him  Continue IV fluids, oxygen, subcu Lovenox  Monitor blood counts and reticulocyte count  On Hydrea  Incentive spirometry  I discussed the care plan with the patient and he verbalized understanding    Case discussed with:  [x]Patient  []Family  []Nursing  []Case Management  DVT Prophylaxis:  [x]Lovenox  []Hep SQ  []SCDs  []Coumadin   []On Heparin gtt    Objective:   VS: /83   Pulse 73   Temp 97.3 °F (36.3 °C) (Oral)   Resp 18   Ht 1.829 m (6')   Wt 87.5 kg (193 lb)   SpO2 97%   BMI 26.18 kg/m²    Tmax/24hrs: Temp (24hrs), Av.9 °F (36.6 °C), Min:97.3 °F (36.3 °C), Max:98.1 °F (36.7 °C)    Input/Output:   Intake/Output Summary (Last 24 hours) at 2025 1642  Last data filed at 2025 1140  Gross per 24 hour   Intake 365 ml   Output --   Net 365 ml       General:  Awake, alert  Cardiovascular:  S1S2+, RRR  Pulmonary:  CTA b/l  GI:  Soft, BS+, NT, ND  Extremities:  No edema      Labs:    Recent Results (from the past 24 hours)   Comprehensive Metabolic Panel w/ Reflex to MG    Collection Time: 25  8:14 AM   Result Value Ref Range    Sodium 138 136 - 145 mmol/L    Potassium 3.5 3.5 - 5.5 mmol/L    Chloride 111 100 - 111 mmol/L    CO2 21 21 - 32 mmol/L    Anion Gap 6 3.0 - 18 mmol/L    Glucose 94 74 -

## 2025-04-26 NOTE — PLAN OF CARE
Problem: Discharge Planning  Goal: Discharge to home or other facility with appropriate resources  4/26/2025 0510 by Trinh Ferraro, RN  Outcome: Progressing  4/26/2025 0510 by Trinh Ferraro, RN  Outcome: Progressing     Problem: Pain  Goal: Verbalizes/displays adequate comfort level or baseline comfort level  4/26/2025 0510 by Trinh Ferraro, RN  Outcome: Progressing  4/26/2025 0510 by Trinh Ferraro, RN  Outcome: Progressing

## 2025-04-27 LAB
BASOPHILS # BLD: 0.11 K/UL (ref 0–0.1)
BASOPHILS NFR BLD: 1 % (ref 0–2)
DIFFERENTIAL METHOD BLD: ABNORMAL
EOSINOPHIL # BLD: 1.24 K/UL (ref 0–0.4)
EOSINOPHIL NFR BLD: 11 % (ref 0–5)
ERYTHROCYTE [DISTWIDTH] IN BLOOD BY AUTOMATED COUNT: 23.9 % (ref 11.6–14.5)
HCT VFR BLD AUTO: 22.5 % (ref 36–48)
HGB BLD-MCNC: 7.5 G/DL (ref 13–16)
IMM GRANULOCYTES # BLD AUTO: 0 K/UL (ref 0–0.04)
IMM GRANULOCYTES NFR BLD AUTO: 0 % (ref 0–0.5)
LYMPHOCYTES # BLD: 5.54 K/UL (ref 0.9–3.6)
LYMPHOCYTES NFR BLD: 49 % (ref 21–52)
MCH RBC QN AUTO: 27.1 PG (ref 24–34)
MCHC RBC AUTO-ENTMCNC: 33.3 G/DL (ref 31–37)
MCV RBC AUTO: 81.2 FL (ref 78–100)
MONOCYTES # BLD: 0.23 K/UL (ref 0.05–1.2)
MONOCYTES NFR BLD: 2 % (ref 3–10)
NEUTS SEG # BLD: 4.18 K/UL (ref 1.8–8)
NEUTS SEG NFR BLD: 37 % (ref 40–73)
NRBC # BLD: 0.45 K/UL (ref 0–0.01)
NRBC BLD-RTO: 4 PER 100 WBC
PLATELET # BLD AUTO: 302 K/UL (ref 135–420)
PLATELET COMMENT: ABNORMAL
PMV BLD AUTO: 11.3 FL (ref 9.2–11.8)
RBC # BLD AUTO: 2.77 M/UL (ref 4.35–5.65)
RBC MORPH BLD: ABNORMAL
RETICS/RBC NFR AUTO: 9.9 % (ref 0.5–2.5)
WBC # BLD AUTO: 11.3 K/UL (ref 4.6–13.2)

## 2025-04-27 PROCEDURE — 2580000003 HC RX 258: Performed by: HOSPITALIST

## 2025-04-27 PROCEDURE — 85025 COMPLETE CBC W/AUTO DIFF WBC: CPT

## 2025-04-27 PROCEDURE — 85045 AUTOMATED RETICULOCYTE COUNT: CPT

## 2025-04-27 PROCEDURE — 6370000000 HC RX 637 (ALT 250 FOR IP): Performed by: INTERNAL MEDICINE

## 2025-04-27 PROCEDURE — 6360000002 HC RX W HCPCS: Performed by: INTERNAL MEDICINE

## 2025-04-27 PROCEDURE — 36415 COLL VENOUS BLD VENIPUNCTURE: CPT

## 2025-04-27 PROCEDURE — 99232 SBSQ HOSP IP/OBS MODERATE 35: CPT | Performed by: EMERGENCY MEDICINE

## 2025-04-27 PROCEDURE — 6360000002 HC RX W HCPCS: Performed by: HEALTH CARE PROVIDER

## 2025-04-27 PROCEDURE — 1100000003 HC PRIVATE W/ TELEMETRY

## 2025-04-27 PROCEDURE — 2500000003 HC RX 250 WO HCPCS: Performed by: INTERNAL MEDICINE

## 2025-04-27 PROCEDURE — 6360000002 HC RX W HCPCS: Performed by: HOSPITALIST

## 2025-04-27 RX ADMIN — DIPHENHYDRAMINE HYDROCHLORIDE 25 MG: 50 INJECTION INTRAMUSCULAR; INTRAVENOUS at 02:17

## 2025-04-27 RX ADMIN — THERA TABS 1 TABLET: TAB at 09:16

## 2025-04-27 RX ADMIN — HYDROMORPHONE HYDROCHLORIDE 1 MG: 1 INJECTION, SOLUTION INTRAMUSCULAR; INTRAVENOUS; SUBCUTANEOUS at 09:11

## 2025-04-27 RX ADMIN — HYDROXYUREA 500 MG: 500 CAPSULE ORAL at 22:01

## 2025-04-27 RX ADMIN — ENOXAPARIN SODIUM 40 MG: 100 INJECTION SUBCUTANEOUS at 09:18

## 2025-04-27 RX ADMIN — DEXTROSE MONOHYDRATE AND SODIUM CHLORIDE: 5; .45 INJECTION, SOLUTION INTRAVENOUS at 23:41

## 2025-04-27 RX ADMIN — HYDROMORPHONE HYDROCHLORIDE 1 MG: 1 INJECTION, SOLUTION INTRAMUSCULAR; INTRAVENOUS; SUBCUTANEOUS at 22:01

## 2025-04-27 RX ADMIN — FOLIC ACID 1 MG: 1 TABLET ORAL at 09:16

## 2025-04-27 RX ADMIN — HYDROMORPHONE HYDROCHLORIDE 1 MG: 1 INJECTION, SOLUTION INTRAMUSCULAR; INTRAVENOUS; SUBCUTANEOUS at 12:20

## 2025-04-27 RX ADMIN — HYDROMORPHONE HYDROCHLORIDE 1 MG: 1 INJECTION, SOLUTION INTRAMUSCULAR; INTRAVENOUS; SUBCUTANEOUS at 15:52

## 2025-04-27 RX ADMIN — SODIUM CHLORIDE, PRESERVATIVE FREE 10 ML: 5 INJECTION INTRAVENOUS at 09:13

## 2025-04-27 RX ADMIN — DIPHENHYDRAMINE HYDROCHLORIDE 25 MG: 50 INJECTION INTRAMUSCULAR; INTRAVENOUS at 15:52

## 2025-04-27 RX ADMIN — HYDROMORPHONE HYDROCHLORIDE 1 MG: 1 INJECTION, SOLUTION INTRAMUSCULAR; INTRAVENOUS; SUBCUTANEOUS at 05:16

## 2025-04-27 RX ADMIN — DIPHENHYDRAMINE HYDROCHLORIDE 25 MG: 50 INJECTION INTRAMUSCULAR; INTRAVENOUS at 12:19

## 2025-04-27 RX ADMIN — HYDROXYUREA 500 MG: 500 CAPSULE ORAL at 09:16

## 2025-04-27 RX ADMIN — HYDROMORPHONE HYDROCHLORIDE 1 MG: 1 INJECTION, SOLUTION INTRAMUSCULAR; INTRAVENOUS; SUBCUTANEOUS at 02:12

## 2025-04-27 RX ADMIN — DEXTROSE MONOHYDRATE AND SODIUM CHLORIDE: 5; .45 INJECTION, SOLUTION INTRAVENOUS at 03:13

## 2025-04-27 RX ADMIN — DIPHENHYDRAMINE HYDROCHLORIDE 25 MG: 50 INJECTION INTRAMUSCULAR; INTRAVENOUS at 19:03

## 2025-04-27 RX ADMIN — SODIUM CHLORIDE, PRESERVATIVE FREE 10 ML: 5 INJECTION INTRAVENOUS at 22:01

## 2025-04-27 RX ADMIN — PANTOPRAZOLE SODIUM 40 MG: 40 TABLET, DELAYED RELEASE ORAL at 07:02

## 2025-04-27 RX ADMIN — HYDROMORPHONE HYDROCHLORIDE 1 MG: 1 INJECTION, SOLUTION INTRAMUSCULAR; INTRAVENOUS; SUBCUTANEOUS at 19:02

## 2025-04-27 ASSESSMENT — PAIN SCALES - GENERAL
PAINLEVEL_OUTOF10: 5
PAINLEVEL_OUTOF10: 6
PAINLEVEL_OUTOF10: 6
PAINLEVEL_OUTOF10: 7
PAINLEVEL_OUTOF10: 3
PAINLEVEL_OUTOF10: 6
PAINLEVEL_OUTOF10: 6
PAINLEVEL_OUTOF10: 3
PAINLEVEL_OUTOF10: 5
PAINLEVEL_OUTOF10: 3
PAINLEVEL_OUTOF10: 2
PAINLEVEL_OUTOF10: 7
PAINLEVEL_OUTOF10: 6
PAINLEVEL_OUTOF10: 4
PAINLEVEL_OUTOF10: 7
PAINLEVEL_OUTOF10: 7
PAINLEVEL_OUTOF10: 6
PAINLEVEL_OUTOF10: 4
PAINLEVEL_OUTOF10: 7

## 2025-04-27 ASSESSMENT — PAIN DESCRIPTION - FREQUENCY
FREQUENCY: CONTINUOUS

## 2025-04-27 ASSESSMENT — PAIN DESCRIPTION - PAIN TYPE
TYPE: CHRONIC PAIN

## 2025-04-27 ASSESSMENT — PAIN DESCRIPTION - ORIENTATION
ORIENTATION: ANTERIOR;POSTERIOR
ORIENTATION: ANTERIOR
ORIENTATION: ANTERIOR;POSTERIOR
ORIENTATION: ANTERIOR;POSTERIOR
ORIENTATION: RIGHT;LEFT;POSTERIOR
ORIENTATION: ANTERIOR;POSTERIOR

## 2025-04-27 ASSESSMENT — PAIN DESCRIPTION - ONSET
ONSET: ON-GOING

## 2025-04-27 ASSESSMENT — PAIN SCALES - WONG BAKER
WONGBAKER_NUMERICALRESPONSE: HURTS LITTLE MORE
WONGBAKER_NUMERICALRESPONSE: HURTS EVEN MORE
WONGBAKER_NUMERICALRESPONSE: HURTS A LITTLE BIT
WONGBAKER_NUMERICALRESPONSE: HURTS EVEN MORE
WONGBAKER_NUMERICALRESPONSE: HURTS EVEN MORE
WONGBAKER_NUMERICALRESPONSE: NO HURT
WONGBAKER_NUMERICALRESPONSE: HURTS EVEN MORE

## 2025-04-27 ASSESSMENT — PAIN - FUNCTIONAL ASSESSMENT
PAIN_FUNCTIONAL_ASSESSMENT: ACTIVITIES ARE NOT PREVENTED

## 2025-04-27 ASSESSMENT — PAIN DESCRIPTION - LOCATION
LOCATION: BACK;LEG
LOCATION: LEG;BACK
LOCATION: BACK;LEG

## 2025-04-27 ASSESSMENT — PAIN DESCRIPTION - DESCRIPTORS
DESCRIPTORS: ACHING

## 2025-04-27 NOTE — PLAN OF CARE
Problem: Discharge Planning  Goal: Discharge to home or other facility with appropriate resources  4/26/2025 2049 by Cely Catherine, RN  Outcome: Progressing  Flowsheets (Taken 4/26/2025 1950)  Discharge to home or other facility with appropriate resources: Identify barriers to discharge with patient and caregiver  4/26/2025 1104 by Oksana Jones, RN  Outcome: Progressing  Flowsheets  Taken 4/26/2025 0900 by Oksana Jones, RN  Discharge to home or other facility with appropriate resources: Identify barriers to discharge with patient and caregiver  Taken 4/25/2025 1138 by Yael Jefferson, RN  Discharge to home or other facility with appropriate resources:   Identify barriers to discharge with patient and caregiver   Arrange for needed discharge resources and transportation as appropriate     Problem: Pain  Goal: Verbalizes/displays adequate comfort level or baseline comfort level  4/26/2025 2049 by Cely Catherine, RN  Outcome: Progressing  Flowsheets (Taken 4/26/2025 1945)  Verbalizes/displays adequate comfort level or baseline comfort level:   Assess pain using appropriate pain scale   Encourage patient to monitor pain and request assistance  4/26/2025 1104 by Oksana Jones, RN  Outcome: Progressing  Flowsheets (Taken 4/25/2025 1138 by Yael Jefferson RN)  Verbalizes/displays adequate comfort level or baseline comfort level:   Encourage patient to monitor pain and request assistance   Assess pain using appropriate pain scale      Stable

## 2025-04-27 NOTE — PLAN OF CARE
Problem: Discharge Planning  Goal: Discharge to home or other facility with appropriate resources  Flowsheets (Taken 4/27/2025 0915)  Discharge to home or other facility with appropriate resources: Identify barriers to discharge with patient and caregiver     Problem: Pain  Goal: Verbalizes/displays adequate comfort level or baseline comfort level  Flowsheets (Taken 4/27/2025 0400 by Cely Catherine, RN)  Verbalizes/displays adequate comfort level or baseline comfort level:   Encourage patient to monitor pain and request assistance   Assess pain using appropriate pain scale

## 2025-04-27 NOTE — PROGRESS NOTES
[facility]  Progress Note    Patient: Norma Galan Age: 44 y.o. : 1980 MR#: [id number] SSN: [id number]  Date/Time: 2025    Subjective:     I personally saw and evaluated this patient face-to-face today.  Patient is sitting in bed, awake and alert.  Still has generalized pains    Assessment/Plan:     Sickle cell pain crisis.  Anemia    Plan  Judicious pain control.  I discussed with patient and that current regimen is working for him  Continue IV fluids, oxygen, subcu Lovenox  Monitor blood counts and reticulocyte count  On Hydrea  Incentive spirometry  I discussed the care plan with the patient and he verbalized understanding    -patient continues to have generalized pains and does not feel ready to go home.  Reticulocyte count is still elevated.  Continue judicious pain control and IV fluids and oxygen and subcu Lovenox.  I discussed care plan with the patient    Disposition-likely home soon, possibly     Case discussed with:  [x]Patient  []Family  []Nursing  []Case Management  DVT Prophylaxis:  [x]Lovenox  []Hep SQ  []SCDs  []Coumadin   []On Heparin gtt    Objective:   VS: /80   Pulse 77   Temp 98.5 °F (36.9 °C) (Oral)   Resp 18   Ht 1.829 m (6')   Wt 87.5 kg (193 lb)   SpO2 96%   BMI 26.18 kg/m²    Tmax/24hrs: Temp (24hrs), Av.9 °F (36.6 °C), Min:97.5 °F (36.4 °C), Max:98.5 °F (36.9 °C)    Input/Output:   Intake/Output Summary (Last 24 hours) at 2025 1606  Last data filed at 2025 1220  Gross per 24 hour   Intake 3437.69 ml   Output --   Net 3437.69 ml       General:  Awake, alert  Cardiovascular:  S1S2+, RRR  Pulmonary:  CTA b/l  GI:  Soft, BS+, NT, ND  Extremities:  No edema        Labs:    Recent Results (from the past 24 hours)   Reticulocytes    Collection Time: 25  5:06 AM   Result Value Ref Range    Reticulocyte Count,Automated 9.9 (H) 0.5 - 2.5 %   CBC with Auto Differential    Collection Time:

## 2025-04-27 NOTE — PROGRESS NOTES
4 Eyes Skin Assessment     NAME:  Norma Galan  YOB: 1980  MEDICAL RECORD NUMBER:  [id number]    The patient is being assessed for  Shift Handoff    I agree that at least one RN has performed a thorough Head to Toe Skin Assessment on the patient. ALL assessment sites listed below have been assessed.      Areas assessed by both nurses:    Head, Face, Ears, Shoulders, Back, Chest, Arms, Elbows, Hands, Sacrum. Buttock, Coccyx, Ischium, Legs. Feet and Heels, and Under Medical Devices         Does the Patient have a Wound? No noted wound(s)       Tima Prevention initiated by RN: No  Wound Care Orders initiated by RN: No    Pressure Injury (Stage 3,4, Unstageable, DTI, NWPT, and Complex wounds) if present, place Wound referral order by RN under : No    New Ostomies, if present place, Ostomy referral order under : No     Nurse 1 eSignature: Electronically signed by Oksana Jones RN on 4/27/25 at 7:59 PM EDT    **SHARE this note so that the co-signing nurse can place an eSignature**    Nurse 2 eSignature: Electronically signed by Rachana Solis RN on 4/27/25 at 1935 PM EDT

## 2025-04-27 NOTE — PROGRESS NOTES
Received verbal bedside report from ongoing nurse, Oksana HURTADO. Report given to oncoming nurses Rachana, BRYANT and Cely RN. Received patient in good spirits sitting in bed in high fowlers position. A/Ox4, with compaints of pain and discomfort located in the lower back and bilateral legs. Pain rating 6 out of 10. Informed patient per MAR not time for pain medication but will medicate as ordered. Assessment completed, Lung sounds auscultated, clear bilaterally. No SOB or respiratory distress noted. Active bowel sounds all four quads. No tenderness or pain noted. Last BM reported yesterday. Standard safety measures in place, bed in lowest position with bed locked. Call light and telephone, siderails upx3 and overbed table w/in reach.

## 2025-04-28 VITALS
HEART RATE: 76 BPM | SYSTOLIC BLOOD PRESSURE: 135 MMHG | DIASTOLIC BLOOD PRESSURE: 52 MMHG | WEIGHT: 193 LBS | BODY MASS INDEX: 26.14 KG/M2 | HEIGHT: 72 IN | TEMPERATURE: 98.1 F | RESPIRATION RATE: 18 BRPM | OXYGEN SATURATION: 98 %

## 2025-04-28 PROBLEM — D57.00 SICKLE CELL PAIN CRISIS (HCC): Status: RESOLVED | Noted: 2020-01-08 | Resolved: 2025-04-28

## 2025-04-28 PROCEDURE — 6360000002 HC RX W HCPCS: Performed by: HOSPITALIST

## 2025-04-28 PROCEDURE — 6370000000 HC RX 637 (ALT 250 FOR IP): Performed by: INTERNAL MEDICINE

## 2025-04-28 PROCEDURE — 99239 HOSP IP/OBS DSCHRG MGMT >30: CPT | Performed by: HOSPITALIST

## 2025-04-28 PROCEDURE — 94761 N-INVAS EAR/PLS OXIMETRY MLT: CPT

## 2025-04-28 PROCEDURE — 6360000002 HC RX W HCPCS: Performed by: HEALTH CARE PROVIDER

## 2025-04-28 PROCEDURE — 6360000002 HC RX W HCPCS: Performed by: INTERNAL MEDICINE

## 2025-04-28 PROCEDURE — 2500000003 HC RX 250 WO HCPCS: Performed by: INTERNAL MEDICINE

## 2025-04-28 RX ADMIN — HYDROMORPHONE HYDROCHLORIDE 1 MG: 1 INJECTION, SOLUTION INTRAMUSCULAR; INTRAVENOUS; SUBCUTANEOUS at 15:51

## 2025-04-28 RX ADMIN — HYDROMORPHONE HYDROCHLORIDE 1 MG: 1 INJECTION, SOLUTION INTRAMUSCULAR; INTRAVENOUS; SUBCUTANEOUS at 05:12

## 2025-04-28 RX ADMIN — SODIUM CHLORIDE, PRESERVATIVE FREE 10 ML: 5 INJECTION INTRAVENOUS at 08:40

## 2025-04-28 RX ADMIN — ENOXAPARIN SODIUM 40 MG: 100 INJECTION SUBCUTANEOUS at 08:40

## 2025-04-28 RX ADMIN — PANTOPRAZOLE SODIUM 40 MG: 40 TABLET, DELAYED RELEASE ORAL at 06:38

## 2025-04-28 RX ADMIN — FOLIC ACID 1 MG: 1 TABLET ORAL at 08:40

## 2025-04-28 RX ADMIN — HYDROXYUREA 500 MG: 500 CAPSULE ORAL at 08:41

## 2025-04-28 RX ADMIN — HYDROMORPHONE HYDROCHLORIDE 1 MG: 1 INJECTION, SOLUTION INTRAMUSCULAR; INTRAVENOUS; SUBCUTANEOUS at 11:54

## 2025-04-28 RX ADMIN — HYDROMORPHONE HYDROCHLORIDE 1 MG: 1 INJECTION, SOLUTION INTRAMUSCULAR; INTRAVENOUS; SUBCUTANEOUS at 01:27

## 2025-04-28 RX ADMIN — HYDROMORPHONE HYDROCHLORIDE 1 MG: 1 INJECTION, SOLUTION INTRAMUSCULAR; INTRAVENOUS; SUBCUTANEOUS at 08:41

## 2025-04-28 RX ADMIN — THERA TABS 1 TABLET: TAB at 08:40

## 2025-04-28 RX ADMIN — DIPHENHYDRAMINE HYDROCHLORIDE 25 MG: 50 INJECTION INTRAMUSCULAR; INTRAVENOUS at 01:27

## 2025-04-28 ASSESSMENT — PAIN DESCRIPTION - PAIN TYPE
TYPE: CHRONIC PAIN

## 2025-04-28 ASSESSMENT — PAIN DESCRIPTION - FREQUENCY
FREQUENCY: CONTINUOUS

## 2025-04-28 ASSESSMENT — PAIN SCALES - GENERAL
PAINLEVEL_OUTOF10: 3
PAINLEVEL_OUTOF10: 6
PAINLEVEL_OUTOF10: 3
PAINLEVEL_OUTOF10: 2
PAINLEVEL_OUTOF10: 3
PAINLEVEL_OUTOF10: 2
PAINLEVEL_OUTOF10: 6
PAINLEVEL_OUTOF10: 7
PAINLEVEL_OUTOF10: 6
PAINLEVEL_OUTOF10: 0
PAINLEVEL_OUTOF10: 2
PAINLEVEL_OUTOF10: 6

## 2025-04-28 ASSESSMENT — PAIN DESCRIPTION - DESCRIPTORS
DESCRIPTORS: ACHING
DESCRIPTORS: ACHING;DISCOMFORT
DESCRIPTORS: ACHING

## 2025-04-28 ASSESSMENT — PAIN DESCRIPTION - LOCATION
LOCATION: BACK;LEG
LOCATION: BACK;LEG
LOCATION: GENERALIZED;BACK;LEG
LOCATION: GENERALIZED
LOCATION: BACK;GENERALIZED

## 2025-04-28 ASSESSMENT — PAIN DESCRIPTION - ORIENTATION
ORIENTATION: OTHER (COMMENT)
ORIENTATION: ANTERIOR;POSTERIOR
ORIENTATION: ANTERIOR;POSTERIOR
ORIENTATION: POSTERIOR;ANTERIOR
ORIENTATION: RIGHT;LEFT;LOWER

## 2025-04-28 ASSESSMENT — PAIN SCALES - WONG BAKER
WONGBAKER_NUMERICALRESPONSE: HURTS A LITTLE BIT
WONGBAKER_NUMERICALRESPONSE: HURTS A LITTLE BIT
WONGBAKER_NUMERICALRESPONSE: HURTS LITTLE MORE
WONGBAKER_NUMERICALRESPONSE: HURTS A LITTLE BIT
WONGBAKER_NUMERICALRESPONSE: HURTS EVEN MORE
WONGBAKER_NUMERICALRESPONSE: HURTS A LITTLE BIT

## 2025-04-28 ASSESSMENT — PAIN DESCRIPTION - ONSET
ONSET: GRADUAL
ONSET: ON-GOING
ONSET: GRADUAL
ONSET: GRADUAL

## 2025-04-28 ASSESSMENT — PAIN - FUNCTIONAL ASSESSMENT
PAIN_FUNCTIONAL_ASSESSMENT: ACTIVITIES ARE NOT PREVENTED

## 2025-04-28 NOTE — CARE COORDINATION
04/28/25 1226   IMM Letter   IMM Letter given to Patient/Family/Significant other/Guardian/POA/by: Cassandra Spencer   IMM Letter date given: 04/28/25   IMM Letter time given: 1226     Patient waived 4 hour notice of discharge requirement by Medicare.       Cassandra Spencer BSW  Case Management    
Care Consult (CM Consult) N/A   Services At/After Discharge None    Resource Information Provided? No   Mode of Transport at Discharge Other (see comment)  (spouse)   Confirm Follow Up Transport Family   Condition of Participation: Discharge Planning   The Plan for Transition of Care is related to the following treatment goals: Plan is to return home with family.     Lorraine HAYWOODW   Case Management

## 2025-04-28 NOTE — PROGRESS NOTES
4 Eyes Skin Assessment     NAME:  Norma Galan  YOB: 1980  MEDICAL RECORD NUMBER:  [id number]    The patient is being assessed for  Shift Handoff    I agree that at least one RN has performed a thorough Head to Toe Skin Assessment on the patient. ALL assessment sites listed below have been assessed.      Areas assessed by both nurses:    Sacrum. Buttock, Coccyx, Ischium        Does the Patient have a Wound? No noted wound(s)       Tima Prevention initiated by RN: No  Wound Care Orders initiated by RN: No    Pressure Injury (Stage 3,4, Unstageable, DTI, NWPT, and Complex wounds) if present, place Wound referral order by RN under : No    New Ostomies, if present place, Ostomy referral order under : No     Nurse 1 eSignature: Electronically signed by Rachana Solis RN on 4/28/25 at 6:42 AM EDT    **SHARE this note so that the co-signing nurse can place an eSignature**    Nurse 2 eSignature: {Esignature:[id number]}

## 2025-04-28 NOTE — PLAN OF CARE
Problem: Pain  Goal: Verbalizes/displays adequate comfort level or baseline comfort level  Outcome: Progressing  Note: Pt is here for sickle cell crisis. Current pain med is working. Pt is encouraged to report pain.     Problem: Safety - Adult  Goal: Free from fall injury  Outcome: Progressing  Note: Pt is alert and oriented and able to make needs known. He was educated on how to stay safe.

## 2025-04-28 NOTE — DISCHARGE SUMMARY
Discharge Summary    Patient: Norma Galan MRN: [id number]  CSN: [id number]    YOB: 1980  Age: 44 y.o.  Sex: male    DOA: 4/24/2025 LOS:  LOS: 3 days        Disposition: Home     Discharge Date: 4/28/2025    Admission Diagnosis: Sickle cell pain crisis (HCC) [D57.00]  Acute bilateral low back pain without sciatica [M54.50]    Discharge Diagnosis:    Sickle cell pain crisis.  Chronic anemia    Discharge Condition: Stable      PHYSICAL EXAM  Visit Vitals  BP (!) 135/52   Pulse 76   Temp 98.1 °F (36.7 °C) (Oral)   Resp 18   Ht 1.829 m (6')   Wt 87.5 kg (193 lb)   SpO2 98%   BMI 26.18 kg/m²       General: Alert, cooperative, no acute distress    Lungs:  CTA Bilaterally. No Wheezing/Rales.  Heart:             Regular rate and Rhythm.  Abdomen: Soft, Non distended, Non tender. + Bowel sounds.  Extremities: No edema.  Psych:   Good insight. Not anxious or agitated.  Neurologic:  AA, oriented X 3. Moves all ext                               HPI  44 y.o. male with significant past medical history of sickle cell disease who presents with complaint of bodyaches and overwhelming back pain.  Patient also notes bilateral lower extremity pain.  He states that this is very typical for his usual sickle cell pain crises.  Patient attempted to manage at home to no avail.  This ultimately led to him presenting to the Emergency Department on 4/24/2025.  Laboratory evaluation of patient was had patient was found to have a reassuring CMP with borderline low potassium at 3.4 and normal albumin at 4.1.  CBC however revealed patient to be anemic with a hemoglobin of 7.3 which appears to be down from his usual 8.5-9.  Patient denied any blood loss in the form of hemoptysis hematemesis hematuria dyschezia or melena.  Patient denies any dysuria.  Patient states that he has been compliant with his medications including his hydroxyurea.     Hospital Course:   Patient was admitted hospital impression of sickle cell crisis,

## 2025-04-28 NOTE — PROGRESS NOTES
Received pt in bed. No s/s of any pain or discomfort. Resting in bed with eyes close. Easy to arouse. RR even an non labored. No bowel movement so far. Administer medications. Care plan reviewed an all questions answered. Encouraged pt to get out of bed today.  They verbalized understanding. Repositioned patient. Hydration provided. Call bell within reach an bed is in the lowest position.

## 2025-04-28 NOTE — PROGRESS NOTES
Pt is alert and oriented and able to make needs known. No s/s of any pain or discomfort. Discharge instructions reviewed in full detail with the patient. Opportunity given for questions and answers provided.  All belongings are with the patient. Pt is waiting til his ride gets here at 5pm. Pt is discharged in stable condition. Iv was not removed. Pt would like it removed right before he leaves.

## 2025-04-28 NOTE — PLAN OF CARE
Problem: Discharge Planning  Goal: Discharge to home or other facility with appropriate resources  4/27/2025 2032 by Cely Catherine, RN  Outcome: Progressing  Flowsheets (Taken 4/27/2025 1949)  Discharge to home or other facility with appropriate resources: Identify barriers to discharge with patient and caregiver  4/27/2025 1414 by Oksana Jones, RN  Flowsheets (Taken 4/27/2025 0915)  Discharge to home or other facility with appropriate resources: Identify barriers to discharge with patient and caregiver     Problem: Pain  Goal: Verbalizes/displays adequate comfort level or baseline comfort level  4/27/2025 2032 by Cely Catherine, RN  Outcome: Progressing  Flowsheets (Taken 4/27/2025 1945)  Verbalizes/displays adequate comfort level or baseline comfort level:   Assess pain using appropriate pain scale   Encourage patient to monitor pain and request assistance  4/27/2025 1414 by Oksana Jones, RN  Flowsheets (Taken 4/27/2025 0400 by Cely Catherine, RN)  Verbalizes/displays adequate comfort level or baseline comfort level:   Encourage patient to monitor pain and request assistance   Assess pain using appropriate pain scale

## 2025-04-28 NOTE — DISCHARGE INSTRUCTIONS
Discharge Instructions    Patient: Norma Galan MRN: [id number]  [facility]: [id number]    YOB: 1980  Age: 44 y.o.  Sex: male    DOA: 4/24/2025       DIET:  regular diet    ACTIVITY: activity as tolerated    ADDITIONAL INFORMATION: If you experience any of the following symptoms but not limited to Fever, chills, nausea, vomiting, diarrhea, change in mentation, falling, bleeding, shortness of breath, chest pain, please call your primary care physician or return to the emergency room if you cannot get hold of your doctor:     FOLLOW UP CARE:      Tha Mcneil MD  4/28/2025 11:36 AM

## 2025-04-28 NOTE — CASE COMMUNICATION
D/C order noted for today. Orders reviewed. No needs identified at this time. Patient sister Maria Fernanda to transport today at 5:00 pm SW remains available if needed.         Cassandra Spencer BSW  Case Management

## 2025-04-28 NOTE — PROGRESS NOTES
Received verbal bedside report from ongoing nurse, Oksana HURTADO. Report given to oncoming nurses BRYANT Reich and Cely RN. Received patient in good spirits sitting in bed in high fowlers position watching TV A/Ox4, with compaints of pain and discomfort located in the lower back and bilateral legs. Pain rating 7 out of 10. Informed patient per MAR not time for pain medication but will medicate as ordered. Assessment completed, Lung sounds auscultated, clear bilaterally. No SOB or respiratory distress noted. Active bowel sounds all four quads. No tenderness or pain noted. Last BM reported yesterday. Standard safety measures in place, bed in lowest position with bed locked. Call light and telephone, siderails upx3 and overbed table w/in reach. Fresh water st bedside.

## 2025-06-14 ENCOUNTER — HOSPITAL ENCOUNTER (EMERGENCY)
Facility: HOSPITAL | Age: 45
Discharge: HOME | End: 2025-06-14
Attending: STUDENT IN AN ORGANIZED HEALTH CARE EDUCATION/TRAINING PROGRAM
Payer: MEDICARE

## 2025-06-14 VITALS
OXYGEN SATURATION: 98 % | RESPIRATION RATE: 20 BRPM | WEIGHT: 190 LBS | HEART RATE: 88 BPM | SYSTOLIC BLOOD PRESSURE: 134 MMHG | HEIGHT: 70 IN | BODY MASS INDEX: 27.2 KG/M2 | TEMPERATURE: 98.9 F | DIASTOLIC BLOOD PRESSURE: 79 MMHG

## 2025-06-14 DIAGNOSIS — D57.00 SICKLE CELL PAIN CRISIS (HCC): Primary | ICD-10-CM

## 2025-06-14 LAB
ANION GAP SERPL CALC-SCNC: 14 MMOL/L (ref 3–18)
BASOPHILS # BLD: 0 K/UL (ref 0–0.1)
BASOPHILS NFR BLD: 0 % (ref 0–2)
BUN SERPL-MCNC: 7 MG/DL (ref 6–23)
BUN/CREAT SERPL: 9 (ref 12–20)
CALCIUM SERPL-MCNC: 8.9 MG/DL (ref 8.5–10.1)
CHLORIDE SERPL-SCNC: 107 MMOL/L (ref 98–107)
CO2 SERPL-SCNC: 20 MMOL/L (ref 21–32)
CREAT SERPL-MCNC: 0.82 MG/DL (ref 0.6–1.3)
DIFFERENTIAL METHOD BLD: ABNORMAL
EOSINOPHIL # BLD: 0.38 K/UL (ref 0–0.4)
EOSINOPHIL NFR BLD: 3 % (ref 0–5)
ERYTHROCYTE [DISTWIDTH] IN BLOOD BY AUTOMATED COUNT: 23.2 % (ref 11.6–14.5)
GLUCOSE SERPL-MCNC: 103 MG/DL (ref 74–108)
HCT VFR BLD AUTO: 24.1 % (ref 36–48)
HGB BLD-MCNC: 8.2 G/DL (ref 13–16)
IMM GRANULOCYTES # BLD AUTO: 0 K/UL (ref 0–0.04)
IMM GRANULOCYTES NFR BLD AUTO: 0 % (ref 0–0.5)
LYMPHOCYTES # BLD: 4.1 K/UL (ref 0.9–3.6)
LYMPHOCYTES NFR BLD: 32 % (ref 21–52)
MCH RBC QN AUTO: 27.7 PG (ref 24–34)
MCHC RBC AUTO-ENTMCNC: 34 G/DL (ref 31–37)
MCV RBC AUTO: 81.4 FL (ref 78–100)
MONOCYTES # BLD: 1.28 K/UL (ref 0.05–1.2)
MONOCYTES NFR BLD: 10 % (ref 3–10)
NEUTS SEG # BLD: 7.04 K/UL (ref 1.8–8)
NEUTS SEG NFR BLD: 55 % (ref 40–73)
NRBC # BLD: 0.85 K/UL (ref 0–0.01)
NRBC BLD-RTO: 6.7 PER 100 WBC
PLATELET # BLD AUTO: 214 K/UL (ref 135–420)
PLATELET COMMENT: ABNORMAL
PMV BLD AUTO: 11.1 FL (ref 9.2–11.8)
POTASSIUM SERPL-SCNC: 3.7 MMOL/L (ref 3.5–5.5)
RBC # BLD AUTO: 2.96 M/UL (ref 4.35–5.65)
RBC MORPH BLD: ABNORMAL
RETICS/RBC NFR AUTO: 9.1 % (ref 0.5–2.5)
SODIUM SERPL-SCNC: 140 MMOL/L (ref 136–145)
WBC # BLD AUTO: 12.8 K/UL (ref 4.6–13.2)

## 2025-06-14 PROCEDURE — 85045 AUTOMATED RETICULOCYTE COUNT: CPT

## 2025-06-14 PROCEDURE — 6360000002 HC RX W HCPCS: Performed by: STUDENT IN AN ORGANIZED HEALTH CARE EDUCATION/TRAINING PROGRAM

## 2025-06-14 PROCEDURE — 96374 THER/PROPH/DIAG INJ IV PUSH: CPT

## 2025-06-14 PROCEDURE — 85025 COMPLETE CBC W/AUTO DIFF WBC: CPT

## 2025-06-14 PROCEDURE — 96375 TX/PRO/DX INJ NEW DRUG ADDON: CPT

## 2025-06-14 PROCEDURE — 80048 BASIC METABOLIC PNL TOTAL CA: CPT

## 2025-06-14 PROCEDURE — 6370000000 HC RX 637 (ALT 250 FOR IP)

## 2025-06-14 PROCEDURE — 96376 TX/PRO/DX INJ SAME DRUG ADON: CPT

## 2025-06-14 PROCEDURE — 99284 EMERGENCY DEPT VISIT MOD MDM: CPT

## 2025-06-14 PROCEDURE — 6360000002 HC RX W HCPCS

## 2025-06-14 PROCEDURE — 6370000000 HC RX 637 (ALT 250 FOR IP): Performed by: STUDENT IN AN ORGANIZED HEALTH CARE EDUCATION/TRAINING PROGRAM

## 2025-06-14 RX ORDER — HYDROMORPHONE HYDROCHLORIDE 1 MG/ML
1 INJECTION, SOLUTION INTRAMUSCULAR; INTRAVENOUS; SUBCUTANEOUS
Refills: 0 | Status: COMPLETED | OUTPATIENT
Start: 2025-06-14 | End: 2025-06-14

## 2025-06-14 RX ORDER — HYDROMORPHONE HYDROCHLORIDE 1 MG/ML
1 INJECTION, SOLUTION INTRAMUSCULAR; INTRAVENOUS; SUBCUTANEOUS
Status: COMPLETED | OUTPATIENT
Start: 2025-06-14 | End: 2025-06-14

## 2025-06-14 RX ORDER — DIPHENHYDRAMINE HYDROCHLORIDE 50 MG/ML
50 INJECTION, SOLUTION INTRAMUSCULAR; INTRAVENOUS ONCE
Status: COMPLETED | OUTPATIENT
Start: 2025-06-14 | End: 2025-06-14

## 2025-06-14 RX ORDER — 0.9 % SODIUM CHLORIDE 0.9 %
1000 INTRAVENOUS SOLUTION INTRAVENOUS ONCE
Status: DISCONTINUED | OUTPATIENT
Start: 2025-06-14 | End: 2025-06-14

## 2025-06-14 RX ORDER — ACETAMINOPHEN 500 MG
1000 TABLET ORAL
Status: COMPLETED | OUTPATIENT
Start: 2025-06-14 | End: 2025-06-14

## 2025-06-14 RX ORDER — KETOROLAC TROMETHAMINE 15 MG/ML
30 INJECTION, SOLUTION INTRAMUSCULAR; INTRAVENOUS
Status: COMPLETED | OUTPATIENT
Start: 2025-06-14 | End: 2025-06-14

## 2025-06-14 RX ORDER — DIPHENHYDRAMINE HCL 50 MG
50 CAPSULE ORAL
Status: COMPLETED | OUTPATIENT
Start: 2025-06-14 | End: 2025-06-14

## 2025-06-14 RX ADMIN — DIPHENHYDRAMINE HYDROCHLORIDE 50 MG: 50 CAPSULE ORAL at 10:44

## 2025-06-14 RX ADMIN — ACETAMINOPHEN 1000 MG: 500 TABLET ORAL at 08:07

## 2025-06-14 RX ADMIN — KETOROLAC TROMETHAMINE 30 MG: 15 INJECTION, SOLUTION INTRAMUSCULAR; INTRAVENOUS at 08:08

## 2025-06-14 RX ADMIN — HYDROMORPHONE HYDROCHLORIDE 1 MG: 1 INJECTION, SOLUTION INTRAMUSCULAR; INTRAVENOUS; SUBCUTANEOUS at 08:50

## 2025-06-14 RX ADMIN — HYDROMORPHONE HYDROCHLORIDE 1 MG: 1 INJECTION, SOLUTION INTRAMUSCULAR; INTRAVENOUS; SUBCUTANEOUS at 10:08

## 2025-06-14 RX ADMIN — HYDROMORPHONE HYDROCHLORIDE 1 MG: 1 INJECTION, SOLUTION INTRAMUSCULAR; INTRAVENOUS; SUBCUTANEOUS at 08:09

## 2025-06-14 RX ADMIN — DIPHENHYDRAMINE HYDROCHLORIDE 50 MG: 50 INJECTION INTRAMUSCULAR; INTRAVENOUS at 08:09

## 2025-06-14 ASSESSMENT — PAIN - FUNCTIONAL ASSESSMENT: PAIN_FUNCTIONAL_ASSESSMENT: 0-10

## 2025-06-14 ASSESSMENT — PAIN SCALES - GENERAL
PAINLEVEL_OUTOF10: 5
PAINLEVEL_OUTOF10: 7
PAINLEVEL_OUTOF10: 8

## 2025-06-14 ASSESSMENT — PAIN DESCRIPTION - LOCATION: LOCATION: BACK;LEG

## 2025-06-14 ASSESSMENT — PAIN DESCRIPTION - ORIENTATION: ORIENTATION: RIGHT;LEFT

## 2025-06-14 NOTE — DISCHARGE INSTRUCTIONS
You were seen in the emergency department today for sickle cell pain crisis.  Please remain well-hydrated, especially as we had into the summer months.  Follow-up with your hematologist/oncologist as needed.  Please continue to take your daily medications, and use your pain medications as previously prescribed.  Please return to the emergency department if you have a fast heart rate, worse breathing, chest pain, lose the ability to control your bowel or bladder, lose the ability to walk, or any other new or concerning findings.  Thank you for allowing us to be part of your care today

## 2025-06-14 NOTE — ED PROVIDER NOTES
EMERGENCY DEPARTMENT HISTORY AND PHYSICAL EXAM      Date: 6/14/2025  Patient Name: Norma Galan    History of Presenting Illness     Chief Complaint   Patient presents with    Sickle Cell Pain Crisis       History (Context): Norma Galan is a 44 y.o. male  presents to the ED today with chief complaint of low back pain, bilateral leg achiness.  Patient states that his legs and low back been bothering him since yesterday, patient has a history of sickle cell disease.  Last admission for pain crisis was in March 2025; patient states that he follows with his hematologist regularly, takes his hydroxyurea regularly.  States that he took his home Dilaudid this morning prior to arrival with minimal relief.  States that he works at an [employer] which requires significant amount of walking and heavy lifting.  Of note there has been a heat wave, risk of dehydration is higher than usual.  Denies fevers, chills, headache, visual changes, chest pain, shortness of breath, productive cough, abdominal pain, nausea, vomiting, urinary changes, blood in urine or stool.  Denies history of avascular necrosis, denies joint pain, denies pinpoint tenderness.       PCP: Catrachita Chaparro MD    Current Facility-Administered Medications   Medication Dose Route Frequency Provider Last Rate Last Admin    acetaminophen (TYLENOL) tablet 1,000 mg  1,000 mg Oral NOW Gerson Kamara,         ketorolac (TORADOL) injection 30 mg  30 mg IntraVENous NOW Gerson Kamara,         sodium chloride 0.9 % bolus 1,000 mL  1,000 mL IntraVENous Once Gerson Kamara, DO        HYDROmorphone HCl PF (DILAUDID) injection 1 mg  1 mg IntraVENous NOW Gerson Kamara, DO         Current Outpatient Medications   Medication Sig Dispense Refill    ibuprofen (ADVIL;MOTRIN) 400 MG tablet Take 1 tablet by mouth every 8 hours as needed for Pain or Fever 120 tablet 3    pantoprazole (PROTONIX) 40 MG tablet Take 1 tablet by mouth every morning (before  breakfast) 30 tablet 3    docusate sodium (COLACE, DULCOLAX) 100 MG CAPS Take 100 mg by mouth daily 30 capsule 0    Multiple Vitamins-Minerals (THERAPEUTIC MULTIVITAMIN-MINERALS) tablet Take 1 tablet by mouth daily      folic acid (FOLVITE) 1 MG tablet Take 1 tablet by mouth daily 30 tablet 3    polyethylene glycol (GLYCOLAX) 17 g packet Take 1 packet by mouth daily 30 packet 0    HYDROmorphone (DILAUDID) 4 MG tablet TAKE 1 TABLET BY MOUTH EVERY 4 TO 6 HOURS AS NEEDED FOR PAIN      hydroxyurea (HYDREA) 500 MG chemo capsule Take by mouth 2 times daily         Past History     Past Medical History:   Past Medical History:   Diagnosis Date    B12 deficiency 03/15/2010    210    Cholelithiasis 09/17/2012    U/S Result: Cholelithiasis    Elevated alkaline phosphatase level 03/21/2010    158    Elevated ALT measurement 03/21/2010    71    Elevated AST (SGOT) 03/10/2012    38    Elevated platelet count 10/25/2007    494    Elevated total protein 12/27/2011    8.7    History of blood transfusion     Hypocalcemia 07/06/2011    4.1    Hypokalemia     Hyponatremia 11/18/2009    Leukocytosis 11/16/2009    Microcytic anemia 10/25/2007    Pleural effusion on right 07/15/2015    [facility] CXR Result    Reticulocytosis 10/25/2007    7.8    Serum total bilirubin elevated 12/27/2011    T.B. 3.8, D.B. 0.4.     Sickle cell anemia with crisis (HCC)     Dr. Catrachita Chaparro    Vitamin D deficiency 01/20/2016    5.4       Past Surgical History:  Past Surgical History:   Procedure Laterality Date    CHOLECYSTECTOMY         Family History:  Family History   Problem Relation Age of Onset    Hypertension Neg Hx     Stroke Neg Hx     Heart Attack Neg Hx     Heart Disease Neg Hx     Diabetes Neg Hx     Cancer Neg Hx        Social History:   Social History     Tobacco Use    Smoking status: Never    Smokeless tobacco: Never   Substance Use Topics    Alcohol use: Yes    Drug use: No       Allergies:  Allergies   Allergen Reactions    Milk (Cow)

## 2025-08-18 ENCOUNTER — HOSPITAL ENCOUNTER (EMERGENCY)
Facility: HOSPITAL | Age: 45
Discharge: HOME | End: 2025-08-18
Attending: STUDENT IN AN ORGANIZED HEALTH CARE EDUCATION/TRAINING PROGRAM
Payer: MEDICARE

## 2025-08-18 VITALS
DIASTOLIC BLOOD PRESSURE: 75 MMHG | OXYGEN SATURATION: 98 % | TEMPERATURE: 98.4 F | HEIGHT: 72 IN | RESPIRATION RATE: 14 BRPM | BODY MASS INDEX: 26.14 KG/M2 | SYSTOLIC BLOOD PRESSURE: 126 MMHG | WEIGHT: 193 LBS | HEART RATE: 80 BPM

## 2025-08-18 DIAGNOSIS — D57.00 SICKLE CELL PAIN CRISIS (HCC): Primary | ICD-10-CM

## 2025-08-18 LAB
ALBUMIN SERPL-MCNC: 4.1 G/DL (ref 3.4–5)
ALBUMIN/GLOB SERPL: 1 (ref 0.8–1.7)
ALP SERPL-CCNC: 151 U/L (ref 45–117)
ALT SERPL-CCNC: 21 U/L (ref 10–50)
ANION GAP SERPL CALC-SCNC: 13 MMOL/L (ref 3–18)
AST SERPL-CCNC: 74 U/L (ref 10–38)
BASOPHILS # BLD: 0 K/UL (ref 0–0.1)
BASOPHILS NFR BLD: 0 % (ref 0–2)
BILIRUB SERPL-MCNC: 5.7 MG/DL (ref 0.2–1)
BUN SERPL-MCNC: 6 MG/DL (ref 6–23)
BUN/CREAT SERPL: 8 (ref 12–20)
CALCIUM SERPL-MCNC: 9.1 MG/DL (ref 8.5–10.1)
CHLORIDE SERPL-SCNC: 107 MMOL/L (ref 98–107)
CO2 SERPL-SCNC: 19 MMOL/L (ref 21–32)
CREAT SERPL-MCNC: 0.86 MG/DL (ref 0.6–1.3)
DIFFERENTIAL METHOD BLD: ABNORMAL
EOSINOPHIL # BLD: 1.2 K/UL (ref 0–0.4)
EOSINOPHIL NFR BLD: 9 % (ref 0–5)
ERYTHROCYTE [DISTWIDTH] IN BLOOD BY AUTOMATED COUNT: 24.9 % (ref 11.6–14.5)
GLOBULIN SER CALC-MCNC: 4.1 G/DL (ref 2–4)
GLUCOSE SERPL-MCNC: 89 MG/DL (ref 74–108)
HCT VFR BLD AUTO: 22.9 % (ref 36–48)
HGB BLD-MCNC: 7.8 G/DL (ref 13–16)
IMM GRANULOCYTES # BLD AUTO: 0 K/UL (ref 0–0.04)
IMM GRANULOCYTES NFR BLD AUTO: 0 % (ref 0–0.5)
LYMPHOCYTES # BLD: 5.98 K/UL (ref 0.9–3.6)
LYMPHOCYTES NFR BLD: 45 % (ref 21–52)
MCH RBC QN AUTO: 27 PG (ref 24–34)
MCHC RBC AUTO-ENTMCNC: 34.1 G/DL (ref 31–37)
MCV RBC AUTO: 79.2 FL (ref 78–100)
MONOCYTES # BLD: 1.2 K/UL (ref 0.05–1.2)
MONOCYTES NFR BLD: 9 % (ref 3–10)
NEUTS SEG # BLD: 4.92 K/UL (ref 1.8–8)
NEUTS SEG NFR BLD: 37 % (ref 40–73)
NRBC # BLD: 0.72 K/UL (ref 0–0.01)
NRBC BLD-RTO: 5.4 PER 100 WBC
PLATELET # BLD AUTO: 287 K/UL (ref 135–420)
PLATELET COMMENT: ABNORMAL
PMV BLD AUTO: 10.5 FL (ref 9.2–11.8)
POTASSIUM SERPL-SCNC: 4.6 MMOL/L (ref 3.5–5.5)
PROT SERPL-MCNC: 8.1 G/DL (ref 6.4–8.2)
RBC # BLD AUTO: 2.89 M/UL (ref 4.35–5.65)
RBC MORPH BLD: ABNORMAL
RETICS/RBC NFR AUTO: 8.9 % (ref 0.5–2.5)
SODIUM SERPL-SCNC: 139 MMOL/L (ref 136–145)
WBC # BLD AUTO: 13.3 K/UL (ref 4.6–13.2)

## 2025-08-18 PROCEDURE — 80053 COMPREHEN METABOLIC PANEL: CPT

## 2025-08-18 PROCEDURE — 6360000002 HC RX W HCPCS: Performed by: STUDENT IN AN ORGANIZED HEALTH CARE EDUCATION/TRAINING PROGRAM

## 2025-08-18 PROCEDURE — 99284 EMERGENCY DEPT VISIT MOD MDM: CPT

## 2025-08-18 PROCEDURE — 85025 COMPLETE CBC W/AUTO DIFF WBC: CPT

## 2025-08-18 PROCEDURE — 6370000000 HC RX 637 (ALT 250 FOR IP): Performed by: STUDENT IN AN ORGANIZED HEALTH CARE EDUCATION/TRAINING PROGRAM

## 2025-08-18 PROCEDURE — 96372 THER/PROPH/DIAG INJ SC/IM: CPT

## 2025-08-18 PROCEDURE — 85045 AUTOMATED RETICULOCYTE COUNT: CPT

## 2025-08-18 RX ORDER — HYDROMORPHONE HYDROCHLORIDE 2 MG/1
8 TABLET ORAL
Refills: 0 | Status: COMPLETED | OUTPATIENT
Start: 2025-08-18 | End: 2025-08-18

## 2025-08-18 RX ORDER — KETOROLAC TROMETHAMINE 15 MG/ML
15 INJECTION, SOLUTION INTRAMUSCULAR; INTRAVENOUS ONCE
Status: DISCONTINUED | OUTPATIENT
Start: 2025-08-18 | End: 2025-08-18

## 2025-08-18 RX ORDER — SODIUM CHLORIDE 450 MG/100ML
INJECTION, SOLUTION INTRAVENOUS CONTINUOUS
Status: DISCONTINUED | OUTPATIENT
Start: 2025-08-18 | End: 2025-08-18 | Stop reason: HOSPADM

## 2025-08-18 RX ORDER — ACETAMINOPHEN 500 MG
1000 TABLET ORAL
Status: COMPLETED | OUTPATIENT
Start: 2025-08-18 | End: 2025-08-18

## 2025-08-18 RX ORDER — HYDROMORPHONE HYDROCHLORIDE 1 MG/ML
1 INJECTION, SOLUTION INTRAMUSCULAR; INTRAVENOUS; SUBCUTANEOUS
Status: COMPLETED | OUTPATIENT
Start: 2025-08-18 | End: 2025-08-18

## 2025-08-18 RX ORDER — HYDROMORPHONE HYDROCHLORIDE 1 MG/ML
1 INJECTION, SOLUTION INTRAMUSCULAR; INTRAVENOUS; SUBCUTANEOUS
Refills: 0 | Status: DISCONTINUED | OUTPATIENT
Start: 2025-08-18 | End: 2025-08-18

## 2025-08-18 RX ORDER — KETOROLAC TROMETHAMINE 15 MG/ML
15 INJECTION, SOLUTION INTRAMUSCULAR; INTRAVENOUS
Status: COMPLETED | OUTPATIENT
Start: 2025-08-18 | End: 2025-08-18

## 2025-08-18 RX ORDER — IBUPROFEN 600 MG/1
600 TABLET, FILM COATED ORAL
Status: COMPLETED | OUTPATIENT
Start: 2025-08-18 | End: 2025-08-18

## 2025-08-18 RX ADMIN — HYDROMORPHONE HYDROCHLORIDE 8 MG: 2 TABLET ORAL at 06:53

## 2025-08-18 RX ADMIN — HYDROMORPHONE HYDROCHLORIDE 1 MG: 1 INJECTION, SOLUTION INTRAMUSCULAR; INTRAVENOUS; SUBCUTANEOUS at 05:32

## 2025-08-18 RX ADMIN — IBUPROFEN 600 MG: 600 TABLET ORAL at 06:52

## 2025-08-18 RX ADMIN — KETOROLAC TROMETHAMINE 15 MG: 15 INJECTION, SOLUTION INTRAMUSCULAR; INTRAVENOUS at 03:51

## 2025-08-18 RX ADMIN — HYDROMORPHONE HYDROCHLORIDE 8 MG: 2 TABLET ORAL at 03:48

## 2025-08-18 RX ADMIN — ACETAMINOPHEN 1000 MG: 500 TABLET, FILM COATED ORAL at 03:46

## 2025-08-18 ASSESSMENT — PAIN DESCRIPTION - LOCATION: LOCATION: BACK;LEG

## 2025-08-18 ASSESSMENT — PAIN SCALES - GENERAL
PAINLEVEL_OUTOF10: 8

## 2025-08-18 ASSESSMENT — PAIN - FUNCTIONAL ASSESSMENT
PAIN_FUNCTIONAL_ASSESSMENT: 0-10

## 2025-08-18 ASSESSMENT — PAIN DESCRIPTION - PAIN TYPE: TYPE: ACUTE PAIN

## 2025-08-18 ASSESSMENT — PAIN DESCRIPTION - ORIENTATION: ORIENTATION: LEFT;RIGHT

## (undated) DEVICE — FLEX ADVANTAGE 3000CC: Brand: FLEX ADVANTAGE

## (undated) DEVICE — DRAPE XR C ARM 41X74IN LF --

## (undated) DEVICE — CATHETER IV 14GA L5.25IN PERIPH ORNG FEP POLYMER 3 BVL

## (undated) DEVICE — 3M™ BAIR PAWS FLEX™ WARMING GOWN, STANDARD, 20 PER CASE 81003: Brand: BAIR PAWS™

## (undated) DEVICE — KENDALL SCD EXPRESS SLEEVES, KNEE LENGTH, MEDIUM: Brand: KENDALL SCD

## (undated) DEVICE — KIT CLN UP BON SECOURS MARYV

## (undated) DEVICE — BLADELESS OPTICAL TROCAR WITH FIXATION CANNULA: Brand: VERSAONE

## (undated) DEVICE — Device

## (undated) DEVICE — NEEDLE INSUF L120MM DIA2MM DISP FOR PNEUMOPERI ENDOPATH

## (undated) DEVICE — PACK PROCEDURE SURG LAPAROSCOPY 17X7 MM BRTRC PRIMUS

## (undated) DEVICE — SUTURE MCRYL SZ 4-0 L27IN ABSRB UD L24MM PS-1 3/8 CIR PRIM Y935H

## (undated) DEVICE — (D)SYR 10ML 1/5ML GRAD NSAF -- PKGING CHANGE USE ITEM 338027

## (undated) DEVICE — APPLIER CLP M/L SHFT DIA5MM 15 LIG LIGAMAX 5

## (undated) DEVICE — BLADELESS OPTICAL TROCAR WITH FIXATION CANNULA: Brand: VERSAPORT

## (undated) DEVICE — SPECIMEN RETRIEVAL POUCH: Brand: ENDO CATCH GOLD

## (undated) DEVICE — GLOVE SURG SZ 7.5 L11.73IN FNGR THK9.8MIL STRW LTX POLYMER

## (undated) DEVICE — SUTURE VCRL SZ 0 L18IN ABSRB UD POLYGLACTIN 910 BRAID TIE J912G

## (undated) DEVICE — Device: Brand: MEDEX

## (undated) DEVICE — SOLUTION LACTATED RINGERS INJECTION USP

## (undated) DEVICE — ADHESIVE TISS DERMA FLEX 0.7ML -- HIGH VISCOSITY

## (undated) DEVICE — DERMABOND SKIN ADH 0.7ML -- DERMABOND ADVANCED 12/BX

## (undated) DEVICE — REM POLYHESIVE ADULT PATIENT RETURN ELECTRODE: Brand: VALLEYLAB